# Patient Record
Sex: FEMALE | Race: WHITE | NOT HISPANIC OR LATINO | Employment: OTHER | URBAN - METROPOLITAN AREA
[De-identification: names, ages, dates, MRNs, and addresses within clinical notes are randomized per-mention and may not be internally consistent; named-entity substitution may affect disease eponyms.]

---

## 2017-01-03 ENCOUNTER — RADIANT APPOINTMENT (OUTPATIENT)
Dept: GENERAL RADIOLOGY | Facility: CLINIC | Age: 62
End: 2017-01-03
Attending: FAMILY MEDICINE
Payer: MEDICARE

## 2017-01-03 ENCOUNTER — OFFICE VISIT (OUTPATIENT)
Dept: FAMILY MEDICINE | Facility: CLINIC | Age: 62
End: 2017-01-03
Payer: MEDICARE

## 2017-01-03 VITALS
TEMPERATURE: 99.1 F | RESPIRATION RATE: 16 BRPM | BODY MASS INDEX: 47.8 KG/M2 | WEIGHT: 269.8 LBS | DIASTOLIC BLOOD PRESSURE: 77 MMHG | OXYGEN SATURATION: 96 % | HEART RATE: 72 BPM | SYSTOLIC BLOOD PRESSURE: 112 MMHG | HEIGHT: 63 IN

## 2017-01-03 DIAGNOSIS — M79.662 PAIN OF LEFT LOWER LEG: ICD-10-CM

## 2017-01-03 DIAGNOSIS — M79.661 PAIN OF RIGHT LOWER LEG: ICD-10-CM

## 2017-01-03 DIAGNOSIS — R09.89 OTHER SPECIFIED SYMPTOMS AND SIGNS INVOLVING THE CIRCULATORY AND RESPIRATORY SYSTEMS: ICD-10-CM

## 2017-01-03 DIAGNOSIS — M79.662 PAIN OF LEFT LOWER LEG: Primary | ICD-10-CM

## 2017-01-03 DIAGNOSIS — R20.9 BILATERAL COLD FEET: ICD-10-CM

## 2017-01-03 DIAGNOSIS — E66.01 OBESITY, CLASS III, BMI 40-49.9 (MORBID OBESITY) (H): ICD-10-CM

## 2017-01-03 LAB
BASOPHILS # BLD AUTO: 0 10E9/L (ref 0–0.2)
BASOPHILS NFR BLD AUTO: 0.5 %
CRP SERPL-MCNC: <2.9 MG/L (ref 0–8)
DIFFERENTIAL METHOD BLD: NORMAL
EOSINOPHIL # BLD AUTO: 0.1 10E9/L (ref 0–0.7)
EOSINOPHIL NFR BLD AUTO: 1.5 %
ERYTHROCYTE [DISTWIDTH] IN BLOOD BY AUTOMATED COUNT: 13.8 % (ref 10–15)
ERYTHROCYTE [SEDIMENTATION RATE] IN BLOOD BY WESTERGREN METHOD: 13 MM/H (ref 0–30)
HCT VFR BLD AUTO: 45.5 % (ref 35–47)
HGB BLD-MCNC: 14.5 G/DL (ref 11.7–15.7)
LYMPHOCYTES # BLD AUTO: 2.5 10E9/L (ref 0.8–5.3)
LYMPHOCYTES NFR BLD AUTO: 34.2 %
MCH RBC QN AUTO: 31 PG (ref 26.5–33)
MCHC RBC AUTO-ENTMCNC: 31.9 G/DL (ref 31.5–36.5)
MCV RBC AUTO: 97 FL (ref 78–100)
MONOCYTES # BLD AUTO: 0.5 10E9/L (ref 0–1.3)
MONOCYTES NFR BLD AUTO: 6.8 %
NEUTROPHILS # BLD AUTO: 4.2 10E9/L (ref 1.6–8.3)
NEUTROPHILS NFR BLD AUTO: 57 %
PLATELET # BLD AUTO: 201 10E9/L (ref 150–450)
RBC # BLD AUTO: 4.67 10E12/L (ref 3.8–5.2)
URATE SERPL-MCNC: 6.1 MG/DL (ref 2.6–6)
WBC # BLD AUTO: 7.4 10E9/L (ref 4–11)

## 2017-01-03 PROCEDURE — 86618 LYME DISEASE ANTIBODY: CPT | Performed by: FAMILY MEDICINE

## 2017-01-03 PROCEDURE — 73590 X-RAY EXAM OF LOWER LEG: CPT | Mod: RT

## 2017-01-03 PROCEDURE — 86038 ANTINUCLEAR ANTIBODIES: CPT | Performed by: FAMILY MEDICINE

## 2017-01-03 PROCEDURE — 84550 ASSAY OF BLOOD/URIC ACID: CPT | Performed by: FAMILY MEDICINE

## 2017-01-03 PROCEDURE — 36415 COLL VENOUS BLD VENIPUNCTURE: CPT | Performed by: FAMILY MEDICINE

## 2017-01-03 PROCEDURE — 86431 RHEUMATOID FACTOR QUANT: CPT | Performed by: FAMILY MEDICINE

## 2017-01-03 PROCEDURE — 85652 RBC SED RATE AUTOMATED: CPT | Performed by: FAMILY MEDICINE

## 2017-01-03 PROCEDURE — 73590 X-RAY EXAM OF LOWER LEG: CPT | Mod: LT

## 2017-01-03 PROCEDURE — 99214 OFFICE O/P EST MOD 30 MIN: CPT | Performed by: FAMILY MEDICINE

## 2017-01-03 PROCEDURE — 86140 C-REACTIVE PROTEIN: CPT | Performed by: FAMILY MEDICINE

## 2017-01-03 PROCEDURE — 85025 COMPLETE CBC W/AUTO DIFF WBC: CPT | Performed by: FAMILY MEDICINE

## 2017-01-03 NOTE — NURSING NOTE
"Chief Complaint   Patient presents with     Musculoskeletal Problem       Initial /77 mmHg  Pulse 72  Temp(Src) 99.1  F (37.3  C) (Tympanic)  Resp 16  Ht 5' 3\" (1.6 m)  Wt 269 lb 12.8 oz (122.38 kg)  BMI 47.80 kg/m2  SpO2 96%  LMP 05/29/2006  Breastfeeding? No Estimated body mass index is 47.8 kg/(m^2) as calculated from the following:    Height as of this encounter: 5' 3\" (1.6 m).    Weight as of this encounter: 269 lb 12.8 oz (122.38 kg).  BP completed using cuff size: cristobal Sol      "

## 2017-01-03 NOTE — PROGRESS NOTES
"Jacquie Harris is 61 year old female   Chief Complaint   Patient presents with     Musculoskeletal Problem     Musculoskeletal problem/pain      Duration: a long time...years / Never seen for this before        Description  Location: both legs    Intensity:  moderate    Accompanying signs and symptoms: numbness, tingling, warmth and swelling    History  Previous similar problem: no   Previous evaluation:  none    Precipitating or alleviating factors:  Trauma or overuse: no   Aggravating factors include: sitting, standing, walking, climbing stairs and lifting    Therapies tried and outcome: nothing       Problem list and histories reviewed & adjusted, as indicated.  Additional history: brought in written description:  \"Feet and toes are chilly, legs, knees and thighs warm almost feverish, left knee upper outer side rather numb since knee surgery, outer lower side tender to the tough with a bruised feeling.  front and back of knee swollen and hot to touch.  Knee cap and lower burning sensation.  Ankle is sore and tender to touch.  Right knee rather numb and hot tonight.  more numb to outside.  Front and back of knee swollen and very warm.  Back of calf hurts when I flex my toes.  Both thighs are warm and tender where I rest to jot in my notebook.  Outer left th back of thigh achy and sore.  Right ankle is sore and painful.  Woke at 7 am with pain in both legs just behind the knee.\"    Patient Active Problem List   Diagnosis     s/p gastric bypass x 2     Allergic rhinitis     Carpal tunnel syndrome     Obstructive sleep apnea     Rosacea     CARDIOVASCULAR SCREENING; LDL GOAL LESS THAN 160     Tick bite, infected, positive Lyme test 1996 not treated     Vitamin D deficiency     Osteoporosis     Obesity, Class III, BMI 40-49.9 (morbid obesity) (H)     S/P TKR (total knee replacement)     HTN, goal below 140/80     Back pain, left below scapula, strained muscles     Headache     Encounter for routine gynecological " examination      Colles' fracture     Pain of left lower leg     Pain of right lower leg     Bilateral cold feet     Other specified symptoms and signs involving the circulatory and respiratory systems      Past Surgical History   Procedure Laterality Date     Tubal ligation  1994     C/section, low transverse  1978, 1984, 1994     x 3     Gastric bypass  1980/2005     Gastric Bypass and revision     C oral surgery procedure  age 18     wisdom teeth     Colonoscopy  2001     normal colonoscopy     Hernia repair, incisional  6/16/2008     lap.repair with 10x8 mesh     Arthroplasty knee  4/2/2012     Procedure:ARTHROPLASTY KNEE; Left Total Knee Arthroplasty--Anesth.Choice; Surgeon:VICENTE MORALES; Location:[place] OR       Social History   Substance Use Topics     Smoking status: Never Smoker      Smokeless tobacco: Never Used     Alcohol Use: 0.0 oz/week     0 Standard drinks or equivalent per week      Comment: mixed very light 1 a month if that     Family History   Problem Relation Age of Onset     C.A.D. Father      MI at age 60     Hypertension Father      Lipids Father      high cholesterol     Alzheimer Disease Father      Eye Disorder Mother      glaucoma     OSTEOPOROSIS Mother      on Fosamax     Musculoskeletal Disorder Mother      knee and feet problems     Arthritis Mother      sagrario and feet problems     CANCER Other      maternal cousin with cervical cancer     CANCER Maternal Aunt      stomach cancer     DIABETES No family hx of      CEREBROVASCULAR DISEASE No family hx of      Breast Cancer No family hx of      Cancer - colorectal No family hx of      Prostate Cancer No family hx of      Other Cancer Brother          Current Outpatient Prescriptions   Medication Sig Dispense Refill     nystatin (MYCOSTATIN) cream Apply topically 2 times daily       hydrocortisone 1 % ointment  30 g 0     clobetasol (TEMOVATE) 0.05 % ointment Apply sparingly to affected area twice daily for 14 days.  Do not apply to  "face. 15 g 0     hydrochlorothiazide 12.5 MG TABS Take 1 tablet (12.5 mg) by mouth daily 90 tablet 3     atenolol (TENORMIN) 50 MG tablet Take 1 tablet (50 mg) by mouth daily 90 tablet 3     nystatin (MYCOSTATIN) 791853 UNIT/GM POWD Apply topically 3 times daily as needed 30 g 1     Emollient (CERAVE) CREA Externally apply topically 2 times daily as needed 1 Bottle 3     Allergies   Allergen Reactions     Vioxx Itching and Swelling     VIOXX (Swollen Feet,Hands itching), Okay with ibuprofen and aspirin     Recent Labs   Lab Test  08/15/16   0820  07/13/16   0648  02/26/16   0855   11/22/13   0802  09/24/13   0515  05/01/13   0722   07/14/11   1048  11/04/09   1440   A1C   --    --    --    --    --    --    --    --    --   5.5   LDL   --   94   --    --    --    --   127   --   104   --    HDL   --   45*   --    --    --    --   47*   --   46*   --    TRIG   --   158*   --    --    --    --   163*   --   136   --    ALT   --    --   24   --   36  21   --    --   <6  21   CR  0.80   --   0.75   < >  0.78  0.59   --    --    --   0.69   GFRESTIMATED  73   --   78   < >  76  >90   --    --    --   Not Calculated   GFRESTBLACK  88   --   >90   GFR Calc     < >  >90  >90   --    --    --   Not Calculated   POTASSIUM  3.8   --   3.7   < >  4.3  4.4   --    < >   --   4.6   TSH   --    --   1.82   --    --    --    --    --    --   1.68    < > = values in this interval not displayed.      BP Readings from Last 3 Encounters:   01/03/17 112/77   10/20/16 136/83   10/18/16 105/70    Wt Readings from Last 3 Encounters:   01/03/17 269 lb 12.8 oz (122.38 kg)   10/20/16 261 lb (118.389 kg)   10/18/16 260 lb (117.935 kg)         ROS:  Constitutional, HEENT, cardiovascular, pulmonary, gi and gu systems are negative, except as otherwise noted.    OBJECTIVE:                                                    /77 mmHg  Pulse 72  Temp(Src) 99.1  F (37.3  C) (Tympanic)  Resp 16  Ht 5' 3\" (1.6 m)  Wt 269 lb " 12.8 oz (122.38 kg)  BMI 47.80 kg/m2  SpO2 96%  LMP 05/29/2006  Breastfeeding? No  GENERAL APPEARANCE ADULT: Alert, no acute distress, morbidly obese  MS: extremities normal, no peripheral edema  leg obese, no lesions, full range of motion  SKIN: no suspicious lesions or rashes  NEURO: Alert, oriented, speech and mentation normal, Sensation is normal both legs.  PSYCH: mentation appears normal., affect and mood normal  Diagnostic Test Results:  Results for orders placed or performed in visit on 01/03/17   Erythrocyte sedimentation rate auto   Result Value Ref Range    Sed Rate 13 0 - 30 mm/h   CRP inflammation   Result Value Ref Range    CRP Inflammation <2.9 0.0 - 8.0 mg/L   CBC with platelets differential   Result Value Ref Range    WBC 7.4 4.0 - 11.0 10e9/L    RBC Count 4.67 3.8 - 5.2 10e12/L    Hemoglobin 14.5 11.7 - 15.7 g/dL    Hematocrit 45.5 35.0 - 47.0 %    MCV 97 78 - 100 fl    MCH 31.0 26.5 - 33.0 pg    MCHC 31.9 31.5 - 36.5 g/dL    RDW 13.8 10.0 - 15.0 %    Platelet Count 201 150 - 450 10e9/L    Diff Method Automated Method     % Neutrophils 57.0 %    % Lymphocytes 34.2 %    % Monocytes 6.8 %    % Eosinophils 1.5 %    % Basophils 0.5 %    Absolute Neutrophil 4.2 1.6 - 8.3 10e9/L    Absolute Lymphocytes 2.5 0.8 - 5.3 10e9/L    Absolute Monocytes 0.5 0.0 - 1.3 10e9/L    Absolute Eosinophils 0.1 0.0 - 0.7 10e9/L    Absolute Basophils 0.0 0.0 - 0.2 10e9/L   Uric acid   Result Value Ref Range    Uric Acid 6.1 (H) 2.6 - 6.0 mg/dL     Xray - bilateral Tib/fib normal, reviewed with Jacquie     ASSESSMENT/PLAN:                                                    1. Pain of left lower leg  If labs normal defer to ortho, encourage considerable weight loss, apathetic, has had gastric bypass surgery twice.  - XR Tibia & Fibula Left 2 Views; Future  - XR Tibia & Fibula Right 2 Views; Future  - Erythrocyte sedimentation rate auto  - CRP inflammation  - CBC with platelets differential  - Uric acid  - Antinuclear  antibody screen by EIA  - Rheumatoid factor  - Lyme Disease Crystal with reflex to WB Serum  - ORTHO  REFERRAL    2. Pain of right lower leg  as listed above  - XR Tibia & Fibula Left 2 Views; Future  - Erythrocyte sedimentation rate auto  - CRP inflammation  - CBC with platelets differential  - Uric acid  - Antinuclear antibody screen by EIA  - Rheumatoid factor  - Lyme Disease Crystal with reflex to WB Serum  - ORTHO  REFERRAL    3. Bilateral cold feet  Concern for circulatory disorder, expect to be normal, weight and winter related  - ANKLE-ARM INDEX SINGLE LEVEL BILATERAL; Future  - Erythrocyte sedimentation rate auto  - CRP inflammation  - CBC with platelets differential  - Uric acid  - Antinuclear antibody screen by EIA  - Rheumatoid factor  - Lyme Disease Crystal with reflex to WB Serum    4. Other specified symptoms and signs involving the circulatory and respiratory systems   - ANKLE-ARM INDEX SINGLE LEVEL BILATERAL; Future  - Erythrocyte sedimentation rate auto  - CRP inflammation  - CBC with platelets differential  - Uric acid  - Antinuclear antibody screen by EIA  - Rheumatoid factor  - Lyme Disease Crystal with reflex to WB Serum    5. Obesity, Class III, BMI 40-49.9 (morbid obesity) (H)      Mariel Sabillon MD  [facility]

## 2017-01-03 NOTE — Clinical Note
[facility]  [address] [place] [address]  Phone: [phone]    January 9, 2017    Jacquie Steven  [address] [place] [address]          Dear MsSophie Steven,    The results of your recent lab tests were Labs are essentially normal. Enclosed is a copy of these results.  If you have any further questions or problems, please contact our office.    Sincerely,      Mariel Sabillon MD / scar

## 2017-01-03 NOTE — MR AVS SNAPSHOT
After Visit Summary   1/3/2017    Jacquie Harris    MRN: [id number]           Patient Information     Date Of Birth          1955        Visit Information        Provider Department      1/3/2017 9:20 AM Mariel Sabillon MD [facility]        Today's Diagnoses     Pain of left lower leg    -  1     Pain of right lower leg         Bilateral cold feet         Other specified symptoms and signs involving the circulatory and respiratory systems          Obesity, Class III, BMI 40-49.9 (morbid obesity) (H)            Follow-ups after your visit        Additional Services     ORTHO  REFERRAL       [facility] is referring you to the Orthopedic  Services at [facility].       The  Representative will assist you in the coordination of your Orthopedic and Musculoskeletal Care as prescribed by your physician.    The  Representative will call you within 1 business day to help schedule your appointment, or you may contact the  Representative at:    All areas ~ [phone]     Type of Referral : Non Surgical       Timeframe requested: Routine    Coverage of these services is subject to the terms and limitations of your health insurance plan.  Please call member services at your health plan with any benefit or coverage questions.      If X-rays, CT or MRI's have been performed, please contact the facility where they were done to arrange for , prior to your scheduled appointment.  Please bring this referral request to your appointment and present it to your specialist.                  Future tests that were ordered for you today     Open Future Orders        Priority Expected Expires Ordered    ANKLE-ARM INDEX SINGLE LEVEL BILATERAL Routine  2/17/2017 1/3/2017            Who to contact     If you have questions or need follow up information about today's clinic visit or your schedule please contact [place]  [facility] directly at [phone].  Normal or non-critical lab and imaging results will be communicated to you by MyChart, letter or phone within 4 business days after the clinic has received the results. If you do not hear from us within 7 days, please contact the clinic through [employer] or phone. If you have a critical or abnormal lab result, we will notify you by phone as soon as possible.  Submit refill requests through [employer] or call your pharmacy and they will forward the refill request to us. Please allow 3 business days for your refill to be completed.          Additional Information About Your Visit        [employer] Information     [employer] lets you send messages to your doctor, view your test results, renew your prescriptions, schedule appointments and more. To sign up, go to [email or web address] . Click on \"Log in\" on the left side of the screen, which will take you to the Welcome page. Then click on \"Sign up Now\" on the right side of the page.     You will be asked to enter the access code listed below, as well as some personal information. Please follow the directions to create your username and password.     Your access code is: [email or web address]  Expires: 4/3/2017 10:56 AM     Your access code will  in 90 days. If you need help or a new code, please call your [facility] clinic or [phone].        Care EveryWhere ID     This is your Care EveryWhere ID. This could be used by other organizations to access your [facility] medical records  [id number]        Your Vitals Were     Pulse Temperature Respirations    72 99.1  F (37.3  C) (Tympanic) 16    Height BMI (Body Mass Index) Pulse Oximetry    5' 3\" (1.6 m) 47.80 kg/m2 96%    Last Period Breastfeeding?       2006 No        Blood Pressure from Last 3 Encounters:   17 112/77   10/20/16 136/83   10/18/16 105/70    Weight from Last 3 Encounters:   17 269 lb 12.8 oz (122.38 kg)   10/20/16 261 lb (118.389 kg)   10/18/16 260 lb " (117.935 kg)              We Performed the Following     Antinuclear antibody screen by EIA     CBC with platelets differential     CRP inflammation     Erythrocyte sedimentation rate auto     Lyme Disease Crystal with reflex to WB Serum     ORTHO  REFERRAL     Rheumatoid factor     Uric acid        Primary Care Provider Office Phone # Fax #    Mariel Va Sabillon [phone]       [facility] [address] [address]        Thank you!     Thank you for choosing [facility]  for your care. Our goal is always to provide you with excellent care. Hearing back from our patients is one way we can continue to improve our services. Please take a few minutes to complete the written survey that you may receive in the mail after your visit with us. Thank you!             Your Updated Medication List - Protect others around you: Learn how to safely use, store and throw away your medicines at www.disposemymeds.org.          This list is accurate as of: 1/3/17 10:56 AM.  Always use your most recent med list.                   Brand Name Dispense Instructions for use    atenolol 50 MG tablet    TENORMIN    90 tablet    Take 1 tablet (50 mg) by mouth daily       CERAVE Crea     1 Bottle    Externally apply topically 2 times daily as needed       clobetasol 0.05 % ointment    TEMOVATE    15 g    Apply sparingly to affected area twice daily for 14 days.  Do not apply to face.       hydrochlorothiazide 12.5 MG Tabs tablet     90 tablet    Take 1 tablet (12.5 mg) by mouth daily       hydrocortisone 1 % ointment     30 g        * nystatin [id number] UNIT/GM Powd    MYCOSTATIN    30 g    Apply topically 3 times daily as needed       * nystatin cream    MYCOSTATIN     Apply topically 2 times daily       * Notice:  This list has 2 medication(s) that are the same as other medications prescribed for you. Read the directions carefully, and ask your doctor or other care provider to review  them with you.

## 2017-01-04 LAB
ANA SER QL IA: NORMAL
B BURGDOR IGG+IGM SER QL: 0.03 (ref 0–0.89)
RHEUMATOID FACT SER NEPH-ACNC: <20 IU/ML (ref 0–20)

## 2017-01-10 ENCOUNTER — OFFICE VISIT (OUTPATIENT)
Dept: ORTHOPEDICS | Facility: CLINIC | Age: 62
End: 2017-01-10
Payer: MEDICARE

## 2017-01-10 VITALS
SYSTOLIC BLOOD PRESSURE: 115 MMHG | HEIGHT: 63 IN | WEIGHT: 269 LBS | BODY MASS INDEX: 47.66 KG/M2 | DIASTOLIC BLOOD PRESSURE: 75 MMHG

## 2017-01-10 DIAGNOSIS — M71.21 BAKER'S CYST, RIGHT: ICD-10-CM

## 2017-01-10 DIAGNOSIS — M17.11 PRIMARY OSTEOARTHRITIS OF RIGHT KNEE: Primary | ICD-10-CM

## 2017-01-10 DIAGNOSIS — E79.0 ELEVATED URIC ACID IN BLOOD: ICD-10-CM

## 2017-01-10 DIAGNOSIS — Z96.652 STATUS POST TOTAL LEFT KNEE REPLACEMENT: ICD-10-CM

## 2017-01-10 DIAGNOSIS — M25.562 ARTHRALGIA OF BOTH LOWER LEGS: ICD-10-CM

## 2017-01-10 DIAGNOSIS — M25.561 ARTHRALGIA OF BOTH LOWER LEGS: ICD-10-CM

## 2017-01-10 DIAGNOSIS — M70.50 PES ANSERINE BURSITIS: ICD-10-CM

## 2017-01-10 PROCEDURE — 99203 OFFICE O/P NEW LOW 30 MIN: CPT | Performed by: FAMILY MEDICINE

## 2017-01-10 RX ORDER — PREDNISONE 20 MG/1
40 TABLET ORAL DAILY
Qty: 10 TABLET | Refills: 0 | Status: SHIPPED | OUTPATIENT
Start: 2017-01-10 | End: 2017-01-15

## 2017-01-10 NOTE — PROGRESS NOTES
Jacquie Harris  :  1955  DOS: 1/10/2017  MRN: [id number]    Sports Medicine Clinic Visit    PCP: Mariel Sabillon    Jacquie Harris is a 61 year old female who is seen in consultation at the request of  Dr. Sabillon presenting with bilateral knee, lower leg pain.    Injury: H/o chronic bilateral knee pain over last 5+ years, now having radiating burning pain/swelling into bilateral lower legs over last 2 - 3 months.  Pain located over bilateral deep medial knee, posterior knee, posterior lower leg, distal thigh, radiating to lower leg.  Additional Features:  Positive: swelling, grinding, catching, numbness and weakness.  Symptoms are better with Rest.  Symptoms are worse with: prolonged standing, crossing legs, lying in bed, going from sit to stand position.  Other evaluation and/or treatments so far consists of: Tylenol, Ibuprofen, Rest and PCP consult.  Recent imaging completed: X-rays completed 1/3/17.  Prior History of related problems: H/o left TKA ~ 4 years by Dr Panchal @ [facility].  Right knee popliteal cyst aspiration provided moderate relief.  Patient believes that she had steroid injection into left by Dr Panchal in 2016 that provided minimal relief.    Social History: unemployed    Review of Systems  Musculoskeletal: as above  Remainder of review of systems is negative including constitutional, CV, pulmonary, GI, Skin and Neurologic except as noted in HPI or medical history.    Past Medical History   Diagnosis Date     IRON DEFICIENCY ANEMIA 2005      Iron infusion/ resolved since injections     DARWIN (obstructive sleep apnea)      HTN (hypertension)      Past Surgical History   Procedure Laterality Date     Tubal ligation       C/section, low transverse  , , 1994     x 3     Gastric bypass  /     Gastric Bypass and revision     C oral surgery procedure  age 18     wisdom teeth     Colonoscopy       normal colonoscopy     Hernia repair, incisional  2008     lap.repair  "with 10x8 mesh     Arthroplasty knee  4/2/2012     Procedure:ARTHROPLASTY KNEE; Left Total Knee Arthroplasty--Anesth.Choice; Surgeon:VICENTE MORALES; Location:[place] OR       Objective  /75 mmHg  Ht 5' 3\" (1.6 m)  Wt 269 lb (122.018 kg)  BMI 47.66 kg/m2  LMP 05/29/2006    General: healthy, alert and in no distress, obese  HEENT: no scleral icterus or conjunctival erythema   Skin: no suspicious lesions or rash. No jaundice.   CV: regular rhythm by palpation, 2+ distal pulses, no pedal edema    Resp: normal respiratory effort without conversational dyspnea   Psych: normal mood and affect    Gait: antalgic, appropriate coordination and balance   Neuro: normal light touch sensory exam of the extremities. Motor strength as noted below     Right Knee exam    ROM:        Flexion 110 degrees       Extension -2 degrees       Range of motion limited by pain, mechanical    Inspection:       no visible ecchymosis        effusion noted trace    Skin:       no visible deformities       well perfused       capillary refill brisk    Patellar Motion:        Crepitus noted in the patellofemoral joint    Tender:        medial patellar border       lateral patellar border       medial joint line       lateral joint line       Pes anserine bursa b/l       Posterior knee medially    Non Tender:         remainder of knee area        along MCL        distal IT Band        infrapatellar tendon        tibial tubercle    Special Tests:        neg (-) varus at 0 deg and 30 deg       neg (-) valgus at 0 deg and 30 deg    Evaluation of ipsilateral kinetic chain       decreased strength with resisted abduction of the b/l hip       decreased strength with resisted extension of the b/l hip       B/l decreased quad tone and core deconditioning      Radiology  XR images independently visualized and reviewed with patient today in clinic  Medial compartment narrowing on the R, no acute findings in lower leg bekah, no lucencies appreciated " about L knee hardware s/p TKA, will follow final radiology read    Assessment:  1. Primary osteoarthritis of right knee    2. Baker's cyst, right    3. Arthralgia of both lower legs    4. Status post total left knee replacement    5. Pes anserine bursitis    6. Elevated uric acid in blood        Plan:  Discussed the assessment with the patient.  Follow up: prn based on clinical progress  Suspect much of pain on R coming from OA  Trial of prednisone burst to help with sx b/l  Defer further knee tx/workup to surgeon who performed TKA, Dr Thompson  Offered synvisc into R knee which has worked well in the past, would perform using US guidance  Can consider if prednisone does not resolve the pain  Can use US to eval baker's cyst as well  PT offered and advised, order placed today  Uric acid slightly elevated, not a lot of risk factors, but prednoisone may help and can follow with PCP for further tx options  No clear clinical signs suggesting gout today  If aspiration is done in the future would be helpful to test fluid for crystals  We discussed modified progressive pain-free activity as tolerated  RICE reviewed  Reviewed wt loss, activity modification and progressive increase in activity as tolerated and guided by pain  Reviewed options for potential steroid vs viscosupplementation injections and the possibility for future orthopedic referral prn  Reviewed safe and appropriate OTC medication choices, try tylenol first  Up to 3000mg daily of tylenol is generally safe, NSAID dosing and duration limitations reviewed  Discussed nature of degenerative arthrosis of the knee.   Discussed symptom treatment with ice or heat, topical treatments, and rest if needed.   Home handouts provided and supportive care reviewed  All questions were answered today  Contact us with additional questions or concerns  Signs and sx of concern reviewed      Cedric Rashid DO, CANETTIE  Primary Care Sports Medicine  [facility]                Disclaimer: This note consists of symbols derived from keyboarding, dictation and/or voice recognition software. As a result, there may be errors in the script that have gone undetected. Please consider this when interpreting information found in this chart.

## 2017-01-10 NOTE — MR AVS SNAPSHOT
After Visit Summary   1/10/2017    Jacquie Harris    MRN: [id number]           Patient Information     Date Of Birth          1955        Visit Information        Provider Department      1/10/2017 10:20 AM Cedric Rashid, DO [facility]        Today's Diagnoses     Primary osteoarthritis of right knee    -  1     Baker's cyst, right         Arthralgia of both lower legs         Status post total left knee replacement         Pes anserine bursitis         Elevated uric acid in blood           Care Instructions    Assessment:  1. Primary osteoarthritis of right knee    2. Baker's cyst, right    3. Arthralgia of both lower legs    4. Status post total left knee replacement    5. Pes anserine bursitis    6. Elevated uric acid in blood        Plan:  Discussed the assessment with the patient.  Rehab: [facility] - [phone]  Follow up: as needed          Follow-ups after your visit        Additional Services     PHYSICAL THERAPY REFERRAL       *This therapy referral will be filtered to a centralized scheduling office at [facility] and the patient will receive a call to schedule an appointment at a [facility] location most convenient for them. *     [facility] provides Physical Therapy evaluation and treatment and many specialty services across the [facility] system.  If requesting a specialty program, please choose from the list below.    If you have not heard from the scheduling office within 2 business days, please call [phone] for all locations, with the exception of [place], please call [phone].  Treatment: Evaluation & Treatment  Special Instructions/Modalities: establish HEP  Special Programs: None    Please be aware that coverage of these services is subject to the terms and limitations of your health insurance plan.  Call member services at your health plan with any benefit or  "coverage questions.      **Note to Provider:  If you are referring outside of [facility] for the therapy appointment, please list the name of the location in the  special instructions  above, print the referral and give to the patient to schedule the appointment.                  Who to contact     If you have questions or need follow up information about today's clinic visit or your schedule please contact [facility] directly at [phone].  Normal or non-critical lab and imaging results will be communicated to you by [facility], letter or phone within 4 business days after the clinic has received the results. If you do not hear from us within 7 days, please contact the clinic through [facility] or phone. If you have a critical or abnormal lab result, we will notify you by phone as soon as possible.  Submit refill requests through [employer] or call your pharmacy and they will forward the refill request to us. Please allow 3 business days for your refill to be completed.          Additional Information About Your Visit        [employer] Information     [employer] lets you send messages to your doctor, view your test results, renew your prescriptions, schedule appointments and more. To sign up, go to [email or web address] . Click on \"Log in\" on the left side of the screen, which will take you to the Welcome page. Then click on \"Sign up Now\" on the right side of the page.     You will be asked to enter the access code listed below, as well as some personal information. Please follow the directions to create your username and password.     Your access code is: [email or web address]  Expires: 4/3/2017 10:56 AM     Your access code will  in 90 days. If you need help or a new code, please call your [facility] clinic or [phone].        Care EveryWhere ID     This is your Care EveryWhere ID. This could be used by other organizations to access your [facility] medical records  [id number]        Your " "Vitals Were     Height BMI (Body Mass Index) Last Period             5' 3\" (1.6 m) 47.66 kg/m2 05/29/2006          Blood Pressure from Last 3 Encounters:   01/10/17 115/75   01/03/17 112/77   10/20/16 136/83    Weight from Last 3 Encounters:   01/10/17 269 lb (122.018 kg)   01/03/17 269 lb 12.8 oz (122.38 kg)   10/20/16 261 lb (118.389 kg)              We Performed the Following     PHYSICAL THERAPY REFERRAL          Today's Medication Changes          These changes are accurate as of: 1/10/17 11:15 AM.  If you have any questions, ask your nurse or doctor.               Start taking these medicines.        Dose/Directions    predniSONE 20 MG tablet   Commonly known as:  DELTASONE   Used for:  Primary osteoarthritis of right knee, Arthralgia of both lower legs, Elevated uric acid in blood, Pes anserine bursitis   Started by:  Cedric Rashid DO        Dose:  40 mg   Take 2 tablets (40 mg) by mouth daily for 5 days   Quantity:  10 tablet   Refills:  0            Where to get your medicines      These medications were sent to [facility] [address]  [address] [address]     Phone:  [phone]    - predniSONE 20 MG tablet             Primary Care Provider Office Phone # Fax #    Mariel Va Sabillon [phone]       [facility] [address] [address]        Thank you!     Thank you for choosing [facility]  for your care. Our goal is always to provide you with excellent care. Hearing back from our patients is one way we can continue to improve our services. Please take a few minutes to complete the written survey that you may receive in the mail after your visit with us. Thank you!             Your Updated Medication List - Protect others around you: Learn how to safely use, store and throw away your medicines at www.disposemymeds.org.          This list is accurate as of: 1/10/17 " 11:15 AM.  Always use your most recent med list.                   Brand Name Dispense Instructions for use    atenolol 50 MG tablet    TENORMIN    90 tablet    Take 1 tablet (50 mg) by mouth daily       CERAVE Crea     1 Bottle    Externally apply topically 2 times daily as needed       clobetasol 0.05 % ointment    TEMOVATE    15 g    Apply sparingly to affected area twice daily for 14 days.  Do not apply to face.       hydrochlorothiazide 12.5 MG Tabs tablet     90 tablet    Take 1 tablet (12.5 mg) by mouth daily       hydrocortisone 1 % ointment     30 g        * nystatin 110026 UNIT/GM Powd    MYCOSTATIN    30 g    Apply topically 3 times daily as needed       * nystatin cream    MYCOSTATIN     Apply topically 2 times daily       predniSONE 20 MG tablet    DELTASONE    10 tablet    Take 2 tablets (40 mg) by mouth daily for 5 days       * Notice:  This list has 2 medication(s) that are the same as other medications prescribed for you. Read the directions carefully, and ask your doctor or other care provider to review them with you.

## 2017-01-10 NOTE — PATIENT INSTRUCTIONS
Assessment:  1. Primary osteoarthritis of right knee    2. Baker's cyst, right    3. Arthralgia of both lower legs    4. Status post total left knee replacement    5. Pes anserine bursitis    6. Elevated uric acid in blood        Plan:  Discussed the assessment with the patient.  [facility] [phone]  Follow up: as needed

## 2017-01-10 NOTE — NURSING NOTE
"Initial /75 mmHg  Ht 5' 3\" (1.6 m)  Wt 269 lb (122.018 kg)  BMI 47.66 kg/m2  LMP 05/29/2006 Estimated body mass index is 47.66 kg/(m^2) as calculated from the following:    Height as of this encounter: 5' 3\" (1.6 m).    Weight as of this encounter: 269 lb (122.018 kg). .    Sav Smith ATC  "

## 2017-01-27 ENCOUNTER — HOSPITAL ENCOUNTER (OUTPATIENT)
Dept: PHYSICAL THERAPY | Facility: CLINIC | Age: 62
Setting detail: THERAPIES SERIES
End: 2017-01-27
Attending: FAMILY MEDICINE
Payer: MEDICARE

## 2017-01-27 PROCEDURE — 40000185 ZZHC STATISTIC PT OUTPT VISIT

## 2017-01-27 PROCEDURE — 97110 THERAPEUTIC EXERCISES: CPT | Mod: GP

## 2017-01-27 PROCEDURE — G8979 MOBILITY GOAL STATUS: HCPCS | Mod: GP,CJ

## 2017-01-27 PROCEDURE — 97162 PT EVAL MOD COMPLEX 30 MIN: CPT | Mod: GP

## 2017-01-27 PROCEDURE — G8978 MOBILITY CURRENT STATUS: HCPCS | Mod: GP,CJ

## 2017-01-27 NOTE — PROGRESS NOTES
[facility]          OUTPATIENT PHYSICAL THERAPY ORTHOPEDIC EVALUATION  PLAN OF TREATMENT FOR OUTPATIENT REHABILITATION  (COMPLETE FOR INITIAL CLAIMS ONLY)  Patient's Last Name, First Name, M.I.  YOB: 1955  Jacquie Harris       Provider s Name:  [facility]   Medical Record No.  [id number]   Start of Care Date:  01/27/17   Onset Date:  06/01/16   Type:     _X__PT   ___OT   ___SLP Medical Diagnosis:  Primary osteoarthritis of right knee, Pes anserine bursitis, Arthralgia of both lower legs, Status post total left knee replacement     PT Diagnosis:  B knee and leg pain   Visits from SOC:  1      _________________________________________________________________________________  Plan of Treatment/Functional Goals:  strengthening, stretching, balance training, gait training           Goals  Goal Identifier: 1  Goal Description: Pt will be able to walk 2 blocks with < 5/10 pain.  Target Date: 02/17/17    Goal Identifier: 2  Goal Description: Pt will be able to navigate stairs one step at a time with < 5/10 pain.  Target Date: 02/24/17    Goal Identifier: 3  Goal Description: Pt will be able to stand for 1 hour with < 5/10 pain.  Target Date: 02/24/17    Goal Identifier: 4  Goal Description: Pt will be independent with HEP for improved functional recovery.  Target Date: 02/24/17                                                Therapy Frequency:  1 time/week  Predicted Duration of Therapy Intervention:  4 weeks    MARLYS QUINONEZ, PT                 I CERTIFY THE NEED FOR THESE SERVICES FURNISHED UNDER        THIS PLAN OF TREATMENT AND WHILE UNDER MY CARE     (Physician co-signature of this document indicates review and certification of the therapy plan).                       Certification Date From:  01/27/17   Certification Date To:  03/10/17    Referring Provider:  Dr Rashid    Initial Assessment        See Epic Evaluation Start of Care Date: 01/27/17

## 2017-01-27 NOTE — PROGRESS NOTES
PT Knee/LE Evaluation 01/27/17 1000   General Information   Type of Visit Initial OP Ortho PT Evaluation   Start of Care Date 01/27/17   Referring Physician Dr Rashid   Patient/Family Goals Statement wants to be painfree   Orders Evaluate and Treat   Orders Comment Develop HEP   Date of Order 01/10/17   Insurance Type Medicare   Medical Diagnosis Primary osteoarthritis of right knee, Pes anserine bursitis, Arthralgia of both lower legs, Status post total left knee replacement   Surgical/Medical history reviewed Yes  (L TKA, HTN, arthritis, gastric bypass)   Body Part(s)   Body Part(s) Knee   Presentation and Etiology   Pertinent history of current problem (include personal factors and/or comorbidities that impact the POC) Pt reports having a Bakers cyst in the back of her R knee that originally gave her pain starting last summer. She came to PT for 2 visits in Oct and then recieved a cortisone injection. The shot helped for a week, but she did not return to PT as she did not think it helped. Now her pain is in B knees and lower legs into ankles. Rates pain at 6-7/10 at rest, 8-9/10 with amb on the R, pain comes and goes on the L and she rates it at 3-4/10 currently.   Impairments A. Pain;B. Decreased WB tolerance;C. Swelling;D. Decreased ROM;E. Decreased flexibility;F. Decreased strength and endurance;G. Impaired balance;H. Impaired gait;J. Burning;K. Numbness;L. Tingling;M. Locking or catching   Functional Limitations perform activities of daily living;perform desired leisure / sports activities  (stairs, walking, squatting)   Symptom Location B ant and post knee, legs ant, med, lat, and post   Onset date of current episode/exacerbation 06/01/16   Chronicity Chronic   Pain rating (0-10 point scale) Best (/10);Worst (/10)   Best (/10) 3   Worst (/10) 8   Pain quality A. Sharp;B. Dull;C. Aching;D. Burning;E. Shooting;F. Stabbing;G. Cramping   Frequency of pain/symptoms A. Constant   Pain/symptoms are: The same all the  "time   Pain/symptoms exacerbated by G. Certain positions  (long sitting, stairs)   Pain/symptoms eased by E. Changing positions   Progression of symptoms since onset: Worsened   Prior Level of Function   Functional Level Prior Comment stairs one at a time or chair lift   Current Level of Function   Current Community Support Family/friend caregiver   Patient role/employment history F. Retired   [facility]   Home/community accessibility has a lift chair for stairs   Current equipment-Gait/Locomotion None   Fall Risk Screen   Fall screen completed by PT   Per patient - Fall 2 or more times in past year? No   Per patient - Fall with injury in past year? No   Is patient a fall risk? No   Functional Scales   Functional Scales (LEFS 23/80)   Knee Objective Findings   Side (if bilateral, select both right and left) Right;Left   Posture B valgus, L foot pronation   Right Knee Flexion AROM 108*   Right Knee Flexion Strength 4/5 ++   Right Knee Extension Strength 4/5 ++   Right Hip Abduction Strength 3/5    Knee/Hip Strength Comments B hip flex 4-/5, PF 3-/5 with pain on the R in post thigh and leg   Palpation very tender in B adductors, IT bands, hamstrings, calves, ant tib; nontender quads   Left Knee Extension AROM 0*   Left Knee Flexion AROM 125*   Left Knee Flexion Strength 4/5 ++   Left Knee Extension Strength 4/5 ++   Left Hip Abduction Strength 3-/5 ++   Left Gastrocnemius Flexibility normal   Left Hamstring Flexibility normal   Left ITB Flexibility normal   Gait/Locomotion Amb with B lateral lean, waddle gait   Balance/Proprioception (Single Leg Stance) R 9\". L 17\"   Right Knee Extension AROM 0*   Knee ROM Comment Pt reports R knee feels limited due to swelling   Right Hamstring Flexibility normal   Right ITB Flexibility normal   Right Gastrocnemius Flexibility normal   Lachmans Test -   Anterior Drawer Test -   Posterior Drawer Test -   Varus Stress Test -   Valgus Stress Test -   Fatmua's " Test -   Planned Therapy Interventions   Planned Therapy Interventions strengthening;stretching;balance training;gait training   Clinical Impression   Criteria for Skilled Therapeutic Interventions Met yes, treatment indicated   PT Diagnosis B knee and leg pain   Influenced by the following impairments pain, weakness   Functional limitations due to impairments walking, stairs, prolonged standing   Clinical Presentation Evolving/Changing   Clinical Presentation Rationale pain is worsening, arthritis, high BMI, impaired gait, stairs   Clinical Decision Making (Complexity) Moderate complexity   Therapy Frequency 1 time/week   Predicted Duration of Therapy Intervention (days/wks) 4 weeks   Risk & Benefits of therapy have been explained Yes   Patient, Family & other staff in agreement with plan of care Yes   Education Assessment   Preferred Learning Style Listening;Demonstration;Pictures/video   Barriers to Learning No barriers   ORTHO GOALS   PT Ortho Eval Goals 1;2;3;4   Ortho Goal 1   Goal Identifier 1   Goal Description Pt will be able to walk 2 blocks with < 5/10 pain.   Target Date 02/17/17   Ortho Goal 2   Goal Identifier 2   Goal Description Pt will be able to navigate stairs one step at a time with < 5/10 pain.   Target Date 02/24/17   Ortho Goal 3   Goal Identifier 3   Goal Description Pt will be able to stand for 1 hour with < 5/10 pain.   Target Date 02/24/17   Ortho Goal 4   Goal Identifier 4   Goal Description Pt will be independent with HEP for improved functional recovery.   Target Date 02/24/17   Total Evaluation Time   Total Evaluation Time 20   Therapy Certification   Certification date from 01/27/17   Certification date to 03/10/17   Medical Diagnosis Primary osteoarthritis of right knee, Pes anserine bursitis, Arthralgia of both lower legs, Status post total left knee replacement     Génesis Quick PT

## 2017-01-31 ENCOUNTER — HOSPITAL ENCOUNTER (OUTPATIENT)
Dept: PHYSICAL THERAPY | Facility: CLINIC | Age: 62
Setting detail: THERAPIES SERIES
End: 2017-01-31
Attending: FAMILY MEDICINE
Payer: MEDICARE

## 2017-01-31 PROCEDURE — 97110 THERAPEUTIC EXERCISES: CPT | Mod: GP | Performed by: PHYSICAL THERAPIST

## 2017-01-31 PROCEDURE — 40000718 ZZHC STATISTIC PT DEPARTMENT ORTHO VISIT: Performed by: PHYSICAL THERAPIST

## 2017-02-07 ENCOUNTER — HOSPITAL ENCOUNTER (OUTPATIENT)
Dept: PHYSICAL THERAPY | Facility: CLINIC | Age: 62
Setting detail: THERAPIES SERIES
End: 2017-02-07
Attending: FAMILY MEDICINE
Payer: MEDICARE

## 2017-02-07 PROCEDURE — 97110 THERAPEUTIC EXERCISES: CPT | Mod: GP | Performed by: PHYSICAL THERAPIST

## 2017-02-07 PROCEDURE — 40000718 ZZHC STATISTIC PT DEPARTMENT ORTHO VISIT: Performed by: PHYSICAL THERAPIST

## 2017-02-14 ENCOUNTER — HOSPITAL ENCOUNTER (OUTPATIENT)
Dept: PHYSICAL THERAPY | Facility: CLINIC | Age: 62
Setting detail: THERAPIES SERIES
End: 2017-02-14
Attending: FAMILY MEDICINE
Payer: MEDICARE

## 2017-02-14 PROCEDURE — G8980 MOBILITY D/C STATUS: HCPCS | Mod: GP,CJ | Performed by: PHYSICAL THERAPIST

## 2017-02-14 PROCEDURE — G8979 MOBILITY GOAL STATUS: HCPCS | Mod: GP,CI | Performed by: PHYSICAL THERAPIST

## 2017-02-14 PROCEDURE — 40000718 ZZHC STATISTIC PT DEPARTMENT ORTHO VISIT: Performed by: PHYSICAL THERAPIST

## 2017-02-14 NOTE — PROGRESS NOTES
Jacquie Harris   PHYSICAL THERAPY DISCHARGE  02/14/17 0800   Signing Clinician's Name / Credentials   Signing clinician's name / credentials Kris Hoenk, PT   Session Number   Session Number 4MC   PT Medicare Only G-code   G-code Mobility: Walking & Moving Around   Mobility: Walking & Moving Around   Mobility Goal,  (eval/re-eval, every progress note, & discharge) CI: 1-19% impairment   Mobility Discharge Status,  (discharge) CJ: 20-39% impairment   Discharge Mobility Modifier Rationale no signif change form eval, cont knee pain   Adult Goals   PT Ortho Eval Goals 1;2;3;4   Ortho Goal 1   Goal Description Pt will be able to walk 2 blocks with < 5/10 pain.  (pain level 8/10 today)   Target Date 02/17/17   Ortho Goal 2   Goal Description Pt will be able to navigate stairs one step at a time with < 5/10 pain.  (not met, high pain level)   Target Date 02/24/17   Ortho Goal 3   Goal Description Pt will be able to stand for 1 hour with < 5/10 pain.  (not met)   Target Date 02/24/17   Ortho Goal 4   Goal Description Pt will be independent with HEP for improved functional recovery.  (knows exercises, min change)   Target Date 02/24/17   Subjective Report   Subjective Report pain 8/10 today, had injection yesterday, [facility] going to [facility] to [facility] in [place], getting injection series of 5 shots, they have a 5 week plan of PT in series with the sinvis injections, really want to try this to see if her knee can get better   Therapeutic Procedure/exercise   Minutes 5   Skilled Intervention bike only   Treatment Detail recum bike 5min   Plan   Plan for next session DC PT here,will continue elswhere per pt choice   Total Session Time   Total Session Time 10 no charge   Kris Hoenk, PT #[id number]  [facility]

## 2017-02-24 ENCOUNTER — HOSPITAL ENCOUNTER (EMERGENCY)
Facility: CLINIC | Age: 62
Discharge: HOME | End: 2017-02-24
Attending: EMERGENCY MEDICINE | Admitting: EMERGENCY MEDICINE
Payer: MEDICARE

## 2017-02-24 ENCOUNTER — APPOINTMENT (OUTPATIENT)
Dept: GENERAL RADIOLOGY | Facility: CLINIC | Age: 62
End: 2017-02-24
Attending: EMERGENCY MEDICINE
Payer: MEDICARE

## 2017-02-24 VITALS
HEIGHT: 63 IN | OXYGEN SATURATION: 96 % | WEIGHT: 260 LBS | DIASTOLIC BLOOD PRESSURE: 69 MMHG | BODY MASS INDEX: 46.07 KG/M2 | RESPIRATION RATE: 16 BRPM | SYSTOLIC BLOOD PRESSURE: 122 MMHG | TEMPERATURE: 99.1 F

## 2017-02-24 DIAGNOSIS — R51.9 NONINTRACTABLE HEADACHE, UNSPECIFIED CHRONICITY PATTERN, UNSPECIFIED HEADACHE TYPE: ICD-10-CM

## 2017-02-24 DIAGNOSIS — B34.9 VIRAL SYNDROME: ICD-10-CM

## 2017-02-24 LAB
ALBUMIN SERPL-MCNC: 3.4 G/DL (ref 3.4–5)
ALBUMIN UR-MCNC: NEGATIVE MG/DL
ALP SERPL-CCNC: 74 U/L (ref 40–150)
ALT SERPL W P-5'-P-CCNC: 25 U/L (ref 0–50)
ANION GAP SERPL CALCULATED.3IONS-SCNC: 6 MMOL/L (ref 3–14)
APPEARANCE UR: CLEAR
AST SERPL W P-5'-P-CCNC: 21 U/L (ref 0–45)
BASOPHILS # BLD AUTO: 0 10E9/L (ref 0–0.2)
BASOPHILS NFR BLD AUTO: 0.5 %
BILIRUB SERPL-MCNC: 0.4 MG/DL (ref 0.2–1.3)
BILIRUB UR QL STRIP: NEGATIVE
BUN SERPL-MCNC: 13 MG/DL (ref 7–30)
CALCIUM SERPL-MCNC: 8.7 MG/DL (ref 8.5–10.1)
CHLORIDE SERPL-SCNC: 101 MMOL/L (ref 94–109)
CO2 SERPL-SCNC: 28 MMOL/L (ref 20–32)
COLOR UR AUTO: YELLOW
CREAT SERPL-MCNC: 0.82 MG/DL (ref 0.52–1.04)
DIFFERENTIAL METHOD BLD: ABNORMAL
EOSINOPHIL # BLD AUTO: 0 10E9/L (ref 0–0.7)
EOSINOPHIL NFR BLD AUTO: 0.2 %
ERYTHROCYTE [DISTWIDTH] IN BLOOD BY AUTOMATED COUNT: 12.8 % (ref 10–15)
FLUAV+FLUBV AG SPEC QL: NEGATIVE
FLUAV+FLUBV AG SPEC QL: NORMAL
GFR SERPL CREATININE-BSD FRML MDRD: 71 ML/MIN/1.7M2
GLUCOSE SERPL-MCNC: 124 MG/DL (ref 70–99)
GLUCOSE UR STRIP-MCNC: NEGATIVE MG/DL
HCT VFR BLD AUTO: 45.6 % (ref 35–47)
HGB BLD-MCNC: 14.6 G/DL (ref 11.7–15.7)
HGB UR QL STRIP: ABNORMAL
IMM GRANULOCYTES # BLD: 0 10E9/L (ref 0–0.4)
IMM GRANULOCYTES NFR BLD: 0.2 %
KETONES UR STRIP-MCNC: NEGATIVE MG/DL
LACTATE BLD-SCNC: 1.4 MMOL/L (ref 0.7–2.1)
LEUKOCYTE ESTERASE UR QL STRIP: NEGATIVE
LYMPHOCYTES # BLD AUTO: 0.7 10E9/L (ref 0.8–5.3)
LYMPHOCYTES NFR BLD AUTO: 15.8 %
MCH RBC QN AUTO: 30.7 PG (ref 26.5–33)
MCHC RBC AUTO-ENTMCNC: 32 G/DL (ref 31.5–36.5)
MCV RBC AUTO: 96 FL (ref 78–100)
MONOCYTES # BLD AUTO: 0.5 10E9/L (ref 0–1.3)
MONOCYTES NFR BLD AUTO: 12.2 %
MUCOUS THREADS #/AREA URNS LPF: PRESENT /LPF
NEUTROPHILS # BLD AUTO: 3.2 10E9/L (ref 1.6–8.3)
NEUTROPHILS NFR BLD AUTO: 71.1 %
NITRATE UR QL: NEGATIVE
PH UR STRIP: 6.5 PH (ref 5–7)
PLATELET # BLD AUTO: 148 10E9/L (ref 150–450)
POTASSIUM SERPL-SCNC: 3.7 MMOL/L (ref 3.4–5.3)
PROT SERPL-MCNC: 7.3 G/DL (ref 6.8–8.8)
RBC # BLD AUTO: 4.76 10E12/L (ref 3.8–5.2)
RBC #/AREA URNS AUTO: 7 /HPF (ref 0–2)
SODIUM SERPL-SCNC: 135 MMOL/L (ref 133–144)
SP GR UR STRIP: 1.02 (ref 1–1.03)
SPECIMEN SOURCE: NORMAL
SQUAMOUS #/AREA URNS AUTO: 1 /HPF (ref 0–1)
URN SPEC COLLECT METH UR: ABNORMAL
UROBILINOGEN UR STRIP-MCNC: NORMAL MG/DL (ref 0–2)
WBC # BLD AUTO: 4.4 10E9/L (ref 4–11)
WBC #/AREA URNS AUTO: 1 /HPF (ref 0–2)

## 2017-02-24 PROCEDURE — 25000128 H RX IP 250 OP 636: Performed by: EMERGENCY MEDICINE

## 2017-02-24 PROCEDURE — 81001 URINALYSIS AUTO W/SCOPE: CPT | Performed by: EMERGENCY MEDICINE

## 2017-02-24 PROCEDURE — 96375 TX/PRO/DX INJ NEW DRUG ADDON: CPT

## 2017-02-24 PROCEDURE — 80053 COMPREHEN METABOLIC PANEL: CPT | Performed by: EMERGENCY MEDICINE

## 2017-02-24 PROCEDURE — 85025 COMPLETE CBC W/AUTO DIFF WBC: CPT | Performed by: EMERGENCY MEDICINE

## 2017-02-24 PROCEDURE — 99284 EMERGENCY DEPT VISIT MOD MDM: CPT | Mod: 25

## 2017-02-24 PROCEDURE — 87804 INFLUENZA ASSAY W/OPTIC: CPT | Performed by: EMERGENCY MEDICINE

## 2017-02-24 PROCEDURE — 96374 THER/PROPH/DIAG INJ IV PUSH: CPT

## 2017-02-24 PROCEDURE — 71020 XR CHEST 2 VW: CPT

## 2017-02-24 PROCEDURE — 96361 HYDRATE IV INFUSION ADD-ON: CPT

## 2017-02-24 PROCEDURE — 83605 ASSAY OF LACTIC ACID: CPT | Performed by: EMERGENCY MEDICINE

## 2017-02-24 PROCEDURE — 99284 EMERGENCY DEPT VISIT MOD MDM: CPT | Performed by: EMERGENCY MEDICINE

## 2017-02-24 PROCEDURE — 25800025 ZZH RX 258: Performed by: EMERGENCY MEDICINE

## 2017-02-24 PROCEDURE — 25000125 ZZHC RX 250: Performed by: EMERGENCY MEDICINE

## 2017-02-24 RX ORDER — KETOROLAC TROMETHAMINE 30 MG/ML
30 INJECTION, SOLUTION INTRAMUSCULAR; INTRAVENOUS ONCE
Status: COMPLETED | OUTPATIENT
Start: 2017-02-24 | End: 2017-02-24

## 2017-02-24 RX ORDER — ONDANSETRON 4 MG/1
4 TABLET, ORALLY DISINTEGRATING ORAL ONCE
Status: COMPLETED | OUTPATIENT
Start: 2017-02-24 | End: 2017-02-24

## 2017-02-24 RX ORDER — SODIUM CHLORIDE, SODIUM LACTATE, POTASSIUM CHLORIDE, CALCIUM CHLORIDE 600; 310; 30; 20 MG/100ML; MG/100ML; MG/100ML; MG/100ML
1000 INJECTION, SOLUTION INTRAVENOUS CONTINUOUS
Status: DISCONTINUED | OUTPATIENT
Start: 2017-02-24 | End: 2017-02-24 | Stop reason: HOSPADM

## 2017-02-24 RX ORDER — DIPHENHYDRAMINE HYDROCHLORIDE 50 MG/ML
25 INJECTION INTRAMUSCULAR; INTRAVENOUS ONCE
Status: COMPLETED | OUTPATIENT
Start: 2017-02-24 | End: 2017-02-24

## 2017-02-24 RX ORDER — METOCLOPRAMIDE HYDROCHLORIDE 5 MG/ML
10 INJECTION INTRAMUSCULAR; INTRAVENOUS ONCE
Status: COMPLETED | OUTPATIENT
Start: 2017-02-24 | End: 2017-02-24

## 2017-02-24 RX ADMIN — SODIUM CHLORIDE, POTASSIUM CHLORIDE, SODIUM LACTATE AND CALCIUM CHLORIDE 1000 ML: 600; 310; 30; 20 INJECTION, SOLUTION INTRAVENOUS at 01:37

## 2017-02-24 RX ADMIN — ONDANSETRON 4 MG: 4 TABLET, ORALLY DISINTEGRATING ORAL at 00:40

## 2017-02-24 RX ADMIN — KETOROLAC TROMETHAMINE 30 MG: 30 INJECTION, SOLUTION INTRAMUSCULAR at 01:40

## 2017-02-24 RX ADMIN — METOCLOPRAMIDE 10 MG: 5 INJECTION, SOLUTION INTRAMUSCULAR; INTRAVENOUS at 01:43

## 2017-02-24 RX ADMIN — DIPHENHYDRAMINE HYDROCHLORIDE 25 MG: 50 INJECTION, SOLUTION INTRAMUSCULAR; INTRAVENOUS at 01:40

## 2017-02-24 ASSESSMENT — ENCOUNTER SYMPTOMS
WEAKNESS: 0
FATIGUE: 1
COUGH: 1
HEADACHES: 1
NECK PAIN: 0
NECK STIFFNESS: 0
NUMBNESS: 0
FREQUENCY: 0
APPETITE CHANGE: 1
ACTIVITY CHANGE: 1
SHORTNESS OF BREATH: 1
CHILLS: 1
PHOTOPHOBIA: 0
DYSURIA: 0
VOICE CHANGE: 0
CONFUSION: 0
SPEECH DIFFICULTY: 0
BACK PAIN: 0
VOMITING: 0
TROUBLE SWALLOWING: 0
LIGHT-HEADEDNESS: 0
HEMATURIA: 0
ABDOMINAL PAIN: 0
SORE THROAT: 0
DIARRHEA: 0
RHINORRHEA: 0
NAUSEA: 0
FEVER: 1
SINUS PRESSURE: 0

## 2017-02-24 NOTE — DISCHARGE INSTRUCTIONS
"  Self-Care for Headaches  Most headaches aren't serious and can be relieved with self-care. But some headaches may be a sign of another health problem like eye trouble or high blood pressure. To find the best treatment, learn what kind of headaches you get. For tension headaches, self-care will usually help. To treat migraines, ask your healthcare provider for advice. It is also possible to get both tension and migraine headaches. Self-care involves relieving the pain and avoiding headache  triggers  if you can.    Ways to reduce pain and tension  Try these steps:    Apply a cold compress or ice pack to the pain site.    Drink fluids. If nausea makes it hard to drink, try sucking on ice.    Rest. Protect yourself from bright light and loud noises.    Calm your emotions by imagining a peaceful scene.    Massage tight neck, shoulder, and head muscles.    To relax muscles, soak in a hot bath or use a hot shower.  Use medicines  Aspirin or aspirin substitutes, such as ibuprofen and acetaminophen, can relieve headache. Remember: Never give aspirin to anyone 18 years old or younger because of the risk of developing Reye syndrome. Use pain medicines only when necessary.  Track your headaches  Keeping a headache diary can help you and your healthcare provider identify what's causing your headaches:    Note when each headache happens.    Identify your activities and the foods you've eaten 6 to 8 hours before the headache began.    Look for any trends or \"triggers.\"  Signs of tension headache  Any of the following can be signs:    Dull pain or feeling of pressure in a tight band around your head    Pain in your neck or shoulders    Headache without a definite beginning or end    Headache after an activity such as driving or working on a computer  Signs of migraine  Any of the following can be signs:    Throbbing pain on one or both sides of your head    Nausea or vomiting    Extreme sensitivity to light, sound, and " "smells    Bright spots, flashes, or other visual changes    Pain or nausea so severe that you can't continue your daily activities  Call your healthcare provider   If you have any of the following symptoms, contact your healthcare provider:    A headache that lingers after a recent injury or bump to the head.    A fever with a stiff neck or pain when you bend your head toward your chest.    A headache along with slurred speech, changes in your vision, or numbness or weakness in your arms or legs.    A headache for longer than 3 days.    Frequent headaches, especially in the morning.    Headaches with seizures     Seek immediate medical attention if you have a headache that you would call \"the worst headache you have ever had.\"     7779-6677 [employer]. [address] [place] [address], [place] [address]. All rights reserved. This information is not intended as a substitute for professional medical care. Always follow your healthcare professional's instructions.        "

## 2017-02-24 NOTE — ED AVS SNAPSHOT
[facility] Emergency Department    [address] [address]    Phone:  [phone]    Fax:  [phone]                                       Jacquie Harris   MRN: [id number]    Department:  [facility] Emergency Department   Date of Visit:  2/24/2017           After Visit Summary Signature Page     I have received my discharge instructions, and my questions have been answered. I have discussed any challenges I see with this plan with the nurse or doctor.    ..........................................................................................................................................  Patient/Patient Representative Signature      ..........................................................................................................................................  Patient Representative Print Name and Relationship to Patient    ..................................................               ................................................  Date                                            Time    ..........................................................................................................................................  Reviewed by Signature/Title    ...................................................              ..............................................  Date                                                            Time

## 2017-02-24 NOTE — ED PROVIDER NOTES
History     Chief Complaint   Patient presents with     Fever     Headache     HPI  Jacquie Harris is a 61 year old female with a history of obesity, obstructive sleep apnea, hypertension, headaches, and gastric bypass in the past presents for evaluation of headache with moderate nonproductive cough, and chills with fever up to 102 at home today.  Patient reports intermittent symptoms all winter long but had been feeling well until this morning.  Patient has been feeling fatigued with her headache, cough, and chills.  Patient also reports new urinary urgency without dysuria or frequency.  Denies lightheadedness or dizziness.  Denies significant neck pain.  Head pain not worsened with movement of the neck.  Denies any visual symptoms or other numbness, tingling, or weakness.  Patient does not take any medicines after measuring a temporal temperature of 102.  No known sick contacts.    I have reviewed the Medications, Allergies, Past Medical and Surgical History, and Social History in the Epic system.    Review of Systems   Constitutional: Positive for activity change (decreased today), appetite change (Decreased this evening), chills, fatigue and fever.   HENT: Positive for congestion (Mild). Negative for rhinorrhea, sinus pressure, sneezing, sore throat, trouble swallowing and voice change.    Eyes: Negative for photophobia and visual disturbance.   Respiratory: Positive for cough and shortness of breath (Mild earlier, normal now.).    Cardiovascular: Negative for chest pain.   Gastrointestinal: Negative for abdominal pain, diarrhea (Patient does report some loose stools today), nausea and vomiting.   Genitourinary: Positive for decreased urine volume and urgency. Negative for dysuria, frequency and hematuria.   Musculoskeletal: Negative for back pain, neck pain and neck stiffness.   Skin: Negative for rash.   Neurological: Positive for headaches. Negative for speech difficulty, weakness, light-headedness and  "numbness.   Psychiatric/Behavioral: Negative for confusion.   All other systems reviewed and are negative.      Physical Exam   BP: 140/74  Heart Rate: 108  Temp: 99.1  F (37.3  C)  Resp: 18  Height: 160 cm (5' 3\")  Weight: 117.9 kg (260 lb)  SpO2: 94 %  Physical Exam   Constitutional: She is oriented to person, place, and time. She appears well-developed and well-nourished. No distress.   HENT:   Head: Normocephalic and atraumatic.   Mouth/Throat: Oropharynx is clear and moist.   Eyes: Conjunctivae and EOM are normal. Pupils are equal, round, and reactive to light.   Neck: Normal range of motion. Neck supple. No muscular tenderness present. No rigidity. Normal range of motion present. No Brudzinski's sign and no Kernig's sign noted.   Cardiovascular: Regular rhythm and normal heart sounds.    Minimally elevated heart rate at the time of my evaluation with pulse around 104   Pulmonary/Chest: Effort normal and breath sounds normal. No respiratory distress. She has no wheezes. She has no rales.   Patient did have a mild dry cough while I was in the room   Abdominal: Soft. There is no tenderness. There is no rebound and no guarding.   Obese   Musculoskeletal: Normal range of motion. She exhibits no edema.   Neurological: She is alert and oriented to person, place, and time.   Skin: Skin is warm and dry. She is not diaphoretic.   Psychiatric: She has a normal mood and affect.   Nursing note and vitals reviewed.      ED Course     ED Course     Procedures          Results for orders placed or performed during the hospital encounter of 02/24/17   XR Chest 2 Views    Narrative    CHEST 2 VIEWS  2/24/2017 2:18 AM     HISTORY: Cough and fever.    COMPARISON: 9/26/2013.    FINDINGS: Hypoinflated lungs. The lungs are clear. Normal-sized  cardiac silhouette.      Impression    IMPRESSION: No evidence of active cardiopulmonary disease.    SWEETIE DUGGAN MD   UA with Microscopic reflex to Culture   Result Value Ref Range    " Color Urine Yellow     Appearance Urine Clear     Glucose Urine Negative NEG mg/dL    Bilirubin Urine Negative NEG    Ketones Urine Negative NEG mg/dL    Specific Gravity Urine 1.018 1.003 - 1.035    Blood Urine Trace (A) NEG    pH Urine 6.5 5.0 - 7.0 pH    Protein Albumin Urine Negative NEG mg/dL    Urobilinogen mg/dL Normal 0.0 - 2.0 mg/dL    Nitrite Urine Negative NEG    Leukocyte Esterase Urine Negative NEG    Source Midstream Urine     WBC Urine 1 0 - 2 /HPF    RBC Urine 7 (H) 0 - 2 /HPF    Squamous Epithelial /HPF Urine 1 0 - 1 /HPF    Mucous Urine Present (A) NEG /LPF   CBC with platelets differential   Result Value Ref Range    WBC 4.4 4.0 - 11.0 10e9/L    RBC Count 4.76 3.8 - 5.2 10e12/L    Hemoglobin 14.6 11.7 - 15.7 g/dL    Hematocrit 45.6 35.0 - 47.0 %    MCV 96 78 - 100 fl    MCH 30.7 26.5 - 33.0 pg    MCHC 32.0 31.5 - 36.5 g/dL    RDW 12.8 10.0 - 15.0 %    Platelet Count 148 (L) 150 - 450 10e9/L    Diff Method Automated Method     % Neutrophils 71.1 %    % Lymphocytes 15.8 %    % Monocytes 12.2 %    % Eosinophils 0.2 %    % Basophils 0.5 %    % Immature Granulocytes 0.2 %    Absolute Neutrophil 3.2 1.6 - 8.3 10e9/L    Absolute Lymphocytes 0.7 (L) 0.8 - 5.3 10e9/L    Absolute Monocytes 0.5 0.0 - 1.3 10e9/L    Absolute Eosinophils 0.0 0.0 - 0.7 10e9/L    Absolute Basophils 0.0 0.0 - 0.2 10e9/L    Abs Immature Granulocytes 0.0 0 - 0.4 10e9/L   Comprehensive metabolic panel   Result Value Ref Range    Sodium 135 133 - 144 mmol/L    Potassium 3.7 3.4 - 5.3 mmol/L    Chloride 101 94 - 109 mmol/L    Carbon Dioxide 28 20 - 32 mmol/L    Anion Gap 6 3 - 14 mmol/L    Glucose 124 (H) 70 - 99 mg/dL    Urea Nitrogen 13 7 - 30 mg/dL    Creatinine 0.82 0.52 - 1.04 mg/dL    GFR Estimate 71 >60 mL/min/1.7m2    GFR Estimate If Black 85 >60 mL/min/1.7m2    Calcium 8.7 8.5 - 10.1 mg/dL    Bilirubin Total 0.4 0.2 - 1.3 mg/dL    Albumin 3.4 3.4 - 5.0 g/dL    Protein Total 7.3 6.8 - 8.8 g/dL    Alkaline Phosphatase 74 40 -  150 U/L    ALT 25 0 - 50 U/L    AST 21 0 - 45 U/L   Lactic acid whole blood   Result Value Ref Range    Lactic Acid 1.4 0.7 - 2.1 mmol/L   Influenza A/B antigen   Result Value Ref Range    Influenza A/B Agn Specimen Nasopharyngeal     Influenza A Negative NEG    Influenza B  NEG     Negative   Test results must be correlated with clinical data. If necessary, results   should be confirmed by a molecular assay or viral culture.           2:58 AM: PT re-assessed. Feeling much better. HA rated 2/10.      Assessments & Plan (with Medical Decision Making)  61-year-old female with a history of obesity, obstructive sleep apnea, hypertension, headaches, gastric bypass presenting for evaluation of headache with a nonproductive cough as well as fever and chills at home today.  Fever and chills of all resolved without pharmacologic intervention.  Patient maintained complaint is of headache.  Headache similar to previous headaches.  Patient alert and oriented with no neurologic symptoms.  Negative for meningeal signs and afebrile in the department making meningitis unlikely.  The patient does have a mild cough and given her reported fevers at home, patient was screened for influenza as well as for pneumonia, both negative.  Screening blood work showed a minimally low platelets but otherwise normal.  Symptoms treated with Toradol, Reglan, and Benadryl with notable improvement.  Symptoms may be secondary to viral infection.  His symptoms overall improved and patient clinically stable with no concerning findings on exam or workup.  Recommended continued symptomatic treatment at home.       Jacquie Harris is a 61 year old female with a history of obesity, obstructive sleep apnea, hypertension, headaches, and gastric bypass in the past presents for evaluation of headache with moderate nonproductive cough, and chills with fever up to 102 at home today.    I have reviewed the nursing notes.    I have reviewed the findings, diagnosis,  plan and need for follow up with the patient.    Discharge Medication List as of 2/24/2017  3:06 AM          Final diagnoses:   Nonintractable headache, unspecified chronicity pattern, unspecified headache type   Viral syndrome       2/24/2017   [facility] EMERGENCY DEPARTMENT     Mcmillan, Tk Isaac MD  02/24/17 0529

## 2017-02-24 NOTE — ED NOTES
Pt presents to ED with complaints of upper respiratory symptoms since linda, worse since yesterday. Pt complaining of nasal congestion, fevers, headaches (migraine), ear pain, sore throat, cough and decreased appetite. Decreased fluid intake. Some nausea. No change in bowel or bladder.

## 2017-02-24 NOTE — ED AVS SNAPSHOT
[facility] Emergency Department    [address] [address]    Phone:  [phone]    Fax:  [phone]                                       Jacquie Harris   MRN: [id number]    Department:  [facility] Emergency Department   Date of Visit:  2/24/2017           Patient Information     Date Of Birth          1955        Your diagnoses for this visit were:     Nonintractable headache, unspecified chronicity pattern, unspecified headache type     Viral syndrome        You were seen by Tk Mcmillan MD.      Follow-up Information     Follow up with Mariel Sabillon MD. Schedule an appointment as soon as possible for a visit in 3 days.    Specialty:  Family Practice    Why:  As needed for follow up    Contact information:    [facility] MED  [address] [address]  [phone]          Discharge Instructions         Self-Care for Headaches  Most headaches aren't serious and can be relieved with self-care. But some headaches may be a sign of another health problem like eye trouble or high blood pressure. To find the best treatment, learn what kind of headaches you get. For tension headaches, self-care will usually help. To treat migraines, ask your healthcare provider for advice. It is also possible to get both tension and migraine headaches. Self-care involves relieving the pain and avoiding headache  triggers  if you can.    Ways to reduce pain and tension  Try these steps:    Apply a cold compress or ice pack to the pain site.    Drink fluids. If nausea makes it hard to drink, try sucking on ice.    Rest. Protect yourself from bright light and loud noises.    Calm your emotions by imagining a peaceful scene.    Massage tight neck, shoulder, and head muscles.    To relax muscles, soak in a hot bath or use a hot shower.  Use medicines  Aspirin or aspirin substitutes, such as ibuprofen and acetaminophen, can relieve headache. Remember: Never give aspirin  "to anyone 18 years old or younger because of the risk of developing Reye syndrome. Use pain medicines only when necessary.  Track your headaches  Keeping a headache diary can help you and your healthcare provider identify what's causing your headaches:    Note when each headache happens.    Identify your activities and the foods you've eaten 6 to 8 hours before the headache began.    Look for any trends or \"triggers.\"  Signs of tension headache  Any of the following can be signs:    Dull pain or feeling of pressure in a tight band around your head    Pain in your neck or shoulders    Headache without a definite beginning or end    Headache after an activity such as driving or working on a computer  Signs of migraine  Any of the following can be signs:    Throbbing pain on one or both sides of your head    Nausea or vomiting    Extreme sensitivity to light, sound, and smells    Bright spots, flashes, or other visual changes    Pain or nausea so severe that you can't continue your daily activities  Call your healthcare provider   If you have any of the following symptoms, contact your healthcare provider:    A headache that lingers after a recent injury or bump to the head.    A fever with a stiff neck or pain when you bend your head toward your chest.    A headache along with slurred speech, changes in your vision, or numbness or weakness in your arms or legs.    A headache for longer than 3 days.    Frequent headaches, especially in the morning.    Headaches with seizures     Seek immediate medical attention if you have a headache that you would call \"the worst headache you have ever had.\"     3414-0808 [employer]. [address] [place] [address]. All rights reserved. This information is not intended as a substitute for professional medical care. Always follow your healthcare professional's instructions.          Discharge References/Attachments     VIRAL SYNDROME (ADULT) (ENGLISH)    "   Future Appointments        Provider Department Dept Phone Center    2/27/2017 12:45 PM Bairon Ordonez MD [facility] [phone] [facility]      24 Hour Appointment Hotline       To make an appointment at any [facility], call [phone] ([phone]). If you don't have a family doctor or clinic, we will help you find one. [facility] are conveniently located to serve the needs of you and your family.             Review of your medicines      Our records show that you are taking the medicines listed below. If these are incorrect, please call your family doctor or clinic.        Dose / Directions Last dose taken    atenolol 50 MG tablet   Commonly known as:  TENORMIN   Dose:  50 mg   Quantity:  90 tablet        Take 1 tablet (50 mg) by mouth daily   Refills:  3        CERAVE Crea   Quantity:  1 Bottle        Externally apply topically 2 times daily as needed   Refills:  3        clobetasol 0.05 % ointment   Commonly known as:  TEMOVATE   Quantity:  15 g        Apply sparingly to affected area twice daily for 14 days.  Do not apply to face.   Refills:  0        hydrochlorothiazide 12.5 MG Tabs tablet   Dose:  12.5 mg   Quantity:  90 tablet        Take 1 tablet (12.5 mg) by mouth daily   Refills:  3        hydrocortisone 1 % ointment   Quantity:  30 g        Refills:  0        * nystatin [id number] UNIT/GM Powd   Commonly known as:  MYCOSTATIN   Quantity:  30 g        Apply topically 3 times daily as needed   Refills:  1        * nystatin cream   Commonly known as:  MYCOSTATIN        Apply topically 2 times daily   Refills:  0        * Notice:  This list has 2 medication(s) that are the same as other medications prescribed for you. Read the directions carefully, and ask your doctor or other care provider to review them with you.            Procedures and tests performed during your visit     CBC with platelets differential    Comprehensive metabolic panel    Influenza A/B antigen     Lactic acid whole blood    UA with Microscopic reflex to Culture    XR Chest 2 Views      Orders Needing Specimen Collection     None      Pending Results     Date and Time Order Name Status Description    2/24/2017 0116 XR Chest 2 Views Preliminary             Pending Culture Results     No orders found from 2/22/2017 to 2/25/2017.             Test Results from your hospital stay     2/24/2017  1:06 AM - Interface, Flexilab Results      Component Results     Component Value Ref Range & Units Status    Influenza A/B Agn Specimen Nasopharyngeal  Final    Influenza A Negative NEG Final    Influenza B  NEG Final    Negative   Test results must be correlated with clinical data. If necessary, results   should be confirmed by a molecular assay or viral culture.           2/24/2017  2:03 AM - Interface, Flexilab Results      Component Results     Component Value Ref Range & Units Status    Color Urine Yellow  Final    Appearance Urine Clear  Final    Glucose Urine Negative NEG mg/dL Final    Bilirubin Urine Negative NEG Final    Ketones Urine Negative NEG mg/dL Final    Specific Gravity Urine 1.018 1.003 - 1.035 Final    Blood Urine Trace (A) NEG Final    pH Urine 6.5 5.0 - 7.0 pH Final    Protein Albumin Urine Negative NEG mg/dL Final    Urobilinogen mg/dL Normal 0.0 - 2.0 mg/dL Final    Nitrite Urine Negative NEG Final    Leukocyte Esterase Urine Negative NEG Final    Source Midstream Urine  Final    WBC Urine 1 0 - 2 /HPF Final    RBC Urine 7 (H) 0 - 2 /HPF Final    Squamous Epithelial /HPF Urine 1 0 - 1 /HPF Final    Mucous Urine Present (A) NEG /LPF Final         2/24/2017  2:41 AM - Interface, Radiant Ib      Narrative     CHEST 2 VIEWS  2/24/2017 2:18 AM     HISTORY: Cough and fever.    COMPARISON: 9/26/2013.    FINDINGS: Hypoinflated lungs. The lungs are clear. Normal-sized  cardiac silhouette.        Impression     IMPRESSION: No evidence of active cardiopulmonary disease.         2/24/2017  2:02 AM - Interface,  Flexilab Results      Component Results     Component Value Ref Range & Units Status    WBC 4.4 4.0 - 11.0 10e9/L Final    RBC Count 4.76 3.8 - 5.2 10e12/L Final    Hemoglobin 14.6 11.7 - 15.7 g/dL Final    Hematocrit 45.6 35.0 - 47.0 % Final    MCV 96 78 - 100 fl Final    MCH 30.7 26.5 - 33.0 pg Final    MCHC 32.0 31.5 - 36.5 g/dL Final    RDW 12.8 10.0 - 15.0 % Final    Platelet Count 148 (L) 150 - 450 10e9/L Final    Diff Method Automated Method  Final    % Neutrophils 71.1 % Final    % Lymphocytes 15.8 % Final    % Monocytes 12.2 % Final    % Eosinophils 0.2 % Final    % Basophils 0.5 % Final    % Immature Granulocytes 0.2 % Final    Absolute Neutrophil 3.2 1.6 - 8.3 10e9/L Final    Absolute Lymphocytes 0.7 (L) 0.8 - 5.3 10e9/L Final    Absolute Monocytes 0.5 0.0 - 1.3 10e9/L Final    Absolute Eosinophils 0.0 0.0 - 0.7 10e9/L Final    Absolute Basophils 0.0 0.0 - 0.2 10e9/L Final    Abs Immature Granulocytes 0.0 0 - 0.4 10e9/L Final         2/24/2017  2:14 AM - Interface, Flexilab Results      Component Results     Component Value Ref Range & Units Status    Sodium 135 133 - 144 mmol/L Final    Potassium 3.7 3.4 - 5.3 mmol/L Final    Chloride 101 94 - 109 mmol/L Final    Carbon Dioxide 28 20 - 32 mmol/L Final    Anion Gap 6 3 - 14 mmol/L Final    Glucose 124 (H) 70 - 99 mg/dL Final    Urea Nitrogen 13 7 - 30 mg/dL Final    Creatinine 0.82 0.52 - 1.04 mg/dL Final    GFR Estimate 71 >60 mL/min/1.7m2 Final    Non  GFR Calc    GFR Estimate If Black 85 >60 mL/min/1.7m2 Final    African American GFR Calc    Calcium 8.7 8.5 - 10.1 mg/dL Final    Bilirubin Total 0.4 0.2 - 1.3 mg/dL Final    Albumin 3.4 3.4 - 5.0 g/dL Final    Protein Total 7.3 6.8 - 8.8 g/dL Final    Alkaline Phosphatase 74 40 - 150 U/L Final    ALT 25 0 - 50 U/L Final    AST 21 0 - 45 U/L Final         2/24/2017  2:00 AM - Interface, Flexilab Results      Component Results     Component Value Ref Range & Units Status    Lactic Acid 1.4  "0.7 - 2.1 mmol/L Final                Thank you for choosing [facility]       Thank you for choosing [facility] for your care. Our goal is always to provide you with excellent care. Hearing back from our patients is one way we can continue to improve our services. Please take a few minutes to complete the written survey that you may receive in the mail after you visit with us. Thank you!        [employer] Information     [email or web address] lets you send messages to your doctor, view your test results, renew your prescriptions, schedule appointments and more. To sign up, go to [email or web address] . Click on \"Log in\" on the left side of the screen, which will take you to the Welcome page. Then click on \"Sign up Now\" on the right side of the page.     You will be asked to enter the access code listed below, as well as some personal information. Please follow the directions to create your username and password.     Your access code is: [email or web address]  Expires: 4/3/2017 10:56 AM     Your access code will  in 90 days. If you need help or a new code, please call your [facility] clinic or [phone].        Care EveryWhere ID     This is your Care EveryWhere ID. This could be used by other organizations to access your [facility] medical records  [id number]        After Visit Summary       This is your record. Keep this with you and show to your community pharmacist(s) and doctor(s) at your next visit.                  "

## 2017-03-06 ENCOUNTER — OFFICE VISIT (OUTPATIENT)
Dept: FAMILY MEDICINE | Facility: CLINIC | Age: 62
End: 2017-03-06
Payer: MEDICARE

## 2017-03-06 VITALS
BODY MASS INDEX: 47.65 KG/M2 | HEART RATE: 80 BPM | TEMPERATURE: 98.7 F | SYSTOLIC BLOOD PRESSURE: 106 MMHG | DIASTOLIC BLOOD PRESSURE: 80 MMHG | WEIGHT: 269 LBS | OXYGEN SATURATION: 97 %

## 2017-03-06 DIAGNOSIS — J06.9 VIRAL UPPER RESPIRATORY TRACT INFECTION: Primary | ICD-10-CM

## 2017-03-06 PROCEDURE — 99213 OFFICE O/P EST LOW 20 MIN: CPT | Performed by: NURSE PRACTITIONER

## 2017-03-06 NOTE — MR AVS SNAPSHOT
After Visit Summary   3/6/2017    Jacquie Harris    MRN: [id number]           Patient Information     Date Of Birth          1955        Visit Information        Provider Department      3/6/2017 11:40 AM Migdalia Romero APRN CNP [facility]        Care Instructions          Thank you for choosing [facility].  You may be receiving a survey in the mail from Nnamdi Hopkins regarding your visit today.  Please take a few minutes to complete and return the survey to let us know how we are doing.      If you have questions or concerns, please contact us via Ensequence or you can contact your care team at [phone].    Our Clinic hours are:  Monday 6:40 am  to 7:00 pm  Tuesday -Friday 6:40 am to 5:00 pm    The [place] outpatient lab hours are:  Monday - Friday 6:10 am to 4:45 pm  Saturdays 7:00 am to 11:00 am  Appointments are required, call [phone]    If you have clinical questions after hours or would like to schedule an appointment,  call the clinic at [phone].        Follow-ups after your visit        Who to contact     If you have questions or need follow up information about today's clinic visit or your schedule please contact [facility] directly at [phone].  Normal or non-critical lab and imaging results will be communicated to you by [facility], letter or phone within 4 business days after the clinic has received the results. If you do not hear from us within 7 days, please contact the clinic through [employer] [facility] [employer] [facility] [employer] or phone. If you have a critical or abnormal lab result, we will notify you by phone as soon as possible.  Submit refill requests through Ensequence or call your pharmacy and they will forward the refill request to us. Please allow 3 business days for your refill to be completed.          Additional Information About Your Visit        [facility] Information     Ensequence lets you send messages to your doctor, view your test results, renew your prescriptions,  "schedule appointments and more. To sign up, go to [email or web address] . Click on \"Log in\" on the left side of the screen, which will take you to the Welcome page. Then click on \"Sign up Now\" on the right side of the page.     You will be asked to enter the access code listed below, as well as some personal information. Please follow the directions to create your username and password.     Your access code is: [email or web address]  Expires: 4/3/2017 10:56 AM     Your access code will  in 90 days. If you need help or a new code, please call your [place] clinic or [phone].        Care EveryWhere ID     This is your Care EveryWhere ID. This could be used by other organizations to access your [place] medical records  [id number]        Your Vitals Were     Pulse Temperature Last Period Pulse Oximetry BMI (Body Mass Index)       80 98.7  F (37.1  C) (Tympanic) 2006 97% 47.65 kg/m2        Blood Pressure from Last 3 Encounters:   17 106/80   17 122/69   01/10/17 115/75    Weight from Last 3 Encounters:   17 269 lb (122 kg)   17 260 lb (117.9 kg)   01/10/17 269 lb (122 kg)              Today, you had the following     No orders found for display       Primary Care Provider Office Phone # Fax #    Mariel Va Sabillon [phone]       [facility] [address] [facility] [address]        Thank you!     Thank you for choosing [facility]  for your care. Our goal is always to provide you with excellent care. Hearing back from our patients is one way we can continue to improve our services. Please take a few minutes to complete the written survey that you may receive in the mail after your visit with us. Thank you!             Your Updated Medication List - Protect others around you: Learn how to safely use, store and throw away your medicines at www.disposemymeds.org.          This list is accurate as of: 3/6/17 12:00 PM.  Always use your most " recent med list.                   Brand Name Dispense Instructions for use    atenolol 50 MG tablet    TENORMIN    90 tablet    Take 1 tablet (50 mg) by mouth daily       CERAVE Crea     1 Bottle    Externally apply topically 2 times daily as needed       clobetasol 0.05 % ointment    TEMOVATE    15 g    Apply sparingly to affected area twice daily for 14 days.  Do not apply to face.       hydrochlorothiazide 12.5 MG Tabs tablet     90 tablet    Take 1 tablet (12.5 mg) by mouth daily       hydrocortisone 1 % ointment     30 g    Reported on 3/6/2017       * nystatin [id number] UNIT/GM Powd    MYCOSTATIN    30 g    Apply topically 3 times daily as needed       * nystatin cream    MYCOSTATIN     Apply topically 2 times daily Reported on 3/6/2017       * Notice:  This list has 2 medication(s) that are the same as other medications prescribed for you. Read the directions carefully, and ask your doctor or other care provider to review them with you.

## 2017-03-06 NOTE — NURSING NOTE
"Initial /80 (BP Location: Left arm, Patient Position: Chair, Cuff Size: Adult Large)  Pulse 80  Temp 98.7  F (37.1  C) (Tympanic)  Wt 269 lb (122 kg)  LMP 05/29/2006  SpO2 97%  BMI 47.65 kg/m2 Estimated body mass index is 47.65 kg/(m^2) as calculated from the following:    Height as of 2/24/17: 5' 3\" (1.6 m).    Weight as of this encounter: 269 lb (122 kg). .    Sindhu Friedman    "

## 2017-03-06 NOTE — PATIENT INSTRUCTIONS
Thank you for choosing [facility].  You may be receiving a survey in the mail from Nnamdi Hopkins regarding your visit today.  Please take a few minutes to complete and return the survey to let us know how we are doing.      If you have questions or concerns, please contact us via CloudWalk or you can contact your care team at [phone].    Our Clinic hours are:  Monday 6:40 am  to 7:00 pm  Tuesday -Friday 6:40 am to 5:00 pm    The [place] outpatient lab hours are:  Monday - Friday 6:10 am to 4:45 pm  Saturdays 7:00 am to 11:00 am  Appointments are required, call [phone]    If you have clinical questions after hours or would like to schedule an appointment,  call the clinic at [phone].

## 2017-03-06 NOTE — PROGRESS NOTES
SUBJECTIVE:                                                    Jacquie Harris is a 61 year old female who presents to clinic today for the following health issues:      ENT Symptoms             Symptoms: cc Present Absent Comment   Fever/Chills  x  Chills and sweats   Fatigue  x     Muscle Aches  x     Eye Irritation   x    Sneezing   x    Nasal Sudeep/Drg  x     Sinus Pressure/Pain   x    Loss of smell   x    Dental pain   x    Sore Throat   x    Swollen Glands  x     Ear Pain/Fullness  x     Cough  x     Wheeze   x    Chest Pain   x    Shortness of breath  x     Rash   x    Other   x      Symptom duration:  weeks   Symptom severity:  moderate to severe   Treatments tried:  natural remedies   Contacts:  none   Patient was in ER 10 days ago- pneumonia and influenza were negative.   She continues to complaint of URI symptoms, - non productive cough. Bilateral ears bother her a bit/ feel plugged. No sore throat. No sinus congestion. Feels more fatigued than normal.   Not sleeping well at night. Under a lot of stress- elderly father at home not expected to live much longer. No fevers.       -------------------------------------    Problem list and histories reviewed & adjusted, as indicated.  Additional history: as documented    Patient Active Problem List   Diagnosis     s/p gastric bypass x 2     Allergic rhinitis     Carpal tunnel syndrome     Obstructive sleep apnea     Rosacea     CARDIOVASCULAR SCREENING; LDL GOAL LESS THAN 160     Tick bite, infected, positive Lyme test 1996 not treated     Vitamin D deficiency     Osteoporosis     Obesity, Class III, BMI 40-49.9 (morbid obesity) (H)     S/P TKR (total knee replacement)     HTN, goal below 140/80     Back pain, left below scapula, strained muscles     Headache     Encounter for routine gynecological examination      Colles' fracture     Pain of left lower leg     Pain of right lower leg     Bilateral cold feet     Other specified symptoms and signs involving the  circulatory and respiratory systems      Past Surgical History   Procedure Laterality Date     Tubal ligation  1994     C/section, low transverse  1978, 1984, 1994     x 3     Gastric bypass  1980/2005     Gastric Bypass and revision     C oral surgery procedure  age 18     wisdom teeth     Colonoscopy  2001     normal colonoscopy     Hernia repair, incisional  6/16/2008     lap.repair with 10x8 mesh     Arthroplasty knee  4/2/2012     Procedure:ARTHROPLASTY KNEE; Left Total Knee Arthroplasty--Anesth.Choice; Surgeon:VICENTE MORALES; Location:[place] OR       Social History   Substance Use Topics     Smoking status: Never Smoker     Smokeless tobacco: Never Used     Alcohol use 0.0 oz/week     0 Standard drinks or equivalent per week      Comment: mixed very light 1 a month if that     Family History   Problem Relation Age of Onset     C.A.D. Father      MI at age 60     Hypertension Father      Lipids Father      high cholesterol     Alzheimer Disease Father      Eye Disorder Mother      glaucoma     OSTEOPOROSIS Mother      on Fosamax     Musculoskeletal Disorder Mother      knee and feet problems     Arthritis Mother      sagrario and feet problems     CANCER Other      maternal cousin with cervical cancer     CANCER Maternal Aunt      stomach cancer     DIABETES No family hx of      CEREBROVASCULAR DISEASE No family hx of      Breast Cancer No family hx of      Cancer - colorectal No family hx of      Prostate Cancer No family hx of      Other Cancer Brother            Reviewed and updated as needed this visit by clinical staff       Reviewed and updated as needed this visit by Provider         ROS:  Constitutional, HEENT, cardiovascular, pulmonary, GI, , musculoskeletal, neuro, skin, endocrine and psych systems are negative, except as otherwise noted.    OBJECTIVE:                                                    /80 (BP Location: Left arm, Patient Position: Chair, Cuff Size: Adult Large)  Pulse 80  Temp  98.7  F (37.1  C) (Tympanic)  Wt 269 lb (122 kg)  LMP 05/29/2006  SpO2 97%  BMI 47.65 kg/m2  Body mass index is 47.65 kg/(m^2).  GENERAL: alert, no distress and fatigued  EYES: Eyes grossly normal to inspection, PERRL and conjunctivae and sclerae normal  HENT: ear canals and TM's normal, nose and mouth without ulcers or lesions  NECK: no adenopathy, no asymmetry, masses, or scars and thyroid normal to palpation  RESP: lungs clear to auscultation - no rales, rhonchi or wheezes  CV: regular rate and rhythm, normal S1 S2, no S3 or S4, no murmur, click or rub,   MS: no gross musculoskeletal defects noted, no edema  PSYCH: mentation appears normal and affect flat    Diagnostic Test Results:  none      ASSESSMENT/PLAN:                                                      1. Viral upper respiratory tract infection  Patient with vague general URI complaints-  influenza and chest x-ray unremarkable in ER 10 days ago- and symptoms are not worsening. Patient has concerns about why she is not getting better and if she can be around her sick elderly father. Discussed with patient that physical exam unremarkable, recent chest x-ray and influenza negative- stress along with co morbidities  can influence healing process. CBC also normal except for minimally low platelet count.   Recommend emotional support, decrease stress, increase fluid intake- start OTC Vit. C/Zinc supplement.   Ok to see her father as long as she wears a mask and goes good handwashing.     MARGARITA [facility]

## 2017-03-17 ENCOUNTER — OFFICE VISIT (OUTPATIENT)
Dept: FAMILY MEDICINE | Facility: CLINIC | Age: 62
End: 2017-03-17
Payer: MEDICARE

## 2017-03-17 ENCOUNTER — RADIANT APPOINTMENT (OUTPATIENT)
Dept: GENERAL RADIOLOGY | Facility: CLINIC | Age: 62
End: 2017-03-17
Attending: PHYSICIAN ASSISTANT
Payer: MEDICARE

## 2017-03-17 VITALS
HEART RATE: 74 BPM | BODY MASS INDEX: 47.65 KG/M2 | TEMPERATURE: 99.1 F | SYSTOLIC BLOOD PRESSURE: 114 MMHG | WEIGHT: 269 LBS | DIASTOLIC BLOOD PRESSURE: 80 MMHG

## 2017-03-17 DIAGNOSIS — S93.422A SPRAIN OF DELTOID LIGAMENT OF LEFT ANKLE, INITIAL ENCOUNTER: ICD-10-CM

## 2017-03-17 DIAGNOSIS — S93.422A SPRAIN OF DELTOID LIGAMENT OF LEFT ANKLE, INITIAL ENCOUNTER: Primary | ICD-10-CM

## 2017-03-17 PROCEDURE — 99214 OFFICE O/P EST MOD 30 MIN: CPT | Performed by: PHYSICIAN ASSISTANT

## 2017-03-17 PROCEDURE — 73610 X-RAY EXAM OF ANKLE: CPT | Mod: RT

## 2017-03-17 ASSESSMENT — ENCOUNTER SYMPTOMS
VOMITING: 0
SEIZURES: 0
WEIGHT LOSS: 0
HALLUCINATIONS: 0
FOCAL WEAKNESS: 0
BLURRED VISION: 0
INSOMNIA: 0
PALPITATIONS: 0
FREQUENCY: 0
FEVER: 0
WHEEZING: 0
ORTHOPNEA: 0
HEADACHES: 0
DIZZINESS: 0
MYALGIAS: 0
HEMOPTYSIS: 0
NERVOUS/ANXIOUS: 0
DOUBLE VISION: 0
COUGH: 0
NAUSEA: 0
DEPRESSION: 0
PHOTOPHOBIA: 0
SORE THROAT: 0
DYSURIA: 0
EYE DISCHARGE: 0
SPUTUM PRODUCTION: 0
DIAPHORESIS: 0
ABDOMINAL PAIN: 0
EYE PAIN: 0
BACK PAIN: 0
HEARTBURN: 0
WEAKNESS: 0
SENSORY CHANGE: 0
CONSTIPATION: 0
LOSS OF CONSCIOUSNESS: 0
SHORTNESS OF BREATH: 0
EYE REDNESS: 0
DIARRHEA: 0
NECK PAIN: 0
NEUROLOGICAL NEGATIVE: 1
TINGLING: 0
BLOOD IN STOOL: 0

## 2017-03-17 ASSESSMENT — LIFESTYLE VARIABLES: SUBSTANCE_ABUSE: 0

## 2017-03-17 NOTE — NURSING NOTE
"Chief Complaint   Patient presents with     Musculoskeletal Problem     ankle pain       Initial /80 (BP Location: Right arm, Cuff Size: Adult Large)  Pulse 74  Temp 99.1  F (37.3  C) (Tympanic)  Wt 269 lb (122 kg)  LMP 05/29/2006  BMI 47.65 kg/m2 Estimated body mass index is 47.65 kg/(m^2) as calculated from the following:    Height as of 2/24/17: 5' 3\" (1.6 m).    Weight as of this encounter: 269 lb (122 kg).  Medication Reconciliation: complete   Blessing Saunders / Certified Medical Assistant......3/17/2017 10:43 AM    "

## 2017-03-17 NOTE — PROGRESS NOTES
HPI    SUBJECTIVE:                                                    Jacquie Harris is a 61 year old female who presents to clinic today for pain and bruising of right ankle that occurred 4 days ago after an injury. She points to the medial malleolus as the source of pain.      Joint Pain     Onset: 4 days    Description:   Location: right ankle and maybe closer to foot  Character: Sharp and Stabbing    Intensity: moderate    Progression of Symptoms: worse, intermittent    Accompanying Signs & Symptoms:  Other symptoms: swelling, redness and hard to tell cause she does PT on that leg   History:   Previous similar pain: no       Precipitating factors:   Trauma or overuse: YES- fell on stairs    Alleviating factors:  Improved by: ice       Therapies Tried and outcome: ice helps at night          Problem list and histories reviewed & adjusted, as indicated.  Additional history: as documented    Patient Active Problem List   Diagnosis     s/p gastric bypass x 2     Allergic rhinitis     Carpal tunnel syndrome     Obstructive sleep apnea     Rosacea     CARDIOVASCULAR SCREENING; LDL GOAL LESS THAN 160     Tick bite, infected, positive Lyme test 1996 not treated     Vitamin D deficiency     Osteoporosis     Obesity, Class III, BMI 40-49.9 (morbid obesity) (H)     S/P TKR (total knee replacement)     HTN, goal below 140/80     Back pain, left below scapula, strained muscles     Headache     Encounter for routine gynecological examination      Colles' fracture     Pain of left lower leg     Pain of right lower leg     Bilateral cold feet     Other specified symptoms and signs involving the circulatory and respiratory systems      Past Surgical History   Procedure Laterality Date     Tubal ligation  1994     C/section, low transverse  1978, 1984, 1994     x 3     Gastric bypass  1980/2005     Gastric Bypass and revision     C oral surgery procedure  age 18     wisdom teeth     Colonoscopy  2001     normal colonoscopy      Hernia repair, incisional  6/16/2008     lap.repair with 10x8 mesh     Arthroplasty knee  4/2/2012     Procedure:ARTHROPLASTY KNEE; Left Total Knee Arthroplasty--Anesth.Choice; Surgeon:VICENTE MORALES; Location:[place] OR       Social History   Substance Use Topics     Smoking status: Never Smoker     Smokeless tobacco: Never Used     Alcohol use 0.0 oz/week     0 Standard drinks or equivalent per week      Comment: mixed very light 1 a month if that     Family History   Problem Relation Age of Onset     C.A.D. Father      MI at age 60     Hypertension Father      Lipids Father      high cholesterol     Alzheimer Disease Father      Eye Disorder Mother      glaucoma     OSTEOPOROSIS Mother      on Fosamax     Musculoskeletal Disorder Mother      knee and feet problems     Arthritis Mother      sagrario and feet problems     Other Cancer Brother      CANCER Other      maternal cousin with cervical cancer     CANCER Maternal Aunt      stomach cancer     DIABETES No family hx of      CEREBROVASCULAR DISEASE No family hx of      Breast Cancer No family hx of      Cancer - colorectal No family hx of      Prostate Cancer No family hx of          Current Outpatient Prescriptions   Medication Sig Dispense Refill     nystatin (MYCOSTATIN) cream Apply topically 2 times daily Reported on 3/6/2017       hydrochlorothiazide 12.5 MG TABS Take 1 tablet (12.5 mg) by mouth daily 90 tablet 3     atenolol (TENORMIN) 50 MG tablet Take 1 tablet (50 mg) by mouth daily 90 tablet 3     Emollient (CERAVE) CREA Externally apply topically 2 times daily as needed 1 Bottle 3     hydrocortisone 1 % ointment Reported on 3/17/2017 30 g 0     clobetasol (TEMOVATE) 0.05 % ointment Apply sparingly to affected area twice daily for 14 days.  Do not apply to face. (Patient not taking: Reported on 3/6/2017) 15 g 0     nystatin (MYCOSTATIN) 423798 UNIT/GM POWD Apply topically 3 times daily as needed (Patient not taking: Reported on 3/6/2017) 30 g 1      Allergies   Allergen Reactions     Vioxx Itching and Swelling     VIOXX (Swollen Feet,Hands itching), Okay with ibuprofen and aspirin     Labs reviewed in EPIC    Reviewed and updated as needed this visit by clinical staff       Reviewed and updated as needed this visit by Provider             Review of Systems   Constitutional: Negative for diaphoresis, fever, malaise/fatigue and weight loss.   HENT: Negative for congestion, ear discharge, ear pain, hearing loss, nosebleeds and sore throat.    Eyes: Negative for blurred vision, double vision, photophobia, pain, discharge and redness.   Respiratory: Negative for cough, hemoptysis, sputum production, shortness of breath and wheezing.    Cardiovascular: Negative for chest pain, palpitations, orthopnea and leg swelling.   Gastrointestinal: Negative for abdominal pain, blood in stool, constipation, diarrhea, heartburn, melena, nausea and vomiting.   Genitourinary: Negative.  Negative for dysuria, frequency and urgency.   Musculoskeletal: Positive for joint pain. Negative for back pain, myalgias and neck pain.   Skin: Negative for itching and rash.   Neurological: Negative.  Negative for dizziness, tingling, sensory change, focal weakness, seizures, loss of consciousness, weakness and headaches.   Endo/Heme/Allergies: Negative.    Psychiatric/Behavioral: Negative for depression, hallucinations, substance abuse and suicidal ideas. The patient is not nervous/anxious and does not have insomnia.          Physical Exam   Constitutional: She is oriented to person, place, and time and well-developed, well-nourished, and in no distress. No distress.   HENT:   Head: Normocephalic and atraumatic.   Right Ear: External ear normal.   Left Ear: External ear normal.   Nose: Nose normal.   Eyes: Conjunctivae and EOM are normal. Pupils are equal, round, and reactive to light. Right eye exhibits no discharge. Left eye exhibits no discharge. No scleral icterus.   Neck: Normal range of  motion. Neck supple. No JVD present. No tracheal deviation present. No thyromegaly present.   Cardiovascular: Normal rate, regular rhythm, normal heart sounds and intact distal pulses.  Exam reveals no gallop and no friction rub.    No murmur heard.  Pulmonary/Chest: Effort normal and breath sounds normal. No stridor. No respiratory distress. She has no wheezes. She has no rales. She exhibits no tenderness.   Abdominal: Soft. Bowel sounds are normal. She exhibits no distension and no mass. There is no tenderness. There is no rebound and no guarding.   Musculoskeletal: Normal range of motion. She exhibits no edema.        Right ankle: She exhibits swelling and ecchymosis. She exhibits normal range of motion. Tenderness. Medial malleolus tenderness found.   Lymphadenopathy:     She has no cervical adenopathy.   Neurological: She is alert and oriented to person, place, and time. She has normal reflexes. No cranial nerve deficit. She exhibits normal muscle tone. Gait normal.   Skin: Skin is warm and dry. No rash noted. She is not diaphoretic. No erythema. No pallor.   Psychiatric: Mood, memory, affect and judgment normal.         (S9.422A) Sprain of deltoid ligament of left ankle, initial encounter  (primary encounter diagnosis)  Comment:   Plan: XR Ankle Right G/E 3 Views           X-rays show no evidence of fracture or significant ankle sprain. Symptomatic treatment was discussed and she will follow up is symptoms do not resolve.

## 2017-03-17 NOTE — MR AVS SNAPSHOT
"              After Visit Summary   3/17/2017    Jacquie Harris    MRN: [id number]           Patient Information     Date Of Birth          1955        Visit Information        Provider Department      3/17/2017 10:40 AM Denis Schmid PA-C [facility]        Today's Diagnoses     Sprain of deltoid ligament of left ankle, initial encounter    -  1       Follow-ups after your visit        Follow-up notes from your care team     Return if symptoms worsen or fail to improve.      Who to contact     If you have questions or need follow up information about today's clinic visit or your schedule please contact [facility] directly at [phone].  Normal or non-critical lab and imaging results will be communicated to you by [employer], letter or phone within 4 business days after the clinic has received the results. If you do not hear from us within 7 days, please contact the clinic through MyChart or phone. If you have a critical or abnormal lab result, we will notify you by phone as soon as possible.  Submit refill requests through [email or web address] or call your pharmacy and they will forward the refill request to us. Please allow 3 business days for your refill to be completed.          Additional Information About Your Visit        MyChart Information     [email or web address] lets you send messages to your doctor, view your test results, renew your prescriptions, schedule appointments and more. To sign up, go to [email or web address] . Click on \"Log in\" on the left side of the screen, which will take you to the Welcome page. Then click on \"Sign up Now\" on the right side of the page.     You will be asked to enter the access code listed below, as well as some personal information. Please follow the directions to create your username and password.     Your access code is: [email or web address]  Expires: 4/3/2017 11:56 AM     Your access code will  in 90 days. If you need help or a new code, please call your " [facility] or [phone].        Care EveryWhere ID     This is your Care EveryWhere ID. This could be used by other organizations to access your [place] medical records  [id number]        Your Vitals Were     Pulse Temperature Last Period BMI (Body Mass Index)          74 99.1  F (37.3  C) (Tympanic) 05/29/2006 47.65 kg/m2         Blood Pressure from Last 3 Encounters:   03/17/17 114/80   03/06/17 106/80   02/24/17 122/69    Weight from Last 3 Encounters:   03/17/17 269 lb (122 kg)   03/06/17 269 lb (122 kg)   02/24/17 260 lb (117.9 kg)               Primary Care Provider Office Phone # Fax #    Mariel Va Sabillon [phone]       [facility] [address] [facility] [address]        Thank you!     Thank you for choosing [facility]  for your care. Our goal is always to provide you with excellent care. Hearing back from our patients is one way we can continue to improve our services. Please take a few minutes to complete the written survey that you may receive in the mail after your visit with us. Thank you!             Your Updated Medication List - Protect others around you: Learn how to safely use, store and throw away your medicines at www.disposemymeds.org.          This list is accurate as of: 3/17/17 12:05 PM.  Always use your most recent med list.                   Brand Name Dispense Instructions for use    atenolol 50 MG tablet    TENORMIN    90 tablet    Take 1 tablet (50 mg) by mouth daily       CERAVE Crea     1 Bottle    Externally apply topically 2 times daily as needed       clobetasol 0.05 % ointment    TEMOVATE    15 g    Apply sparingly to affected area twice daily for 14 days.  Do not apply to face.       hydrochlorothiazide 12.5 MG Tabs tablet     90 tablet    Take 1 tablet (12.5 mg) by mouth daily       hydrocortisone 1 % ointment     30 g    Reported on 3/17/2017       * nystatin [id number] UNIT/GM Powd    MYCOSTATIN    30 g    Apply  topically 3 times daily as needed       * nystatin cream    MYCOSTATIN     Apply topically 2 times daily Reported on 3/6/2017       * Notice:  This list has 2 medication(s) that are the same as other medications prescribed for you. Read the directions carefully, and ask your doctor or other care provider to review them with you.

## 2017-04-14 ENCOUNTER — HOSPITAL ENCOUNTER (EMERGENCY)
Facility: CLINIC | Age: 62
Discharge: HOME | End: 2017-04-14
Attending: EMERGENCY MEDICINE | Admitting: EMERGENCY MEDICINE
Payer: MEDICARE

## 2017-04-14 VITALS
OXYGEN SATURATION: 98 % | TEMPERATURE: 98 F | HEART RATE: 83 BPM | SYSTOLIC BLOOD PRESSURE: 146 MMHG | RESPIRATION RATE: 18 BRPM | DIASTOLIC BLOOD PRESSURE: 98 MMHG

## 2017-04-14 DIAGNOSIS — S16.1XXA CERVICAL STRAIN, INITIAL ENCOUNTER: ICD-10-CM

## 2017-04-14 DIAGNOSIS — M62.830 SPASM OF BACK MUSCLES: ICD-10-CM

## 2017-04-14 DIAGNOSIS — S39.012A BACK STRAIN, INITIAL ENCOUNTER: ICD-10-CM

## 2017-04-14 PROCEDURE — 99284 EMERGENCY DEPT VISIT MOD MDM: CPT | Performed by: EMERGENCY MEDICINE

## 2017-04-14 PROCEDURE — 99284 EMERGENCY DEPT VISIT MOD MDM: CPT | Mod: 25

## 2017-04-14 PROCEDURE — 96372 THER/PROPH/DIAG INJ SC/IM: CPT

## 2017-04-14 PROCEDURE — 25000128 H RX IP 250 OP 636: Performed by: EMERGENCY MEDICINE

## 2017-04-14 PROCEDURE — A9270 NON-COVERED ITEM OR SERVICE: HCPCS | Mod: GY | Performed by: EMERGENCY MEDICINE

## 2017-04-14 PROCEDURE — 25000132 ZZH RX MED GY IP 250 OP 250 PS 637: Mod: GY | Performed by: EMERGENCY MEDICINE

## 2017-04-14 RX ORDER — HYDROCODONE BITARTRATE AND ACETAMINOPHEN 5; 325 MG/1; MG/1
TABLET ORAL
Qty: 20 TABLET | Refills: 0 | Status: SHIPPED | OUTPATIENT
Start: 2017-04-14 | End: 2017-04-20

## 2017-04-14 RX ORDER — KETOROLAC TROMETHAMINE 30 MG/ML
60 INJECTION, SOLUTION INTRAMUSCULAR; INTRAVENOUS ONCE
Status: COMPLETED | OUTPATIENT
Start: 2017-04-14 | End: 2017-04-14

## 2017-04-14 RX ORDER — METHOCARBAMOL 750 MG/1
TABLET, FILM COATED ORAL
Qty: 40 TABLET | Refills: 0 | Status: SHIPPED | OUTPATIENT
Start: 2017-04-14 | End: 2017-04-20

## 2017-04-14 RX ORDER — HYDROCODONE BITARTRATE AND ACETAMINOPHEN 5; 325 MG/1; MG/1
2 TABLET ORAL ONCE
Status: COMPLETED | OUTPATIENT
Start: 2017-04-14 | End: 2017-04-14

## 2017-04-14 RX ADMIN — KETOROLAC TROMETHAMINE 60 MG: 30 INJECTION, SOLUTION INTRAMUSCULAR at 10:48

## 2017-04-14 RX ADMIN — HYDROCODONE BITARTRATE AND ACETAMINOPHEN 2 TABLET: 5; 325 TABLET ORAL at 10:51

## 2017-04-14 ASSESSMENT — ENCOUNTER SYMPTOMS
NECK PAIN: 1
NUMBNESS: 0
CONSTITUTIONAL NEGATIVE: 1
NECK STIFFNESS: 1
BACK PAIN: 1
WEAKNESS: 0

## 2017-04-14 NOTE — DISCHARGE INSTRUCTIONS
Back Spasm (No Trauma)    Spasm of the back muscles can occur after a sudden forceful twisting or bending force (such as in a car accident), after a simple awkward movement, or after lifting something heavy with poor body positioning. In either case, muscle spasm adds to the pain. Sleeping in an awkward position or on a poor quality mattress can also cause this. Some persons respond to emotional stress by tensing the muscles of their back.  Pain that continues may require further evaluation or other types of treatment such as physical therapy.  Unless you had a physical injury (for example, a car accident or fall), X-rays are usually not ordered for the initial evaluation of back pain. If pain continues and does not respond to medical treatment, X-rays and other tests may be performed at a later time.   Home care  1. As soon as possible, begin sitting or walking to avoid problems from prolonged bedrest (muscle weakness, worsening back stiffness and pain, blood clots in the legs).  2. When in bed, try to find a position of comfort. A firm mattress is best. Try lying flat on your back with pillows under your knees. You can also try lying on your side with your knees bent up toward your chest and a pillow between your knees.  3. Avoid prolonged sitting, long car rides or travel. This puts more stress on the lower back than standing or walking.   4. During the first 24 to 72 hours after an injury of flare-up apply an ice pack to the painful area for 20 minutes, then remove it for 20 minutes over a period of 60 to 90 minutes or several times a day. This will reduce swelling and pain. Always wrap ice packs in a thin towel.  5. You can start with ice, then switch to heat. Heat (hot shower, hot bath, or heating pad) reduces swelling and pain, and works well for muscle spasms. Heat can be applied to the painful area for 20 minutes , then remove it for 20 minutes over a period of 60 to 90 minutes or several times a day. As  a safety precaution, do not sleep on a heating pad as it can burn or damage skin.  6. Ice and heat therapies can be alternated.  7. Gentle stretching will help your back heal faster. Perform this simple routine 2 to 3 times a day until your back is feeling better.     Low back stretch    Lie on your back with your knees bent and both feet on the ground.    Slowly raise your left knee to your chest as you flatten your lower back against the floor. Hold for 20 to 30 seconds.    Relax and repeat the exercise with your right knee.    Do 2 to 3 of these exercises for each leg.    Repeat, hugging both knees to your chest at the same time.    Do not bounce, but use a gentle pull.    8. Be aware of safe lifting methods and do not lift anything over 15 pounds until all the pain is gone.  Medications  Talk to your doctor before using medications, especially if you have other medical problems or are taking other medicines.  You may use acetaminophen (such as Tylenol) or ibuprofen (such as Advil or Motrin) to control pain, unless another pain medicine was prescribed. If you have a chronic condition such as diabetes, liver or kidney disease, stomach ulcer, or gastrointestinal bleeding, or are taking blood thinners, talk with your doctor before taking any medications.  Be careful if you are given prescription pain medications, narcotics, or medication for muscle spasm. They can cause drowsiness, affect your coordination, reflexes or judgment. Do not drive or operate heavy machinery.  Follow-up care  Follow up with your doctor or this facility if your symptoms do not start to improve after one week. Physical therapy or further tests may be needed.  If X-rays were taken, they will be reviewed by a radiologist. You will be notified of any new findings that may affect your care.  Call 911  Seek emergency medica care if any of the following occur:    Trouble breathing    Confusion    Drowsiness or trouble awakening    Fainting or loss  of consciousness    Rapid or very slow heart rate    Loss of bowel or bladder control  When to seek medical care  Get prompt medicat attention if any of the following occur:    Pain becomes worse or spreads to your legs    Weakness or numbness in one or both legs    Numbness in the groin or genital area    Unexplained fever over 100.4 F (38.0 C)    Burning or pain when passing urine    9132-8399 [employer] [address] [place] [address], [place] [address]. All rights reserved. This information is not intended as a substitute for professional medical care. Always follow your healthcare professional's instructions.          Relieving Back Pain  Back pain is a common problem. You can strain back muscles by lifting too much weight or just by moving the wrong way. Back strain can be uncomfortable, even painful. And it can take weeks or months to improve. To help yourself feel better and prevent future back strains, try these tips.  Important Note: Do not give aspirin to children or teens without first discussing it with your healthcare provider.     Ice    Ice reduces muscle pain and swelling. It helps most during the first 24 to 48 hours after an injury.    Wrap an ice pack or a bag of frozen peas in a thin towel. (Never place ice directly on your skin.)    Place the ice where your back hurts the most.    Don t ice for more than 20 minutes at a time.    You can use ice several times a day.     Medicines  Over-the-counter pain relievers can include acetaminophen and anti-inflammatory medicines, which includes aspirin or ibuprofen. They can help ease discomfort. Some also reduce swelling.    Tell your healthcare provider about any medicines you are already taking.    Take medicines only as directed.    Heat  After the first 48 hours, heat can relax sore muscles and improve blood flow.    Try a warm bath or shower. Or use a heating pad set on low. To prevent a burn, keep a cloth between you and the heating  pad.    Don t use a heating pad for more than 15 minutes at a time. Never sleep on a heating pad.    6780-9293 [employer] [address] [place] [address], [place] [address]. All rights reserved. This information is not intended as a substitute for professional medical care. Always follow your healthcare professional's instructions.

## 2017-04-14 NOTE — ED PROVIDER NOTES
"  History     Chief Complaint   Patient presents with     Back Pain     Rib Pain     HPI  Jacquie Harris is a 62 year old female who strained her neck and back while doing \"spring cleaning\" several days ago.  She has diffuse pain in the neck and back which was insidious in onset 3 days ago.  Pain is sharp, severe, radiating throughout the back and exacerbated by movement, bending and palpation.  She is using Ibuprofen and Aleve without pain relief.  No associated neurologic deficit or abnormality.         I have reviewed the Medications, Allergies, Past Medical and Surgical History, and Social History in the Epic system.  Patient Active Problem List   Diagnosis     s/p gastric bypass x 2     Allergic rhinitis     Carpal tunnel syndrome     Obstructive sleep apnea     Rosacea     CARDIOVASCULAR SCREENING; LDL GOAL LESS THAN 160     Tick bite, infected, positive Lyme test 1996 not treated     Vitamin D deficiency     Osteoporosis     Obesity, Class III, BMI 40-49.9 (morbid obesity) (H)     S/P TKR (total knee replacement)     HTN, goal below 140/80     Back pain, left below scapula, strained muscles     Headache     Encounter for routine gynecological examination      Colles' fracture     Pain of left lower leg     Pain of right lower leg     Bilateral cold feet     Other specified symptoms and signs involving the circulatory and respiratory systems      Past Medical History:   Diagnosis Date     HTN (hypertension)      IRON DEFICIENCY ANEMIA 7/5/2005     Iron infusion/ resolved since injections     DARWIN (obstructive sleep apnea)      Past Surgical History:   Procedure Laterality Date     ARTHROPLASTY KNEE  4/2/2012    Procedure:ARTHROPLASTY KNEE; Left Total Knee Arthroplasty--Anesth.Choice; Surgeon:VICENTE MORALES; Location:[facility] ORAL SURGERY PROCEDURE  age 18    wisdom teeth     C/SECTION, LOW TRANSVERSE  1978, 1984, 1994    x 3     COLONOSCOPY  2001    normal colonoscopy     GASTRIC BYPASS  1980/2005    " Gastric Bypass and revision     HERNIA REPAIR, INCISIONAL  6/16/2008    lap.repair with 10x8 mesh     TUBAL LIGATION  1994     No current facility-administered medications for this encounter.      Current Outpatient Prescriptions   Medication     HYDROcodone-acetaminophen (NORCO) 5-325 MG per tablet     methocarbamol (ROBAXIN) 750 MG tablet     nystatin (MYCOSTATIN) cream     hydrocortisone 1 % ointment     clobetasol (TEMOVATE) 0.05 % ointment     hydrochlorothiazide 12.5 MG TABS     atenolol (TENORMIN) 50 MG tablet     nystatin (MYCOSTATIN) 941722 UNIT/GM POWD     Emollient (CERAVE) CREA     Allergies   Allergen Reactions     Vioxx Itching and Swelling     VIOXX (Swollen Feet,Hands itching), Okay with ibuprofen and aspirin     Social History   Substance Use Topics     Smoking status: Never Smoker     Smokeless tobacco: Never Used     Alcohol use 0.0 oz/week     0 Standard drinks or equivalent per week      Comment: mixed very light 1 a month if that     Family History   Problem Relation Age of Onset     C.A.D. Father      MI at age 60     Hypertension Father      Lipids Father      high cholesterol     Alzheimer Disease Father      Eye Disorder Mother      glaucoma     OSTEOPOROSIS Mother      on Fosamax     Musculoskeletal Disorder Mother      knee and feet problems     Arthritis Mother      sagrario and feet problems     Other Cancer Brother      CANCER Other      maternal cousin with cervical cancer     CANCER Maternal Aunt      stomach cancer     DIABETES No family hx of      CEREBROVASCULAR DISEASE No family hx of      Breast Cancer No family hx of      Cancer - colorectal No family hx of      Prostate Cancer No family hx of        Review of Systems   Constitutional: Negative.    Musculoskeletal: Positive for back pain, neck pain and neck stiffness.   Skin: Negative.    Neurological: Negative for weakness and numbness.       Physical Exam   BP: (!) 146/98  Pulse: 83  Temp: 98  F (36.7  C)  Resp: 18  SpO2: 98  %  Physical Exam   Constitutional: She is oriented to person, place, and time. She appears well-developed and well-nourished. No distress.   HENT:   Head: Normocephalic and atraumatic.   Mouth/Throat: Oropharynx is clear and moist.   Eyes: Conjunctivae and EOM are normal. No scleral icterus.   Neck: Trachea normal. Neck supple. Normal carotid pulses present. Muscular tenderness present. No spinous process tenderness present. Carotid bruit is not present. No rigidity. Decreased range of motion present. No tracheal deviation, no edema and no erythema present. No thyromegaly present.   Cardiovascular: Normal rate, regular rhythm and normal heart sounds.  Exam reveals no gallop and no friction rub.    No murmur heard.  Pulmonary/Chest: Effort normal and breath sounds normal. No respiratory distress. She has no wheezes. She has no rales.   Abdominal: Soft. There is no tenderness.   Musculoskeletal: She exhibits tenderness (diffuse paraspinous musculature and back tenderness). She exhibits no edema.        Cervical back: She exhibits decreased range of motion and tenderness. She exhibits no bony tenderness, no swelling, no edema and no deformity.        Thoracic back: She exhibits decreased range of motion and tenderness. She exhibits no bony tenderness, no swelling, no edema and no deformity.        Lumbar back: She exhibits decreased range of motion and tenderness. She exhibits no bony tenderness, no swelling, no edema and no deformity.        Back:    Neurological: She is alert and oriented to person, place, and time. She has normal strength. No sensory deficit.   Skin: Skin is warm and dry. No rash noted. She is not diaphoretic. No erythema. No pallor.   Psychiatric: Her behavior is normal.     Anxious affect.   Nursing note and vitals reviewed.      ED Course     ED Course     Procedures               Labs Ordered and Resulted from Time of ED Arrival Up to the Time of Departure from the ED - No data to  "display    Medications   ketorolac (TORADOL) injection 60 mg (60 mg Intramuscular Given 4/14/17 1048)   HYDROcodone-acetaminophen (NORCO) 5-325 MG per tablet 2 tablet (2 tablets Oral Given 4/14/17 1051)       Assessments & Plan (with Medical Decision Making)   Insidious onset of neck and back paraspinous musc tenderness c/w strain and spasm, after \"spring cleaning\" activity several days ago. Neurologically intact. She was given Norco and Toradol in the ED and will be discharged with prescriptions for Norco and Robaxin.  She can continue NSAID and will follow up in primary care clinic next week for reevaluation symptoms aren't improving/resolving. Patient was provided instructions for supportive care and will return as needed for worsened condition or worsening symptoms, or new problems or concerns.      I have reviewed the nursing notes.    I have reviewed the findings, diagnosis, plan and need for follow up with the patient.    Discharge Medication List as of 4/14/2017 10:44 AM      START taking these medications    Details   HYDROcodone-acetaminophen (NORCO) 5-325 MG per tablet 1-2 tabs po q 4-6 hrs. prn pain, Disp-20 tablet, R-0, Local Print      methocarbamol (ROBAXIN) 750 MG tablet 1 tab by mouth every 4 hrs. or 2 tabs every 8 hrs. as needed for muscle spasm., Disp-40 tablet, R-0, Local Print             Final diagnoses:   Cervical strain, initial encounter   Back strain, initial encounter - Thoracic and lumbar   Spasm of back muscles       4/14/2017   [facility] EMERGENCY DEPARTMENT     Pablo De Jesus MD  04/14/17 1151    "

## 2017-04-14 NOTE — ED AVS SNAPSHOT
[facility] Emergency Department    [address] [facility] [address]    Phone:  [phone]    Fax:  [phone]                                       Jacquie Harris   MRN: [id number]    Department:  [facility] Emergency Department   Date of Visit:  4/14/2017           Patient Information     Date Of Birth          1955        Your diagnoses for this visit were:     Cervical strain, initial encounter     Back strain, initial encounter Thoracic and lumbar    Spasm of back muscles        You were seen by Pablo De Jesus MD.      Follow-up Information     Schedule an appointment as soon as possible for a visit with Mariel Sabillon MD.    Specialty:  Family Practice    Contact information:    [facility]  [address] [facility] [facility] [address]  [phone]          Discharge Instructions         Back Spasm (No Trauma)    Spasm of the back muscles can occur after a sudden forceful twisting or bending force (such as in a car accident), after a simple awkward movement, or after lifting something heavy with poor body positioning. In either case, muscle spasm adds to the pain. Sleeping in an awkward position or on a poor quality mattress can also cause this. Some persons respond to emotional stress by tensing the muscles of their back.  Pain that continues may require further evaluation or other types of treatment such as physical therapy.  Unless you had a physical injury (for example, a car accident or fall), X-rays are usually not ordered for the initial evaluation of back pain. If pain continues and does not respond to medical treatment, X-rays and other tests may be performed at a later time.   Home care  1. As soon as possible, begin sitting or walking to avoid problems from prolonged bedrest (muscle weakness, worsening back stiffness and pain, blood clots in the legs).  2. When in bed, try to find a position of comfort. A firm mattress is best. Try lying flat on your back with  pillows under your knees. You can also try lying on your side with your knees bent up toward your chest and a pillow between your knees.  3. Avoid prolonged sitting, long car rides or travel. This puts more stress on the lower back than standing or walking.   4. During the first 24 to 72 hours after an injury of flare-up apply an ice pack to the painful area for 20 minutes, then remove it for 20 minutes over a period of 60 to 90 minutes or several times a day. This will reduce swelling and pain. Always wrap ice packs in a thin towel.  5. You can start with ice, then switch to heat. Heat (hot shower, hot bath, or heating pad) reduces swelling and pain, and works well for muscle spasms. Heat can be applied to the painful area for 20 minutes , then remove it for 20 minutes over a period of 60 to 90 minutes or several times a day. As a safety precaution, do not sleep on a heating pad as it can burn or damage skin.  6. Ice and heat therapies can be alternated.  7. Gentle stretching will help your back heal faster. Perform this simple routine 2 to 3 times a day until your back is feeling better.     Low back stretch    Lie on your back with your knees bent and both feet on the ground.    Slowly raise your left knee to your chest as you flatten your lower back against the floor. Hold for 20 to 30 seconds.    Relax and repeat the exercise with your right knee.    Do 2 to 3 of these exercises for each leg.    Repeat, hugging both knees to your chest at the same time.    Do not bounce, but use a gentle pull.    8. Be aware of safe lifting methods and do not lift anything over 15 pounds until all the pain is gone.  Medications  Talk to your doctor before using medications, especially if you have other medical problems or are taking other medicines.  You may use acetaminophen (such as Tylenol) or ibuprofen (such as Advil or Motrin) to control pain, unless another pain medicine was prescribed. If you have a chronic condition  such as diabetes, liver or kidney disease, stomach ulcer, or gastrointestinal bleeding, or are taking blood thinners, talk with your doctor before taking any medications.  Be careful if you are given prescription pain medications, narcotics, or medication for muscle spasm. They can cause drowsiness, affect your coordination, reflexes or judgment. Do not drive or operate heavy machinery.  Follow-up care  Follow up with your doctor or this facility if your symptoms do not start to improve after one week. Physical therapy or further tests may be needed.  If X-rays were taken, they will be reviewed by a radiologist. You will be notified of any new findings that may affect your care.  Call [phone]  Seek emergency medica care if any of the following occur:    Trouble breathing    Confusion    Drowsiness or trouble awakening    Fainting or loss of consciousness    Rapid or very slow heart rate    Loss of bowel or bladder control  When to seek medical care  Get prompt medicat attention if any of the following occur:    Pain becomes worse or spreads to your legs    Weakness or numbness in one or both legs    Numbness in the groin or genital area    Unexplained fever over 100.4 F (38.0 C)    Burning or pain when passing urine    6620-5784 [employer]. [address] [place], [place] [address]. All rights reserved. This information is not intended as a substitute for professional medical care. Always follow your healthcare professional's instructions.          Relieving Back Pain  Back pain is a common problem. You can strain back muscles by lifting too much weight or just by moving the wrong way. Back strain can be uncomfortable, even painful. And it can take weeks or months to improve. To help yourself feel better and prevent future back strains, try these tips.  Important Note: Do not give aspirin to children or teens without first discussing it with your healthcare provider.     Ice    Ice reduces muscle pain and  swelling. It helps most during the first 24 to 48 hours after an injury.    Wrap an ice pack or a bag of frozen peas in a thin towel. (Never place ice directly on your skin.)    Place the ice where your back hurts the most.    Don t ice for more than 20 minutes at a time.    You can use ice several times a day.     Medicines  Over-the-counter pain relievers can include acetaminophen and anti-inflammatory medicines, which includes aspirin or ibuprofen. They can help ease discomfort. Some also reduce swelling.    Tell your healthcare provider about any medicines you are already taking.    Take medicines only as directed.    Heat  After the first 48 hours, heat can relax sore muscles and improve blood flow.    Try a warm bath or shower. Or use a heating pad set on low. To prevent a burn, keep a cloth between you and the heating pad.    Don t use a heating pad for more than 15 minutes at a time. Never sleep on a heating pad.    4696-8455 [employer]. [address] [place] [address]. All rights reserved. This information is not intended as a substitute for professional medical care. Always follow your healthcare professional's instructions.          24 Hour Appointment Hotline       To make an appointment at any [place] clinic, call [phone] ([phone]). If you don't have a family doctor or clinic, we will help you find one. [place] clinics are conveniently located to serve the needs of you and your family.             Review of your medicines      START taking        Dose / Directions Last dose taken    HYDROcodone-acetaminophen 5-325 MG per tablet   Commonly known as:  NORCO   Quantity:  20 tablet        1-2 tabs po q 4-6 hrs. prn pain   Refills:  0        methocarbamol 750 MG tablet   Commonly known as:  ROBAXIN   Quantity:  40 tablet        1 tab by mouth every 4 hrs. or 2 tabs every 8 hrs. as needed for muscle spasm.   Refills:  0          Our records show that you are taking the  medicines listed below. If these are incorrect, please call your family doctor or clinic.        Dose / Directions Last dose taken    atenolol 50 MG tablet   Commonly known as:  TENORMIN   Dose:  50 mg   Quantity:  90 tablet        Take 1 tablet (50 mg) by mouth daily   Refills:  3        CERAVE Crea   Quantity:  1 Bottle        Externally apply topically 2 times daily as needed   Refills:  3        clobetasol 0.05 % ointment   Commonly known as:  TEMOVATE   Quantity:  15 g        Apply sparingly to affected area twice daily for 14 days.  Do not apply to face.   Refills:  0        hydrochlorothiazide 12.5 MG Tabs tablet   Dose:  12.5 mg   Quantity:  90 tablet        Take 1 tablet (12.5 mg) by mouth daily   Refills:  3        hydrocortisone 1 % ointment   Quantity:  30 g        Reported on 3/17/2017   Refills:  0        * nystatin [id number] UNIT/GM Powd   Commonly known as:  MYCOSTATIN   Quantity:  30 g        Apply topically 3 times daily as needed   Refills:  1        * nystatin cream   Commonly known as:  MYCOSTATIN        Apply topically 2 times daily Reported on 3/6/2017   Refills:  0        * Notice:  This list has 2 medication(s) that are the same as other medications prescribed for you. Read the directions carefully, and ask your doctor or other care provider to review them with you.            Prescriptions were sent or printed at these locations (2 Prescriptions)                   Other Prescriptions                Printed at Department/Unit printer (2 of 2)         HYDROcodone-acetaminophen (NORCO) 5-325 MG per tablet               methocarbamol (ROBAXIN) 750 MG tablet                Orders Needing Specimen Collection     None      Pending Results     No orders found from 4/12/2017 to 4/15/2017.            Pending Culture Results     No orders found from 4/12/2017 to 4/15/2017.            Test Results From Your Hospital Stay               Thank you for choosing [facility]       Thank you for choosing [facility]  "for your care. Our goal is always to provide you with excellent care. Hearing back from our patients is one way we can continue to improve our services. Please take a few minutes to complete the written survey that you may receive in the mail after you visit with us. Thank you!        [employer] Information     [employer] lets you send messages to your doctor, view your test results, renew your prescriptions, schedule appointments and more. To sign up, go to [email or web address] . Click on \"Log in\" on the left side of the screen, which will take you to the Welcome page. Then click on \"Sign up Now\" on the right side of the page.     You will be asked to enter the access code listed below, as well as some personal information. Please follow the directions to create your username and password.     Your access code is: [email or web address]  Expires: 2017 10:44 AM     Your access code will  in 90 days. If you need help or a new code, please call your [place] clinic or [phone].        Care EveryWhere ID     This is your Care EveryWhere ID. This could be used by other organizations to access your [place] medical records  [id number]        After Visit Summary       This is your record. Keep this with you and show to your community pharmacist(s) and doctor(s) at your next visit.                  "

## 2017-04-14 NOTE — ED NOTES
Pt here with neck and back pain following repetative movements Monday and Tuesday. Was spring cleaning, lifting and moving furniture, and dusting. States pain starts in the neck and goes all the way down her back. Moving her arms, laughing, moving all cause pain.

## 2017-04-14 NOTE — ED AVS SNAPSHOT
[facility] Emergency Department    [address] [facility] [address] [address]    Phone:  [phone]    Fax:  [phone]                                       Jacquie Harris   MRN: [id number]    Department:  [facility] Emergency Department   Date of Visit:  4/14/2017           After Visit Summary Signature Page     I have received my discharge instructions, and my questions have been answered. I have discussed any challenges I see with this plan with the nurse or doctor.    ..........................................................................................................................................  Patient/Patient Representative Signature      ..........................................................................................................................................  Patient Representative Print Name and Relationship to Patient    ..................................................               ................................................  Date                                            Time    ..........................................................................................................................................  Reviewed by Signature/Title    ...................................................              ..............................................  Date                                                            Time

## 2017-04-14 NOTE — ED NOTES
Spring cleaning and felt since Tuesday night pain radiating from neck to lower back.  Difficult to walk.

## 2017-04-15 ENCOUNTER — HOSPITAL ENCOUNTER (INPATIENT)
Facility: CLINIC | Age: 62
LOS: 2 days | Discharge: HOME | DRG: 445 | End: 2017-04-18
Attending: EMERGENCY MEDICINE | Admitting: INTERNAL MEDICINE
Payer: MEDICARE

## 2017-04-15 ENCOUNTER — TELEPHONE (OUTPATIENT)
Dept: NURSING | Facility: CLINIC | Age: 62
End: 2017-04-15

## 2017-04-15 ENCOUNTER — APPOINTMENT (OUTPATIENT)
Dept: CT IMAGING | Facility: CLINIC | Age: 62
DRG: 445 | End: 2017-04-15
Attending: EMERGENCY MEDICINE
Payer: MEDICARE

## 2017-04-15 ENCOUNTER — APPOINTMENT (OUTPATIENT)
Dept: ULTRASOUND IMAGING | Facility: CLINIC | Age: 62
DRG: 445 | End: 2017-04-15
Attending: INTERNAL MEDICINE
Payer: MEDICARE

## 2017-04-15 DIAGNOSIS — R74.01 ELEVATED TRANSAMINASE LEVEL: ICD-10-CM

## 2017-04-15 DIAGNOSIS — T40.2X5A THERAPEUTIC OPIOID INDUCED CONSTIPATION: Primary | ICD-10-CM

## 2017-04-15 DIAGNOSIS — M54.6 ACUTE BILATERAL THORACIC BACK PAIN: ICD-10-CM

## 2017-04-15 DIAGNOSIS — K59.03 THERAPEUTIC OPIOID INDUCED CONSTIPATION: Primary | ICD-10-CM

## 2017-04-15 DIAGNOSIS — K83.1 BILIARY OBSTRUCTION (H): ICD-10-CM

## 2017-04-15 PROBLEM — R74.02 ELEVATED LEVELS OF TRANSAMINASE & LACTIC ACID DEHYDROGENASE: Status: ACTIVE | Noted: 2017-04-15

## 2017-04-15 LAB
ALBUMIN SERPL-MCNC: 2.8 G/DL (ref 3.4–5)
ALBUMIN SERPL-MCNC: 3 G/DL (ref 3.4–5)
ALP SERPL-CCNC: 126 U/L (ref 40–150)
ALP SERPL-CCNC: 163 U/L (ref 40–150)
ALT SERPL W P-5'-P-CCNC: 433 U/L (ref 0–50)
ALT SERPL W P-5'-P-CCNC: 558 U/L (ref 0–50)
ANION GAP SERPL CALCULATED.3IONS-SCNC: 5 MMOL/L (ref 3–14)
ANION GAP SERPL CALCULATED.3IONS-SCNC: 9 MMOL/L (ref 3–14)
APAP SERPL-MCNC: 3 MG/L (ref 10–20)
AST SERPL W P-5'-P-CCNC: 635 U/L (ref 0–45)
AST SERPL W P-5'-P-CCNC: 684 U/L (ref 0–45)
BASOPHILS # BLD AUTO: 0 10E9/L (ref 0–0.2)
BASOPHILS NFR BLD AUTO: 0.6 %
BILIRUB SERPL-MCNC: 0.6 MG/DL (ref 0.2–1.3)
BILIRUB SERPL-MCNC: 1.4 MG/DL (ref 0.2–1.3)
BUN SERPL-MCNC: 13 MG/DL (ref 7–30)
BUN SERPL-MCNC: 19 MG/DL (ref 7–30)
CALCIUM SERPL-MCNC: 8.5 MG/DL (ref 8.5–10.1)
CALCIUM SERPL-MCNC: 8.9 MG/DL (ref 8.5–10.1)
CHLORIDE SERPL-SCNC: 103 MMOL/L (ref 94–109)
CHLORIDE SERPL-SCNC: 106 MMOL/L (ref 94–109)
CK SERPL-CCNC: 50 U/L (ref 30–225)
CO2 SERPL-SCNC: 25 MMOL/L (ref 20–32)
CO2 SERPL-SCNC: 28 MMOL/L (ref 20–32)
CREAT SERPL-MCNC: 0.65 MG/DL (ref 0.52–1.04)
CREAT SERPL-MCNC: 0.67 MG/DL (ref 0.52–1.04)
CRP SERPL-MCNC: 27.2 MG/L (ref 0–8)
D DIMER PPP FEU-MCNC: 0.7 UG/ML FEU (ref 0–0.5)
DIFFERENTIAL METHOD BLD: NORMAL
EOSINOPHIL # BLD AUTO: 0.1 10E9/L (ref 0–0.7)
EOSINOPHIL NFR BLD AUTO: 2 %
ERYTHROCYTE [DISTWIDTH] IN BLOOD BY AUTOMATED COUNT: 13.1 % (ref 10–15)
GFR SERPL CREATININE-BSD FRML MDRD: 89 ML/MIN/1.7M2
GFR SERPL CREATININE-BSD FRML MDRD: ABNORMAL ML/MIN/1.7M2
GLUCOSE SERPL-MCNC: 111 MG/DL (ref 70–99)
GLUCOSE SERPL-MCNC: 113 MG/DL (ref 70–99)
HCT VFR BLD AUTO: 43.8 % (ref 35–47)
HGB BLD-MCNC: 14 G/DL (ref 11.7–15.7)
IMM GRANULOCYTES # BLD: 0 10E9/L (ref 0–0.4)
IMM GRANULOCYTES NFR BLD: 0 %
LIPASE SERPL-CCNC: 255 U/L (ref 73–393)
LYMPHOCYTES # BLD AUTO: 1.4 10E9/L (ref 0.8–5.3)
LYMPHOCYTES NFR BLD AUTO: 27.1 %
MCH RBC QN AUTO: 30.2 PG (ref 26.5–33)
MCHC RBC AUTO-ENTMCNC: 32 G/DL (ref 31.5–36.5)
MCV RBC AUTO: 95 FL (ref 78–100)
MONOCYTES # BLD AUTO: 0.4 10E9/L (ref 0–1.3)
MONOCYTES NFR BLD AUTO: 8.3 %
NEUTROPHILS # BLD AUTO: 3.1 10E9/L (ref 1.6–8.3)
NEUTROPHILS NFR BLD AUTO: 62 %
PLATELET # BLD AUTO: 192 10E9/L (ref 150–450)
POTASSIUM SERPL-SCNC: 3.6 MMOL/L (ref 3.4–5.3)
POTASSIUM SERPL-SCNC: 4.3 MMOL/L (ref 3.4–5.3)
PROT SERPL-MCNC: 6.6 G/DL (ref 6.8–8.8)
PROT SERPL-MCNC: 7 G/DL (ref 6.8–8.8)
RBC # BLD AUTO: 4.63 10E12/L (ref 3.8–5.2)
SODIUM SERPL-SCNC: 136 MMOL/L (ref 133–144)
SODIUM SERPL-SCNC: 140 MMOL/L (ref 133–144)
WBC # BLD AUTO: 5.1 10E9/L (ref 4–11)

## 2017-04-15 PROCEDURE — 96374 THER/PROPH/DIAG INJ IV PUSH: CPT

## 2017-04-15 PROCEDURE — 25000128 H RX IP 250 OP 636: Performed by: INTERNAL MEDICINE

## 2017-04-15 PROCEDURE — 82550 ASSAY OF CK (CPK): CPT | Performed by: EMERGENCY MEDICINE

## 2017-04-15 PROCEDURE — 96361 HYDRATE IV INFUSION ADD-ON: CPT

## 2017-04-15 PROCEDURE — 99220 ZZC INITIAL OBSERVATION CARE,LEVL III: CPT | Performed by: INTERNAL MEDICINE

## 2017-04-15 PROCEDURE — 25500064 ZZH RX 255 OP 636: Performed by: EMERGENCY MEDICINE

## 2017-04-15 PROCEDURE — 80053 COMPREHEN METABOLIC PANEL: CPT | Performed by: EMERGENCY MEDICINE

## 2017-04-15 PROCEDURE — 36415 COLL VENOUS BLD VENIPUNCTURE: CPT | Performed by: INTERNAL MEDICINE

## 2017-04-15 PROCEDURE — 25000125 ZZHC RX 250: Performed by: EMERGENCY MEDICINE

## 2017-04-15 PROCEDURE — 96376 TX/PRO/DX INJ SAME DRUG ADON: CPT

## 2017-04-15 PROCEDURE — 83690 ASSAY OF LIPASE: CPT | Performed by: EMERGENCY MEDICINE

## 2017-04-15 PROCEDURE — G0378 HOSPITAL OBSERVATION PER HR: HCPCS

## 2017-04-15 PROCEDURE — 74177 CT ABD & PELVIS W/CONTRAST: CPT

## 2017-04-15 PROCEDURE — 80053 COMPREHEN METABOLIC PANEL: CPT | Performed by: INTERNAL MEDICINE

## 2017-04-15 PROCEDURE — 85379 FIBRIN DEGRADATION QUANT: CPT | Performed by: EMERGENCY MEDICINE

## 2017-04-15 PROCEDURE — 96375 TX/PRO/DX INJ NEW DRUG ADDON: CPT

## 2017-04-15 PROCEDURE — 99285 EMERGENCY DEPT VISIT HI MDM: CPT | Mod: 25

## 2017-04-15 PROCEDURE — 86140 C-REACTIVE PROTEIN: CPT | Performed by: EMERGENCY MEDICINE

## 2017-04-15 PROCEDURE — 71260 CT THORAX DX C+: CPT

## 2017-04-15 PROCEDURE — 25000128 H RX IP 250 OP 636: Performed by: EMERGENCY MEDICINE

## 2017-04-15 PROCEDURE — 85025 COMPLETE CBC W/AUTO DIFF WBC: CPT | Performed by: EMERGENCY MEDICINE

## 2017-04-15 PROCEDURE — 99285 EMERGENCY DEPT VISIT HI MDM: CPT | Performed by: EMERGENCY MEDICINE

## 2017-04-15 PROCEDURE — 80329 ANALGESICS NON-OPIOID 1 OR 2: CPT | Performed by: INTERNAL MEDICINE

## 2017-04-15 PROCEDURE — 25000128 H RX IP 250 OP 636

## 2017-04-15 RX ORDER — HYDROMORPHONE HYDROCHLORIDE 1 MG/ML
.3-.5 INJECTION, SOLUTION INTRAMUSCULAR; INTRAVENOUS; SUBCUTANEOUS
Status: DISCONTINUED | OUTPATIENT
Start: 2017-04-15 | End: 2017-04-18 | Stop reason: HOSPADM

## 2017-04-15 RX ORDER — HYDROCHLOROTHIAZIDE 12.5 MG/1
12.5 CAPSULE ORAL DAILY
Status: DISCONTINUED | OUTPATIENT
Start: 2017-04-16 | End: 2017-04-18 | Stop reason: HOSPADM

## 2017-04-15 RX ORDER — ONDANSETRON 2 MG/ML
4 INJECTION INTRAMUSCULAR; INTRAVENOUS EVERY 6 HOURS PRN
Status: DISCONTINUED | OUTPATIENT
Start: 2017-04-15 | End: 2017-04-18 | Stop reason: HOSPADM

## 2017-04-15 RX ORDER — ONDANSETRON 2 MG/ML
4 INJECTION INTRAMUSCULAR; INTRAVENOUS ONCE
Status: COMPLETED | OUTPATIENT
Start: 2017-04-15 | End: 2017-04-15

## 2017-04-15 RX ORDER — ONDANSETRON 2 MG/ML
INJECTION INTRAMUSCULAR; INTRAVENOUS
Status: COMPLETED
Start: 2017-04-15 | End: 2017-04-15

## 2017-04-15 RX ORDER — ATENOLOL 50 MG/1
50 TABLET ORAL DAILY
Status: DISCONTINUED | OUTPATIENT
Start: 2017-04-16 | End: 2017-04-18 | Stop reason: HOSPADM

## 2017-04-15 RX ORDER — ONDANSETRON 4 MG/1
4 TABLET, ORALLY DISINTEGRATING ORAL EVERY 6 HOURS PRN
Status: DISCONTINUED | OUTPATIENT
Start: 2017-04-15 | End: 2017-04-18 | Stop reason: HOSPADM

## 2017-04-15 RX ORDER — LORAZEPAM 2 MG/ML
0.5 INJECTION INTRAMUSCULAR ONCE
Status: COMPLETED | OUTPATIENT
Start: 2017-04-15 | End: 2017-04-15

## 2017-04-15 RX ORDER — PROCHLORPERAZINE MALEATE 5 MG
5-10 TABLET ORAL EVERY 6 HOURS PRN
Status: DISCONTINUED | OUTPATIENT
Start: 2017-04-15 | End: 2017-04-18 | Stop reason: HOSPADM

## 2017-04-15 RX ORDER — OXYCODONE AND ACETAMINOPHEN 5; 325 MG/1; MG/1
1-2 TABLET ORAL EVERY 6 HOURS PRN
Qty: 20 TABLET | Refills: 0 | Status: SHIPPED | OUTPATIENT
Start: 2017-04-15 | End: 2017-04-20

## 2017-04-15 RX ORDER — NALOXONE HYDROCHLORIDE 0.4 MG/ML
.1-.4 INJECTION, SOLUTION INTRAMUSCULAR; INTRAVENOUS; SUBCUTANEOUS
Status: DISCONTINUED | OUTPATIENT
Start: 2017-04-15 | End: 2017-04-18 | Stop reason: HOSPADM

## 2017-04-15 RX ORDER — HYDROMORPHONE HYDROCHLORIDE 1 MG/ML
0.5 INJECTION, SOLUTION INTRAMUSCULAR; INTRAVENOUS; SUBCUTANEOUS
Status: COMPLETED | OUTPATIENT
Start: 2017-04-15 | End: 2017-04-15

## 2017-04-15 RX ORDER — PROCHLORPERAZINE 25 MG
25 SUPPOSITORY, RECTAL RECTAL EVERY 12 HOURS PRN
Status: DISCONTINUED | OUTPATIENT
Start: 2017-04-15 | End: 2017-04-18 | Stop reason: HOSPADM

## 2017-04-15 RX ORDER — ONDANSETRON 4 MG/1
4 TABLET, ORALLY DISINTEGRATING ORAL EVERY 6 HOURS PRN
Qty: 10 TABLET | Refills: 0 | Status: SHIPPED | OUTPATIENT
Start: 2017-04-15 | End: 2017-04-18

## 2017-04-15 RX ORDER — IOPAMIDOL 755 MG/ML
100 INJECTION, SOLUTION INTRAVASCULAR ONCE
Status: COMPLETED | OUTPATIENT
Start: 2017-04-15 | End: 2017-04-15

## 2017-04-15 RX ORDER — SODIUM CHLORIDE 9 MG/ML
1000 INJECTION, SOLUTION INTRAVENOUS CONTINUOUS
Status: DISCONTINUED | OUTPATIENT
Start: 2017-04-15 | End: 2017-04-16

## 2017-04-15 RX ADMIN — HYDROMORPHONE HYDROCHLORIDE 0.5 MG: 1 INJECTION, SOLUTION INTRAMUSCULAR; INTRAVENOUS; SUBCUTANEOUS at 21:05

## 2017-04-15 RX ADMIN — LORAZEPAM 0.5 MG: 2 INJECTION INTRAMUSCULAR; INTRAVENOUS at 08:22

## 2017-04-15 RX ADMIN — HYDROMORPHONE HYDROCHLORIDE 0.5 MG: 1 INJECTION, SOLUTION INTRAMUSCULAR; INTRAVENOUS; SUBCUTANEOUS at 15:34

## 2017-04-15 RX ADMIN — ONDANSETRON 4 MG: 2 INJECTION INTRAMUSCULAR; INTRAVENOUS at 10:49

## 2017-04-15 RX ADMIN — HYDROMORPHONE HYDROCHLORIDE 0.5 MG: 1 INJECTION, SOLUTION INTRAMUSCULAR; INTRAVENOUS; SUBCUTANEOUS at 08:22

## 2017-04-15 RX ADMIN — HYDROMORPHONE HYDROCHLORIDE 0.5 MG: 1 INJECTION, SOLUTION INTRAMUSCULAR; INTRAVENOUS; SUBCUTANEOUS at 17:49

## 2017-04-15 RX ADMIN — SODIUM CHLORIDE 1000 ML: 9 INJECTION, SOLUTION INTRAVENOUS at 08:20

## 2017-04-15 RX ADMIN — SODIUM CHLORIDE 1000 ML: 9 INJECTION, SOLUTION INTRAVENOUS at 14:56

## 2017-04-15 RX ADMIN — SODIUM CHLORIDE 1000 ML: 9 INJECTION, SOLUTION INTRAVENOUS at 22:49

## 2017-04-15 RX ADMIN — SODIUM CHLORIDE 1000 ML: 9 INJECTION, SOLUTION INTRAVENOUS at 10:47

## 2017-04-15 RX ADMIN — SODIUM CHLORIDE 75 ML: 9 INJECTION, SOLUTION INTRAVENOUS at 09:18

## 2017-04-15 RX ADMIN — PROCHLORPERAZINE EDISYLATE 5 MG: 5 INJECTION INTRAMUSCULAR; INTRAVENOUS at 16:20

## 2017-04-15 RX ADMIN — ONDANSETRON 4 MG: 2 INJECTION INTRAMUSCULAR; INTRAVENOUS at 08:21

## 2017-04-15 RX ADMIN — HYDROMORPHONE HYDROCHLORIDE 0.5 MG: 1 INJECTION, SOLUTION INTRAMUSCULAR; INTRAVENOUS; SUBCUTANEOUS at 14:28

## 2017-04-15 RX ADMIN — IOPAMIDOL 100 ML: 755 INJECTION, SOLUTION INTRAVENOUS at 09:18

## 2017-04-15 NOTE — ED AVS SNAPSHOT
[facility] Emergency Department    [address] [facility] [address]    Phone:  [phone]    Fax:  [phone]                                       Jacquie Harris   MRN: [id number]    Department:  [facility] Emergency Department   Date of Visit:  4/15/2017           Patient Information     Date Of Birth          1955        Your diagnoses for this visit were:     Acute bilateral thoracic back pain        You were seen by Cedric Quick MD.      Follow-up Information     Follow up with Mariel Sabillon MD.    Specialty:  Family Practice    Contact information:    [facility]  [address] [facility] [id number] [phone]          Discharge Instructions       Drink plenty of fluids, up and moving about as able, Percocet for pain, Zofran for nausea, return here for worsening pain, progressive weakness, fever greater than 100.4, trouble breathing or any other concern.    24 Hour Appointment Hotline       To make an appointment at any [place] clinic, call [phone] ([phone]). If you don't have a family doctor or clinic, we will help you find one. [facility] clinics are conveniently located to serve the needs of you and your family.             Review of your medicines      START taking        Dose / Directions Last dose taken    ondansetron 4 MG ODT tab   Commonly known as:  ZOFRAN ODT   Dose:  4 mg   Quantity:  10 tablet        Take 1 tablet (4 mg) by mouth every 6 hours as needed for nausea   Refills:  0        oxyCODONE-acetaminophen 5-325 MG per tablet   Commonly known as:  PERCOCET   Dose:  1-2 tablet   Quantity:  20 tablet        Take 1-2 tablets by mouth every 6 hours as needed for moderate to severe pain   Refills:  0          Our records show that you are taking the medicines listed below. If these are incorrect, please call your family doctor or clinic.        Dose / Directions Last dose taken    atenolol 50 MG tablet   Commonly known as:  TENORMIN    Dose:  50 mg   Quantity:  90 tablet        Take 1 tablet (50 mg) by mouth daily   Refills:  3        CERAVE Crea   Quantity:  1 Bottle        Externally apply topically 2 times daily as needed   Refills:  3        clobetasol 0.05 % ointment   Commonly known as:  TEMOVATE   Quantity:  15 g        Apply sparingly to affected area twice daily for 14 days.  Do not apply to face.   Refills:  0        hydrochlorothiazide 12.5 MG Tabs tablet   Dose:  12.5 mg   Quantity:  90 tablet        Take 1 tablet (12.5 mg) by mouth daily   Refills:  3        HYDROcodone-acetaminophen 5-325 MG per tablet   Commonly known as:  NORCO   Quantity:  20 tablet        1-2 tabs po q 4-6 hrs. prn pain   Refills:  0        hydrocortisone 1 % ointment   Quantity:  30 g        Reported on 3/17/2017   Refills:  0        methocarbamol 750 MG tablet   Commonly known as:  ROBAXIN   Quantity:  40 tablet        1 tab by mouth every 4 hrs. or 2 tabs every 8 hrs. as needed for muscle spasm.   Refills:  0        * nystatin [id number] UNIT/GM Powd   Commonly known as:  MYCOSTATIN   Quantity:  30 g        Apply topically 3 times daily as needed   Refills:  1        * nystatin cream   Commonly known as:  MYCOSTATIN        Apply topically 2 times daily Reported on 3/6/2017   Refills:  0        * Notice:  This list has 2 medication(s) that are the same as other medications prescribed for you. Read the directions carefully, and ask your doctor or other care provider to review them with you.            Prescriptions were sent or printed at these locations (2 Prescriptions)                   Other Prescriptions                Printed at Department/Unit printer (2 of 2)         oxyCODONE-acetaminophen (PERCOCET) 5-325 MG per tablet               ondansetron (ZOFRAN ODT) 4 MG ODT tab                Procedures and tests performed during your visit     CBC with platelets differential    CK total    CRP inflammation    CT Chest/Abdomen/Pelvis w Contrast    Comprehensive  metabolic panel    D dimer quantitative    Lipase    Peripheral IV catheter      Orders Needing Specimen Collection     None      Pending Results     Date and Time Order Name Status Description    4/15/2017 0852 CT Chest/Abdomen/Pelvis w Contrast Preliminary             Pending Culture Results     No orders found from 4/13/2017 to 4/16/2017.            Test Results From Your Hospital Stay        4/15/2017  8:32 AM      Component Results     Component Value Ref Range & Units Status    WBC 5.1 4.0 - 11.0 10e9/L Final    RBC Count 4.63 3.8 - 5.2 10e12/L Final    Hemoglobin 14.0 11.7 - 15.7 g/dL Final    Hematocrit 43.8 35.0 - 47.0 % Final    MCV 95 78 - 100 fl Final    MCH 30.2 26.5 - 33.0 pg Final    MCHC 32.0 31.5 - 36.5 g/dL Final    RDW 13.1 10.0 - 15.0 % Final    Platelet Count 192 150 - 450 10e9/L Final    Diff Method Automated Method  Final    % Neutrophils 62.0 % Final    % Lymphocytes 27.1 % Final    % Monocytes 8.3 % Final    % Eosinophils 2.0 % Final    % Basophils 0.6 % Final    % Immature Granulocytes 0.0 % Final    Absolute Neutrophil 3.1 1.6 - 8.3 10e9/L Final    Absolute Lymphocytes 1.4 0.8 - 5.3 10e9/L Final    Absolute Monocytes 0.4 0.0 - 1.3 10e9/L Final    Absolute Eosinophils 0.1 0.0 - 0.7 10e9/L Final    Absolute Basophils 0.0 0.0 - 0.2 10e9/L Final    Abs Immature Granulocytes 0.0 0 - 0.4 10e9/L Final         4/15/2017  8:49 AM      Component Results     Component Value Ref Range & Units Status    CRP Inflammation 27.2 (H) 0.0 - 8.0 mg/L Final         4/15/2017  8:59 AM      Component Results     Component Value Ref Range & Units Status    Sodium 140 133 - 144 mmol/L Final    Potassium 3.6 3.4 - 5.3 mmol/L Final    Chloride 106 94 - 109 mmol/L Final    Carbon Dioxide 25 20 - 32 mmol/L Final    Anion Gap 9 3 - 14 mmol/L Final    Glucose 111 (H) 70 - 99 mg/dL Final    Urea Nitrogen 19 7 - 30 mg/dL Final    Creatinine 0.65 0.52 - 1.04 mg/dL Final    GFR Estimate >90  Non  GFR Calc    >60 mL/min/1.7m2 Final    GFR Estimate If Black >90   GFR Calc   >60 mL/min/1.7m2 Final    Calcium 8.9 8.5 - 10.1 mg/dL Final    Bilirubin Total 0.6 0.2 - 1.3 mg/dL Final    Albumin 3.0 (L) 3.4 - 5.0 g/dL Final    Protein Total 7.0 6.8 - 8.8 g/dL Final    Alkaline Phosphatase 126 40 - 150 U/L Final     (H) 0 - 50 U/L Final     (HH) 0 - 45 U/L Final    Critical Value called to and read back by  NANNETTE SOLIMAN RN [id number] 04/15/2017 AMADOU           4/15/2017  8:43 AM      Component Results     Component Value Ref Range & Units Status    D Dimer 0.7 (H) 0.0 - 0.50 ug/ml FEU Final    This D-dimer assay is intended for use in conjuntion with a clinical pretest   probability assessment model to exclude pulmonary embolism (PE) and as an aid   in the diagnosis of deep venous thrombosis (DVT) in outpatients suspected of   PE   or DVT. The cut-off value is 0.5 g/mL FEU.           4/15/2017  8:47 AM      Component Results     Component Value Ref Range & Units Status    Lipase 255 73 - 393 U/L Final         4/15/2017  8:49 AM      Component Results     Component Value Ref Range & Units Status    CK Total 50 30 - 225 U/L Final         4/15/2017 10:01 AM      Narrative     CT CHEST/ABDOMEN/PELVIS WITH CONTRAST  4/15/2017 9:35 AM    HISTORY: Symptoms of dissection, right upper quadrant pain.    TECHNIQUE: CT scan obtained of the chest, abdomen, and pelvis with  oral and IV contrast. 100 Ml's Isovue 370 injected. Radiation dose for  this scan was reduced using automated exposure control, adjustment of  the mA and/or kV according to patient size, or iterative  reconstruction technique.    COMPARISON: CT abdomen and pelvis 9/24/2013.    FINDINGS:  Chest: No evidence for thoracic aortic dissection. No acute thoracic  aortic abnormality is seen. No evidence for pulmonary embolism. Mild  areas of coronary artery calcification. No effusions. No acute  airspace disease. Minor bibasilar atelectasis. Tiny nodule  lateral  right upper lobe is 0.3 cm series 5 image 34. No acute or aggressive  osseous abnormality. No adenopathy is identified by size.    Abdomen/pelvis: No evidence for abdominal aortic dissection or  aneurysm. No acute aortic abnormality is seen. Cholecystectomy. Mild  biliary ectasia may just relate to cholecystectomy. Gastric surgical  change. The liver, adrenals, spleen, pancreas, and kidneys do not show  any significant abnormalities. Left renal cyst image 127. Mild  vascular calcifications. No bowel obstruction or acute bowel finding.  No fluid collection or free air. No acute osseous abnormality is seen.        Impression     IMPRESSION:   1. No acute aortic abnormality is seen within the chest or abdomen. No  specific acute finding is identified.  2. Coronary artery and aortic calcifications.  3. Small right-sided pulmonary nodule.    Recommendations for one or multiple incidental lung nodules < 6mm :    Low risk patients: No routine follow-up.    High risk patients: Optional follow-up CT at 12 months; if  unchanged, no further follow-up.    *Low Risk: Minimal or absent history of smoking or other known risk  factors.  *Nonsolid (ground glass) or partly solid nodules may require longer  follow-up to exclude indolent adenocarcinoma.  *Recommendations based on Guidelines for the Management of Incidental  Pulmonary Nodules Detected at CT: From the Fleischner Society 2017,  Radiology 2017.                Thank you for choosing [facility]       Thank you for choosing [facility] for your care. Our goal is always to provide you with excellent care. Hearing back from our patients is one way we can continue to improve our services. Please take a few minutes to complete the written survey that you may receive in the mail after you visit with us. Thank you!        [employer] Information     [employer] lets you send messages to your doctor, view your test results, renew your prescriptions, schedule appointments and more. To  "sign up, go to [email or web address] . Click on \"Log in\" on the left side of the screen, which will take you to the Welcome page. Then click on \"Sign up Now\" on the right side of the page.     You will be asked to enter the access code listed below, as well as some personal information. Please follow the directions to create your username and password.     Your access code is: [email or web address]  Expires: 2017 10:44 AM     Your access code will  in 90 days. If you need help or a new code, please call your [place] clinic or [phone].        Care EveryWhere ID     This is your Care EveryWhere ID. This could be used by other organizations to access your [place] medical records  [id number]        After Visit Summary       This is your record. Keep this with you and show to your community pharmacist(s) and doctor(s) at your next visit.                  "

## 2017-04-15 NOTE — IP AVS SNAPSHOT
MRN:[id number]                      After Visit Summary   4/15/2017    Jacquie Harris    MRN: [id number]           Thank you!     Thank you for choosing [facility] for your care. Our goal is always to provide you with excellent care. Hearing back from our patients is one way we can continue to improve our services. Please take a few minutes to complete the written survey that you may receive in the mail after you visit with us. Thank you!        Patient Information     Date Of Birth          1955        Designated Caregiver       Most Recent Value    Caregiver    Will someone help with your care after discharge? no      About your hospital stay     You were admitted on:  April 15, 2017 You last received care in the:  [facility]    You were discharged on:  April 18, 2017       Who to Call     For medical emergencies, please call 911.  For non-urgent questions about your medical care, please call your primary care provider or clinic, [phone]          Attending Provider     Provider Specialty    Cedric Quick MD Emergency Medicine    [facility], Marv Osorio MD Internal Medicine    Devon La MD Family Practice       Primary Care Provider Office Phone # Fax #    Mariel Va Sabillon [phone]       [facility] [address] [address]        Additional Services     GASTROENTEROLOGY ADULT REF CONSULT ONLY       Preferred Location: [facility], [facility] [phone].  Pancreas division.        Please be aware that coverage of these services is subject to the terms and limitations of your health insurance plan.  Call member services at your health plan with any benefit or coverage questions.  Any procedures must be performed at a [facility] facility OR coordinated by your clinic's referral office.    Please bring the following with you to your appointment:    (1) Any X-Rays, CTs or MRIs which have been performed.  Contact the facility  "where they were done to arrange for  prior to your scheduled appointment.    (2) List of current medications   (3) This referral request   (4) Any documents/labs given to you for this referral                  Further instructions from your care team       Drink plenty of fluids, up and moving about as able, Percocet for pain, Zofran for nausea, return here for worsening pain, progressive weakness, fever greater than 100.4, trouble breathing or any other concern.    Would  follow up with GI (pancreas/biliary division at [facility] in the next 2 months ) they will be contacting you in the next day or so to schedule appointment   Senna for constipation .  May add miralax if needed  Use Tylenol 500mg or ibuprofen 400 mg up 2-3 times/day for the back pain.    Try to get on any aerobic exercise program you can do for the back pain.        Pending Results     No orders found from 4/13/2017 to 4/16/2017.            Statement of Approval     Ordered          04/18/17 0918  I have reviewed and agree with all the recommendations and orders detailed in this document.  EFFECTIVE NOW     Approved and electronically signed by:  Devon La MD             Admission Information     Date & Time Provider Department Dept. Phone    4/15/2017 Devon La MD [facility] [phone]      Your Vitals Were     Blood Pressure Pulse Temperature Respirations Height Weight    134/66 (BP Location: Left arm) 90 98.3  F (36.8  C) (Oral) 20 1.6 m (5' 3\") 125 kg (275 lb 9.2 oz)    Last Period Pulse Oximetry BMI (Body Mass Index)             05/29/2006 95% 48.82 kg/m2         [employer] Information     [employer] lets you send messages to your doctor, view your test results, renew your prescriptions, schedule appointments and more. To sign up, go to [email or web address] . Click on \"Log in\" on the left side of the screen, which will take you to the Welcome page. Then click on \"Sign up Now\" on the right side of " the page.     You will be asked to enter the access code listed below, as well as some personal information. Please follow the directions to create your username and password.     Your access code is: [email or web address]  Expires: 2017 10:44 AM     Your access code will  in 90 days. If you need help or a new code, please call your [facility] clinic or [phone].        Care EveryWhere ID     This is your Care EveryWhere ID. This could be used by other organizations to access your [facility] medical records  [id number]           Review of your medicines      START taking        Dose / Directions    acetaminophen 500 MG tablet   Commonly known as:  TYLENOL   Used for:  Acute bilateral thoracic back pain   Notes to Patient:  Not given today        Dose:  1000 mg   Take 2 tablets (1,000 mg) by mouth every 8 hours as needed for mild pain   Quantity:  100 tablet   Refills:  0       ibuprofen 200 MG capsule   Used for:  Acute bilateral thoracic back pain   Notes to Patient:  Not given today        Dose:  400 mg   Take 400 mg by mouth every 8 hours as needed for fever   Quantity:  60 capsule   Refills:  0       ondansetron 4 MG ODT tab   Commonly known as:  ZOFRAN ODT   Notes to Patient:  None given today, resume if needed        Dose:  4 mg   Take 1 tablet (4 mg) by mouth every 6 hours as needed for nausea   Quantity:  10 tablet   Refills:  0       oxyCODONE-acetaminophen 5-325 MG per tablet   Commonly known as:  PERCOCET   Notes to Patient:  DO NOT TAKE WITH HYDROCODONE(NORCO).  TAKE ONE OR THE OTHER IF TYLENOL OR IBUPROFEN NOT EFFECTIVE FOR PAIN        Dose:  1-2 tablet   Take 1-2 tablets by mouth every 6 hours as needed for moderate to severe pain   Quantity:  20 tablet   Refills:  0       senna-docusate 8.6-50 MG per tablet   Commonly known as:  SENOKOT-S;PERICOLACE   Used for:  Therapeutic opioid induced constipation        Dose:  1-2 tablet   Take 1-2 tablets by mouth 2 times daily as needed for constipation    Quantity:  100 tablet   Refills:  0         CONTINUE these medicines which may have CHANGED, or have new prescriptions. If we are uncertain of the size of tablets/capsules you have at home, strength may be listed as something that might have changed.        Dose / Directions    clobetasol 0.05 % ointment   Commonly known as:  TEMOVATE   This may have changed:    - when to take this  - reasons to take this  - additional instructions   Used for:  Vaginal itching        Apply sparingly to affected area twice daily for 14 days.  Do not apply to face.   Quantity:  15 g   Refills:  0       HYDROcodone-acetaminophen 5-325 MG per tablet   Commonly known as:  NORCO   This may have changed:    - how much to take  - how to take this  - additional instructions   Notes to Patient:  DO NOT TAKE WITH OXYCODONE (PERCOCET)  TAKE ONE OR THE OTHER IF TYLENOL OR IBUPROFEN NOT EFFECTIVE FOR PAIN.         1-2 tabs po q 4-6 hrs. prn pain   Quantity:  20 tablet   Refills:  0         CONTINUE these medicines which have NOT CHANGED        Dose / Directions    atenolol 50 MG tablet   Commonly known as:  TENORMIN   Used for:  HTN, goal below 140/80        Dose:  50 mg   Take 1 tablet (50 mg) by mouth daily   Quantity:  90 tablet   Refills:  3       CERAVE Crea   Used for:  Sunburn, Itching        Externally apply topically 2 times daily as needed   Quantity:  1 Bottle   Refills:  3       hydrochlorothiazide 12.5 MG Tabs tablet   Used for:  HTN, goal below 140/80        Dose:  12.5 mg   Take 1 tablet (12.5 mg) by mouth daily   Quantity:  90 tablet   Refills:  3       methocarbamol 750 MG tablet   Commonly known as:  ROBAXIN   Notes to Patient:  None given while hospitalized        1 tab by mouth every 4 hrs. or 2 tabs every 8 hrs. as needed for muscle spasm.   Quantity:  40 tablet   Refills:  0       * nystatin [id number] UNIT/GM Powd   Commonly known as:  MYCOSTATIN   Used for:  Candidiasis of skin        Apply topically 3 times daily as needed    Quantity:  30 g   Refills:  1       * nystatin cream   Commonly known as:  MYCOSTATIN        Apply topically 2 times daily Reported on 3/6/2017   Refills:  0       * Notice:  This list has 2 medication(s) that are the same as other medications prescribed for you. Read the directions carefully, and ask your doctor or other care provider to review them with you.         Where to get your medicines      Some of these will need a paper prescription and others can be bought over the counter. Ask your nurse if you have questions.     Bring a paper prescription for each of these medications     ondansetron 4 MG ODT tab    oxyCODONE-acetaminophen 5-325 MG per tablet       You don't need a prescription for these medications     acetaminophen 500 MG tablet    ibuprofen 200 MG capsule    senna-docusate 8.6-50 MG per tablet                Protect others around you: Learn how to safely use, store and throw away your medicines at www.disposemymeds.org.             Medication List: This is a list of all your medications and when to take them. Check marks below indicate your daily home schedule. Keep this list as a reference.      Medications           Morning Afternoon Evening Bedtime As Needed    acetaminophen 500 MG tablet   Commonly known as:  TYLENOL   Take 2 tablets (1,000 mg) by mouth every 8 hours as needed for mild pain   Notes to Patient:  Not given today                                   atenolol 50 MG tablet   Commonly known as:  TENORMIN   Take 1 tablet (50 mg) by mouth daily   Last time this was given:  50 mg on 4/18/2017  9:00 AM   Next Dose Due:  04/19/17                                   CERAVE Crea   Externally apply topically 2 times daily as needed                                   clobetasol 0.05 % ointment   Commonly known as:  TEMOVATE   Apply sparingly to affected area twice daily for 14 days.  Do not apply to face.                                   hydrochlorothiazide 12.5 MG Tabs tablet   Take 1 tablet  (12.5 mg) by mouth daily   Last time this was given:  04/18/17  9:00 AM   Next Dose Due:  04/19/17                                   HYDROcodone-acetaminophen 5-325 MG per tablet   Commonly known as:  NORCO   1-2 tabs po q 4-6 hrs. prn pain   Notes to Patient:  DO NOT TAKE WITH OXYCODONE (PERCOCET)  TAKE ONE OR THE OTHER IF TYLENOL OR IBUPROFEN NOT EFFECTIVE FOR PAIN.                                    ibuprofen 200 MG capsule   Take 400 mg by mouth every 8 hours as needed for fever   Notes to Patient:  Not given today                                   methocarbamol 750 MG tablet   Commonly known as:  ROBAXIN   1 tab by mouth every 4 hrs. or 2 tabs every 8 hrs. as needed for muscle spasm.   Notes to Patient:  None given while hospitalized                                   * nystatin [id number] UNIT/GM Powd   Commonly known as:  MYCOSTATIN   Apply topically 3 times daily as needed                                   * nystatin cream   Commonly known as:  MYCOSTATIN   Apply topically 2 times daily Reported on 3/6/2017                                   ondansetron 4 MG ODT tab   Commonly known as:  ZOFRAN ODT   Take 1 tablet (4 mg) by mouth every 6 hours as needed for nausea   Last time this was given:  4 mg on 4/16/2017  4:38 PM   Notes to Patient:  None given today, resume if needed                                   oxyCODONE-acetaminophen 5-325 MG per tablet   Commonly known as:  PERCOCET   Take 1-2 tablets by mouth every 6 hours as needed for moderate to severe pain   Notes to Patient:  DO NOT TAKE WITH HYDROCODONE(NORCO).  TAKE ONE OR THE OTHER IF TYLENOL OR IBUPROFEN NOT EFFECTIVE FOR PAIN                                   senna-docusate 8.6-50 MG per tablet   Commonly known as:  SENOKOT-S;PERICOLACE   Take 1-2 tablets by mouth 2 times daily as needed for constipation   Last time this was given:  2 tablets on 4/18/2017  9:00 AM                                   * Notice:  This list has 2 medication(s) that are the same  as other medications prescribed for you. Read the directions carefully, and ask your doctor or other care provider to review them with you.

## 2017-04-15 NOTE — ED PROVIDER NOTES
Chief Y and back pain    62-year-old female with a history of hypertension, morbid obesity, obstructive sleep apnea, gastric bypass surgery, presents for 2nd day in a row secondary to pain in the neck and paraspinous musculature.  She reports onset this past week on 4/11.  This was after doing spring cleaning and moving some exercise equipment in the home.  Patient had no immediate onset of pain during cleaning of the home.  Denies prior such pain.  Seen here yesterday diagnosed with muscle strain and spasm, prescription for Robaxin and hydrocodone.  Taking one half hydrocodone tablet every 2 hours with minimal pain relief and  associated nausea..  Denies associated fever, headache, focal neurologic change, cough, vomiting, chest pain, shortness of air, abdominal pain.  She has not had a bowel movement for 2 days.  Denies urinary symptoms.    Past medical history, medications, allergies, social history, family history all reviewed.  Patient Active Problem List   Diagnosis     s/p gastric bypass x 2     Allergic rhinitis     Carpal tunnel syndrome     Obstructive sleep apnea     Rosacea     CARDIOVASCULAR SCREENING; LDL GOAL LESS THAN 160     Tick bite, infected, positive Lyme test 1996 not treated     Vitamin D deficiency     Osteoporosis     Obesity, Class III, BMI 40-49.9 (morbid obesity) (H)     S/P TKR (total knee replacement)     HTN, goal below 140/80     Back pain, left below scapula, strained muscles     Headache     Encounter for routine gynecological examination      Colles' fracture     Pain of left lower leg     Pain of right lower leg     Bilateral cold feet     Other specified symptoms and signs involving the circulatory and respiratory systems      ROS: All other systems reviewed and are negative.  /88  Pulse 87  Temp 98.6  F (37  C) (Temporal)  Resp 16  Wt 117.9 kg (260 lb)  LMP 05/29/2006  SpO2 95%  BMI 46.06 kg/m2  Nontoxic appearing no respiratory distress alert and oriented ×3  Head  atraumatic normocephalic  Neck supple full active painful range of motion, tender cervical paraspinous musculature  Tender thoracic and lumbar paraspinous musculature worse on the right than the left  Lungs clear to auscultation  Heart regular no murmur  Abdomen soft mild epigastric tenderness without guarding or rebound bowel sounds positive no masses or HSM  Strength and sensation grossly intact throughout the extremities  Speech is fluent, good eye contact, thought processes are rational    Results for orders placed or performed during the hospital encounter of 04/15/17   CT Chest/Abdomen/Pelvis w Contrast    Narrative    CT CHEST/ABDOMEN/PELVIS WITH CONTRAST  4/15/2017 9:35 AM    HISTORY: Symptoms of dissection, right upper quadrant pain.    TECHNIQUE: CT scan obtained of the chest, abdomen, and pelvis with  oral and IV contrast. 100 Ml's Isovue 370 injected. Radiation dose for  this scan was reduced using automated exposure control, adjustment of  the mA and/or kV according to patient size, or iterative  reconstruction technique.    COMPARISON: CT abdomen and pelvis 9/24/2013.    FINDINGS:  Chest: No evidence for thoracic aortic dissection. No acute thoracic  aortic abnormality is seen. No evidence for pulmonary embolism. Mild  areas of coronary artery calcification. No effusions. No acute  airspace disease. Minor bibasilar atelectasis. Tiny nodule lateral  right upper lobe is 0.3 cm series 5 image 34. No acute or aggressive  osseous abnormality. No adenopathy is identified by size.    Abdomen/pelvis: No evidence for abdominal aortic dissection or  aneurysm. No acute aortic abnormality is seen. Cholecystectomy. Mild  biliary ectasia may just relate to cholecystectomy. Gastric surgical  change. The liver, adrenals, spleen, pancreas, and kidneys do not show  any significant abnormalities. Left renal cyst image 127. Mild  vascular calcifications. No bowel obstruction or acute bowel finding.  No fluid collection or  free air. No acute osseous abnormality is seen.      Impression    IMPRESSION:   1. No acute aortic abnormality is seen within the chest or abdomen. No  specific acute finding is identified.  2. Coronary artery and aortic calcifications.  3. Small right-sided pulmonary nodule.    Recommendations for one or multiple incidental lung nodules < 6mm :    Low risk patients: No routine follow-up.    High risk patients: Optional follow-up CT at 12 months; if  unchanged, no further follow-up.    *Low Risk: Minimal or absent history of smoking or other known risk  factors.  *Nonsolid (ground glass) or partly solid nodules may require longer  follow-up to exclude indolent adenocarcinoma.  *Recommendations based on Guidelines for the Management of Incidental  Pulmonary Nodules Detected at CT: From the Fleischner Society 2017,  Radiology 2017.    PATY SANDOVAL MD   CBC with platelets differential   Result Value Ref Range    WBC 5.1 4.0 - 11.0 10e9/L    RBC Count 4.63 3.8 - 5.2 10e12/L    Hemoglobin 14.0 11.7 - 15.7 g/dL    Hematocrit 43.8 35.0 - 47.0 %    MCV 95 78 - 100 fl    MCH 30.2 26.5 - 33.0 pg    MCHC 32.0 31.5 - 36.5 g/dL    RDW 13.1 10.0 - 15.0 %    Platelet Count 192 150 - 450 10e9/L    Diff Method Automated Method     % Neutrophils 62.0 %    % Lymphocytes 27.1 %    % Monocytes 8.3 %    % Eosinophils 2.0 %    % Basophils 0.6 %    % Immature Granulocytes 0.0 %    Absolute Neutrophil 3.1 1.6 - 8.3 10e9/L    Absolute Lymphocytes 1.4 0.8 - 5.3 10e9/L    Absolute Monocytes 0.4 0.0 - 1.3 10e9/L    Absolute Eosinophils 0.1 0.0 - 0.7 10e9/L    Absolute Basophils 0.0 0.0 - 0.2 10e9/L    Abs Immature Granulocytes 0.0 0 - 0.4 10e9/L   CRP inflammation   Result Value Ref Range    CRP Inflammation 27.2 (H) 0.0 - 8.0 mg/L   Comprehensive metabolic panel   Result Value Ref Range    Sodium 140 133 - 144 mmol/L    Potassium 3.6 3.4 - 5.3 mmol/L    Chloride 106 94 - 109 mmol/L    Carbon Dioxide 25 20 - 32 mmol/L    Anion Gap 9 3 - 14  mmol/L    Glucose 111 (H) 70 - 99 mg/dL    Urea Nitrogen 19 7 - 30 mg/dL    Creatinine 0.65 0.52 - 1.04 mg/dL    GFR Estimate >90  Non  GFR Calc   >60 mL/min/1.7m2    GFR Estimate If Black >90   GFR Calc   >60 mL/min/1.7m2    Calcium 8.9 8.5 - 10.1 mg/dL    Bilirubin Total 0.6 0.2 - 1.3 mg/dL    Albumin 3.0 (L) 3.4 - 5.0 g/dL    Protein Total 7.0 6.8 - 8.8 g/dL    Alkaline Phosphatase 126 40 - 150 U/L     (H) 0 - 50 U/L     (HH) 0 - 45 U/L   D dimer quantitative   Result Value Ref Range    D Dimer 0.7 (H) 0.0 - 0.50 ug/ml FEU   Lipase   Result Value Ref Range    Lipase 255 73 - 393 U/L   CK total   Result Value Ref Range    CK Total 50 30 - 225 U/L   Acetaminophen level   Result Value Ref Range    Acetaminophen Level 3 mg/L   Comprehensive metabolic panel   Result Value Ref Range    Sodium 136 133 - 144 mmol/L    Potassium 4.3 3.4 - 5.3 mmol/L    Chloride 103 94 - 109 mmol/L    Carbon Dioxide 28 20 - 32 mmol/L    Anion Gap 5 3 - 14 mmol/L    Glucose 113 (H) 70 - 99 mg/dL    Urea Nitrogen 13 7 - 30 mg/dL    Creatinine 0.67 0.52 - 1.04 mg/dL    GFR Estimate 89 >60 mL/min/1.7m2    GFR Estimate If Black >90   GFR Calc   >60 mL/min/1.7m2    Calcium 8.5 8.5 - 10.1 mg/dL    Bilirubin Total 1.4 (H) 0.2 - 1.3 mg/dL    Albumin 2.8 (L) 3.4 - 5.0 g/dL    Protein Total 6.6 (L) 6.8 - 8.8 g/dL    Alkaline Phosphatase 163 (H) 40 - 150 U/L     (HH) 0 - 50 U/L     (HH) 0 - 45 U/L     MDM: 62-year-old female presents with paraspinous muscle pain to the point of inability to function.  No specific trigger identified.  Incidental finding of transaminitis without elevation of alkaline phosphatase or bilirubin.  Prior cholecystectomy, gastric bypass.  Reviewed differential, no medications that could possibly explain transaminitis, nothing over-the-counter or herbal, patient is without fever or white count and level of transaminase elevation not consistent with  viral hepatitis, although could represent EBV, CMV.  No obvious liver lesions on CT, findings are not consistent with ischemia.  Patient will be admitted secondary to ongoing nausea vomiting, malaise, and significant elevation of transaminases.  Reviewed with  who accepts patient for admission.    Impression: Thoracic back pain, transaminitis       Cedric Quick MD  04/16/17 0626       Cedric Quick MD  04/16/17 0627

## 2017-04-15 NOTE — ED NOTES
States couldn't take muscle relaxer and narcotics due to nausea with taking them.  Numbness to both arms.  Difficult to ambulate and move arms without pain to neck and lower back.

## 2017-04-15 NOTE — PROGRESS NOTES
"1745 checked on pt, note  at bedside eating pt tray.  reports pt just had emesis of pork/rice from supper tray. This writer explained need to avoid food until nausea/emesis resolved for more than a few hours. Encourage GI rest, sips water only, IVF will keep pt hydrated. Pt states \"well I haven't eaten much in 2 days\". C/O back pain \"between my shoulder blades\", verified with pt, this is new site of pain as she had not reported previous upper back pain. Dilaudid given at 1749.   "

## 2017-04-15 NOTE — TELEPHONE ENCOUNTER
"Call Type: Triage Call    Presenting Problem: Spouse calling reporting patient was seen for  \"back pain\" yesterday. Reporting patient was given \"pain medication  and sent home.\" Patient stating \"I want to know what is causing it.\"  Reporting she is unable to tolerate pain medication and was only  able to take 1/2 tab of Hydrocodone this morning.  Reporting  increasing pain since yesterday. \"I can barely get up to go 25  feet.\" Patient plans to have spouse drive her back to ED now.  Triage Note:  Guideline Title: No Guideline - Advice Per Reference (Adult)  Recommended Disposition: See ED Immediately  Original Inclination: Did not know what to do  Override Disposition:  Intended Action: Follow advice given  Physician Contacted: No  SEE ED IMMEDIATELY ?  YES  ACTIVATE  ? NO  Physician Instructions:  Care Advice:  "

## 2017-04-15 NOTE — PROGRESS NOTES
"1425 entered room, pt appears angry, asking for pain medications. Dilaudid given at 1428, pt states call light didn't work. Call light remote does work, laying along side pt in bed, demonstrated to pt, bedside rail call light did not work. Fixed siderail nurses light, does work, pt able to use. Pt tearful, angry discussing \"nothing being done here in 2 days\", \"nobody tells me why I'm here, just leave me in this bed\". Pt C/O neck, back pain, upper abdominal pain, aqua k applied to neck for comfort. Notified charge nurse and Dr Grissom of pt complaints/concerns. Dr Grissom estimate round on pt in several hours, will notify pt of estimate. Will relay to pt xray/CT results per Dr Grissom request. Cont to monitor/treat pain.  "

## 2017-04-15 NOTE — IP AVS SNAPSHOT
[facility]    [address] [address]    Phone:  [phone]    Fax:  [phone]                                       After Visit Summary   4/15/2017    Jacquie Harris    MRN: [id number]           After Visit Summary Signature Page     I have received my discharge instructions, and my questions have been answered. I have discussed any challenges I see with this plan with the nurse or doctor.    ..........................................................................................................................................  Patient/Patient Representative Signature      ..........................................................................................................................................  Patient Representative Print Name and Relationship to Patient    ..................................................               ................................................  Date                                            Time    ..........................................................................................................................................  Reviewed by Signature/Title    ...................................................              ..............................................  Date                                                            Time

## 2017-04-15 NOTE — ED AVS SNAPSHOT
[facility] Emergency Department    [address] [address]    Phone:  [phone]    Fax:  [phone]                                       Jacquie Harris   MRN: [id number]    Department:  [facility] Emergency Department   Date of Visit:  4/15/2017           After Visit Summary Signature Page     I have received my discharge instructions, and my questions have been answered. I have discussed any challenges I see with this plan with the nurse or doctor.    ..........................................................................................................................................  Patient/Patient Representative Signature      ..........................................................................................................................................  Patient Representative Print Name and Relationship to Patient    ..................................................               ................................................  Date                                            Time    ..........................................................................................................................................  Reviewed by Signature/Title    ...................................................              ..............................................  Date                                                            Time

## 2017-04-15 NOTE — ED NOTES
Patient has  [place] to Observation  order. Patient has been given Patient Bill of Rights, Observation brochure and  What does Observation mean to me  forms.  Patient has been given the opportunity to ask questions about observation status and their plan of care.     Beverly Tavera

## 2017-04-15 NOTE — PROGRESS NOTES
"Pt denies nausea at this time, pain increases with ambulation, \"tolerable \"at this time while resting in bed. Paged Dr Grissom for prn pain medications and prn antiemetics.  "

## 2017-04-15 NOTE — PROGRESS NOTES
"[facility] [facility] [place]    Patient admitted to room 2312 at approximately 1305 via cart from emergency room. Patient was accompanied by transport tech.     Verbal SBAR report received from Cordelia prior to patient arrival.     Patient ambulated to bed with stand-by assist. Patient alert and oriented X 3. Pain is controlled with current analgesics.  Medication(s) being used: narcotic analgesics including Dilaudid. 0-10 Pain Scale: 4 (with norco and muscle relaxer). Admission vital signs: Blood pressure 132/69, pulse 80, temperature 99.3  F (37.4  C), temperature source Oral, resp. rate 18, height 1.6 m (5' 3\"), weight 125 kg (275 lb 9.2 oz), last menstrual period 05/29/2006, SpO2 96 %, not currently breastfeeding. Patient was oriented to plan of care, call light, bed controls, tv, telephone, bathroom and visiting hours.     The following safety risks were identified during admission: none. Yellow risk band applied: NO.     Emi Ambrosio    "

## 2017-04-15 NOTE — PROGRESS NOTES
"1600 pt reports pain \"better\", Dose Dilaudid given at 1534. Pt requesting apple juice and sandwich, given per request at 1600. Pt  reports pt had emesis at 1620, Compazine given. Assist to bathroom to void, C/O back pain with ambulation. Assist to bed, offered additional pain medications, pt declined. 1650 pt reports nausea resolved, feeling sleepy, pain \"ok\". Continue to monitor.  "

## 2017-04-16 ENCOUNTER — APPOINTMENT (OUTPATIENT)
Dept: ULTRASOUND IMAGING | Facility: CLINIC | Age: 62
DRG: 445 | End: 2017-04-16
Attending: INTERNAL MEDICINE
Payer: MEDICARE

## 2017-04-16 PROBLEM — K83.1 BILIARY OBSTRUCTION (H): Status: ACTIVE | Noted: 2017-04-16

## 2017-04-16 LAB
ALBUMIN SERPL-MCNC: 2.6 G/DL (ref 3.4–5)
ALP SERPL-CCNC: 170 U/L (ref 40–150)
ALT SERPL W P-5'-P-CCNC: 463 U/L (ref 0–50)
ANION GAP SERPL CALCULATED.3IONS-SCNC: 6 MMOL/L (ref 3–14)
AST SERPL W P-5'-P-CCNC: 422 U/L (ref 0–45)
BILIRUB SERPL-MCNC: 2.3 MG/DL (ref 0.2–1.3)
BUN SERPL-MCNC: 11 MG/DL (ref 7–30)
CALCIUM SERPL-MCNC: 8.5 MG/DL (ref 8.5–10.1)
CHLORIDE SERPL-SCNC: 105 MMOL/L (ref 94–109)
CO2 SERPL-SCNC: 30 MMOL/L (ref 20–32)
CREAT SERPL-MCNC: 0.72 MG/DL (ref 0.52–1.04)
ERYTHROCYTE [DISTWIDTH] IN BLOOD BY AUTOMATED COUNT: 13 % (ref 10–15)
ERYTHROCYTE [SEDIMENTATION RATE] IN BLOOD BY WESTERGREN METHOD: 94 MM/H (ref 0–30)
GFR SERPL CREATININE-BSD FRML MDRD: 82 ML/MIN/1.7M2
GLUCOSE SERPL-MCNC: 97 MG/DL (ref 70–99)
HCT VFR BLD AUTO: 41.3 % (ref 35–47)
HGB BLD-MCNC: 13 G/DL (ref 11.7–15.7)
LIPASE SERPL-CCNC: 226 U/L (ref 73–393)
MCH RBC QN AUTO: 30.3 PG (ref 26.5–33)
MCHC RBC AUTO-ENTMCNC: 31.5 G/DL (ref 31.5–36.5)
MCV RBC AUTO: 96 FL (ref 78–100)
PLATELET # BLD AUTO: 159 10E9/L (ref 150–450)
POTASSIUM SERPL-SCNC: 4.5 MMOL/L (ref 3.4–5.3)
PROT SERPL-MCNC: 6.3 G/DL (ref 6.8–8.8)
RBC # BLD AUTO: 4.29 10E12/L (ref 3.8–5.2)
SODIUM SERPL-SCNC: 141 MMOL/L (ref 133–144)
WBC # BLD AUTO: 5.3 10E9/L (ref 4–11)

## 2017-04-16 PROCEDURE — 85027 COMPLETE CBC AUTOMATED: CPT | Performed by: INTERNAL MEDICINE

## 2017-04-16 PROCEDURE — 99233 SBSQ HOSP IP/OBS HIGH 50: CPT | Performed by: INTERNAL MEDICINE

## 2017-04-16 PROCEDURE — 25000125 ZZHC RX 250: Performed by: INTERNAL MEDICINE

## 2017-04-16 PROCEDURE — A9270 NON-COVERED ITEM OR SERVICE: HCPCS | Mod: GY | Performed by: PHYSICIAN ASSISTANT

## 2017-04-16 PROCEDURE — 80053 COMPREHEN METABOLIC PANEL: CPT | Performed by: INTERNAL MEDICINE

## 2017-04-16 PROCEDURE — 87340 HEPATITIS B SURFACE AG IA: CPT | Performed by: INTERNAL MEDICINE

## 2017-04-16 PROCEDURE — 12000000 ZZH R&B MED SURG/OB

## 2017-04-16 PROCEDURE — 96361 HYDRATE IV INFUSION ADD-ON: CPT

## 2017-04-16 PROCEDURE — 25000132 ZZH RX MED GY IP 250 OP 250 PS 637: Mod: GY | Performed by: PHYSICIAN ASSISTANT

## 2017-04-16 PROCEDURE — 85652 RBC SED RATE AUTOMATED: CPT | Performed by: INTERNAL MEDICINE

## 2017-04-16 PROCEDURE — 86709 HEPATITIS A IGM ANTIBODY: CPT | Performed by: INTERNAL MEDICINE

## 2017-04-16 PROCEDURE — 93975 VASCULAR STUDY: CPT | Mod: TC

## 2017-04-16 PROCEDURE — 86803 HEPATITIS C AB TEST: CPT | Performed by: INTERNAL MEDICINE

## 2017-04-16 PROCEDURE — 83690 ASSAY OF LIPASE: CPT | Performed by: INTERNAL MEDICINE

## 2017-04-16 PROCEDURE — 25000132 ZZH RX MED GY IP 250 OP 250 PS 637: Mod: GY | Performed by: INTERNAL MEDICINE

## 2017-04-16 PROCEDURE — 25000128 H RX IP 250 OP 636: Performed by: INTERNAL MEDICINE

## 2017-04-16 PROCEDURE — 36415 COLL VENOUS BLD VENIPUNCTURE: CPT | Performed by: INTERNAL MEDICINE

## 2017-04-16 PROCEDURE — A9270 NON-COVERED ITEM OR SERVICE: HCPCS | Mod: GY | Performed by: INTERNAL MEDICINE

## 2017-04-16 PROCEDURE — 96376 TX/PRO/DX INJ SAME DRUG ADON: CPT

## 2017-04-16 PROCEDURE — G0378 HOSPITAL OBSERVATION PER HR: HCPCS

## 2017-04-16 RX ORDER — SODIUM CHLORIDE 9 MG/ML
1000 INJECTION, SOLUTION INTRAVENOUS CONTINUOUS
Status: DISCONTINUED | OUTPATIENT
Start: 2017-04-16 | End: 2017-04-16

## 2017-04-16 RX ORDER — IBUPROFEN 200 MG
400 TABLET ORAL ONCE
Status: COMPLETED | OUTPATIENT
Start: 2017-04-16 | End: 2017-04-16

## 2017-04-16 RX ADMIN — HYDROMORPHONE HYDROCHLORIDE 0.5 MG: 1 INJECTION, SOLUTION INTRAMUSCULAR; INTRAVENOUS; SUBCUTANEOUS at 08:00

## 2017-04-16 RX ADMIN — Medication 1 MG: at 16:30

## 2017-04-16 RX ADMIN — ONDANSETRON 4 MG: 2 INJECTION INTRAMUSCULAR; INTRAVENOUS at 08:08

## 2017-04-16 RX ADMIN — HYDROMORPHONE HYDROCHLORIDE 0.5 MG: 1 INJECTION, SOLUTION INTRAMUSCULAR; INTRAVENOUS; SUBCUTANEOUS at 12:15

## 2017-04-16 RX ADMIN — HYDROMORPHONE HYDROCHLORIDE 0.5 MG: 1 INJECTION, SOLUTION INTRAMUSCULAR; INTRAVENOUS; SUBCUTANEOUS at 00:45

## 2017-04-16 RX ADMIN — ONDANSETRON 4 MG: 4 TABLET, ORALLY DISINTEGRATING ORAL at 16:38

## 2017-04-16 RX ADMIN — Medication 1 MG: at 19:45

## 2017-04-16 RX ADMIN — HYDROMORPHONE HYDROCHLORIDE 0.5 MG: 1 INJECTION, SOLUTION INTRAMUSCULAR; INTRAVENOUS; SUBCUTANEOUS at 04:00

## 2017-04-16 RX ADMIN — HYDROCHLOROTHIAZIDE 12.5 MG: 12.5 CAPSULE ORAL at 08:00

## 2017-04-16 RX ADMIN — IBUPROFEN 400 MG: 200 TABLET, FILM COATED ORAL at 21:51

## 2017-04-16 NOTE — PROGRESS NOTES
[facility] Medicine Follow up Note  Date of Service: 04/16/2017      Patient still with recurrent pain in band from back around lower chest, not changed by eating. Otherwise no complaints.     RUQ US: CBD 1.4 cm cannot exclude obstruction. Patent hepatic vein and portal vein. Lipase normal.       Discussed with GI at [facility]. Agreed with plan for MRCP tomorrow. Would want to see evidence of filling defect/obstructin before considering transfer for ERCP. Gallstone still possible.    - advance diet today and NPO after MN   - MRCP tomorrow      Discussed with patient and  at length and explained what we've found and plan. Questions answered.    Patient staying at least another night if not more for symptom control and further work up. Admit to inpatient.  30 minutes coordinating care and counseling patient.   Mavr Grissom MD  [facility] Medicine

## 2017-04-16 NOTE — PROGRESS NOTES
[facility] Medicine Progress Note  Date of Service: 04/16/2017    Assessment & Plan   Jacquie Harris is a 62 year old female who presented on 4/15/2017 with back pain     Suspicious for bilary colic though s/p cholecystectomy  Marked elevated transaminases but normal alk phos and bilirubin on admission. Toxic hepatitis unlikely to have pain. DDx biliary obstruction, portal vein or hepatic vein thrombosis.    RUQ US ordered on admission but was not done but planned for this morning. Bilirubin and alk phos now trending up consistent with biliary obstruction. Does not look infected clinically.    - keep NPO   - dc RUQ US   - MRCP today   - symptom control      Back pain  Suspect referred GI pain and muscle spasms.   - pain control    S/p gastric bypass    Morbid obesity      DVT Prophylaxis: Low Risk/Ambulatory with no VTE prophylaxis indicated  Code Status: Prior    Lines: PIV   Ceballos catheter: None    Discussion: Await MRCP. Likely will need to discussed with GI once we have results.     Disposition: Uncertain. Will continue observation and consider admission or transfer after MRCP    Attestation:  Total time: 25 minutes    Marv Grissom MD       Interval History   Back pain controlled. Did not sleep well. No fever, chills. No chest pain, shortness of breath.     Physical Exam   Temp:  [98.7  F (37.1  C)-99.3  F (37.4  C)] 98.7  F (37.1  C)  Pulse:  [74-89] 89  Resp:  [18] 18  BP: (100-135)/(66-97) 132/69  SpO2:  [93 %-99 %] 96 %    Weights:   Vitals:    04/15/17 0745 04/15/17 1311   Weight: 117.9 kg (260 lb) 125 kg (275 lb 9.2 oz)    Body mass index is 48.82 kg/(m^2).    Constitutional: alert and oriented, nontoxic, NAD, tired appearing  CV: Regular  Respiratory: CTA bilaterally  GI: Soft, moderate RUQ tenderness without guarding  Skin: Warm and dry    Data     Recent Labs  Lab 04/16/17  0610 04/15/17  1954/17  0800   WBC 5.3  --  5.1   HGB 13.0  --  14.0   MCV 96  --  95      --  192    136 140   POTASSIUM 4.5 4.3 3.6   CHLORIDE 105 103 106   CO2 30 28 25   BUN 11 13 19   CR 0.72 0.67 0.65   ANIONGAP 6 5 9   SONIYA 8.5 8.5 8.9   GLC 97 113* 111*   ALBUMIN 2.6* 2.8* 3.0*   PROTTOTAL 6.3* 6.6* 7.0   BILITOTAL 2.3* 1.4* 0.6   ALKPHOS 170* 163* 126   * 558* 433*   * 635* 684*   LIPASE  --   --  255         Recent Labs  Lab 04/16/17  0610 04/15/17  1954/17  0800   GLC 97 113* 111*        Unresulted Labs Ordered in the Past 30 Days of this Admission     Date and Time Order Name Status Description    4/16/2017 0000 Hepatitis C antibody In process     4/16/2017 0000 Hepatitis B surface antigen In process     4/16/2017 0000 Hepatitis A antibody IgM In process            Imaging  Recent Results (from the past 24 hour(s))   CT Chest/Abdomen/Pelvis w Contrast    Narrative    CT CHEST/ABDOMEN/PELVIS WITH CONTRAST  4/15/2017 9:35 AM    HISTORY: Symptoms of dissection, right upper quadrant pain.    TECHNIQUE: CT scan obtained of the chest, abdomen, and pelvis with  oral and IV contrast. 100 Ml's Isovue 370 injected. Radiation dose for  this scan was reduced using automated exposure control, adjustment of  the mA and/or kV according to patient size, or iterative  reconstruction technique.    COMPARISON: CT abdomen and pelvis 9/24/2013.    FINDINGS:  Chest: No evidence for thoracic aortic dissection. No acute thoracic  aortic abnormality is seen. No evidence for pulmonary embolism. Mild  areas of coronary artery calcification. No effusions. No acute  airspace disease. Minor bibasilar atelectasis. Tiny nodule lateral  right upper lobe is 0.3 cm series 5 image 34. No acute or aggressive  osseous abnormality. No adenopathy is identified by size.    Abdomen/pelvis: No evidence for abdominal aortic dissection or  aneurysm. No acute aortic abnormality is seen. Cholecystectomy. Mild  biliary ectasia may just relate to cholecystectomy. Gastric surgical  change. The liver,  adrenals, spleen, pancreas, and kidneys do not show  any significant abnormalities. Left renal cyst image 127. Mild  vascular calcifications. No bowel obstruction or acute bowel finding.  No fluid collection or free air. No acute osseous abnormality is seen.      Impression    IMPRESSION:   1. No acute aortic abnormality is seen within the chest or abdomen. No  specific acute finding is identified.  2. Coronary artery and aortic calcifications.  3. Small right-sided pulmonary nodule.    Recommendations for one or multiple incidental lung nodules < 6mm :    Low risk patients: No routine follow-up.    High risk patients: Optional follow-up CT at 12 months; if  unchanged, no further follow-up.    *Low Risk: Minimal or absent history of smoking or other known risk  factors.  *Nonsolid (ground glass) or partly solid nodules may require longer  follow-up to exclude indolent adenocarcinoma.  *Recommendations based on Guidelines for the Management of Incidental  Pulmonary Nodules Detected at CT: From the Fleischner Society 2017,  Radiology 2017.    PATY SANDOVAL MD        I reviewed all new labs and imaging results over the last 24 hours. I will personally review MRCP once completed.    Medications     NaCl 1,000 mL (04/16/17 0600)       atenolol  50 mg Oral Daily     hydrochlorothiazide  12.5 mg Oral Daily   Reviewed meds    Marv Grissom MD

## 2017-04-16 NOTE — H&P
[facility]    History and Physical  Hospital Medicine       Date of Admission:  4/15/2017  Date of Service: 4/15/2017     Assessment & Plan   Jacquie Harris is a 62 year old female who presents with back pain    Suspicious for bilary colic though s/p cholecystectomy   Marked elevated transaminases but normal alk phos and bilirubin. Toxic hepatitis unlikely to have pain. DDx biliary obstruction, portal vein or hepatic vein thrombosis   - recheck LFTs tonight to trend   - RUQ US with doppler    Back pain   Suspect referred GI pain and muscle spasms.    - pain control    DVT Prophylaxis: Low Risk/Ambulatory with no VTE prophylaxis indicated  Code Status: Full Code    Disposition: Anticipate dischargein 1 days once improving. Appropriate for observation cares    Marv Grissom MD  Hospital Medicine        Primary Care Physician   Mariel Sabillon [phone]    History is obtained from the patient who is a good historian and  at bedside and review of old records via the EMR.    Past Medical History    Past Medical History:   Diagnosis Date     HTN (hypertension)      IRON DEFICIENCY ANEMIA 7/5/2005     Iron infusion/ resolved since injections     DARWIN (obstructive sleep apnea)        Priority: Medium     Pain of left lower leg 01/03/2017     Priority: Medium     Pain of right lower leg 01/03/2017     Priority: Medium     Bilateral cold feet 01/03/2017     Priority: Medium     Other specified symptoms and signs involving the circulatory and respiratory systems  01/03/2017     Priority: Medium     Colles' fracture 07/06/2015     Priority: Medium     Encounter for routine gynecological examination  06/04/2015     Priority: Medium     Headache 03/23/2015     Priority: Medium     Problem list name updated by automated process. Provider to review       Back pain, left below scapula, strained muscles 10/24/2013     Priority: Medium     HTN, goal below 140/80 05/05/2013     Priority: Medium      S/P TKR (total knee replacement) 04/03/2012     Priority: Medium     Osteoporosis 02/19/2012     Priority: Medium     Previous T scores -2.2  2/2012 Dexascan:  Lumbar spine L1-L4 T-score: -2.4, Left femoral neck T-score: -2.7, Right femoral neck T-score: -2.2   Percent change in lumbar spine: +6.6% since 4/16/2008   Percent change in femurs: +2.9% since 4/16/2008    IMPRESSION: Osteoporosis in the left femoral neck. Severe osteopenia  in the right femoral neck and lumbar spine.       Obesity, Class III, BMI 40-49.9 (morbid obesity) (H) 02/19/2012     Priority: Medium     ideal weight 120, goal weight 200, lose 58 lbs in 58 weeks.       Vitamin D deficiency 07/18/2011     Priority: Medium     Tick bite, infected, positive Lyme test 1996 not treated 07/08/2011     Priority: Medium     Rosacea 11/04/2009     Priority: Medium     Obstructive sleep apnea 12/01/2006     Priority: Medium     Sleep study 12/06 for EDS- AHI 36, desaturations to 65%. CPAP recommended but not tolerated.  01/16/07  ENT consult recommend CPAP and weight loss. Surgery discussed but success rate less than CPAP  1/15/07 CPAP trial, was not able to tolerate. Retried 2008, tolerated better.   Problem list name updated by automated process. Provider to review       Carpal tunnel syndrome 06/07/2006     Priority: Medium     04/10/09 Dr.Meletiou Mercado [facility]. Finding consistant with CTS, but nerve studies and MRI of C-Spine are normal. Corticosteroid injection given in office       CARDIOVASCULAR SCREENING; LDL GOAL LESS THAN 160 10/31/2010     Priority: Low     Allergic rhinitis 03/27/2006     Priority: Low     Problem list name updated by automated process. Provider to review       s/p gastric bypass x 2 07/05/2005     Priority: Low     Gastric bypass 1980          Past Surgical History   Past Surgical History:   Procedure Laterality Date     ARTHROPLASTY KNEE  4/2/2012    Procedure:ARTHROPLASTY KNEE; Left Total Knee Arthroplasty--Anesth.Choice;  Surgeon:VICENTE MORALES; Location:[place] OR     C ORAL SURGERY PROCEDURE  age 18    wisdom teeth     C/SECTION, LOW TRANSVERSE  1978, 1984, 1994    x 3     COLONOSCOPY  2001    normal colonoscopy     GASTRIC BYPASS  1980/2005    Gastric Bypass and revision     HERNIA REPAIR, INCISIONAL  6/16/2008    lap.repair with 10x8 mesh     TUBAL LIGATION  1994        History of Present Illness   Jacquie Harris is a 62 year old female who presents with 4 days of back pain.  Pain began late in the day on Tuesday 1st noticed the base of the neck between the shoulder blades and over the next couple of days it progressed to being the whole spine from the neck to the belt line.  There is bilateral paraspinous muscle pain and tenderness, and her  and was rubbing her back with blue ice 3-5 times a day.  Patient also notes a bandlike tightness from mid thoracic around front lower chest.  Her pain is primarily constant and sharp.  Has been eating without exacerbation of pain.  No known trauma.  On the day this started they have been doing spring cleaning but nothing strenuous.  Friday went to the emergency department and was given Robaxin and Norco.  Robaxin causes severe nausea and the Norco was also difficult to tolerate.  She returned today for further workup.  In the ED she was found to have elevated transaminases and otherwise unremarkable labs, exam and imaging.    Patient has been taking some ibuprofen but only about 6 total over the course.  She took Aleve 2 tablets once.  No Tylenol other than what was in a couple of Norco that she took.  Blood weeks ago she had some heartburn that was better with proton pump inhibitor and this is not returned.  No change in bowel movements although she's been constipated L for 2 days.  No fevers or chills.  She has history of cholecystectomy.  She is a history of gastric bypass.    Prior to Admission Medications   Prior to Admission Medications   Prescriptions Last Dose Informant  Patient Reported? Taking?   Emollient (CERAVE) CREA More than a month at Unknown time Self No No   Sig: Externally apply topically 2 times daily as needed   HYDROcodone-acetaminophen (NORCO) 5-325 MG per tablet 4/15/2017 at am 0600 Self No Yes   Si-2 tabs po q 4-6 hrs. prn pain   Patient taking differently: Take 0.5-1 tablets by mouth 1-2 tabs po q 4-6 hrs. prn pain   atenolol (TENORMIN) 50 MG tablet 4/15/2017 at am Self No Yes   Sig: Take 1 tablet (50 mg) by mouth daily   clobetasol (TEMOVATE) 0.05 % ointment Past Week at Unknown time Self No Yes   Sig: Apply sparingly to affected area twice daily for 14 days.  Do not apply to face.   Patient taking differently: daily as needed Apply sparingly to affected area twice daily for 14 days.  Do not apply to face.   hydrochlorothiazide 12.5 MG TABS 4/15/2017 at am Self No Yes   Sig: Take 1 tablet (12.5 mg) by mouth daily   methocarbamol (ROBAXIN) 750 MG tablet 4/15/2017 at 0600 0.5 tab Self No Yes   Si tab by mouth every 4 hrs. or 2 tabs every 8 hrs. as needed for muscle spasm.   nystatin (MYCOSTATIN) 570996 UNIT/GM POWD Past Month at Unknown time Self No Yes   Sig: Apply topically 3 times daily as needed   nystatin (MYCOSTATIN) cream Past Month at Unknown time Self Yes Yes   Sig: Apply topically 2 times daily Reported on 3/6/2017      Facility-Administered Medications: None     Allergies   Allergies   Allergen Reactions     Vioxx Itching and Swelling     VIOXX (Swollen Feet,Hands itching), Okay with ibuprofen and aspirin       Family History    Family History   Problem Relation Age of Onset     C.A.D. Father      MI at age 60     Hypertension Father      Lipids Father      high cholesterol     Alzheimer Disease Father      Eye Disorder Mother      glaucoma     OSTEOPOROSIS Mother      on Fosamax     Musculoskeletal Disorder Mother      knee and feet problems     Arthritis Mother      sagrario and feet problems     Other Cancer Brother      CANCER Other      maternal  "cousin with cervical cancer     CANCER Maternal Aunt      stomach cancer     DIABETES No family hx of      CEREBROVASCULAR DISEASE No family hx of      Breast Cancer No family hx of      Cancer - colorectal No family hx of      Prostate Cancer No family hx of      mother is alive age 79, father  age 89 just last week with complications of dementia.  One brother  of substance abuse issues.  3 sisters are living.    Social History   Social History     Social History     Marital status:      Spouse name: N/A     Number of children: N/A     Years of education: N/A     Occupational History     Not on file.     Social History Main Topics     Smoking status: Never Smoker     Smokeless tobacco: Never Used     Alcohol use 0.0 oz/week     0 Standard drinks or equivalent per week      Comment: mixed very light 1 a month if that     Drug use: No     Sexual activity: Yes     Partners: Male     Birth control/ protection: Surgical     Other Topics Concern     Parent/Sibling W/ Cabg, Mi Or Angioplasty Before 65f 55m? No      Service No     Blood Transfusions Yes     , , ,      Caffeine Concern No     Occupational Exposure No     Hobby Hazards No     Sleep Concern No     Stress Concern No     Weight Concern Yes     Special Diet No     Back Care No     Exercise No     Bike Helmet No     Seat Belt Yes     Social History Narrative     lives with  and retired from administrative support    Review of Systems   The 10 point Review of Systems is negative other than noted in the HPI or here.  No other pertinent positives    Physical Exam   /69  Pulse 80  Temp 99.3  F (37.4  C) (Oral)  Resp 18  Ht 1.6 m (5' 3\")  Wt 125 kg (275 lb 9.2 oz)  LMP 2006  SpO2 96%  BMI 48.82 kg/m2     Weight: 275 lbs 9.2 oz Body mass index is 48.82 kg/(m^2).     Constitutional: Alert, oriented, cooperative, no apparent distress, appears nontoxic, appears stated age.  Eyes: Eyes are clear, " pupils are reactive.  HEENT: Oropharynx is clear and a little dry. No evidence of cranial trauma.  Lymph/Hematologic: No epitrochlear, axillary, anterior or posterior cervical, or supraclavicular lymphadenopathy is appreciated.  Cardiovascular: Regular rate and rhythm, normal S1 and S2, and no murmur noted. JVP is normal. Good peripheral pulses in wrists bilaterally. No lower extremity edema.  Respiratory: Clear to auscultation bilaterally.   GI: Soft, moderate tenderness to moderate palpation in the right upper quadrant without guarding or rebound, normal bowel sounds, no hepatosplenomegaly.  Genitourinary: Deferred  Musculoskeletal: Normal muscle bulk and tone for age.  There is paraspinous muscle tenderness and tightness though not exquisite.  No spinous process tenderness.  Skin: Warm and dry, no rashes.   Neurologic: Neck supple. Cranial nerves are grossly intact.  is symmetric.  Dorsiflexion is symmetric and strong.    Data   Data reviewed today:     Recent Labs  Lab 04/15/17  0800   WBC 5.1   HGB 14.0   MCV 95         POTASSIUM 3.6   CHLORIDE 106   CO2 25   BUN 19   CR 0.65   ANIONGAP 9   SONIYA 8.9   *   ALBUMIN 3.0*   PROTTOTAL 7.0   BILITOTAL 0.6   ALKPHOS 126   *   *   LIPASE 255       Recent Results (from the past 24 hour(s))   CT Chest/Abdomen/Pelvis w Contrast    Narrative    CT CHEST/ABDOMEN/PELVIS WITH CONTRAST  4/15/2017 9:35 AM    HISTORY: Symptoms of dissection, right upper quadrant pain.    TECHNIQUE: CT scan obtained of the chest, abdomen, and pelvis with  oral and IV contrast. 100 Ml's Isovue 370 injected. Radiation dose for  this scan was reduced using automated exposure control, adjustment of  the mA and/or kV according to patient size, or iterative  reconstruction technique.    COMPARISON: CT abdomen and pelvis 9/24/2013.    FINDINGS:  Chest: No evidence for thoracic aortic dissection. No acute thoracic  aortic abnormality is seen. No evidence for pulmonary  embolism. Mild  areas of coronary artery calcification. No effusions. No acute  airspace disease. Minor bibasilar atelectasis. Tiny nodule lateral  right upper lobe is 0.3 cm series 5 image 34. No acute or aggressive  osseous abnormality. No adenopathy is identified by size.    Abdomen/pelvis: No evidence for abdominal aortic dissection or  aneurysm. No acute aortic abnormality is seen. Cholecystectomy. Mild  biliary ectasia may just relate to cholecystectomy. Gastric surgical  change. The liver, adrenals, spleen, pancreas, and kidneys do not show  any significant abnormalities. Left renal cyst image 127. Mild  vascular calcifications. No bowel obstruction or acute bowel finding.  No fluid collection or free air. No acute osseous abnormality is seen.      Impression    IMPRESSION:   1. No acute aortic abnormality is seen within the chest or abdomen. No  specific acute finding is identified.  2. Coronary artery and aortic calcifications.  3. Small right-sided pulmonary nodule.    Recommendations for one or multiple incidental lung nodules < 6mm :    Low risk patients: No routine follow-up.    High risk patients: Optional follow-up CT at 12 months; if  unchanged, no further follow-up.    *Low Risk: Minimal or absent history of smoking or other known risk  factors.  *Nonsolid (ground glass) or partly solid nodules may require longer  follow-up to exclude indolent adenocarcinoma.  *Recommendations based on Guidelines for the Management of Incidental  Pulmonary Nodules Detected at CT: From the Fleischner Society 2017,  Radiology 2017.    PATY SANDOVAL MD       I personally reviewed the abdominal CT image(s) showing nothing acute, no visible biliary ductal dilatation, aorta appears normal.    Chart documentation with keystrokes and/or Dragon voice recognition software. Although reviewed after completion, some word and grammatical error may remain.  Marv Grissom MD  [facility]

## 2017-04-16 NOTE — PLAN OF CARE
Problem: Pain, Acute (Adult)  Goal: Identify Related Risk Factors and Signs and Symptoms  Related risk factors and signs and symptoms are identified upon initiation of [employer] Clinical Practice Guideline (CPG)   Outcome: No Change  Patient up in chair this am. NPO for abdominal u/s. Complained of a headache. States back and neck pain improved. Dilaudid given. She became nauseated and zofran given. States that she takes motrin for headache at home. Patient tearful after MD rounds. States that she doesn't understand what he is talking about. He had discontinued the US and ordered MRCP, but due to a holiday. MRI not available. US reordered. Explained this to patient. Currently down for US. VSS. Will monitor.

## 2017-04-16 NOTE — PLAN OF CARE
Critical stat lab results called to writer  AST = 635 (down from 684 earlier today)  ALT = 558 (up from 433 earlier today)  U/S tech called to say this U/S test is best done NPO X 12 hrs    Above information related to STEPHEN Rendon  U/S tech verified with writer will be here in am 9-10 am Easter Sunday

## 2017-04-17 ENCOUNTER — APPOINTMENT (OUTPATIENT)
Dept: MRI IMAGING | Facility: CLINIC | Age: 62
DRG: 445 | End: 2017-04-17
Attending: INTERNAL MEDICINE
Payer: MEDICARE

## 2017-04-17 LAB
ALBUMIN SERPL-MCNC: 2.4 G/DL (ref 3.4–5)
ALP SERPL-CCNC: 148 U/L (ref 40–150)
ALT SERPL W P-5'-P-CCNC: 289 U/L (ref 0–50)
ANION GAP SERPL CALCULATED.3IONS-SCNC: 5 MMOL/L (ref 3–14)
AST SERPL W P-5'-P-CCNC: 147 U/L (ref 0–45)
BILIRUB SERPL-MCNC: 0.5 MG/DL (ref 0.2–1.3)
BUN SERPL-MCNC: 11 MG/DL (ref 7–30)
CALCIUM SERPL-MCNC: 8.7 MG/DL (ref 8.5–10.1)
CHLORIDE SERPL-SCNC: 106 MMOL/L (ref 94–109)
CO2 SERPL-SCNC: 32 MMOL/L (ref 20–32)
CREAT SERPL-MCNC: 0.74 MG/DL (ref 0.52–1.04)
ERYTHROCYTE [DISTWIDTH] IN BLOOD BY AUTOMATED COUNT: 13 % (ref 10–15)
GFR SERPL CREATININE-BSD FRML MDRD: 80 ML/MIN/1.7M2
GLUCOSE SERPL-MCNC: 119 MG/DL (ref 70–99)
HAV IGM SERPL QL IA: NORMAL
HBV SURFACE AG SERPL QL IA: NONREACTIVE
HCT VFR BLD AUTO: 39.9 % (ref 35–47)
HCV AB SERPL QL IA: NORMAL
HGB BLD-MCNC: 12.6 G/DL (ref 11.7–15.7)
LIPASE SERPL-CCNC: 176 U/L (ref 73–393)
MCH RBC QN AUTO: 30 PG (ref 26.5–33)
MCHC RBC AUTO-ENTMCNC: 31.6 G/DL (ref 31.5–36.5)
MCV RBC AUTO: 95 FL (ref 78–100)
PLATELET # BLD AUTO: 156 10E9/L (ref 150–450)
POTASSIUM SERPL-SCNC: 3.6 MMOL/L (ref 3.4–5.3)
PROT SERPL-MCNC: 6 G/DL (ref 6.8–8.8)
RBC # BLD AUTO: 4.2 10E12/L (ref 3.8–5.2)
SODIUM SERPL-SCNC: 143 MMOL/L (ref 133–144)
WBC # BLD AUTO: 4.6 10E9/L (ref 4–11)

## 2017-04-17 PROCEDURE — 74183 MRI ABD W/O CNTR FLWD CNTR: CPT

## 2017-04-17 PROCEDURE — A9270 NON-COVERED ITEM OR SERVICE: HCPCS | Mod: GY | Performed by: FAMILY MEDICINE

## 2017-04-17 PROCEDURE — A9585 GADOBUTROL INJECTION: HCPCS | Performed by: FAMILY MEDICINE

## 2017-04-17 PROCEDURE — 25000132 ZZH RX MED GY IP 250 OP 250 PS 637: Mod: GY | Performed by: FAMILY MEDICINE

## 2017-04-17 PROCEDURE — S5010 5% DEXTROSE AND 0.45% SALINE: HCPCS | Performed by: INTERNAL MEDICINE

## 2017-04-17 PROCEDURE — 27210995 ZZH RX 272: Performed by: FAMILY MEDICINE

## 2017-04-17 PROCEDURE — 25500064 ZZH RX 255 OP 636: Performed by: FAMILY MEDICINE

## 2017-04-17 PROCEDURE — 99233 SBSQ HOSP IP/OBS HIGH 50: CPT | Performed by: FAMILY MEDICINE

## 2017-04-17 PROCEDURE — 25000132 ZZH RX MED GY IP 250 OP 250 PS 637: Mod: GY | Performed by: INTERNAL MEDICINE

## 2017-04-17 PROCEDURE — 83690 ASSAY OF LIPASE: CPT | Performed by: INTERNAL MEDICINE

## 2017-04-17 PROCEDURE — 25800025 ZZH RX 258: Performed by: INTERNAL MEDICINE

## 2017-04-17 PROCEDURE — A9270 NON-COVERED ITEM OR SERVICE: HCPCS | Mod: GY | Performed by: INTERNAL MEDICINE

## 2017-04-17 PROCEDURE — 36415 COLL VENOUS BLD VENIPUNCTURE: CPT | Performed by: INTERNAL MEDICINE

## 2017-04-17 PROCEDURE — 85027 COMPLETE CBC AUTOMATED: CPT | Performed by: INTERNAL MEDICINE

## 2017-04-17 PROCEDURE — 80053 COMPREHEN METABOLIC PANEL: CPT | Performed by: INTERNAL MEDICINE

## 2017-04-17 PROCEDURE — 12000000 ZZH R&B MED SURG/OB

## 2017-04-17 RX ORDER — GADOBUTROL 604.72 MG/ML
15 INJECTION INTRAVENOUS ONCE
Status: COMPLETED | OUTPATIENT
Start: 2017-04-17 | End: 2017-04-17

## 2017-04-17 RX ORDER — ACYCLOVIR 200 MG/1
60 CAPSULE ORAL ONCE
Status: DISCONTINUED | OUTPATIENT
Start: 2017-04-17 | End: 2017-04-18 | Stop reason: HOSPADM

## 2017-04-17 RX ORDER — ACYCLOVIR 200 MG/1
60 CAPSULE ORAL ONCE
Status: COMPLETED | OUTPATIENT
Start: 2017-04-17 | End: 2017-04-17

## 2017-04-17 RX ORDER — AMOXICILLIN 250 MG
1-2 CAPSULE ORAL 2 TIMES DAILY PRN
Status: DISCONTINUED | OUTPATIENT
Start: 2017-04-17 | End: 2017-04-18 | Stop reason: HOSPADM

## 2017-04-17 RX ADMIN — HYDROCHLOROTHIAZIDE 12.5 MG: 12.5 CAPSULE ORAL at 16:17

## 2017-04-17 RX ADMIN — Medication 1 MG: at 15:39

## 2017-04-17 RX ADMIN — SENNOSIDES AND DOCUSATE SODIUM 2 TABLET: 8.6; 5 TABLET ORAL at 23:00

## 2017-04-17 RX ADMIN — ATENOLOL 50 MG: 50 TABLET ORAL at 16:17

## 2017-04-17 RX ADMIN — DEXTROSE AND SODIUM CHLORIDE: 5; 450 INJECTION, SOLUTION INTRAVENOUS at 01:16

## 2017-04-17 RX ADMIN — GADOBUTROL 15 ML: 604.72 INJECTION INTRAVENOUS at 09:39

## 2017-04-17 RX ADMIN — SENNOSIDES AND DOCUSATE SODIUM 2 TABLET: 8.6; 5 TABLET ORAL at 16:21

## 2017-04-17 RX ADMIN — SODIUM CHLORIDE 60 ML: 9 INJECTION, SOLUTION INTRAMUSCULAR; INTRAVENOUS; SUBCUTANEOUS at 09:39

## 2017-04-17 NOTE — PROGRESS NOTES
"SUBJECTIVE:   Some back pain continues.   Some of her abd pain but she is better  Hungry  Had some BRBPR on th paper this AM with attempt at BM.  No BM since 6 days.       ROS:4 point ROS including Respiratory, CV, GI and , other than that noted in the HPI,  is negative     OBJECTIVE:   /76 (BP Location: Left arm)  Pulse 79  Temp 97.8  F (36.6  C) (Oral)  Resp 16  Ht 1.6 m (5' 3\")  Wt 125 kg (275 lb 9.2 oz)  LMP 05/29/2006  SpO2 95%  BMI 48.82 kg/m2    GENERAL APPEARANCE:  Alert, NAD      RESP:clear      CV: regular rate and rhythm,  No  murmur , edema: none       Abdomen: soft, some right upper quadrant tenderness with positive Garza's.  Suspect some liver tenderness.   Skin: no cyanosis, pallor, or jaundice    CMP  Recent Labs  Lab 04/17/17  0610 04/16/17  0610 04/15/17  1954/17  0800    141 136 140   POTASSIUM 3.6 4.5 4.3 3.6   CHLORIDE 106 105 103 106   CO2 32 30 28 25   ANIONGAP 5 6 5 9   * 97 113* 111*   BUN 11 11 13 19   CR 0.74 0.72 0.67 0.65   GFRESTIMATED 80 82 89 >90Non  GFR Calc   GFRESTBLACK >90African American GFR Calc >90African American GFR Calc >90African American GFR Calc >90African American GFR Calc   SONIYA 8.7 8.5 8.5 8.9   PROTTOTAL 6.0* 6.3* 6.6* 7.0   ALBUMIN 2.4* 2.6* 2.8* 3.0*   BILITOTAL 0.5 2.3* 1.4* 0.6   ALKPHOS 148 170* 163* 126   * 422* 635* 684*   * 463* 558* 433*     CBC  Recent Labs  Lab 04/17/17  0610 04/16/17  0610 04/15/17  0800   WBC 4.6 5.3 5.1   RBC 4.20 4.29 4.63   HGB 12.6 13.0 14.0   HCT 39.9 41.3 43.8   MCV 95 96 95   MCH 30.0 30.3 30.2   MCHC 31.6 31.5 32.0   RDW 13.0 13.0 13.1    159 192     INRNo lab results found in last 7 days.  Arterial BloodGas  No lab results found in last 7 days.   Venous Blood Gas  No lab results found in last 7 days.    Medications     sodium chloride bacteriostatic  60 mL Intravenous Once     atenolol  50 mg Oral Daily     hydrochlorothiazide  12.5 mg Oral Daily "       Intake/Output Summary (Last 24 hours) at 04/17/17 1120  Last data filed at 04/17/17 0600   Gross per 24 hour   Intake           2154.3 ml   Output              950 ml   Net           1204.3 ml         ASSESSMENT: PLAN:   Suspicious for bilary colic though s/p cholecystectomy  Marked elevated transaminases but normal alk phos and bilirubin on admission. Toxic hepatitis unlikely to have pain. DDx biliary obstruction, portal vein or hepatic vein thrombosis.   RUQ US ordered on admission but was not done but planned for this morning. Bilirubin and alk phos now trending up consistent with biliary obstruction. Does not look infected clinically.   - MRCP negative.  LFTs coming down.   -will try feeding again and discuss with GI:    -suspect transcient biliary obstruction.          Back pain  Suspect referred GI pain and muscle spasms.   - there is some muscle spasm on exam.      Constipation:   -likely from narcotics plus the acute biliary event.  Follow with supp as needed.       S/p gastric bypass     Morbid obesity        DVT Prophylaxis: Low Risk/Ambulatory with no VTE prophylaxis indicated  Code Status: Prior     Lines: PIV   Ceballos catheter: None     Discussion: MRCP negative.  Discuss GI:       Disposition:

## 2017-04-17 NOTE — PROGRESS NOTES
Called due to fever (100.2). Patient has elevated transaminases. Allergy to Vioxx but takes ibuprofen at home. No signs of bleeding or renal impairment. No on anti-coagulation.    Ibuprofen 400mg po x1.    I have discussed patient with Dr. Robertson. Assessment and plan as above.    Julieta Mojica PA-C

## 2017-04-17 NOTE — PLAN OF CARE
Problem: Goal Outcome Summary  Goal: Goal Outcome Summary  Outcome: No Change  Up independently in room.  NPO this morning prior to MRCP.  Tolerated eating soup, bread, strawberries and applesauce for lunch with no increase in abdominal pain or nausea.  Sipping on 7-up soda brought from home. IV removed secondary to pain.  Will address with Dr La. No problems voiding, pt reports having difficulty having bowel movement.  She states her  will bring chocolate from home which usually relieves her constipation.  Will obtain PRN order for bowel med as well.  Pt reports small amount of blood on toilet tissue after straining to have BM twice today, no BM since 04/11/17.    Medicated with (po) dilaudid and warm pack to left upper back/shoulder discomfort.

## 2017-04-17 NOTE — PLAN OF CARE
Problem: Goal Outcome Summary  Goal: Goal Outcome Summary  Outcome: Improving  Patient up independently in her room. Reporting upper abdominal pain and back/neck pain. PRN oral dilaudid for pain management, and ice to neck. Ibuprofen for slight headache. MD advanced her diet to regular for dinner, which she tolerated well, however patient is to be NPO at midnight for MRCP tomorrow.

## 2017-04-18 VITALS
DIASTOLIC BLOOD PRESSURE: 66 MMHG | TEMPERATURE: 98.3 F | RESPIRATION RATE: 20 BRPM | SYSTOLIC BLOOD PRESSURE: 134 MMHG | WEIGHT: 275.57 LBS | BODY MASS INDEX: 48.83 KG/M2 | HEART RATE: 90 BPM | HEIGHT: 63 IN | OXYGEN SATURATION: 95 %

## 2017-04-18 LAB
ALBUMIN SERPL-MCNC: 2.6 G/DL (ref 3.4–5)
ALP SERPL-CCNC: 132 U/L (ref 40–150)
ALT SERPL W P-5'-P-CCNC: 200 U/L (ref 0–50)
ANION GAP SERPL CALCULATED.3IONS-SCNC: 6 MMOL/L (ref 3–14)
AST SERPL W P-5'-P-CCNC: 66 U/L (ref 0–45)
BILIRUB SERPL-MCNC: 0.3 MG/DL (ref 0.2–1.3)
BUN SERPL-MCNC: 9 MG/DL (ref 7–30)
CALCIUM SERPL-MCNC: 8.7 MG/DL (ref 8.5–10.1)
CHLORIDE SERPL-SCNC: 105 MMOL/L (ref 94–109)
CO2 SERPL-SCNC: 31 MMOL/L (ref 20–32)
CREAT SERPL-MCNC: 0.65 MG/DL (ref 0.52–1.04)
ERYTHROCYTE [DISTWIDTH] IN BLOOD BY AUTOMATED COUNT: 13 % (ref 10–15)
GFR SERPL CREATININE-BSD FRML MDRD: ABNORMAL ML/MIN/1.7M2
GGT SERPL-CCNC: 161 U/L (ref 0–40)
GLUCOSE SERPL-MCNC: 101 MG/DL (ref 70–99)
HCT VFR BLD AUTO: 40.1 % (ref 35–47)
HGB BLD-MCNC: 12.7 G/DL (ref 11.7–15.7)
MCH RBC QN AUTO: 29.8 PG (ref 26.5–33)
MCHC RBC AUTO-ENTMCNC: 31.7 G/DL (ref 31.5–36.5)
MCV RBC AUTO: 94 FL (ref 78–100)
PLATELET # BLD AUTO: 191 10E9/L (ref 150–450)
POTASSIUM SERPL-SCNC: 3.7 MMOL/L (ref 3.4–5.3)
PROT SERPL-MCNC: 6.2 G/DL (ref 6.8–8.8)
RBC # BLD AUTO: 4.26 10E12/L (ref 3.8–5.2)
SODIUM SERPL-SCNC: 142 MMOL/L (ref 133–144)
WBC # BLD AUTO: 5.6 10E9/L (ref 4–11)

## 2017-04-18 PROCEDURE — 25000132 ZZH RX MED GY IP 250 OP 250 PS 637: Mod: GY | Performed by: INTERNAL MEDICINE

## 2017-04-18 PROCEDURE — A9270 NON-COVERED ITEM OR SERVICE: HCPCS | Mod: GY | Performed by: FAMILY MEDICINE

## 2017-04-18 PROCEDURE — A9270 NON-COVERED ITEM OR SERVICE: HCPCS | Mod: GY | Performed by: INTERNAL MEDICINE

## 2017-04-18 PROCEDURE — 36415 COLL VENOUS BLD VENIPUNCTURE: CPT | Performed by: FAMILY MEDICINE

## 2017-04-18 PROCEDURE — 82977 ASSAY OF GGT: CPT | Performed by: FAMILY MEDICINE

## 2017-04-18 PROCEDURE — 25000132 ZZH RX MED GY IP 250 OP 250 PS 637: Mod: GY | Performed by: FAMILY MEDICINE

## 2017-04-18 PROCEDURE — 99238 HOSP IP/OBS DSCHRG MGMT 30/<: CPT | Performed by: FAMILY MEDICINE

## 2017-04-18 PROCEDURE — 80053 COMPREHEN METABOLIC PANEL: CPT | Performed by: FAMILY MEDICINE

## 2017-04-18 PROCEDURE — 85027 COMPLETE CBC AUTOMATED: CPT | Performed by: FAMILY MEDICINE

## 2017-04-18 RX ORDER — AMOXICILLIN 250 MG
1-2 CAPSULE ORAL 2 TIMES DAILY PRN
Qty: 100 TABLET | COMMUNITY
Start: 2017-04-18 | End: 2017-08-18

## 2017-04-18 RX ORDER — OMEGA-3 FATTY ACIDS/FISH OIL 300-1000MG
400 CAPSULE ORAL EVERY 8 HOURS PRN
Qty: 60 CAPSULE | Refills: 0 | COMMUNITY
Start: 2017-04-18 | End: 2017-08-18

## 2017-04-18 RX ORDER — ACETAMINOPHEN 500 MG
1000 TABLET ORAL EVERY 8 HOURS PRN
Qty: 100 TABLET | Refills: 0 | COMMUNITY
Start: 2017-04-18 | End: 2017-08-18

## 2017-04-18 RX ADMIN — HYDROCHLOROTHIAZIDE 12.5 MG: 12.5 CAPSULE ORAL at 09:00

## 2017-04-18 RX ADMIN — SENNOSIDES AND DOCUSATE SODIUM 2 TABLET: 8.6; 5 TABLET ORAL at 09:00

## 2017-04-18 RX ADMIN — ATENOLOL 50 MG: 50 TABLET ORAL at 09:00

## 2017-04-18 NOTE — PLAN OF CARE
Problem: Pain, Acute (Adult)  Goal: Identify Related Risk Factors and Signs and Symptoms  Related risk factors and signs and symptoms are identified upon initiation of Human Response Clinical Practice Guideline (CPG)   Outcome: Improving  Abdominal and back pain has resolved.  Left neck/shoulder pain continues and has not requested and PRN meds for this.  Ambulated in hallway last evening independently and offered no complaints.  VSS.  Senna started yesterday, and very small hard BM per patient-will monitor.

## 2017-04-18 NOTE — PLAN OF CARE
Problem: Discharge Planning  Goal: Discharge Planning (Adult, OB, Behavioral, Peds)  Outcome: Adequate for Discharge Date Met:  04/18/17  [facility] [facility] DISCHARGE NOTE     Patient discharged to home at 10:55 AM via wheel chair. Accompanied by spouse and staff. Discharge instructions reviewed with patient, opportunity offered to ask questions. Prescriptions sent to patients preferred pharmacy. All belongings sent with patient.     Pt updated on changes from AVS:  GI will be contacting pt in 1-2 weeks NOT 1-2 days.  Pt did not want percocet filled as she has NORCO at home if needed.  Gely Santana    Problem: Goal Outcome Summary  Goal: Goal Outcome Summary  Outcome: Adequate for Discharge Date Met:  04/18/17  Up in room independently.  Medicated with senna again this morning, had large soft formed brown stool prior to discharge.Ate fair.  Reported low achy abdominal discomfort prior to BM, declined the need for pain medication today.

## 2017-04-18 NOTE — DOWNTIME EVENT NOTE
The EMR was down for 7 hours on 4/18/2017.    Yesica Thao was responsible for completing the paper charting during this time period.     The following information was re-entered into the system by Yesica Thao: Flowsheet data, Intake and output, Orders and MAR    The following information will remain in the paper chart:     Yesica Thao  4/18/2017

## 2017-04-18 NOTE — PHARMACY - DISCHARGE MEDICATION RECONCILIATION
Discharge medication review for this patient is complete. Pharmacist assisted with medication reconciliation of discharge medications with prior to admission medications.     The following changes were made to the discharge medication list based on pharmacist review:  Added:  none  Discontinued: patient refused Percocet as he has Norco at home if he needs for pain.  Changed: none      Patient's Discharge Medication List  - medications as listed on After Visit Summary (AVS)     Review of your medicines      START taking       Dose / Directions    acetaminophen 500 MG tablet   Commonly known as:  TYLENOL   Used for:  Acute bilateral thoracic back pain   Notes to Patient:  Not given today        Dose:  1000 mg   Take 2 tablets (1,000 mg) by mouth every 8 hours as needed for mild pain   Quantity:  100 tablet   Refills:  0       ibuprofen 200 MG capsule   Used for:  Acute bilateral thoracic back pain   Notes to Patient:  Not given today        Dose:  400 mg   Take 400 mg by mouth every 8 hours as needed for fever   Quantity:  60 capsule   Refills:  0       ondansetron 4 MG ODT tab   Commonly known as:  ZOFRAN ODT   Notes to Patient:  None given today, resume if needed        Dose:  4 mg   Take 1 tablet (4 mg) by mouth every 6 hours as needed for nausea   Quantity:  10 tablet   Refills:  0       oxyCODONE-acetaminophen 5-325 MG per tablet   Commonly known as:  PERCOCET   Notes to Patient:  DO NOT TAKE WITH HYDROCODONE(NORCO).  TAKE ONE OR THE OTHER IF TYLENOL OR IBUPROFEN NOT EFFECTIVE FOR PAIN        Dose:  1-2 tablet   Take 1-2 tablets by mouth every 6 hours as needed for moderate to severe pain   Quantity:  20 tablet   Refills:  0       senna-docusate 8.6-50 MG per tablet   Commonly known as:  SENOKOT-S;PERICOLACE   Used for:  Therapeutic opioid induced constipation        Dose:  1-2 tablet   Take 1-2 tablets by mouth 2 times daily as needed for constipation   Quantity:  100 tablet   Refills:  0         CONTINUE these  medicines which may have CHANGED, or have new prescriptions. If we are uncertain of the size of tablets/capsules you have at home, strength may be listed as something that might have changed.       Dose / Directions    clobetasol 0.05 % ointment   Commonly known as:  TEMOVATE   This may have changed:    - when to take this  - reasons to take this  - additional instructions   Used for:  Vaginal itching        Apply sparingly to affected area twice daily for 14 days.  Do not apply to face.   Quantity:  15 g   Refills:  0       HYDROcodone-acetaminophen 5-325 MG per tablet   Commonly known as:  NORCO   This may have changed:    - how much to take  - how to take this  - additional instructions   Notes to Patient:  DO NOT TAKE WITH OXYCODONE (PERCOCET)  TAKE ONE OR THE OTHER IF TYLENOL OR IBUPROFEN NOT EFFECTIVE FOR PAIN.         1-2 tabs po q 4-6 hrs. prn pain   Quantity:  20 tablet   Refills:  0         CONTINUE these medicines which have NOT CHANGED       Dose / Directions    atenolol 50 MG tablet   Commonly known as:  TENORMIN   Used for:  HTN, goal below 140/80        Dose:  50 mg   Take 1 tablet (50 mg) by mouth daily   Quantity:  90 tablet   Refills:  3       CERAVE Crea   Used for:  Sunburn, Itching        Externally apply topically 2 times daily as needed   Quantity:  1 Bottle   Refills:  3       hydrochlorothiazide 12.5 MG Tabs tablet   Used for:  HTN, goal below 140/80        Dose:  12.5 mg   Take 1 tablet (12.5 mg) by mouth daily   Quantity:  90 tablet   Refills:  3       methocarbamol 750 MG tablet   Commonly known as:  ROBAXIN   Notes to Patient:  None given while hospitalized        1 tab by mouth every 4 hrs. or 2 tabs every 8 hrs. as needed for muscle spasm.   Quantity:  40 tablet   Refills:  0       * nystatin 590012 UNIT/GM Powd   Commonly known as:  MYCOSTATIN   Used for:  Candidiasis of skin        Apply topically 3 times daily as needed   Quantity:  30 g   Refills:  1       * nystatin cream    Commonly known as:  MYCOSTATIN        Apply topically 2 times daily Reported on 3/6/2017   Refills:  0       * Notice:  This list has 2 medication(s) that are the same as other medications prescribed for you. Read the directions carefully, and ask your doctor or other care provider to review them with you.         Where to get your medicines      Some of these will need a paper prescription and others can be bought over the counter. Ask your nurse if you have questions.     Bring a paper prescription for each of these medications      ondansetron 4 MG ODT tab     oxyCODONE-acetaminophen 5-325 MG per tablet       You don't need a prescription for these medications      acetaminophen 500 MG tablet     ibuprofen 200 MG capsule     senna-docusate 8.6-50 MG per tablet

## 2017-04-18 NOTE — DISCHARGE SUMMARY
[place] Hospitalist Discharge Summary    Jacquie Harris MRN# [id number]   Age: 62 year old YOB: 1955     Date of Admission:  4/15/2017  Date of Discharge::  4/18/2017 10:50 AM  Admitting Physician:  Marv Grissom MD  Discharge Physician:  Devon La MD  Primary Physician: Mariel Sabillon       Home clinic: [facility]               Discharge Diagnosis:   Principle diagnosis: acute biliary obstruction with spontaneous resolution     Secondary diagnoses:  Fatty liver  Morbid obesity  Constipation due to narcotics  S/P gastric bypass.   Mechanical thoracic back pain       Discharge Instructions:   Drink plenty of fluids, up and moving about as able, Percocet for pain, Zofran for nausea, return here for worsening pain, progressive weakness, fever greater than 100.4, trouble breathing or any other concern.    Would  follow up with GI (pancreas/biliary division at [facility] in the next 2 months )  Senna for constipation .  May add miralax if needed  Use Tylenol 500mg or ibuprofen 400 mg up 2-3 times/day for the back pain.    Try to get on any aerobic exercise program you can do for the back pain.        Follow up with primary care provider in 7 days        Procedures:       Results for orders placed or performed during the hospital encounter of 04/15/17   CT Chest/Abdomen/Pelvis w Contrast    Narrative    CT CHEST/ABDOMEN/PELVIS WITH CONTRAST  4/15/2017 9:35 AM    HISTORY: Symptoms of dissection, right upper quadrant pain.    TECHNIQUE: CT scan obtained of the chest, abdomen, and pelvis with  oral and IV contrast. 100 Ml's Isovue 370 injected. Radiation dose for  this scan was reduced using automated exposure control, adjustment of  the mA and/or kV according to patient size, or iterative  reconstruction technique.    COMPARISON: CT abdomen and pelvis 9/24/2013.    FINDINGS:  Chest: No evidence for thoracic aortic dissection. No acute thoracic  aortic abnormality is seen. No  evidence for pulmonary embolism. Mild  areas of coronary artery calcification. No effusions. No acute  airspace disease. Minor bibasilar atelectasis. Tiny nodule lateral  right upper lobe is 0.3 cm series 5 image 34. No acute or aggressive  osseous abnormality. No adenopathy is identified by size.    Abdomen/pelvis: No evidence for abdominal aortic dissection or  aneurysm. No acute aortic abnormality is seen. Cholecystectomy. Mild  biliary ectasia may just relate to cholecystectomy. Gastric surgical  change. The liver, adrenals, spleen, pancreas, and kidneys do not show  any significant abnormalities. Left renal cyst image 127. Mild  vascular calcifications. No bowel obstruction or acute bowel finding.  No fluid collection or free air. No acute osseous abnormality is seen.      Impression    IMPRESSION:   1. No acute aortic abnormality is seen within the chest or abdomen. No  specific acute finding is identified.  2. Coronary artery and aortic calcifications.  3. Small right-sided pulmonary nodule.    Recommendations for one or multiple incidental lung nodules < 6mm :    Low risk patients: No routine follow-up.    High risk patients: Optional follow-up CT at 12 months; if  unchanged, no further follow-up.    *Low Risk: Minimal or absent history of smoking or other known risk  factors.  *Nonsolid (ground glass) or partly solid nodules may require longer  follow-up to exclude indolent adenocarcinoma.  *Recommendations based on Guidelines for the Management of Incidental  Pulmonary Nodules Detected at CT: From the Fleischner Society 2017,  Radiology 2017.    PATY SANDOVAL MD   US Abdomen Complete Abd/Pelvis Duplex Complete    Narrative    COMPLETE ABDOMEN ULTRASOUND WITH DOPPLER COMPLETE  4/16/2017  10:49 AM      HISTORY: Right upper quadrant pain, elevated LFTs, rising bilirubin,  evaluate for biliary obstruction. Patient is status post  cholecystectomy.    COMPARISON: CT abdomen and pelvis 4/15/2017.    FINDINGS:    Gallbladder: Absent.    Bile ducts: Common duct measures up to 1.4 cm.    Liver: Echogenic liver indicating fatty infiltration. Enlarged liver  measuring 20 cm in length.    Pancreas: There is some mild ectasia of the main pancreatic duct  measuring 0.4 cm.    Spleen: Normal.     Right kidney: Normal.     Left kidney: There is a 4 cm simple cyst at the lower left kidney.    Aorta and IVC: Not specifically assessed.     Color and Doppler spectral analysis of the portal veins, hepatic  veins, and hepatic artery show patency.      Impression    IMPRESSION:   1. Cholecystectomy. Mild dilatation of the common bile duct measuring  up to 1.4 cm. Mild ectasia of the main pancreatic duct. It is  difficult to exclude an obstructing process, though dilatation of the  biliary tree may normally be seen after cholecystectomy.  2. Fatty infiltration of the liver. Enlarged liver.  3. Left lower renal cyst.    PATY SANDOVAL MD   MR Abdomen MRCP w/o & w Contrast    Narrative    MR ABDOMEN MRCP WITH AND WITHOUT CONTRAST April 17, 2017 10:17 AM     HISTORY: Right upper quadrant pain, rising liver function tests and  bilirubin. Status post cholecystectomy and gastric bypass, evaluate  for biliary obstruction.     TECHNIQUE:  Multisequence, multiplanar imaging is performed through  the biliary system. A total of 15 mL Gadavist is injected  intravenously followed by dynamic axial T1 fat sat sequences.    FINDINGS:     Abdomen: There is diffuse fatty infiltration of the liver on  out-of-phase T1-weighted sequence. No focal liver mass is appreciated.  Spleen, pancreas, adrenal glands and right kidney are unremarkable.  Probable tiny exophytic upper pole right renal cyst measuring less  than 1 cm on series 3, image 11. A complex appearing cyst lower pole  left kidney on series 3, image 12 shows dependent debris and measures  3.2 cm possibly a proteinaceous or hemorrhagic cyst. No  hydronephrosis. No enlarged abdominal lymph nodes. Apparent  prior  anterior abdominal wall hernia repair with suture artifact evident on  series 6, image 56. Aorta is normal in caliber.    MRCP images demonstrate no evidence of intrahepatic or common bile  duct dilatation. No intraductal stones are appreciated. Pancreatic  duct is normal in caliber and course. Prior cholecystectomy changes.    Following contrast administration, no abnormal arterial enhancement is  identified within the liver. No focal liver lesions are appreciated.  The spleen, pancreas and adrenal glands are unremarkable. No  enhancement within the tiny upper pole right renal lesion or complex  lower pole left renal lesion.      Impression    IMPRESSION: Diffuse fatty infiltration of the liver without focal  liver mass. No biliary obstruction or intraductal stones. Pancreas and  pancreatic duct are unremarkable. Bilateral renal cysts of which the  left is likely complex with dependent proteinaceous debris or  hemorrhage. Renal cysts are likely benign and no further follow-up.    SARAHY MESSER MD                    Allergies:      Allergies   Allergen Reactions     Vioxx Itching and Swelling     VIOXX (Swollen Feet,Hands itching), Okay with ibuprofen and aspirin                  Discharge Medications:     Current Discharge Medication List      START taking these medications    Details   senna-docusate (SENOKOT-S;PERICOLACE) 8.6-50 MG per tablet Take 1-2 tablets by mouth 2 times daily as needed for constipation  Qty: 100 tablet    Associated Diagnoses: Therapeutic opioid induced constipation      acetaminophen (TYLENOL) 500 MG tablet Take 2 tablets (1,000 mg) by mouth every 8 hours as needed for mild pain  Qty: 100 tablet, Refills: 0    Associated Diagnoses: Acute bilateral thoracic back pain      ibuprofen 200 MG capsule Take 400 mg by mouth every 8 hours as needed for fever  Qty: 60 capsule, Refills: 0    Associated Diagnoses: Acute bilateral thoracic back pain      oxyCODONE-acetaminophen (PERCOCET) 5-325 MG  per tablet Take 1-2 tablets by mouth every 6 hours as needed for moderate to severe pain  Qty: 20 tablet, Refills: 0      ondansetron (ZOFRAN ODT) 4 MG ODT tab Take 1 tablet (4 mg) by mouth every 6 hours as needed for nausea  Qty: 10 tablet, Refills: 0         CONTINUE these medications which have NOT CHANGED    Details   HYDROcodone-acetaminophen (NORCO) 5-325 MG per tablet 1-2 tabs po q 4-6 hrs. prn pain  Qty: 20 tablet, Refills: 0      methocarbamol (ROBAXIN) 750 MG tablet 1 tab by mouth every 4 hrs. or 2 tabs every 8 hrs. as needed for muscle spasm.  Qty: 40 tablet, Refills: 0      nystatin (MYCOSTATIN) cream Apply topically 2 times daily Reported on 3/6/2017      clobetasol (TEMOVATE) 0.05 % ointment Apply sparingly to affected area twice daily for 14 days.  Do not apply to face.  Qty: 15 g, Refills: 0    Associated Diagnoses: Vaginal itching      hydrochlorothiazide 12.5 MG TABS Take 1 tablet (12.5 mg) by mouth daily  Qty: 90 tablet, Refills: 3    Associated Diagnoses: HTN, goal below 140/80      atenolol (TENORMIN) 50 MG tablet Take 1 tablet (50 mg) by mouth daily  Qty: 90 tablet, Refills: 3    Associated Diagnoses: HTN, goal below 140/80      nystatin (MYCOSTATIN) 188898 UNIT/GM POWD Apply topically 3 times daily as needed  Qty: 30 g, Refills: 1    Associated Diagnoses: Candidiasis of skin      Emollient (CERAVE) CREA Externally apply topically 2 times daily as needed  Qty: 1 Bottle, Refills: 3    Associated Diagnoses: Sunburn; Itching                   Consultations:   None           Brief History of Presenting Illness:   Jacquie Harris is a 62 year old female who presents with 4 days of back pain.  Pain began late in the day on Tuesday 1st noticed the base of the neck between the shoulder blades and over the next couple of days it progressed to being the whole spine from the neck to the belt line. There is bilateral paraspinous muscle pain and tenderness, and her  and was rubbing her back with blue  ice 3-5 times a day.  Patient also notes a bandlike tightness from mid thoracic around front lower chest. Her pain is primarily constant and sharp. Has been eating without exacerbation of pain. No known trauma. On the day this started they have been doing spring cleaning but nothing strenuous. Friday went to the emergency department and was given Robaxin and Norco. Robaxin causes severe nausea and the Norco was also difficult to tolerate. She returned today for further workup. In the ED she was found to have elevated transaminases and otherwise unremarkable labs, exam and imaging.     Patient has been taking some ibuprofen but only about 6 total over the course. She took Aleve 2 tablets once. No Tylenol other than what was in a couple of Norco that she took. Blood weeks ago she had some heartburn that was better with proton pump inhibitor and this is not returned. No change in bowel movements although she's been constipated L for 2 days. No fevers or chills. She has history of cholecystectomy. She is a history of gastric bypass.                Hospital Course:   Suspicious for bilary colic though s/p cholecystectomy  Marked elevated transaminases but normal alk phos and bilirubin on admission. Toxic hepatitis unlikely to have pain. DDx biliary obstruction, portal vein or hepatic vein thrombosis.   RUQ US ordered on admission but was not done but planned for this morning. Bilirubin and alk phos now trending up consistent with biliary obstruction. Does not look infected clinically.   - MRCP negative. LFTs coming down.   -will try feeding again and discuss with GI: they concurred this seemed likely transient biliary obstruction.              Back pain  Suspect referred GI pain and muscle spasms.   - there is some muscle spasm on exam.     Fatty liver.   Discussed with patient.       Constipation:   -likely from narcotics plus the acute biliary event. Follow with supp as needed.       S/p gastric bypass      Morbid  "obesity contributing to her fatty liver, persistent elevated transaminase,           DVT Prophylaxis: Low Risk/Ambulatory with no VTE prophylaxis indicated  Code Status: Prior      Lines: PIV   Ceballos catheter: None            Discharge Exam:   OBJECTIVE:   /66 (BP Location: Left arm)  Pulse 90  Temp 98.3  F (36.8  C) (Oral)  Resp 20  Ht 1.6 m (5' 3\")  Wt 125 kg (275 lb 9.2 oz)  LMP 05/29/2006  SpO2 95%  BMI 48.82 kg/m2    GENERAL APPEARANCE:  Alert, NAD, Ox3     RESP:clear      CV: regular rates and rhythm,no murmur, no click or rub - no edema     Abdomen: soft, nontender, no liver or spleen enlargement, no masses, BSs normal   Skin: no cyanosis, pallor, or jaundice            Pending Tests at Discharge:     Unresulted Labs Ordered in the Past 30 Days of this Admission     No orders found from 2/14/2017 to 4/16/2017.                   Discharge Disposition:   Discharged to home      Attestation:  Amount of time performed on this discharge : 30 minutes.    Devon La MD       "

## 2017-04-18 NOTE — DISCHARGE INSTRUCTIONS
Drink plenty of fluids, up and moving about as able, Percocet for pain, Zofran for nausea, return here for worsening pain, progressive weakness, fever greater than 100.4, trouble breathing or any other concern.    Would  follow up with GI (pancreas/biliary division at [facility] in the next 2 months ) they will be contacting you in the next day 1-2 weeks to schedule appointment   Senna for constipation .  May add miralax if needed  Use Tylenol 500mg or ibuprofen 400 mg up 2-3 times/day for the back pain.    Try to get on any aerobic exercise program you can do for the back pain.

## 2017-04-19 ENCOUNTER — CARE COORDINATION (OUTPATIENT)
Dept: GASTROENTEROLOGY | Facility: CLINIC | Age: 62
End: 2017-04-19

## 2017-04-19 ENCOUNTER — TELEPHONE (OUTPATIENT)
Dept: FAMILY MEDICINE | Facility: CLINIC | Age: 62
End: 2017-04-19

## 2017-04-19 NOTE — TELEPHONE ENCOUNTER
"ED/Discharge Protocol    \"Hi, my name is Mandi Baker, a registered nurse, and I am calling on behalf of Dr. Sabillon's office at [facility].  I am calling to follow up and see how things are going for you after your recent visit.\"    \"I see that you were in the (ER/UC/IP) on 4/15/2017.    How are you doing now that you are home?\" \"Doing ok, the worst is the pain in my neck and shoulders. I am using Ibuprofen and ice/heat\"    Is patient experiencing symptoms that may require a hospital visit?  no    Discharge Instructions    \"Let's review your discharge instructions.  What is/are the follow-up recommendations?  Pt. Response: GI and PCP    \"Were you instructed to make a follow-up appointment?\"  Pt. Response: Yes.  Has appointment been made?   No.  \"Can I help you schedule that appointment?\" Scheduled her with Dr. Sabillon for tomorrow morning.      \"When you see the provider, I would recommend that you bring your discharge instructions with you.      Call Summary    \"Do you have any questions or concerns about your condition or care plan at the moment?\"    No  Triage nurse advice given: call us with concerns or questions    Patient was in ER three times in the past year (assess appropriateness of ER visits.)      \"If you have questions or things don't continue to improve, we encourage you contact us through the main clinic number,  [phone].  Even if the clinic is not open, triage nurses are available 24/7 to help you.     We would like you to know that our clinic has extended hours (provide information).  We also have urgent care (provide details on closest location and hours/contact info)\"      \"Thank you for your time and take care!\"        "

## 2017-04-19 NOTE — TELEPHONE ENCOUNTER
ED/UC/IP follow up phone call:   04/18/17  Acute Bilateral Thoracic Back Pain ; Therapeutic Opioid Induced Constipation    RN please call to follow up    Number of ED visits in past 12 mths:   2      Melvina Selden  Clinic Station

## 2017-04-19 NOTE — PROGRESS NOTES
Hi, this pt has been referred by Dr. Devon La from [facility] for Pancreas Division and Biliary Obstruction.  All of her records are in [facility], and they are hoping the pt can be seen in the next few months.  Pt's number is above.  Thanks!     Danni                Referring provider: Dr. Devon La from [facility]    Referred to: [facility] providers     Referral Received: 4/17/2017    Records received: In EPIC    MD review date: Reviewed by Dr. Rome: 4/21/2017                               Requested procedure: Clinic appointment    Evaluation for: pancreas division and biliary obstruction.    Clinical History (per RN review of records provided):   MRCP: MRCP images demonstrate no evidence of intrahepatic or common bile  duct dilatation. No intraductal stones are appreciated. Pancreatic  duct is normal in caliber and course. Prior cholecystectomy changes.        IMPRESSION: Diffuse fatty infiltration of the liver without focal  liver mass. No biliary obstruction or intraductal stones. Pancreas and  pancreatic duct are unremarkable. Bilateral renal cysts of which the  left is likely complex with dependent proteinaceous debris or  hemorrhage. Renal cysts are likely benign and no further follow-up.    Hospitalization notes:   Suspicious for bilary colic though s/p cholecystectomy  Marked elevated transaminases but normal alk phos and bilirubin on admission. Toxic hepatitis unlikely to have pain. DDx biliary obstruction, portal vein or hepatic vein thrombosis.   RUQ US ordered on admission but was not done but planned for this morning. Bilirubin and alk phos now trending up consistent with biliary obstruction. Does not look infected clinically.   - MRCP negative. LFTs coming down.   -will try feeding again and discuss with GI: they concurred this seemed likely transient biliary obstruction.     Labs:                     Lipase         4/16     226  4/17     176    ALT   4/16      463  4/18       200    AST  4/16      422  4/18      161    4/19/2017: Called clinic to verify no urgency for clinic visit.     Recommendations/Orders:  Per Dr. Rome  Next available clinic appointment: If symptoms worsen please let Dr Rome know.

## 2017-04-20 ENCOUNTER — OFFICE VISIT (OUTPATIENT)
Dept: FAMILY MEDICINE | Facility: CLINIC | Age: 62
End: 2017-04-20
Payer: MEDICARE

## 2017-04-20 VITALS
WEIGHT: 269 LBS | HEART RATE: 71 BPM | DIASTOLIC BLOOD PRESSURE: 81 MMHG | SYSTOLIC BLOOD PRESSURE: 133 MMHG | HEIGHT: 63 IN | BODY MASS INDEX: 47.66 KG/M2

## 2017-04-20 DIAGNOSIS — R74.01 ELEVATED LEVELS OF TRANSAMINASE & LACTIC ACID DEHYDROGENASE: ICD-10-CM

## 2017-04-20 DIAGNOSIS — R74.02 ELEVATED LEVELS OF TRANSAMINASE & LACTIC ACID DEHYDROGENASE: ICD-10-CM

## 2017-04-20 DIAGNOSIS — K76.0 FATTY LIVER: ICD-10-CM

## 2017-04-20 DIAGNOSIS — K83.1 BILIARY OBSTRUCTION (H): Primary | ICD-10-CM

## 2017-04-20 LAB
ALBUMIN SERPL-MCNC: 3.2 G/DL (ref 3.4–5)
ALP SERPL-CCNC: 129 U/L (ref 40–150)
ALT SERPL W P-5'-P-CCNC: 123 U/L (ref 0–50)
ANION GAP SERPL CALCULATED.3IONS-SCNC: 6 MMOL/L (ref 3–14)
AST SERPL W P-5'-P-CCNC: 28 U/L (ref 0–45)
BILIRUB DIRECT SERPL-MCNC: 0.1 MG/DL (ref 0–0.2)
BILIRUB SERPL-MCNC: 0.3 MG/DL (ref 0.2–1.3)
BUN SERPL-MCNC: 11 MG/DL (ref 7–30)
CALCIUM SERPL-MCNC: 9.2 MG/DL (ref 8.5–10.1)
CHLORIDE SERPL-SCNC: 101 MMOL/L (ref 94–109)
CO2 SERPL-SCNC: 31 MMOL/L (ref 20–32)
CREAT SERPL-MCNC: 0.72 MG/DL (ref 0.52–1.04)
GFR SERPL CREATININE-BSD FRML MDRD: 81 ML/MIN/1.7M2
GGT SERPL-CCNC: 148 U/L (ref 0–40)
GLUCOSE SERPL-MCNC: 89 MG/DL (ref 70–99)
POTASSIUM SERPL-SCNC: 3.6 MMOL/L (ref 3.4–5.3)
PROT SERPL-MCNC: 7.3 G/DL (ref 6.8–8.8)
SODIUM SERPL-SCNC: 138 MMOL/L (ref 133–144)

## 2017-04-20 PROCEDURE — 36415 COLL VENOUS BLD VENIPUNCTURE: CPT | Performed by: FAMILY MEDICINE

## 2017-04-20 PROCEDURE — 82977 ASSAY OF GGT: CPT | Performed by: FAMILY MEDICINE

## 2017-04-20 PROCEDURE — 80076 HEPATIC FUNCTION PANEL: CPT | Performed by: FAMILY MEDICINE

## 2017-04-20 PROCEDURE — 80048 BASIC METABOLIC PNL TOTAL CA: CPT | Performed by: FAMILY MEDICINE

## 2017-04-20 PROCEDURE — 99214 OFFICE O/P EST MOD 30 MIN: CPT | Performed by: FAMILY MEDICINE

## 2017-04-20 NOTE — NURSING NOTE
"Chief Complaint   Patient presents with     Hospital F/U     hospital f/u - discharged 4/18/17       Initial /81  Pulse 71  Ht 5' 3\" (1.6 m)  Wt 269 lb (122 kg)  LMP 05/29/2006  BMI 47.65 kg/m2 Estimated body mass index is 47.65 kg/(m^2) as calculated from the following:    Height as of this encounter: 5' 3\" (1.6 m).    Weight as of this encounter: 269 lb (122 kg).  Medication Reconciliation: complete  "

## 2017-04-20 NOTE — MR AVS SNAPSHOT
"              After Visit Summary   2017    Jacquie Harris    MRN: [id number]           Patient Information     Date Of Birth          1955        Visit Information        Provider Department      2017 8:40 AM Mariel Sabillon MD [facility]        Today's Diagnoses     Biliary obstruction    -  1    Elevated levels of transaminase & lactic acid dehydrogenase          Care Instructions    [facility]          Follow-ups after your visit        Who to contact     If you have questions or need follow up information about today's clinic visit or your schedule please contact [facility] directly at [phone].  Normal or non-critical lab and imaging results will be communicated to you by MyChart, letter or phone within 4 business days after the clinic has received the results. If you do not hear from us within 7 days, please contact the clinic through [email or web address] or phone. If you have a critical or abnormal lab result, we will notify you by phone as soon as possible.  Submit refill requests through [employer] or call your pharmacy and they will forward the refill request to us. Please allow 3 business days for your refill to be completed.          Additional Information About Your Visit        MyChart Information     [employer] lets you send messages to your doctor, view your test results, renew your prescriptions, schedule appointments and more. To sign up, go to [email or web address] . Click on \"Log in\" on the left side of the screen, which will take you to the Welcome page. Then click on \"Sign up Now\" on the right side of the page.     You will be asked to enter the access code listed below, as well as some personal information. Please follow the directions to create your username and password.     Your access code is: [email or web address]  Expires: 2017 10:44 AM     Your access code will  in 90 days. If you need help or a new code, please call your [facility] or " "[phone].        Care EveryWhere ID     This is your Care EveryWhere ID. This could be used by other organizations to access your [place] medical records  [id number]        Your Vitals Were     Pulse Height Last Period BMI (Body Mass Index)          71 5' 3\" (1.6 m) 05/29/2006 47.65 kg/m2         Blood Pressure from Last 3 Encounters:   04/20/17 141/87   04/18/17 134/66   04/14/17 (!) 146/98    Weight from Last 3 Encounters:   04/20/17 269 lb (122 kg)   04/15/17 275 lb 9.2 oz (125 kg)   03/17/17 269 lb (122 kg)              We Performed the Following     Basic metabolic panel     GGT     Hepatic panel          Today's Medication Changes          These changes are accurate as of: 4/20/17  9:12 AM.  If you have any questions, ask your nurse or doctor.               These medicines have changed or have updated prescriptions.        Dose/Directions    clobetasol 0.05 % ointment   Commonly known as:  TEMOVATE   This may have changed:    - when to take this  - reasons to take this  - additional instructions   Used for:  Vaginal itching        Apply sparingly to affected area twice daily for 14 days.  Do not apply to face.   Quantity:  15 g   Refills:  0                Primary Care Provider Office Phone # Fax #    Mariel Va Sabillon [phone]       [facility] [address] [address]        Thank you!     Thank you for choosing [facility]  for your care. Our goal is always to provide you with excellent care. Hearing back from our patients is one way we can continue to improve our services. Please take a few minutes to complete the written survey that you may receive in the mail after your visit with us. Thank you!             Your Updated Medication List - Protect others around you: Learn how to safely use, store and throw away your medicines at www.disposemymeds.org.          This list is accurate as of: 4/20/17  9:12 AM.  Always use your most recent med " list.                   Brand Name Dispense Instructions for use    acetaminophen 500 MG tablet    TYLENOL    100 tablet    Take 2 tablets (1,000 mg) by mouth every 8 hours as needed for mild pain       atenolol 50 MG tablet    TENORMIN    90 tablet    Take 1 tablet (50 mg) by mouth daily       CERAVE Crea     1 Bottle    Externally apply topically 2 times daily as needed       clobetasol 0.05 % ointment    TEMOVATE    15 g    Apply sparingly to affected area twice daily for 14 days.  Do not apply to face.       hydrochlorothiazide 12.5 MG Tabs tablet     90 tablet    Take 1 tablet (12.5 mg) by mouth daily       ibuprofen 200 MG capsule     60 capsule    Take 400 mg by mouth every 8 hours as needed for fever       * nystatin 686116 UNIT/GM Powd    MYCOSTATIN    30 g    Apply topically 3 times daily as needed       * nystatin cream    MYCOSTATIN     Apply topically 2 times daily Reported on 3/6/2017       senna-docusate 8.6-50 MG per tablet    SENOKOT-S;PERICOLACE    100 tablet    Take 1-2 tablets by mouth 2 times daily as needed for constipation       * Notice:  This list has 2 medication(s) that are the same as other medications prescribed for you. Read the directions carefully, and ask your doctor or other care provider to review them with you.

## 2017-04-20 NOTE — PROGRESS NOTES
Two tests are still elevated but coming down, not normal. [facility]  Please notify.        Thank you. WICHO WHITE MD

## 2017-04-20 NOTE — PROGRESS NOTES
"a  SUBJECTIVE:                                                    Jacquie Harris is 62 year old female   Chief Complaint   Patient presents with     Hospital F/U     hospital f/u - discharged 4/18/17         Hospital Follow-up Visit:    Hospital/Nursing Home/IP Rehab Facility: [facility]  Date of Admission: 4/15/17  Date of Discharge: 4/18/17  Reason(s) for Admission: back and neck pain along with a GI blockage            Problems taking medications regularly:  None       Medication changes since discharge: None       Problems adhering to non-medication therapy:  None    Summary of hospitalization:  [facility] discharge summary reviewed  Copy of discharge summary:  \"Principle diagnosis: acute biliary obstruction with spontaneous resolution   Secondary diagnoses:  Fatty liver  Morbid obesity  Constipation due to narcotics  S/P gastric bypass.   Mechanical thoracic back pain  Discharge Instructions:   Drink plenty of fluids, up and moving about as able, Percocet for pain, Zofran for nausea, return here for worsening pain, progressive weakness, fever greater than 100.4, trouble breathing or any other concern.     Would follow up with GI (pancreas/biliary division at [facility] in the next 2 months )  Senna for constipation . May add miralax if needed  Use Tylenol 500mg or ibuprofen 400 mg up 2-3 times/day for the back pain.   Try to get on any aerobic exercise program you can do for the back pain.\"     Diagnostic Tests/Treatments reviewed.  Follow up needed: GI at [place] next 2 months  Other Healthcare Providers Involved in Patient s Care:         None  Update since discharge: improved.     Post Discharge Medication Reconciliation: discharge medications reconciled, continue medications without change.  Plan of care communicated with patient     Coding guidelines for this visit:  Type of Medical   Decision Making Face-to-Face Visit       within 7 Days of discharge Face-to-Face Visit        within " 14 days of discharge   Moderate Complexity [id number]   High Complexity [id number]            Problem list and histories reviewed & adjusted, as indicated.  Additional history: as documented    Patient Active Problem List   Diagnosis     s/p gastric bypass x 2     Allergic rhinitis     Carpal tunnel syndrome     Obstructive sleep apnea     Rosacea     CARDIOVASCULAR SCREENING; LDL GOAL LESS THAN 160     Tick bite, infected, positive Lyme test 1996 not treated     Vitamin D deficiency     Osteoporosis     Obesity, Class III, BMI 40-49.9 (morbid obesity) (H)     S/P TKR (total knee replacement)     HTN, goal below 140/80     Back pain, left below scapula, strained muscles     Headache     Encounter for routine gynecological examination      Colles' fracture     Pain of left lower leg     Pain of right lower leg     Bilateral cold feet     Other specified symptoms and signs involving the circulatory and respiratory systems      Elevated levels of transaminase & lactic acid dehydrogenase     Biliary obstruction     Fatty liver     Past Surgical History:   Procedure Laterality Date     ARTHROPLASTY KNEE  4/2/2012    Procedure:ARTHROPLASTY KNEE; Left Total Knee Arthroplasty--Anesth.Choice; Surgeon:VICENTE MORAELS; Location:[place] OR     C ORAL SURGERY PROCEDURE  age 18    wisdom teeth     C/SECTION, LOW TRANSVERSE  1978, 1984, 1994    x 3     COLONOSCOPY  2001    normal colonoscopy     GASTRIC BYPASS  1980/2005    Gastric Bypass and revision     HERNIA REPAIR, INCISIONAL  6/16/2008    lap.repair with 10x8 mesh     TUBAL LIGATION  1994       Social History   Substance Use Topics     Smoking status: Never Smoker     Smokeless tobacco: Never Used     Alcohol use 0.0 oz/week     0 Standard drinks or equivalent per week      Comment: mixed very light 1 a month if that     Family History   Problem Relation Age of Onset     C.A.D. Father      MI at age 60     Hypertension Father      Lipids Father      high cholesterol      Alzheimer Disease Father      Eye Disorder Mother      glaucoma     OSTEOPOROSIS Mother      on Fosamax     Musculoskeletal Disorder Mother      knee and feet problems     Arthritis Mother      sagrario and feet problems     Other Cancer Brother      CANCER Other      maternal cousin with cervical cancer     CANCER Maternal Aunt      stomach cancer     DIABETES No family hx of      CEREBROVASCULAR DISEASE No family hx of      Breast Cancer No family hx of      Cancer - colorectal No family hx of      Prostate Cancer No family hx of          Current Outpatient Prescriptions   Medication Sig Dispense Refill     senna-docusate (SENOKOT-S;PERICOLACE) 8.6-50 MG per tablet Take 1-2 tablets by mouth 2 times daily as needed for constipation 100 tablet      acetaminophen (TYLENOL) 500 MG tablet Take 2 tablets (1,000 mg) by mouth every 8 hours as needed for mild pain 100 tablet 0     ibuprofen 200 MG capsule Take 400 mg by mouth every 8 hours as needed for fever 60 capsule 0     nystatin (MYCOSTATIN) cream Apply topically 2 times daily Reported on 3/6/2017       clobetasol (TEMOVATE) 0.05 % ointment Apply sparingly to affected area twice daily for 14 days.  Do not apply to face. (Patient taking differently: daily as needed Apply sparingly to affected area twice daily for 14 days.  Do not apply to face.) 15 g 0     hydrochlorothiazide 12.5 MG TABS Take 1 tablet (12.5 mg) by mouth daily 90 tablet 3     atenolol (TENORMIN) 50 MG tablet Take 1 tablet (50 mg) by mouth daily 90 tablet 3     nystatin (MYCOSTATIN) 658011 UNIT/GM POWD Apply topically 3 times daily as needed 30 g 1     Emollient (CERAVE) CREA Externally apply topically 2 times daily as needed 1 Bottle 3     Allergies   Allergen Reactions     Vioxx Itching and Swelling     VIOXX (Swollen Feet,Hands itching), Okay with ibuprofen and aspirin     Recent Labs   Lab Test  04/18/17   0620  04/17/17   0610  04/16/17   0610   07/13/16   0648  02/26/16   0855   05/01/13   0722    "07/14/11   1048  11/04/09   1440   A1C   --    --    --    --    --    --    --    --    --    --   5.5   LDL   --    --    --    --   94   --    --   127   --   104   --    HDL   --    --    --    --   45*   --    --   47*   --   46*   --    TRIG   --    --    --    --   158*   --    --   163*   --   136   --    ALT  200*  289*  463*   < >   --   24   < >   --    --   <6  21   CR  0.65  0.74  0.72   < >   --   0.75   < >   --    --    --   0.69   GFRESTIMATED  >90  Non  GFR Calc    80  82   < >   --   78   < >   --    --    --   Not Calculated   GFRESTBLACK  >90   GFR Calc    >90   GFR Calc    >90   GFR Calc     < >   --   >90   GFR Calc     < >   --    --    --   Not Calculated   POTASSIUM  3.7  3.6  4.5   < >   --   3.7   < >   --    < >   --   4.6   TSH   --    --    --    --    --   1.82   --    --    --    --   1.68    < > = values in this interval not displayed.      BP Readings from Last 3 Encounters:   04/20/17 133/81   04/18/17 134/66   04/14/17 (!) 146/98    Wt Readings from Last 3 Encounters:   04/20/17 269 lb (122 kg)   04/15/17 275 lb 9.2 oz (125 kg)   03/17/17 269 lb (122 kg)         ROS:  Constitutional, HEENT, cardiovascular, pulmonary, gi and gu systems are negative, except as otherwise noted.    OBJECTIVE:                                                    /81  Pulse 71  Ht 5' 3\" (1.6 m)  Wt 269 lb (122 kg)  LMP 05/29/2006  BMI 47.65 kg/m2  GENERAL APPEARANCE ADULT: Alert, no acute distress  CV: normal rate, regular rhythm, no murmur or gallop  ABDOMEN: soft, no organomegaly, masses or tenderness, morbidly obese  PSYCH: mentation appears normal., affect and mood normal  Diagnostic Test Results:  pending     ASSESSMENT/PLAN:                                                    1. Biliary obstruction  Resolved, recheck LFT to verify improvement  - Hepatic panel  - Basic metabolic panel  - GGT    2. Elevated " levels of transaminase & lactic acid dehydrogenase  as listed above  - Hepatic panel  - Basic metabolic panel  - GGT    3. Fatty liver  Considerable time discussing cause and need to lose weight, 10% was set, she has better understanding, will bet off pop and candy by May 1, use accounting tool like myfitnesspal.com.  Work on nutritions food and not consume empty calories.      Mariel Sabillon MD  [facility]

## 2017-04-21 ENCOUNTER — TELEPHONE (OUTPATIENT)
Dept: GASTROENTEROLOGY | Facility: CLINIC | Age: 62
End: 2017-04-21

## 2017-04-21 NOTE — TELEPHONE ENCOUNTER
Jazmín Watts, Maxim Loya                   Reviewed by Wild:   Next available clinic visit.       Thank you,   Jazmín Jones informed that she is scheduled on 6/19/2017 at 12 P for a clinic consult with Dr. Rome.   Instructions/location will be mailed to Karen as she does not know where we are located. Address verified during this call.    SR 04/21/2017  924x

## 2017-04-24 ENCOUNTER — TELEPHONE (OUTPATIENT)
Dept: FAMILY MEDICINE | Facility: CLINIC | Age: 62
End: 2017-04-24

## 2017-04-24 NOTE — TELEPHONE ENCOUNTER
Reason for Call: Request for an order or referral:    Order or referral being requested: Shoulder and neck xrays     Date needed: as soon as possible    Has the patient been seen by the PCP for this problem? NO    Additional comments: Pt is calling to have xrays ordered for her chiropractor, states she was seen last week   And does not want to pay for them out of pocket if they are done in his office.      Phone number Patient can be reached at:  Home number on file [phone] (home)    Best Time:  Any     Can we leave a detailed message on this number?  YES    Call taken on 4/24/2017 at 7:49 AM by Roxie Aragon

## 2017-04-24 NOTE — TELEPHONE ENCOUNTER
Notified her,   She agreed to an appt, and is scheduled to see Dr Suazo tomorrow am.   Kristen Griffin RNC

## 2017-04-24 NOTE — TELEPHONE ENCOUNTER
"Dr Sabillon, please see her request for \"xray orders\" below.   You did see her recently, is this reasonable? Or does she need to be seen again?   Kristen Griffin RNC    "

## 2017-04-25 ENCOUNTER — OFFICE VISIT (OUTPATIENT)
Dept: FAMILY MEDICINE | Facility: CLINIC | Age: 62
End: 2017-04-25
Payer: MEDICARE

## 2017-04-25 VITALS
TEMPERATURE: 98.9 F | BODY MASS INDEX: 46.42 KG/M2 | SYSTOLIC BLOOD PRESSURE: 107 MMHG | HEART RATE: 67 BPM | DIASTOLIC BLOOD PRESSURE: 62 MMHG | HEIGHT: 63 IN | WEIGHT: 262 LBS

## 2017-04-25 DIAGNOSIS — M54.2 CERVICALGIA: Primary | ICD-10-CM

## 2017-04-25 DIAGNOSIS — M25.512 ACUTE PAIN OF LEFT SHOULDER: ICD-10-CM

## 2017-04-25 PROCEDURE — 20610 DRAIN/INJ JOINT/BURSA W/O US: CPT | Performed by: FAMILY MEDICINE

## 2017-04-25 PROCEDURE — 99213 OFFICE O/P EST LOW 20 MIN: CPT | Mod: 25 | Performed by: FAMILY MEDICINE

## 2017-04-25 RX ORDER — TRIAMCINOLONE ACETONIDE 40 MG/ML
40 INJECTION, SUSPENSION INTRA-ARTICULAR; INTRAMUSCULAR ONCE
Qty: 1 ML | Refills: 0 | OUTPATIENT
Start: 2017-04-25 | End: 2017-04-25

## 2017-04-25 NOTE — PROGRESS NOTES
SUBJECTIVE:                                                    Jacquie Harris is a 62 year old female who presents to clinic today for the following health issues:      Joint Pain     Onset: 4-10-17    Description:   Location: left shoulder both sides of neck   Character: Sharp    Intensity: moderate, severe with movement     Progression of Symptoms: same    Accompanying Signs & Symptoms:  Other symptoms: radiation of pain to down spine and she feels like she has swollen glands    History:   Previous similar pain: no       Precipitating factors:   Trauma or overuse: YES- trauma she was pulling on and exercise machine     Alleviating factors:  Improved by: heat       Therapies Tried and outcome: Patient was seen in ER on 14th and 15th  Had a CT and MRI  Was done Patient has tried ice heat ibuprofen       Patient comes in with pain in her left shoulder and also in her neck. She reports that she was doing some spring cleaning and started some exercises that requires some weight lifting. She says she hurt her shoulders doing this and the pain also radiates into her neck. The pain is a dull pain and is continuous. She reports that her range of motion both in her neck and shoulders have been decreased. She has been taking Ibuprofen for the pain with some minimal improvement. No swelling or redness.       Problem list and histories reviewed & adjusted, as indicated.  Additional history: as documented    Patient Active Problem List   Diagnosis     s/p gastric bypass x 2     Allergic rhinitis     Carpal tunnel syndrome     Obstructive sleep apnea     Rosacea     CARDIOVASCULAR SCREENING; LDL GOAL LESS THAN 160     Tick bite, infected, positive Lyme test 1996 not treated     Vitamin D deficiency     Osteoporosis     Obesity, Class III, BMI 40-49.9 (morbid obesity) (H)     S/P TKR (total knee replacement)     HTN, goal below 140/80     Back pain, left below scapula, strained muscles     Headache     Encounter for routine  gynecological examination      Colles' fracture     Pain of left lower leg     Pain of right lower leg     Bilateral cold feet     Other specified symptoms and signs involving the circulatory and respiratory systems      Elevated levels of transaminase & lactic acid dehydrogenase     Biliary obstruction     Fatty liver     Past Surgical History:   Procedure Laterality Date     ARTHROPLASTY KNEE  4/2/2012    Procedure:ARTHROPLASTY KNEE; Left Total Knee Arthroplasty--Anesth.Choice; Surgeon:VICENTE MORALES; Location:[place] OR     C ORAL SURGERY PROCEDURE  age 18    wisdom teeth     C/SECTION, LOW TRANSVERSE  1978, 1984, 1994    x 3     COLONOSCOPY  2001    normal colonoscopy     GASTRIC BYPASS  1980/2005    Gastric Bypass and revision     HERNIA REPAIR, INCISIONAL  6/16/2008    lap.repair with 10x8 mesh     TUBAL LIGATION  1994       Social History   Substance Use Topics     Smoking status: Never Smoker     Smokeless tobacco: Never Used     Alcohol use 0.0 oz/week     0 Standard drinks or equivalent per week      Comment: mixed very light 1 a month if that     Family History   Problem Relation Age of Onset     C.A.D. Father      MI at age 60     Hypertension Father      Lipids Father      high cholesterol     Alzheimer Disease Father      Eye Disorder Mother      glaucoma     OSTEOPOROSIS Mother      on Fosamax     Musculoskeletal Disorder Mother      knee and feet problems     Arthritis Mother      sagrario and feet problems     Other Cancer Brother      CANCER Other      maternal cousin with cervical cancer     CANCER Maternal Aunt      stomach cancer     DIABETES No family hx of      CEREBROVASCULAR DISEASE No family hx of      Breast Cancer No family hx of      Cancer - colorectal No family hx of      Prostate Cancer No family hx of          Current Outpatient Prescriptions   Medication Sig Dispense Refill     clobetasol (TEMOVATE) 0.05 % ointment Apply sparingly to affected area twice daily for 14 days.  Do not apply  "to face. (Patient taking differently: daily as needed Apply sparingly to affected area twice daily for 14 days.  Do not apply to face.) 15 g 0     hydrochlorothiazide 12.5 MG TABS Take 1 tablet (12.5 mg) by mouth daily 90 tablet 3     atenolol (TENORMIN) 50 MG tablet Take 1 tablet (50 mg) by mouth daily 90 tablet 3     nystatin (MYCOSTATIN) 561000 UNIT/GM POWD Apply topically 3 times daily as needed 30 g 1     Emollient (CERAVE) CREA Externally apply topically 2 times daily as needed 1 Bottle 3     senna-docusate (SENOKOT-S;PERICOLACE) 8.6-50 MG per tablet Take 1-2 tablets by mouth 2 times daily as needed for constipation (Patient not taking: Reported on 4/25/2017) 100 tablet      acetaminophen (TYLENOL) 500 MG tablet Take 2 tablets (1,000 mg) by mouth every 8 hours as needed for mild pain 100 tablet 0     ibuprofen 200 MG capsule Take 400 mg by mouth every 8 hours as needed for fever 60 capsule 0     nystatin (MYCOSTATIN) cream Apply topically 2 times daily Reported on 3/6/2017       Allergies   Allergen Reactions     Vioxx Itching and Swelling     VIOXX (Swollen Feet,Hands itching), Okay with ibuprofen and aspirin       Reviewed and updated as needed this visit by clinical staff  Tobacco  Allergies  Med Hx  Surg Hx  Fam Hx  Soc Hx      Reviewed and updated as needed this visit by Provider         ROS:  Constitutional, HEENT, cardiovascular, pulmonary, gi and gu systems are negative, except as otherwise noted.    OBJECTIVE:                                                    /62  Pulse 67  Temp 98.9  F (37.2  C) (Tympanic)  Ht 5' 3\" (1.6 m)  Wt 262 lb (118.8 kg)  LMP 05/29/2006  BMI 46.41 kg/m2  Body mass index is 46.41 kg/(m^2).  GENERAL: healthy, alert and no distress  NECK: no adenopathy, no asymmetry, masses, or scars, thyroid normal to palpation and rang of motion impaired.  RESP: lungs clear to auscultation - no rales, rhonchi or wheezes  CV: regular rate and rhythm, normal S1 S2, no S3 or S4, " no murmur, click or rub, no peripheral edema and peripheral pulses strong  ABDOMEN: soft, nontender, no hepatosplenomegaly, no masses and bowel sounds normal  MS: decreased range of motion in left shoulder     Diagnostic Test Results:  none      ASSESSMENT/PLAN:                                                      (M54.2) Cervicalgia  (primary encounter diagnosis)  Comment: Patient referred to physical therapy   Plan: PHYSICAL THERAPY REFERRAL      (M25.512) Acute pain of left shoulder  Comment: Patient is given a cortisone shot in her left shoulder.   Plan:  Cortisone shot, physical therapy.     The following medication was given:     MEDICATION: Kenalog 40 mg  ROUTE: Intrarticular  SITE: Left shoulder  DOSE: 40 mg   LOT #: [id number]  :  [employer]  EXPIRATION DATE:  August 2018   NDC#: 8393-7990-42       FUTURE APPOINTMENTS:       - Follow-up visit as needed    Jennie Suazo MD  [facility]

## 2017-04-25 NOTE — PROCEDURES
Injection Procedure note  After obtaining consent from the patient the area of the left shoulder joint was prepped in the usual fashion. Lidocaine 1 % with Kenalog (40 Mg ) was injected into the joint space .Area was cleansed .Band aid applied.Patient tolerated the procedure well.

## 2017-04-25 NOTE — PATIENT INSTRUCTIONS
Thank you for choosing [facility].  You may be receiving a survey in the mail from Nnamdi Hopkins regarding your visit today.  Please take a few minutes to complete and return the survey to let us know how we are doing.      If you have questions or concerns, please contact us via [email or web address] or you can contact your care team at [phone].    Our Clinic hours are:  Monday 6:40 am  to 7:00 pm  Tuesday -Friday 6:40 am to 5:00 pm    The [place] outpatient lab hours are:  Monday - Friday 6:10 am to 4:45 pm  Saturdays 7:00 am to 11:00 am  Appointments are required, call [phone]    If you have clinical questions after hours or would like to schedule an appointment,  call the clinic at [phone].  Neck Sprain or Strain  A sudden force that causes turning or bending of the neck can cause sprain or strain. An example would be the force from a car accident. This can stretch or tear muscles called a strain. It can also stretch or tear ligaments called a sprain. Either of these can cause neck pain. Sometimes neck pain occurs after a simple awkward movement. In either case, muscle spasm is commonly present and contributes to the pain.    Unless you had a forceful physical injury (for example, a car accident or fall), X-rays are usually not ordered for the initial evaluation of neck pain. If pain continues and dose not respond to medical treatment, X-rays and other tests may be performed at a later time.  Home care    You may feel more soreness and spasm the first few days after the injury. Rest until symptoms begin to improve.    When lying down, use a comfortable pillow or a rolled towel that supports the head and keeps the spine in a neutral position. The position of the head should not be tilted forward or backward.    Apply an ice pack over the injured area for 15 to 20 minutes every 3 to 6 hours. You should do this for the first 24 to 48 hours. You can make an ice pack by filling a plastic bag that seals at  the top with ice cubes and then wrapping it with a thin towel. After 48 hours, apply heat (warm shower or warm bath) for 15 to 20 minutes several times a day, or alternate ice and heat.    You may use over-the-counter pain medicine to control pain, unless another pain medicine was prescribed. If you have chronic liver or kidney disease or ever had a stomach ulcer or GI bleeding, talk with your healthcare provider before using these medicines.    If a soft cervical collar was prescribed, it should be worn only for periods of increased pain. It should not be worn for more than 3 hours a day, or for a period longer than 1 to 2 weeks.  Follow-up care  Follow up with your healthcare provider as directed. Physical therapy may be needed.  Sometimes fractures don t show up on the first X-ray. Bruises and sprains can sometimes hurt as much as a fracture. These injuries can take time to heal completely. If your symptoms don t improve or they get worse, talk with your healthcare provider. You may need a repeat X-ray or other tests. If X-rays were taken, you will be told of any new findings that may affect your care.  Call 911  Call 911 if you have:     Neck swelling, difficulty or painful swallowing    Difficulty breathing    Chest pain  When to seek medical advice  Call your healthcare provider right away if any of these occur:    Pain becomes worse or spreads into your arms    Weakness or numbness in one or both arms    [phone] [employer]. [address] [place] [address]. All rights reserved. This information is not intended as a substitute for professional medical care. Always follow your healthcare professional's instructions.

## 2017-04-25 NOTE — MR AVS SNAPSHOT
After Visit Summary   4/25/2017    Jacquie Harris    MRN: [id number]           Patient Information     Date Of Birth          1955        Visit Information        Provider Department      4/25/2017 9:40 AM Jennie Suazo MD [facility]        Today's Diagnoses     Cervicalgia    -  1    Acute pain of left shoulder          Care Instructions          Thank you for choosing [facility].  You may be receiving a survey in the mail from [facility] regarding your visit today.  Please take a few minutes to complete and return the survey to let us know how we are doing.      If you have questions or concerns, please contact us via [employer] or you can contact your care team at [phone].    Our Clinic hours are:  Monday 6:40 am  to 7:00 pm  Tuesday -Friday 6:40 am to 5:00 pm    The [place] outpatient lab hours are:  Monday - Friday 6:10 am to 4:45 pm  Saturdays 7:00 am to 11:00 am  Appointments are required, call [phone]    If you have clinical questions after hours or would like to schedule an appointment,  call the clinic at [phone].  Neck Sprain or Strain  A sudden force that causes turning or bending of the neck can cause sprain or strain. An example would be the force from a car accident. This can stretch or tear muscles called a strain. It can also stretch or tear ligaments called a sprain. Either of these can cause neck pain. Sometimes neck pain occurs after a simple awkward movement. In either case, muscle spasm is commonly present and contributes to the pain.    Unless you had a forceful physical injury (for example, a car accident or fall), X-rays are usually not ordered for the initial evaluation of neck pain. If pain continues and dose not respond to medical treatment, X-rays and other tests may be performed at a later time.  Home care    You may feel more soreness and spasm the first few days after the injury. Rest until symptoms begin to  improve.    When lying down, use a comfortable pillow or a rolled towel that supports the head and keeps the spine in a neutral position. The position of the head should not be tilted forward or backward.    Apply an ice pack over the injured area for 15 to 20 minutes every 3 to 6 hours. You should do this for the first 24 to 48 hours. You can make an ice pack by filling a plastic bag that seals at the top with ice cubes and then wrapping it with a thin towel. After 48 hours, apply heat (warm shower or warm bath) for 15 to 20 minutes several times a day, or alternate ice and heat.    You may use over-the-counter pain medicine to control pain, unless another pain medicine was prescribed. If you have chronic liver or kidney disease or ever had a stomach ulcer or GI bleeding, talk with your healthcare provider before using these medicines.    If a soft cervical collar was prescribed, it should be worn only for periods of increased pain. It should not be worn for more than 3 hours a day, or for a period longer than 1 to 2 weeks.  Follow-up care  Follow up with your healthcare provider as directed. Physical therapy may be needed.  Sometimes fractures don t show up on the first X-ray. Bruises and sprains can sometimes hurt as much as a fracture. These injuries can take time to heal completely. If your symptoms don t improve or they get worse, talk with your healthcare provider. You may need a repeat X-ray or other tests. If X-rays were taken, you will be told of any new findings that may affect your care.  Call 911  Call 911 if you have:     Neck swelling, difficulty or painful swallowing    Difficulty breathing    Chest pain  When to seek medical advice  Call your healthcare provider right away if any of these occur:    Pain becomes worse or spreads into your arms    Weakness or numbness in one or both arms    6050-2556 [employer]. [address] [place] [address], [place] [address]. All rights reserved. This  information is not intended as a substitute for professional medical care. Always follow your healthcare professional's instructions.              Follow-ups after your visit        Additional Services     PHYSICAL THERAPY REFERRAL       *This therapy referral will be filtered to a centralized scheduling office at [facility] and the patient will receive a call to schedule an appointment at a [place] location most convenient for them. *     [facility] provides Physical Therapy evaluation and treatment and many specialty services across the [place] system.  If requesting a specialty program, please choose from the list below.    If you have not heard from the scheduling office within 2 business days, please call [phone] for all locations, with the exception of [place], please call [phone].  Treatment: Evaluation & Treatment  Special Instructions/Modalities:   Special Programs: None    Please be aware that coverage of these services is subject to the terms and limitations of your health insurance plan.  Call member services at your health plan with any benefit or coverage questions.      **Note to Provider:  If you are referring outside of [place] for the therapy appointment, please list the name of the location in the  special instructions  above, print the referral and give to the patient to schedule the appointment.                  Your next 10 appointments already scheduled     Jun 19, 2017 12:00 PM CDT   (Arrive by 11:45 AM)   New Patient Visit with Malachi Rome MD   [facility]iliary ([facility])    [address] [place] [address] [phone]              Who to contact     If you have questions or need follow up information about today's clinic visit or your schedule please contact [facility] directly at [phone].  Normal or non-critical lab and imaging results  "will be communicated to you by MyChart, letter or phone within 4 business days after the clinic has received the results. If you do not hear from us within 7 days, please contact the clinic through [employer] or phone. If you have a critical or abnormal lab result, we will notify you by phone as soon as possible.  Submit refill requests through [employer] or call your pharmacy and they will forward the refill request to us. Please allow 3 business days for your refill to be completed.          Additional Information About Your Visit        j-Grab Information     [employer] lets you send messages to your doctor, view your test results, renew your prescriptions, schedule appointments and more. To sign up, go to [email or web address] . Click on \"Log in\" on the left side of the screen, which will take you to the Welcome page. Then click on \"Sign up Now\" on the right side of the page.     You will be asked to enter the access code listed below, as well as some personal information. Please follow the directions to create your username and password.     Your access code is: [email or web address]  Expires: 2017 10:44 AM     Your access code will  in 90 days. If you need help or a new code, please call your [place] clinic or [phone].        Care EveryWhere ID     This is your Care EveryWhere ID. This could be used by other organizations to access your [place] medical records  [id number]        Your Vitals Were     Pulse Temperature Height Last Period BMI (Body Mass Index)       67 98.9  F (37.2  C) (Tympanic) 5' 3\" (1.6 m) 2006 46.41 kg/m2        Blood Pressure from Last 3 Encounters:   17 107/62   17 133/81   17 134/66    Weight from Last 3 Encounters:   17 262 lb (118.8 kg)   17 269 lb (122 kg)   04/15/17 275 lb 9.2 oz (125 kg)              We Performed the Following     PHYSICAL THERAPY REFERRAL          Today's Medication Changes          These changes are accurate as of: 17 " 10:20 AM.  If you have any questions, ask your nurse or doctor.               These medicines have changed or have updated prescriptions.        Dose/Directions    clobetasol 0.05 % ointment   Commonly known as:  TEMOVATE   This may have changed:    - when to take this  - reasons to take this  - additional instructions   Used for:  Vaginal itching        Apply sparingly to affected area twice daily for 14 days.  Do not apply to face.   Quantity:  15 g   Refills:  0                Primary Care Provider Office Phone # Fax #    Mariel Va Sabillon [phone]       [facility] [address] [facility] [address]        Thank you!     Thank you for choosing [facility]  for your care. Our goal is always to provide you with excellent care. Hearing back from our patients is one way we can continue to improve our services. Please take a few minutes to complete the written survey that you may receive in the mail after your visit with us. Thank you!             Your Updated Medication List - Protect others around you: Learn how to safely use, store and throw away your medicines at www.disposemymeds.org.          This list is accurate as of: 4/25/17 10:20 AM.  Always use your most recent med list.                   Brand Name Dispense Instructions for use    acetaminophen 500 MG tablet    TYLENOL    100 tablet    Take 2 tablets (1,000 mg) by mouth every 8 hours as needed for mild pain       atenolol 50 MG tablet    TENORMIN    90 tablet    Take 1 tablet (50 mg) by mouth daily       CERAVE Crea     1 Bottle    Externally apply topically 2 times daily as needed       clobetasol 0.05 % ointment    TEMOVATE    15 g    Apply sparingly to affected area twice daily for 14 days.  Do not apply to face.       hydrochlorothiazide 12.5 MG Tabs tablet     90 tablet    Take 1 tablet (12.5 mg) by mouth daily       ibuprofen 200 MG capsule     60 capsule    Take 400 mg by mouth every 8 hours as  needed for fever       * nystatin 423937 UNIT/GM Powd    MYCOSTATIN    30 g    Apply topically 3 times daily as needed       * nystatin cream    MYCOSTATIN     Apply topically 2 times daily Reported on 3/6/2017       senna-docusate 8.6-50 MG per tablet    SENOKOT-S;PERICOLACE    100 tablet    Take 1-2 tablets by mouth 2 times daily as needed for constipation       * Notice:  This list has 2 medication(s) that are the same as other medications prescribed for you. Read the directions carefully, and ask your doctor or other care provider to review them with you.

## 2017-04-25 NOTE — NURSING NOTE
"Chief Complaint   Patient presents with     Neck Pain     and shoulder        Initial /62 (BP Location: Left arm, Cuff Size: Adult Large)  Pulse 67  Temp 98.9  F (37.2  C) (Tympanic)  Ht 5' 3\" (1.6 m)  Wt 262 lb (118.8 kg)  LMP 05/29/2006  BMI 46.41 kg/m2 Estimated body mass index is 46.41 kg/(m^2) as calculated from the following:    Height as of this encounter: 5' 3\" (1.6 m).    Weight as of this encounter: 262 lb (118.8 kg).  Medication Reconciliation: complete  "

## 2017-05-17 ENCOUNTER — OFFICE VISIT (OUTPATIENT)
Dept: FAMILY MEDICINE | Facility: CLINIC | Age: 62
End: 2017-05-17
Payer: MEDICARE

## 2017-05-17 VITALS
HEIGHT: 63 IN | WEIGHT: 254 LBS | BODY MASS INDEX: 45 KG/M2 | HEART RATE: 66 BPM | SYSTOLIC BLOOD PRESSURE: 118 MMHG | TEMPERATURE: 98.7 F | DIASTOLIC BLOOD PRESSURE: 67 MMHG

## 2017-05-17 DIAGNOSIS — K83.1 BILIARY OBSTRUCTION (H): Primary | ICD-10-CM

## 2017-05-17 LAB
ALBUMIN SERPL-MCNC: 3.7 G/DL (ref 3.4–5)
ALP SERPL-CCNC: 75 U/L (ref 40–150)
ALT SERPL W P-5'-P-CCNC: 23 U/L (ref 0–50)
ANION GAP SERPL CALCULATED.3IONS-SCNC: 6 MMOL/L (ref 3–14)
AST SERPL W P-5'-P-CCNC: 20 U/L (ref 0–45)
BILIRUB SERPL-MCNC: 0.4 MG/DL (ref 0.2–1.3)
BUN SERPL-MCNC: 18 MG/DL (ref 7–30)
CALCIUM SERPL-MCNC: 9.5 MG/DL (ref 8.5–10.1)
CHLORIDE SERPL-SCNC: 103 MMOL/L (ref 94–109)
CO2 SERPL-SCNC: 31 MMOL/L (ref 20–32)
CREAT SERPL-MCNC: 0.76 MG/DL (ref 0.52–1.04)
GFR SERPL CREATININE-BSD FRML MDRD: 77 ML/MIN/1.7M2
GGT SERPL-CCNC: 26 U/L (ref 0–40)
GLUCOSE SERPL-MCNC: 97 MG/DL (ref 70–99)
POTASSIUM SERPL-SCNC: 3.8 MMOL/L (ref 3.4–5.3)
PROT SERPL-MCNC: 7.7 G/DL (ref 6.8–8.8)
SODIUM SERPL-SCNC: 140 MMOL/L (ref 133–144)

## 2017-05-17 PROCEDURE — 80053 COMPREHEN METABOLIC PANEL: CPT | Performed by: FAMILY MEDICINE

## 2017-05-17 PROCEDURE — 36415 COLL VENOUS BLD VENIPUNCTURE: CPT | Performed by: FAMILY MEDICINE

## 2017-05-17 PROCEDURE — 99213 OFFICE O/P EST LOW 20 MIN: CPT | Performed by: FAMILY MEDICINE

## 2017-05-17 PROCEDURE — 82977 ASSAY OF GGT: CPT | Performed by: FAMILY MEDICINE

## 2017-05-17 RX ORDER — HYDROMORPHONE HYDROCHLORIDE 2 MG/1
2 TABLET ORAL EVERY 4 HOURS PRN
Qty: 18 TABLET | Refills: 0 | Status: SHIPPED | OUTPATIENT
Start: 2017-05-17 | End: 2017-08-18

## 2017-05-17 NOTE — NURSING NOTE
"Chief Complaint   Patient presents with     Right Upper Quadrant Pain       Initial /67  Pulse 66  Temp 98.7  F (37.1  C) (Tympanic)  Ht 5' 3\" (1.6 m)  Wt 254 lb (115.2 kg)  LMP 05/29/2006  BMI 44.99 kg/m2 Estimated body mass index is 44.99 kg/(m^2) as calculated from the following:    Height as of this encounter: 5' 3\" (1.6 m).    Weight as of this encounter: 254 lb (115.2 kg).  Medication Reconciliation: complete  "

## 2017-05-17 NOTE — MR AVS SNAPSHOT
After Visit Summary   5/17/2017    Jacquie Harris    MRN: [id number]           Patient Information     Date Of Birth          1955        Visit Information        Provider Department      5/17/2017 7:40 AM Jennie Suazo MD [facility]        Today's Diagnoses     Biliary obstruction    -  1      Care Instructions          Thank you for choosing [facility].  You may be receiving a survey in the mail from [facility] regarding your visit today.  Please take a few minutes to complete and return the survey to let us know how we are doing.      If you have questions or concerns, please contact us via [employer] or you can contact your care team at [phone].    Our Clinic hours are:  Monday 6:40 am  to 7:00 pm  Tuesday -Friday 6:40 am to 5:00 pm    The [place] outpatient lab hours are:  Monday - Friday 6:10 am to 4:45 pm  Saturdays 7:00 am to 11:00 am  Appointments are required, call [phone]    If you have clinical questions after hours or would like to schedule an appointment,  call the clinic at [phone].  Possible Gallstone with Biliary Colic (Presumed)    Your abdominal pain is may be due to spasm of the gallbladder. This is called gallbladder or biliary colic. The gallbladder is a small sac under the liver, which stores and releases bile. Bile is a fluid that aids in the digestion of fat. Eating fatty food stimulates the gallbladder to contract, and release the bile. A gallstone may form in this sac. Although most people do not have symptoms, when the stone moves and blocks the passage of bile out of the bladder, it can cause pain and even an infection.  To be more certain of the diagnosis, you may need to have an ultrasound, CT-scan or other special test.  A number of things increase the risk for developing gallstones:    Women are more likely    Obesity    Increasing age    Losing or gaining weight quickly    High calorie diet    Pregnancy    Hormone  therapy    Diabetes  The most common symptoms are:    Abdominal pain, cramping, aching    Nausea, vomiting    Fever  Many illnesses can cause these symptoms. This pain usually starts in the upper right side of your abdomen. Sometimes it can radiate to your right shoulder, back and arm. It usually starts suddenly, becomes more intense quickly, and then gradually decreases and disappears over a couple of hours. Elderly people and diabetics may have difficulty showing where the pain is exactly.  Home care    Rest in bed and follow a clear liquid diet until feeling better. If pain or nausea medicine was given to help with your symptoms, take these as directed.    You can take acetaminophen or ibuprofen for pain, unless you were given a different pain medicine to use.Note: If you have chronic liver or kidney disease or ever had a stomach ulcer or GI bleeding or are taking blood thinner medications, talk with your doctor before using these medicines.    Fat in your diet makes the gallbladder contract and may cause increased pain. Therefore, avoid fat in your diet over the next two days and follow a low-fat diet after that. If you are overweight, a low fat diet will help you lose weight.  Follow-up care  If a test was already scheduled for you, keep this appointment. Be sure you know how to prepare yourself for the test. Usually, you will be asked not to eat or drink anything for at least 8 hours before the test. Schedule an appointment with your own doctor after your test is complete to discuss the findings.  When to seek medical advice  Call your healthcare provider if any of the following occur:    Pain gets worse or moves to the right lower abdomen    Repeated vomiting    Swelling of the abdomen    Pain lasts over 6 hours    Fever of 100.4  F (38  C) or higher, or as directed by your healthcare provider    Weakness, dizziness    Dark urine or light colored stools    Yellow color of the skin or eyes    Chest, arm, back,  "neck or jaw pain  Call 911  Get emergency medical care right away if any of these occur:    Trouble breathing    Very confused    Very drowsy or trouble awakening    Fainting or loss of consciousness    Rapid heart rate    6927-0790 [employer]. [address] [place] [address], [place] [address]. All rights reserved. This information is not intended as a substitute for professional medical care. Always follow your healthcare professional's instructions.              Follow-ups after your visit        Your next 10 appointments already scheduled     Jun 19, 2017 12:00 PM CDT   (Arrive by 11:45 AM)   New Patient Visit with Malachi Rome MD   [facility] ([facility])    [address]  4th Floor  [facility] [address] [phone]              Who to contact     If you have questions or need follow up information about today's clinic visit or your schedule please contact [facility] directly at [phone].  Normal or non-critical lab and imaging results will be communicated to you by MyChart, letter or phone within 4 business days after the clinic has received the results. If you do not hear from us within 7 days, please contact the clinic through [employer] or phone. If you have a critical or abnormal lab result, we will notify you by phone as soon as possible.  Submit refill requests through [employer] or call your pharmacy and they will forward the refill request to us. Please allow 3 business days for your refill to be completed.          Additional Information About Your Visit        [employer] Information     [employer] lets you send messages to your doctor, view your test results, renew your prescriptions, schedule appointments and more. To sign up, go to [email or web address] . Click on \"Log in\" on the left side of the screen, which will take you to the Welcome page. Then click on \"Sign up Now\" on the right side of the page.     You will " "be asked to enter the access code listed below, as well as some personal information. Please follow the directions to create your username and password.     Your access code is: [email or web address]  Expires: 2017 10:44 AM     Your access code will  in 90 days. If you need help or a new code, please call your [place] clinic or [phone].        Care EveryWhere ID     This is your Care EveryWhere ID. This could be used by other organizations to access your [place] medical records  [id number]        Your Vitals Were     Pulse Temperature Height Last Period BMI (Body Mass Index)       66 98.7  F (37.1  C) (Tympanic) 5' 3\" (1.6 m) 2006 44.99 kg/m2        Blood Pressure from Last 3 Encounters:   17 118/67   17 107/62   17 133/81    Weight from Last 3 Encounters:   17 254 lb (115.2 kg)   17 262 lb (118.8 kg)   17 269 lb (122 kg)              We Performed the Following     Comprehensive metabolic panel     GGT          Today's Medication Changes          These changes are accurate as of: 17  8:03 AM.  If you have any questions, ask your nurse or doctor.               Start taking these medicines.        Dose/Directions    HYDROmorphone 2 MG tablet   Commonly known as:  DILAUDID   Used for:  Biliary obstruction   Started by:  Jennie Suazo MD        Dose:  2 mg   Take 1 tablet (2 mg) by mouth every 4 hours as needed for pain   Quantity:  18 tablet   Refills:  0         These medicines have changed or have updated prescriptions.        Dose/Directions    clobetasol 0.05 % ointment   Commonly known as:  TEMOVATE   This may have changed:    - when to take this  - reasons to take this  - additional instructions   Used for:  Vaginal itching        Apply sparingly to affected area twice daily for 14 days.  Do not apply to face.   Quantity:  15 g   Refills:  0            Where to get your medicines      Some of these will need a paper prescription and others can " be bought over the counter.  Ask your nurse if you have questions.     Bring a paper prescription for each of these medications     HYDROmorphone 2 MG tablet                Primary Care Provider Office Phone # Fax #    Mariel Va Sabillon [phone]       [facility] [address] [facility] [address] [facility] [address]        Thank you!     Thank you for choosing [facility]  for your care. Our goal is always to provide you with excellent care. Hearing back from our patients is one way we can continue to improve our services. Please take a few minutes to complete the written survey that you may receive in the mail after your visit with us. Thank you!             Your Updated Medication List - Protect others around you: Learn how to safely use, store and throw away your medicines at www.disposemymeds.org.          This list is accurate as of: 5/17/17  8:03 AM.  Always use your most recent med list.                   Brand Name Dispense Instructions for use    acetaminophen 500 MG tablet    TYLENOL    100 tablet    Take 2 tablets (1,000 mg) by mouth every 8 hours as needed for mild pain       atenolol 50 MG tablet    TENORMIN    90 tablet    Take 1 tablet (50 mg) by mouth daily       CERAVE Crea     1 Bottle    Externally apply topically 2 times daily as needed       clobetasol 0.05 % ointment    TEMOVATE    15 g    Apply sparingly to affected area twice daily for 14 days.  Do not apply to face.       hydrochlorothiazide 12.5 MG Tabs tablet     90 tablet    Take 1 tablet (12.5 mg) by mouth daily       HYDROmorphone 2 MG tablet    DILAUDID    18 tablet    Take 1 tablet (2 mg) by mouth every 4 hours as needed for pain       ibuprofen 200 MG capsule     60 capsule    Take 400 mg by mouth every 8 hours as needed for fever       * nystatin 219317 UNIT/GM Powd    MYCOSTATIN    30 g    Apply topically 3 times daily as needed       * nystatin cream    MYCOSTATIN     Apply topically 2 times daily  Reported on 3/6/2017       senna-docusate 8.6-50 MG per tablet    SENOKOT-S;PERICOLACE    100 tablet    Take 1-2 tablets by mouth 2 times daily as needed for constipation       * Notice:  This list has 2 medication(s) that are the same as other medications prescribed for you. Read the directions carefully, and ask your doctor or other care provider to review them with you.

## 2017-05-17 NOTE — PROGRESS NOTES
Please inform patient that test result was within normal parameters.   Thank you.     Jennie Suazo M.D.

## 2017-05-17 NOTE — LETTER
[facility]  [address] [address]  Phone: [phone]    May 17, 2017    Jacquie Steven  [address] [place] [id number]          Dear Ms. Harris,    The results of your recent lab tests were within normal limits. Enclosed is a copy of these results.  If you have any further questions or problems, please contact our office.    Sincerely,      Jennie Suazo MD / scar

## 2017-05-17 NOTE — PATIENT INSTRUCTIONS
Thank you for choosing [facility].  You may be receiving a survey in the mail from Nnamdi Hopkins regarding your visit today.  Please take a few minutes to complete and return the survey to let us know how we are doing.      If you have questions or concerns, please contact us via [employer] or you can contact your care team at [phone].    Our Clinic hours are:  Monday 6:40 am  to 7:00 pm  Tuesday -Friday 6:40 am to 5:00 pm    The [place] outpatient lab hours are:  Monday - Friday 6:10 am to 4:45 pm  Saturdays 7:00 am to 11:00 am  Appointments are required, call [phone]    If you have clinical questions after hours or would like to schedule an appointment,  call the clinic at [phone].  Possible Gallstone with Biliary Colic (Presumed)    Your abdominal pain is may be due to spasm of the gallbladder. This is called gallbladder or biliary colic. The gallbladder is a small sac under the liver, which stores and releases bile. Bile is a fluid that aids in the digestion of fat. Eating fatty food stimulates the gallbladder to contract, and release the bile. A gallstone may form in this sac. Although most people do not have symptoms, when the stone moves and blocks the passage of bile out of the bladder, it can cause pain and even an infection.  To be more certain of the diagnosis, you may need to have an ultrasound, CT-scan or other special test.  A number of things increase the risk for developing gallstones:    Women are more likely    Obesity    Increasing age    Losing or gaining weight quickly    High calorie diet    Pregnancy    Hormone therapy    Diabetes  The most common symptoms are:    Abdominal pain, cramping, aching    Nausea, vomiting    Fever  Many illnesses can cause these symptoms. This pain usually starts in the upper right side of your abdomen. Sometimes it can radiate to your right shoulder, back and arm. It usually starts suddenly, becomes more intense quickly, and then gradually  decreases and disappears over a couple of hours. Elderly people and diabetics may have difficulty showing where the pain is exactly.  Home care    Rest in bed and follow a clear liquid diet until feeling better. If pain or nausea medicine was given to help with your symptoms, take these as directed.    You can take acetaminophen or ibuprofen for pain, unless you were given a different pain medicine to use.Note: If you have chronic liver or kidney disease or ever had a stomach ulcer or GI bleeding or are taking blood thinner medications, talk with your doctor before using these medicines.    Fat in your diet makes the gallbladder contract and may cause increased pain. Therefore, avoid fat in your diet over the next two days and follow a low-fat diet after that. If you are overweight, a low fat diet will help you lose weight.  Follow-up care  If a test was already scheduled for you, keep this appointment. Be sure you know how to prepare yourself for the test. Usually, you will be asked not to eat or drink anything for at least 8 hours before the test. Schedule an appointment with your own doctor after your test is complete to discuss the findings.  When to seek medical advice  Call your healthcare provider if any of the following occur:    Pain gets worse or moves to the right lower abdomen    Repeated vomiting    Swelling of the abdomen    Pain lasts over 6 hours    Fever of 100.4  F (38  C) or higher, or as directed by your healthcare provider    Weakness, dizziness    Dark urine or light colored stools    Yellow color of the skin or eyes    Chest, arm, back, neck or jaw pain  Call 911  Get emergency medical care right away if any of these occur:    Trouble breathing    Very confused    Very drowsy or trouble awakening    Fainting or loss of consciousness    Rapid heart rate    9705-5985 [employer]. [address] [place] [address], [place] [address]. All rights reserved. This information is not intended as a  substitute for professional medical care. Always follow your healthcare professional's instructions.

## 2017-05-17 NOTE — PROGRESS NOTES
SUBJECTIVE:                                                    Jacquie Harris is a 62 year old female who presents to clinic today for the following health issues:      Concern - RUQ pain      Onset: yesterday    Description:   Patient was driving home and felt a dull back ache, and today is unable to use her R /arm     Intensity: moderate    Progression of Symptoms:  worsening    Accompanying Signs & Symptoms:  Dull back ache        Previous history of similar problem:   4-17 seen in the ER     Precipitating factors:   Worsened by: with reaching, or any movement of arm     Alleviating factors:  Improved by: none        Therapies Tried and outcome: increase of water     Patient is a 62 yr old female here for right upper quadrant. Pain started yesterday and has persisted. Patient reports similar pain as compared to when she had the pain in April. She was diagnosed with biliary obstruction at the time. She was referred to the GI specialist at the  . She reports no nausea or vomiting at this time. She reports that she has been on a strict diet and has cut out sugars from her diet.    Her pain today is mild to moderate and it varies depending on what she is doing .       Problem list and histories reviewed & adjusted, as indicated.  Additional history: as documented    Patient Active Problem List   Diagnosis     s/p gastric bypass x 2     Allergic rhinitis     Carpal tunnel syndrome     Obstructive sleep apnea     Rosacea     CARDIOVASCULAR SCREENING; LDL GOAL LESS THAN 160     Tick bite, infected, positive Lyme test 1996 not treated     Vitamin D deficiency     Osteoporosis     Obesity, Class III, BMI 40-49.9 (morbid obesity) (H)     S/P TKR (total knee replacement)     HTN, goal below 140/80     Back pain, left below scapula, strained muscles     Headache     Encounter for routine gynecological examination      Colles' fracture     Pain of left lower leg     Pain of right lower leg     Bilateral cold feet     Other  specified symptoms and signs involving the circulatory and respiratory systems      Elevated levels of transaminase & lactic acid dehydrogenase     Biliary obstruction     Fatty liver     Past Surgical History:   Procedure Laterality Date     ARTHROPLASTY KNEE  4/2/2012    Procedure:ARTHROPLASTY KNEE; Left Total Knee Arthroplasty--Anesth.Choice; Surgeon:VICENTE MORALES; Location:[place] OR     C ORAL SURGERY PROCEDURE  age 18    wisdom teeth     C/SECTION, LOW TRANSVERSE  1978, 1984, 1994    x 3     COLONOSCOPY  2001    normal colonoscopy     GASTRIC BYPASS  1980/2005    Gastric Bypass and revision     HERNIA REPAIR, INCISIONAL  6/16/2008    lap.repair with 10x8 mesh     TUBAL LIGATION  1994       Social History   Substance Use Topics     Smoking status: Never Smoker     Smokeless tobacco: Never Used     Alcohol use 0.0 oz/week     0 Standard drinks or equivalent per week      Comment: mixed very light 1 a month if that     Family History   Problem Relation Age of Onset     C.A.D. Father      MI at age 60     Hypertension Father      Lipids Father      high cholesterol     Alzheimer Disease Father      Eye Disorder Mother      glaucoma     OSTEOPOROSIS Mother      on Fosamax     Musculoskeletal Disorder Mother      knee and feet problems     Arthritis Mother      sagrario and feet problems     Other Cancer Brother      CANCER Other      maternal cousin with cervical cancer     CANCER Maternal Aunt      stomach cancer     DIABETES No family hx of      CEREBROVASCULAR DISEASE No family hx of      Breast Cancer No family hx of      Cancer - colorectal No family hx of      Prostate Cancer No family hx of          Current Outpatient Prescriptions   Medication Sig Dispense Refill     HYDROmorphone (DILAUDID) 2 MG tablet Take 1 tablet (2 mg) by mouth every 4 hours as needed for pain 18 tablet 0     nystatin (MYCOSTATIN) cream Apply topically 2 times daily Reported on 3/6/2017       hydrochlorothiazide 12.5 MG TABS Take 1  "tablet (12.5 mg) by mouth daily 90 tablet 3     atenolol (TENORMIN) 50 MG tablet Take 1 tablet (50 mg) by mouth daily 90 tablet 3     nystatin (MYCOSTATIN) 172668 UNIT/GM POWD Apply topically 3 times daily as needed 30 g 1     senna-docusate (SENOKOT-S;PERICOLACE) 8.6-50 MG per tablet Take 1-2 tablets by mouth 2 times daily as needed for constipation (Patient not taking: Reported on 4/25/2017) 100 tablet      acetaminophen (TYLENOL) 500 MG tablet Take 2 tablets (1,000 mg) by mouth every 8 hours as needed for mild pain 100 tablet 0     ibuprofen 200 MG capsule Take 400 mg by mouth every 8 hours as needed for fever (Patient not taking: Reported on 5/17/2017) 60 capsule 0     clobetasol (TEMOVATE) 0.05 % ointment Apply sparingly to affected area twice daily for 14 days.  Do not apply to face. (Patient taking differently: daily as needed Apply sparingly to affected area twice daily for 14 days.  Do not apply to face.) 15 g 0     Emollient (CERAVE) CREA Externally apply topically 2 times daily as needed 1 Bottle 3     Allergies   Allergen Reactions     Vioxx Itching and Swelling     VIOXX (Swollen Feet,Hands itching), Okay with ibuprofen and aspirin       Reviewed and updated as needed this visit by clinical staff  Tobacco  Allergies  Med Hx  Surg Hx  Fam Hx  Soc Hx      Reviewed and updated as needed this visit by Provider         ROS:  Constitutional, HEENT, cardiovascular, pulmonary, gi and gu systems are negative, except as otherwise noted.    OBJECTIVE:                                                    /67  Pulse 66  Temp 98.7  F (37.1  C) (Tympanic)  Ht 5' 3\" (1.6 m)  Wt 254 lb (115.2 kg)  LMP 05/29/2006  BMI 44.99 kg/m2  Body mass index is 44.99 kg/(m^2).  GENERAL: healthy, alert and no distress  NECK: no adenopathy, no asymmetry, masses, or scars and thyroid normal to palpation  RESP: lungs clear to auscultation - no rales, rhonchi or wheezes  CV: regular rate and rhythm, normal S1 S2, no S3 or " S4, no murmur, click or rub, no peripheral edema and peripheral pulses strong  ABDOMEN: tenderness RUQ and bowel sounds normal  MS: no gross musculoskeletal defects noted, no edema    Diagnostic Test Results:  Pending      ASSESSMENT/PLAN:                                                    1. Biliary obstruction  Patient given pain medication. If her liver enzymes are more elevated than the last time will call GI .  - GGT  - Comprehensive metabolic panel  - HYDROmorphone (DILAUDID) 2 MG tablet; Take 1 tablet (2 mg) by mouth every 4 hours as needed for pain  Dispense: 18 tablet; Refill: 0    FUTURE APPOINTMENTS:       - Follow-up visit as needed.    Jennie Suazo MD  [facility]

## 2017-06-09 ENCOUNTER — CARE COORDINATION (OUTPATIENT)
Dept: GASTROENTEROLOGY | Facility: CLINIC | Age: 62
End: 2017-06-09

## 2017-06-09 NOTE — PROGRESS NOTES
Confirmed appointment with Dr. Rome on 6/19/2017 at 12 pm.    Contact information given for financial counselor as Karen has a few questions.    Emerald Larry LPN  Advanced Endoscopy~ Panc/Bili  Dr. Rome, Dr. Rg & Dr. Gonzalez  [phone]

## 2017-06-19 ENCOUNTER — OFFICE VISIT (OUTPATIENT)
Dept: GASTROENTEROLOGY | Facility: CLINIC | Age: 62
End: 2017-06-19

## 2017-06-19 VITALS
SYSTOLIC BLOOD PRESSURE: 116 MMHG | HEART RATE: 54 BPM | WEIGHT: 251.3 LBS | BODY MASS INDEX: 44.53 KG/M2 | OXYGEN SATURATION: 96 % | DIASTOLIC BLOOD PRESSURE: 72 MMHG | HEIGHT: 63 IN

## 2017-06-19 DIAGNOSIS — K76.0 FATTY LIVER: ICD-10-CM

## 2017-06-19 DIAGNOSIS — R10.11 ABDOMINAL PAIN, RIGHT UPPER QUADRANT: Primary | ICD-10-CM

## 2017-06-19 ASSESSMENT — PAIN SCALES - GENERAL: PAINLEVEL: NO PAIN (0)

## 2017-06-19 NOTE — NURSING NOTE
"Chief Complaint   Patient presents with     Consult     Conuslt for fatty liver       Vitals:    06/19/17 1133   BP: 116/72   Pulse: 54   SpO2: 96%   Weight: 251 lb 4.8 oz   Height: 5' 3\"       Body mass index is 44.52 kg/(m^2).    [place] Cedeño CMA                          "

## 2017-06-19 NOTE — PROGRESS NOTES
"REASON FOR CONSULT:  \"stone in liver\"   REFERRING PHYSICIAN: Dr. Marv Grissom ([facility])      History obtained from patient and EMR.     HISTORY OF PRESENT ILLNESS:     Malena Harris is a 62 year old female with history of cholecystectomy, gastric bypass, fatty liver, marked elevation in liver function test, recently hospitalized in April for concern for biliary obstruction who was referred at the request of Dr. Grissom (Hospitalist)  for further evaluation.   Patient reports in April she presented to the emergency room with severe back pain that radiated to both sides of upper abdomen. At that time, she thought she pulled a muscle from moving furniture. She states the pain was severe and sharp. She states she never experienced this type of pain before. During her hospitalization, she was found to have marked elevation in transaminase levels (ALT//147, ) and rising bilirubin level, concern for biliary obstruction.   RUQ abdominal US showed dilated CBD measuring 1.4cm. MRCP from 4/17/2017 showed NO evidence of intrahepatic or CBD dilation, no intraductal stone, with a normal appearing pancreas. She was noted to have diffuse fatty infiltration of the liver.    Since her discharge from the hospital in April, patient reports doing relatively well.   She states she has one episode of mild abdominal pain in the RUQ that felt like a \"twisting, pinching pain\" under her bra strap that \"didn't last long\". Last pain episode was 2 weeks ago. She states the pain was similar to her first episode that sent her to the hospital \"not as intense\". She followed up with her primary care physicians, LFT's were rechecked and noted to be trending down. She reports occasional nausea that is chronic since having her gastric bypass surgery. She denies fever, night sweats, chills, jaundice, dark urine.     PAST MEDICAL HISTORY:  Fatty liver   Hypertension      PAST SURGICAL HISTORY:   Gastric bypass surgery ([facility] years ago)     Cholecystectomy        MEDICATIONS:   See Epic medication list, medications reviewed       FAMILY HISTORY:  -Father  of heart disease         SOCIAL HISTORY:    Home situation: lives with , 2 adult sons   Occupation/Schooling:   Tobacco use: Never   Alcohol use: rare     REVIEW OF SYSTEMS:   A complete 10-point review of systems was performed and was noted to be negative except as above.      PHYSICAL EXAM:  GENERAL: obese, alert,  no acute distress  EYES: Eyes grossly normal to inspection, anicteric sclera   MOUTH: no ulcers, no lesions  NECK: no tenderness, no adenopathy, no asymmetry, no masses, no stiffness; thyroid- normal to palpation  RESP: lungs clear to auscultation - no rales, no rhonchi, no wheezes  CV: regular rates and rhythm, normal S1 S2, no murmur   ABDOMEN: soft, no tenderness, no hepatosplenomegaly, no masses, distention, guarding, or rebound, normal bowel sounds  MS: extremities- no gross deformities noted, no edema  SKIN: no rashes  NEURO: strength and tone- normal, mentation- intact, speech- normal, reflexes- symmetric  Non focal no aphasia. No facial asymmetry.   PSYCH: Alert and oriented times 3; speech- coherent , normal rate and volume; able to articulate logical thoughts, able to abstract reason, no tangential thoughts, no hallucinations or delusions, affect- normal   LYMPHATICS: ant. cervical- normal      ASSESSMENT AND RECOMMENDATIONS:   Malena Harris  is a  62 year old female with history of cholecystectomy, gastric bypass, fatty liver, RUQ abdominal pain and marked elevation in liver function test that has since normalized, who was recently hospitalized due to concern for biliary obstruction with spontaneous resolution. Discussed at length with patient considering she had transit episode of abdominal pain that has since resolved, liver function test has normalized, and there is NO evidence of bile duct stone on imaging, there is no  indication for endoscopic intervention at this time. MRCP from 4/17/2017 reviewed with Dr. Rome in clinic today. Possible etiology for transit elevation in LFT's and abdominal pain include passage of bile duct stone or mild papillary stenosis. Considering patient has history of gastric bypass ([place] anatomy) access to the pancreaticobiliary limb in order to perform ERCP would require laparoscopic assistance. Risk of procedure explained. Considering most recent MRCP showed no evidence of bile duct stone, we do not recommend endoscopic intervention at this time. Patient was informed, if she develops recurrence of symptoms, we will reconsider the need for endoscopic intervention. She agreed with plan. She was given a prescription for SL Levsin for smooth muscle spasm to take as needed. Referral placed for her to follow-up with Hepatology for concern for fatty liver, and referral placed to see our dietitian Michelle for further dietary recommendations.             Plan discussed with Dr. Rome, agreed with plan     Lakisha Daly, [facility]   Advanced Endoscopy   [phone]        I spent 25 minutes with this patient face to face and explained the conditions and plans (more than 50% of time was counseling/coordination of care, as discussed above).

## 2017-06-19 NOTE — PATIENT INSTRUCTIONS
It was a pleasure taking care of you today.  I've included a brief summary of our discussion and care plan from today's visit below.  Please review this information with your primary care provider.  ______________________________________________________________________    My recommendations are summarized as follows:    Hepatology referral, please call [phone] to schedule an appointment.   Take Levsin as needed for abdominal spasm       ______________________________________________________________________    Who do I call with any questions after my visit?  Please be in touch if there are any further questions that arise following today's visit.  There are multiple ways to contact your gastroenterology care team.        During business hours, you may reach a Gastroenterology nurse at [phone].       To schedule or reschedule an appointment, please call [phone].       You can always send a secure message through [employer].  VFA messages are answered by your nurse or doctor typically within 24 hours.  Please allow extra time on weekends and holidays.        For urgent/emergent questions after business hours, you may reach the on-call GI Fellow by contacting the [facility]  at [phone].         How will I get the results of any tests ordered?    You will receive all of your results.  If you have signed up for VFA, any tests ordered at your visit will be available to you after your physician reviews them.  Typically this takes 1-2 weeks.  If there are urgent results that require a change in your care plan, your physician or nurse will call you to discuss the next steps.      What is VFA?  VFA is a secure way for you to access all of your healthcare records from the [facility].  It is a web based computer program, so you can sign on to it from any location.  It also allows you to send secure messages to your care team.  I recommend signing up for [employer]  access if you have not already done so and are comfortable with using a computer.      How to I schedule a follow-up visit?  If you did not schedule a follow-up visit today, please call [phone] to schedule a follow-up office visit.                Who do I call for additional appointments?      To schedule an appointment with the Pain Clinic, please call [phone].      To schedule an appointment with the Radiology Department, please call [phone].      To schedule an appointment with the Pre-procedure Assessment Clinic (PAC), please call [phone].      To schedule your Endoscopy appointment, please call [phone].      To schedule an appointment with the Dietitian, please call [phone].      To schedule an appointment with the Financial Counselor, please call [phone].      To schedule an appointment with our Health Psychologist, please call         [phone].           Thank you,    Lakisha Daly, CNP  [facility]  Division of Gastroenterology

## 2017-06-19 NOTE — MR AVS SNAPSHOT
After Visit Summary   6/19/2017    Jacquie Harris    MRN: [id number]           Patient Information     Date Of Birth          1955        Visit Information        Provider Department      6/19/2017 12:00 PM Lakisha Daly, MARGARITA CNP [facility]        Today's Diagnoses     Fatty liver    -  1    Abdominal pain, right upper quadrant          Care Instructions    It was a pleasure taking care of you today.  I've included a brief summary of our discussion and care plan from today's visit below.  Please review this information with your primary care provider.  ______________________________________________________________________    My recommendations are summarized as follows:    Hepatology referral, please call [phone] to schedule an appointment.   Take Levsin as needed for abdominal spasm       ______________________________________________________________________    Who do I call with any questions after my visit?  Please be in touch if there are any further questions that arise following today's visit.  There are multiple ways to contact your gastroenterology care team.        During business hours, you may reach a Gastroenterology nurse at [phone].       To schedule or reschedule an appointment, please call [phone].       You can always send a secure message through [employer].  [employer] messages are answered by your nurse or doctor typically within 24 hours.  Please allow extra time on weekends and holidays.        For urgent/emergent questions after business hours, you may reach the on-call GI Fellow by contacting the [facility] at [phone].         How will I get the results of any tests ordered?    You will receive all of your results.  If you have signed up for [employer], any tests ordered at your visit will be available to you after your physician reviews them.  Typically this takes 1-2 weeks.  If there are urgent results that require a  change in your care plan, your physician or nurse will call you to discuss the next steps.      What is [employer]?  [employer] is a secure way for you to access all of your healthcare records from the [facility].  It is a web based computer program, so you can sign on to it from any location.  It also allows you to send secure messages to your care team.  I recommend signing up for [employer] access if you have not already done so and are comfortable with using a computer.      How to I schedule a follow-up visit?  If you did not schedule a follow-up visit today, please call [phone] to schedule a follow-up office visit.                Who do I call for additional appointments?      To schedule an appointment with the Pain Clinic, please call [phone].      To schedule an appointment with the Radiology Department, please call [phone].      To schedule an appointment with the Pre-procedure Assessment Clinic (PAC), please call [phone].      To schedule your Endoscopy appointment, please call [phone].      To schedule an appointment with the Dietitian, please call [phone].      To schedule an appointment with the Financial Counselor, please call [phone].      To schedule an appointment with our Health Psychologist, please call         [phone].           Thank you,    Lakisha Daly, CNP  [facility]  Division of Gastroenterology                  Follow-ups after your visit        Additional Services     GI Hepatology Adult IP Consult           NUTRITION REFERRAL                 Your next 10 appointments already scheduled     Sep 12, 2017  7:40 AM CDT   [facility]azo [facility]   [facility]    [address] [address]   [phone]            Sep 12, 2017  8:30 AM CDT   MA SCREENING DIGITAL BILATERAL with [address] ([facility])    [address]  "[place]  [facility] [address]   [phone]           Do not use any powder, lotion or deodorant under your arms or on your breast. If you do, we will ask you to remove it before your exam.  Wear comfortable, two-piece clothing.  If you have any allergies, tell your care team.  Bring any previous mammograms from other facilities or have them mailed to the breast center.              Who to contact     Please call your clinic at [phone] to:    Ask questions about your health    Make or cancel appointments    Discuss your medicines    Learn about your test results    Speak to your doctor   If you have compliments or concerns about an experience at your clinic, or if you wish to file a complaint, please contact [facility] Patient Relations at [phone] or email us at [email or web address]         Additional Information About Your Visit        [employer] Information     [employer] is an electronic gateway that provides easy, online access to your medical records. With [employer], you can request a clinic appointment, read your test results, renew a prescription or communicate with your care team.     To sign up for [employer] visit the website at [email or web address]   You will be asked to enter the access code listed below, as well as some personal information. Please follow the directions to create your username and password.     Your access code is: [email or web address]  Expires: 2017 10:44 AM     Your access code will  in 90 days. If you need help or a new code, please contact your [facility] or call [phone] for assistance.        Care EveryWhere ID     This is your Care EveryWhere ID. This could be used by other organizations to access your [place] medical records  [id number]        Your Vitals Were     Pulse Height Last Period Pulse Oximetry BMI (Body Mass Index)       54 1.6 m (5' 3\") 2006 96% 44.52 kg/m2        Blood " Pressure from Last 3 Encounters:   06/19/17 116/72   05/17/17 118/67   04/25/17 107/62    Weight from Last 3 Encounters:   06/19/17 114 kg (251 lb 4.8 oz)   05/17/17 115.2 kg (254 lb)   04/25/17 118.8 kg (262 lb)              We Performed the Following     GI Hepatology Adult IP Consult     NUTRITION REFERRAL          Today's Medication Changes          These changes are accurate as of: 6/19/17  1:33 PM.  If you have any questions, ask your nurse or doctor.               Start taking these medicines.        Dose/Directions    hyoscyamine 0.125 MG sublingual tablet   Commonly known as:  LEVSIN/SL   Used for:  Abdominal pain, right upper quadrant   Started by:  Lakisha Daly APRN CNP        Dose:  0.125 mg   Place 1 tablet (0.125 mg) under the tongue 4 times daily as needed for cramping   Quantity:  120 tablet   Refills:  3         These medicines have changed or have updated prescriptions.        Dose/Directions    clobetasol 0.05 % ointment   Commonly known as:  TEMOVATE   This may have changed:    - when to take this  - reasons to take this  - additional instructions   Used for:  Vaginal itching        Apply sparingly to affected area twice daily for 14 days.  Do not apply to face.   Quantity:  15 g   Refills:  0            Where to get your medicines      These medications were sent to [facility] [address]  [address] [facility] [address]     Phone:  [phone]     hyoscyamine 0.125 MG sublingual tablet                Primary Care Provider Office Phone # Fax #    Marielarias Sabillon [phone]       [address] [address]        Thank you!     Thank you for choosing [facility]  for your care. Our goal is always to provide you with excellent care. Hearing back from our patients is one way we can continue to improve our services. Please take a few minutes to complete the written survey that  you may receive in the mail after your visit with us. Thank you!             Your Updated Medication List - Protect others around you: Learn how to safely use, store and throw away your medicines at www.disposemymeds.org.          This list is accurate as of: 6/19/17  1:33 PM.  Always use your most recent med list.                   Brand Name Dispense Instructions for use    acetaminophen 500 MG tablet    TYLENOL    100 tablet    Take 2 tablets (1,000 mg) by mouth every 8 hours as needed for mild pain       atenolol 50 MG tablet    TENORMIN    90 tablet    Take 1 tablet (50 mg) by mouth daily       CERAVE Crea     1 Bottle    Externally apply topically 2 times daily as needed       clobetasol 0.05 % ointment    TEMOVATE    15 g    Apply sparingly to affected area twice daily for 14 days.  Do not apply to face.       hydrochlorothiazide 12.5 MG Tabs tablet     90 tablet    Take 1 tablet (12.5 mg) by mouth daily       HYDROmorphone 2 MG tablet    DILAUDID    18 tablet    Take 1 tablet (2 mg) by mouth every 4 hours as needed for pain       hyoscyamine 0.125 MG sublingual tablet    LEVSIN/SL    120 tablet    Place 1 tablet (0.125 mg) under the tongue 4 times daily as needed for cramping       ibuprofen 200 MG capsule     60 capsule    Take 400 mg by mouth every 8 hours as needed for fever       * nystatin 306047 UNIT/GM Powd    MYCOSTATIN    30 g    Apply topically 3 times daily as needed       * nystatin cream    MYCOSTATIN     Apply topically 2 times daily Reported on 3/6/2017       senna-docusate 8.6-50 MG per tablet    SENOKOT-S;PERICOLACE    100 tablet    Take 1-2 tablets by mouth 2 times daily as needed for constipation       * Notice:  This list has 2 medication(s) that are the same as other medications prescribed for you. Read the directions carefully, and ask your doctor or other care provider to review them with you.

## 2017-06-19 NOTE — LETTER
"6/19/2017       RE: Jacquie Harris  [address] [place] [address]     Dear Colleague,    Thank you for referring your patient, Jacquie Harris, to the [facility] at [facility]. Please see a copy of my visit note below.    REASON FOR CONSULT:  \"stone in liver\"   REFERRING PHYSICIAN: Dr. Marv Grissom ([facility])      History obtained from patient and EMR.     HISTORY OF PRESENT ILLNESS:     Malena Harris is a 62 year old female with history of cholecystectomy, gastric bypass, fatty liver, marked elevation in liver function test, recently hospitalized in April for concern for biliary obstruction who was referred at the request of Dr. Grissom (Hospitalist)  for further evaluation.   Patient reports in April she presented to the emergency room with severe back pain that radiated to both sides of upper abdomen. At that time, she thought she pulled a muscle from moving furniture. She states the pain was severe and sharp. She states she never experienced this type of pain before. During her hospitalization, she was found to have marked elevation in transaminase levels (ALT//147, ) and rising bilirubin level, concern for biliary obstruction.   RUQ abdominal US showed dilated CBD measuring 1.4cm. MRCP from 4/17/2017 showed NO evidence of intrahepatic or CBD dilation, no intraductal stone, with a normal appearing pancreas. She was noted to have diffuse fatty infiltration of the liver.    Since her discharge from the hospital in April, patient reports doing relatively well.   She states she has one episode of mild abdominal pain in the RUQ that felt like a \"twisting, pinching pain\" under her bra strap that \"didn't last long\". Last pain episode was 2 weeks ago. She states the pain was similar to her first episode that sent her to the hospital \"not as intense\". She followed up with her primary care physicians, LFT's were rechecked and noted to be " trending down. She reports occasional nausea that is chronic since having her gastric bypass surgery. She denies fever, night sweats, chills, jaundice, dark urine.     PAST MEDICAL HISTORY:  Fatty liver   Hypertension      PAST SURGICAL HISTORY:   Gastric bypass surgery ([facility] 15 years ago)     Cholecystectomy        MEDICATIONS:   See Epic medication list, medications reviewed       FAMILY HISTORY:  -Father  of heart disease         SOCIAL HISTORY:    Home situation: lives with , 2 adult sons   Occupation/Schooling:   Tobacco use: Never   Alcohol use: rare     REVIEW OF SYSTEMS:   A complete 10-point review of systems was performed and was noted to be negative except as above.      PHYSICAL EXAM:  GENERAL: obese, alert,  no acute distress  EYES: Eyes grossly normal to inspection, anicteric sclera   MOUTH: no ulcers, no lesions  NECK: no tenderness, no adenopathy, no asymmetry, no masses, no stiffness; thyroid- normal to palpation  RESP: lungs clear to auscultation - no rales, no rhonchi, no wheezes  CV: regular rates and rhythm, normal S1 S2, no murmur   ABDOMEN: soft, no tenderness, no hepatosplenomegaly, no masses, distention, guarding, or rebound, normal bowel sounds  MS: extremities- no gross deformities noted, no edema  SKIN: no rashes  NEURO: strength and tone- normal, mentation- intact, speech- normal, reflexes- symmetric  Non focal no aphasia. No facial asymmetry.   PSYCH: Alert and oriented times 3; speech- coherent , normal rate and volume; able to articulate logical thoughts, able to abstract reason, no tangential thoughts, no hallucinations or delusions, affect- normal   LYMPHATICS: ant. cervical- normal      ASSESSMENT AND RECOMMENDATIONS:   Malena Harris  is a  62 year old female with history of cholecystectomy, gastric bypass, fatty liver, RUQ abdominal pain and marked elevation in liver function test that has since normalized, who was recently hospitalized due to  concern for biliary obstruction with spontaneous resolution. Discussed at length with patient considering she had transit episode of abdominal pain that has since resolved, liver function test has normalized, and there is NO evidence of bile duct stone on imaging, there is no indication for endoscopic intervention at this time. MRCP from 4/17/2017 reviewed with Dr. Rome in clinic today. Possible etiology for transit elevation in LFT's and abdominal pain include passage of bile duct stone or mild papillary stenosis. Considering patient has history of gastric bypass ([place] anatomy) access to the pancreaticobiliary limb in order to perform ERCP would require laparoscopic assistance. Risk of procedure explained. Considering most recent MRCP showed no evidence of bile duct stone, we do not recommend endoscopic intervention at this time. Patient was informed, if she develops recurrence of symptoms, we will reconsider the need for endoscopic intervention. She agreed with plan. She was given a prescription for SL Levsin for smooth muscle spasm to take as needed. Referral placed for her to follow-up with Hepatology for concern for fatty liver, and referral placed to see our dietitian Michelle for further dietary recommendations.     Plan discussed with Dr. Rome, agreed with plan     I spent 25 minutes with this patient face to face and explained the conditions and plans (more than 50% of time was counseling/coordination of care, as discussed above).        Again, thank you for allowing me to participate in the care of your patient.      Sincerely,    MARGARITA Romero CNP

## 2017-07-01 ENCOUNTER — HEALTH MAINTENANCE LETTER (OUTPATIENT)
Age: 62
End: 2017-07-01

## 2017-08-01 DIAGNOSIS — K76.0 FATTY LIVER: Primary | ICD-10-CM

## 2017-08-08 ENCOUNTER — PRE VISIT (OUTPATIENT)
Dept: GASTROENTEROLOGY | Facility: CLINIC | Age: 62
End: 2017-08-08

## 2017-08-08 NOTE — TELEPHONE ENCOUNTER
Was the patient contacted by phone and reminded of the upcoming visit? Yes    Was the patient instructed to bring a current list of all medications to the appointment or instructed to bring in all medication bottles? Yes, patient verbalized understanding    Was the patient instructed to arrive prior to the appointment time to have ordered labs drawn? Yes, patient verbalized understanding    Were the needed lab orders placed? Yes    Elisa Catherine CMA  8/8/2017 3:46 PM

## 2017-08-12 ENCOUNTER — HOSPITAL ENCOUNTER (OUTPATIENT)
Dept: MRI IMAGING | Facility: CLINIC | Age: 62
Discharge: HOME | End: 2017-08-12
Attending: PHYSICIAN ASSISTANT | Admitting: PHYSICIAN ASSISTANT

## 2017-08-12 DIAGNOSIS — M25.519 SHOULDER PAIN: ICD-10-CM

## 2017-08-12 PROCEDURE — 73221 MRI JOINT UPR EXTREM W/O DYE: CPT | Mod: RT

## 2017-08-16 ENCOUNTER — OFFICE VISIT (OUTPATIENT)
Dept: GASTROENTEROLOGY | Facility: CLINIC | Age: 62
End: 2017-08-16
Attending: INTERNAL MEDICINE
Payer: MEDICARE

## 2017-08-16 VITALS
TEMPERATURE: 98.4 F | SYSTOLIC BLOOD PRESSURE: 114 MMHG | OXYGEN SATURATION: 96 % | RESPIRATION RATE: 18 BRPM | DIASTOLIC BLOOD PRESSURE: 67 MMHG | HEART RATE: 50 BPM | BODY MASS INDEX: 42.51 KG/M2 | HEIGHT: 63 IN | WEIGHT: 239.9 LBS

## 2017-08-16 DIAGNOSIS — R79.89 ABNORMAL LIVER FUNCTION TESTS: ICD-10-CM

## 2017-08-16 DIAGNOSIS — E88.01 ALPHA-1-ANTITRYPSIN DEFICIENCY (H): ICD-10-CM

## 2017-08-16 DIAGNOSIS — R19.5 POSITIVE FIT (FECAL IMMUNOCHEMICAL TEST): Primary | ICD-10-CM

## 2017-08-16 DIAGNOSIS — K76.0 FATTY LIVER: ICD-10-CM

## 2017-08-16 PROBLEM — R09.89 OTHER SPECIFIED SYMPTOMS AND SIGNS INVOLVING THE CIRCULATORY AND RESPIRATORY SYSTEMS: Status: RESOLVED | Noted: 2017-01-03 | Resolved: 2017-08-16

## 2017-08-16 PROBLEM — M79.662 PAIN OF LEFT LOWER LEG: Status: RESOLVED | Noted: 2017-01-03 | Resolved: 2017-08-16

## 2017-08-16 PROBLEM — K83.1 BILIARY OBSTRUCTION (H): Status: RESOLVED | Noted: 2017-04-16 | Resolved: 2017-08-16

## 2017-08-16 PROBLEM — M79.661 PAIN OF RIGHT LOWER LEG: Status: RESOLVED | Noted: 2017-01-03 | Resolved: 2017-08-16

## 2017-08-16 LAB
ALBUMIN SERPL-MCNC: 3.4 G/DL (ref 3.4–5)
ALP SERPL-CCNC: 80 U/L (ref 40–150)
ALT SERPL W P-5'-P-CCNC: 21 U/L (ref 0–50)
ANION GAP SERPL CALCULATED.3IONS-SCNC: 6 MMOL/L (ref 3–14)
AST SERPL W P-5'-P-CCNC: 12 U/L (ref 0–45)
BILIRUB DIRECT SERPL-MCNC: 0.1 MG/DL (ref 0–0.2)
BILIRUB SERPL-MCNC: 0.6 MG/DL (ref 0.2–1.3)
BUN SERPL-MCNC: 19 MG/DL (ref 7–30)
CALCIUM SERPL-MCNC: 9.2 MG/DL (ref 8.5–10.1)
CHLORIDE SERPL-SCNC: 102 MMOL/L (ref 94–109)
CO2 SERPL-SCNC: 31 MMOL/L (ref 20–32)
CREAT SERPL-MCNC: 0.74 MG/DL (ref 0.52–1.04)
ERYTHROCYTE [DISTWIDTH] IN BLOOD BY AUTOMATED COUNT: 13.1 % (ref 10–15)
GFR SERPL CREATININE-BSD FRML MDRD: 79 ML/MIN/1.7M2
GLUCOSE SERPL-MCNC: 93 MG/DL (ref 70–99)
HCT VFR BLD AUTO: 46.9 % (ref 35–47)
HGB BLD-MCNC: 14.8 G/DL (ref 11.7–15.7)
INR PPP: 1.05 (ref 0.86–1.14)
MCH RBC QN AUTO: 29.6 PG (ref 26.5–33)
MCHC RBC AUTO-ENTMCNC: 31.6 G/DL (ref 31.5–36.5)
MCV RBC AUTO: 94 FL (ref 78–100)
PLATELET # BLD AUTO: 213 10E9/L (ref 150–450)
POTASSIUM SERPL-SCNC: 4 MMOL/L (ref 3.4–5.3)
PROT SERPL-MCNC: 7.6 G/DL (ref 6.8–8.8)
RBC # BLD AUTO: 5 10E12/L (ref 3.8–5.2)
SODIUM SERPL-SCNC: 139 MMOL/L (ref 133–144)
WBC # BLD AUTO: 8.4 10E9/L (ref 4–11)

## 2017-08-16 PROCEDURE — 82103 ALPHA-1-ANTITRYPSIN TOTAL: CPT | Performed by: INTERNAL MEDICINE

## 2017-08-16 PROCEDURE — 85610 PROTHROMBIN TIME: CPT | Performed by: INTERNAL MEDICINE

## 2017-08-16 PROCEDURE — 80048 BASIC METABOLIC PNL TOTAL CA: CPT | Performed by: INTERNAL MEDICINE

## 2017-08-16 PROCEDURE — 36415 COLL VENOUS BLD VENIPUNCTURE: CPT | Performed by: INTERNAL MEDICINE

## 2017-08-16 PROCEDURE — 85027 COMPLETE CBC AUTOMATED: CPT | Performed by: INTERNAL MEDICINE

## 2017-08-16 PROCEDURE — 80076 HEPATIC FUNCTION PANEL: CPT | Performed by: INTERNAL MEDICINE

## 2017-08-16 PROCEDURE — 81332 SERPINA1 GENE: CPT | Performed by: INTERNAL MEDICINE

## 2017-08-16 PROCEDURE — 83516 IMMUNOASSAY NONANTIBODY: CPT | Performed by: INTERNAL MEDICINE

## 2017-08-16 PROCEDURE — 99212 OFFICE O/P EST SF 10 MIN: CPT | Mod: ZF

## 2017-08-16 ASSESSMENT — PAIN SCALES - GENERAL: PAINLEVEL: MODERATE PAIN (5)

## 2017-08-16 NOTE — NURSING NOTE
"Chief Complaint   Patient presents with     Allied Health Visit     fatty liver       Initial /67  Pulse 50  Temp 98.4  F (36.9  C) (Oral)  Resp 18  Ht 1.6 m (5' 3\")  Wt 108.8 kg (239 lb 14.4 oz)  LMP 05/29/2006  SpO2 96%  BMI 42.5 kg/m2 Estimated body mass index is 42.5 kg/(m^2) as calculated from the following:    Height as of this encounter: 1.6 m (5' 3\").    Weight as of this encounter: 108.8 kg (239 lb 14.4 oz).  Medication Reconciliation: complete'  "

## 2017-08-16 NOTE — LETTER
"8/16/2017       RE: Jacquie Harris  [address] [place] [address]     Dear Colleague,    Thank you for referring your patient, Jacquie Harris, to the [facility]PATOLOG[facility] at [facility]. Please see a copy of my visit note below.    [facility]    Hepatology New Patient Visit    Referring provider:  Referred Self    Chief complaint:   establish care for fatty liver    HPI:   Mrs. Harris is a 62 year old female with past history of gastric bypass, longstanding obesity and recent RUQ pain who presents for evaluation of fatty liver disease. In April, she developed RUQ pain in conjunction with abnormal hepatic panel that was evaluated by our pancreas/biliary group and was felt to be 2/2 passed biliary stone or perhaps papillary stenosis. They did not feel there was clear benefit to ERCP given her altered anatomy. Her imaging did show some increased echogenicity consistent with fatty infiltration.     She has no previous knowledge of having liver disease. She does have a diagnosis of DARWIN although she has not been wearing here CPAP.     Of importance, she was recently seen by her PCP after diagnosis of fatty liver disease and has stopped drinking pop and eating candy and has accordingly lost approximately 30#. She is also exercising more frequently as well.     She was entirely pain free following her episode in April until around two days ago when she \"strained something\" on her right side after daly some peaches and she is concerned that this is her liver acting up again.     Patient denies jaundice, abdominal distension, lower extremity edema, lethargy or confusion.    No history of melena, hematemesis or hematochezia.    Patient denies fevers, sweats, chills or weight loss.    Medical hx Surgical hx   Past Medical History:   Diagnosis Date     HTN (hypertension)      IRON DEFICIENCY ANEMIA 7/5/2005     DARWIN (obstructive sleep apnea)       Past Surgical " History:   Procedure Laterality Date     ARTHROPLASTY KNEE  4/2/2012    Procedure:ARTHROPLASTY KNEE; Left Total Knee Arthroplasty--Anesth.Choice; Surgeon:VICENTE MORALES; Location:[facility] OR     C ORAL SURGERY PROCEDURE  age 18    wisdom teeth     C/SECTION, LOW TRANSVERSE  1978, 1984, 1994    x 3     COLONOSCOPY  2001    normal colonoscopy     GASTRIC BYPASS  1980/2005    Gastric Bypass and revision     HERNIA REPAIR, INCISIONAL  6/16/2008    lap.repair with 10x8 mesh     TUBAL LIGATION  1994          Medications  Prior to Admission medications    Medication Sig Start Date End Date Taking? Authorizing Provider   hyoscyamine (LEVSIN/SL) 0.125 MG sublingual tablet Place 1 tablet (0.125 mg) under the tongue 4 times daily as needed for cramping 6/19/17  Yes Lakisha Daly APRN CNP   HYDROmorphone (DILAUDID) 2 MG tablet Take 1 tablet (2 mg) by mouth every 4 hours as needed for pain 5/17/17  Yes Jennie Suazo MD   senna-docusate (SENOKOT-S;PERICOLACE) 8.6-50 MG per tablet Take 1-2 tablets by mouth 2 times daily as needed for constipation 4/18/17  Yes Devon La MD   acetaminophen (TYLENOL) 500 MG tablet Take 2 tablets (1,000 mg) by mouth every 8 hours as needed for mild pain 4/18/17  Yes Devon La MD   ibuprofen 200 MG capsule Take 400 mg by mouth every 8 hours as needed for fever 4/18/17  Yes Devon La MD   nystatin (MYCOSTATIN) cream Apply topically 2 times daily Reported on 3/6/2017 10/20/16  Yes Jennie Suazo MD   clobetasol (TEMOVATE) 0.05 % ointment Apply sparingly to affected area twice daily for 14 days.  Do not apply to face.  Patient taking differently: daily as needed Apply sparingly to affected area twice daily for 14 days.  Do not apply to face. 10/20/16  Yes Jennie Suazo MD   hydrochlorothiazide 12.5 MG TABS Take 1 tablet (12.5 mg) by mouth daily 8/15/16  Yes Migdalia Romero APRN CNP   atenolol (TENORMIN) 50 MG tablet Take 1 tablet  "(50 mg) by mouth daily 8/15/16  Yes Migdalia Romero APRN CNP   nystatin (MYCOSTATIN) 560457 UNIT/GM POWD Apply topically 3 times daily as needed 8/15/16  Yes Migdalia Romero APRN CNP   Emollient (CERAVE) CREA Externally apply topically 2 times daily as needed 7/12/16  Yes Mariel Sabillon MD       Allergies  Allergies   Allergen Reactions     Vioxx Itching and Swelling     VIOXX (Swollen Feet,Hands itching), Okay with ibuprofen and aspirin       Family hx Social hx   Family History   Problem Relation Age of Onset     C.A.D. Father      MI at age 60     Hypertension Father      Alzheimer Disease Father      Hyperlipidemia Father      OSTEOPOROSIS Mother      on Fosamax     Glaucoma Mother      Other Cancer Brother      Stomach Cancer Maternal Aunt      Cervical Cancer Cousin      DIABETES No family hx of      CEREBROVASCULAR DISEASE No family hx of      Breast Cancer No family hx of      Cancer - colorectal No family hx of      Prostate Cancer No family hx of      Liver Disease No family hx of       Social History   Substance Use Topics     Smoking status: Never Smoker     Smokeless tobacco: Never Used     Alcohol use 0.0 oz/week     0 Standard drinks or equivalent per week      Comment: mixed very light 1 a month if that          Review of systems  A 10-point review of systems was negative.    Examination  /67  Pulse 50  Temp 98.4  F (36.9  C) (Oral)  Resp 18  Ht 1.6 m (5' 3\")  Wt 108.8 kg (239 lb 14.4 oz)  LMP 05/29/2006  SpO2 96%  BMI 42.5 kg/m2  Body mass index is 42.5 kg/(m^2).    Gen- well, NAD, A+Ox3, normal color  Eye- EOMI  ENT- MMM, normal oropharynx  Lym- no palpable lymphadenopathy  CVS- S1, S2 normal, no added sounds, RRR  RS- CTA  Abd- soft, obese, NT, ND, BS+, no palpable organomegaly  Extr- pulses good, no LORI  MS- hands normal- no clubbing  Neuro- A+Ox3, no asterixis  Skin- no rash or jaundice  Psych- normal mood    Laboratory  Lab Results   Component Value Date     " 08/16/2017    POTASSIUM 4.0 08/16/2017    CHLORIDE 102 08/16/2017    CO2 31 08/16/2017    BUN 19 08/16/2017    CR 0.74 08/16/2017       Lab Results   Component Value Date    BILITOTAL 0.6 08/16/2017    ALT 21 08/16/2017    AST 12 08/16/2017    ALKPHOS 80 08/16/2017       Lab Results   Component Value Date    ALBUMIN 3.4 08/16/2017    PROTTOTAL 7.6 08/16/2017        Lab Results   Component Value Date    WBC 8.4 08/16/2017    HGB 14.8 08/16/2017    MCV 94 08/16/2017     08/16/2017       Lab Results   Component Value Date    INR 1.05 08/16/2017         Radiology   MRCP 4/17/17 personally reviewed     Assessment  62 year old female s/p gastric bypass and features of the metabolic syndrome (central adiposity, DARWIN, HTN) with imaging findings consistent with steatosis. She does not consume alcohol to any significant extent. Her LFTs have normalized (and on review, have never been abnormal in past) which are more suggestive towards transient obstruction as the cause of her hepatic panel abnormalities. She nonetheless will certainly benefit from addressing components of the metabolic syndrome.    Plan  -check TTG Ab, A1-AT genotype  -Re-address DARWIN treatment  -continue slow regular weight loss  -RTC PRN if LFTs become abnormal    Patient seen and discussed with Dr. Andrew Cifuentes  Hepatology Fellow      Physician Attestation  I, Benny Morales, saw this patient with the fellow and agree with the fellow s findings and plan of care as documented in the fellow s note.  I personally reviewed vital signs, medications, labs and imaging.    Abnormal liver function tests in April may be related to passed gallstone.  Liver function tests normal now.  Patient is currently losing weight through dietary modification which will treat/improve any underlying NAFLD.  Liver function tests can be monitored every 6-12 months.    Benny Morales  Date of Service (when I saw the patient): Aug 16, 2017

## 2017-08-16 NOTE — MR AVS SNAPSHOT
After Visit Summary   8/16/2017    Jacquie Harris    MRN: [id number]           Patient Information     Date Of Birth          1955        Visit Information        Provider Department      8/16/2017 10:20 AM Benny Collazo MD [facility]        Today's Diagnoses     Positive FIT (fecal immunochemical test)    -  1    Abnormal liver function tests        Alpha-1-antitrypsin deficiency (H)            Follow-ups after your visit        Additional Services     GASTROENTEROLOGY ADULT REF PROCEDURE ONLY       Next available, any available provider at [facility]  Last Lab Result: Creatinine (mg/dL)       Date                     Value                 08/16/2017               0.74             ----------  Body mass index is 42.5 kg/(m^2).     Needed:  No  Language:  English    Patient will be contacted to schedule procedure.     Please be aware that coverage of these services is subject to the terms and limitations of your health insurance plan.  Call member services at your health plan with any benefit or coverage questions.  Any procedures must be performed at a [place] facility OR coordinated by your clinic's referral office.    Please bring the following with you to your appointment:    (1) Any X-Rays, CTs or MRIs which have been performed.  Contact the facility where they were done to arrange for  prior to your scheduled appointment.    (2) List of current medications   (3) This referral request   (4) Any documents/labs given to you for this referral                  Follow-up notes from your care team     Return if symptoms worsen or fail to improve.      Your next 10 appointments already scheduled     Aug 18, 2017  1:20 PM CDT   SHORT with Mariel Sabillon MD   [facility] ([facility]    [address] [facility] [address]   [phone]            Sep 12, 2017  7:40 AM CDT   SHORT with Jennie Suazo MD   [place]  [facility] [place] ([facility])    [address]  [facility] [address]   [phone]            Sep 12, 2017  8:30 AM CDT   MA SCREENING DIGITAL BILATERAL with [email or web address]   [facility] ([facility])    [address]  [facility] [address]   [phone]           Do not use any powder, lotion or deodorant under your arms or on your breast. If you do, we will ask you to remove it before your exam.  Wear comfortable, two-piece clothing.  If you have any allergies, tell your care team.  Bring any previous mammograms from other facilities or have them mailed to the breast center. Three-dimensional (3D) mammograms are available at [place] locations in [facility], and [place]. [facility] locations include [place] [facility] in [place]. Benefits of 3D mammograms include: - Improved rate of cancer detection - Decreases your chance of having to go back for more tests, which means fewer: - \"False-positive\" results (This means that there is an abnormal area but it isn't cancer.) - Invasive testing procedures, such as a biopsy or surgery - Can provide clearer images of the breast if you have dense breast tissue. 3D mammography is an optional exam that anyone can have with a 2D mammogram. It doesn't replace or take the place of a 2D mammogram. 2D mammograms remain an effective screening test for all women.  Not all insurance companies cover the cost of a 3D mammogram. Check with your insurance.              Who to contact     If you have questions or need follow up information about today's clinic visit or your schedule please contact [facility] directly at [phone].  Normal or non-critical lab and imaging results will be communicated to you by MyChart, letter or phone within 4 business days after the clinic has received the results. If you do not hear from us within 7 days, please contact the " "clinic through [employer] or phone. If you have a critical or abnormal lab result, we will notify you by phone as soon as possible.  Submit refill requests through Veronica or call your pharmacy and they will forward the refill request to us. Please allow 3 business days for your refill to be completed.          Additional Information About Your Visit        [employer] Information     Veronica lets you send messages to your doctor, view your test results, renew your prescriptions, schedule appointments and more. To sign up, go to [email or web address] . Click on \"Log in\" on the left side of the screen, which will take you to the Welcome page. Then click on \"Sign up Now\" on the right side of the page.     You will be asked to enter the access code listed below, as well as some personal information. Please follow the directions to create your username and password.     Your access code is: [email or web address]  Expires: 10/31/2017  6:30 AM     Your access code will  in 90 days. If you need help or a new code, please call your [facility] clinic or [phone].        Care EveryWhere ID     This is your Care EveryWhere ID. This could be used by other organizations to access your [facility] medical records  [id number]        Your Vitals Were     Pulse Temperature Respirations Height Last Period Pulse Oximetry    50 98.4  F (36.9  C) (Oral) 18 1.6 m (5' 3\") 2006 96%    BMI (Body Mass Index)                   42.5 kg/m2            Blood Pressure from Last 3 Encounters:   17 114/67   17 116/72   17 118/67    Weight from Last 3 Encounters:   17 108.8 kg (239 lb 14.4 oz)   17 114 kg (251 lb 4.8 oz)   17 115.2 kg (254 lb)              We Performed the Following     GASTROENTEROLOGY ADULT REF PROCEDURE ONLY          Today's Medication Changes          These changes are accurate as of: 17 11:59 PM.  If you have any questions, ask your nurse or doctor.               These medicines have " changed or have updated prescriptions.        Dose/Directions    clobetasol 0.05 % ointment   Commonly known as:  TEMOVATE   This may have changed:    - when to take this  - reasons to take this  - additional instructions   Used for:  Vaginal itching        Apply sparingly to affected area twice daily for 14 days.  Do not apply to face.   Quantity:  15 g   Refills:  0                Primary Care Provider Office Phone # Fax #    Mariel Va Sabillon [phone] [address] [facility] [address]        Equal Access to Services     [facility]: Hadii jose ku hadasho Soomaali, waaxda luqadaha, qaybta kaalmada adeegyazachary, mildred sanchez . So [facility] [phone].    ATENCIÓN: Si habla español, tiene a booth disposición servicios gratuitos de asistencia lingüística. Ll[facility] [phone].    We comply with applicable federal civil rights laws and [place] laws. We do not discriminate on the basis of race, color, national origin, age, disability sex, sexual orientation or gender identity.            Thank you!     Thank you for choosing [facility]  for your care. Our goal is always to provide you with excellent care. Hearing back from our patients is one way we can continue to improve our services. Please take a few minutes to complete the written survey that you may receive in the mail after your visit with us. Thank you!             Your Updated Medication List - Protect others around you: Learn how to safely use, store and throw away your medicines at www.disposemymeds.org.          This list is accurate as of: 8/16/17 11:59 PM.  Always use your most recent med list.                   Brand Name Dispense Instructions for use Diagnosis    acetaminophen 500 MG tablet    TYLENOL    100 tablet    Take 2 tablets (1,000 mg) by mouth every 8 hours as needed for mild pain    Acute bilateral thoracic back pain       atenolol 50 MG tablet    TENORMIN    90 tablet    Take 1  tablet (50 mg) by mouth daily    HTN, goal below 140/80       CERAVE Crea     1 Bottle    Externally apply topically 2 times daily as needed    Sunburn, Itching       clobetasol 0.05 % ointment    TEMOVATE    15 g    Apply sparingly to affected area twice daily for 14 days.  Do not apply to face.    Vaginal itching       hydrochlorothiazide 12.5 MG Tabs tablet     90 tablet    Take 1 tablet (12.5 mg) by mouth daily    HTN, goal below 140/80       HYDROmorphone 2 MG tablet    DILAUDID    18 tablet    Take 1 tablet (2 mg) by mouth every 4 hours as needed for pain    Biliary obstruction       hyoscyamine 0.125 MG sublingual tablet    LEVSIN/SL    120 tablet    Place 1 tablet (0.125 mg) under the tongue 4 times daily as needed for cramping    Abdominal pain, right upper quadrant       ibuprofen 200 MG capsule     60 capsule    Take 400 mg by mouth every 8 hours as needed for fever    Acute bilateral thoracic back pain       * nystatin 108703 UNIT/GM Powd    MYCOSTATIN    30 g    Apply topically 3 times daily as needed    Candidiasis of skin       * nystatin cream    MYCOSTATIN     Apply topically 2 times daily Reported on 3/6/2017        senna-docusate 8.6-50 MG per tablet    SENOKOT-S;PERICOLACE    100 tablet    Take 1-2 tablets by mouth 2 times daily as needed for constipation    Therapeutic opioid induced constipation       * Notice:  This list has 2 medication(s) that are the same as other medications prescribed for you. Read the directions carefully, and ask your doctor or other care provider to review them with you.

## 2017-08-16 NOTE — PROGRESS NOTES
"[facility]    Hepatology New Patient Visit    Referring provider:  Referred Self    Chief complaint:   establish care for fatty liver    HPI:   Mrs. Harris is a 62 year old female with past history of gastric bypass, longstanding obesity and recent RUQ pain who presents for evaluation of fatty liver disease. In April, she developed RUQ pain in conjunction with abnormal hepatic panel that was evaluated by our pancreas/biliary group and was felt to be 2/2 passed biliary stone or perhaps papillary stenosis. They did not feel there was clear benefit to ERCP given her altered anatomy. Her imaging did show some increased echogenicity consistent with fatty infiltration.     She has no previous knowledge of having liver disease. She does have a diagnosis of DARWIN although she has not been wearing here CPAP.     Of importance, she was recently seen by her PCP after diagnosis of fatty liver disease and has stopped drinking pop and eating candy and has accordingly lost approximately 30#. She is also exercising more frequently as well.     She was entirely pain free following her episode in April until around two days ago when she \"strained something\" on her right side after daly some peaches and she is concerned that this is her liver acting up again.     Patient denies jaundice, abdominal distension, lower extremity edema, lethargy or confusion.    No history of melena, hematemesis or hematochezia.    Patient denies fevers, sweats, chills or weight loss.    Medical hx Surgical hx   Past Medical History:   Diagnosis Date     HTN (hypertension)      IRON DEFICIENCY ANEMIA 7/5/2005     DARWIN (obstructive sleep apnea)       Past Surgical History:   Procedure Laterality Date     ARTHROPLASTY KNEE  4/2/2012    Procedure:ARTHROPLASTY KNEE; Left Total Knee Arthroplasty--Anesth.Choice; Surgeon:VICENTE MORALES; Location:[place] OR     C ORAL SURGERY PROCEDURE  age 18    wisdom teeth     C/SECTION, LOW " TRANSVERSE  1978, 1984, 1994    x 3     COLONOSCOPY  2001    normal colonoscopy     GASTRIC BYPASS  1980/2005    Gastric Bypass and revision     HERNIA REPAIR, INCISIONAL  6/16/2008    lap.repair with 10x8 mesh     TUBAL LIGATION  1994          Medications  Prior to Admission medications    Medication Sig Start Date End Date Taking? Authorizing Provider   hyoscyamine (LEVSIN/SL) 0.125 MG sublingual tablet Place 1 tablet (0.125 mg) under the tongue 4 times daily as needed for cramping 6/19/17  Yes Lakisha Daly APRN CNP   HYDROmorphone (DILAUDID) 2 MG tablet Take 1 tablet (2 mg) by mouth every 4 hours as needed for pain 5/17/17  Yes Jennie Suazo MD   senna-docusate (SENOKOT-S;PERICOLACE) 8.6-50 MG per tablet Take 1-2 tablets by mouth 2 times daily as needed for constipation 4/18/17  Yes Devon La MD   acetaminophen (TYLENOL) 500 MG tablet Take 2 tablets (1,000 mg) by mouth every 8 hours as needed for mild pain 4/18/17  Yes Devon La MD   ibuprofen 200 MG capsule Take 400 mg by mouth every 8 hours as needed for fever 4/18/17  Yes Devon La MD   nystatin (MYCOSTATIN) cream Apply topically 2 times daily Reported on 3/6/2017 10/20/16  Yes Jennie Suazo MD   clobetasol (TEMOVATE) 0.05 % ointment Apply sparingly to affected area twice daily for 14 days.  Do not apply to face.  Patient taking differently: daily as needed Apply sparingly to affected area twice daily for 14 days.  Do not apply to face. 10/20/16  Yes Jennie Suazo MD   hydrochlorothiazide 12.5 MG TABS Take 1 tablet (12.5 mg) by mouth daily 8/15/16  Yes Migdalia Romero APRN CNP   atenolol (TENORMIN) 50 MG tablet Take 1 tablet (50 mg) by mouth daily 8/15/16  Yes Migdalia Romero APRN CNP   nystatin (MYCOSTATIN) 257564 UNIT/GM POWD Apply topically 3 times daily as needed 8/15/16  Yes Migdalia Romero APRN CNP   Emollient (CERAVE) CREA Externally apply topically 2 times daily as  "needed 7/12/16  Yes Mariel Sabillon MD       Allergies  Allergies   Allergen Reactions     Vioxx Itching and Swelling     VIOXX (Swollen Feet,Hands itching), Okay with ibuprofen and aspirin       Family hx Social hx   Family History   Problem Relation Age of Onset     C.A.D. Father      MI at age 60     Hypertension Father      Alzheimer Disease Father      Hyperlipidemia Father      OSTEOPOROSIS Mother      on Fosamax     Glaucoma Mother      Other Cancer Brother      Stomach Cancer Maternal Aunt      Cervical Cancer Cousin      DIABETES No family hx of      CEREBROVASCULAR DISEASE No family hx of      Breast Cancer No family hx of      Cancer - colorectal No family hx of      Prostate Cancer No family hx of      Liver Disease No family hx of       Social History   Substance Use Topics     Smoking status: Never Smoker     Smokeless tobacco: Never Used     Alcohol use 0.0 oz/week     0 Standard drinks or equivalent per week      Comment: mixed very light 1 a month if that          Review of systems  A 10-point review of systems was negative.    Examination  /67  Pulse 50  Temp 98.4  F (36.9  C) (Oral)  Resp 18  Ht 1.6 m (5' 3\")  Wt 108.8 kg (239 lb 14.4 oz)  LMP 05/29/2006  SpO2 96%  BMI 42.5 kg/m2  Body mass index is 42.5 kg/(m^2).    Gen- well, NAD, A+Ox3, normal color  Eye- EOMI  ENT- MMM, normal oropharynx  Lym- no palpable lymphadenopathy  CVS- S1, S2 normal, no added sounds, RRR  RS- CTA  Abd- soft, obese, NT, ND, BS+, no palpable organomegaly  Extr- pulses good, no LORI  MS- hands normal- no clubbing  Neuro- A+Ox3, no asterixis  Skin- no rash or jaundice  Psych- normal mood    Laboratory  Lab Results   Component Value Date     08/16/2017    POTASSIUM 4.0 08/16/2017    CHLORIDE 102 08/16/2017    CO2 31 08/16/2017    BUN 19 08/16/2017    CR 0.74 08/16/2017       Lab Results   Component Value Date    BILITOTAL 0.6 08/16/2017    ALT 21 08/16/2017    AST 12 08/16/2017    ALKPHOS 80 " 08/16/2017       Lab Results   Component Value Date    ALBUMIN 3.4 08/16/2017    PROTTOTAL 7.6 08/16/2017        Lab Results   Component Value Date    WBC 8.4 08/16/2017    HGB 14.8 08/16/2017    MCV 94 08/16/2017     08/16/2017       Lab Results   Component Value Date    INR 1.05 08/16/2017         Radiology   MRCP 4/17/17 personally reviewed     Assessment  62 year old female s/p gastric bypass and features of the metabolic syndrome (central adiposity, DARWIN, HTN) with imaging findings consistent with steatosis. She does not consume alcohol to any significant extent. Her LFTs have normalized (and on review, have never been abnormal in past) which are more suggestive towards transient obstruction as the cause of her hepatic panel abnormalities. She nonetheless will certainly benefit from addressing components of the metabolic syndrome.    Plan  -check TTG Ab, A1-AT genotype  -Re-address DARWIN treatment  -continue slow regular weight loss  -RTC PRN if LFTs become abnormal    Patient seen and discussed with Dr. Andrew Cifuentes  Hepatology Fellow      Physician Attestation  I, Benny Morales, saw this patient with the fellow and agree with the fellow s findings and plan of care as documented in the fellow s note.  I personally reviewed vital signs, medications, labs and imaging.    Abnormal liver function tests in April may be related to passed gallstone.  Liver function tests normal now.  Patient is currently losing weight through dietary modification which will treat/improve any underlying NAFLD.  Liver function tests can be monitored every 6-12 months.    Benny Morales  Date of Service (when I saw the patient): Aug 16, 2017

## 2017-08-17 LAB
TTG IGA SER-ACNC: 1 U/ML
TTG IGG SER-ACNC: 1 U/ML

## 2017-08-18 ENCOUNTER — OFFICE VISIT (OUTPATIENT)
Dept: FAMILY MEDICINE | Facility: CLINIC | Age: 62
End: 2017-08-18
Payer: MEDICARE

## 2017-08-18 VITALS
DIASTOLIC BLOOD PRESSURE: 69 MMHG | SYSTOLIC BLOOD PRESSURE: 113 MMHG | BODY MASS INDEX: 41.82 KG/M2 | HEART RATE: 70 BPM | WEIGHT: 236 LBS | HEIGHT: 63 IN | TEMPERATURE: 98.7 F

## 2017-08-18 DIAGNOSIS — M79.621 AXILLARY PAIN, RIGHT: Primary | ICD-10-CM

## 2017-08-18 DIAGNOSIS — B37.2 CANDIDIASIS OF SKIN: ICD-10-CM

## 2017-08-18 DIAGNOSIS — R19.5 POSITIVE FIT (FECAL IMMUNOCHEMICAL TEST): ICD-10-CM

## 2017-08-18 DIAGNOSIS — M54.9 UPPER BACK PAIN ON RIGHT SIDE: ICD-10-CM

## 2017-08-18 PROCEDURE — 99214 OFFICE O/P EST MOD 30 MIN: CPT | Performed by: FAMILY MEDICINE

## 2017-08-18 RX ORDER — NYSTATIN 100000 [USP'U]/G
POWDER TOPICAL 3 TIMES DAILY PRN
Qty: 30 G | Refills: 1 | Status: SHIPPED | OUTPATIENT
Start: 2017-08-18 | End: 2018-01-08

## 2017-08-18 RX ORDER — NYSTATIN 100000 U/G
CREAM TOPICAL 2 TIMES DAILY
Qty: 30 G | Refills: 11 | Status: SHIPPED | OUTPATIENT
Start: 2017-08-18 | End: 2021-11-22

## 2017-08-18 NOTE — PATIENT INSTRUCTIONS
Thank you for choosing [facility].  You may be receiving a survey in the mail from Nnamdi Hopkins regarding your visit today.  Please take a few minutes to complete and return the survey to let us know how we are doing.      If you have questions or concerns, please contact us via Baeta or you can contact your care team at [phone].    Our Clinic hours are:  Monday 6:40 am  to 7:00 pm  Tuesday -Friday 6:40 am to 5:00 pm    The [place] outpatient lab hours are:  Monday - Friday 6:10 am to 4:45 pm  Saturdays 7:00 am to 11:00 am  Appointments are required, call [phone]    If you have clinical questions after hours or would like to schedule an appointment,  call the clinic at [phone].

## 2017-08-18 NOTE — PROGRESS NOTES
SUBJECTIVE:                                                    Jacquie Harris is 62 year old female   Chief Complaint   Patient presents with     Pain     Here to discuss about right side pain that radiates around to part of the back.     Refill Request     Needs a refill of the Nystatin powder and cream.     Health Maintenance     Colonoscopy order placed on 8-16-17 by her Gastro doctor.     PAIN      Duration: Started in April-Was in the hospital in April.  More recent symptoms since last Wednesday-Thursday.    Description (location/character/radiation): Right side, where her bra strap is.      Intensity:  moderate, severe    Accompanying signs and symptoms: The pain radiates to the right back.  Not to the center.    History (similar episodes/previous evaluation): Thought her symptoms were related to her liver and had an appointment with her Gastro doctor on 8-16-17.  He told her to see her PCP to discuss if the symptoms could be a pinched nerve with the ribs.    Precipitating or alleviating factors: If she tries to keep her arm still and not move she can control the pain.  Rolling over in bed can cause more pain.    Therapies tried and outcome: She is not able to take pain medication due to getting a very upset stomach.       REFILL:  Needs a refill of the Nystatin powder and cream.    Problem list and histories reviewed & adjusted, as indicated.  Additional history: review of her chart shows work up by several providers and no definite cause known.  Fatty liver and elevated LFT's. Positive FIT.  No colonoscopy scheduled  Patient Active Problem List   Diagnosis     s/p gastric bypass x 2     Allergic rhinitis     Carpal tunnel syndrome     Obstructive sleep apnea     Rosacea     CARDIOVASCULAR SCREENING; LDL GOAL LESS THAN 160     Tick bite, infected, positive Lyme test 1996 not treated     Vitamin D deficiency     Osteoporosis     Obesity, Class III, BMI 40-49.9 (morbid obesity) (H)     S/P TKR (total knee  replacement)     HTN, goal below 140/80     Back pain, left below scapula, strained muscles     Encounter for routine gynecological examination      Colles' fracture     Bilateral cold feet     Elevated levels of transaminase & lactic acid dehydrogenase     Fatty liver     Past Surgical History:   Procedure Laterality Date     ARTHROPLASTY KNEE  4/2/2012    Procedure:ARTHROPLASTY KNEE; Left Total Knee Arthroplasty--Anesth.Choice; Surgeon:VICENTE MORALES; Location:[facility] OR     C ORAL SURGERY PROCEDURE  age 18    wisdom teeth     C/SECTION, LOW TRANSVERSE  1978, 1984, 1994    x 3     COLONOSCOPY  2001    normal colonoscopy     GASTRIC BYPASS  1980/2005    Gastric Bypass and revision     HERNIA REPAIR, INCISIONAL  6/16/2008    lap.repair with 10x8 mesh     TUBAL LIGATION  1994       Social History   Substance Use Topics     Smoking status: Never Smoker     Smokeless tobacco: Never Used     Alcohol use 0.0 oz/week     0 Standard drinks or equivalent per week      Comment: mixed very light 1 a month if that     Family History   Problem Relation Age of Onset     C.A.D. Father      MI at age 60     Hypertension Father      Alzheimer Disease Father      Hyperlipidemia Father      OSTEOPOROSIS Mother      on Fosamax     Glaucoma Mother      Other Cancer Brother      Stomach Cancer Maternal Aunt      Cervical Cancer Cousin      DIABETES No family hx of      CEREBROVASCULAR DISEASE No family hx of      Breast Cancer No family hx of      Cancer - colorectal No family hx of      Prostate Cancer No family hx of      Liver Disease No family hx of          Current Outpatient Prescriptions   Medication Sig Dispense Refill     nystatin (MYCOSTATIN) cream Apply topically 2 times daily Reported on 3/6/2017       clobetasol (TEMOVATE) 0.05 % ointment Apply sparingly to affected area twice daily for 14 days.  Do not apply to face. (Patient taking differently: daily as needed Apply sparingly to affected area twice daily for 14 days.   Do not apply to face.) 15 g 0     hydrochlorothiazide 12.5 MG TABS Take 1 tablet (12.5 mg) by mouth daily 90 tablet 3     atenolol (TENORMIN) 50 MG tablet Take 1 tablet (50 mg) by mouth daily 90 tablet 3     nystatin (MYCOSTATIN) 020509 UNIT/GM POWD Apply topically 3 times daily as needed 30 g 1     Emollient (CERAVE) CREA Externally apply topically 2 times daily as needed 1 Bottle 3     hyoscyamine (LEVSIN/SL) 0.125 MG sublingual tablet Place 1 tablet (0.125 mg) under the tongue 4 times daily as needed for cramping 120 tablet 3     Allergies   Allergen Reactions     Vioxx Itching and Swelling     VIOXX (Swollen Feet,Hands itching), Okay with ibuprofen and aspirin     Recent Labs   Lab Test  08/16/17   0930  05/17/17   0804  04/20/17   0921   07/13/16   0648  02/26/16   0855   05/01/13   0722   07/14/11   1048  11/04/09   1440   A1C   --    --    --    --    --    --    --    --    --    --   5.5   LDL   --    --    --    --   94   --    --   127   --   104   --    HDL   --    --    --    --   45*   --    --   47*   --   46*   --    TRIG   --    --    --    --   158*   --    --   163*   --   136   --    ALT  21  23  123*   < >   --   24   < >   --    --   <6  21   CR  0.74  0.76  0.72   < >   --   0.75   < >   --    --    --   0.69   GFRESTIMATED  79  77  81   < >   --   78   < >   --    --    --   Not Calculated   GFRESTBLACK  >90  >90  African American GFR Calc    >90   GFR Calc     < >   --   >90   GFR Calc     < >   --    --    --   Not Calculated   POTASSIUM  4.0  3.8  3.6   < >   --   3.7   < >   --    < >   --   4.6   TSH   --    --    --    --    --   1.82   --    --    --    --   1.68    < > = values in this interval not displayed.      BP Readings from Last 3 Encounters:   08/18/17 113/69   08/16/17 114/67   06/19/17 116/72    Wt Readings from Last 3 Encounters:   08/18/17 236 lb (107 kg)   08/16/17 239 lb 14.4 oz (108.8 kg)   06/19/17 251 lb 4.8 oz (114 kg)     "     ROS:  Constitutional, HEENT, cardiovascular, pulmonary, gi and gu systems are negative, except as otherwise noted.    OBJECTIVE:                                                    /69  Pulse 70  Temp 98.7  F (37.1  C) (Tympanic)  Ht 5' 3\" (1.6 m)  Wt 236 lb (107 kg)  LMP 05/29/2006  BMI 41.81 kg/m2  GENERAL APPEARANCE ADULT: Alert, no acute distress  CV: normal rate, regular rhythm, no murmur or gallop  ABDOMEN: soft, no organomegaly, masses or tenderness  PSYCH: mentation appears normal., affect and mood normal  Diagnostic Test Results:  none      ASSESSMENT/PLAN:                                                    1. Axillary pain, right  Possible muscle strain, rib malalignment, will have PT evaluate and treat  - PHYSICAL THERAPY REFERRAL    2. Upper back pain on right side  - PHYSICAL THERAPY REFERRAL    3. Positive FIT (fecal immunochemical test)  - GASTROENTEROLOGY ADULT REF PROCEDURE ONLY    Mariel Sabillon MD  [facility]        "

## 2017-08-18 NOTE — NURSING NOTE
"Chief Complaint   Patient presents with     Pain     Here to discuss about right side pain that radiates around to part of the back.     Refill Request     Needs a refill of the Nystatin powder and cream.       Initial /69  Pulse 70  Temp 98.7  F (37.1  C) (Tympanic)  Ht 5' 3\" (1.6 m)  Wt 236 lb (107 kg)  LMP 05/29/2006  BMI 41.81 kg/m2 Estimated body mass index is 41.81 kg/(m^2) as calculated from the following:    Height as of this encounter: 5' 3\" (1.6 m).    Weight as of this encounter: 236 lb (107 kg).  Medication Reconciliation: complete  "

## 2017-08-18 NOTE — MR AVS SNAPSHOT
After Visit Summary   8/18/2017    Jacquie Harris    MRN: [id number]           Patient Information     Date Of Birth          1955        Visit Information        Provider Department      8/18/2017 1:20 PM Mariel Sabillon MD [facility]        Today's Diagnoses     Axillary pain, right    -  1    Upper back pain on right side        Positive FIT (fecal immunochemical test)          Care Instructions          Thank you for choosing [facility].  You may be receiving a survey in the mail from [facility] regarding your visit today.  Please take a few minutes to complete and return the survey to let us know how we are doing.      If you have questions or concerns, please contact us via [employer] or you can contact your care team at [phone].    Our Clinic hours are:  Monday 6:40 am  to 7:00 pm  Tuesday -Friday 6:40 am to 5:00 pm    The [place] outpatient lab hours are:  Monday - Friday 6:10 am to 4:45 pm  Saturdays 7:00 am to 11:00 am  Appointments are required, call [phone]    If you have clinical questions after hours or would like to schedule an appointment,  call the clinic at [phone].            Follow-ups after your visit        Additional Services     GASTROENTEROLOGY ADULT REF PROCEDURE ONLY       Last Lab Result: Creatinine (mg/dL)       Date                     Value                 08/16/2017               0.74             ----------  Body mass index is 41.81 kg/(m^2).      Patient will be contacted to schedule procedure.     Please be aware that coverage of these services is subject to the terms and limitations of your health insurance plan.  Call member services at your health plan with any benefit or coverage questions.  Any procedures must be performed at a [facility] facility OR coordinated by your clinic's referral office.    Please bring the following with you to your appointment:    (1) Any X-Rays, CTs or MRIs which have been performed.  Contact the  "facility where they were done to arrange for  prior to your scheduled appointment.    (2) List of current medications   (3) This referral request   (4) Any documents/labs given to you for this referral            PHYSICAL THERAPY REFERRAL       *This therapy referral will be filtered to a centralized scheduling office at [facility] and the patient will receive a call to schedule an appointment at a [facility] location most convenient for them. *     [facility] provides Physical Therapy evaluation and treatment and many specialty services across the [facility] system.  If requesting a specialty program, please choose from the list below.    If you have not heard from the scheduling office within 2 business days, please call [phone] for all locations, with the exception of [place], please call [phone].  Treatment: Evaluation & Treatment  Special Instructions/Modalitie  Special Programs:     Please be aware that coverage of these services is subject to the terms and limitations of your health insurance plan.  Call member services at your health plan with any benefit or coverage questions.      **Note to Provider:  If you are referring outside of [facility] for the therapy appointment, please list the name of the location in the \"special instructions\" above, print the referral and give to the patient to schedule the appointment.                  Your next 10 appointments already scheduled     Aug 21, 2017  9:20 AM CDT   Pre-Op physical with Mariel Sabillon MD   [facility]    [address] [place] [id number]   [phone]            Aug 25, 2017   Procedure with Hernando Thompson MD   [facility] (--)    [address] [place] [id number]   [phone]           The medical center is located at [address]. (between [address] and [address] in [place], four miles north of " "[place]).            Sep 12, 2017  7:40 AM CDT   SHORT [facility] Jennie Suazo MD   [facility]    [address] [facility] [id number] [phone]            Sep 12, 2017  8:30 AM CDT   MA SCREENING DIGITAL BILATERAL with [email or web address]   [facility] ([facility])    [address] [id number] [phone]           Do not use any powder, lotion or deodorant under your arms or on your breast. If you do, we will ask you to remove it before your exam.  Wear comfortable, two-piece clothing.  If you have any allergies, tell your care team.  Bring any previous mammograms from other facilities or have them mailed to the breast center. Three-dimensional (3D) mammograms are available at [facility] locations in [facility], and [place]. [facility] locations include [place] and [facility] in [place]. Benefits of 3D mammograms include: - Improved rate of cancer detection - Decreases your chance of having to go back for more tests, which means fewer: - \"False-positive\" results (This means that there is an abnormal area but it isn't cancer.) - Invasive testing procedures, such as a biopsy or surgery - Can provide clearer images of the breast if you have dense breast tissue. 3D mammography is an optional exam that anyone can have with a 2D mammogram. It doesn't replace or take the place of a 2D mammogram. 2D mammograms remain an effective screening test for all women.  Not all insurance companies cover the cost of a 3D mammogram. Check with your insurance.              Who to contact     If you have questions or need follow up information about today's clinic visit or your schedule please contact [facility] directly at [phone].  Normal or non-critical lab and imaging results will be communicated to you by MyChart, letter or phone within 4 business days " "after the clinic has received the results. If you do not hear from us within 7 days, please contact the clinic through [employer] or phone. If you have a critical or abnormal lab result, we will notify you by phone as soon as possible.  Submit refill requests through [employer] or call your pharmacy and they will forward the refill request to us. Please allow 3 business days for your refill to be completed.          Additional Information About Your Visit        [employer] Information     [employer] lets you send messages to your doctor, view your test results, renew your prescriptions, schedule appointments and more. To sign up, go to [email or web address] . Click on \"Log in\" on the left side of the screen, which will take you to the Welcome page. Then click on \"Sign up Now\" on the right side of the page.     You will be asked to enter the access code listed below, as well as some personal information. Please follow the directions to create your username and password.     Your access code is: [email or web address]  Expires: 10/31/2017  6:30 AM     Your access code will  in 90 days. If you need help or a new code, please call your [facility] [place] clinic or [phone].        Care EveryWhere ID     This is your Care EveryWhere ID. This could be used by other organizations to access your [place] medical records  [id number]        Your Vitals Were     Pulse Temperature Height Last Period BMI (Body Mass Index)       70 98.7  F (37.1  C) (Tympanic) 5' 3\" (1.6 m) 2006 41.81 kg/m2        Blood Pressure from Last 3 Encounters:   17 113/69   17 114/67   17 116/72    Weight from Last 3 Encounters:   17 236 lb (107 kg)   17 239 lb 14.4 oz (108.8 kg)   17 251 lb 4.8 oz (114 kg)              We Performed the Following     GASTROENTEROLOGY ADULT REF PROCEDURE ONLY     PHYSICAL THERAPY REFERRAL          Today's Medication Changes          These changes are accurate as of: 17  1:43 PM.  If you have " any questions, ask your nurse or doctor.               These medicines have changed or have updated prescriptions.        Dose/Directions    clobetasol 0.05 % ointment   Commonly known as:  TEMOVATE   This may have changed:    - when to take this  - reasons to take this  - additional instructions   Used for:  Vaginal itching        Apply sparingly to affected area twice daily for 14 days.  Do not apply to face.   Quantity:  15 g   Refills:  0                Primary Care Provider Office Phone # Fax #    Mariel Va Sabillon [phone] [address] [facility] [facility] [address]        Equal Access to Services     [facility]: Hadii jose ku hadasho Soomaali, waaxda luqadaha, qaybta kaalmada shaun, mildred sanchez . So [facility] [phone].    ATENCIÓN: Si habla español, tiene a booth disposición servicios gratuitos de asistencia lingüística. Llame al [phone].    We comply with applicable federal civil rights laws and [place] laws. We do not discriminate on the basis of race, color, national origin, age, disability sex, sexual orientation or gender identity.            Thank you!     Thank you for choosing [facility]  for your care. Our goal is always to provide you with excellent care. Hearing back from our patients is one way we can continue to improve our services. Please take a few minutes to complete the written survey that you may receive in the mail after your visit with us. Thank you!             Your Updated Medication List - Protect others around you: Learn how to safely use, store and throw away your medicines at www.disposemymeds.org.          This list is accurate as of: 8/18/17  1:43 PM.  Always use your most recent med list.                   Brand Name Dispense Instructions for use Diagnosis    atenolol 50 MG tablet    TENORMIN    90 tablet    Take 1 tablet (50 mg) by mouth daily    HTN, goal below 140/80       CERAVE Crea     1 Bottle     Externally apply topically 2 times daily as needed    Sunburn, Itching       clobetasol 0.05 % ointment    TEMOVATE    15 g    Apply sparingly to affected area twice daily for 14 days.  Do not apply to face.    Vaginal itching       hydrochlorothiazide 12.5 MG Tabs tablet     90 tablet    Take 1 tablet (12.5 mg) by mouth daily    HTN, goal below 140/80       hyoscyamine 0.125 MG sublingual tablet    LEVSIN/SL    120 tablet    Place 1 tablet (0.125 mg) under the tongue 4 times daily as needed for cramping    Abdominal pain, right upper quadrant       * nystatin 644849 UNIT/GM Powd    MYCOSTATIN    30 g    Apply topically 3 times daily as needed    Candidiasis of skin       * nystatin cream    MYCOSTATIN     Apply topically 2 times daily Reported on 3/6/2017        * Notice:  This list has 2 medication(s) that are the same as other medications prescribed for you. Read the directions carefully, and ask your doctor or other care provider to review them with you.

## 2017-08-21 ENCOUNTER — HOSPITAL ENCOUNTER (OUTPATIENT)
Dept: PHYSICAL THERAPY | Facility: CLINIC | Age: 62
Setting detail: THERAPIES SERIES
End: 2017-08-21
Attending: FAMILY MEDICINE
Payer: MEDICARE

## 2017-08-21 ENCOUNTER — OFFICE VISIT (OUTPATIENT)
Dept: FAMILY MEDICINE | Facility: CLINIC | Age: 62
End: 2017-08-21
Payer: MEDICARE

## 2017-08-21 ENCOUNTER — ANESTHESIA EVENT (OUTPATIENT)
Dept: GASTROENTEROLOGY | Facility: CLINIC | Age: 62
End: 2017-08-21
Payer: MEDICARE

## 2017-08-21 VITALS
HEART RATE: 56 BPM | DIASTOLIC BLOOD PRESSURE: 77 MMHG | HEIGHT: 63 IN | SYSTOLIC BLOOD PRESSURE: 114 MMHG | WEIGHT: 234 LBS | TEMPERATURE: 98.1 F | BODY MASS INDEX: 41.46 KG/M2

## 2017-08-21 DIAGNOSIS — S49.91XD SHOULDER INJURY, RIGHT, SUBSEQUENT ENCOUNTER: ICD-10-CM

## 2017-08-21 DIAGNOSIS — Z01.818 PREOP GENERAL PHYSICAL EXAM: Primary | ICD-10-CM

## 2017-08-21 PROCEDURE — 97140 MANUAL THERAPY 1/> REGIONS: CPT | Mod: GP | Performed by: PHYSICAL THERAPIST

## 2017-08-21 PROCEDURE — 40000718 ZZHC STATISTIC PT DEPARTMENT ORTHO VISIT: Performed by: PHYSICAL THERAPIST

## 2017-08-21 PROCEDURE — G8982 BODY POS GOAL STATUS: HCPCS | Mod: GP,CI | Performed by: PHYSICAL THERAPIST

## 2017-08-21 PROCEDURE — G8981 BODY POS CURRENT STATUS: HCPCS | Mod: GP,CK | Performed by: PHYSICAL THERAPIST

## 2017-08-21 PROCEDURE — 99214 OFFICE O/P EST MOD 30 MIN: CPT | Performed by: FAMILY MEDICINE

## 2017-08-21 PROCEDURE — 93000 ELECTROCARDIOGRAM COMPLETE: CPT | Performed by: FAMILY MEDICINE

## 2017-08-21 PROCEDURE — 97110 THERAPEUTIC EXERCISES: CPT | Mod: GP | Performed by: PHYSICAL THERAPIST

## 2017-08-21 PROCEDURE — 97162 PT EVAL MOD COMPLEX 30 MIN: CPT | Mod: GP | Performed by: PHYSICAL THERAPIST

## 2017-08-21 NOTE — PROGRESS NOTES
08/21/17 1100   General Information   Type of Visit Initial OP Ortho PT Evaluation   Start of Care Date 08/21/17   Referring Physician Mariel Sabillon MD   Patient/Family Goals Statement To get rid of the pain   Orders Evaluate and Treat   Date of Order 08/18/17   Insurance Type Medicare   Medical Diagnosis R UBP/ axillary pain   Body Part(s)   Body Part(s) Cervical Spine   Presentation and Etiology   Pertinent history of current problem (include personal factors and/or comorbidities that impact the POC) Pt states mid April she felt a band tightening around lower bra level.   Pt states she had attempted an ab strengthening machine did 1 rep and the symptoms came on.   Pt states she was hospitalized at the time due to neck and back pain.   Pt had MRI in hospital was told she has a fatty liver but has learned that would not be causing her pain.   Currently:  R  lateral rib cage to UB.  Intermittent pain 10/10.   Pt also notes some neck pain but feels this may be due to posture w/ reading in bed.  No numbness/ tingling.  Pt is scheduled for RCR 8/25/17.  No recent tests.  No meds for this problem.  PMHX:  Anemia,  R wrist fx 2 years ago,  HBP,  bladder control / urgency, arthritis.  L TKA.  Mod complexity: having R RC surgery this week   Impairments A. Pain;D. Decreased ROM   Onset date of current episode/exacerbation 04/11/17   Pain quality A. Sharp;E. Shooting;F. Stabbing   Pain exacerbation comment toileting/ wiping.  Reaching to turn on light/ faucet.  Wakes 2X/night due to symptoms.  Bending fwd.  Getting in /out of car.  Reaching back to push off from chair.  Pt states overall has been less active due to rib and shoulder pain.  Pulling weeds   Pain/symptoms eased by A. Sitting;C. Rest;E. Changing positions   Progression of symptoms since onset: Improved  (no longer radiates around whole body)   Prior Level of Function   Functional Level Prior Comment could sleep thru the night.  Gardening.  Likes to ride  4 kim/ snowmobile.     Current Level of Function   Patient role/employment history F. Retired   Fall Risk Screen   Fall screen completed by PT   Per patient - Fall 2 or more times in past year? No   Per patient - Fall with injury in past year? No   Is patient a fall risk? No   Cervical Spine   Posture Mild FH;  more prominant anterior upper ribs   Cervical Flexion ROM WFL   Cervical Extension ROM 50-60% w/ some irritation   Cervical Right Side Bending ROM 50%   Cervical Left Side Bending ROM 50% w/ opposite side pull   Cervical Right Rotation ROM 50% w/ opposite side pull   Cervical Left Rotation ROM WFL   Shoulder AROM Screen flex R 75*, L 140*;  abd R 45*, L 120*,  ER R 45*, L 77*   Shoulder Shrug (C2-C4) Strength strength not tested due to current RCT ---surgery 8/25/17   Segmental Mobility-Cervical ERSL T2,    Segmental Mobility-Thoracic ERSR  T4, T6,   Post rib R T 6 and T7   Palpation Moderate tenderness R levator   Planned Therapy Interventions   Planned Therapy Interventions manual therapy;joint mobilization;ROM;strengthening;stretching  (posture)   Clinical Impression   Criteria for Skilled Therapeutic Interventions Met yes, treatment indicated   PT Diagnosis R rib/ UBP   Influenced by the following impairments pain, decreased mobility   Functional limitations due to impairments reaching, sleeping,  getting in/out of car   Clinical Presentation Evolving/Changing   Clinical Presentation Rationale will be having RC surgery this week   Clinical Decision Making (Complexity) Moderate complexity   Therapy Frequency other (see comments)   Predicted Duration of Therapy Intervention (days/wks) 6 visits over the next 90 days due to RCR on 8/25/17   Risk & Benefits of therapy have been explained Yes   Patient, Family & other staff in agreement with plan of care Yes   Education Assessment   Barriers to Learning No barriers   Ortho Goal 1   Goal Description 1.  Pt will be able to get in/out of car w/ pain no > 3/10    Target Date 11/19/17   Ortho Goal 2   Goal Description 2.  Reaching w/ toileting no pain > 3/10   Target Date 11/19/17   Ortho Goal 3   Goal Description 3.  Pt will wake no > 4X/wk due to rib / upper back pain   Target Date 11/19/17   Ortho Goal 4   Goal Description 4.  Independent and consistent w/HEP and increased awareness of posture   Target Date 11/19/17   Total Evaluation Time   Total Evaluation Time 30   Therapy Certification   Certification date from 08/21/17   Certification date to 11/19/17   Medical Diagnosis UBP/ axillary pain       Thank you for this referral,    Dasia Wilson, PT,  CEAS   [id number]  [facility].  [phone]

## 2017-08-21 NOTE — NURSING NOTE
"Chief Complaint   Patient presents with     Pre-Op Exam       Initial /77 (BP Location: Left arm, Patient Position: Sitting, Cuff Size: Adult Regular)  Pulse 56  Temp 98.1  F (36.7  C) (Tympanic)  Ht 5' 3\" (1.6 m)  Wt 234 lb (106.1 kg)  LMP 05/29/2006  BMI 41.45 kg/m2 Estimated body mass index is 41.45 kg/(m^2) as calculated from the following:    Height as of this encounter: 5' 3\" (1.6 m).    Weight as of this encounter: 234 lb (106.1 kg).  Medication Reconciliation: complete    "

## 2017-08-21 NOTE — MR AVS SNAPSHOT
After Visit Summary   8/21/2017    Jacquie Harris    MRN: [id number]           Patient Information     Date Of Birth          1955        Visit Information        Provider Department      8/21/2017 9:20 AM Mariel Sabillon MD [facility]        Today's Diagnoses     Preop general physical exam    -  1    Shoulder injury, right, subsequent encounter          Care Instructions      Before Your Surgery      Call your surgeon if there is any change in your health. This includes signs of a cold or flu (such as a sore throat, runny nose, cough, rash or fever).    Do not smoke, drink alcohol or take over the counter medicine (unless your surgeon or primary care doctor tells you to) for the 24 hours before and after surgery.    If you take prescribed drugs: Follow your doctor s orders about which medicines to take and which to stop until after surgery.    Eating and drinking prior to surgery: follow the instructions from your surgeon  Take a shower or bath the night before surgery. Use the soap your surgeon gave you to gently clean your skin. If you do not have soap from your surgeon, use your regular soap. Do not shave or scrub the surgery site.  Wear clean pajamas and have clean sheets on your bed.       Thank you for choosing [facility].  You may be receiving a survey in the mail from [employer] regarding your visit today.  Please take a few minutes to complete and return the survey to let us know how we are doing.      If you have questions or concerns, please contact us via [employer] or you can contact your care team at [phone].    Our Clinic hours are:  Monday 6:40 am  to 7:00 pm  Tuesday -Friday 6:40 am to 5:00 pm    The [place] outpatient lab hours are:  Monday - Friday 6:10 am to 4:45 pm  Saturdays 7:00 am to 11:00 am  Appointments are required, call [phone]      If you have clinical questions after hours or would like to schedule an appointment,  call the clinic at  [phone].              Follow-ups after your visit        Your next 10 appointments already scheduled     Aug 21, 2017 11:00 AM CDT   Ortho Eval with Dasia Wilson PT   [facility] Physical Therapy ([facility])    [address]  [address]  [place] [id number] [phone]            Aug 22, 2017   Procedure with Delfino Yoo MD   [facility] Endoscopy ([facility])    [address] [id number] [phone]           The medical center is located at [address] (between [address] in [place], [place] miles north of [place]).            Aug 25, 2017   Procedure with Hernando Thompson MD   [facility] PeriOP Services (--)    [address] [address] [phone]           The medical center is located at [address] (between [address] in [place], four miles north of [place]).            Sep 12, 2017  7:40 AM CDT   SHORT with Jennie Suazo MD   [facility]    [address] [address] [phone]            Sep 12, 2017  8:30 AM CDT   MA SCREENING DIGITAL BILATERAL with [facility] [facility] ([facility])    [address] [address] [phone]           Do not use any powder, lotion or deodorant under your arms or on your breast. If you do, we will ask you to remove it before your exam.  Wear comfortable, two-piece clothing.  If you have any allergies, tell your care team.  Bring any previous mammograms from other facilities or have them mailed to the breast center. Three-dimensional (3D) mammograms are available at [place] locations in [facility], and [place]. -Health locations include [place] and [facility] in [place]. Benefits of 3D mammograms include: - Improved rate of cancer detection -  "Decreases your chance of having to go back for more tests, which means fewer: - \"False-positive\" results (This means that there is an abnormal area but it isn't cancer.) - Invasive testing procedures, such as a biopsy or surgery - Can provide clearer images of the breast if you have dense breast tissue. 3D mammography is an optional exam that anyone can have with a 2D mammogram. It doesn't replace or take the place of a 2D mammogram. 2D mammograms remain an effective screening test for all women.  Not all insurance companies cover the cost of a 3D mammogram. Check with your insurance.              Who to contact     If you have questions or need follow up information about today's clinic visit or your schedule please contact [facility] directly at [phone].  Normal or non-critical lab and imaging results will be communicated to you by [employer], letter or phone within 4 business days after the clinic has received the results. If you do not hear from us within 7 days, please contact the clinic through [employer] or phone. If you have a critical or abnormal lab result, we will notify you by phone as soon as possible.  Submit refill requests through [employer] or call your pharmacy and they will forward the refill request to us. Please allow 3 business days for your refill to be completed.          Additional Information About Your Visit        [employer] Information     [employer] lets you send messages to your doctor, view your test results, renew your prescriptions, schedule appointments and more. To sign up, go to [email or web address] . Click on \"Log in\" on the left side of the screen, which will take you to the Welcome page. Then click on \"Sign up Now\" on the right side of the page.     You will be asked to enter the access code listed below, as well as some personal information. Please follow the directions to create your username and password.     Your access code is: [email or web address]  Expires: 10/31/2017  " "6:30 AM     Your access code will  in 90 days. If you need help or a new code, please call your [place] clinic or [phone].        Care EveryWhere ID     This is your Care EveryWhere ID. This could be used by other organizations to access your [place] medical records  [id number]        Your Vitals Were     Pulse Temperature Height Last Period BMI (Body Mass Index)       56 98.1  F (36.7  C) (Tympanic) 5' 3\" (1.6 m) 2006 41.45 kg/m2        Blood Pressure from Last 3 Encounters:   17 114/77   17 113/69   17 114/67    Weight from Last 3 Encounters:   17 234 lb (106.1 kg)   17 236 lb (107 kg)   17 239 lb 14.4 oz (108.8 kg)              We Performed the Following     EKG 12-lead complete w/read - Clinics          Today's Medication Changes          These changes are accurate as of: 17 10:05 AM.  If you have any questions, ask your nurse or doctor.               These medicines have changed or have updated prescriptions.        Dose/Directions    clobetasol 0.05 % ointment   Commonly known as:  TEMOVATE   This may have changed:    - when to take this  - reasons to take this  - additional instructions   Used for:  Vaginal itching        Apply sparingly to affected area twice daily for 14 days.  Do not apply to face.   Quantity:  15 g   Refills:  0                Primary Care Provider Office Phone # Fax #    Mariel Va Sabillon [phone] [address] [address]        Equal Access to Services     [facility] Center[facility]ILLIAM AH: Hadii jose quiroz Sobg, waedwinda luqadaha, qaybjessica kaalmildred vila. So [facility] [phone].    ATENCIÓN: Si habla español, tiene a booth disposición servicios gratuitos de asistencia lingüística. Llame al [phone].    We comply with applicable federal civil rights laws and [place] laws. We do not discriminate on the basis of race, color, national origin, age, disability " sex, sexual orientation or gender identity.            Thank you!     Thank you for choosing [facility]  for your care. Our goal is always to provide you with excellent care. Hearing back from our patients is one way we can continue to improve our services. Please take a few minutes to complete the written survey that you may receive in the mail after your visit with us. Thank you!             Your Updated Medication List - Protect others around you: Learn how to safely use, store and throw away your medicines at www.disposemymeds.org.          This list is accurate as of: 8/21/17 10:05 AM.  Always use your most recent med list.                   Brand Name Dispense Instructions for use Diagnosis    atenolol 50 MG tablet    TENORMIN    90 tablet    Take 1 tablet (50 mg) by mouth daily    HTN, goal below 140/80       CERAVE Crea     1 Bottle    Externally apply topically 2 times daily as needed    Sunburn, Itching       clobetasol 0.05 % ointment    TEMOVATE    15 g    Apply sparingly to affected area twice daily for 14 days.  Do not apply to face.    Vaginal itching       hydrochlorothiazide 12.5 MG Tabs tablet     90 tablet    Take 1 tablet (12.5 mg) by mouth daily    HTN, goal below 140/80       * nystatin 212135 UNIT/GM Powd    MYCOSTATIN    30 g    Apply topically 3 times daily as needed    Candidiasis of skin       * nystatin cream    MYCOSTATIN    30 g    Apply topically 2 times daily Reported on 3/6/2017    Candidiasis of skin       * Notice:  This list has 2 medication(s) that are the same as other medications prescribed for you. Read the directions carefully, and ask your doctor or other care provider to review them with you.

## 2017-08-21 NOTE — PROGRESS NOTES
[facility]  [address] [address]  [phone]  Dept: [phone]    PRE-OP EVALUATION:  Today's date: 2017    Jacquie Harris (: 1955) presents for pre-operative evaluation assessment as requested by Dr. Thompson.  She requires evaluation and anesthesia risk assessment prior to undergoing surgery/procedure for treatment of torn rotator cuff .  Proposed procedure: Right shoulder distal clavicle excision, subacromial decompression, rotator cuff repair with possible ganglion cyst excision.    Date of Surgery/ Procedure: 17  Time of Surgery/ Procedure: 1:35 pm  Hospital/Surgical Facility: [facility]    Primary Physician: Mariel Sabillon  Type of Anesthesia Anticipated: General    Patient has a Health Care Directive or Living Will:  NO    1. NO - Do you have a history of heart attack, stroke, stent, bypass or surgery on an artery in the head, neck, heart or legs?  2. NO - Do you ever have any pain or discomfort in your chest?  3. NO - Do you have a history of  Heart Failure?  4. NO - Are you troubled by shortness of breath when: walking on the level, up a slight hill or at night?  5. NO - Do you currently have a cold, bronchitis or other respiratory infection?  6. NO - Do you have a cough, shortness of breath or wheezing?  7. NO - Do you sometimes get pains in the calves of your legs when you walk?  8. NO - Do you or anyone in your family have previous history of blood clots?  9. YES, Personal hx. - Do you or does anyone in your family have a serious bleeding problem such as prolonged bleeding following surgeries or cuts?  10. YES - Have you ever had problems with anemia or been told to take iron pills?  11. NO - Have you had any abnormal blood loss such as black, tarry or bloody stools, or abnormal vaginal bleeding?  12. YES - Have you ever had a blood transfusion?  13. NO - Have you or any of your relatives ever had problems with anesthesia?  14.  YES - Do you have sleep apnea, excessive snoring or daytime drowsiness?  15. NO - Do you have any prosthetic heart valves?  16. YES, Knee - Do you have prosthetic joints?  17. NO - Is there any chance that you may be pregnant?        HPI:                                                      Brief HPI related to upcoming procedure: Jacquie Harris is 62 year old white female with obesity, fatty liver, HTN, osteoporosis and right shoulder injury who is here to get clearance to have general anesthesia.  MR shoulder:  1. 1.9 cm supraspinatus tendon tear with full-thickness involvement.  Moderate tendinosis.  2. Some anatomic findings which can predispose to impingement.  3. 2.3 cm ganglion cyst extending between the supraspinatus and  infraspinatus tendons.    See problem list for active medical problems.  Problems all longstanding and stable, except as noted/documented.  See ROS for pertinent symptoms related to these conditions.                                                                                                  .    MEDICAL HISTORY:                                                    Patient Active Problem List    Diagnosis Date Noted     Shoulder injury, right, subsequent encounter 08/21/2017     Priority: Medium     Fatty liver 04/20/2017     Priority: Medium     Elevated levels of transaminase & lactic acid dehydrogenase 04/15/2017     Priority: Medium     Bilateral cold feet 01/03/2017     Priority: Medium     Colles' fracture 07/06/2015     Priority: Medium     Encounter for routine gynecological examination  06/04/2015     Priority: Medium     Back pain, left below scapula, strained muscles 10/24/2013     Priority: Medium     HTN, goal below 140/80 05/05/2013     Priority: Medium     S/P TKR (total knee replacement) 04/03/2012     Priority: Medium     Osteoporosis 02/19/2012     Priority: Medium     Previous T scores -2.2  2/2012 Dexascan:  Lumbar spine L1-L4 T-score: -2.4, Left femoral neck T-score:  -2.7, Right femoral neck T-score: -2.2   Percent change in lumbar spine: +6.6% since 4/16/2008   Percent change in femurs: +2.9% since 4/16/2008    IMPRESSION: Osteoporosis in the left femoral neck. Severe osteopenia  in the right femoral neck and lumbar spine.       Obesity, Class III, BMI 40-49.9 (morbid obesity) (H) 02/19/2012     Priority: Medium     ideal weight 120, goal weight 200, lose 58 lbs in 58 weeks.       Vitamin D deficiency 07/18/2011     Priority: Medium     Tick bite, infected, positive Lyme test 1996 not treated 07/08/2011     Priority: Medium     Rosacea 11/04/2009     Priority: Medium     Obstructive sleep apnea 12/01/2006     Priority: Medium     Sleep study 12/06 for EDS- AHI 36, desaturations to 65%. CPAP recommended but not tolerated.  01/16/07  ENT consult recommend CPAP and weight loss. Surgery discussed but success rate less than CPAP  1/15/07 CPAP trial, was not able to tolerate. Retried 2008, tolerated better.   Problem list name updated by automated process. Provider to review       Carpal tunnel syndrome 06/07/2006     Priority: Medium     04/10/09  [facility]. Finding consistant with CTS, but nerve studies and MRI of C-Spine are normal. Corticosteroid injection given in office       CARDIOVASCULAR SCREENING; LDL GOAL LESS THAN 160 10/31/2010     Priority: Low     Allergic rhinitis 03/27/2006     Priority: Low     Problem list name updated by automated process. Provider to review       s/p gastric bypass x 2 07/05/2005     Priority: Low     Gastric bypass 1980        Past Medical History:   Diagnosis Date     HTN (hypertension)      IRON DEFICIENCY ANEMIA 7/5/2005     Iron infusion/ resolved since injections     DARWIN (obstructive sleep apnea)      Past Surgical History:   Procedure Laterality Date     ARTHROPLASTY KNEE  4/2/2012    Procedure:ARTHROPLASTY KNEE; Left Total Knee Arthroplasty--Anesth.Choice; Surgeon:VICENTE MORALES; Location:[facility] ORAL SURGERY  "PROCEDURE  age 18    wisdom teeth     C/SECTION, LOW TRANSVERSE  1978, 1984, 1994    x 3     COLONOSCOPY  2001    normal colonoscopy     GASTRIC BYPASS  1980/2005    Gastric Bypass and revision     HERNIA REPAIR, INCISIONAL  6/16/2008    lap.repair with 10x8 mesh     TUBAL LIGATION  1994     Current Outpatient Prescriptions   Medication Sig Dispense Refill     nystatin (MYCOSTATIN) 705890 UNIT/GM POWD Apply topically 3 times daily as needed 30 g 1     nystatin (MYCOSTATIN) cream Apply topically 2 times daily Reported on 3/6/2017 30 g 11     clobetasol (TEMOVATE) 0.05 % ointment Apply sparingly to affected area twice daily for 14 days.  Do not apply to face. (Patient taking differently: daily as needed Apply sparingly to affected area twice daily for 14 days.  Do not apply to face.) 15 g 0     hydrochlorothiazide 12.5 MG TABS Take 1 tablet (12.5 mg) by mouth daily 90 tablet 3     atenolol (TENORMIN) 50 MG tablet Take 1 tablet (50 mg) by mouth daily 90 tablet 3     Emollient (CERAVE) CREA Externally apply topically 2 times daily as needed 1 Bottle 3     OTC products: None, except as noted above    Allergies   Allergen Reactions     Vioxx Itching and Swelling     VIOXX (Swollen Feet,Hands itching), Okay with ibuprofen and aspirin      Latex Allergy: NO    Social History   Substance Use Topics     Smoking status: Never Smoker     Smokeless tobacco: Never Used     Alcohol use 0.0 oz/week     0 Standard drinks or equivalent per week      Comment: mixed very light 1 a month if that     History   Drug Use No       REVIEW OF SYSTEMS:                                                    Constitutional, HEENT, cardiovascular, pulmonary, gi and gu systems are negative, except as otherwise noted.      EXAM:                                                    /77 (BP Location: Left arm, Patient Position: Sitting, Cuff Size: Adult Regular)  Pulse 56  Temp 98.1  F (36.7  C) (Tympanic)  Ht 5' 3\" (1.6 m)  Wt 234 lb (106.1 kg) "  LMP 05/29/2006  BMI 41.45 kg/m2    GENERAL APPEARANCE: healthy, alert and no distress     EYES: EOMI, PERRL     HENT: ear canals and TM's normal and nose and mouth without ulcers or lesions     NECK: no adenopathy, no asymmetry, masses, or scars and thyroid normal to palpation     RESP: lungs clear to auscultation - no rales, rhonchi or wheezes     CV: regular rates and rhythm, normal S1 S2, no S3 or S4 and no murmur, click or rub     ABDOMEN:  soft, nontender, no HSM or masses and bowel sounds normal     MS: extremities normal- no gross deformities noted, no evidence of inflammation in joints, FROM in all extremities.     SKIN: no suspicious lesions or rashes     NEURO: Normal strength and tone, sensory exam grossly normal, mentation intact and speech normal     PSYCH: mentation appears normal. and affect normal/bright     LYMPHATICS: No axillary, cervical, or supraclavicular nodes    DIAGNOSTICS:                                                    EKG: sinus bradycardia, rate 48, normal axis, normal intervals, no acute ST/T changes c/w ischemia, no LVH by voltage criteria, unchanged from previous tracings    Recent Labs   Lab Test  08/16/17   0930  05/17/17   0804   04/18/17   0620   04/05/12   0610   11/04/09   1440   HGB  14.8   --    --   12.7   < >   --    < >   --    PLT  213   --    --   191   < >   --    < >   --    INR  1.05   --    --    --    --   1.47*   < >   --    NA  139  140   < >  142   < >   --    --   140   POTASSIUM  4.0  3.8   < >  3.7   < >   --    < >  4.6   CR  0.74  0.76   < >  0.65   < >   --    --   0.69   A1C   --    --    --    --    --    --    --   5.5    < > = values in this interval not displayed.        IMPRESSION:                                                    Reason for surgery/procedure:   1. Preop general physical exam  - EKG 12-lead complete w/read - Clinics    2. Shoulder injury, right, subsequent encounter   cleared for general anesthesia        The proposed surgical  procedure is considered INTERMEDIATE risk.    REVISED CARDIAC RISK INDEX  The patient has the following serious cardiovascular risks for perioperative complications such as (MI, PE, VFib and 3  AV Block):  No serious cardiac risks  INTERPRETATION: The ASCVD Risk score (Jolieannmarie BRITO Jr, et al., 2013) failed to calculate for the following reasons:    The patient has a prior MCI or stroke diagnosis      The patient has the following additional risks for perioperative complications:  No identified additional risks      ICD-10-CM    1. Preop general physical exam Z01.818    2. Shoulder injury, right, subsequent encounter S49.91XD        RECOMMENDATIONS:                                                      --Consult hospital rounder / IM to assist post-op medical management    --Patient is to take all scheduled medications on the day of surgery EXCEPT for modifications listed below.    APPROVAL GIVEN to proceed with proposed procedure, without further diagnostic evaluation       Signed Electronically by: Mariel Sabillon MD    Copy of this evaluation report is provided to requesting physician.    [place] Preop Guidelines

## 2017-08-21 NOTE — ANESTHESIA PREPROCEDURE EVALUATION
Anesthesia Evaluation     .             ROS/MED HX    ENT/Pulmonary:     (+)sleep apnea, DARWIN risk factors allergic rhinitis, , . .    Neurologic:       Cardiovascular:     (+) hypertension----. : . . . :. . Previous cardiac testing date:results:date: results:ECG reviewed date:8/21/17 results:Marked sinus Bradycardia   BORDERLINE RHYTHM--rate 48   date: results:          METS/Exercise Tolerance:     Hematologic:     (+) Anemia, -      Musculoskeletal:   (+) , , other musculoskeletal- osteoporosis , s/p TKA, LBP, right shoulder injury      GI/Hepatic:     (+) liver disease, Other GI/Hepatic positive FIT , s/p gastric bypass x 2      Renal/Genitourinary:         Endo:     (+) Obesity, .      Psychiatric:     (+) psychiatric history depression      Infectious Disease:         Malignancy:         Other: Comment: Vit D deficiency                    Physical Exam  Normal systems: cardiovascular, pulmonary and dental    Airway   Mallampati: II  TM distance: >3 FB  Neck ROM: full    Dental     Cardiovascular       Pulmonary                     Anesthesia Plan      History & Physical Review  History and physical reviewed and following examination; no interval change.    ASA Status:  3 .    NPO Status:  > 6 hours    Plan for MAC Reason for MAC:  Deep or markedly invasive procedure (G8)         Postoperative Care      Consents  Anesthetic plan, risks, benefits and alternatives discussed with:  Patient..                          .

## 2017-08-21 NOTE — PROGRESS NOTES
[facility]          OUTPATIENT PHYSICAL THERAPY ORTHOPEDIC EVALUATION  PLAN OF TREATMENT FOR OUTPATIENT REHABILITATION  (COMPLETE FOR INITIAL CLAIMS ONLY)  Patient's Last Name, First Name, M.I.  YOB: 1955  Jacquie Harris       Provider s Name:  [facility]   Medical Record No.  [id number]   Start of Care Date:  08/21/17   Onset Date:  04/11/17   Type:     _X__PT   ___OT   ___SLP Medical Diagnosis:  UBP/ axillary pain     PT Diagnosis:  R rib/ UBP   Visits from SOC:  1      _________________________________________________________________________________  Plan of Treatment/Functional Goals:  manual therapy, joint mobilization, ROM, strengthening, stretching (posture)     Goals  Goal Description: 1.  Pt will be able to get in/out of car w/ pain no > 3/10  Target Date: 11/19/17    Goal Description: 2.  Reaching w/ toileting no pain > 3/10  Target Date: 11/19/17     Goal Description: 3.  Pt will wake no > 4X/wk due to rib / upper back pain  Target Date: 11/19/17    Goal Description: 4.  Independent and consistent w/HEP and increased awareness of posture  Target Date: 11/19/17      Therapy Frequency:  other (see comments)  Predicted Duration of Therapy Intervention:  6 visits over the next 90 days due to RCR on 8/25/17    Dasia Wilson, PT                 I CERTIFY THE NEED FOR THESE SERVICES FURNISHED UNDER        THIS PLAN OF TREATMENT AND WHILE UNDER MY CARE     (Physician co-signature of this document indicates review and certification of the therapy plan).                         Certification Date From:  08/21/17   Certification Date To:  11/19/17    Referring Provider:  Mariel Sabillon MD    Initial Assessment        See Epic Evaluation Start of Care Date: 08/21/17

## 2017-08-22 ENCOUNTER — ANESTHESIA (OUTPATIENT)
Dept: GASTROENTEROLOGY | Facility: CLINIC | Age: 62
End: 2017-08-22
Payer: MEDICARE

## 2017-08-22 ENCOUNTER — HOSPITAL ENCOUNTER (OUTPATIENT)
Facility: CLINIC | Age: 62
Discharge: HOME | End: 2017-08-22
Attending: SURGERY | Admitting: SURGERY
Payer: MEDICARE

## 2017-08-22 ENCOUNTER — SURGERY (OUTPATIENT)
Age: 62
End: 2017-08-22

## 2017-08-22 VITALS
OXYGEN SATURATION: 99 % | SYSTOLIC BLOOD PRESSURE: 112 MMHG | HEART RATE: 66 BPM | BODY MASS INDEX: 41.46 KG/M2 | TEMPERATURE: 98.6 F | DIASTOLIC BLOOD PRESSURE: 64 MMHG | RESPIRATION RATE: 16 BRPM | HEIGHT: 63 IN | WEIGHT: 234 LBS

## 2017-08-22 LAB
A1AT PHENOTYP SERPL-IMP: NORMAL
A1AT SERPL-MCNC: 131 MG/DL (ref 90–200)
A1AT SS SERPL-MCNC: NEGATIVE
A1AT SZ SERPL-MCNC: NORMAL
A1AT ZZ SERPL-MCNC: NEGATIVE
COLONOSCOPY: NORMAL
SPECIMEN SOURCE: NORMAL

## 2017-08-22 PROCEDURE — 25000128 H RX IP 250 OP 636: Performed by: SURGERY

## 2017-08-22 PROCEDURE — 45378 DIAGNOSTIC COLONOSCOPY: CPT | Performed by: SURGERY

## 2017-08-22 PROCEDURE — 25000125 ZZHC RX 250: Performed by: SURGERY

## 2017-08-22 PROCEDURE — 25000128 H RX IP 250 OP 636: Performed by: NURSE ANESTHETIST, CERTIFIED REGISTERED

## 2017-08-22 PROCEDURE — 37000008 ZZH ANESTHESIA TECHNICAL FEE, 1ST 30 MIN: Performed by: SURGERY

## 2017-08-22 RX ORDER — ONDANSETRON 2 MG/ML
4 INJECTION INTRAMUSCULAR; INTRAVENOUS
Status: DISCONTINUED | OUTPATIENT
Start: 2017-08-22 | End: 2017-08-22 | Stop reason: HOSPADM

## 2017-08-22 RX ORDER — PROPOFOL 10 MG/ML
INJECTION, EMULSION INTRAVENOUS PRN
Status: DISCONTINUED | OUTPATIENT
Start: 2017-08-22 | End: 2017-08-22

## 2017-08-22 RX ORDER — LIDOCAINE 40 MG/G
CREAM TOPICAL
Status: DISCONTINUED | OUTPATIENT
Start: 2017-08-22 | End: 2017-08-22 | Stop reason: HOSPADM

## 2017-08-22 RX ORDER — SODIUM CHLORIDE, SODIUM LACTATE, POTASSIUM CHLORIDE, CALCIUM CHLORIDE 600; 310; 30; 20 MG/100ML; MG/100ML; MG/100ML; MG/100ML
INJECTION, SOLUTION INTRAVENOUS CONTINUOUS
Status: DISCONTINUED | OUTPATIENT
Start: 2017-08-22 | End: 2017-08-22 | Stop reason: HOSPADM

## 2017-08-22 RX ORDER — PROPOFOL 10 MG/ML
INJECTION, EMULSION INTRAVENOUS CONTINUOUS PRN
Status: DISCONTINUED | OUTPATIENT
Start: 2017-08-22 | End: 2017-08-22

## 2017-08-22 RX ADMIN — SODIUM CHLORIDE, POTASSIUM CHLORIDE, SODIUM LACTATE AND CALCIUM CHLORIDE: 600; 310; 30; 20 INJECTION, SOLUTION INTRAVENOUS at 10:02

## 2017-08-22 RX ADMIN — LIDOCAINE HYDROCHLORIDE 1 ML: 10 INJECTION, SOLUTION EPIDURAL; INFILTRATION; INTRACAUDAL; PERINEURAL at 10:02

## 2017-08-22 RX ADMIN — PROPOFOL 50 MG: 10 INJECTION, EMULSION INTRAVENOUS at 10:16

## 2017-08-22 RX ADMIN — PROPOFOL 200 MCG/KG/MIN: 10 INJECTION, EMULSION INTRAVENOUS at 10:16

## 2017-08-22 RX ADMIN — PROPOFOL 50 MG: 10 INJECTION, EMULSION INTRAVENOUS at 10:15

## 2017-08-22 NOTE — H&P
"62 year old year old female here for colonoscopy for positive FIT test.    Patient Active Problem List   Diagnosis     s/p gastric bypass x 2     Allergic rhinitis     Carpal tunnel syndrome     Obstructive sleep apnea     Rosacea     CARDIOVASCULAR SCREENING; LDL GOAL LESS THAN 160     Tick bite, infected, positive Lyme test 1996 not treated     Vitamin D deficiency     Osteoporosis     Obesity, Class III, BMI 40-49.9 (morbid obesity) (H)     S/P TKR (total knee replacement)     HTN, goal below 140/80     Back pain, left below scapula, strained muscles     Encounter for routine gynecological examination      Colles' fracture     Bilateral cold feet     Elevated levels of transaminase & lactic acid dehydrogenase     Fatty liver     Shoulder injury, right, subsequent encounter       Past Medical History:   Diagnosis Date     HTN (hypertension)      IRON DEFICIENCY ANEMIA 7/5/2005     Iron infusion/ resolved since injections     DARWIN (obstructive sleep apnea)        Past Surgical History:   Procedure Laterality Date     ARTHROPLASTY KNEE  4/2/2012    Procedure:ARTHROPLASTY KNEE; Left Total Knee Arthroplasty--Anesth.Choice; Surgeon:VICENTE MORALES; Location:[place] OR      ORAL SURGERY PROCEDURE  age 18    wisdom teeth     C/SECTION, LOW TRANSVERSE  1978, 1984, 1994    x 3     COLONOSCOPY  2001    normal colonoscopy     GASTRIC BYPASS  1980/2005    Gastric Bypass and revision     HERNIA REPAIR, INCISIONAL  6/16/2008    lap.repair with 10x8 mesh     TUBAL LIGATION  1994       @[facility]@    No current outpatient prescriptions on file.       Allergies   Allergen Reactions     Vioxx Itching and Swelling     VIOXX (Swollen Feet,Hands itching), Okay with ibuprofen and aspirin       Pt reports that she has never smoked. She has never used smokeless tobacco. She reports that she drinks alcohol. She reports that she does not use illicit drugs.    Exam:  /89  Temp 98.6  F (37  C) (Oral)  Resp 20  Ht 1.6 m (5' 3\")  Wt " 106.1 kg (234 lb)  LMP 05/29/2006  SpO2 100%  BMI 41.45 kg/m2    Awake, Alert OX3  Lungs - CTA bilaterally  CV - RRR, no murmurs, distal pulses intact  Abd - soft, non-distended, non-tender, +BS  Extr - No cyanosis or edema    A/P 62 year old year old female in need of colonoscopy for positive FIT test.  Risks, benefits, alternatives, and complications were discussed including the possibility of perforation and the patient agreed to proceed.    Delfino Yoo MD

## 2017-08-22 NOTE — BRIEF OP NOTE
[facility]   Brief Operative Note    Pre-operative diagnosis: positive FIT   Post-operative diagnosis normal colon   Procedure: Procedure(s):  Colonoscopy   - Wound Class: II-Clean Contaminated   Surgeon(s): Surgeon(s) and Role:     * Delfino Yoo MD - Primary   Estimated blood loss: * No values recorded between 8/22/2017 12:00 AM and 8/22/2017 10:34 AM *    Specimens: * No specimens in log *   Findings: Normal colon

## 2017-08-22 NOTE — ANESTHESIA CARE TRANSFER NOTE
Patient: Jacquie Harris    Procedure(s):  Colonoscopy   - Wound Class: II-Clean Contaminated    Diagnosis: positive FIT  Diagnosis Additional Information: No value filed.    Anesthesia Type:   MAC     Note:    Patient transferred to:Phase II        Vitals: (Last set prior to Anesthesia Care Transfer)    CRNA VITALS  8/22/2017 1001 - 8/22/2017 1034      8/22/2017             NIBP: (!)  83/40    Pulse: 61    NIBP Mean: 55    Ht Rate: 61    SpO2: 98 %                Electronically Signed By: Jazzmine Sebastian CRNA, APRN CRNA  August 22, 2017  10:34 AM

## 2017-08-22 NOTE — ANESTHESIA POSTPROCEDURE EVALUATION
Patient: Jacquie Harris    Procedure(s):  Colonoscopy   - Wound Class: II-Clean Contaminated    Diagnosis:positive FIT  Diagnosis Additional Information: No value filed.    Anesthesia Type:  MAC    Note:  Anesthesia Post Evaluation    Patient location during evaluation: Bedside  Patient participation: Able to fully participate in evaluation  Level of consciousness: awake and alert  Pain management: adequate  Airway patency: patent  Cardiovascular status: acceptable  Respiratory status: acceptable  Hydration status: acceptable  PONV: none     Anesthetic complications: None          Last vitals:  Vitals:    08/22/17 0926   BP: 121/89   Resp: 20   Temp: 37  C (98.6  F)   SpO2: 100%         Electronically Signed By: Jazzmine Sebastian CRNA, APRN CRNA  August 22, 2017  10:34 AM

## 2017-08-24 ENCOUNTER — ANESTHESIA EVENT (OUTPATIENT)
Dept: SURGERY | Facility: CLINIC | Age: 62
End: 2017-08-24
Payer: MEDICARE

## 2017-08-25 ENCOUNTER — HOSPITAL ENCOUNTER (OUTPATIENT)
Facility: CLINIC | Age: 62
Discharge: HOME | End: 2017-08-25
Attending: ORTHOPAEDIC SURGERY | Admitting: ORTHOPAEDIC SURGERY
Payer: MEDICARE

## 2017-08-25 ENCOUNTER — ANESTHESIA (OUTPATIENT)
Dept: SURGERY | Facility: CLINIC | Age: 62
End: 2017-08-25
Payer: MEDICARE

## 2017-08-25 VITALS
SYSTOLIC BLOOD PRESSURE: 131 MMHG | WEIGHT: 234 LBS | BODY MASS INDEX: 41.46 KG/M2 | OXYGEN SATURATION: 93 % | RESPIRATION RATE: 16 BRPM | TEMPERATURE: 98.2 F | HEIGHT: 63 IN | DIASTOLIC BLOOD PRESSURE: 74 MMHG

## 2017-08-25 PROCEDURE — 25000566 ZZH SEVOFLURANE, EA 15 MIN: Performed by: ORTHOPAEDIC SURGERY

## 2017-08-25 PROCEDURE — 36000063 ZZH SURGERY LEVEL 4 EA 15 ADDTL MIN: Performed by: ORTHOPAEDIC SURGERY

## 2017-08-25 PROCEDURE — 25000125 ZZHC RX 250: Performed by: NURSE ANESTHETIST, CERTIFIED REGISTERED

## 2017-08-25 PROCEDURE — 71000027 ZZH RECOVERY PHASE 2 EACH 15 MINS: Performed by: ORTHOPAEDIC SURGERY

## 2017-08-25 PROCEDURE — C1713 ANCHOR/SCREW BN/BN,TIS/BN: HCPCS | Performed by: ORTHOPAEDIC SURGERY

## 2017-08-25 PROCEDURE — 25000128 H RX IP 250 OP 636: Performed by: NURSE ANESTHETIST, CERTIFIED REGISTERED

## 2017-08-25 PROCEDURE — 37000009 ZZH ANESTHESIA TECHNICAL FEE, EACH ADDTL 15 MIN: Performed by: ORTHOPAEDIC SURGERY

## 2017-08-25 PROCEDURE — 71000012 ZZH RECOVERY PHASE 1 LEVEL 1 FIRST HR: Performed by: ORTHOPAEDIC SURGERY

## 2017-08-25 PROCEDURE — 37000008 ZZH ANESTHESIA TECHNICAL FEE, 1ST 30 MIN: Performed by: ORTHOPAEDIC SURGERY

## 2017-08-25 PROCEDURE — A9270 NON-COVERED ITEM OR SERVICE: HCPCS | Mod: GY | Performed by: PHYSICIAN ASSISTANT

## 2017-08-25 PROCEDURE — 40000306 ZZH STATISTIC PRE PROC ASSESS II: Performed by: ORTHOPAEDIC SURGERY

## 2017-08-25 PROCEDURE — 27210794 ZZH OR GENERAL SUPPLY STERILE: Performed by: ORTHOPAEDIC SURGERY

## 2017-08-25 PROCEDURE — 25000128 H RX IP 250 OP 636: Performed by: PHYSICIAN ASSISTANT

## 2017-08-25 PROCEDURE — 25000132 ZZH RX MED GY IP 250 OP 250 PS 637: Mod: GY | Performed by: PHYSICIAN ASSISTANT

## 2017-08-25 PROCEDURE — 36000093 ZZH SURGERY LEVEL 4 1ST 30 MIN: Performed by: ORTHOPAEDIC SURGERY

## 2017-08-25 DEVICE — IMP ANCHOR ARTHREX BIO-SWIVLK W/F TAPE 4.75MM AR-2600SBS-4: Type: IMPLANTABLE DEVICE | Site: SHOULDER | Status: FUNCTIONAL

## 2017-08-25 DEVICE — IMP ANCHOR ARTHREX 5.5MM BIOCOMPOSITE CORKSCREW AR-1927BCF: Type: IMPLANTABLE DEVICE | Site: SHOULDER | Status: FUNCTIONAL

## 2017-08-25 RX ORDER — EPHEDRINE SULFATE 50 MG/ML
INJECTION, SOLUTION INTRAVENOUS PRN
Status: DISCONTINUED | OUTPATIENT
Start: 2017-08-25 | End: 2017-08-25

## 2017-08-25 RX ORDER — KETOROLAC TROMETHAMINE 30 MG/ML
30 INJECTION, SOLUTION INTRAMUSCULAR; INTRAVENOUS EVERY 6 HOURS PRN
Status: DISCONTINUED | OUTPATIENT
Start: 2017-08-25 | End: 2017-08-25 | Stop reason: HOSPADM

## 2017-08-25 RX ORDER — CEFAZOLIN SODIUM 1 G/3ML
1 INJECTION, POWDER, FOR SOLUTION INTRAMUSCULAR; INTRAVENOUS SEE ADMIN INSTRUCTIONS
Status: DISCONTINUED | OUTPATIENT
Start: 2017-08-25 | End: 2017-08-25 | Stop reason: HOSPADM

## 2017-08-25 RX ORDER — NALOXONE HYDROCHLORIDE 0.4 MG/ML
.1-.4 INJECTION, SOLUTION INTRAMUSCULAR; INTRAVENOUS; SUBCUTANEOUS
Status: DISCONTINUED | OUTPATIENT
Start: 2017-08-25 | End: 2017-08-25 | Stop reason: HOSPADM

## 2017-08-25 RX ORDER — METOCLOPRAMIDE 10 MG/1
10 TABLET ORAL EVERY 6 HOURS PRN
Status: DISCONTINUED | OUTPATIENT
Start: 2017-08-25 | End: 2017-08-25 | Stop reason: HOSPADM

## 2017-08-25 RX ORDER — LIDOCAINE HCL/EPINEPHRINE/PF 2%-1:200K
VIAL (ML) INJECTION PRN
Status: DISCONTINUED | OUTPATIENT
Start: 2017-08-25 | End: 2017-08-25

## 2017-08-25 RX ORDER — ACETAMINOPHEN 325 MG/1
975 TABLET ORAL ONCE
Status: COMPLETED | OUTPATIENT
Start: 2017-08-25 | End: 2017-08-25

## 2017-08-25 RX ORDER — FENTANYL CITRATE 50 UG/ML
25-50 INJECTION, SOLUTION INTRAMUSCULAR; INTRAVENOUS
Status: DISCONTINUED | OUTPATIENT
Start: 2017-08-25 | End: 2017-08-25 | Stop reason: HOSPADM

## 2017-08-25 RX ORDER — LIDOCAINE HYDROCHLORIDE 10 MG/ML
INJECTION, SOLUTION EPIDURAL; INFILTRATION; INTRACAUDAL; PERINEURAL PRN
Status: DISCONTINUED | OUTPATIENT
Start: 2017-08-25 | End: 2017-08-25

## 2017-08-25 RX ORDER — LIDOCAINE 40 MG/G
CREAM TOPICAL
Status: DISCONTINUED | OUTPATIENT
Start: 2017-08-25 | End: 2017-08-25 | Stop reason: HOSPADM

## 2017-08-25 RX ORDER — LIDOCAINE HYDROCHLORIDE 10 MG/ML
INJECTION, SOLUTION INFILTRATION; PERINEURAL PRN
Status: DISCONTINUED | OUTPATIENT
Start: 2017-08-25 | End: 2017-08-25

## 2017-08-25 RX ORDER — ONDANSETRON 2 MG/ML
4 INJECTION INTRAMUSCULAR; INTRAVENOUS EVERY 30 MIN PRN
Status: DISCONTINUED | OUTPATIENT
Start: 2017-08-25 | End: 2017-08-25 | Stop reason: HOSPADM

## 2017-08-25 RX ORDER — PROPOFOL 10 MG/ML
INJECTION, EMULSION INTRAVENOUS PRN
Status: DISCONTINUED | OUTPATIENT
Start: 2017-08-25 | End: 2017-08-25

## 2017-08-25 RX ORDER — HYDROMORPHONE HYDROCHLORIDE 1 MG/ML
.3-.5 INJECTION, SOLUTION INTRAMUSCULAR; INTRAVENOUS; SUBCUTANEOUS EVERY 10 MIN PRN
Status: DISCONTINUED | OUTPATIENT
Start: 2017-08-25 | End: 2017-08-25 | Stop reason: HOSPADM

## 2017-08-25 RX ORDER — ROPIVACAINE HYDROCHLORIDE 5 MG/ML
INJECTION, SOLUTION EPIDURAL; INFILTRATION; PERINEURAL PRN
Status: DISCONTINUED | OUTPATIENT
Start: 2017-08-25 | End: 2017-08-25

## 2017-08-25 RX ORDER — MEPERIDINE HYDROCHLORIDE 25 MG/ML
12.5 INJECTION INTRAMUSCULAR; INTRAVENOUS; SUBCUTANEOUS
Status: DISCONTINUED | OUTPATIENT
Start: 2017-08-25 | End: 2017-08-25 | Stop reason: HOSPADM

## 2017-08-25 RX ORDER — ALBUTEROL SULFATE 0.83 MG/ML
2.5 SOLUTION RESPIRATORY (INHALATION) EVERY 4 HOURS PRN
Status: DISCONTINUED | OUTPATIENT
Start: 2017-08-25 | End: 2017-08-25 | Stop reason: HOSPADM

## 2017-08-25 RX ORDER — METOCLOPRAMIDE HYDROCHLORIDE 5 MG/ML
10 INJECTION INTRAMUSCULAR; INTRAVENOUS EVERY 6 HOURS PRN
Status: DISCONTINUED | OUTPATIENT
Start: 2017-08-25 | End: 2017-08-25 | Stop reason: HOSPADM

## 2017-08-25 RX ORDER — ONDANSETRON 4 MG/1
4 TABLET, ORALLY DISINTEGRATING ORAL EVERY 30 MIN PRN
Status: DISCONTINUED | OUTPATIENT
Start: 2017-08-25 | End: 2017-08-25 | Stop reason: HOSPADM

## 2017-08-25 RX ORDER — SODIUM CHLORIDE, SODIUM LACTATE, POTASSIUM CHLORIDE, CALCIUM CHLORIDE 600; 310; 30; 20 MG/100ML; MG/100ML; MG/100ML; MG/100ML
INJECTION, SOLUTION INTRAVENOUS CONTINUOUS
Status: DISCONTINUED | OUTPATIENT
Start: 2017-08-25 | End: 2017-08-25 | Stop reason: HOSPADM

## 2017-08-25 RX ORDER — FENTANYL CITRATE 50 UG/ML
INJECTION, SOLUTION INTRAMUSCULAR; INTRAVENOUS PRN
Status: DISCONTINUED | OUTPATIENT
Start: 2017-08-25 | End: 2017-08-25

## 2017-08-25 RX ORDER — CEFAZOLIN SODIUM 2 G/100ML
2 INJECTION, SOLUTION INTRAVENOUS
Status: DISCONTINUED | OUTPATIENT
Start: 2017-08-25 | End: 2017-08-25 | Stop reason: HOSPADM

## 2017-08-25 RX ADMIN — MIDAZOLAM HYDROCHLORIDE 2 MG: 1 INJECTION, SOLUTION INTRAMUSCULAR; INTRAVENOUS at 12:52

## 2017-08-25 RX ADMIN — ROCURONIUM BROMIDE 20 MG: 10 INJECTION INTRAVENOUS at 13:31

## 2017-08-25 RX ADMIN — FENTANYL CITRATE 100 MCG: 50 INJECTION, SOLUTION INTRAMUSCULAR; INTRAVENOUS at 13:07

## 2017-08-25 RX ADMIN — MIDAZOLAM HYDROCHLORIDE 1 MG: 1 INJECTION, SOLUTION INTRAMUSCULAR; INTRAVENOUS at 13:31

## 2017-08-25 RX ADMIN — MIDAZOLAM HYDROCHLORIDE 1 MG: 1 INJECTION, SOLUTION INTRAMUSCULAR; INTRAVENOUS at 13:07

## 2017-08-25 RX ADMIN — EPHEDRINE SULFATE 10 MG: 50 INJECTION, SOLUTION INTRAVENOUS at 13:47

## 2017-08-25 RX ADMIN — LIDOCAINE HYDROCHLORIDE 1 ML: 10 INJECTION, SOLUTION EPIDURAL; INFILTRATION; INTRACAUDAL; PERINEURAL at 12:11

## 2017-08-25 RX ADMIN — SODIUM CHLORIDE, POTASSIUM CHLORIDE, SODIUM LACTATE AND CALCIUM CHLORIDE: 600; 310; 30; 20 INJECTION, SOLUTION INTRAVENOUS at 12:11

## 2017-08-25 RX ADMIN — SODIUM CHLORIDE, POTASSIUM CHLORIDE, SODIUM LACTATE AND CALCIUM CHLORIDE: 600; 310; 30; 20 INJECTION, SOLUTION INTRAVENOUS at 15:54

## 2017-08-25 RX ADMIN — ROPIVACAINE HYDROCHLORIDE 15 ML: 5 INJECTION, SOLUTION EPIDURAL; INFILTRATION; PERINEURAL at 13:12

## 2017-08-25 RX ADMIN — CEFAZOLIN 2 G: 1 INJECTION, POWDER, FOR SOLUTION INTRAMUSCULAR; INTRAVENOUS at 13:40

## 2017-08-25 RX ADMIN — LIDOCAINE HYDROCHLORIDE 2 ML: 10 INJECTION, SOLUTION EPIDURAL; INFILTRATION; INTRACAUDAL; PERINEURAL at 13:06

## 2017-08-25 RX ADMIN — ROPIVACAINE HYDROCHLORIDE 15 ML: 5 INJECTION, SOLUTION EPIDURAL; INFILTRATION; PERINEURAL at 13:13

## 2017-08-25 RX ADMIN — EPHEDRINE SULFATE 10 MG: 50 INJECTION, SOLUTION INTRAVENOUS at 14:30

## 2017-08-25 RX ADMIN — LIDOCAINE HYDROCHLORIDE,EPINEPHRINE BITARTRATE 3 ML: 20; .005 INJECTION, SOLUTION EPIDURAL; INFILTRATION; INTRACAUDAL; PERINEURAL at 13:11

## 2017-08-25 RX ADMIN — FENTANYL CITRATE 100 MCG: 50 INJECTION, SOLUTION INTRAMUSCULAR; INTRAVENOUS at 12:52

## 2017-08-25 RX ADMIN — LIDOCAINE HYDROCHLORIDE 3 ML: 10 INJECTION, SOLUTION EPIDURAL; INFILTRATION; INTRACAUDAL; PERINEURAL at 12:53

## 2017-08-25 RX ADMIN — ACETAMINOPHEN 975 MG: 325 TABLET, FILM COATED ORAL at 12:16

## 2017-08-25 RX ADMIN — PROPOFOL 200 MG: 10 INJECTION, EMULSION INTRAVENOUS at 13:31

## 2017-08-25 RX ADMIN — LIDOCAINE HYDROCHLORIDE 50 MG: 10 INJECTION, SOLUTION INFILTRATION; PERINEURAL at 13:31

## 2017-08-25 RX ADMIN — FENTANYL CITRATE 50 MCG: 50 INJECTION, SOLUTION INTRAMUSCULAR; INTRAVENOUS at 13:45

## 2017-08-25 RX ADMIN — ONDANSETRON 4 MG: 2 INJECTION INTRAMUSCULAR; INTRAVENOUS at 15:50

## 2017-08-25 NOTE — ANESTHESIA CARE TRANSFER NOTE
Patient: Jacquie Steven    Procedure(s):  Right shoulder distal clavicle excision, subacromial decompression, rotator cuff repair  - Wound Class: I-Clean    Diagnosis: Right rotator cuff tear  Diagnosis Additional Information: No value filed.    Anesthesia Type:   General, ETT, Periph. Nerve Block for postop pain     Note:  Airway :Nasal Cannula  Patient transferred to:PACU        Vitals: (Last set prior to Anesthesia Care Transfer)    CRNA VITALS  8/25/2017 1429 - 8/25/2017 1504      8/25/2017             Resp Rate (observed): (!)  5                Electronically Signed By: Jazzmine Sebastian CRNA, APRN CRNA  August 25, 2017  3:04 PM

## 2017-08-25 NOTE — ANESTHESIA PREPROCEDURE EVALUATION
Anesthesia Evaluation     . Pt has had prior anesthetic. Type: MAC and General           ROS/MED HX    ENT/Pulmonary:     (+)sleep apnea, DARWIN risk factors hypertension, allergic rhinitis, doesn't use CPAP , . .    Neurologic:  - neg neurologic ROS     Cardiovascular:     (+) hypertension----. : . . . :. .       METS/Exercise Tolerance:     Hematologic:     (+) Anemia, -      Musculoskeletal:  - neg musculoskeletal ROS       GI/Hepatic:     (+) Other GI/Hepatic GB x 2      Renal/Genitourinary:  - ROS Renal section negative       Endo: Comment: MORBID OBESITY    (+) Obesity, .      Psychiatric:  - neg psychiatric ROS       Infectious Disease:  - neg infectious disease ROS       Malignancy:      - no malignancy   Other:    (+) H/O Chronic Pain,                   Physical Exam  Normal systems: cardiovascular, pulmonary and dental    Airway   Mallampati: II  TM distance: >3 FB  Neck ROM: full    Dental     Cardiovascular       Pulmonary                     Anesthesia Plan      History & Physical Review  History and physical reviewed and following examination; no interval change.    ASA Status:  3 .    NPO Status:  > 6 hours    Plan for General, ETT and Periph. Nerve Block for postop pain with Intravenous and Propofol induction. Maintenance will be Balanced.    PONV prophylaxis:  Ondansetron (or other 5HT-3) and Dexamethasone or Solumedrol  Additional equipment: Videolaryngoscope      Postoperative Care  Postoperative pain management:  IV analgesics, Oral pain medications and Peripheral nerve block (Single Shot).      Consents  Anesthetic plan, risks, benefits and alternatives discussed with:  Patient..                          .

## 2017-08-25 NOTE — H&P (VIEW-ONLY)
[facility]  [address] [address]  [phone]  Dept: [phone]    PRE-OP EVALUATION:  Today's date: 2017    Jacquie Harris (: 1955) presents for pre-operative evaluation assessment as requested by Dr. Thompson.  She requires evaluation and anesthesia risk assessment prior to undergoing surgery/procedure for treatment of torn rotator cuff .  Proposed procedure: Right shoulder distal clavicle excision, subacromial decompression, rotator cuff repair with possible ganglion cyst excision.    Date of Surgery/ Procedure: 17  Time of Surgery/ Procedure: 1:35 pm  Hospital/Surgical Facility: [facility]    Primary Physician: Mariel Sabillon  Type of Anesthesia Anticipated: General    Patient has a Health Care Directive or Living Will:  NO    1. NO - Do you have a history of heart attack, stroke, stent, bypass or surgery on an artery in the head, neck, heart or legs?  2. NO - Do you ever have any pain or discomfort in your chest?  3. NO - Do you have a history of  Heart Failure?  4. NO - Are you troubled by shortness of breath when: walking on the level, up a slight hill or at night?  5. NO - Do you currently have a cold, bronchitis or other respiratory infection?  6. NO - Do you have a cough, shortness of breath or wheezing?  7. NO - Do you sometimes get pains in the calves of your legs when you walk?  8. NO - Do you or anyone in your family have previous history of blood clots?  9. YES, Personal hx. - Do you or does anyone in your family have a serious bleeding problem such as prolonged bleeding following surgeries or cuts?  10. YES - Have you ever had problems with anemia or been told to take iron pills?  11. NO - Have you had any abnormal blood loss such as black, tarry or bloody stools, or abnormal vaginal bleeding?  12. YES - Have you ever had a blood transfusion?  13. NO - Have you or any of your relatives ever had problems with anesthesia?  14.  YES - Do you have sleep apnea, excessive snoring or daytime drowsiness?  15. NO - Do you have any prosthetic heart valves?  16. YES, Knee - Do you have prosthetic joints?  17. NO - Is there any chance that you may be pregnant?        HPI:                                                      Brief HPI related to upcoming procedure: Jacquie Harris is 62 year old white female with obesity, fatty liver, HTN, osteoporosis and right shoulder injury who is here to get clearance to have general anesthesia.  MR shoulder:  1. 1.9 cm supraspinatus tendon tear with full-thickness involvement.  Moderate tendinosis.  2. Some anatomic findings which can predispose to impingement.  3. 2.3 cm ganglion cyst extending between the supraspinatus and  infraspinatus tendons.    See problem list for active medical problems.  Problems all longstanding and stable, except as noted/documented.  See ROS for pertinent symptoms related to these conditions.                                                                                                  .    MEDICAL HISTORY:                                                    Patient Active Problem List    Diagnosis Date Noted     Shoulder injury, right, subsequent encounter 08/21/2017     Priority: Medium     Fatty liver 04/20/2017     Priority: Medium     Elevated levels of transaminase & lactic acid dehydrogenase 04/15/2017     Priority: Medium     Bilateral cold feet 01/03/2017     Priority: Medium     Colles' fracture 07/06/2015     Priority: Medium     Encounter for routine gynecological examination  06/04/2015     Priority: Medium     Back pain, left below scapula, strained muscles 10/24/2013     Priority: Medium     HTN, goal below 140/80 05/05/2013     Priority: Medium     S/P TKR (total knee replacement) 04/03/2012     Priority: Medium     Osteoporosis 02/19/2012     Priority: Medium     Previous T scores -2.2  2/2012 Dexascan:  Lumbar spine L1-L4 T-score: -2.4, Left femoral neck T-score:  -2.7, Right femoral neck T-score: -2.2   Percent change in lumbar spine: +6.6% since 4/16/2008   Percent change in femurs: +2.9% since 4/16/2008    IMPRESSION: Osteoporosis in the left femoral neck. Severe osteopenia  in the right femoral neck and lumbar spine.       Obesity, Class III, BMI 40-49.9 (morbid obesity) (H) 02/19/2012     Priority: Medium     ideal weight 120, goal weight 200, lose 58 lbs in 58 weeks.       Vitamin D deficiency 07/18/2011     Priority: Medium     Tick bite, infected, positive Lyme test 1996 not treated 07/08/2011     Priority: Medium     Rosacea 11/04/2009     Priority: Medium     Obstructive sleep apnea 12/01/2006     Priority: Medium     Sleep study 12/06 for EDS- AHI 36, desaturations to 65%. CPAP recommended but not tolerated.  01/16/07  ENT consult recommend CPAP and weight loss. Surgery discussed but success rate less than CPAP  1/15/07 CPAP trial, was not able to tolerate. Retried 2008, tolerated better.   Problem list name updated by automated process. Provider to review       Carpal tunnel syndrome 06/07/2006     Priority: Medium     04/10/09  [facility]. Finding consistant with CTS, but nerve studies and MRI of C-Spine are normal. Corticosteroid injection given in office       CARDIOVASCULAR SCREENING; LDL GOAL LESS THAN 160 10/31/2010     Priority: Low     Allergic rhinitis 03/27/2006     Priority: Low     Problem list name updated by automated process. Provider to review       s/p gastric bypass x 2 07/05/2005     Priority: Low     Gastric bypass 1980        Past Medical History:   Diagnosis Date     HTN (hypertension)      IRON DEFICIENCY ANEMIA 7/5/2005     Iron infusion/ resolved since injections     DARWIN (obstructive sleep apnea)      Past Surgical History:   Procedure Laterality Date     ARTHROPLASTY KNEE  4/2/2012    Procedure:ARTHROPLASTY KNEE; Left Total Knee Arthroplasty--Anesth.Choice; Surgeon:VICENTE MORALES; Location:[facility] ORAL SURGERY  "PROCEDURE  age 18    wisdom teeth     C/SECTION, LOW TRANSVERSE  1978, 1984, 1994    x 3     COLONOSCOPY  2001    normal colonoscopy     GASTRIC BYPASS  1980/2005    Gastric Bypass and revision     HERNIA REPAIR, INCISIONAL  6/16/2008    lap.repair with 10x8 mesh     TUBAL LIGATION  1994     Current Outpatient Prescriptions   Medication Sig Dispense Refill     nystatin (MYCOSTATIN) 475616 UNIT/GM POWD Apply topically 3 times daily as needed 30 g 1     nystatin (MYCOSTATIN) cream Apply topically 2 times daily Reported on 3/6/2017 30 g 11     clobetasol (TEMOVATE) 0.05 % ointment Apply sparingly to affected area twice daily for 14 days.  Do not apply to face. (Patient taking differently: daily as needed Apply sparingly to affected area twice daily for 14 days.  Do not apply to face.) 15 g 0     hydrochlorothiazide 12.5 MG TABS Take 1 tablet (12.5 mg) by mouth daily 90 tablet 3     atenolol (TENORMIN) 50 MG tablet Take 1 tablet (50 mg) by mouth daily 90 tablet 3     Emollient (CERAVE) CREA Externally apply topically 2 times daily as needed 1 Bottle 3     OTC products: None, except as noted above    Allergies   Allergen Reactions     Vioxx Itching and Swelling     VIOXX (Swollen Feet,Hands itching), Okay with ibuprofen and aspirin      Latex Allergy: NO    Social History   Substance Use Topics     Smoking status: Never Smoker     Smokeless tobacco: Never Used     Alcohol use 0.0 oz/week     0 Standard drinks or equivalent per week      Comment: mixed very light 1 a month if that     History   Drug Use No       REVIEW OF SYSTEMS:                                                    Constitutional, HEENT, cardiovascular, pulmonary, gi and gu systems are negative, except as otherwise noted.      EXAM:                                                    /77 (BP Location: Left arm, Patient Position: Sitting, Cuff Size: Adult Regular)  Pulse 56  Temp 98.1  F (36.7  C) (Tympanic)  Ht 5' 3\" (1.6 m)  Wt 234 lb (106.1 kg) "  LMP 05/29/2006  BMI 41.45 kg/m2    GENERAL APPEARANCE: healthy, alert and no distress     EYES: EOMI, PERRL     HENT: ear canals and TM's normal and nose and mouth without ulcers or lesions     NECK: no adenopathy, no asymmetry, masses, or scars and thyroid normal to palpation     RESP: lungs clear to auscultation - no rales, rhonchi or wheezes     CV: regular rates and rhythm, normal S1 S2, no S3 or S4 and no murmur, click or rub     ABDOMEN:  soft, nontender, no HSM or masses and bowel sounds normal     MS: extremities normal- no gross deformities noted, no evidence of inflammation in joints, FROM in all extremities.     SKIN: no suspicious lesions or rashes     NEURO: Normal strength and tone, sensory exam grossly normal, mentation intact and speech normal     PSYCH: mentation appears normal. and affect normal/bright     LYMPHATICS: No axillary, cervical, or supraclavicular nodes    DIAGNOSTICS:                                                    EKG: sinus bradycardia, rate 48, normal axis, normal intervals, no acute ST/T changes c/w ischemia, no LVH by voltage criteria, unchanged from previous tracings    Recent Labs   Lab Test  08/16/17   0930  05/17/17   0804   04/18/17   0620   04/05/12   0610   11/04/09   1440   HGB  14.8   --    --   12.7   < >   --    < >   --    PLT  213   --    --   191   < >   --    < >   --    INR  1.05   --    --    --    --   1.47*   < >   --    NA  139  140   < >  142   < >   --    --   140   POTASSIUM  4.0  3.8   < >  3.7   < >   --    < >  4.6   CR  0.74  0.76   < >  0.65   < >   --    --   0.69   A1C   --    --    --    --    --    --    --   5.5    < > = values in this interval not displayed.        IMPRESSION:                                                    Reason for surgery/procedure:   1. Preop general physical exam  - EKG 12-lead complete w/read - Clinics    2. Shoulder injury, right, subsequent encounter   cleared for general anesthesia        The proposed surgical  procedure is considered INTERMEDIATE risk.    REVISED CARDIAC RISK INDEX  The patient has the following serious cardiovascular risks for perioperative complications such as (MI, PE, VFib and 3  AV Block):  No serious cardiac risks  INTERPRETATION: The ASCVD Risk score (Jolieannmarie BRITO Jr, et al., 2013) failed to calculate for the following reasons:    The patient has a prior MCI or stroke diagnosis      The patient has the following additional risks for perioperative complications:  No identified additional risks      ICD-10-CM    1. Preop general physical exam Z01.818    2. Shoulder injury, right, subsequent encounter S49.91XD        RECOMMENDATIONS:                                                      --Consult hospital rounder / IM to assist post-op medical management    --Patient is to take all scheduled medications on the day of surgery EXCEPT for modifications listed below.    APPROVAL GIVEN to proceed with proposed procedure, without further diagnostic evaluation       Signed Electronically by: Mariel Sabillon MD    Copy of this evaluation report is provided to requesting physician.    [facility] Preop Guidelines

## 2017-08-25 NOTE — DISCHARGE INSTRUCTIONS
Same Day Surgery Discharge Instructions  Special Precautions After Surgery - Adult    1. It is not unusual to feel lightheaded or faint, up to 24 hours after surgery or while taking pain medication.  If you have these symptoms; sit for a few minutes before standing and have someone assist you when getting up.  2. You should rest and relax for the next 24 hours and must have someone stay with you for at least 24 hours after your discharge.  3. DO NOT DRIVE any vehicle or operate mechanical equipment for 24 hours following the end of your surgery.  DO NOT DRIVE while taking narcotic pain medications that have been prescribed by your physician.  If you had a limb operated on, you must be able to use it fully to drive.  4. DO NOT drink alcoholic beverages for 24 hours following surgery or while taking prescription pain medication.  5. Drink clear liquids (apple juice, ginger ale, broth, 7-Up, etc.).  Progress to your regular diet as you feel able.  6. Any questions call your physician and do not make important decisions for 24 hours.    Start your pain pills at bedtime per anesthesia instructions.    [facility]:  [phone]       Same Day Surgery [phone], Monday thru Friday 6am-9pm.      Nausea and Vomiting  What are nausea and vomiting?   Nausea is the queasy feeling you usually have before you vomit. Vomiting is the forceful emptying (throwing up) of the stomach's contents through the mouth.   What causes nausea and vomiting?   Nausea and vomiting are symptoms that may occur with many conditions, such as:   Anesthesia medications   side effect of narcotic medicines  exposure to unpleasant odors or sights   stress and anxiety     How is it treated?   At first you should rest your stomach for a few hours by eating nothing solid and sipping only clear liquids. A little later you can eat soft bland foods that are easy to digest.   It is important to drink small amounts (1 to 4 ounces)  often so that you do not become dehydrated. Gradually drink larger amounts of the clear fluids. If you vomit, wait an hour, then start over with a smaller amount of fluid.   Eat slowly and avoid foods that are acidic, spicy, fatty, or fibrous (such as meats, coarse grains, and raw vegetables). Also avoid extremely hot or cold food. In addition, avoid dairy products if you have diarrhea. You may start eating your normal diet again in 3 days or so, when all signs of illness have passed.   Rest as much as possible. Sit or lie down with your head propped up. Do not lie flat for at least 2 hours after eating. Nausea and vomiting usually last only a short period of time. If you have cramping or pain in your belly you can try putting a heating pad set at low or a covered hot water bottle on your belly. Never set a heating pad on high because you could get burned.   If you have been vomiting for more than a day or have had diarrhea for over 3 days, you may need to have an exam by your provider, including a check for dehydration. If you are very dehydrated, you may need to be given fluids intravenously (IV). In children and older adults dehydration can quickly become life threatening.   When should I call my healthcare provider?   Talk with your provider if you are unable to keep fluids down for more than 12 hours or if you have any of the following symptoms with nausea and vomiting:   severe headache or neck ache, or stiff neck   severe abdominal pain   diarrhea and vomiting that last more than 24 hours   blood in the vomited material that may look red, brown, or black, or like coffee grounds   bloody diarrhea   very forceful vomiting   signs of dehydration such as dry mouth, excessive thirst, little or no urination, severe weakness, dizziness, or lightheadedness.   If you have nausea and pain in the jaw, arm, shoulder, chest, or back; sweating; shortness of breath; or lightheadedness; call 911 for emergency care.      Breakthrough Bleeding    How/Why does it occur?   There are many causes of breakthrough bleeding onto your dressing. The two most common causes are increased activity and increased NSAID use.     How is it treated?   The treatment for breakthrough dressing bleeding depends on the cause. For simple problems such as a saturated dressing, you may need to reinforce the dressing with more gauze and tape and put slight pressure on the site.  Call your healthcare provider if something else is causing bleeding.        Post-operative Infection  What is a wound infection?    watch for signs of infection. Signs that the wound/incision is infected include:   Pus or cloudy fluid draining from the incision.   A pimple or yellow crust forming on the incision   The scab is increasing in size.   Increasing redness occurs around the incisions  A red streak is spreading from the wound toward the heart.   The incision has become extremely tender and/or hot   The lymph node draining that area of skin may become large and tender.   You may develop a fever over 100?F (37.8?C).     What is the cause?   Most skin infections follow breaks in the skin (for example, surgery,  from cuts, puncture wounds, animal bites, splinters, thorns, or burns). Bacteria (especially staphylococcus or streptococcus) then invade the wound and cause the infection.    Deeper wounds (like surgical incisions) are much more likely to become infected than superficial wounds (for example, scrapes).     What is the treatment?   Call your doctor's clinic if you feel you have the beginnings of an infection   Antibiotics You will probably need antibiotics prescribed by your healthcare provider. This medicine will kill the germs that are causing the wound infection. Try not to forget any of the doses.  Even if you feel better in a few days, take the antibiotic until it is completely gone to keep the infection from flaring up again.    Fever and pain relief Take  acetaminophen or ibuprofen if you develop a fever over 102?F (39?C)    How can I help prevent infections?   Wash around all new incisions vigorously with soap and water for 5 to 10 minutes to remove dirt and bacteria.      When should I call my healthcare provider?   Call IMMEDIATELY if:   The redness keeps spreading.   The wound becomes extremely painful.   Call during office hours if:   The fever is not gone 48 hours after you start taking an antibiotic.   The wound infection does not look better 3 days after your start taking an antibiotic.   The wound isn't completely healed within 10 days.   You have other questions or concerns.

## 2017-08-25 NOTE — BRIEF OP NOTE
[facility] Operative Note    Pre-operative diagnosis: Right rotator cuff tear   Post-operative diagnosis: Same  Rotator cuff disease   Procedure: Rotator cuff repair (Left)   Surgeon: [facility] Thompson MD   Assistant(s): Estefania Bacon PA-C   Anesthesia: General endotracheal anesthesia   Estimated blood loss: Less than 10 ml   Drains: None   Complications: None   Condition: Stable

## 2017-08-25 NOTE — ANESTHESIA POSTPROCEDURE EVALUATION
Patient: Jacquie Steven    Procedure(s):  Right shoulder distal clavicle excision, subacromial decompression, rotator cuff repair  - Wound Class: I-Clean    Diagnosis:Right rotator cuff tear  Diagnosis Additional Information: No value filed.    Anesthesia Type:  General, ETT, Periph. Nerve Block for postop pain    Note:  Anesthesia Post Evaluation    Patient location during evaluation: Bedside  Patient participation: Able to fully participate in evaluation  Level of consciousness: awake and alert  Pain management: adequate  Airway patency: patent  Cardiovascular status: acceptable  Respiratory status: acceptable  Hydration status: acceptable  PONV: none     Anesthetic complications: None          Last vitals:  Vitals:    08/25/17 1145   BP: 113/72   Resp: 18   Temp: 36.6  C (97.8  F)   SpO2: 97%         Electronically Signed By: Jazzmine Sebastian CRNA, APRN CRNA  August 25, 2017  3:10 PM

## 2017-08-25 NOTE — ANESTHESIA PROCEDURE NOTES
Peripheral nerve/Neuraxial procedure note : Interscalene  Pre-Procedure    Location: pre-op      Pre-Anesthestic Checklist: patient identified, IV checked, site marked, risks and benefits discussed, informed consent, monitors and equipment checked, pre-op evaluation, at physician/surgeon's request and post-op pain management    Timeout  Correct Patient: Yes   Correct Procedure: Yes   Correct Site: Yes   Correct Laterality: Yes   Correct Position: Yes   Site Marked: Yes   .   Procedure Documentation    .    Procedure:    Interscalene.  Local skin infiltrated with 5 mL of 1% lidocaine.     Ultrasound used to identify targeted nerve, plexus, or vascular marker and placed a needle adjacent to it., Ultrasound was used to visualize the spread of the anesthetic in close proximity to the above stated nerve. A permanent image is entered into the patient's record.  Patient Prep;mask, sterile gloves, chlorhexidine gluconate and isopropyl alcohol, patient draped.  Nerve Stim: Initial Level 1 mA.  Lowest motor response 0.44 mA..  Needle: insulated Needle Gauge: 20.    Needle Length (Inches) 4  Insertion Method: Single Shot.       Assessment/Narrative  .  The placement was negative for: blood aspirated, painful injection and site bleeding.  Bolus given via needle..   Secured via.   Complications: none. Test dose of 3 mL lidocaine 2% w/ 1:200,000 epinephrine at 13:11. .

## 2017-08-25 NOTE — OP NOTE
DATE OF SURGERY:  08/25/2017.      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      PROCEDURE:  Mini open right shoulder large rotator cuff repair with distal clavicle excision and subacromial decompression.      SURGEON:  Hernando Thompson MD      ASSISTANT:  Estefania Bacon PA-C      ANESTHESIA:  General endotracheal.      ESTIMATED BLOOD LOSS:  10 mL.      COMPLICATIONS:  None apparent.      INDICATIONS:  Jacquie Harris is a 62-year-old white female who presented with longstanding issues with supraspinatus tendinopathy, AC arthrosis, subacromial impingement syndrome, but then recently going on to a tear.  Alternatives, risks, possible complications carefully discussed prior to obtaining operative consent.      OPERATION:  The patient was brought to the main operating room and after general anesthesia was obtained placed in a beach chair position.  Two grams of Ancef were given intravenously.  Right upper extremity was prepped and draped in the usual sterile fashion.      Oblique incision was made centered off the distal acromion.  Subcutaneous tissue was divided with cautery.  I then made a split within the deltoid fibers and elevated the small portion of the deltoid off the anterior acromion.  Weitlaner retractor was placed deeper.      We then placed a Remington retractor underneath the acromion and resected anterior and inferior osteophytes with an oscillating saw, removing bony fragments.  A rasp was used to smooth off the undersurface.  Wound was irrigated out.      Once again noted the large cuff tear was peeled up off the greater tuberosity.  Unfortunately, a good section of it was markedly shredded.  I broke down all adhesions with a Remington retractor, resected out the diseased  tissue and then made a trough within the greater tuberosity.  Then brought the supraspinatus, infraspinatus complex down as far as absolutely possible to the junction of the humeral articular cartilage in the tuberosity.  I then utilized the Arthrex SpeedBridge with 2 medial anchors and 2 lateral anchors to lock it down.  Then, I did an additional SpeedFix with the medial and lateral anchors with additional 4 FiberWires.  I was able to gain complete closure; however, it was obviously under moderate tension.      I therefore placed baby Hohmann retractors on the distal clavicle, excised approximately 4 mm of bone, removing the bony fragment.  Surfaces were smoothed off with a rongeur.  We irrigated out the wound, removed retractors and reapproximated the deltoid with #1 Stratafix, closed subcutaneous tissue with 2-0 Stratafix, completed closure with 3-0.  Dermabond was placed over the skin followed by a sterile compression bandage and a shoulder immobilizer.  The patient was awoken, extubated and returned to PAR in stable condition, without apparent complication.         VICENTE MORALES MD             D: 2017 14:30   T: 2017 17:14   MT: MARINA      Name:     DESTINY KILPATRICK   MRN:      [id number]        Account:        [id number]   :      1955           Procedure Date: 2017      Document: [id number]       cc: [facility]

## 2017-08-25 NOTE — IP AVS SNAPSHOT
[facility] PreOP/Phase II    [address] [address]    Phone:  [phone]    Fax:  [phone]                                       After Visit Summary   8/25/2017    Jacquie Harris    MRN: [id number]           After Visit Summary Signature Page     I have received my discharge instructions, and my questions have been answered. I have discussed any challenges I see with this plan with the nurse or doctor.    ..........................................................................................................................................  Patient/Patient Representative Signature      ..........................................................................................................................................  Patient Representative Print Name and Relationship to Patient    ..................................................               ................................................  Date                                            Time    ..........................................................................................................................................  Reviewed by Signature/Title    ...................................................              ..............................................  Date                                                            Time

## 2017-08-25 NOTE — IP AVS SNAPSHOT
MRN:[id number]                      After Visit Summary   8/25/2017    Jacquie Harris    MRN: [id number]           Thank you!     Thank you for choosing [facility] for your care. Our goal is always to provide you with excellent care. Hearing back from our patients is one way we can continue to improve our services. Please take a few minutes to complete the written survey that you may receive in the mail after you visit with us. Thank you!        Patient Information     Date Of Birth          1955        About your hospital stay     You were admitted on:  August 25, 2017 You last received care in the:  [facility] PreOP/Phase II    You were discharged on:  August 25, 2017       Who to Call     For medical emergencies, please call 911.  For non-urgent questions about your medical care, please call your primary care provider or clinic, [phone]  For questions related to your surgery, please call your surgery clinic        Attending Provider     Provider Specialty    Hernando Thompson MD Orthopaedic Surgery       Primary Care Provider Office Phone # Fax #    Mariel Va Sabillon [phone]      After Care Instructions      Diet as Tolerated       Return to diet before surgery, unless instructed otherwise.            Discharge Instructions       Review outpatient procedure discharge instructions with patient as directed by Provider            Dressing Change        Daily and as needed            Ice to affected area       Ice pack to surgical site every 15 minutes per hour for 24 hours            Return to clinic       Return to clinic in 2 weeks            Shower        Cover dressing if dressing is not going to be changed today            Weight bearing - As tolerated           Wound care       Do not immerse wound in water until sutures removed                  Your next 10 appointments already scheduled     Sep 12, 2017  7:40 AM CDT   SHORT with Jennie Suazo  "MD   [facility]    [address]  [facility] [address]   [phone]            Sep 12, 2017  8:30 AM CDT   MA SCREENING DIGITAL BILATERAL with [id number]   [facility] ([facility])    [address]  [place] [address]   [phone]           Do not use any powder, lotion or deodorant under your arms or on your breast. If you do, we will ask you to remove it before your exam.  Wear comfortable, two-piece clothing.  If you have any allergies, tell your care team.  Bring any previous mammograms from other facilities or have them mailed to the breast center. Three-dimensional (3D) mammograms are available at [place] locations in [facility], and [place]. [facility] locations include [place] [facility] in [place]. Benefits of 3D mammograms include: - Improved rate of cancer detection - Decreases your chance of having to go back for more tests, which means fewer: - \"False-positive\" results (This means that there is an abnormal area but it isn't cancer.) - Invasive testing procedures, such as a biopsy or surgery - Can provide clearer images of the breast if you have dense breast tissue. 3D mammography is an optional exam that anyone can have with a 2D mammogram. It doesn't replace or take the place of a 2D mammogram. 2D mammograms remain an effective screening test for all women.  Not all insurance companies cover the cost of a 3D mammogram. Check with your insurance.              Further instructions from your care team                         Same Day Surgery Discharge Instructions  Special Precautions After Surgery - Adult    1. It is not unusual to feel lightheaded or faint, up to 24 hours after surgery or while taking pain medication.  If you have these symptoms; sit for a few minutes before standing and have someone assist you when getting up.  2. You should rest " and relax for the next 24 hours and must have someone stay with you for at least 24 hours after your discharge.  3. DO NOT DRIVE any vehicle or operate mechanical equipment for 24 hours following the end of your surgery.  DO NOT DRIVE while taking narcotic pain medications that have been prescribed by your physician.  If you had a limb operated on, you must be able to use it fully to drive.  4. DO NOT drink alcoholic beverages for 24 hours following surgery or while taking prescription pain medication.  5. Drink clear liquids (apple juice, ginger ale, broth, 7-Up, etc.).  Progress to your regular diet as you feel able.  6. Any questions call your physician and do not make important decisions for 24 hours.    Start your pain pills at bedtime per anesthesia instructions.    [facility]:  [phone]       Same Day Surgery [phone], Monday thru Friday 6am-9pm.      Nausea and Vomiting  What are nausea and vomiting?   Nausea is the queasy feeling you usually have before you vomit. Vomiting is the forceful emptying (throwing up) of the stomach's contents through the mouth.   What causes nausea and vomiting?   Nausea and vomiting are symptoms that may occur with many conditions, such as:   Anesthesia medications   side effect of narcotic medicines  exposure to unpleasant odors or sights   stress and anxiety     How is it treated?   At first you should rest your stomach for a few hours by eating nothing solid and sipping only clear liquids. A little later you can eat soft bland foods that are easy to digest.   It is important to drink small amounts (1 to 4 ounces) often so that you do not become dehydrated. Gradually drink larger amounts of the clear fluids. If you vomit, wait an hour, then start over with a smaller amount of fluid.   Eat slowly and avoid foods that are acidic, spicy, fatty, or fibrous (such as meats, coarse grains, and raw vegetables). Also avoid extremely hot or cold food. In addition,  avoid dairy products if you have diarrhea. You may start eating your normal diet again in 3 days or so, when all signs of illness have passed.   Rest as much as possible. Sit or lie down with your head propped up. Do not lie flat for at least 2 hours after eating. Nausea and vomiting usually last only a short period of time. If you have cramping or pain in your belly you can try putting a heating pad set at low or a covered hot water bottle on your belly. Never set a heating pad on high because you could get burned.   If you have been vomiting for more than a day or have had diarrhea for over 3 days, you may need to have an exam by your provider, including a check for dehydration. If you are very dehydrated, you may need to be given fluids intravenously (IV). In children and older adults dehydration can quickly become life threatening.   When should I call my healthcare provider?   Talk with your provider if you are unable to keep fluids down for more than 12 hours or if you have any of the following symptoms with nausea and vomiting:   severe headache or neck ache, or stiff neck   severe abdominal pain   diarrhea and vomiting that last more than 24 hours   blood in the vomited material that may look red, brown, or black, or like coffee grounds   bloody diarrhea   very forceful vomiting   signs of dehydration such as dry mouth, excessive thirst, little or no urination, severe weakness, dizziness, or lightheadedness.   If you have nausea and pain in the jaw, arm, shoulder, chest, or back; sweating; shortness of breath; or lightheadedness; call 911 for emergency care.     Breakthrough Bleeding    How/Why does it occur?   There are many causes of breakthrough bleeding onto your dressing. The two most common causes are increased activity and increased NSAID use.     How is it treated?   The treatment for breakthrough dressing bleeding depends on the cause. For simple problems such as a saturated dressing, you may need  to reinforce the dressing with more gauze and tape and put slight pressure on the site.  Call your healthcare provider if something else is causing bleeding.        Post-operative Infection  What is a wound infection?    watch for signs of infection. Signs that the wound/incision is infected include:   Pus or cloudy fluid draining from the incision.   A pimple or yellow crust forming on the incision   The scab is increasing in size.   Increasing redness occurs around the incisions  A red streak is spreading from the wound toward the heart.   The incision has become extremely tender and/or hot   The lymph node draining that area of skin may become large and tender.   You may develop a fever over 100?F (37.8?C).     What is the cause?   Most skin infections follow breaks in the skin (for example, surgery,  from cuts, puncture wounds, animal bites, splinters, thorns, or burns). Bacteria (especially staphylococcus or streptococcus) then invade the wound and cause the infection.    Deeper wounds (like surgical incisions) are much more likely to become infected than superficial wounds (for example, scrapes).     What is the treatment?   Call your doctor's clinic if you feel you have the beginnings of an infection   Antibiotics You will probably need antibiotics prescribed by your healthcare provider. This medicine will kill the germs that are causing the wound infection. Try not to forget any of the doses.  Even if you feel better in a few days, take the antibiotic until it is completely gone to keep the infection from flaring up again.    Fever and pain relief Take acetaminophen or ibuprofen if you develop a fever over 102?F (39?C)    How can I help prevent infections?   Wash around all new incisions vigorously with soap and water for 5 to 10 minutes to remove dirt and bacteria.      When should I call my healthcare provider?   Call IMMEDIATELY if:   The redness keeps spreading.   The wound becomes extremely painful.  "  Call during office hours if:   The fever is not gone 48 hours after you start taking an antibiotic.   The wound infection does not look better 3 days after your start taking an antibiotic.   The wound isn't completely healed within 10 days.   You have other questions or concerns.               Pending Results     No orders found from 2017 to 2017.            Admission Information     Date & Time Provider Department Dept. Phone    2017 Hernando Thompson MD [facility] PreOP/Phase [phone]      Your Vitals Were     Blood Pressure Temperature Respirations Height Weight Last Period    135/82 98.2  F (36.8  C) 16 1.6 m (5' 3\") 106.1 kg (234 lb) 2006    Pulse Oximetry BMI (Body Mass Index)                95% 41.45 kg/m2          MyChart Information     [facility] lets you send messages to your doctor, view your test results, renew your prescriptions, schedule appointments and more. To sign up, go to [email or web address] . Click on \"Log in\" on the left side of the screen, which will take you to the Welcome page. Then click on \"Sign up Now\" on the right side of the page.     You will be asked to enter the access code listed below, as well as some personal information. Please follow the directions to create your username and password.     Your access code is: [email or web address]  Expires: 10/31/2017  6:30 AM     Your access code will  in 90 days. If you need help or a new code, please call your [place] clinic or [phone].        Care EveryWhere ID     This is your Care EveryWhere ID. This could be used by other organizations to access your [place] medical records  [id number]        Equal Access to Services     [facility] AH: Hadrica Lynch, waedwinda guerita, qaena kaalmada shaun, mildred gonzalez. So [facility] [phone].    ATENCIÓN: Si habla español, tiene a booth disposición servicios gratuitos de asistencia lingüística. Llame al " [phone].    We comply with applicable federal civil rights laws and [place] laws. We do not discriminate on the basis of race, color, national origin, age, disability sex, sexual orientation or gender identity.               Review of your medicines      CONTINUE these medicines which may have CHANGED, or have new prescriptions. If we are uncertain of the size of tablets/capsules you have at home, strength may be listed as something that might have changed.        Dose / Directions    clobetasol 0.05 % ointment   Commonly known as:  TEMOVATE   This may have changed:    - when to take this  - reasons to take this  - additional instructions   Used for:  Vaginal itching        Apply sparingly to affected area twice daily for 14 days.  Do not apply to face.   Quantity:  15 g   Refills:  0         CONTINUE these medicines which have NOT CHANGED        Dose / Directions    atenolol 50 MG tablet   Commonly known as:  TENORMIN   Used for:  HTN, goal below 140/80        Dose:  50 mg   Take 1 tablet (50 mg) by mouth daily   Quantity:  90 tablet   Refills:  3       CERAVE Crea   Used for:  Sunburn, Itching        Externally apply topically 2 times daily as needed   Quantity:  1 Bottle   Refills:  3       hydrochlorothiazide 12.5 MG Tabs tablet   Used for:  HTN, goal below 140/80        Dose:  12.5 mg   Take 1 tablet (12.5 mg) by mouth daily   Quantity:  90 tablet   Refills:  3       * nystatin [id number] UNIT/GM Powd   Commonly known as:  MYCOSTATIN   Used for:  Candidiasis of skin        Apply topically 3 times daily as needed   Quantity:  30 g   Refills:  1       * nystatin cream   Commonly known as:  MYCOSTATIN   Used for:  Candidiasis of skin        Apply topically 2 times daily Reported on 3/6/2017   Quantity:  30 g   Refills:  11       * Notice:  This list has 2 medication(s) that are the same as other medications prescribed for you. Read the directions carefully, and ask your doctor or other care provider to review  them with you.             Protect others around you: Learn how to safely use, store and throw away your medicines at www.disposemymeds.org.             Medication List: This is a list of all your medications and when to take them. Check marks below indicate your daily home schedule. Keep this list as a reference.      Medications           Morning Afternoon Evening Bedtime As Needed    atenolol 50 MG tablet   Commonly known as:  TENORMIN   Take 1 tablet (50 mg) by mouth daily                                CERAVE Crea   Externally apply topically 2 times daily as needed                                clobetasol 0.05 % ointment   Commonly known as:  TEMOVATE   Apply sparingly to affected area twice daily for 14 days.  Do not apply to face.                                hydrochlorothiazide 12.5 MG Tabs tablet   Take 1 tablet (12.5 mg) by mouth daily                                * nystatin [id number] UNIT/GM Powd   Commonly known as:  MYCOSTATIN   Apply topically 3 times daily as needed                                * nystatin cream   Commonly known as:  MYCOSTATIN   Apply topically 2 times daily Reported on 3/6/2017                                * Notice:  This list has 2 medication(s) that are the same as other medications prescribed for you. Read the directions carefully, and ask your doctor or other care provider to review them with you.

## 2017-09-12 ENCOUNTER — HOSPITAL ENCOUNTER (OUTPATIENT)
Dept: PHYSICAL THERAPY | Facility: CLINIC | Age: 62
Setting detail: THERAPIES SERIES
End: 2017-09-12
Attending: PHYSICIAN ASSISTANT
Payer: MEDICARE

## 2017-09-12 ENCOUNTER — OFFICE VISIT (OUTPATIENT)
Dept: FAMILY MEDICINE | Facility: CLINIC | Age: 62
End: 2017-09-12
Payer: MEDICARE

## 2017-09-12 VITALS
WEIGHT: 252 LBS | BODY MASS INDEX: 44.65 KG/M2 | DIASTOLIC BLOOD PRESSURE: 57 MMHG | SYSTOLIC BLOOD PRESSURE: 118 MMHG | TEMPERATURE: 98.5 F | HEIGHT: 63 IN | HEART RATE: 74 BPM

## 2017-09-12 DIAGNOSIS — Z12.31 ENCOUNTER FOR SCREENING MAMMOGRAM FOR BREAST CANCER: Primary | ICD-10-CM

## 2017-09-12 PROCEDURE — 40000718 ZZHC STATISTIC PT DEPARTMENT ORTHO VISIT: Performed by: PHYSICAL THERAPIST

## 2017-09-12 PROCEDURE — G8987 SELF CARE CURRENT STATUS: HCPCS | Mod: GP,CL | Performed by: PHYSICAL THERAPIST

## 2017-09-12 PROCEDURE — G8989 SELF CARE D/C STATUS: HCPCS | Mod: GP,CL | Performed by: PHYSICAL THERAPIST

## 2017-09-12 PROCEDURE — 97110 THERAPEUTIC EXERCISES: CPT | Mod: GP | Performed by: PHYSICAL THERAPIST

## 2017-09-12 PROCEDURE — 99213 OFFICE O/P EST LOW 20 MIN: CPT | Performed by: FAMILY MEDICINE

## 2017-09-12 PROCEDURE — G8988 SELF CARE GOAL STATUS: HCPCS | Mod: GP,CI | Performed by: PHYSICAL THERAPIST

## 2017-09-12 PROCEDURE — 97161 PT EVAL LOW COMPLEX 20 MIN: CPT | Mod: GP | Performed by: PHYSICAL THERAPIST

## 2017-09-12 RX ORDER — HYDROCODONE BITARTRATE AND ACETAMINOPHEN 5; 325 MG/1; MG/1
1 TABLET ORAL EVERY 4 HOURS PRN
Qty: 18 TABLET | Refills: 0 | COMMUNITY
Start: 2017-09-12 | End: 2017-12-14

## 2017-09-12 NOTE — PROGRESS NOTES
09/12/17 1400   General Information   Type of Visit Initial OP Ortho PT Evaluation   Start of Care Date 09/12/17   Referring Physician MARISELA Garland   Patient/Family Goals Statement To be able to use R arm fully   Orders Evaluate and Treat   Date of Order 09/08/17   Insurance Type Medicare   Medical Diagnosis s/p R DCE, SAD, RCR   Body Part(s)   Body Part(s) Shoulder   Presentation and Etiology   Pertinent history of current problem (include personal factors and/or comorbidities that impact the POC) Pt is s/p R RCR (large), DCE, SAD on 8/25/17.  Pt presented to PT w/o a sling.   Pt was told she could go w/o the sling per her report and is not to use it.  Pt notes pain in upper arm / forearm and wrist also R thumb.  Pt states the thumb bothered when she woke up w/ arm in sling.  Pain @ best 3/10, @ worst 8/10.   Meds:  hydrocodone.   Pt is R dominant.  PMHX:  B wrist fx, HBP, anemia, bladder control issues,  arthritis.   Low complexity   Impairments A. Pain;C. Swelling;D. Decreased ROM;E. Decreased flexibility;F. Decreased strength and endurance   Onset date of current episode/exacerbation 08/25/17   Pain quality C. Aching;B. Dull;F. Stabbing;G. Cramping   Pain exacerbation comment no use of R UE currently.  Sleeping in bed propped w/ pillows wakes 4-5X/night to change position or get ice.  Pt did drive today but no use of R UE.     Pain/symptoms eased by E. Changing positions;H. Cold  (Rx meds)   Progression of symptoms since onset: Improved   Current Level of Function   Patient role/employment history F. Retired   Fall Risk Screen   Fall screen completed by PT   Per patient - Fall 2 or more times in past year? No   Per patient - Fall with injury in past year? No   Is patient a fall risk? No   Shoulder Objective Findings   Side (if bilateral, select both right and left) Right   Observation incision healing as expected.   Purple brusing in R upper arm.     Shoulder ROM Comment Elbow AROM ext lacks 10*,   flex WNL   Right Shoulder Flexion PROM 80*   Right Shoulder Abduction PROM scaption   Right Shoulder ER PROM 30* w/ arm at side.   Right Shoulder IR PROM 50* in 20* abd.   Planned Therapy Interventions   Planned Therapy Interventions manual therapy;ROM;strengthening;stretching  (progress as able following large RCR)   Clinical Impression   Criteria for Skilled Therapeutic Interventions Met yes, treatment indicated   PT Diagnosis s/p R shoulder RCR, SAD, DCE   Influenced by the following impairments pain, decreased ROM, decreased strength   Functional limitations due to impairments all daily activities, no reaching or lifting currently   Clinical Presentation Stable/Uncomplicated   Clinical Presentation Rationale progressing as expected following RCR/ DCE/ SAD   Clinical Decision Making (Complexity) Low complexity   Therapy Frequency 2 times/Week   Predicted Duration of Therapy Intervention (days/wks) 6 weeks then 1x/wk x 4 weeks = 16 visits   Risk & Benefits of therapy have been explained Yes   Patient, Family & other staff in agreement with plan of care Yes   Education Assessment   Barriers to Learning No barriers   Ortho Goal 1   Goal Description 1.  Pt will be able to reach to shoulder ht in order to wash her hair w/ minimal difficulty   Target Date 10/27/17   Ortho Goal 2   Goal Description 2. Pt will be able to reach behind back w/ putting on bra   Target Date 11/11/17   Ortho Goal 3   Goal Description 3.   Pt will be able to reach overhead w/ ADL's w/ minimal difficulty   Target Date 12/11/17   Ortho Goal 4   Goal Description 4.  Pt will be able to lift 10-15# w/ ADL's w/ pain no > 2/10 and minimal difficulty   Target Date 12/11/17   Ortho Goal 5   Goal Description 5.  Independent and consistent w/HEP   Target Date 12/11/17   Total Evaluation Time   Total Evaluation Time 20   Therapy Certification   Certification date from 09/12/17   Certification date to 12/11/17   Medical Diagnosis s/p R shoulder DCE/ SAD/  RCR     Thank you for this referral,    Dasia Wilson, PT,  CEAS   #[id number]  [facility]  [phone]

## 2017-09-12 NOTE — PROGRESS NOTES
[facility]          OUTPATIENT PHYSICAL THERAPY ORTHOPEDIC EVALUATION  PLAN OF TREATMENT FOR OUTPATIENT REHABILITATION  (COMPLETE FOR INITIAL CLAIMS ONLY)  Patient's Last Name, First Name, M.I.  YOB: 1955  Jacquie Harris       Provider s Name:  [facility]   Medical Record No.  [id number]   Start of Care Date:  09/12/17   Onset Date:  08/25/17   Type:     _X__PT   ___OT   ___SLP Medical Diagnosis:  s/p R shoulder DCE/ SAD/ RCR     PT Diagnosis:  s/p R shoulder RCR, SAD, DCE   Visits from SOC:  1      _________________________________________________________________________________  Plan of Treatment/Functional Goals:  manual therapy, ROM, strengthening, stretching (progress as able following large RCR)     Goals  Goal Description: 1.  Pt will be able to reach to shoulder ht in order to wash her hair w/ minimal difficulty  Target Date: 10/27/17    Goal Description: 2. Pt will be able to reach behind back w/ putting on bra  Target Date: 11/11/17    Goal Description: 3.   Pt will be able to reach overhead w/ ADL's w/ minimal difficulty  Target Date: 12/11/17    Goal Description: 4.  Pt will be able to lift 10-15# w/ ADL's w/ pain no > 2/10 and minimal difficulty  Target Date: 12/11/17     Goal Description: 5.  Independent and consistent w/HEP  Target Date: 12/11/17    Therapy Frequency:  2 times/Week  Predicted Duration of Therapy Intervention:  6 weeks then 1x/wk x 4 weeks = 16 visits    Dasia Wilson, PT                 I CERTIFY THE NEED FOR THESE SERVICES FURNISHED UNDER        THIS PLAN OF TREATMENT AND WHILE UNDER MY CARE .             Physician Signature               Date    X_____________________________________________________                               Certification Date From:  09/12/17   Certification Date To:  12/11/17    Referring Provider:  MARISELA Garland    Initial Assessment        See Epic Evaluation Start of Care Date: 09/12/17

## 2017-09-12 NOTE — PROGRESS NOTES
SUBJECTIVE:   Jacquie Harris is a 62 year old female who presents to clinic today for the following health issues:      Patient is in for breast exam today on [facility] program     Patient is here for the breast examination and mammogram; She has no new complaints on her breasts.She is not due for a pap.     Problem list and histories reviewed & adjusted, as indicated.  Additional history: as documented    Patient Active Problem List   Diagnosis     s/p gastric bypass x 2     Allergic rhinitis     Carpal tunnel syndrome     Obstructive sleep apnea     Rosacea     CARDIOVASCULAR SCREENING; LDL GOAL LESS THAN 160     Tick bite, infected, positive Lyme test 1996 not treated     Vitamin D deficiency     Osteoporosis     Obesity, Class III, BMI 40-49.9 (morbid obesity) (H)     S/P TKR (total knee replacement)     HTN, goal below 140/80     Back pain, left below scapula, strained muscles     Encounter for routine gynecological examination      Colles' fracture     Bilateral cold feet     Elevated levels of transaminase & lactic acid dehydrogenase     Fatty liver     Shoulder injury, right, subsequent encounter     Past Surgical History:   Procedure Laterality Date     ARTHROPLASTY KNEE  4/2/2012    Procedure:ARTHROPLASTY KNEE; Left Total Knee Arthroplasty--Anesth.Choice; Surgeon:VICENTE THOMPSON; Location:[facility]     ARTHROTOMY SHOULDER, ROTATOR CUFF REPAIR, COMBINED Right 8/25/2017    Procedure: COMBINED ARTHROTOMY SHOULDER, ROTATOR CUFF REPAIR;  Right shoulder distal clavicle excision, subacromial decompression, rotator cuff repair ;  Surgeon: Vicente Thompson MD;  Location: [facility] OR     C ORAL SURGERY PROCEDURE  age 18    wisdom teeth     C/SECTION, LOW TRANSVERSE  1978, 1984, 1994    x 3     COLONOSCOPY  2001    normal colonoscopy     COLONOSCOPY N/A 8/22/2017    Procedure: COLONOSCOPY;  Colonoscopy  ;  Surgeon: Delfino Yoo MD;  Location: [facility]     GASTRIC BYPASS  1980/2005    Gastric Bypass and revision      HERNIA REPAIR, INCISIONAL  6/16/2008    lap.repair with 10x8 mesh     TUBAL LIGATION  1994       Social History   Substance Use Topics     Smoking status: Never Smoker     Smokeless tobacco: Never Used     Alcohol use 0.0 oz/week     0 Standard drinks or equivalent per week      Comment: mixed very light 1 a month if that     Family History   Problem Relation Age of Onset     C.A.D. Father      MI at age 60     Hypertension Father      Alzheimer Disease Father      Hyperlipidemia Father      OSTEOPOROSIS Mother      on Fosamax     Glaucoma Mother      Other Cancer Brother      Stomach Cancer Maternal Aunt      Cervical Cancer Cousin      DIABETES No family hx of      CEREBROVASCULAR DISEASE No family hx of      Breast Cancer No family hx of      Cancer - colorectal No family hx of      Prostate Cancer No family hx of      Liver Disease No family hx of          Current Outpatient Prescriptions   Medication Sig Dispense Refill     HYDROcodone-acetaminophen (NORCO) 5-325 MG per tablet Take 1 tablet by mouth every 4 hours as needed for pain maximum  tablet(s) per day 18 tablet 0     nystatin (MYCOSTATIN) 733261 UNIT/GM POWD Apply topically 3 times daily as needed 30 g 1     nystatin (MYCOSTATIN) cream Apply topically 2 times daily Reported on 3/6/2017 30 g 11     clobetasol (TEMOVATE) 0.05 % ointment Apply sparingly to affected area twice daily for 14 days.  Do not apply to face. (Patient taking differently: daily as needed Apply sparingly to affected area twice daily for 14 days.  Do not apply to face.) 15 g 0     hydrochlorothiazide 12.5 MG TABS Take 1 tablet (12.5 mg) by mouth daily 90 tablet 3     atenolol (TENORMIN) 50 MG tablet Take 1 tablet (50 mg) by mouth daily 90 tablet 3     Emollient (CERAVE) CREA Externally apply topically 2 times daily as needed 1 Bottle 3     Allergies   Allergen Reactions     Vioxx Itching and Swelling     VIOXX (Swollen Feet,Hands itching), Okay with ibuprofen and aspirin     BP  "Readings from Last 3 Encounters:   09/12/17 118/57   08/25/17 131/74   08/22/17 112/64    Wt Readings from Last 3 Encounters:   09/12/17 252 lb (114.3 kg)   08/25/17 234 lb (106.1 kg)   08/22/17 234 lb (106.1 kg)                  Labs reviewed in EPIC        Reviewed and updated as needed this visit by clinical staffTobacco  Allergies  Med Hx  Surg Hx  Fam Hx  Soc Hx      Reviewed and updated as needed this visit by Provider         ROS:  Constitutional, HEENT, cardiovascular, pulmonary, gi and gu systems are negative, except as otherwise noted.      OBJECTIVE:   /57  Pulse 74  Temp 98.5  F (36.9  C) (Tympanic)  Ht 5' 3\" (1.6 m)  Wt 252 lb (114.3 kg)  LMP 05/29/2006  BMI 44.64 kg/m2  Body mass index is 44.64 kg/(m^2).  GENERAL: healthy, alert and no distress  EYES: Eyes grossly normal to inspection, PERRL and conjunctivae and sclerae normal  HENT: ear canals and TM's normal, nose and mouth without ulcers or lesions  NECK: no adenopathy, no asymmetry, masses, or scars and thyroid normal to palpation  RESP: lungs clear to auscultation - no rales, rhonchi or wheezes  BREAST: normal without masses, tenderness or nipple discharge and no palpable axillary masses or adenopathy  CV: regular rate and rhythm, normal S1 S2, no S3 or S4, no murmur, click or rub, no peripheral edema and peripheral pulses strong      Diagnostic Test Results:  none     ASSESSMENT/PLAN:         1. Encounter for screening mammogram for breast cancer  Patient has no abnormalities seen on her breast examination. Mammogram to be scheduled by patient .      FUTURE APPOINTMENTS:       - Follow-up visit as needed.    Jennie Suazo MD  [facility]  "

## 2017-09-12 NOTE — MR AVS SNAPSHOT
After Visit Summary   9/12/2017    Jacquie Harris    MRN: [id number]           Patient Information     Date Of Birth          1955        Visit Information        Provider Department      9/12/2017 7:40 AM Jennie Suazo MD [facility]        Today's Diagnoses     Encounter for screening mammogram for breast cancer    -  1      Care Instructions          Thank you for choosing [facility].  You may be receiving a survey in the mail from [facility] regarding your visit today.  Please take a few minutes to complete and return the survey to let us know how we are doing.      If you have questions or concerns, please contact us via Soonr or you can contact your care team at [phone].    Our Clinic hours are:  Monday 6:40 am  to 7:00 pm  Tuesday -Friday 6:40 am to 5:00 pm    The [place] outpatient lab hours are:  Monday - Friday 6:10 am to 4:45 pm  Saturdays 7:00 am to 11:00 am  Appointments are required, call [phone]    If you have clinical questions after hours or would like to schedule an appointment,  call the clinic at [phone].          Follow-ups after your visit        Your next 10 appointments already scheduled     Sep 12, 2017  2:00 PM CDT   Ortho Eval with Dasia Wilson PT   [facility] ([facility])    [address] [address] [phone]              Who to contact     If you have questions or need follow up information about today's clinic visit or your schedule please contact [facility] directly at [phone].  Normal or non-critical lab and imaging results will be communicated to you by MyChart, letter or phone within 4 business days after the clinic has received the results. If you do not hear from us within 7 days, please contact the clinic through MyChart or phone. If you have a critical or abnormal lab result, we will notify you by phone as soon as  "possible.  Submit refill requests through Optaros or call your pharmacy and they will forward the refill request to us. Please allow 3 business days for your refill to be completed.          Additional Information About Your Visit        Optaros Information     Optaros lets you send messages to your doctor, view your test results, renew your prescriptions, schedule appointments and more. To sign up, go to [email or web address] . Click on \"Log in\" on the left side of the screen, which will take you to the Welcome page. Then click on \"Sign up Now\" on the right side of the page.     You will be asked to enter the access code listed below, as well as some personal information. Please follow the directions to create your username and password.     Your access code is: [email or web address]  Expires: 10/31/2017  6:30 AM     Your access code will  in 90 days. If you need help or a new code, please call your [facility] clinic or [phone].        Care EveryWhere ID     This is your Care EveryWhere ID. This could be used by other organizations to access your [facility] medical records  [id number]        Your Vitals Were     Pulse Temperature Height Last Period BMI (Body Mass Index)       74 98.5  F (36.9  C) (Tympanic) 5' 3\" (1.6 m) 2006 44.64 kg/m2        Blood Pressure from Last 3 Encounters:   17 118/57   17 131/74   17 112/64    Weight from Last 3 Encounters:   17 252 lb (114.3 kg)   17 234 lb (106.1 kg)   17 234 lb (106.1 kg)              Today, you had the following     No orders found for display         Today's Medication Changes          These changes are accurate as of: 17 11:23 AM.  If you have any questions, ask your nurse or doctor.               These medicines have changed or have updated prescriptions.        Dose/Directions    clobetasol 0.05 % ointment   Commonly known as:  TEMOVATE   This may have changed:    - when to take this  - reasons to take this  - " additional instructions   Used for:  Vaginal itching        Apply sparingly to affected area twice daily for 14 days.  Do not apply to face.   Quantity:  15 g   Refills:  0                Primary Care Provider Office Phone # Fax #    Mariel Va Sabillon [phone] [address] [facility] [address]        Equal Access to Services     KUNAL NGO : Hadii aad ku hadasho Soomaali, waaxda luqadaha, qaybta kaalmada adeegyada, waxay idiin hayaan adeeg khdarysridevi lawintern . So [facility] [phone].    ATENCIÓN: Si habla español, tiene a booth disposición servicios gratuitos de asistencia lingüística. Llame al [phone].    We comply with applicable federal civil rights laws and [place] laws. We do not discriminate on the basis of race, color, national origin, age, disability sex, sexual orientation or gender identity.            Thank you!     Thank you for choosing [facility]  for your care. Our goal is always to provide you with excellent care. Hearing back from our patients is one way we can continue to improve our services. Please take a few minutes to complete the written survey that you may receive in the mail after your visit with us. Thank you!             Your Updated Medication List - Protect others around you: Learn how to safely use, store and throw away your medicines at www.disposemymeds.org.          This list is accurate as of: 9/12/17 11:23 AM.  Always use your most recent med list.                   Brand Name Dispense Instructions for use Diagnosis    atenolol 50 MG tablet    TENORMIN    90 tablet    Take 1 tablet (50 mg) by mouth daily    HTN, goal below 140/80       CERAVE Crea     1 Bottle    Externally apply topically 2 times daily as needed    Sunburn, Itching       clobetasol 0.05 % ointment    TEMOVATE    15 g    Apply sparingly to affected area twice daily for 14 days.  Do not apply to face.    Vaginal itching       hydrochlorothiazide 12.5 MG Tabs tablet      90 tablet    Take 1 tablet (12.5 mg) by mouth daily    HTN, goal below 140/80       HYDROcodone-acetaminophen 5-325 MG per tablet    NORCO    18 tablet    Take 1 tablet by mouth every 4 hours as needed for pain maximum *** tablet(s) per day        * nystatin 548932 UNIT/GM Powd    MYCOSTATIN    30 g    Apply topically 3 times daily as needed    Candidiasis of skin       * nystatin cream    MYCOSTATIN    30 g    Apply topically 2 times daily Reported on 3/6/2017    Candidiasis of skin       * Notice:  This list has 2 medication(s) that are the same as other medications prescribed for you. Read the directions carefully, and ask your doctor or other care provider to review them with you.

## 2017-09-15 ENCOUNTER — HOSPITAL ENCOUNTER (OUTPATIENT)
Dept: PHYSICAL THERAPY | Facility: CLINIC | Age: 62
Setting detail: THERAPIES SERIES
End: 2017-09-15
Attending: PHYSICIAN ASSISTANT
Payer: MEDICARE

## 2017-09-15 PROCEDURE — 97110 THERAPEUTIC EXERCISES: CPT | Mod: GP | Performed by: PHYSICAL THERAPIST

## 2017-09-15 PROCEDURE — 40000718 ZZHC STATISTIC PT DEPARTMENT ORTHO VISIT: Performed by: PHYSICAL THERAPIST

## 2017-09-19 ENCOUNTER — HOSPITAL ENCOUNTER (OUTPATIENT)
Dept: PHYSICAL THERAPY | Facility: CLINIC | Age: 62
Setting detail: THERAPIES SERIES
End: 2017-09-19
Attending: PHYSICIAN ASSISTANT
Payer: MEDICARE

## 2017-09-19 PROCEDURE — 40000185 ZZHC STATISTIC PT OUTPT VISIT

## 2017-09-19 PROCEDURE — 97110 THERAPEUTIC EXERCISES: CPT | Mod: GP

## 2017-09-22 ENCOUNTER — HOSPITAL ENCOUNTER (OUTPATIENT)
Dept: PHYSICAL THERAPY | Facility: CLINIC | Age: 62
Setting detail: THERAPIES SERIES
End: 2017-09-22
Attending: PHYSICIAN ASSISTANT
Payer: MEDICARE

## 2017-09-22 PROCEDURE — 97110 THERAPEUTIC EXERCISES: CPT | Mod: GP | Performed by: PHYSICAL THERAPIST

## 2017-09-22 PROCEDURE — 40000718 ZZHC STATISTIC PT DEPARTMENT ORTHO VISIT: Performed by: PHYSICAL THERAPIST

## 2017-09-26 ENCOUNTER — HOSPITAL ENCOUNTER (OUTPATIENT)
Dept: PHYSICAL THERAPY | Facility: CLINIC | Age: 62
Setting detail: THERAPIES SERIES
End: 2017-09-26
Attending: PHYSICIAN ASSISTANT
Payer: MEDICARE

## 2017-09-26 PROCEDURE — 97110 THERAPEUTIC EXERCISES: CPT | Mod: GP | Performed by: PHYSICAL THERAPIST

## 2017-09-26 PROCEDURE — 40000718 ZZHC STATISTIC PT DEPARTMENT ORTHO VISIT: Performed by: PHYSICAL THERAPIST

## 2017-09-27 DIAGNOSIS — I10 HTN, GOAL BELOW 140/80: ICD-10-CM

## 2017-09-28 RX ORDER — HYDROCHLOROTHIAZIDE 12.5 MG/1
TABLET ORAL
Qty: 90 TABLET | Refills: 1 | Status: SHIPPED | OUTPATIENT
Start: 2017-09-28 | End: 2018-01-08

## 2017-09-28 RX ORDER — ATENOLOL 50 MG/1
TABLET ORAL
Qty: 90 TABLET | Refills: 1 | Status: SHIPPED | OUTPATIENT
Start: 2017-09-28 | End: 2018-01-08

## 2017-09-28 NOTE — TELEPHONE ENCOUNTER
Prescription approved per [facility] Refill Protocol.  Patient LOV for HTN 4/20/17  Refilled Rx for HCTZ  6 mos    Lacey MEYERS Rn

## 2017-09-29 ENCOUNTER — HOSPITAL ENCOUNTER (OUTPATIENT)
Dept: PHYSICAL THERAPY | Facility: CLINIC | Age: 62
Setting detail: THERAPIES SERIES
End: 2017-09-29
Attending: PHYSICIAN ASSISTANT
Payer: MEDICARE

## 2017-09-29 PROCEDURE — 40000718 ZZHC STATISTIC PT DEPARTMENT ORTHO VISIT: Performed by: PHYSICAL THERAPIST

## 2017-09-29 PROCEDURE — 97110 THERAPEUTIC EXERCISES: CPT | Mod: GP | Performed by: PHYSICAL THERAPIST

## 2017-10-04 ENCOUNTER — HOSPITAL ENCOUNTER (OUTPATIENT)
Dept: PHYSICAL THERAPY | Facility: CLINIC | Age: 62
Setting detail: THERAPIES SERIES
End: 2017-10-04
Attending: PHYSICIAN ASSISTANT
Payer: MEDICARE

## 2017-10-04 PROCEDURE — 40000718 ZZHC STATISTIC PT DEPARTMENT ORTHO VISIT: Performed by: PHYSICAL THERAPIST

## 2017-10-04 PROCEDURE — 97110 THERAPEUTIC EXERCISES: CPT | Mod: GP | Performed by: PHYSICAL THERAPIST

## 2017-10-04 NOTE — PROGRESS NOTES
10/04/17 1600   Signing Clinician's Name / Credentials   Signing clinician's name / credentials Dasia Wilson, PT [id number]   Session Number   Session Number 7    Ortho Goal 1   Goal Description 1.  Pt will be able to reach to shoulder ht in order to wash her hair w/ minimal difficulty.  10/4/17 ongoing goal   Target Date 11/03/17   Ortho Goal 2   Goal Description 2. Pt will be able to reach behind back w/ putting on bra   Target Date 11/11/17   Ortho Goal 3   Goal Description 3.   Pt will be able to reach overhead w/ ADL's w/ minimal difficulty   Target Date 12/11/17   Ortho Goal 4   Goal Description 4.  Pt will be able to lift 10-15# w/ ADL's w/ pain no > 2/10 and minimal difficulty   Target Date 12/11/17   Ortho Goal 5   Goal Description 5.  Independent and consistent w/HEP   Target Date 12/11/17   Subjective Report   Subjective Report Pt states her arm is really sore.Pt ntoes tension in R upper arm and forearm is really sore. Pt also reports neck soreness.   Pt is not taking any meds.   Pain level 5-6/10.  Pt drove 5 hours yesterday.  Pt states today she did paperwork/ pay bills    Objective Measures   Objective Measure R shld PROM   Details flex 125*, scaption 110*, ER 77* in 35* abd, IR 65* in 50* abd   Therapeutic Procedure/exercise   Patient Response Pt did better w/ PROM today.     Treatment Detail Pendulums X 4 directions.  Table flex X 10.  Pulley flex and scaption X 10 each.   Wand IR X 10.   Wand ER w/ arm at side supine X 10.   PROM R shoulder X 4 directions.  LT sets.     Manual Therapy   Skilled Intervention to promote relaxation   Treatment Detail Grade 1 oscillations and gentle rib rocking w/ PROM .      Plan   Home program ex as above   Plan cont 1X/wk add wall walks/ arm biking.    Comments   Comments Pt has not been wearing her sling.  Pt cont to work towards goals

## 2017-10-06 ENCOUNTER — TRANSFERRED RECORDS (OUTPATIENT)
Dept: HEALTH INFORMATION MANAGEMENT | Facility: CLINIC | Age: 62
End: 2017-10-06

## 2017-10-09 ENCOUNTER — HOSPITAL ENCOUNTER (OUTPATIENT)
Dept: PHYSICAL THERAPY | Facility: CLINIC | Age: 62
Setting detail: THERAPIES SERIES
End: 2017-10-09
Attending: PHYSICIAN ASSISTANT
Payer: MEDICARE

## 2017-10-09 PROCEDURE — 40000718 ZZHC STATISTIC PT DEPARTMENT ORTHO VISIT: Performed by: PHYSICAL THERAPIST

## 2017-10-09 PROCEDURE — 97110 THERAPEUTIC EXERCISES: CPT | Mod: GP | Performed by: PHYSICAL THERAPIST

## 2017-10-18 ENCOUNTER — HOSPITAL ENCOUNTER (OUTPATIENT)
Dept: PHYSICAL THERAPY | Facility: CLINIC | Age: 62
Setting detail: THERAPIES SERIES
End: 2017-10-18
Attending: PHYSICIAN ASSISTANT
Payer: MEDICARE

## 2017-10-18 PROCEDURE — 40000718 ZZHC STATISTIC PT DEPARTMENT ORTHO VISIT: Performed by: PHYSICAL THERAPIST

## 2017-10-18 PROCEDURE — 97110 THERAPEUTIC EXERCISES: CPT | Mod: GP | Performed by: PHYSICAL THERAPIST

## 2017-10-25 ENCOUNTER — HOSPITAL ENCOUNTER (OUTPATIENT)
Dept: PHYSICAL THERAPY | Facility: CLINIC | Age: 62
Setting detail: THERAPIES SERIES
End: 2017-10-25
Attending: FAMILY MEDICINE
Payer: MEDICARE

## 2017-10-25 ENCOUNTER — HOSPITAL ENCOUNTER (OUTPATIENT)
Dept: PHYSICAL THERAPY | Facility: CLINIC | Age: 62
Setting detail: THERAPIES SERIES
End: 2017-10-25
Attending: PHYSICIAN ASSISTANT
Payer: MEDICARE

## 2017-10-25 PROCEDURE — 97110 THERAPEUTIC EXERCISES: CPT | Mod: GP | Performed by: PHYSICAL THERAPIST

## 2017-10-25 PROCEDURE — 40000718 ZZHC STATISTIC PT DEPARTMENT ORTHO VISIT: Performed by: PHYSICAL THERAPIST

## 2017-10-25 PROCEDURE — 97140 MANUAL THERAPY 1/> REGIONS: CPT | Mod: GP | Performed by: PHYSICAL THERAPIST

## 2017-10-25 NOTE — PROGRESS NOTES
OUTPATIENT PHYSICAL THERAPY DISCHARGE SUMMARY   Estefania Bacon, PAC 9/12/17 to 10/25/17 0900   Signing Clinician's Name / Credentials   Signing clinician's name / credentials Dasia Corralchristiane, PT [id number]   Session Number   Session Number 10    Ortho Goal 1   Goal Description 1.  Pt will be able to reach to shoulder ht in order to wash her hair w/ minimal difficulty.  10/4/17 ongoing goal  10/25/17 ongoing goal   Target Date 11/03/17   Ortho Goal 2   Goal Description 2. Pt will be able to reach behind back w/ putting on bra.  10/25/17 has done it occasionally but notes increased pain 8/10   Target Date 11/11/17   Ortho Goal 3   Goal Description 3.   Pt will be able to reach overhead w/ ADL's w/ minimal difficulty   Target Date 12/11/17   Ortho Goal 4   Goal Description 4.  Pt will be able to lift 10-15# w/ ADL's w/ pain no > 2/10 and minimal difficulty   Target Date 12/11/17   Ortho Goal 5   Goal Description 5.  Independent and consistent w/HEP.  10/25/17 consistent w/ current program   Target Date 12/11/17   Subjective Report   Subjective Report Pt notes shoulder is sore at incision which will go up the shoulder.  Pt states she has been digging w/ plastic tool old wood chips/ bark w/ R UE to clean up her yard X last 3 days.  Pain level today 3-4/10   Objective Measures   Objective Measure R shld AAROM   Details flex 145*, scaption 147*, ER 83* in 45* abd, IR 67* in 50* abd    Objective Measure R shoulder AROM   Details flex 40*,  scaption 38*,  ER 45*,  IR to T11   Therapeutic Procedure/exercise   Treatment Detail Scifit seat 6 elevated L1 X 2 min.  Pulley flex and scaption X 10 each.   wall flex and  B ER X 5 each.   Wand IR X 10.   Self assisted / wand flex supine X 10 total.    Wand ER w/ arm at side supine X 10.   AAROM R shoulder X 4 directions.    Plan   Home program ex as above.     Plan  cont 1X/wk X 6 weeks --AA/ AROM and progresss into strengthening   Comments   Comments  Pt cont to work towards goals as  noted above

## 2017-11-01 ENCOUNTER — HOSPITAL ENCOUNTER (OUTPATIENT)
Dept: PHYSICAL THERAPY | Facility: CLINIC | Age: 62
Setting detail: THERAPIES SERIES
End: 2017-11-01
Attending: PHYSICIAN ASSISTANT
Payer: MEDICARE

## 2017-11-01 ENCOUNTER — HOSPITAL ENCOUNTER (OUTPATIENT)
Dept: PHYSICAL THERAPY | Facility: CLINIC | Age: 62
Setting detail: THERAPIES SERIES
End: 2017-11-01
Attending: FAMILY MEDICINE
Payer: MEDICARE

## 2017-11-01 PROCEDURE — 40000718 ZZHC STATISTIC PT DEPARTMENT ORTHO VISIT: Performed by: PHYSICAL THERAPIST

## 2017-11-01 PROCEDURE — 97110 THERAPEUTIC EXERCISES: CPT | Mod: GP | Performed by: PHYSICAL THERAPIST

## 2017-11-01 PROCEDURE — 97140 MANUAL THERAPY 1/> REGIONS: CPT | Mod: GP | Performed by: PHYSICAL THERAPIST

## 2017-11-01 NOTE — PROGRESS NOTES
Dr. Thompson 11/01/17 0800   Signing Clinician's Name / Credentials   Signing clinician's name / credentials Dasia Wilson, PT [id number]   Session Number   Session Number 11--shoulder   Ortho Goal 1   Goal Description 1.  Pt will be able to reach to shoulder ht in order to wash her hair w/ minimal difficulty.  10/4/17 ongoing goal  10/25/17 ongoing goal   Target Date 11/03/17   Ortho Goal 2   Goal Description 2. Pt will be able to reach behind back w/ putting on bra.  10/25/17 has done it occasionally but notes increased pain 8/10   Target Date 11/11/17   Ortho Goal 3   Goal Description 3.   Pt will be able to reach overhead w/ ADL's w/ minimal difficulty   Target Date 12/11/17   Ortho Goal 4   Goal Description 4.  Pt will be able to lift 10-15# w/ ADL's w/ pain no > 2/10 and minimal difficulty   Target Date 12/11/17   Ortho Goal 5   Goal Description 5.  Independent and consistent w/HEP.  10/25/17 consistent w/ current program   Target Date 12/11/17   Subjective Report   Subjective Report Pt states shoulder pain 5/10.  Pt states the upper arm is hard and it hurts.  Pt notes the bruising has resolved.  Pt states she is using the arm more but not carrying any wt w/ it.  Pt states she still has difficulty w/ wall flex.    Objective Measures   Objective Measure R shld AAROM   Details flex 150*, scaption 165*, ER 85* in 45* abd, IR 65* in 50* abd    Objective Measure R shoulder AROM   Details flex 43*w/ mild hike,  scaption 38* w/ increased hike,  ER 63*,  IR to L1   Therapeutic Procedure/exercise   Patient Response ongoing difficulty w/ wall walk--limited motion.   Treatment Detail Scifit seat 6 elevated L1 X 2 min.  Pulley flex and scaption X 10 each.   wall flex and  B ER X 5 each.   Wand IR X 10.   Shoulder ext leaning on plinth X 15.  ER in SLw/ LT set X 15.  Supine AROM flex * between PT hands X 10.  Flex supine from folded pillow (starting and  endrange lowering  w/ elbow bent) and reaching overhead and back  to pillow X 10.   AAROM R shoulder X 4 directions.    Plan   Home program ex as above.     Plan cont 1X/wk X 5 weeks --AA/ AROM and progresss  strengthening   Comments   Comments s/p R RCR (large), DCE, SAD on 8/25/17.  Pt cont to work towards goals as noted above

## 2017-11-03 ENCOUNTER — TRANSFERRED RECORDS (OUTPATIENT)
Dept: HEALTH INFORMATION MANAGEMENT | Facility: CLINIC | Age: 62
End: 2017-11-03

## 2017-11-10 ENCOUNTER — HOSPITAL ENCOUNTER (OUTPATIENT)
Dept: PHYSICAL THERAPY | Facility: CLINIC | Age: 62
Setting detail: THERAPIES SERIES
End: 2017-11-10
Attending: PHYSICIAN ASSISTANT
Payer: MEDICARE

## 2017-11-10 ENCOUNTER — HOSPITAL ENCOUNTER (OUTPATIENT)
Dept: PHYSICAL THERAPY | Facility: CLINIC | Age: 62
Setting detail: THERAPIES SERIES
End: 2017-11-10
Attending: FAMILY MEDICINE
Payer: MEDICARE

## 2017-11-10 PROCEDURE — 97110 THERAPEUTIC EXERCISES: CPT | Mod: GP | Performed by: PHYSICAL THERAPIST

## 2017-11-10 PROCEDURE — 97140 MANUAL THERAPY 1/> REGIONS: CPT | Mod: GP | Performed by: PHYSICAL THERAPIST

## 2017-11-10 PROCEDURE — 40000718 ZZHC STATISTIC PT DEPARTMENT ORTHO VISIT: Performed by: PHYSICAL THERAPIST

## 2017-11-11 ENCOUNTER — HOSPITAL ENCOUNTER (OUTPATIENT)
Dept: MAMMOGRAPHY | Facility: CLINIC | Age: 62
Discharge: HOME | End: 2017-11-11
Attending: FAMILY MEDICINE | Admitting: FAMILY MEDICINE
Payer: MEDICARE

## 2017-11-11 DIAGNOSIS — Z12.31 VISIT FOR SCREENING MAMMOGRAM: ICD-10-CM

## 2017-11-11 PROCEDURE — G0202 SCR MAMMO BI INCL CAD: HCPCS

## 2017-11-11 PROCEDURE — 77063 BREAST TOMOSYNTHESIS BI: CPT

## 2017-11-22 DIAGNOSIS — S46.001D INJURY OF RIGHT ROTATOR CUFF, SUBSEQUENT ENCOUNTER: Primary | ICD-10-CM

## 2017-11-22 NOTE — PROGRESS NOTES
PT need an x-ray arthrogram to look for rotator cuff tearing.  Hernando Thompson MD, PA-D  11/22/2017   9:55 AM

## 2017-11-24 ENCOUNTER — HOSPITAL ENCOUNTER (OUTPATIENT)
Dept: GENERAL RADIOLOGY | Facility: CLINIC | Age: 62
Discharge: HOME | End: 2017-11-24
Attending: PHYSICIAN ASSISTANT | Admitting: PHYSICIAN ASSISTANT
Payer: MEDICARE

## 2017-11-24 DIAGNOSIS — S46.001D INJURY OF RIGHT ROTATOR CUFF, SUBSEQUENT ENCOUNTER: ICD-10-CM

## 2017-11-24 PROCEDURE — 27211110 XR SHOULDER ARTHROGRAM RIGHT

## 2017-11-24 PROCEDURE — 25500064 ZZH RX 255 OP 636: Performed by: RADIOLOGY

## 2017-11-24 PROCEDURE — 73040 CONTRAST X-RAY OF SHOULDER: CPT | Mod: RT

## 2017-11-24 PROCEDURE — 25000125 ZZHC RX 250: Performed by: RADIOLOGY

## 2017-11-24 RX ORDER — LIDOCAINE HYDROCHLORIDE 10 MG/ML
5 INJECTION, SOLUTION EPIDURAL; INFILTRATION; INTRACAUDAL; PERINEURAL ONCE
Status: COMPLETED | OUTPATIENT
Start: 2017-11-24 | End: 2017-11-24

## 2017-11-24 RX ORDER — IOPAMIDOL 408 MG/ML
10 INJECTION, SOLUTION INTRATHECAL ONCE
Status: COMPLETED | OUTPATIENT
Start: 2017-11-24 | End: 2017-11-24

## 2017-11-24 RX ADMIN — IOPAMIDOL 12 ML: 408 INJECTION, SOLUTION INTRATHECAL at 13:46

## 2017-11-24 RX ADMIN — LIDOCAINE HYDROCHLORIDE 5 ML: 10 INJECTION, SOLUTION EPIDURAL; INFILTRATION; INTRACAUDAL; PERINEURAL at 13:45

## 2017-11-24 NOTE — PROGRESS NOTES
RADIOLOGY PROCEDURE NOTE  Patient name: Jacquie Harris  MRN: [id number]  : 1955    Pre-procedure diagnosis: Pain.  Post-procedure diagnosis: Same    Procedure Date/Time: 2017  1:40 PM  Procedure: Right shoulder arthrogram.  Estimated blood loss: None  Specimen(s) collected:  None.  The patient tolerated the procedure well with no immediate complications.    See imaging dictation for procedural details.    Provider name: Stanley Srivastava  Assistant(s):None

## 2017-12-01 ENCOUNTER — TRANSFERRED RECORDS (OUTPATIENT)
Dept: HEALTH INFORMATION MANAGEMENT | Facility: CLINIC | Age: 62
End: 2017-12-01

## 2017-12-04 ENCOUNTER — OFFICE VISIT (OUTPATIENT)
Dept: FAMILY MEDICINE | Facility: CLINIC | Age: 62
End: 2017-12-04
Payer: MEDICARE

## 2017-12-04 VITALS
DIASTOLIC BLOOD PRESSURE: 71 MMHG | TEMPERATURE: 99.1 F | HEART RATE: 68 BPM | BODY MASS INDEX: 39.98 KG/M2 | WEIGHT: 234.2 LBS | SYSTOLIC BLOOD PRESSURE: 108 MMHG | HEIGHT: 64 IN

## 2017-12-04 DIAGNOSIS — Z01.818 PREOP GENERAL PHYSICAL EXAM: Primary | ICD-10-CM

## 2017-12-04 DIAGNOSIS — Z23 NEED FOR PROPHYLACTIC VACCINATION AND INOCULATION AGAINST INFLUENZA: ICD-10-CM

## 2017-12-04 DIAGNOSIS — S49.91XD SHOULDER INJURY, RIGHT, SUBSEQUENT ENCOUNTER: ICD-10-CM

## 2017-12-04 DIAGNOSIS — E78.2 MIXED HYPERLIPIDEMIA: ICD-10-CM

## 2017-12-04 LAB
ANION GAP SERPL CALCULATED.3IONS-SCNC: 5 MMOL/L (ref 3–14)
BASOPHILS # BLD AUTO: 0 10E9/L (ref 0–0.2)
BASOPHILS NFR BLD AUTO: 0.3 %
BUN SERPL-MCNC: 14 MG/DL (ref 7–30)
CALCIUM SERPL-MCNC: 9.2 MG/DL (ref 8.5–10.1)
CHLORIDE SERPL-SCNC: 101 MMOL/L (ref 94–109)
CHOLEST SERPL-MCNC: 164 MG/DL
CO2 SERPL-SCNC: 31 MMOL/L (ref 20–32)
CREAT SERPL-MCNC: 0.82 MG/DL (ref 0.52–1.04)
DIFFERENTIAL METHOD BLD: NORMAL
EOSINOPHIL # BLD AUTO: 0.1 10E9/L (ref 0–0.7)
EOSINOPHIL NFR BLD AUTO: 1 %
ERYTHROCYTE [DISTWIDTH] IN BLOOD BY AUTOMATED COUNT: 13.2 % (ref 10–15)
GFR SERPL CREATININE-BSD FRML MDRD: 71 ML/MIN/1.7M2
GLUCOSE SERPL-MCNC: 77 MG/DL (ref 70–99)
HCT VFR BLD AUTO: 45.4 % (ref 35–47)
HDLC SERPL-MCNC: 56 MG/DL
HGB BLD-MCNC: 14.8 G/DL (ref 11.7–15.7)
LDLC SERPL CALC-MCNC: 79 MG/DL
LYMPHOCYTES # BLD AUTO: 2.5 10E9/L (ref 0.8–5.3)
LYMPHOCYTES NFR BLD AUTO: 31.8 %
MCH RBC QN AUTO: 31.3 PG (ref 26.5–33)
MCHC RBC AUTO-ENTMCNC: 32.6 G/DL (ref 31.5–36.5)
MCV RBC AUTO: 96 FL (ref 78–100)
MONOCYTES # BLD AUTO: 0.6 10E9/L (ref 0–1.3)
MONOCYTES NFR BLD AUTO: 8 %
NEUTROPHILS # BLD AUTO: 4.6 10E9/L (ref 1.6–8.3)
NEUTROPHILS NFR BLD AUTO: 58.9 %
NONHDLC SERPL-MCNC: 108 MG/DL
PLATELET # BLD AUTO: 237 10E9/L (ref 150–450)
POTASSIUM SERPL-SCNC: 3.4 MMOL/L (ref 3.4–5.3)
RBC # BLD AUTO: 4.73 10E12/L (ref 3.8–5.2)
SODIUM SERPL-SCNC: 137 MMOL/L (ref 133–144)
TRIGL SERPL-MCNC: 144 MG/DL
WBC # BLD AUTO: 7.7 10E9/L (ref 4–11)

## 2017-12-04 PROCEDURE — 99214 OFFICE O/P EST MOD 30 MIN: CPT | Mod: 25 | Performed by: FAMILY MEDICINE

## 2017-12-04 PROCEDURE — 85025 COMPLETE CBC W/AUTO DIFF WBC: CPT | Performed by: FAMILY MEDICINE

## 2017-12-04 PROCEDURE — 90686 IIV4 VACC NO PRSV 0.5 ML IM: CPT | Performed by: FAMILY MEDICINE

## 2017-12-04 PROCEDURE — 80061 LIPID PANEL: CPT | Performed by: FAMILY MEDICINE

## 2017-12-04 PROCEDURE — 80048 BASIC METABOLIC PNL TOTAL CA: CPT | Performed by: FAMILY MEDICINE

## 2017-12-04 PROCEDURE — G0008 ADMIN INFLUENZA VIRUS VAC: HCPCS | Performed by: FAMILY MEDICINE

## 2017-12-04 PROCEDURE — 36415 COLL VENOUS BLD VENIPUNCTURE: CPT | Performed by: FAMILY MEDICINE

## 2017-12-04 NOTE — PROGRESS NOTES
[facility]  [address] [address]  [phone]  Dept: [phone]    PRE-OP EVALUATION:  Today's date: 2017    Jacquie Harris (: 1955) presents for pre-operative evaluation assessment as requested by Dr. Thompson.  She requires evaluation and anesthesia risk assessment prior to undergoing surgery/procedure for treatment of Right shoulder RTC issue .  Proposed procedure: Right Shoulder Rotator Cuff Revision    Date of Surgery/ Procedure: 17  Time of Surgery/ Procedure: [facility]  Hospital/Surgical Facility: [place] OR    Primary Physician: Mariel Sabillon  Type of Anesthesia Anticipated: General    Patient has a Health Care Directive or Living Will:  NO    1. NO - Do you have a history of heart attack, stroke, stent, bypass or surgery on an artery in the head, neck, heart or legs?  2. NO - Do you ever have any pain or discomfort in your chest?  3. NO - Do you have a history of  Heart Failure?  4. NO - Are you troubled by shortness of breath when: walking on the level, up a slight hill or at night?  5. NO - Do you currently have a cold, bronchitis or other respiratory infection?  6. NO - Do you have a cough, shortness of breath or wheezing?  7. NO - Do you sometimes get pains in the calves of your legs when you walk?  8. NO - Do you or anyone in your family have previous history of blood clots?  9. YES - DO YOU OR DOES ANYONE IN YOUR FAMILY HAVE A SERIOUS BLEEDING PROBLEM SUCH AS PROLONGED BLEEDING FOLLOWING SURGERIES OR CUTS?   10. YES - HAVE YOU EVER HAD PROBLEMS WITH ANEMIA OR BEEN TOLD TO TAKE IRON PILLS?   11. NO - Have you had any abnormal blood loss such as black, tarry or bloody stools, or abnormal vaginal bleeding?  12. YES - HAVE YOU EVER HAD A BLOOD TRANSFUSION?   13. NO - Have you or any of your relatives ever had problems with anesthesia?  14. YES - DO YOU HAVE SLEEP APNEA, EXCESSIVE SNORING OR DAYTIME DROWSINESS?   15. NO - Do you have any  prosthetic heart valves?  16. YES - DO YOU HAVE PROSTHETIC JOINTS? l knee  17. NO - Is there any chance that you may be pregnant?        HPI:                                                      Brief HPI related to upcoming procedure: Jacquie Harris is 62 year old white female with rotator cuff injury, fatty liver, elevated LFT, HTN, osteoporosis, obesity   who is here to get clearance to have general anesthesia.        See problem list for active medical problems.  Problems all longstanding and stable, except as noted/documented.  See ROS for pertinent symptoms related to these conditions.                                                                                                  .    MEDICAL HISTORY:                                                    Patient Active Problem List    Diagnosis Date Noted     Shoulder injury, right, subsequent encounter 08/21/2017     Priority: Medium     Fatty liver 04/20/2017     Priority: Medium     Elevated levels of transaminase & lactic acid dehydrogenase 04/15/2017     Priority: Medium     Bilateral cold feet 01/03/2017     Priority: Medium     Colles' fracture 07/06/2015     Priority: Medium     Encounter for routine gynecological examination  06/04/2015     Priority: Medium     Back pain, left below scapula, strained muscles 10/24/2013     Priority: Medium     HTN, goal below 140/80 05/05/2013     Priority: Medium     S/P TKR (total knee replacement) 04/03/2012     Priority: Medium     Osteoporosis 02/19/2012     Priority: Medium     Previous T scores -2.2  2/2012 Dexascan:  Lumbar spine L1-L4 T-score: -2.4, Left femoral neck T-score: -2.7, Right femoral neck T-score: -2.2   Percent change in lumbar spine: +6.6% since 4/16/2008   Percent change in femurs: +2.9% since 4/16/2008    IMPRESSION: Osteoporosis in the left femoral neck. Severe osteopenia  in the right femoral neck and lumbar spine.       Obesity, Class III, BMI 40-49.9 (morbid obesity) (H) 02/19/2012      Priority: Medium     ideal weight 120, goal weight 200, lose 58 lbs in 58 weeks.       Vitamin D deficiency 07/18/2011     Priority: Medium     Tick bite, infected, positive Lyme test 1996 not treated 07/08/2011     Priority: Medium     Rosacea 11/04/2009     Priority: Medium     Obstructive sleep apnea 12/01/2006     Priority: Medium     Sleep study 12/06 for EDS- AHI 36, desaturations to 65%. CPAP recommended but not tolerated.  01/16/07  ENT consult recommend CPAP and weight loss. Surgery discussed but success rate less than CPAP  1/15/07 CPAP trial, was not able to tolerate. Retried 2008, tolerated better.   Problem list name updated by automated process. Provider to review       Carpal tunnel syndrome 06/07/2006     Priority: Medium     04/10/09 Dr.Meletiou Cronin. Finding consistant with CTS, but nerve studies and MRI of C-Spine are normal. Corticosteroid injection given in office       CARDIOVASCULAR SCREENING; LDL GOAL LESS THAN 160 10/31/2010     Priority: Low     Allergic rhinitis 03/27/2006     Priority: Low     Problem list name updated by automated process. Provider to review       s/p gastric bypass x 2 07/05/2005     Priority: Low     Gastric bypass 1980        Past Medical History:   Diagnosis Date     HTN (hypertension)      IRON DEFICIENCY ANEMIA 7/5/2005     Iron infusion/ resolved since injections     DARWIN (obstructive sleep apnea)      Past Surgical History:   Procedure Laterality Date     ARTHROPLASTY KNEE  4/2/2012    Procedure:ARTHROPLASTY KNEE; Left Total Knee Arthroplasty--Anesth.Choice; Surgeon:HERNANDO THOMPSON; Location:[place] OR     ARTHROTOMY SHOULDER, ROTATOR CUFF REPAIR, COMBINED Right 8/25/2017    Procedure: COMBINED ARTHROTOMY SHOULDER, ROTATOR CUFF REPAIR;  Right shoulder distal clavicle excision, subacromial decompression, rotator cuff repair ;  Surgeon: Hernando Thompson MD;  Location: [facility] OR      ORAL SURGERY PROCEDURE  age 18    wisdom teeth     C/SECTION, LOW  "TRANSVERSE  1978, 1984, 1994    x 3     COLONOSCOPY  2001    normal colonoscopy     COLONOSCOPY N/A 8/22/2017    Procedure: COLONOSCOPY;  Colonoscopy  ;  Surgeon: Delfino Yoo MD;  Location: [facility]     GASTRIC BYPASS  1980/2005    Gastric Bypass and revision     HERNIA REPAIR, INCISIONAL  6/16/2008    lap.repair with 10x8 mesh     TUBAL LIGATION  1994       OTC products: None, except as noted above    Allergies   Allergen Reactions     Vioxx Itching and Swelling     VIOXX (Swollen Feet,Hands itching), Okay with ibuprofen and aspirin      Latex Allergy: NO    Social History   Substance Use Topics     Smoking status: Never Smoker     Smokeless tobacco: Never Used     Alcohol use 0.0 oz/week     0 Standard drinks or equivalent per week      Comment: mixed very light 1 a month if that     History   Drug Use No       REVIEW OF SYSTEMS:                                                    C: NEGATIVE for fever, chills, change in weight  E/M: NEGATIVE for ear, mouth and throat problems  R: NEGATIVE for significant cough or SOB  CV: NEGATIVE for chest pain, palpitations or peripheral edema    EXAM:                                                    /71  Pulse 68  Temp 99.1  F (37.3  C) (Tympanic)  Ht 5' 3.5\" (1.613 m)  Wt 234 lb 3.2 oz (106.2 kg)  LMP 05/29/2006  BMI 40.84 kg/m2    GENERAL APPEARANCE: healthy, alert and no distress     EYES: EOMI, PERRL     HENT: ear canals and TM's normal and nose and mouth without ulcers or lesions     NECK: no adenopathy, no asymmetry, masses, or scars and thyroid normal to palpation     RESP: lungs clear to auscultation - no rales, rhonchi or wheezes     CV: regular rates and rhythm, normal S1 S2, no S3 or S4 and no murmur, click or rub     ABDOMEN:  soft, nontender, no HSM or masses and bowel sounds normal     MS: extremities normal- no gross deformities noted, no evidence of inflammation in joints, FROM in all extremities.     SKIN: no suspicious lesions or rashes     " NEURO: Normal strength and tone, sensory exam grossly normal, mentation intact and speech normal     PSYCH: mentation appears normal. and affect normal/bright     LYMPHATICS: No axillary, cervical, or supraclavicular nodes    DIAGNOSTICS:                                                      Labs Resulted Today:   Results for orders placed or performed in visit on 12/04/17   CBC with platelets differential   Result Value Ref Range    WBC 7.7 4.0 - 11.0 10e9/L    RBC Count 4.73 3.8 - 5.2 10e12/L    Hemoglobin 14.8 11.7 - 15.7 g/dL    Hematocrit 45.4 35.0 - 47.0 %    MCV 96 78 - 100 fl    MCH 31.3 26.5 - 33.0 pg    MCHC 32.6 31.5 - 36.5 g/dL    RDW 13.2 10.0 - 15.0 %    Platelet Count 237 150 - 450 10e9/L    Diff Method Automated Method     % Neutrophils 58.9 %    % Lymphocytes 31.8 %    % Monocytes 8.0 %    % Eosinophils 1.0 %    % Basophils 0.3 %    Absolute Neutrophil 4.6 1.6 - 8.3 10e9/L    Absolute Lymphocytes 2.5 0.8 - 5.3 10e9/L    Absolute Monocytes 0.6 0.0 - 1.3 10e9/L    Absolute Eosinophils 0.1 0.0 - 0.7 10e9/L    Absolute Basophils 0.0 0.0 - 0.2 10e9/L       Recent Labs   Lab Test  08/16/17   0930  05/17/17   0804   04/18/17   0620   04/05/12   0610   11/04/09   1440   HGB  14.8   --    --   12.7   < >   --    < >   --    PLT  213   --    --   191   < >   --    < >   --    INR  1.05   --    --    --    --   1.47*   < >   --    NA  139  140   < >  142   < >   --    --   140   POTASSIUM  4.0  3.8   < >  3.7   < >   --    < >  4.6   CR  0.74  0.76   < >  0.65   < >   --    --   0.69   A1C   --    --    --    --    --    --    --   5.5    < > = values in this interval not displayed.        IMPRESSION:                                                    Reason for surgery/procedure:   1. Preop general physical exam  2. Shoulder injury, right, subsequent encounter  - CBC with platelets differential  - Basic metabolic panel   cleared for general anesthesia    3. Mixed hyperlipidemia  - Lipid panel reflex to direct  LDL Fasting    4. Need for prophylactic vaccination and inoculation against influenza  - FLU VAC, SPLIT VIRUS IM > 3 YO (QUADRIVALENT) [48757]        The proposed surgical procedure is considered INTERMEDIATE risk.    REVISED CARDIAC RISK INDEX  The patient has the following serious cardiovascular risks for perioperative complications such as (MI, PE, VFib and 3  AV Block):  No serious cardiac risks  INTERPRETATION: The 10-year ASCVD risk score (Jolieannmarie BRITO Jr, et al., 2013) is: 4.4%    Values used to calculate the score:      Age: 62 years      Sex: Female      Is Non- : No      Diabetic: No      Tobacco smoker: No      Systolic Blood Pressure: 108 mmHg      Is BP treated: Yes      HDL Cholesterol: 45 mg/dL      Total Cholesterol: 171 mg/dL      The patient has the following additional risks for perioperative complications:  No identified additional risks      ICD-10-CM    1. Preop general physical exam Z01.818 CBC with platelets differential     Basic metabolic panel   2. Shoulder injury, right, subsequent encounter S49.91XD CBC with platelets differential     Basic metabolic panel   3. Mixed hyperlipidemia E78.2 Lipid panel reflex to direct LDL Fasting   4. Need for prophylactic vaccination and inoculation against influenza Z23 FLU VAC, SPLIT VIRUS IM > 3 YO (QUADRIVALENT) [68057]       RECOMMENDATIONS:                                                      --Consult hospital rounder / IM to assist post-op medical management    --Patient is to take all scheduled medications on the day of surgery EXCEPT for modifications listed below.    APPROVAL GIVEN to proceed with proposed procedure, without further diagnostic evaluation       Signed Electronically by: Mariel Sabillon MD    Copy of this evaluation report is provided to requesting physician.    [place] Preop Guidelines

## 2017-12-04 NOTE — PROGRESS NOTES
Injectable Influenza Immunization Documentation    1.  Is the person to be vaccinated sick today?   No    2. Does the person to be vaccinated have an allergy to a component   of the vaccine?   No  Egg Allergy Algorithm Link    3. Has the person to be vaccinated ever had a serious reaction   to influenza vaccine in the past?   No    4. Has the person to be vaccinated ever had Guillain-Barré syndrome?   No    Form completed by Blessing Saunders / Certified Medical Assistant......12/4/2017 10:11 AM

## 2017-12-04 NOTE — LETTER
December 4, 2017      Jacquie Harris  [address] [place] [id number]        Dear ,    We are writing to inform you of your test results.    Your test results fall within the expected range(s) .  Please continue with current treatment plan.  If you have any questions or concerns, please call the clinic at the number listed above.       Sincerely,        Mariel Sabillon MD

## 2017-12-04 NOTE — NURSING NOTE
"Initial /71  Pulse 68  Temp 99.1  F (37.3  C) (Tympanic)  Ht 5' 3.5\" (1.613 m)  Wt 234 lb 3.2 oz (106.2 kg)  LMP 05/29/2006  BMI 40.84 kg/m2 Estimated body mass index is 40.84 kg/(m^2) as calculated from the following:    Height as of this encounter: 5' 3.5\" (1.613 m).    Weight as of this encounter: 234 lb 3.2 oz (106.2 kg). .      "

## 2017-12-04 NOTE — MR AVS SNAPSHOT
After Visit Summary   12/4/2017    Jacquie Harris    MRN: [id number]           Patient Information     Date Of Birth          1955        Visit Information        Provider Department      12/4/2017 9:40 AM Mariel Sabillon MD [facility]        Today's Diagnoses     Preop general physical exam    -  1    Shoulder injury, right, subsequent encounter        Mixed hyperlipidemia        Need for prophylactic vaccination and inoculation against influenza          Care Instructions      Before Your Surgery      Call your surgeon if there is any change in your health. This includes signs of a cold or flu (such as a sore throat, runny nose, cough, rash or fever).    Do not smoke, drink alcohol or take over the counter medicine (unless your surgeon or primary care doctor tells you to) for the 24 hours before and after surgery.    If you take prescribed drugs: Follow your doctor s orders about which medicines to take and which to stop until after surgery.    Eating and drinking prior to surgery: follow the instructions from your surgeon    Take a shower or bath the night before surgery. Use the soap your surgeon gave you to gently clean your skin. If you do not have soap from your surgeon, use your regular soap. Do not shave or scrub the surgery site.  Wear clean pajamas and have clean sheets on your bed.           Follow-ups after your visit        Your next 10 appointments already scheduled     Dec 06, 2017  9:00 AM CST   Ortho Treatment with Dasia Wilson PT   [facility] ([facility])    [address] [address]   [phone]            Dec 06, 2017  9:30 AM CST   Ortho Treatment with Dasia Wilson PT   [facility] ([facility])    [address] [address]   [phone]            Dec 18, 2017   Procedure with Hernando Thompson MD   [facility]  "Services (--)    [address] [facility] [address]   [phone]           The medical center is located at [address]. (between [address] in [place], [place] north of [place]).              Who to contact     If you have questions or need follow up information about today's clinic visit or your schedule please contact [facility] directly at [phone].  Normal or non-critical lab and imaging results will be communicated to you by [employer], letter or phone within 4 business days after the clinic has received the results. If you do not hear from us within 7 days, please contact the clinic through [employer] or phone. If you have a critical or abnormal lab result, we will notify you by phone as soon as possible.  Submit refill requests through "[employer] or call your pharmacy and they will forward the refill request to us. Please allow 3 business days for your refill to be completed.          Additional Information About Your Visit        "[employer] Information     "[employer] lets you send messages to your doctor, view your test results, renew your prescriptions, schedule appointments and more. To sign up, go to [email or web address] . Click on \"Log in\" on the left side of the screen, which will take you to the Welcome page. Then click on \"Sign up Now\" on the right side of the page.     You will be asked to enter the access code listed below, as well as some personal information. Please follow the directions to create your username and password.     Your access code is: [email or web address]  Expires: 3/4/2018 11:03 AM     Your access code will  in 90 days. If you need help or a new code, please call your [place] clinic or [phone].        Care EveryWhere ID     This is your Care EveryWhere ID. This could be used by other organizations to access your [facility] medical records  [id number]        Your Vitals Were     Pulse Temperature Height Last Period BMI (Body Mass Index)    " "   68 99.1  F (37.3  C) (Tympanic) 5' 3.5\" (1.613 m) 05/29/2006 40.84 kg/m2        Blood Pressure from Last 3 Encounters:   12/04/17 108/71   09/12/17 118/57   08/25/17 131/74    Weight from Last 3 Encounters:   12/04/17 234 lb 3.2 oz (106.2 kg)   09/12/17 252 lb (114.3 kg)   08/25/17 234 lb (106.1 kg)              We Performed the Following     Basic metabolic panel     CBC with platelets differential     FLU VAC, SPLIT VIRUS IM > 3 YO (QUADRIVALENT) [09303]     Lipid panel reflex to direct LDL Fasting          Today's Medication Changes          These changes are accurate as of: 12/4/17 11:03 AM.  If you have any questions, ask your nurse or doctor.               These medicines have changed or have updated prescriptions.        Dose/Directions    clobetasol 0.05 % ointment   Commonly known as:  TEMOVATE   This may have changed:    - when to take this  - reasons to take this  - additional instructions   Used for:  Vaginal itching        Apply sparingly to affected area twice daily for 14 days.  Do not apply to face.   Quantity:  15 g   Refills:  0                Primary Care Provider Office Phone # Fax #    Mariel Va Sabillon [phone] [address] [facility] [facility] [address]        Equal Access to Services     KUNAL [employer] AH: Hadii aad ku hadasho Sobg, waaxda luqadaha, qaybta kaalmada shaun, mildred gonzalez. So [facility] [phone].    ATENCIÓN: Si habla español, tiene a booth disposición servicios gratuitos de asistencia lingüística. Sandhya al [phone].    We comply with applicable federal civil rights laws and [place] laws. We do not discriminate on the basis of race, color, national origin, age, disability, sex, sexual orientation, or gender identity.            Thank you!     Thank you for choosing [facility]  for your care. Our goal is always to provide you with excellent care. Hearing back from our patients is one way we can continue to " improve our services. Please take a few minutes to complete the written survey that you may receive in the mail after your visit with us. Thank you!             Your Updated Medication List - Protect others around you: Learn how to safely use, store and throw away your medicines at www.disposemymeds.org.          This list is accurate as of: 12/4/17 11:03 AM.  Always use your most recent med list.                   Brand Name Dispense Instructions for use Diagnosis    atenolol 50 MG tablet    TENORMIN    90 tablet    TAKE ONE TABLET BY MOUTH EVERY DAY    HTN, goal below 140/80       CERAVE Crea     1 Bottle    Externally apply topically 2 times daily as needed    Sunburn, Itching       clobetasol 0.05 % ointment    TEMOVATE    15 g    Apply sparingly to affected area twice daily for 14 days.  Do not apply to face.    Vaginal itching       hydrochlorothiazide 12.5 MG Tabs tablet     90 tablet    TAKE ONE TABLET BY MOUTH EVERY DAY    HTN, goal below 140/80       HYDROcodone-acetaminophen 5-325 MG per tablet    NORCO    18 tablet    Take 1 tablet by mouth every 4 hours as needed for pain maximum *** tablet(s) per day        * nystatin 464609 UNIT/GM Powd    MYCOSTATIN    30 g    Apply topically 3 times daily as needed    Candidiasis of skin       * nystatin cream    MYCOSTATIN    30 g    Apply topically 2 times daily Reported on 3/6/2017    Candidiasis of skin       * Notice:  This list has 2 medication(s) that are the same as other medications prescribed for you. Read the directions carefully, and ask your doctor or other care provider to review them with you.

## 2017-12-06 ENCOUNTER — HOSPITAL ENCOUNTER (OUTPATIENT)
Dept: PHYSICAL THERAPY | Facility: CLINIC | Age: 62
Setting detail: THERAPIES SERIES
End: 2017-12-06
Attending: FAMILY MEDICINE
Payer: MEDICARE

## 2017-12-06 PROCEDURE — 97140 MANUAL THERAPY 1/> REGIONS: CPT | Mod: GP | Performed by: PHYSICAL THERAPIST

## 2017-12-06 PROCEDURE — G8982 BODY POS GOAL STATUS: HCPCS | Mod: GP,CI | Performed by: PHYSICAL THERAPIST

## 2017-12-06 PROCEDURE — 97110 THERAPEUTIC EXERCISES: CPT | Mod: GP | Performed by: PHYSICAL THERAPIST

## 2017-12-06 PROCEDURE — 40000718 ZZHC STATISTIC PT DEPARTMENT ORTHO VISIT: Performed by: PHYSICAL THERAPIST

## 2017-12-06 PROCEDURE — G8981 BODY POS CURRENT STATUS: HCPCS | Mod: GP,CJ | Performed by: PHYSICAL THERAPIST

## 2017-12-06 NOTE — PROGRESS NOTES
OUTPATIENT PHYSICAL THERAPY PROGRESS NOTE    8/21/17 to 12/06/17 0800   Signing Clinician's Name / Credentials   Signing clinician's name / credentials Dasia Wilson, PT [id number]   Session Number   Session Number 5 [facility]    PT Medicare Only G-code   G-code Body Position: Changing & Maintaining Body Position   Body Position: Changing & Maintaining Body Position   Body Position Current Status,  (eval/re-eval & every progress note) CJ: 20-39% impairment   Current Body Position Modifier Rationale ongoing difficulty w/ sleep and changing position this is also affected by shoulder   Body Position Goal,  (eval/re-eval, every progress note, & discharge) CI: 1-19% impairment   Ortho Goal 1   Goal Description 1.  Pt will be able to get in/out of car w/ pain no > 3/10.  10/25/17 3-4/10.   12/6/17 R arm bothering more than neck/ UB.    Target Date 01/19/18   Ortho Goal 2   Goal Description 2.  Reaching w/ toileting no pain > 3/10.  10/25/17 limited currently more due to shoulder but notes this is improving.  12/6/17 due to shoulder increased difficulty.     Target Date 11/19/17   Ortho Goal 3   Goal Description 3.  Pt will wake no > 4X/wk due to rib / upper back pain.  10/25/17 waking at least 2X/night.  12/6/17 waking at least 1X/night due to neck/ UB.     Target Date 01/19/18   Ortho Goal 4   Goal Description 4.  Independent and consistent w/HEP and increased awareness of posture.  12/6/17 has not been doing ex since last visit on 11/10/17.     Target Date 01/19/18   Subjective Report   Subjective Report Pt states she will be having RC revision on 12/18/17.   Pt states she has not been able to do any ex due pain down the whole R arm.   Pt notes the neck / UB is flared up.  Pain level 7-8/10.   Pt states L shoulder also bothering as she is doing more w/ that.    Objective Measures   Objective Measure Mod + tenderness R occiput.    Details CROM flex/ ext WFL.,  B rot 90%   Objective Measure increased swelling / bogginess   C7 to T5 posterior region   Therapeutic Procedure/exercise   Treatment Detail Pendulums.  table flex.  pulley flex / scaption,  Wand ER seated.   LT sets.  Cervical ext isometrics.   Manual Therapy   Treatment Detail MET  ERSL T2, ERSR T4. Post rib rocking R T4 and T5.    Occipital releases and manual traction.    cervical sideglides  ST work R > L  neck/ UT/ lev/ UB seated   Plan   Home program ex as previous   Plan  Pt will recheck before surgery as needed then cont up to 5 visits   Comments   Comments Progress towards goals as noted above     RECERTIFICATION    Jacquie [facility]  1955    Session Number: 5/ 10 planned MC --neck since start of care.    Reasons for Continuing Treatment:   Ongoing limitations w/ daily function / sleep    Frequency/Duration  1 time / 1-2 weeks this will be dependent on upcoming shoulder surgery for a total of 5 visits.    Recertification Period  11/19/17- 1/19/18    Physician Signature:    Date:    X_______________________________________________________    Physician Name:Mariel Sabillon MD    I certify the need for these services furnished under this plan of treatment and while under my care. Physician co-signature of this document indicates review and certification of the therapy plan.  This signature may be written on paper, or electronically signed within EPIC.

## 2017-12-07 ENCOUNTER — OFFICE VISIT (OUTPATIENT)
Dept: FAMILY MEDICINE | Facility: CLINIC | Age: 62
End: 2017-12-07
Payer: MEDICARE

## 2017-12-07 VITALS
WEIGHT: 234 LBS | TEMPERATURE: 98.9 F | HEIGHT: 64 IN | SYSTOLIC BLOOD PRESSURE: 105 MMHG | DIASTOLIC BLOOD PRESSURE: 64 MMHG | HEART RATE: 73 BPM | BODY MASS INDEX: 39.95 KG/M2

## 2017-12-07 DIAGNOSIS — R22.9 LOCALIZED SUPERFICIAL SWELLING, MASS, OR LUMP: Primary | ICD-10-CM

## 2017-12-07 PROCEDURE — 99213 OFFICE O/P EST LOW 20 MIN: CPT | Performed by: FAMILY MEDICINE

## 2017-12-07 NOTE — NURSING NOTE
"Chief Complaint   Patient presents with     Swelling     in neck shoulders and middle of back        Initial /64 (BP Location: Left arm, Cuff Size: Adult Large)  Pulse 73  Temp 98.9  F (37.2  C) (Tympanic)  Ht 5' 3.5\" (1.613 m)  Wt 234 lb (106.1 kg)  LMP 05/29/2006  BMI 40.8 kg/m2 Estimated body mass index is 40.8 kg/(m^2) as calculated from the following:    Height as of this encounter: 5' 3.5\" (1.613 m).    Weight as of this encounter: 234 lb (106.1 kg).  Medication Reconciliation: complete  "

## 2017-12-07 NOTE — PROGRESS NOTES
SUBJECTIVE:   Jacquie Harris is a 62 year old female who presents to clinic today for the following health issues:      Joint Pain    Onset: 11/2017    Description: Patient has fullness and swelling in neck both shoulders and middle upper back, she has tried a few things for(seebelow) it but are not helping  had surgery of  the 25th of aug , had a fall 1st week of nov, Patient does not think it has to do with the surgery or fall    Location: left shoulder, right shoulder, neck  and middle upper back    Character: Fullness, tugging  and tightness     Intensity: severe    Progression of Symptoms: worse    Accompanying Signs & Symptoms:  Other symptoms: swelling    History:   Previous similar pain: no       Precipitating factors:   Trauma or overuse: no     Alleviating factors:  Improved by: rest/inactivity and heat    Therapies Tried and outcome: ice heat exercise, and deep heat rubs messaging          62 yr old female with a lump on the back of her neck. Patient states that she first noticed this a couple of weeks ago, it is starting to cause some discomfort for her. She reports that she has been having some neck pain with the swelling . She did report a fall in November. Did not fall on her neck but fell on her buttocks. No head and neck injury at the time.     Problem list and histories reviewed & adjusted, as indicated.  Additional history: as documented    Patient Active Problem List   Diagnosis     s/p gastric bypass x 2     Allergic rhinitis     Carpal tunnel syndrome     Obstructive sleep apnea     Rosacea     CARDIOVASCULAR SCREENING; LDL GOAL LESS THAN 160     Tick bite, infected, positive Lyme test 1996 not treated     Vitamin D deficiency     Osteoporosis     Obesity, Class III, BMI 40-49.9 (morbid obesity) (H)     S/P TKR (total knee replacement)     HTN, goal below 140/80     Back pain, left below scapula, strained muscles     Encounter for routine gynecological examination      Colles' fracture      Bilateral cold feet     Elevated levels of transaminase & lactic acid dehydrogenase     Fatty liver     Shoulder injury, right, subsequent encounter     Past Surgical History:   Procedure Laterality Date     ARTHROPLASTY KNEE  4/2/2012    Procedure:ARTHROPLASTY KNEE; Left Total Knee Arthroplasty--Anesth.Choice; Surgeon:HERNANDO THOMPSON; Location:[facility]     ARTHROTOMY SHOULDER, ROTATOR CUFF REPAIR, COMBINED Right 8/25/2017    Procedure: COMBINED ARTHROTOMY SHOULDER, ROTATOR CUFF REPAIR;  Right shoulder distal clavicle excision, subacromial decompression, rotator cuff repair ;  Surgeon: Hernando Thompson MD;  Location: [facility]     C ORAL SURGERY PROCEDURE  age 18    wisdom teeth     C/SECTION, LOW TRANSVERSE  1978, 1984, 1994    x 3     COLONOSCOPY  2001    normal colonoscopy     COLONOSCOPY N/A 8/22/2017    Procedure: COLONOSCOPY;  Colonoscopy  ;  Surgeon: Delfino Yoo MD;  Location: [facility]     GASTRIC BYPASS  1980/2005    Gastric Bypass and revision     HERNIA REPAIR, INCISIONAL  6/16/2008    lap.repair with 10x8 mesh     TUBAL LIGATION  1994       Social History   Substance Use Topics     Smoking status: Never Smoker     Smokeless tobacco: Never Used     Alcohol use 0.0 oz/week     0 Standard drinks or equivalent per week      Comment: mixed very light 1 a month if that     Family History   Problem Relation Age of Onset     C.A.D. Father      MI at age 60     Hypertension Father      Alzheimer Disease Father      Hyperlipidemia Father      OSTEOPOROSIS Mother      on Fosamax     Glaucoma Mother      Other Cancer Brother      Stomach Cancer Maternal Aunt      Cervical Cancer Cousin      DIABETES No family hx of      CEREBROVASCULAR DISEASE No family hx of      Breast Cancer No family hx of      Cancer - colorectal No family hx of      Prostate Cancer No family hx of      Liver Disease No family hx of          Current Outpatient Prescriptions   Medication Sig Dispense Refill     atenolol (TENORMIN) 50 MG  "tablet TAKE ONE TABLET BY MOUTH EVERY DAY 90 tablet 1     hydrochlorothiazide 12.5 MG TABS tablet TAKE ONE TABLET BY MOUTH EVERY DAY 90 tablet 1     HYDROcodone-acetaminophen (NORCO) 5-325 MG per tablet Take 1 tablet by mouth every 4 hours as needed for pain maximum  tablet(s) per day 18 tablet 0     nystatin (MYCOSTATIN) 112838 UNIT/GM POWD Apply topically 3 times daily as needed (Patient not taking: Reported on 12/4/2017) 30 g 1     nystatin (MYCOSTATIN) cream Apply topically 2 times daily Reported on 3/6/2017 (Patient not taking: Reported on 12/4/2017) 30 g 11     clobetasol (TEMOVATE) 0.05 % ointment Apply sparingly to affected area twice daily for 14 days.  Do not apply to face. (Patient taking differently: daily as needed Apply sparingly to affected area twice daily for 14 days.  Do not apply to face.) 15 g 0     Emollient (CERAVE) CREA Externally apply topically 2 times daily as needed 1 Bottle 3     Allergies   Allergen Reactions     Vioxx Itching and Swelling     VIOXX (Swollen Feet,Hands itching), Okay with ibuprofen and aspirin     BP Readings from Last 3 Encounters:   12/07/17 105/64   12/04/17 108/71   09/12/17 118/57    Wt Readings from Last 3 Encounters:   12/07/17 234 lb (106.1 kg)   12/04/17 234 lb 3.2 oz (106.2 kg)   09/12/17 252 lb (114.3 kg)                  Labs reviewed in EPIC        Reviewed and updated as needed this visit by clinical staffTobacco  Allergies  Med Hx  Surg Hx  Fam Hx  Soc Hx      Reviewed and updated as needed this visit by Provider         ROS:  Constitutional, HEENT, cardiovascular, pulmonary, gi and gu systems are negative, except as otherwise noted.      OBJECTIVE:   /64 (BP Location: Left arm, Cuff Size: Adult Large)  Pulse 73  Temp 98.9  F (37.2  C) (Tympanic)  Ht 5' 3.5\" (1.613 m)  Wt 234 lb (106.1 kg)  LMP 05/29/2006  BMI 40.8 kg/m2  Body mass index is 40.8 kg/(m^2).  GENERAL: healthy, alert and no distress  SKIN: Lump on the posterior neck. Feels " like a lipoma .    Diagnostic Test Results:  none     ASSESSMENT/PLAN:         1. Localized superficial swelling, mass, or lump  Patient referred for an ultrasound,   - US Head Neck Soft Tissue; Future    FUTURE APPOINTMENTS:       - Follow-up visit as needed.    Jennie Suazo MD  [facility]

## 2017-12-07 NOTE — MR AVS SNAPSHOT
After Visit Summary   12/7/2017    Jacquie Harris    MRN: [id number]           Patient Information     Date Of Birth          1955        Visit Information        Provider Department      12/7/2017 12:40 PM Jennie Suazo MD [facility]        Today's Diagnoses     Localized superficial swelling, mass, or lump    -  1      Care Instructions          Thank you for choosing [facility].  You may be receiving a survey in the mail from [facility] regarding your visit today.  Please take a few minutes to complete and return the survey to let us know how we are doing.      If you have questions or concerns, please contact us via Bevy[employer] or you can contact your care team at [phone].    Our Clinic hours are:  Monday 6:40 am  to 7:00 pm  Tuesday -Friday 6:40 am to 5:00 pm    The [place] outpatient lab hours are:  Monday - Friday 6:10 am to 4:45 pm  Saturdays 7:00 am to 11:00 am  Appointments are required, call [phone]    If you have clinical questions after hours or would like to schedule an appointment,  call the clinic at [phone].  Lipoma, No Treatment  A lipoma is a local overgrowth of fatty tissue. It appears as a soft raised area, usually less than 2 inches across. It is a benign condition (not cancer).   Home care  General information regarding lipoma includes:  1. No special care is needed for a lipoma.  2. You can consider removal for cosmetic reasons.  3. Sometimes lipomas are uncomfortable because they put pressure on surrounding tissues. This is also a reason to have a lipoma removed.  Follow-up care  Follow up with your healthcare provider, or as advised if you want to have the lipoma removed at a later time.  When to seek medical advice  Call your healthcare provider right away if any of the following occur:    Redness, pain, tenderness, or drainage from the lipoma    Lipoma begins to enlarge or change shape    Changes in the color of the skin over  "the lipoma  Date Last Reviewed: 6/1/2016 2000-2017 [employer]. [address] [place] [address], [place] [address]. All rights reserved. This information is not intended as a substitute for professional medical care. Always follow your healthcare professional's instructions.                Follow-ups after your visit        Your next 10 appointments already scheduled     Dec 18, 2017   Procedure with Hernando Thompson MD   [facility] (--)    [address] [address] [phone]           The medical center is located at [address]. (between [address] and [address] in [place], [place] miles north of [place]).              Future tests that were ordered for you today     Open Future Orders        Priority Expected Expires Ordered    US Head Neck Soft Tissue Routine  12/7/2018 12/7/2017            Who to contact     If you have questions or need follow up information about today's clinic visit or your schedule please contact [facility] directly at [phone].  Normal or non-critical lab and imaging results will be communicated to you by MyChart, letter or phone within 4 business days after the clinic has received the results. If you do not hear from us within 7 days, please contact the clinic through MyChart or phone. If you have a critical or abnormal lab result, we will notify you by phone as soon as possible.  Submit refill requests through Caregivers or call your pharmacy and they will forward the refill request to us. Please allow 3 business days for your refill to be completed.          Additional Information About Your Visit        [employer] Information     [employer]ivers lets you send messages to your doctor, view your test results, renew your prescriptions, schedule appointments and more. To sign up, go to [email or web address] . Click on \"Log in\" on the left side of the screen, which will take you to the Welcome page. Then click on \"Sign up Now\" " "on the right side of the page.     You will be asked to enter the access code listed below, as well as some personal information. Please follow the directions to create your username and password.     Your access code is: [email or web address]  Expires: 3/4/2018 11:03 AM     Your access code will  in 90 days. If you need help or a new code, please call your [facility] clinic or [phone].        Care EveryWhere ID     This is your Care EveryWhere ID. This could be used by other organizations to access your [facility] medical records  [id number]        Your Vitals Were     Pulse Temperature Height Last Period BMI (Body Mass Index)       73 98.9  F (37.2  C) (Tympanic) 5' 3.5\" (1.613 m) 2006 40.8 kg/m2        Blood Pressure from Last 3 Encounters:   17 105/64   17 108/71   17 118/57    Weight from Last 3 Encounters:   17 234 lb (106.1 kg)   17 234 lb 3.2 oz (106.2 kg)   17 252 lb (114.3 kg)                 Today's Medication Changes          These changes are accurate as of: 17  1:01 PM.  If you have any questions, ask your nurse or doctor.               These medicines have changed or have updated prescriptions.        Dose/Directions    clobetasol 0.05 % ointment   Commonly known as:  TEMOVATE   This may have changed:    - when to take this  - reasons to take this  - additional instructions   Used for:  Vaginal itching        Apply sparingly to affected area twice daily for 14 days.  Do not apply to face.   Quantity:  15 g   Refills:  0                Primary Care Provider Office Phone # Fax #    Mariel Va Sabillon [phone] [address] [address]        Equal Access to Services     PREM NGO AH: Kris Lynch, walavell dexter, qaloista kaalshirley dunn, mildred gonzalez. So [facility] [phone].    ATENCIÓN: Si habla español, tiene a booth disposición servicios gratuitos de asistencia lingüística. " Sandhya urias [phone].    We comply with applicable federal civil rights laws and [place] laws. We do not discriminate on the basis of race, color, national origin, age, disability, sex, sexual orientation, or gender identity.            Thank you!     Thank you for choosing [facility]  for your care. Our goal is always to provide you with excellent care. Hearing back from our patients is one way we can continue to improve our services. Please take a few minutes to complete the written survey that you may receive in the mail after your visit with us. Thank you!             Your Updated Medication List - Protect others around you: Learn how to safely use, store and throw away your medicines at www.disposemymeds.org.          This list is accurate as of: 12/7/17  1:01 PM.  Always use your most recent med list.                   Brand Name Dispense Instructions for use Diagnosis    atenolol 50 MG tablet    TENORMIN    90 tablet    TAKE ONE TABLET BY MOUTH EVERY DAY    HTN, goal below 140/80       CERAVE Crea     1 Bottle    Externally apply topically 2 times daily as needed    Sunburn, Itching       clobetasol 0.05 % ointment    TEMOVATE    15 g    Apply sparingly to affected area twice daily for 14 days.  Do not apply to face.    Vaginal itching       hydrochlorothiazide 12.5 MG Tabs tablet     90 tablet    TAKE ONE TABLET BY MOUTH EVERY DAY    HTN, goal below 140/80       HYDROcodone-acetaminophen 5-325 MG per tablet    NORCO    18 tablet    Take 1 tablet by mouth every 4 hours as needed for pain maximum *** tablet(s) per day        * nystatin 463910 UNIT/GM Powd    MYCOSTATIN    30 g    Apply topically 3 times daily as needed    Candidiasis of skin       * nystatin cream    MYCOSTATIN    30 g    Apply topically 2 times daily Reported on 3/6/2017    Candidiasis of skin       * Notice:  This list has 2 medication(s) that are the same as other medications prescribed for you. Read the directions  carefully, and ask your doctor or other care provider to review them with you.

## 2017-12-07 NOTE — PATIENT INSTRUCTIONS
Thank you for choosing [facility].  You may be receiving a survey in the mail from [facility] regarding your visit today.  Please take a few minutes to complete and return the survey to let us know how we are doing.      If you have questions or concerns, please contact us via [employer]Mundus or you can contact your care team at [phone].    Our Clinic hours are:  Monday 6:40 am  to 7:00 pm  Tuesday -Friday 6:40 am to 5:00 pm    The [place] outpatient lab hours are:  Monday - Friday 6:10 am to 4:45 pm  Saturdays 7:00 am to 11:00 am  Appointments are required, call [phone]    If you have clinical questions after hours or would like to schedule an appointment,  call the clinic at [phone].  Lipoma, No Treatment  A lipoma is a local overgrowth of fatty tissue. It appears as a soft raised area, usually less than 2 inches across. It is a benign condition (not cancer).   Home care  General information regarding lipoma includes:  1. No special care is needed for a lipoma.  2. You can consider removal for cosmetic reasons.  3. Sometimes lipomas are uncomfortable because they put pressure on surrounding tissues. This is also a reason to have a lipoma removed.  Follow-up care  Follow up with your healthcare provider, or as advised if you want to have the lipoma removed at a later time.  When to seek medical advice  Call your healthcare provider right away if any of the following occur:    Redness, pain, tenderness, or drainage from the lipoma    Lipoma begins to enlarge or change shape    Changes in the color of the skin over the lipoma  Date Last Reviewed: 6/1/2016 2000-2017 [employer]. [address] [place] [address], [place] [address]. All rights reserved. This information is not intended as a substitute for professional medical care. Always follow your healthcare professional's instructions.

## 2017-12-08 ENCOUNTER — TELEPHONE (OUTPATIENT)
Dept: FAMILY MEDICINE | Facility: CLINIC | Age: 62
End: 2017-12-08

## 2017-12-08 ENCOUNTER — HOSPITAL ENCOUNTER (OUTPATIENT)
Dept: ULTRASOUND IMAGING | Facility: CLINIC | Age: 62
Discharge: HOME | End: 2017-12-08
Attending: FAMILY MEDICINE | Admitting: FAMILY MEDICINE
Payer: MEDICARE

## 2017-12-08 DIAGNOSIS — R22.9 LOCALIZED SUPERFICIAL SWELLING, MASS, OR LUMP: ICD-10-CM

## 2017-12-08 PROCEDURE — 76536 US EXAM OF HEAD AND NECK: CPT

## 2017-12-08 NOTE — TELEPHONE ENCOUNTER
Reason for Call:  Ultrasound results    Detailed comments: patient is calling and stating she would like her Ultrasound Results today. Could someone else look at it, as Gabriele is not here today.    Phone Number Patient can be reached at: Home number on file [phone] (home)    Best Time: any    Can we leave a detailed message on this number? YES   Génesis Campa  [facility] [place] [facility]      Call taken on 12/8/2017 at 1:31 PM by Génesis Campa

## 2017-12-14 ENCOUNTER — HOSPITAL ENCOUNTER (OUTPATIENT)
Dept: ULTRASOUND IMAGING | Facility: CLINIC | Age: 62
Discharge: HOME | End: 2017-12-14
Attending: FAMILY MEDICINE | Admitting: FAMILY MEDICINE
Payer: MEDICARE

## 2017-12-14 ENCOUNTER — HOSPITAL ENCOUNTER (OUTPATIENT)
Dept: CT IMAGING | Facility: CLINIC | Age: 62
End: 2017-12-14
Attending: FAMILY MEDICINE
Payer: MEDICARE

## 2017-12-14 ENCOUNTER — OFFICE VISIT (OUTPATIENT)
Dept: FAMILY MEDICINE | Facility: CLINIC | Age: 62
End: 2017-12-14
Payer: MEDICARE

## 2017-12-14 VITALS
HEIGHT: 64 IN | SYSTOLIC BLOOD PRESSURE: 136 MMHG | HEART RATE: 63 BPM | BODY MASS INDEX: 39.95 KG/M2 | WEIGHT: 234 LBS | TEMPERATURE: 98.3 F | DIASTOLIC BLOOD PRESSURE: 81 MMHG

## 2017-12-14 DIAGNOSIS — R22.9 LOCALIZED SUPERFICIAL SWELLING, MASS, OR LUMP: Primary | ICD-10-CM

## 2017-12-14 DIAGNOSIS — M79.621 PAIN OF RIGHT UPPER ARM: ICD-10-CM

## 2017-12-14 DIAGNOSIS — R22.9 LOCALIZED SUPERFICIAL SWELLING, MASS, OR LUMP: ICD-10-CM

## 2017-12-14 PROCEDURE — 25000128 H RX IP 250 OP 636: Performed by: RADIOLOGY

## 2017-12-14 PROCEDURE — 93971 EXTREMITY STUDY: CPT | Mod: RT

## 2017-12-14 PROCEDURE — 99214 OFFICE O/P EST MOD 30 MIN: CPT | Performed by: FAMILY MEDICINE

## 2017-12-14 PROCEDURE — 70491 CT SOFT TISSUE NECK W/DYE: CPT

## 2017-12-14 PROCEDURE — 25000125 ZZHC RX 250: Performed by: RADIOLOGY

## 2017-12-14 RX ORDER — IOPAMIDOL 755 MG/ML
80 INJECTION, SOLUTION INTRAVASCULAR ONCE
Status: COMPLETED | OUTPATIENT
Start: 2017-12-14 | End: 2017-12-14

## 2017-12-14 RX ADMIN — IOPAMIDOL 80 ML: 755 INJECTION, SOLUTION INTRAVENOUS at 16:19

## 2017-12-14 RX ADMIN — SODIUM CHLORIDE 70 ML: 9 INJECTION, SOLUTION INTRAVENOUS at 16:19

## 2017-12-14 NOTE — PROGRESS NOTES
SUBJECTIVE:   Jacquie Harris is a 62 year old female who presents to clinic today for the following health issues:      LUMPS      Duration: Right after the first of November.    Description (location/character/radiation): Lumps under the skin.  Her Physical Therapist noticed this first at a visit.  A lump on her back.  She was seen in clinic on 12-7-17.    Intensity:  moderate    Accompanying signs and symptoms: States there are lumps now on the back of the neck, front of the neck, chest, right arm.  Now the left arm is aching.  No fever or chills.    History (similar episodes/previous evaluation): Clinic visit on 12-7-17.  Ultrasound was done on 12-8-17.    Precipitating or alleviating factors: Two nights ago it was painful to sleep due to the pain.  Movement.     Therapies tried and outcome: She is trying to work through the pain.  Doesn't do well with swallowing pills.         Patient is here for a follow up on the lump on the nape of neck. Patient states that she is discovering more lumps on her body . She found one on her right shoulder. She also noticed a lump in her neck area. Her ultrasound was non conclusive. Spoke with radiology a CT of neck was recommended. Lump appears to be getting bigger.    Also mentioned that she had shoulder surgery in August and since then she has had swelling and pain in her right upper arm. She was told that she may have a clot in the arm but no imaging was ever done. She reports pain in the arm and swelling.     Problem list and histories reviewed & adjusted, as indicated.  Additional history: as documented    Patient Active Problem List   Diagnosis     s/p gastric bypass x 2     Allergic rhinitis     Carpal tunnel syndrome     Obstructive sleep apnea     Rosacea     CARDIOVASCULAR SCREENING; LDL GOAL LESS THAN 160     Tick bite, infected, positive Lyme test 1996 not treated     Vitamin D deficiency     Osteoporosis     Obesity, Class III, BMI 40-49.9 (morbid obesity) (H)      S/P TKR (total knee replacement)     HTN, goal below 140/80     Back pain, left below scapula, strained muscles     Encounter for routine gynecological examination      Colles' fracture     Bilateral cold feet     Elevated levels of transaminase & lactic acid dehydrogenase     Fatty liver     Shoulder injury, right, subsequent encounter     Past Surgical History:   Procedure Laterality Date     ARTHROPLASTY KNEE  4/2/2012    Procedure:ARTHROPLASTY KNEE; Left Total Knee Arthroplasty--Anesth.Choice; Surgeon:HERNANDO THOMPSON; Location:[facility]     ARTHROTOMY SHOULDER, ROTATOR CUFF REPAIR, COMBINED Right 8/25/2017    Procedure: COMBINED ARTHROTOMY SHOULDER, ROTATOR CUFF REPAIR;  Right shoulder distal clavicle excision, subacromial decompression, rotator cuff repair ;  Surgeon: Hernando Thompson MD;  Location: [facility]     C ORAL SURGERY PROCEDURE  age 18    wisdom teeth     C/SECTION, LOW TRANSVERSE  1978, 1984, 1994    x 3     COLONOSCOPY  2001    normal colonoscopy     COLONOSCOPY N/A 8/22/2017    Procedure: COLONOSCOPY;  Colonoscopy  ;  Surgeon: Delfino Yoo MD;  Location: [facility]     GASTRIC BYPASS  1980/2005    Gastric Bypass and revision     HERNIA REPAIR, INCISIONAL  6/16/2008    lap.repair with 10x8 mesh     TUBAL LIGATION  1994       Social History   Substance Use Topics     Smoking status: Never Smoker     Smokeless tobacco: Never Used     Alcohol use 0.0 oz/week     0 Standard drinks or equivalent per week      Comment: mixed very light 1 a month if that     Family History   Problem Relation Age of Onset     C.A.D. Father      MI at age 60     Hypertension Father      Alzheimer Disease Father      Hyperlipidemia Father      OSTEOPOROSIS Mother      on Fosamax     Glaucoma Mother      Other Cancer Brother      Stomach Cancer Maternal Aunt      Cervical Cancer Cousin      DIABETES No family hx of      CEREBROVASCULAR DISEASE No family hx of      Breast Cancer No family hx of      Cancer - colorectal No  "family hx of      Prostate Cancer No family hx of      Liver Disease No family hx of          Current Outpatient Prescriptions   Medication Sig Dispense Refill     atenolol (TENORMIN) 50 MG tablet TAKE ONE TABLET BY MOUTH EVERY DAY 90 tablet 1     hydrochlorothiazide 12.5 MG TABS tablet TAKE ONE TABLET BY MOUTH EVERY DAY 90 tablet 1     nystatin (MYCOSTATIN) 166618 UNIT/GM POWD Apply topically 3 times daily as needed 30 g 1     nystatin (MYCOSTATIN) cream Apply topically 2 times daily Reported on 3/6/2017 30 g 11     clobetasol (TEMOVATE) 0.05 % ointment Apply sparingly to affected area twice daily for 14 days.  Do not apply to face. (Patient taking differently: daily as needed Apply sparingly to affected area twice daily for 14 days.  Do not apply to face.) 15 g 0     Emollient (CERAVE) CREA Externally apply topically 2 times daily as needed 1 Bottle 3     Allergies   Allergen Reactions     Vioxx Itching and Swelling     VIOXX (Swollen Feet,Hands itching), Okay with ibuprofen and aspirin     BP Readings from Last 3 Encounters:   12/14/17 136/81   12/07/17 105/64   12/04/17 108/71    Wt Readings from Last 3 Encounters:   12/14/17 234 lb (106.1 kg)   12/07/17 234 lb (106.1 kg)   12/04/17 234 lb 3.2 oz (106.2 kg)                  Labs reviewed in EPIC        Reviewed and updated as needed this visit by clinical staff     Reviewed and updated as needed this visit by Provider     ROS:  Constitutional, HEENT, cardiovascular, pulmonary, gi and gu systems are negative, except as otherwise noted.      OBJECTIVE:   /81  Pulse 63  Temp 98.3  F (36.8  C) (Tympanic)  Ht 5' 3.5\" (1.613 m)  Wt 234 lb (106.1 kg)  LMP 05/29/2006  BMI 40.8 kg/m2  Body mass index is 40.8 kg/(m^2).  GENERAL: healthy, alert and no distress  EYES: Eyes grossly normal to inspection, PERRL and conjunctivae and sclerae normal  HENT: ear canals and TM's normal, nose and mouth without ulcers or lesions  NECK: no adenopathy, no asymmetry, masses, " or scars and thyroid normal to palpation  RESP: lungs clear to auscultation - no rales, rhonchi or wheezes  CV: regular rate and rhythm, normal S1 S2, no S3 or S4, no murmur, click or rub, no peripheral edema and peripheral pulses strong  MS: spine exam shows lump on the inferior neck. Soft movable.    Diagnostic Test Results:  none     ASSESSMENT/PLAN:         1. Localized superficial swelling, mass, or lump  CT ordered, patient will be notified of results.   - CT Soft Tissue Neck w Contrast; Future    2. Pain of right upper arm  Patient has had pain and swelling since the surgery.   - US Upper Extremity Venous Duplex Right; Future    FUTURE APPOINTMENTS:       - Follow-up visit as needed    Jennie Suazo MD  [facility]

## 2017-12-14 NOTE — MR AVS SNAPSHOT
After Visit Summary   12/14/2017    Jacquie Harris    MRN: [id number]           Patient Information     Date Of Birth          1955        Visit Information        Provider Department      12/14/2017 9:20 AM Jennie Suazo MD [facility]        Today's Diagnoses     Localized superficial swelling, mass, or lump    -  1    Pain of right upper arm          Care Instructions          Thank you for choosing [facility].  You may be receiving a survey in the mail from [facility] regarding your visit today.  Please take a few minutes to complete and return the survey to let us know how we are doing.      If you have questions or concerns, please contact us via [employer] or you can contact your care team at [phone].    Our Clinic hours are:  Monday 6:40 am  to 7:00 pm  Tuesday -Friday 6:40 am to 5:00 pm    The [place] outpatient lab hours are:  Monday - Friday 6:10 am to 4:45 pm  Saturdays 7:00 am to 11:00 am  Appointments are required, call [phone]    If you have clinical questions after hours or would like to schedule an appointment,  call the clinic at [phone].            Follow-ups after your visit        Your next 10 appointments already scheduled     Dec 18, 2017   Procedure with Hernando Thompson MD   [facility] (--)    [address] [place] [id number] [phone]           The medical center is located at [address]. (between [address] and [address] in [place], [place] north of [place]).              Future tests that were ordered for you today     Open Future Orders        Priority Expected Expires Ordered    US Upper Extremity Venous Duplex Right Routine  12/14/2018 12/14/2017    CT Soft Tissue Neck w/o Contrast Routine  12/14/2018 12/14/2017            Who to contact     If you have questions or need follow up information about today's clinic visit or your schedule please contact [facility]  "directly at [phone].  Normal or non-critical lab and imaging results will be communicated to you by [employer], letter or phone within 4 business days after the clinic has received the results. If you do not hear from us within 7 days, please contact the clinic through [employer] or phone. If you have a critical or abnormal lab result, we will notify you by phone as soon as possible.  Submit refill requests through [employer] or call your pharmacy and they will forward the refill request to us. Please allow 3 business days for your refill to be completed.          Additional Information About Your Visit        [employer] Information     [employer] lets you send messages to your doctor, view your test results, renew your prescriptions, schedule appointments and more. To sign up, go to [email or web address] . Click on \"Log in\" on the left side of the screen, which will take you to the Welcome page. Then click on \"Sign up Now\" on the right side of the page.     You will be asked to enter the access code listed below, as well as some personal information. Please follow the directions to create your username and password.     Your access code is: [email or web address]  Expires: 3/4/2018 11:03 AM     Your access code will  in 90 days. If you need help or a new code, please call your [place] clinic or [phone].        Care EveryWhere ID     This is your Care EveryWhere ID. This could be used by other organizations to access your [place] medical records  [id number]        Your Vitals Were     Pulse Temperature Height Last Period BMI (Body Mass Index)       63 98.3  F (36.8  C) (Tympanic) 5' 3.5\" (1.613 m) 2006 40.8 kg/m2        Blood Pressure from Last 3 Encounters:   17 136/81   17 105/64   17 108/71    Weight from Last 3 Encounters:   17 234 lb (106.1 kg)   17 234 lb (106.1 kg)   17 234 lb 3.2 oz (106.2 kg)                 Today's Medication Changes          These changes are " accurate as of: 12/14/17  9:53 AM.  If you have any questions, ask your nurse or doctor.               These medicines have changed or have updated prescriptions.        Dose/Directions    clobetasol 0.05 % ointment   Commonly known as:  TEMOVATE   This may have changed:    - when to take this  - reasons to take this  - additional instructions   Used for:  Vaginal itching        Apply sparingly to affected area twice daily for 14 days.  Do not apply to face.   Quantity:  15 g   Refills:  0                Primary Care Provider Office Phone # Fax #    Mariel Va Sabillon [phone] [address] [facility] [address]        Equal Access to Services     KUNAL NOG : Hadii jose Lynch, waaxda guerita, qaena bhatmada shaun, mildred sanchez . So [place] -[phone].    ATENCIÓN: Si habla español, tiene a booth disposición servicios gratuitos de asistencia lingüística. Llame al [phone].    We comply with applicable federal civil rights laws and [place] laws. We do not discriminate on the basis of race, color, national origin, age, disability, sex, sexual orientation, or gender identity.            Thank you!     Thank you for choosing [facility]  for your care. Our goal is always to provide you with excellent care. Hearing back from our patients is one way we can continue to improve our services. Please take a few minutes to complete the written survey that you may receive in the mail after your visit with us. Thank you!             Your Updated Medication List - Protect others around you: Learn how to safely use, store and throw away your medicines at www.disposemymeds.org.          This list is accurate as of: 12/14/17  9:53 AM.  Always use your most recent med list.                   Brand Name Dispense Instructions for use Diagnosis    atenolol 50 MG tablet    TENORMIN    90 tablet    TAKE ONE TABLET BY MOUTH EVERY DAY    HTN, goal  below 140/80       CERAVE Crea     1 Bottle    Externally apply topically 2 times daily as needed    Sunburn, Itching       clobetasol 0.05 % ointment    TEMOVATE    15 g    Apply sparingly to affected area twice daily for 14 days.  Do not apply to face.    Vaginal itching       hydrochlorothiazide 12.5 MG Tabs tablet     90 tablet    TAKE ONE TABLET BY MOUTH EVERY DAY    HTN, goal below 140/80       * nystatin 852265 UNIT/GM Powd    MYCOSTATIN    30 g    Apply topically 3 times daily as needed    Candidiasis of skin       * nystatin cream    MYCOSTATIN    30 g    Apply topically 2 times daily Reported on 3/6/2017    Candidiasis of skin       * Notice:  This list has 2 medication(s) that are the same as other medications prescribed for you. Read the directions carefully, and ask your doctor or other care provider to review them with you.

## 2017-12-14 NOTE — PATIENT INSTRUCTIONS
Thank you for choosing [facility].  You may be receiving a survey in the mail from Nnamdi Hopkins regarding your visit today.  Please take a few minutes to complete and return the survey to let us know how we are doing.      If you have questions or concerns, please contact us via [employer] or you can contact your care team at [phone].    Our Clinic hours are:  Monday 6:40 am  to 7:00 pm  Tuesday -Friday 6:40 am to 5:00 pm    The [place] outpatient lab hours are:  Monday - Friday 6:10 am to 4:45 pm  Saturdays 7:00 am to 11:00 am  Appointments are required, call [phone]    If you have clinical questions after hours or would like to schedule an appointment,  call the clinic at [phone].

## 2017-12-14 NOTE — NURSING NOTE
"Chief Complaint   Patient presents with     Mass     Here to have have areas checked.       Initial /81  Pulse 63  Temp 98.3  F (36.8  C) (Tympanic)  Ht 5' 3.5\" (1.613 m)  Wt 234 lb (106.1 kg)  LMP 05/29/2006  BMI 40.8 kg/m2 Estimated body mass index is 40.8 kg/(m^2) as calculated from the following:    Height as of this encounter: 5' 3.5\" (1.613 m).    Weight as of this encounter: 234 lb (106.1 kg).  Medication Reconciliation: complete  "

## 2017-12-15 ENCOUNTER — TELEPHONE (OUTPATIENT)
Dept: FAMILY MEDICINE | Facility: CLINIC | Age: 62
End: 2017-12-15

## 2017-12-15 ENCOUNTER — ANESTHESIA EVENT (OUTPATIENT)
Dept: SURGERY | Facility: CLINIC | Age: 62
End: 2017-12-15
Payer: MEDICARE

## 2017-12-15 NOTE — TELEPHONE ENCOUNTER
Patient is calling wanting to know her MRI and CT results from last night. Pt is wanting recommendations before her surgery Monday morning   Please advise   Call back [phone]    Roxie Lopez  [facility] [place]

## 2017-12-18 ENCOUNTER — ANESTHESIA (OUTPATIENT)
Dept: SURGERY | Facility: CLINIC | Age: 62
End: 2017-12-18
Payer: MEDICARE

## 2017-12-18 ENCOUNTER — HOSPITAL ENCOUNTER (OUTPATIENT)
Facility: CLINIC | Age: 62
Discharge: HOME | End: 2017-12-18
Attending: ORTHOPAEDIC SURGERY | Admitting: ORTHOPAEDIC SURGERY
Payer: MEDICARE

## 2017-12-18 VITALS
OXYGEN SATURATION: 94 % | BODY MASS INDEX: 39.98 KG/M2 | RESPIRATION RATE: 16 BRPM | SYSTOLIC BLOOD PRESSURE: 113 MMHG | TEMPERATURE: 97.5 F | DIASTOLIC BLOOD PRESSURE: 63 MMHG | HEART RATE: 68 BPM | HEIGHT: 64 IN | WEIGHT: 234.2 LBS

## 2017-12-18 PROCEDURE — 36000063 ZZH SURGERY LEVEL 4 EA 15 ADDTL MIN: Performed by: ORTHOPAEDIC SURGERY

## 2017-12-18 PROCEDURE — 25000125 ZZHC RX 250: Performed by: NURSE ANESTHETIST, CERTIFIED REGISTERED

## 2017-12-18 PROCEDURE — 37000008 ZZH ANESTHESIA TECHNICAL FEE, 1ST 30 MIN: Performed by: ORTHOPAEDIC SURGERY

## 2017-12-18 PROCEDURE — 27210794 ZZH OR GENERAL SUPPLY STERILE: Performed by: ORTHOPAEDIC SURGERY

## 2017-12-18 PROCEDURE — 40000306 ZZH STATISTIC PRE PROC ASSESS II: Performed by: ORTHOPAEDIC SURGERY

## 2017-12-18 PROCEDURE — C9399 UNCLASSIFIED DRUGS OR BIOLOG: HCPCS | Performed by: NURSE ANESTHETIST, CERTIFIED REGISTERED

## 2017-12-18 PROCEDURE — 25000564 ZZH DESFLURANE, EA 15 MIN: Performed by: ORTHOPAEDIC SURGERY

## 2017-12-18 PROCEDURE — 27210995 ZZH RX 272: Performed by: ORTHOPAEDIC SURGERY

## 2017-12-18 PROCEDURE — 36000093 ZZH SURGERY LEVEL 4 1ST 30 MIN: Performed by: ORTHOPAEDIC SURGERY

## 2017-12-18 PROCEDURE — 71000012 ZZH RECOVERY PHASE 1 LEVEL 1 FIRST HR: Performed by: ORTHOPAEDIC SURGERY

## 2017-12-18 PROCEDURE — 25000128 H RX IP 250 OP 636: Performed by: NURSE ANESTHETIST, CERTIFIED REGISTERED

## 2017-12-18 PROCEDURE — 37000009 ZZH ANESTHESIA TECHNICAL FEE, EACH ADDTL 15 MIN: Performed by: ORTHOPAEDIC SURGERY

## 2017-12-18 PROCEDURE — 71000027 ZZH RECOVERY PHASE 2 EACH 15 MINS: Performed by: ORTHOPAEDIC SURGERY

## 2017-12-18 RX ORDER — ONDANSETRON 2 MG/ML
4 INJECTION INTRAMUSCULAR; INTRAVENOUS EVERY 30 MIN PRN
Status: DISCONTINUED | OUTPATIENT
Start: 2017-12-18 | End: 2017-12-18 | Stop reason: HOSPADM

## 2017-12-18 RX ORDER — SODIUM CHLORIDE, SODIUM LACTATE, POTASSIUM CHLORIDE, CALCIUM CHLORIDE 600; 310; 30; 20 MG/100ML; MG/100ML; MG/100ML; MG/100ML
INJECTION, SOLUTION INTRAVENOUS CONTINUOUS
Status: DISCONTINUED | OUTPATIENT
Start: 2017-12-18 | End: 2017-12-18 | Stop reason: HOSPADM

## 2017-12-18 RX ORDER — LIDOCAINE HCL/EPINEPHRINE/PF 2%-1:200K
VIAL (ML) INJECTION PRN
Status: DISCONTINUED | OUTPATIENT
Start: 2017-12-18 | End: 2017-12-18

## 2017-12-18 RX ORDER — CEFAZOLIN SODIUM 2 G/100ML
2 INJECTION, SOLUTION INTRAVENOUS
Status: DISCONTINUED | OUTPATIENT
Start: 2017-12-18 | End: 2017-12-18 | Stop reason: HOSPADM

## 2017-12-18 RX ORDER — ROPIVACAINE HYDROCHLORIDE 7.5 MG/ML
INJECTION, SOLUTION EPIDURAL; PERINEURAL PRN
Status: DISCONTINUED | OUTPATIENT
Start: 2017-12-18 | End: 2017-12-18

## 2017-12-18 RX ORDER — EPHEDRINE SULFATE 50 MG/ML
INJECTION, SOLUTION INTRAVENOUS PRN
Status: DISCONTINUED | OUTPATIENT
Start: 2017-12-18 | End: 2017-12-18

## 2017-12-18 RX ORDER — LIDOCAINE 40 MG/G
CREAM TOPICAL
Status: DISCONTINUED | OUTPATIENT
Start: 2017-12-18 | End: 2017-12-18 | Stop reason: HOSPADM

## 2017-12-18 RX ORDER — ONDANSETRON 4 MG/1
4 TABLET, ORALLY DISINTEGRATING ORAL EVERY 30 MIN PRN
Status: DISCONTINUED | OUTPATIENT
Start: 2017-12-18 | End: 2017-12-18 | Stop reason: HOSPADM

## 2017-12-18 RX ORDER — PHENYLEPHRINE HCL IN 0.9% NACL 1 MG/10 ML
100 SYRINGE (ML) INTRAVENOUS EVERY 5 MIN PRN
Status: DISCONTINUED | OUTPATIENT
Start: 2017-12-18 | End: 2017-12-18 | Stop reason: HOSPADM

## 2017-12-18 RX ORDER — HYDROMORPHONE HYDROCHLORIDE 1 MG/ML
.3-.5 INJECTION, SOLUTION INTRAMUSCULAR; INTRAVENOUS; SUBCUTANEOUS EVERY 10 MIN PRN
Status: DISCONTINUED | OUTPATIENT
Start: 2017-12-18 | End: 2017-12-18 | Stop reason: HOSPADM

## 2017-12-18 RX ORDER — FENTANYL CITRATE 50 UG/ML
INJECTION, SOLUTION INTRAMUSCULAR; INTRAVENOUS PRN
Status: DISCONTINUED | OUTPATIENT
Start: 2017-12-18 | End: 2017-12-18

## 2017-12-18 RX ORDER — DEXAMETHASONE SODIUM PHOSPHATE 4 MG/ML
INJECTION, SOLUTION INTRA-ARTICULAR; INTRALESIONAL; INTRAMUSCULAR; INTRAVENOUS; SOFT TISSUE PRN
Status: DISCONTINUED | OUTPATIENT
Start: 2017-12-18 | End: 2017-12-18

## 2017-12-18 RX ORDER — NALOXONE HYDROCHLORIDE 0.4 MG/ML
.1-.4 INJECTION, SOLUTION INTRAMUSCULAR; INTRAVENOUS; SUBCUTANEOUS
Status: DISCONTINUED | OUTPATIENT
Start: 2017-12-18 | End: 2017-12-18 | Stop reason: HOSPADM

## 2017-12-18 RX ORDER — PROPOFOL 10 MG/ML
INJECTION, EMULSION INTRAVENOUS PRN
Status: DISCONTINUED | OUTPATIENT
Start: 2017-12-18 | End: 2017-12-18

## 2017-12-18 RX ORDER — FENTANYL CITRATE 50 UG/ML
25-50 INJECTION, SOLUTION INTRAMUSCULAR; INTRAVENOUS
Status: DISCONTINUED | OUTPATIENT
Start: 2017-12-18 | End: 2017-12-18 | Stop reason: HOSPADM

## 2017-12-18 RX ORDER — MAGNESIUM HYDROXIDE 1200 MG/15ML
LIQUID ORAL PRN
Status: DISCONTINUED | OUTPATIENT
Start: 2017-12-18 | End: 2017-12-18 | Stop reason: HOSPADM

## 2017-12-18 RX ORDER — LIDOCAINE HYDROCHLORIDE 10 MG/ML
INJECTION, SOLUTION EPIDURAL; INFILTRATION; INTRACAUDAL; PERINEURAL PRN
Status: DISCONTINUED | OUTPATIENT
Start: 2017-12-18 | End: 2017-12-18

## 2017-12-18 RX ORDER — ONDANSETRON 2 MG/ML
INJECTION INTRAMUSCULAR; INTRAVENOUS PRN
Status: DISCONTINUED | OUTPATIENT
Start: 2017-12-18 | End: 2017-12-18

## 2017-12-18 RX ORDER — MEPERIDINE HYDROCHLORIDE 25 MG/ML
12.5 INJECTION INTRAMUSCULAR; INTRAVENOUS; SUBCUTANEOUS
Status: DISCONTINUED | OUTPATIENT
Start: 2017-12-18 | End: 2017-12-18 | Stop reason: HOSPADM

## 2017-12-18 RX ADMIN — EPHEDRINE SULFATE 10 MG: 50 INJECTION, SOLUTION INTRAVENOUS at 12:41

## 2017-12-18 RX ADMIN — PHENYLEPHRINE HYDROCHLORIDE 100 MCG: 10 INJECTION, SOLUTION INTRAMUSCULAR; INTRAVENOUS; SUBCUTANEOUS at 12:21

## 2017-12-18 RX ADMIN — LIDOCAINE HYDROCHLORIDE 40 MG: 10 INJECTION, SOLUTION EPIDURAL; INFILTRATION; INTRACAUDAL; PERINEURAL at 11:56

## 2017-12-18 RX ADMIN — ONDANSETRON 4 MG: 2 INJECTION INTRAMUSCULAR; INTRAVENOUS at 11:56

## 2017-12-18 RX ADMIN — PROPOFOL 150 MG: 10 INJECTION, EMULSION INTRAVENOUS at 11:56

## 2017-12-18 RX ADMIN — FENTANYL CITRATE 100 MCG: 50 INJECTION, SOLUTION INTRAMUSCULAR; INTRAVENOUS at 11:14

## 2017-12-18 RX ADMIN — ROCURONIUM BROMIDE 50 MG: 10 INJECTION INTRAVENOUS at 11:56

## 2017-12-18 RX ADMIN — LIDOCAINE HYDROCHLORIDE 1 ML: 10 INJECTION, SOLUTION EPIDURAL; INFILTRATION; INTRACAUDAL; PERINEURAL at 10:55

## 2017-12-18 RX ADMIN — SODIUM CHLORIDE, POTASSIUM CHLORIDE, SODIUM LACTATE AND CALCIUM CHLORIDE: 600; 310; 30; 20 INJECTION, SOLUTION INTRAVENOUS at 10:55

## 2017-12-18 RX ADMIN — MIDAZOLAM 2 MG: 1 INJECTION INTRAMUSCULAR; INTRAVENOUS at 11:14

## 2017-12-18 RX ADMIN — PHENYLEPHRINE HYDROCHLORIDE 100 MCG: 10 INJECTION, SOLUTION INTRAMUSCULAR; INTRAVENOUS; SUBCUTANEOUS at 12:17

## 2017-12-18 RX ADMIN — PHENYLEPHRINE HYDROCHLORIDE 100 MCG: 10 INJECTION, SOLUTION INTRAMUSCULAR; INTRAVENOUS; SUBCUTANEOUS at 12:08

## 2017-12-18 RX ADMIN — PHENYLEPHRINE HYDROCHLORIDE 100 MCG: 10 INJECTION, SOLUTION INTRAMUSCULAR; INTRAVENOUS; SUBCUTANEOUS at 12:03

## 2017-12-18 RX ADMIN — LIDOCAINE HYDROCHLORIDE,EPINEPHRINE BITARTRATE 3 ML: 20; .005 INJECTION, SOLUTION EPIDURAL; INFILTRATION; INTRACAUDAL; PERINEURAL at 11:21

## 2017-12-18 RX ADMIN — MIDAZOLAM 1 MG: 1 INJECTION INTRAMUSCULAR; INTRAVENOUS at 11:17

## 2017-12-18 RX ADMIN — EPHEDRINE SULFATE 10 MG: 50 INJECTION, SOLUTION INTRAVENOUS at 12:37

## 2017-12-18 RX ADMIN — DEXAMETHASONE SODIUM PHOSPHATE 4 MG: 4 INJECTION, SOLUTION INTRA-ARTICULAR; INTRALESIONAL; INTRAMUSCULAR; INTRAVENOUS; SOFT TISSUE at 11:56

## 2017-12-18 RX ADMIN — PHENYLEPHRINE HYDROCHLORIDE 100 MCG: 10 INJECTION, SOLUTION INTRAMUSCULAR; INTRAVENOUS; SUBCUTANEOUS at 12:13

## 2017-12-18 RX ADMIN — SUGAMMADEX 100 MG: 100 INJECTION, SOLUTION INTRAVENOUS at 12:34

## 2017-12-18 RX ADMIN — FENTANYL CITRATE 150 MCG: 50 INJECTION, SOLUTION INTRAMUSCULAR; INTRAVENOUS at 11:56

## 2017-12-18 RX ADMIN — LIDOCAINE HYDROCHLORIDE 1 ML: 10 INJECTION, SOLUTION EPIDURAL; INFILTRATION; INTRACAUDAL; PERINEURAL at 11:19

## 2017-12-18 RX ADMIN — MIDAZOLAM 1 MG: 1 INJECTION INTRAMUSCULAR; INTRAVENOUS at 11:16

## 2017-12-18 RX ADMIN — ROPIVACAINE HYDROCHLORIDE 30 ML: 7.5 INJECTION, SOLUTION EPIDURAL; PERINEURAL at 11:22

## 2017-12-18 NOTE — ANESTHESIA PREPROCEDURE EVALUATION
Anesthesia Evaluation     . Pt has had prior anesthetic. Type: MAC and General    No history of anesthetic complications          ROS/MED HX    ENT/Pulmonary:     (+)sleep apnea, allergic rhinitis, doesn't use CPAP , . .    Neurologic:  - neg neurologic ROS     Cardiovascular:     (+) Dyslipidemia, hypertension----. : . . . :. . Previous cardiac testing date:results:date: results:ECG reviewed date:8-2017 results:Marked sinus Bradycardia   BORDERLINE RHYTHM date: results:          METS/Exercise Tolerance:     Hematologic:     (+) History of Transfusion -      Musculoskeletal:   (+) arthritis, , , other musculoskeletal- back pain      GI/Hepatic:     (+) liver disease, Other GI/Hepatic h/o gastric bypass x2      Renal/Genitourinary:         Endo:     (+) Obesity, Other Endocrine Disorder morbid obesity.      Psychiatric:  - neg psychiatric ROS       Infectious Disease:  - neg infectious disease ROS       Malignancy:      - no malignancy   Other:    - neg other ROS                 Physical Exam  Normal systems: cardiovascular, pulmonary and dental    Airway   Mallampati: II  TM distance: >3 FB  Neck ROM: full    Dental     Cardiovascular       Pulmonary                     Anesthesia Plan      History & Physical Review  History and physical reviewed and following examination; no interval change.    ASA Status:  3 .    NPO Status:  > 8 hours    Plan for General, ETT and Periph. Nerve Block for postop pain with Intravenous and Propofol induction. Maintenance will be Inhalation and Balanced.    PONV prophylaxis:  Ondansetron (or other 5HT-3) and Dexamethasone or Solumedrol  Additional equipment: Videolaryngoscope      Postoperative Care  Postoperative pain management:  IV analgesics, Oral pain medications and Peripheral nerve block (Single Shot).      Consents  Anesthetic plan, risks, benefits and alternatives discussed with:  Patient..                          .

## 2017-12-18 NOTE — IP AVS SNAPSHOT
MRN:[id number]                      After Visit Summary   12/18/2017    Jacquie Harris    MRN: [id number]           Thank you!     Thank you for choosing [facility] for your care. Our goal is always to provide you with excellent care. Hearing back from our patients is one way we can continue to improve our services. Please take a few minutes to complete the written survey that you may receive in the mail after you visit with us. Thank you!        Patient Information     Date Of Birth          1955        About your hospital stay     You were admitted on:  December 18, 2017 You last received care in the:  [facility] PreOP/Phase II    You were discharged on:  December 18, 2017       Who to Call     For medical emergencies, please call 911.  For non-urgent questions about your medical care, please call your primary care provider or clinic, [phone]  For questions related to your surgery, please call your surgery clinic        Attending Provider     Provider Specialty    Hernando Thompson MD Orthopaedic Surgery       Primary Care Provider Office Phone # Fax #    Mariel Va Sabillon [phone]      After Care Instructions      Diet as Tolerated       Return to diet before surgery, unless instructed otherwise.            Discharge Instructions       Review outpatient procedure discharge instructions with patient as directed by Provider            Dressing Change        Daily and as needed            Ice to affected area       Ice pack to surgical site every 15 minutes per hour for 24 hours            Return to clinic       Return to clinic in 2 weeks            Shower        Cover dressing if dressing is not going to be changed today            Wound care       Do not immerse wound in water until sutures removed                  Further instructions from your care team                         Same Day Surgery Discharge Instructions  Special Precautions After Surgery -  Adult    1. It is not unusual to feel lightheaded or faint, up to 24 hours after surgery or while taking pain medication.  If you have these symptoms; sit for a few minutes before standing and have someone assist you when getting up.  2. You should rest and relax for the next 24 hours and must have someone stay with you for at least 24 hours after your discharge.  3. DO NOT DRIVE any vehicle or operate mechanical equipment for 24 hours following the end of your surgery.  DO NOT DRIVE while taking narcotic pain medications that have been prescribed by your physician.  If you had a limb operated on, you must be able to use it fully to drive.  4. DO NOT drink alcoholic beverages for 24 hours following surgery or while taking prescription pain medication.  5. Drink clear liquids (apple juice, ginger ale, broth, 7-Up, etc.).  Progress to your regular diet as you feel able.  6. Any questions call your physician and do not make important decisions for 24 hours.      Start your pain pills at bedtime per anesthesia instructions.    [facility]:  [phone]       Same Day Surgery [phone], Monday thru Friday 6am-9pm.    Break through Bleeding  As instructed per Surgeon or Nurse.    Post Op Infection  Be alert for signs of infection: redness, swelling, heat, drainage of pus, and/or elevated temperature.  Contact your surgeon if these occur.    Nausea   If post op nausea occurs, at first rest your stomach for a few hours by eating nothing solid and sipping only clear liquids.  Call your Surgeon if nausea does not resolve in 24 hours.        Additional Information     If you use hormonal birth control (such as the pill, patch, ring or implants): You'll need a second form of birth control for 7 days (condoms, a diaphragm or contraceptive foam). While in the hospital, you received a medicine called Bridion. Your normal birth control will not work as well for a week after taking this medicine.          Pending  "Results     No orders found from 2017 to 2017.            Admission Information     Date & Time Provider Department Dept. Phone    2017 Hernando Thompson MD [facility] PreOP/Phase -[phone]      Your Vitals Were     Blood Pressure Pulse Temperature Respirations Height Weight    124/63 68 97.5  F (36.4  C) (Oral) 16 1.613 m (5' 3.5\") 106.2 kg (234 lb 3.2 oz)    Last Period Pulse Oximetry BMI (Body Mass Index)             2006 93% 40.83 kg/m2         MyChart Information     [employer] lets you send messages to your doctor, view your test results, renew your prescriptions, schedule appointments and more. To sign up, go to [email or web address] . Click on \"Log in\" on the left side of the screen, which will take you to the Welcome page. Then click on \"Sign up Now\" on the right side of the page.     You will be asked to enter the access code listed below, as well as some personal information. Please follow the directions to create your username and password.     Your access code is: [email or web address]  Expires: 3/4/2018 11:03 AM     Your access code will  in 90 days. If you need help or a new code, please call your [place] clinic or [phone].        Care EveryWhere ID     This is your Care EveryWhere ID. This could be used by other organizations to access your [place] medical records  [id number]        Equal Access to Services     [facility] AH: Hadii jose ered hadasho Soomaali, waaxda luqadaha, qaybta kaalmada adeegyada, mildred gonzalez. So [facility] [phone].    ATENCIÓN: Si habla español, tiene a booth disposición servicios gratuitos de asistencia lingüística. Llame al [phone].    We comply with applicable federal civil rights laws and [place] laws. We do not discriminate on the basis of race, color, national origin, age, disability, sex, sexual orientation, or gender identity.               Review of your medicines      CONTINUE these medicines " which may have CHANGED, or have new prescriptions. If we are uncertain of the size of tablets/capsules you have at home, strength may be listed as something that might have changed.        Dose / Directions    clobetasol 0.05 % ointment   Commonly known as:  TEMOVATE   This may have changed:    - when to take this  - reasons to take this  - additional instructions   Used for:  Vaginal itching        Apply sparingly to affected area twice daily for 14 days.  Do not apply to face.   Quantity:  15 g   Refills:  0         CONTINUE these medicines which have NOT CHANGED        Dose / Directions    atenolol 50 MG tablet   Commonly known as:  TENORMIN   Used for:  HTN, goal below 140/80        TAKE ONE TABLET BY MOUTH EVERY DAY   Quantity:  90 tablet   Refills:  1       CERAVE Crea   Used for:  Sunburn, Itching        Externally apply topically 2 times daily as needed   Quantity:  1 Bottle   Refills:  3       hydrochlorothiazide 12.5 MG Tabs tablet   Used for:  HTN, goal below 140/80        TAKE ONE TABLET BY MOUTH EVERY DAY   Quantity:  90 tablet   Refills:  1       * nystatin 382012 UNIT/GM Powd   Commonly known as:  MYCOSTATIN   Used for:  Candidiasis of skin        Apply topically 3 times daily as needed   Quantity:  30 g   Refills:  1       * nystatin cream   Commonly known as:  MYCOSTATIN   Used for:  Candidiasis of skin        Apply topically 2 times daily Reported on 3/6/2017   Quantity:  30 g   Refills:  11       * Notice:  This list has 2 medication(s) that are the same as other medications prescribed for you. Read the directions carefully, and ask your doctor or other care provider to review them with you.             Protect others around you: Learn how to safely use, store and throw away your medicines at www.disposemymeds.org.             Medication List: This is a list of all your medications and when to take them. Check marks below indicate your daily home schedule. Keep this list as a reference.       Medications           Morning Afternoon Evening Bedtime As Needed    atenolol 50 MG tablet   Commonly known as:  TENORMIN   TAKE ONE TABLET BY MOUTH EVERY DAY                                CERAVE Crea   Externally apply topically 2 times daily as needed                                clobetasol 0.05 % ointment   Commonly known as:  TEMOVATE   Apply sparingly to affected area twice daily for 14 days.  Do not apply to face.                                hydrochlorothiazide 12.5 MG Tabs tablet   TAKE ONE TABLET BY MOUTH EVERY DAY                                * nystatin [id number] UNIT/GM Powd   Commonly known as:  MYCOSTATIN   Apply topically 3 times daily as needed                                * nystatin cream   Commonly known as:  MYCOSTATIN   Apply topically 2 times daily Reported on 3/6/2017                                * Notice:  This list has 2 medication(s) that are the same as other medications prescribed for you. Read the directions carefully, and ask your doctor or other care provider to review them with you.

## 2017-12-18 NOTE — ANESTHESIA PROCEDURE NOTES
Peripheral nerve/Neuraxial procedure note : interscalene  Pre-Procedure  Performed by  ISAAC NEWSOME   Location: pre-op    Procedure Times:12/18/2017 11:14 AM and 12/18/2017 11:23 AM  Pre-Anesthestic Checklist: patient identified, IV checked, site marked, risks and benefits discussed, informed consent, monitors and equipment checked, pre-op evaluation, at physician/surgeon's request and post-op pain management    Timeout  Correct Patient: Yes   Correct Procedure: Yes   Correct Site: Yes   Correct Laterality: Yes   Correct Position: Yes   Site Marked: Yes   .   Procedure Documentation    Diagnosis:PAIN.    Procedure:    Interscalene.  Local skin infiltrated with 1 mL of 1% lidocaine.     Ultrasound used to identify targeted nerve, plexus, or vascular marker and placed a needle adjacent to it., Ultrasound was used to visualize the spread of the anesthetic in close proximity to the above stated nerve. A permanent image is entered into the patient's record.  Patient Prep;mask, sterile gloves, chlorhexidine gluconate and isopropyl alcohol, patient draped.  Nerve Stim: Initial Level 1 mA.  Lowest motor response 0.44 mA..  Needle: insulated, short bevel Needle Gauge: 22.    Needle Length (Inches) 2  .       Assessment/Narrative  Paresthesias: No.  Injection made incrementally with aspirations every 5 mL..  The placement was negative for: blood aspirated, painful injection and site bleeding.  Bolus given via needle. No blood aspirated via catheter.   Secured via.   Complications: none. Test dose of 3 mL at 11:21. Test dose negative for signs of intravascular, subdural or intrathecal injection.

## 2017-12-18 NOTE — ANESTHESIA CARE TRANSFER NOTE
Patient: Jacquie Steven    Procedure(s):  Right Shoulder Rotator Cuff Revision - Wound Class: I-Clean    Diagnosis: failed rotator cuff repair  Diagnosis Additional Information: No value filed.    Anesthesia Type:   General, ETT, Periph. Nerve Block for postop pain     Note:  Airway :Face Mask  Patient transferred to:PACU  Handoff Report: Identifed the Patient, Identified the Reponsible Provider, Reviewed the pertinent medical history, Discussed the surgical course, Reviewed Intra-OP anesthesia mangement and issues during anesthesia, Set expectations for post-procedure period and Allowed opportunity for questions and acknowledgement of understanding      Vitals: (Last set prior to Anesthesia Care Transfer)    CRNA VITALS  12/18/2017 1222 - 12/18/2017 1258      12/18/2017             Pulse: 85    SpO2: 99 %    Resp Rate (observed): (!)  4                Electronically Signed By: Rambo Montgomery CRNA, APRN CRNA  December 18, 2017  12:58 PM

## 2017-12-18 NOTE — DISCHARGE INSTRUCTIONS
Same Day Surgery Discharge Instructions  Special Precautions After Surgery - Adult    1. It is not unusual to feel lightheaded or faint, up to 24 hours after surgery or while taking pain medication.  If you have these symptoms; sit for a few minutes before standing and have someone assist you when getting up.  2. You should rest and relax for the next 24 hours and must have someone stay with you for at least 24 hours after your discharge.  3. DO NOT DRIVE any vehicle or operate mechanical equipment for 24 hours following the end of your surgery.  DO NOT DRIVE while taking narcotic pain medications that have been prescribed by your physician.  If you had a limb operated on, you must be able to use it fully to drive.  4. DO NOT drink alcoholic beverages for 24 hours following surgery or while taking prescription pain medication.  5. Drink clear liquids (apple juice, ginger ale, broth, 7-Up, etc.).  Progress to your regular diet as you feel able.  6. Any questions call your physician and do not make important decisions for 24 hours.      Start your pain pills at bedtime per anesthesia instructions.    [facility]:  [phone]       Same Day Surgery [phone], Monday thru Friday 6am-9pm.    Break through Bleeding  As instructed per Surgeon or Nurse.    Post Op Infection  Be alert for signs of infection: redness, swelling, heat, drainage of pus, and/or elevated temperature.  Contact your surgeon if these occur.    Nausea   If post op nausea occurs, at first rest your stomach for a few hours by eating nothing solid and sipping only clear liquids.  Call your Surgeon if nausea does not resolve in 24 hours.

## 2017-12-18 NOTE — IP AVS SNAPSHOT
[facility] PreOP/Phase II    [address] [address]    Phone:  [phone]    Fax:  [phone]                                       After Visit Summary   12/18/2017    Jacquie Harris    MRN: [id number]           After Visit Summary Signature Page     I have received my discharge instructions, and my questions have been answered. I have discussed any challenges I see with this plan with the nurse or doctor.    ..........................................................................................................................................  Patient/Patient Representative Signature      ..........................................................................................................................................  Patient Representative Print Name and Relationship to Patient    ..................................................               ................................................  Date                                            Time    ..........................................................................................................................................  Reviewed by Signature/Title    ...................................................              ..............................................  Date                                                            Time

## 2017-12-18 NOTE — ANESTHESIA POSTPROCEDURE EVALUATION
Patient: Jacquie Steven    Procedure(s):  Right Shoulder Rotator Cuff Revision - Wound Class: I-Clean    Diagnosis:failed rotator cuff repair  Diagnosis Additional Information: No value filed.    Anesthesia Type:  General, ETT, Periph. Nerve Block for postop pain    Note:  Anesthesia Post Evaluation    Patient location during evaluation: Phase 2  Patient participation: Able to fully participate in evaluation  Level of consciousness: awake and alert  Pain management: adequate  Airway patency: patent  Cardiovascular status: acceptable and hemodynamically stable  Respiratory status: acceptable, room air and spontaneous ventilation  Hydration status: acceptable  PONV: none     Anesthetic complications: None          Last vitals:  Vitals:    12/18/17 1400 12/18/17 1430 12/18/17 1459   BP: 129/71 124/63 113/63   Pulse:      Resp: 16 16 16   Temp:      SpO2: 93% 93% 94%         Electronically Signed By: MARGARITA Wilson CRNA  December 18, 2017  4:05 PM

## 2017-12-18 NOTE — PROGRESS NOTES
Please inform patient that test result showed a lipoma in the area where she is having the swelling. It is a benign mass. If she wants this removed she will have to see a spine doctor.  Thank you.     Jennie Suazo M.D.

## 2017-12-18 NOTE — H&P (VIEW-ONLY)
[facility]  [address] [address]  [phone]  Dept: [phone]    PRE-OP EVALUATION:  Today's date: 2017    Jacquie Harris (: 1955) presents for pre-operative evaluation assessment as requested by Dr. Thompson.  She requires evaluation and anesthesia risk assessment prior to undergoing surgery/procedure for treatment of Right shoulder RTC issue .  Proposed procedure: Right Shoulder Rotator Cuff Revision    Date of Surgery/ Procedure: 17  Time of Surgery/ Procedure: [facility]  Hospital/Surgical Facility: [place] OR    Primary Physician: Mariel Sabillon  Type of Anesthesia Anticipated: General    Patient has a Health Care Directive or Living Will:  NO    1. NO - Do you have a history of heart attack, stroke, stent, bypass or surgery on an artery in the head, neck, heart or legs?  2. NO - Do you ever have any pain or discomfort in your chest?  3. NO - Do you have a history of  Heart Failure?  4. NO - Are you troubled by shortness of breath when: walking on the level, up a slight hill or at night?  5. NO - Do you currently have a cold, bronchitis or other respiratory infection?  6. NO - Do you have a cough, shortness of breath or wheezing?  7. NO - Do you sometimes get pains in the calves of your legs when you walk?  8. NO - Do you or anyone in your family have previous history of blood clots?  9. YES - DO YOU OR DOES ANYONE IN YOUR FAMILY HAVE A SERIOUS BLEEDING PROBLEM SUCH AS PROLONGED BLEEDING FOLLOWING SURGERIES OR CUTS?   10. YES - HAVE YOU EVER HAD PROBLEMS WITH ANEMIA OR BEEN TOLD TO TAKE IRON PILLS?   11. NO - Have you had any abnormal blood loss such as black, tarry or bloody stools, or abnormal vaginal bleeding?  12. YES - HAVE YOU EVER HAD A BLOOD TRANSFUSION?   13. NO - Have you or any of your relatives ever had problems with anesthesia?  14. YES - DO YOU HAVE SLEEP APNEA, EXCESSIVE SNORING OR DAYTIME DROWSINESS?   15. NO - Do you have any  prosthetic heart valves?  16. YES - DO YOU HAVE PROSTHETIC JOINTS? l knee  17. NO - Is there any chance that you may be pregnant?        HPI:                                                      Brief HPI related to upcoming procedure: Jacquie Harris is 62 year old white female with rotator cuff injury, fatty liver, elevated LFT, HTN, osteoporosis, obesity   who is here to get clearance to have general anesthesia.        See problem list for active medical problems.  Problems all longstanding and stable, except as noted/documented.  See ROS for pertinent symptoms related to these conditions.                                                                                                  .    MEDICAL HISTORY:                                                    Patient Active Problem List    Diagnosis Date Noted     Shoulder injury, right, subsequent encounter 08/21/2017     Priority: Medium     Fatty liver 04/20/2017     Priority: Medium     Elevated levels of transaminase & lactic acid dehydrogenase 04/15/2017     Priority: Medium     Bilateral cold feet 01/03/2017     Priority: Medium     Colles' fracture 07/06/2015     Priority: Medium     Encounter for routine gynecological examination  06/04/2015     Priority: Medium     Back pain, left below scapula, strained muscles 10/24/2013     Priority: Medium     HTN, goal below 140/80 05/05/2013     Priority: Medium     S/P TKR (total knee replacement) 04/03/2012     Priority: Medium     Osteoporosis 02/19/2012     Priority: Medium     Previous T scores -2.2  2/2012 Dexascan:  Lumbar spine L1-L4 T-score: -2.4, Left femoral neck T-score: -2.7, Right femoral neck T-score: -2.2   Percent change in lumbar spine: +6.6% since 4/16/2008   Percent change in femurs: +2.9% since 4/16/2008    IMPRESSION: Osteoporosis in the left femoral neck. Severe osteopenia  in the right femoral neck and lumbar spine.       Obesity, Class III, BMI 40-49.9 (morbid obesity) (H) 02/19/2012      Priority: Medium     ideal weight 120, goal weight 200, lose 58 lbs in 58 weeks.       Vitamin D deficiency 07/18/2011     Priority: Medium     Tick bite, infected, positive Lyme test 1996 not treated 07/08/2011     Priority: Medium     Rosacea 11/04/2009     Priority: Medium     Obstructive sleep apnea 12/01/2006     Priority: Medium     Sleep study 12/06 for EDS- AHI 36, desaturations to 65%. CPAP recommended but not tolerated.  01/16/07  ENT consult recommend CPAP and weight loss. Surgery discussed but success rate less than CPAP  1/15/07 CPAP trial, was not able to tolerate. Retried 2008, tolerated better.   Problem list name updated by automated process. Provider to review       Carpal tunnel syndrome 06/07/2006     Priority: Medium     04/10/09 Dr.Meletiou Cronin. Finding consistant with CTS, but nerve studies and MRI of C-Spine are normal. Corticosteroid injection given in office       CARDIOVASCULAR SCREENING; LDL GOAL LESS THAN 160 10/31/2010     Priority: Low     Allergic rhinitis 03/27/2006     Priority: Low     Problem list name updated by automated process. Provider to review       s/p gastric bypass x 2 07/05/2005     Priority: Low     Gastric bypass 1980        Past Medical History:   Diagnosis Date     HTN (hypertension)      IRON DEFICIENCY ANEMIA 7/5/2005     Iron infusion/ resolved since injections     DARWIN (obstructive sleep apnea)      Past Surgical History:   Procedure Laterality Date     ARTHROPLASTY KNEE  4/2/2012    Procedure:ARTHROPLASTY KNEE; Left Total Knee Arthroplasty--Anesth.Choice; Surgeon:HERNANDO THOMPSON; Location:[facility] OR     ARTHROTOMY SHOULDER, ROTATOR CUFF REPAIR, COMBINED Right 8/25/2017    Procedure: COMBINED ARTHROTOMY SHOULDER, ROTATOR CUFF REPAIR;  Right shoulder distal clavicle excision, subacromial decompression, rotator cuff repair ;  Surgeon: Hernando Thompson MD;  Location: [facility] OR      ORAL SURGERY PROCEDURE  age 18    wisdom teeth     C/SECTION, LOW  "TRANSVERSE  1978, 1984, 1994    x 3     COLONOSCOPY  2001    normal colonoscopy     COLONOSCOPY N/A 8/22/2017    Procedure: COLONOSCOPY;  Colonoscopy  ;  Surgeon: Delfino Yoo MD;  Location: [facility] GI     GASTRIC BYPASS  1980/2005    Gastric Bypass and revision     HERNIA REPAIR, INCISIONAL  6/16/2008    lap.repair with 10x8 mesh     TUBAL LIGATION  1994       OTC products: None, except as noted above    Allergies   Allergen Reactions     Vioxx Itching and Swelling     VIOXX (Swollen Feet,Hands itching), Okay with ibuprofen and aspirin      Latex Allergy: NO    Social History   Substance Use Topics     Smoking status: Never Smoker     Smokeless tobacco: Never Used     Alcohol use 0.0 oz/week     0 Standard drinks or equivalent per week      Comment: mixed very light 1 a month if that     History   Drug Use No       REVIEW OF SYSTEMS:                                                    C: NEGATIVE for fever, chills, change in weight  E/M: NEGATIVE for ear, mouth and throat problems  R: NEGATIVE for significant cough or SOB  CV: NEGATIVE for chest pain, palpitations or peripheral edema    EXAM:                                                    /71  Pulse 68  Temp 99.1  F (37.3  C) (Tympanic)  Ht 5' 3.5\" (1.613 m)  Wt 234 lb 3.2 oz (106.2 kg)  LMP 05/29/2006  BMI 40.84 kg/m2    GENERAL APPEARANCE: healthy, alert and no distress     EYES: EOMI, PERRL     HENT: ear canals and TM's normal and nose and mouth without ulcers or lesions     NECK: no adenopathy, no asymmetry, masses, or scars and thyroid normal to palpation     RESP: lungs clear to auscultation - no rales, rhonchi or wheezes     CV: regular rates and rhythm, normal S1 S2, no S3 or S4 and no murmur, click or rub     ABDOMEN:  soft, nontender, no HSM or masses and bowel sounds normal     MS: extremities normal- no gross deformities noted, no evidence of inflammation in joints, FROM in all extremities.     SKIN: no suspicious lesions or rashes     " NEURO: Normal strength and tone, sensory exam grossly normal, mentation intact and speech normal     PSYCH: mentation appears normal. and affect normal/bright     LYMPHATICS: No axillary, cervical, or supraclavicular nodes    DIAGNOSTICS:                                                      Labs Resulted Today:   Results for orders placed or performed in visit on 12/04/17   CBC with platelets differential   Result Value Ref Range    WBC 7.7 4.0 - 11.0 10e9/L    RBC Count 4.73 3.8 - 5.2 10e12/L    Hemoglobin 14.8 11.7 - 15.7 g/dL    Hematocrit 45.4 35.0 - 47.0 %    MCV 96 78 - 100 fl    MCH 31.3 26.5 - 33.0 pg    MCHC 32.6 31.5 - 36.5 g/dL    RDW 13.2 10.0 - 15.0 %    Platelet Count 237 150 - 450 10e9/L    Diff Method Automated Method     % Neutrophils 58.9 %    % Lymphocytes 31.8 %    % Monocytes 8.0 %    % Eosinophils 1.0 %    % Basophils 0.3 %    Absolute Neutrophil 4.6 1.6 - 8.3 10e9/L    Absolute Lymphocytes 2.5 0.8 - 5.3 10e9/L    Absolute Monocytes 0.6 0.0 - 1.3 10e9/L    Absolute Eosinophils 0.1 0.0 - 0.7 10e9/L    Absolute Basophils 0.0 0.0 - 0.2 10e9/L       Recent Labs   Lab Test  08/16/17   0930  05/17/17   0804   04/18/17   0620   04/05/12   0610   11/04/09   1440   HGB  14.8   --    --   12.7   < >   --    < >   --    PLT  213   --    --   191   < >   --    < >   --    INR  1.05   --    --    --    --   1.47*   < >   --    NA  139  140   < >  142   < >   --    --   140   POTASSIUM  4.0  3.8   < >  3.7   < >   --    < >  4.6   CR  0.74  0.76   < >  0.65   < >   --    --   0.69   A1C   --    --    --    --    --    --    --   5.5    < > = values in this interval not displayed.        IMPRESSION:                                                    Reason for surgery/procedure:   1. Preop general physical exam  2. Shoulder injury, right, subsequent encounter  - CBC with platelets differential  - Basic metabolic panel   cleared for general anesthesia    3. Mixed hyperlipidemia  - Lipid panel reflex to direct  LDL Fasting    4. Need for prophylactic vaccination and inoculation against influenza  - FLU VAC, SPLIT VIRUS IM > 3 YO (QUADRIVALENT) [06905]        The proposed surgical procedure is considered INTERMEDIATE risk.    REVISED CARDIAC RISK INDEX  The patient has the following serious cardiovascular risks for perioperative complications such as (MI, PE, VFib and 3  AV Block):  No serious cardiac risks  INTERPRETATION: The 10-year ASCVD risk score (Jolieannmarie BRITO Jr, et al., 2013) is: 4.4%    Values used to calculate the score:      Age: 62 years      Sex: Female      Is Non- : No      Diabetic: No      Tobacco smoker: No      Systolic Blood Pressure: 108 mmHg      Is BP treated: Yes      HDL Cholesterol: 45 mg/dL      Total Cholesterol: 171 mg/dL      The patient has the following additional risks for perioperative complications:  No identified additional risks      ICD-10-CM    1. Preop general physical exam Z01.818 CBC with platelets differential     Basic metabolic panel   2. Shoulder injury, right, subsequent encounter S49.91XD CBC with platelets differential     Basic metabolic panel   3. Mixed hyperlipidemia E78.2 Lipid panel reflex to direct LDL Fasting   4. Need for prophylactic vaccination and inoculation against influenza Z23 FLU VAC, SPLIT VIRUS IM > 3 YO (QUADRIVALENT) [21848]       RECOMMENDATIONS:                                                      --Consult hospital rounder / IM to assist post-op medical management    --Patient is to take all scheduled medications on the day of surgery EXCEPT for modifications listed below.    APPROVAL GIVEN to proceed with proposed procedure, without further diagnostic evaluation       Signed Electronically by: Mariel Sabillon MD    Copy of this evaluation report is provided to requesting physician.    [facility] Preop Guidelines

## 2017-12-18 NOTE — BRIEF OP NOTE
[facility] Brief Operative Note    Pre-operative diagnosis: failed rotator cuff repair   Post-operative diagnosis: Same   Procedure: Rotator cuff repair (Right)   Surgeon: Hernando Thompson MD   Assistant(s): Estefania Bacon PA-C   Anesthesia: General endotracheal anesthesia   Estimated blood loss: Minimal   Drains: None   Complications: None   Condition: Stable

## 2017-12-19 NOTE — OP NOTE
DATE OF PROCEDURE:  12/18/2017      PREOPERATIVE DIAGNOSIS:  Small recurrent rotator cuff tear, right shoulder.      POSTOPERATIVE DIAGNOSIS:  Small recurrent rotator cuff tear, right shoulder.      PROCEDURE:  Right shoulder rotator cuff repair.      SURGEON:  Hernando Thompson MD      ASSISTANT:  Estefania Bacon PA-C      ANESTHESIA:  General endotracheal.      ESTIMATED BLOOD LOSS:  Minimal.      COMPLICATIONS:  None apparent.      INDICATIONS:  Jacquie Harris is a 62-year-old white female who I did a rotator cuff repair previously.  The patient had persistent pain arthrogram was obtained revealing presumed very small tear in the anterolateral corner.  Therefore, after alternatives, risks, and possible complications were very carefully discussed, the patient desired surgical intervention.  Consent was obtained.      DESCRIPTION OF PROCEDURE:  The patient was brought to main operating room and after general anesthesia was obtained, was placed in a beach chair position.  Two grams of Ancef were given intravenously.  The right upper extremity was prepped and draped in the usual sterile fashion.      The old scar was reopened with a 10-blade.  Subcutaneous tissue was divided.  Deltoid obviously had healed, I reopened a split within the fibers.  No fluid was evident whatsoever.  Therefore, I did break down adhesions with a Remington retractor and noted there was just a small rough edge on the corner of the acromion and this was smoothed off, wound was irrigated out.      With Weitlaner tractor spreading the deltoid fibers and [employer] on the inferior apex, ran the shoulder through a range of motion and identifying no evidence for cuff tear.  I made a small linear incision right on the supraspinatus tendon at the anterolateral corner.  However, expected to find a tear.  However, the undersurface was also solidly healed.  Biceps tendon was again visible and was pristine and within the groove.  Therefore, I resutured this  with a running #1 Vicryl suture and then instilled 20 mL of sterile saline into the glenohumeral joint to produce an intraarticular arthrogram.  The only fluid seen to emanate from the cuff was right at my recent suture line.  Therefore, this was reinforced and again shoulder was filled with fluid with no evidence of otherwise a tear present.  Therefore, irrigated out the wound, I did one final layer of closure over the superficial fibers of the rotator cuff, removed Weitlaner retractor, repaired deltoid with #1 Stratafix, closed subcutaneous tissue with 2-0 Stratafix, completed closure with 3-0 Stratafix.  Dermabond was placed over the skin followed by a sterile compression bandage and a sling.  The patient was awoken, extubated and returned to PAR in stable condition, without apparent complication.         VICENTE MORALES MD             D: 2017 12:37   T: 2017 18:11   MT: MARIO#[id number]      Name:     DESTINY KILPATRICK   MRN:      [id number]        Account:        [id number]   :      1955           Procedure Date: 2017      Document: [id number]       cc: [facility]

## 2017-12-29 ENCOUNTER — TELEPHONE (OUTPATIENT)
Dept: FAMILY MEDICINE | Facility: CLINIC | Age: 62
End: 2017-12-29

## 2017-12-29 NOTE — TELEPHONE ENCOUNTER
Reason for Call:  Pain near shoulder, coughing, cold symptoms, when taking a deep breath she is in pain    Detailed comments: lots of pain, Surgery on Dec 15th for shoulder, pain is in her back near her bra area and shoulder, turning her head hurts    Phone Number Patient can be reached at: Home number on file [phone] (home)    Best Time: any    Can we leave a detailed message on this number? YES   Génesis Campa  [facility] [place] [facility]      Call taken on 12/29/2017 at 12:10 PM by Génesis Campa

## 2017-12-29 NOTE — TELEPHONE ENCOUNTER
Patient is contacted and she has runny nose and eyes, cough and body aches.  Sounds like the flu.  Her  has pneumonia.  To late for Tamiflu but should be seen to have lungs listened to as she is post op. Winter BRAVO RN

## 2018-01-02 NOTE — ADDENDUM NOTE
Encounter addended by: Dasia Wilson, PT on: 1/2/2018  1:55 PM<BR>     Actions taken: Sign clinical note, Flowsheet accepted, Episode resolved

## 2018-01-02 NOTE — PROGRESS NOTES
OUTPATIENT PHYSICAL THERAPY DISCHARGE SUMMARY   Estefania Bacon, PAC 9/12/17 to 11/10/17 1000   Signing Clinician's Name / Credentials   Signing clinician's name / credentials Dasia Corralstefnaineptali, PT [id number]   Session Number   Session Number 12 [facility]--shoulder   Ortho Goal 1   Goal Description 1.  Pt will be able to reach to shoulder ht in order to wash her hair w/ minimal difficulty.  10/4/17 ongoing goal  10/25/17 ongoing goal   Target Date 11/03/17   Ortho Goal 2   Goal Description 2. Pt will be able to reach behind back w/ putting on bra.  10/25/17 has done it occasionally but notes increased pain 8/10   Target Date 11/11/17   Ortho Goal 3   Goal Description 3.   Pt will be able to reach overhead w/ ADL's w/ minimal difficulty   Target Date 12/11/17   Ortho Goal 4   Goal Description 4.  Pt will be able to lift 10-15# w/ ADL's w/ pain no > 2/10 and minimal difficulty   Target Date 12/11/17   Ortho Goal 5   Goal Description 5.  Independent and consistent w/HEP.  10/25/17 consistent w/ current program   Target Date 12/11/17   Subjective Report   Subjective Report Pt notes ongoing shoulder pain 5-6/10.  Pt saw MD and was told it could take up to a year to improve.  Pt states MD also stated he could do an arthrogram.   Therapeutic Procedure/exercise   Treatment Detail Scifit seat 6 elevated L2 X 2 min.  Pulley flex and scaption X 10 each.   wall flex (limited range) and ER X 5 each.   Wand IR X 10.   Shoulder ext leaning on plinth X 20 thumb out.   Horizontal abd leaning on plinth X 10.   ER in SLw/ LT set X 30 ( will add small tuna can at home).  Supine AROM flex * between PT hands X 10.  Flex supine from towel roll (w/ mild bend to elbow) and reaching overhead and back to towel roll X 10.   AAROM R shoulder X 4 directions.   Plan   Home program ex as above.     Plan  Discharge from physical therapy -- spoke w/ patient on 12/6/17 and she was scheduled for RC revision on 12/18/17   Comments   Comments Pt cont to work  towards goals as noted above

## 2018-01-05 ENCOUNTER — TRANSFERRED RECORDS (OUTPATIENT)
Dept: HEALTH INFORMATION MANAGEMENT | Facility: CLINIC | Age: 63
End: 2018-01-05

## 2018-01-08 ENCOUNTER — OFFICE VISIT (OUTPATIENT)
Dept: FAMILY MEDICINE | Facility: CLINIC | Age: 63
End: 2018-01-08
Payer: MEDICARE

## 2018-01-08 VITALS
SYSTOLIC BLOOD PRESSURE: 129 MMHG | DIASTOLIC BLOOD PRESSURE: 76 MMHG | WEIGHT: 237 LBS | HEIGHT: 63 IN | BODY MASS INDEX: 41.99 KG/M2 | TEMPERATURE: 98.6 F | HEART RATE: 61 BPM

## 2018-01-08 DIAGNOSIS — B37.2 CANDIDIASIS OF SKIN: ICD-10-CM

## 2018-01-08 DIAGNOSIS — D17.1 BENIGN LIPOMATOUS NEOPLASM OF SKIN AND SUBCUTANEOUS TISSUE OF TRUNK: Primary | ICD-10-CM

## 2018-01-08 DIAGNOSIS — I10 BENIGN ESSENTIAL HYPERTENSION: ICD-10-CM

## 2018-01-08 PROCEDURE — 99214 OFFICE O/P EST MOD 30 MIN: CPT | Performed by: FAMILY MEDICINE

## 2018-01-08 RX ORDER — HYDROCHLOROTHIAZIDE 12.5 MG/1
12.5 TABLET ORAL DAILY
Qty: 90 TABLET | Refills: 3 | Status: SHIPPED | OUTPATIENT
Start: 2018-01-08 | End: 2019-01-21

## 2018-01-08 RX ORDER — ATENOLOL 50 MG/1
50 TABLET ORAL DAILY
Qty: 90 TABLET | Refills: 3 | Status: SHIPPED | OUTPATIENT
Start: 2018-01-08 | End: 2019-01-21

## 2018-01-08 RX ORDER — NYSTATIN 100000 [USP'U]/G
POWDER TOPICAL 3 TIMES DAILY PRN
Qty: 30 G | Refills: 1 | Status: SHIPPED | OUTPATIENT
Start: 2018-01-08 | End: 2022-04-11

## 2018-01-08 NOTE — PATIENT INSTRUCTIONS
Thank you for choosing [facility].  You may be receiving a survey in the mail from Nnamdi Hopkins regarding your visit today.  Please take a few minutes to complete and return the survey to let us know how we are doing.      If you have questions or concerns, please contact us via [employer] or you can contact your care team at [phone].    Our Clinic hours are:  Monday 6:40 am  to 7:00 pm  Tuesday -Friday 6:40 am to 5:00 pm    The [place] outpatient lab hours are:  Monday - Friday 6:10 am to 4:45 pm  Saturdays 7:00 am to 11:00 am  Appointments are required, call [phone]    If you have clinical questions after hours or would like to schedule an appointment,  call the clinic at [phone].  Lipoma, No Treatment  A lipoma is a local overgrowth of fatty tissue. It appears as a soft raised area, usually less than 2 inches across. It is a benign condition (not cancer).   Home care  General information regarding lipoma includes:  1. No special care is needed for a lipoma.  2. You can consider removal for cosmetic reasons.  3. Sometimes lipomas are uncomfortable because they put pressure on surrounding tissues. This is also a reason to have a lipoma removed.  Follow-up care  Follow up with your healthcare provider, or as advised if you want to have the lipoma removed at a later time.  When to seek medical advice  Call your healthcare provider right away if any of the following occur:    Redness, pain, tenderness, or drainage from the lipoma    Lipoma begins to enlarge or change shape    Changes in the color of the skin over the lipoma  Date Last Reviewed: 6/1/2016 2000-2017 [employer]. [address] [place] [address], [place] [address]. All rights reserved. This information is not intended as a substitute for professional medical care. Always follow your healthcare professional's instructions.

## 2018-01-08 NOTE — PROGRESS NOTES
SUBJECTIVE:   Jacquie Harris is a 62 year old female who presents to clinic today for the following health issues:  Chief Complaint   Patient presents with     lipoma     patient would like a ref for removal of lipoma       Concern - Lipoma would like ref for removal     Onset: 9-2017    Description:   Patient has lump on neck and under R arm and R shoulder     Intensity: moderate    Progression of Symptoms:  Worsening getting larger     Accompanying Signs & Symptoms:  None     Previous history of similar problem:   no    Precipitating factors:   Worsened by: with rubbing of clothes    Alleviating factors:  Improved by: noting     Therapies Tried and outcome: patient has been seen and CT and us was done results Lipomas    Patient here for follow up on Lipomas , had a CT scan which confirmed this. Will priscilla this removed. Patient reports they are causing some discomfort .     Patient also wants refills on her medication for blood pressure, and also refill on her creams.   Has no other acute symptom.     Problem list and histories reviewed & adjusted, as indicated.  Additional history: as documented    Patient Active Problem List   Diagnosis     s/p gastric bypass x 2     Allergic rhinitis     Carpal tunnel syndrome     Obstructive sleep apnea     Rosacea     CARDIOVASCULAR SCREENING; LDL GOAL LESS THAN 160     Tick bite, infected, positive Lyme test 1996 not treated     Vitamin D deficiency     Osteoporosis     Obesity, Class III, BMI 40-49.9 (morbid obesity) (H)     S/P TKR (total knee replacement)     HTN, goal below 140/80     Back pain, left below scapula, strained muscles     Encounter for routine gynecological examination      Colles' fracture     Bilateral cold feet     Elevated levels of transaminase & lactic acid dehydrogenase     Fatty liver     Shoulder injury, right, subsequent encounter     Past Surgical History:   Procedure Laterality Date     ARTHROPLASTY KNEE  4/2/2012    Procedure:ARTHROPLASTY KNEE;  Left Total Knee Arthroplasty--Anesth.Choice; Surgeon:HERNANDO THOMPSON; Location:[facility]     ARTHROTOMY SHOULDER, ROTATOR CUFF REPAIR, COMBINED Right 8/25/2017    Procedure: COMBINED ARTHROTOMY SHOULDER, ROTATOR CUFF REPAIR;  Right shoulder distal clavicle excision, subacromial decompression, rotator cuff repair ;  Surgeon: Hernando Thompson MD;  Location: [facility]     ARTHROTOMY SHOULDER, ROTATOR CUFF REPAIR, COMBINED Right 12/18/2017    Procedure: COMBINED ARTHROTOMY SHOULDER, ROTATOR CUFF REPAIR;  Right Shoulder Rotator Cuff Revision;  Surgeon: Hernando Thompson MD;  Location: [facility]     C ORAL SURGERY PROCEDURE  age 18    wisdom teeth     C/SECTION, LOW TRANSVERSE  1978, 1984, 1994    x 3     COLONOSCOPY  2001    normal colonoscopy     COLONOSCOPY N/A 8/22/2017    Procedure: COLONOSCOPY;  Colonoscopy  ;  Surgeon: Delfino Yoo MD;  Location: [facility]     GASTRIC BYPASS  1980/2005    Gastric Bypass and revision     HERNIA REPAIR, INCISIONAL  6/16/2008    lap.repair with 10x8 mesh     TUBAL LIGATION  1994       Social History   Substance Use Topics     Smoking status: Never Smoker     Smokeless tobacco: Never Used     Alcohol use 0.0 oz/week     0 Standard drinks or equivalent per week      Comment: mixed very light 1 a month if that     Family History   Problem Relation Age of Onset     C.A.D. Father      MI at age 60     Hypertension Father      Alzheimer Disease Father      Hyperlipidemia Father      OSTEOPOROSIS Mother      on Fosamax     Glaucoma Mother      Other Cancer Brother      Stomach Cancer Maternal Aunt      Cervical Cancer Cousin      DIABETES No family hx of      CEREBROVASCULAR DISEASE No family hx of      Breast Cancer No family hx of      Cancer - colorectal No family hx of      Prostate Cancer No family hx of      Liver Disease No family hx of          Current Outpatient Prescriptions   Medication Sig Dispense Refill     atenolol (TENORMIN) 50 MG tablet Take 1 tablet (50 mg) by mouth  "daily 90 tablet 3     hydrochlorothiazide 12.5 MG TABS tablet Take 1 tablet (12.5 mg) by mouth daily 90 tablet 3     nystatin (MYCOSTATIN) 631597 UNIT/GM POWD Apply topically 3 times daily as needed Refill at patient's request 30 g 1     nystatin (MYCOSTATIN) cream Apply topically 2 times daily Reported on 3/6/2017 30 g 11     Emollient (CERAVE) CREA Externally apply topically 2 times daily as needed 1 Bottle 3     [DISCONTINUED] atenolol (TENORMIN) 50 MG tablet TAKE ONE TABLET BY MOUTH EVERY DAY 90 tablet 1     [DISCONTINUED] hydrochlorothiazide 12.5 MG TABS tablet TAKE ONE TABLET BY MOUTH EVERY DAY 90 tablet 1     clobetasol (TEMOVATE) 0.05 % ointment Apply sparingly to affected area twice daily for 14 days.  Do not apply to face. (Patient taking differently: daily as needed Apply sparingly to affected area twice daily for 14 days.  Do not apply to face.) 15 g 0     Allergies   Allergen Reactions     Vioxx Itching and Swelling     VIOXX (Swollen Feet,Hands itching), Okay with ibuprofen and aspirin     BP Readings from Last 3 Encounters:   01/08/18 129/76   12/18/17 113/63   12/14/17 136/81    Wt Readings from Last 3 Encounters:   01/08/18 237 lb (107.5 kg)   12/18/17 234 lb 3.2 oz (106.2 kg)   12/14/17 234 lb (106.1 kg)                  Labs reviewed in EPIC        Reviewed and updated as needed this visit by clinical staffTobacco  Allergies  Med Hx  Surg Hx  Fam Hx  Soc Hx      Reviewed and updated as needed this visit by Provider         ROS:  Constitutional, HEENT, cardiovascular, pulmonary, gi and gu systems are negative, except as otherwise noted.      OBJECTIVE:   /76 (BP Location: Right arm, Cuff Size: Adult Regular)  Pulse 61  Temp 98.6  F (37  C) (Tympanic)  Ht 5' 3\" (1.6 m)  Wt 237 lb (107.5 kg)  LMP 05/29/2006  BMI 41.98 kg/m2  Body mass index is 41.98 kg/(m^2).  GENERAL: healthy, alert and no distress  EYES: Eyes grossly normal to inspection, PERRL and conjunctivae and sclerae " normal  HENT: ear canals and TM's normal, nose and mouth without ulcers or lesions  NECK: no adenopathy, no asymmetry, masses, or scars and thyroid normal to palpation  RESP: lungs clear to auscultation - no rales, rhonchi or wheezes  CV: regular rate and rhythm, normal S1 S2, no S3 or S4, no murmur, click or rub, no peripheral edema and peripheral pulses strong  MS: no gross musculoskeletal defects noted, no edema    Diagnostic Test Results:  none     ASSESSMENT/PLAN:       (D17.1) Benign lipomatous neoplasm of skin and subcutaneous tissue of trunk  (primary encounter diagnosis)  Comment: referred to ortho  Plan: GENERAL SURG ADULT REFERRAL, CANCELED: ORTHO          REFERRAL      (B37.2) Candidiasis of skin  Comment: Medication refilled  Plan: nystatin (MYCOSTATIN) [id number] UNIT/GM POWD      (I10) Benign essential hypertension  Comment: Medication refilled  Plan: atenolol (TENORMIN) 50 MG tablet,         hydrochlorothiazide 12.5 MG TABS tablet              FUTURE APPOINTMENTS:       - Follow-up visit as needed    Jennie Suazo MD  [facility]

## 2018-01-08 NOTE — MR AVS SNAPSHOT
After Visit Summary   1/8/2018    Jacquie Harris    MRN: [id number]           Patient Information     Date Of Birth          1955        Visit Information        Provider Department      1/8/2018 8:40 AM Jennie Suazo MD [facility]        Today's Diagnoses     Benign essential hypertension    -  1    Candidiasis of skin        Benign lipomatous neoplasm of skin and subcutaneous tissue of trunk          Care Instructions          Thank you for choosing [facility].  You may be receiving a survey in the mail from [facility] regarding your visit today.  Please take a few minutes to complete and return the survey to let us know how we are doing.      If you have questions or concerns, please contact us via [employer] or you can contact your care team at [phone].    Our Clinic hours are:  Monday 6:40 am  to 7:00 pm  Tuesday -Friday 6:40 am to 5:00 pm    The [place] outpatient lab hours are:  Monday - Friday 6:10 am to 4:45 pm  Saturdays 7:00 am to 11:00 am  Appointments are required, call [phone]    If you have clinical questions after hours or would like to schedule an appointment,  call the clinic at [phone].  Lipoma, No Treatment  A lipoma is a local overgrowth of fatty tissue. It appears as a soft raised area, usually less than 2 inches across. It is a benign condition (not cancer).   Home care  General information regarding lipoma includes:  1. No special care is needed for a lipoma.  2. You can consider removal for cosmetic reasons.  3. Sometimes lipomas are uncomfortable because they put pressure on surrounding tissues. This is also a reason to have a lipoma removed.  Follow-up care  Follow up with your healthcare provider, or as advised if you want to have the lipoma removed at a later time.  When to seek medical advice  Call your healthcare provider right away if any of the following occur:    Redness, pain, tenderness, or drainage from the  lipoma    Lipoma begins to enlarge or change shape    Changes in the color of the skin over the lipoma  Date Last Reviewed: 6/1/2016 2000-2017 [employer]. [address] [place] [address], [place] [address]. All rights reserved. This information is not intended as a substitute for professional medical care. Always follow your healthcare professional's instructions.                Follow-ups after your visit        Additional Services     ORTHO  REFERRAL       [facility] is referring you to the Orthopedic  Services at [facility].       The  Representative will assist you in the coordination of your Orthopedic and Musculoskeletal Care as prescribed by your physician.    The  Representative will call you within 1 business day to help schedule your appointment, or you may contact the  Representative at:    All areas ~ [phone]     Type of Referral : Surgical / Specialist       Timeframe requested: 3 - 5 days  Has lipoma on base of neck and on cervical spine  Coverage of these services is subject to the terms and limitations of your health insurance plan.  Please call member services at your health plan with any benefit or coverage questions.      If X-rays, CT or MRI's have been performed, please contact the facility where they were done to arrange for , prior to your scheduled appointment.  Please bring this referral request to your appointment and present it to your specialist.                  Who to contact     If you have questions or need follow up information about today's clinic visit or your schedule please contact [facility] directly at [phone].  Normal or non-critical lab and imaging results will be communicated to you by MyChart, letter or phone within 4 business days after the clinic has received the results. If you do not hear from us within 7 days, please contact the clinic through  "[employer] or phone. If you have a critical or abnormal lab result, we will notify you by phone as soon as possible.  Submit refill requests through [employer] or call your pharmacy and they will forward the refill request to us. Please allow 3 business days for your refill to be completed.          Additional Information About Your Visit        [employer] Information     [employer] lets you send messages to your doctor, view your test results, renew your prescriptions, schedule appointments and more. To sign up, go to [email or web address] . Click on \"Log in\" on the left side of the screen, which will take you to the Welcome page. Then click on \"Sign up Now\" on the right side of the page.     You will be asked to enter the access code listed below, as well as some personal information. Please follow the directions to create your username and password.     Your access code is: [email or web address]  Expires: 3/4/2018 11:03 AM     Your access code will  in 90 days. If you need help or a new code, please call your [facility] clinic or [phone].        Care EveryWhere ID     This is your Care EveryWhere ID. This could be used by other organizations to access your [facility] medical records  [id number]        Your Vitals Were     Pulse Temperature Height Last Period BMI (Body Mass Index)       61 98.6  F (37  C) (Tympanic) 5' 3\" (1.6 m) 2006 41.98 kg/m2        Blood Pressure from Last 3 Encounters:   18 129/76   17 113/63   17 136/81    Weight from Last 3 Encounters:   18 237 lb (107.5 kg)   17 234 lb 3.2 oz (106.2 kg)   17 234 lb (106.1 kg)              We Performed the Following     ORTHO  REFERRAL          Today's Medication Changes          These changes are accurate as of: 18  8:52 AM.  If you have any questions, ask your nurse or doctor.               These medicines have changed or have updated prescriptions.        Dose/Directions    atenolol 50 MG tablet   Commonly " known as:  TENORMIN   This may have changed:  See the new instructions.   Used for:  Benign essential hypertension   Changed by:  Jennie Suazo MD        Dose:  50 mg   Take 1 tablet (50 mg) by mouth daily   Quantity:  90 tablet   Refills:  3       clobetasol 0.05 % ointment   Commonly known as:  TEMOVATE   This may have changed:    - when to take this  - reasons to take this  - additional instructions   Used for:  Vaginal itching        Apply sparingly to affected area twice daily for 14 days.  Do not apply to face.   Quantity:  15 g   Refills:  0       hydrochlorothiazide 12.5 MG Tabs tablet   This may have changed:  See the new instructions.   Used for:  Benign essential hypertension   Changed by:  Jennie Suazo MD        Dose:  12.5 mg   Take 1 tablet (12.5 mg) by mouth daily   Quantity:  90 tablet   Refills:  3       * nystatin cream   Commonly known as:  MYCOSTATIN   This may have changed:  Another medication with the same name was changed. Make sure you understand how and when to take each.   Used for:  Candidiasis of skin   Changed by:  Mariel Sabillon MD        Apply topically 2 times daily Reported on 3/6/2017   Quantity:  30 g   Refills:  11       * nystatin [id number] UNIT/GM Powd   Commonly known as:  MYCOSTATIN   This may have changed:  additional instructions   Used for:  Candidiasis of skin   Changed by:  Jennie Suazo MD        Apply topically 3 times daily as needed Refill at patient's request   Quantity:  30 g   Refills:  1       * Notice:  This list has 2 medication(s) that are the same as other medications prescribed for you. Read the directions carefully, and ask your doctor or other care provider to review them with you.         Where to get your medicines      These medications were sent to [id number] - [place], [place] - [address]  [address] [address]     Phone:  [phone]     atenolol 50 MG tablet     hydrochlorothiazide 12.5 MG Tabs tablet    nystatin [id number] UNIT/GM Powd                Primary Care Provider Office Phone # Fax #    Mariel Va Sabillon [phone] [address] [address]        Equal Access to Services     [employer] : Hadii jose ku hadasho Soomaali, waaxda luqadaha, qaybta kaalmada adeegyada, waxthasi alvarez haychaparron howard bergerondarysridevi gonzalez. So [facility] [phone].    ATENCIÓN: Si habla español, tiene a booth disposición servicios gratuitos de asistencia lingüística. Llame al [phone].    We comply with applicable federal civil rights laws and [place] laws. We do not discriminate on the basis of race, color, national origin, age, disability, sex, sexual orientation, or gender identity.            Thank you!     Thank you for choosing [facility]  for your care. Our goal is always to provide you with excellent care. Hearing back from our patients is one way we can continue to improve our services. Please take a few minutes to complete the written survey that you may receive in the mail after your visit with us. Thank you!             Your Updated Medication List - Protect others around you: Learn how to safely use, store and throw away your medicines at www.disposemymeds.org.          This list is accurate as of: 1/8/18  8:52 AM.  Always use your most recent med list.                   Brand Name Dispense Instructions for use Diagnosis    atenolol 50 MG tablet    TENORMIN    90 tablet    Take 1 tablet (50 mg) by mouth daily    Benign essential hypertension       CERAVE Crea     1 Bottle    Externally apply topically 2 times daily as needed    Sunburn, Itching       clobetasol 0.05 % ointment    TEMOVATE    15 g    Apply sparingly to affected area twice daily for 14 days.  Do not apply to face.    Vaginal itching       hydrochlorothiazide 12.5 MG Tabs tablet     90 tablet    Take 1 tablet (12.5 mg) by mouth daily    Benign essential hypertension        * nystatin cream    MYCOSTATIN    30 g    Apply topically 2 times daily Reported on 3/6/2017    Candidiasis of skin       * nystatin [id number] UNIT/GM Powd    MYCOSTATIN    30 g    Apply topically 3 times daily as needed Refill at patient's request    Candidiasis of skin       * Notice:  This list has 2 medication(s) that are the same as other medications prescribed for you. Read the directions carefully, and ask your doctor or other care provider to review them with you.

## 2018-01-11 ENCOUNTER — OFFICE VISIT (OUTPATIENT)
Dept: SURGERY | Facility: CLINIC | Age: 63
End: 2018-01-11
Payer: COMMERCIAL

## 2018-01-11 VITALS
DIASTOLIC BLOOD PRESSURE: 64 MMHG | HEIGHT: 63 IN | WEIGHT: 237 LBS | BODY MASS INDEX: 41.99 KG/M2 | SYSTOLIC BLOOD PRESSURE: 114 MMHG | HEART RATE: 77 BPM | TEMPERATURE: 98.4 F

## 2018-01-11 DIAGNOSIS — D17.1 LIPOMA OF BACK: Primary | ICD-10-CM

## 2018-01-11 PROCEDURE — 99213 OFFICE O/P EST LOW 20 MIN: CPT | Performed by: SURGERY

## 2018-01-11 NOTE — NURSING NOTE
"Initial /64 (BP Location: Left arm, Patient Position: Sitting, Cuff Size: Adult Large)  Pulse 77  Temp 98.4  F (36.9  C) (Oral)  Ht 1.6 m (5' 3\")  Wt 107.5 kg (237 lb)  LMP 05/29/2006  BMI 41.98 kg/m2 Estimated body mass index is 41.98 kg/(m^2) as calculated from the following:    Height as of this encounter: 1.6 m (5' 3\").    Weight as of this encounter: 107.5 kg (237 lb). .    Roopa Harrison MA    "

## 2018-01-11 NOTE — MR AVS SNAPSHOT
"              After Visit Summary   2018    Jacquie Harris    MRN: [id number]           Patient Information     Date Of Birth          1955        Visit Information        Provider Department      2018 3:00 PM Sumit Mejias MD [facility]        Today's Diagnoses     Lipoma of back    -  1      Care Instructions    Per Dr. Mejias's instructions          Follow-ups after your visit        Who to contact     If you have questions or need follow up information about today's clinic visit or your schedule please contact [facility] directly at [phone].  Normal or non-critical lab and imaging results will be communicated to you by MyChart, letter or phone within 4 business days after the clinic has received the results. If you do not hear from us within 7 days, please contact the clinic through [employer] or phone. If you have a critical or abnormal lab result, we will notify you by phone as soon as possible.  Submit refill requests through [employer] or call your pharmacy and they will forward the refill request to us. Please allow 3 business days for your refill to be completed.          Additional Information About Your Visit        MyChart Information     [employer] lets you send messages to your doctor, view your test results, renew your prescriptions, schedule appointments and more. To sign up, go to [email or web address] . Click on \"Log in\" on the left side of the screen, which will take you to the Welcome page. Then click on \"Sign up Now\" on the right side of the page.     You will be asked to enter the access code listed below, as well as some personal information. Please follow the directions to create your username and password.     Your access code is: [email or web address]  Expires: 3/4/2018 11:03 AM     Your access code will  in 90 days. If you need help or a new code, please call your [facility] or [phone].        Care EveryWhere ID     This is your Care " "EveryWhere ID. This could be used by other organizations to access your [place] medical records  [id number]        Your Vitals Were     Pulse Temperature Height Last Period BMI (Body Mass Index)       77 98.4  F (36.9  C) (Oral) 1.6 m (5' 3\") 05/29/2006 41.98 kg/m2        Blood Pressure from Last 3 Encounters:   01/11/18 114/64   01/08/18 129/76   12/18/17 113/63    Weight from Last 3 Encounters:   01/11/18 107.5 kg (237 lb)   01/08/18 107.5 kg (237 lb)   12/18/17 106.2 kg (234 lb 3.2 oz)              Today, you had the following     No orders found for display         Today's Medication Changes          These changes are accurate as of: 1/11/18 11:59 PM.  If you have any questions, ask your nurse or doctor.               These medicines have changed or have updated prescriptions.        Dose/Directions    clobetasol 0.05 % ointment   Commonly known as:  TEMOVATE   This may have changed:    - when to take this  - reasons to take this  - additional instructions   Used for:  Vaginal itching        Apply sparingly to affected area twice daily for 14 days.  Do not apply to face.   Quantity:  15 g   Refills:  0                Primary Care Provider Office Phone # Fax #    Mariel Va Sabillon [phone] [address] [facility] [address]        Equal Access to Services     [employer] AH: Hadii jose Lynch, waaxda lugolden, qaybta brendonalshirley dunn, mildred gonzalez. So [facility] [phone].    ATENCIÓN: Si habla español, tiene a booth disposición servicios gratuitos de asistencia lingüística. Llame al [phone].    We comply with applicable federal civil rights laws and [place] laws. We do not discriminate on the basis of race, color, national origin, age, disability, sex, sexual orientation, or gender identity.            Thank you!     Thank you for choosing [facility]  for your care. Our goal is always to provide you with excellent care. " Hearing back from our patients is one way we can continue to improve our services. Please take a few minutes to complete the written survey that you may receive in the mail after your visit with us. Thank you!             Your Updated Medication List - Protect others around you: Learn how to safely use, store and throw away your medicines at www.disposemymeds.org.          This list is accurate as of: 1/11/18 11:59 PM.  Always use your most recent med list.                   Brand Name Dispense Instructions for use Diagnosis    atenolol 50 MG tablet    TENORMIN    90 tablet    Take 1 tablet (50 mg) by mouth daily    Benign essential hypertension       CERAVE Crea     1 Bottle    Externally apply topically 2 times daily as needed    Sunburn, Itching       clobetasol 0.05 % ointment    TEMOVATE    15 g    Apply sparingly to affected area twice daily for 14 days.  Do not apply to face.    Vaginal itching       hydrochlorothiazide 12.5 MG Tabs tablet     90 tablet    Take 1 tablet (12.5 mg) by mouth daily    Benign essential hypertension       * nystatin cream    MYCOSTATIN    30 g    Apply topically 2 times daily Reported on 3/6/2017    Candidiasis of skin       * nystatin [id number] UNIT/GM Powd    MYCOSTATIN    30 g    Apply topically 3 times daily as needed Refill at patient's request    Candidiasis of skin       * Notice:  This list has 2 medication(s) that are the same as other medications prescribed for you. Read the directions carefully, and ask your doctor or other care provider to review them with you.

## 2018-01-11 NOTE — LETTER
1/11/2018         RE: Jacquie Harris  [address] [place] [id number]        Dear Colleague,    Thank you for referring your patient, Jacquie Harris, to the [facility]. Please see a copy of my visit note below.    Pt comes to see me for discussion of multiple areas of lipomas.  She stated that she has recently loss weight and feels that she now has areas of fatty tumors that is causing her some discomfort.      She has an area of the back of her neck, upper back, her right axilla and her abdominal area.    On examination:  She does have a fat pad area of the back of her neck/top of her back, but it is large and ill define and feels more like residual fatty tissue, rather than a lipoma.    Her right axilla and abdominal area are also fatty tissue, and not a discrete lipoma that can be excised.    A/P:  I do not feel that any of these areas can be removed well for therapeutic reasons.  I explained this to her.  I also offered her a second surgical opinion if she would like to pursue that.  She declined that presently.    Juan Mejias MD, FACS      Again, thank you for allowing me to participate in the care of your patient.        Sincerely,        Sumit Mejias MD

## 2018-01-17 NOTE — PROGRESS NOTES
Pt comes to see me for discussion of multiple areas of lipomas.  She stated that she has recently loss weight and feels that she now has areas of fatty tumors that is causing her some discomfort.      She has an area of the back of her neck, upper back, her right axilla and her abdominal area.    On examination:  She does have a fat pad area of the back of her neck/top of her back, but it is large and ill define and feels more like residual fatty tissue, rather than a lipoma.    Her right axilla and abdominal area are also fatty tissue, and not a discrete lipoma that can be excised.    A/P:  I do not feel that any of these areas can be removed well for therapeutic reasons.  I explained this to her.  I also offered her a second surgical opinion if she would like to pursue that.  She declined that presently.    Juan Mejias MD, FACS

## 2018-01-19 ENCOUNTER — TRANSFERRED RECORDS (OUTPATIENT)
Dept: HEALTH INFORMATION MANAGEMENT | Facility: CLINIC | Age: 63
End: 2018-01-19

## 2018-01-23 ENCOUNTER — HOSPITAL ENCOUNTER (OUTPATIENT)
Dept: PHYSICAL THERAPY | Facility: CLINIC | Age: 63
Setting detail: THERAPIES SERIES
End: 2018-01-23
Attending: ORTHOPAEDIC SURGERY
Payer: COMMERCIAL

## 2018-01-23 PROCEDURE — 40000718 ZZHC STATISTIC PT DEPARTMENT ORTHO VISIT: Performed by: PHYSICAL THERAPIST

## 2018-01-23 PROCEDURE — G8982 BODY POS GOAL STATUS: HCPCS | Mod: GP,CI | Performed by: PHYSICAL THERAPIST

## 2018-01-23 PROCEDURE — G8981 BODY POS CURRENT STATUS: HCPCS | Mod: GP,CK | Performed by: PHYSICAL THERAPIST

## 2018-01-23 PROCEDURE — 97110 THERAPEUTIC EXERCISES: CPT | Mod: GP | Performed by: PHYSICAL THERAPIST

## 2018-01-23 PROCEDURE — 97162 PT EVAL MOD COMPLEX 30 MIN: CPT | Mod: GP | Performed by: PHYSICAL THERAPIST

## 2018-01-23 NOTE — PROGRESS NOTES
OUTPATIENT PHYSICAL THERAPY DISCHARGE SUMMARY    Dr. Sabillon 8/21/17 to 12/06/17 0800   Signing Clinician's Name / Credentials   Signing clinician's name / credentials Dasia Katie, PT [id number]   Session Number   Session Number 5     Ortho Goal 1   Goal Description 1.  Pt will be able to get in/out of car w/ pain no > 3/10.  10/25/17 3-4/10.   12/6/17 R arm bothering more than neck/ UB.    Target Date 01/19/18   Ortho Goal 2   Goal Description 2.  Reaching w/ toileting no pain > 3/10.  10/25/17 limited currently more due to shoulder but notes this is improving.  12/6/17 due to shoulder increased difficulty.     Target Date 11/19/17   Ortho Goal 3   Goal Description 3.  Pt will wake no > 4X/wk due to rib / upper back pain.  10/25/17 waking at least 2X/night.  12/6/17 waking at least 1X/night due to neck/ UB.     Target Date 01/19/18   Ortho Goal 4   Goal Description 4.  Independent and consistent w/HEP and increased awareness of posture.  12/6/17 has not been doing ex since last visit on 11/10/17.     Target Date 01/19/18   Subjective Report   Subjective Report Pt states she will be having RC revision on 12/18/17.   Pt states she has not been able to do any ex due pain down the whole R arm.   Pt notes the neck / UB is flared up.  Pain level 7-8/10.   Pt states L shoulder also bothering as she is doing more w/ that.    Objective Measures   Objective Measure Mod + tenderness R occiput.    Details CROM flex/ ext WFL.,  B rot 90%   Objective Measure increased swelling / bogginess  C7 to T5 posterior region   Therapeutic Procedure/exercise   Treatment Detail Pendulums.  table flex.  pulley flex / scaption,  Wand ER seated.   LT sets.  Cervical ext isometrics.   Manual Therapy   Treatment Detail MET  ERSL T2, ERSR T4. Post rib rocking R T4 and T5.    Occipital releases and manual traction.    cervical sideglides  ST work R > L  neck/ UT/ lev/ UB seated   Plan   Home program ex as previous   Plan  Pt did not return for neck  symptoms.  Discharge from physical therapy.     Comments   Comments Progress towards goals as noted above.  Unable to report final G code as patient did not return.

## 2018-01-23 NOTE — PROGRESS NOTES
01/23/18 0800   General Information   Type of Visit Initial OP Ortho PT Evaluation   Start of Care Date 01/23/18   Referring Physician Dr. Thompson   Patient/Family Goals Statement To be able to move and get dressed easier.  To decrease pain   Orders Evaluate and Treat  (L shoulder, R shoulder full ROM)   Date of Order 01/19/18   Insurance Type [employer]   Medical Diagnosis R shoulder surgery, L shoulder impingement   Body Part(s)   Body Part(s) Shoulder   Presentation and Etiology   Pertinent history of current problem (include personal factors and/or comorbidities that impact the POC) R shoulder:  Pt had RCR and then 2nd surgery on 12/18/17 to reinforce the repair.  Pt wore sling X 3.5-4 weeks.  R shoulder intermittent pain 7/10 in upper arm.   L shoulder:  2 week history of L shoulder pain feels it is due to overuse.  Xray negative.  Pain in upper arm  @ best 3/10, @ worst 9/10.  No numbness/ tingling.  Notes some pain across UT's.   Meds:  none.  PMHX;  anemia, B wrist fx, HBP,  bladder urgency,  arthritis.  Moderate: reoccuring shoulder issues   Impairments A. Pain;B. Decreased WB tolerance;D. Decreased ROM;C. Swelling;E. Decreased flexibility;F. Decreased strength and endurance;M. Locking or catching   Onset date of current episode/exacerbation 12/18/17   Pain quality A. Sharp;B. Dull;C. Aching;E. Shooting;F. Stabbing   Pain exacerbation comment Unable to lie on either side. Pt trying to sleep on her back which is not normal for her therefore getting limited sleep.  Pt states she is sleeping 3-4 hours/ night--pt notes she does better in the recliner.  R arm remains limited w/ reaching.  L limited w/ reaching.  Driving bothers it.    Pain/symptoms eased by C. Rest  (heat temporarily helps)   Progression of symptoms since onset: Unchanged  (but now has L sided symptoms)   Prior Level of Function   Functional Level Prior Comment Normally sleeps 6-8 hours/ night.    Current Level of Function   Patient  role/employment history C. Homemaker;G. Disabled   Fall Risk Screen   Fall screen completed by PT   Have you fallen 2 or more times in the past year? No   Have you fallen and had an injury in the past year? No   Is patient a fall risk? No   Shoulder Objective Findings   Side (if bilateral, select both right and left) Right   Posture mild FH   Cervical Screen (ROM, quadrant) CROM flex/ext/ B rot WFL and no complaints.    Phillips-Av Test L +   Coracoid Test L +   Palpation Mild + tenderness L supraspinatus.  No other touch tenderness but just moving the arm w/ her increases symptoms.     Right Shoulder Flexion AROM R 42*, L 96*   Right Shoulder Flexion PROM R 130*  L 155*   Right Shoulder Abduction AROM R 30*, L 58*  Scaption R 30*, L 60*   Right Shoulder Abduction PROM R 100*,  ,  L 145*   Right Shoulder ER AROM R 35*, L 60*   Right Shoulder ER PROM R 65* w/ arm at side,  L WNL   Right Shoulder IR AROM R to top of sacrum, L to T10   Right Shoulder IR PROM R 35* in 40* abd, L WNL   Right Shoulder Flexion Strength strength not tested at this time   Planned Therapy Interventions   Planned Therapy Interventions manual therapy;ROM;strengthening;stretching   Clinical Impression   Criteria for Skilled Therapeutic Interventions Met yes, treatment indicated   PT Diagnosis B shoulder pain, s/p R shoulder RCR w/ 2nd surgery to reinforce the repair.   Influenced by the following impairments pain, decreased motion, decreased strength   Functional limitations due to impairments reaching, lifting, sleeping   Clinical Presentation Evolving/Changing   Clinical Presentation Rationale ongoing R shoulder issues w/ recent onset of L shoulder pain   Clinical Decision Making (Complexity) Moderate complexity   Predicted Duration of Therapy Intervention (days/wks) 1-2 X /wk up to 12 visits   Risk & Benefits of therapy have been explained Yes   Patient, Family & other staff in agreement with plan of care Yes   Education Assessment    Barriers to Learning No barriers   Ortho Goal 1   Goal Description 1.  Pt will be able to reach to shoulder ht for ADL's w/ minimal difficulty   Target Date 02/22/18   Ortho Goal 2   Goal Description 2.  Pt will be able to reach behind back for ADL's w/ minimal difficulty   Target Date 02/22/18   Ortho Goal 3   Goal Description 3.  Pt will be able to sleep 5-6 hours/ night   Target Date 03/24/18   Ortho Goal 4   Goal Description 4.  Pt will be able to lift and carry up to 10 # for groceries/ laundry   Target Date 03/24/18   Ortho Goal 5   Goal Description 5.  Pt will be able to reach overhead for ADL's w/ minimal difficulty   Target Date 03/24/18   Ortho Goal 6   Goal Description 6.  Pt will be independent and consistent w/ HEP   Target Date 03/24/18   Total Evaluation Time   Total Evaluation Time 30     Thank you for this referral,    Dasia Wilson, PT,  CEAS   #[id number]  [facility]  [phone]

## 2018-01-23 NOTE — ADDENDUM NOTE
Encounter addended by: Dasia Wilson, PT on: 1/23/2018  9:50 AM<BR>     Actions taken: Sign clinical note, Flowsheet accepted, Episode resolved

## 2018-01-29 ENCOUNTER — OFFICE VISIT (OUTPATIENT)
Dept: FAMILY MEDICINE | Facility: CLINIC | Age: 63
End: 2018-01-29
Payer: COMMERCIAL

## 2018-01-29 VITALS
HEIGHT: 63 IN | BODY MASS INDEX: 42.35 KG/M2 | HEART RATE: 86 BPM | DIASTOLIC BLOOD PRESSURE: 75 MMHG | WEIGHT: 239 LBS | TEMPERATURE: 98.6 F | SYSTOLIC BLOOD PRESSURE: 125 MMHG

## 2018-01-29 DIAGNOSIS — M25.512 PAIN IN JOINT OF LEFT SHOULDER: Primary | ICD-10-CM

## 2018-01-29 PROCEDURE — 20610 DRAIN/INJ JOINT/BURSA W/O US: CPT | Performed by: FAMILY MEDICINE

## 2018-01-29 PROCEDURE — 99213 OFFICE O/P EST LOW 20 MIN: CPT | Mod: 25 | Performed by: FAMILY MEDICINE

## 2018-01-29 RX ORDER — LIDOCAINE HYDROCHLORIDE 10 MG/ML
INJECTION, SOLUTION INFILTRATION; PERINEURAL
Qty: 2 ML | Refills: 0 | OUTPATIENT
Start: 2018-01-29 | End: 2018-03-17

## 2018-01-29 RX ORDER — TRIAMCINOLONE ACETONIDE 40 MG/ML
INJECTION, SUSPENSION INTRA-ARTICULAR; INTRAMUSCULAR
Qty: 2 ML | Refills: 0 | OUTPATIENT
Start: 2018-01-29 | End: 2018-03-17

## 2018-01-29 NOTE — NURSING NOTE
"Chief Complaint   Patient presents with     Shoulder Pain     L /side     Neck Pain     R/side        Initial /75 (BP Location: Left arm, Cuff Size: Adult Regular)  Pulse 86  Temp 98.6  F (37  C) (Tympanic)  Ht 5' 3\" (1.6 m)  Wt 239 lb (108.4 kg)  LMP 05/29/2006  BMI 42.34 kg/m2 Estimated body mass index is 42.34 kg/(m^2) as calculated from the following:    Height as of this encounter: 5' 3\" (1.6 m).    Weight as of this encounter: 239 lb (108.4 kg).  Medication Reconciliation: complete  "

## 2018-01-29 NOTE — MR AVS SNAPSHOT
After Visit Summary   1/29/2018    Jacquie Harris    MRN: [id number]           Patient Information     Date Of Birth          1955        Visit Information        Provider Department      1/29/2018 9:00 AM Jennie Suazo MD [facility]        Today's Diagnoses     Pain in joint of left shoulder    -  1      Care Instructions          Thank you for choosing [facility].  You may be receiving a survey in the mail from [employer] regarding your visit today.  Please take a few minutes to complete and return the survey to let us know how we are doing.      If you have questions or concerns, please contact us via [employer] or you can contact your care team at [phone].    Our Clinic hours are:  Monday 6:40 am  to 7:00 pm  Tuesday -Friday 6:40 am to 5:00 pm    The [place] outpatient lab hours are:  Monday - Friday 6:10 am to 4:45 pm  Saturdays 7:00 am to 11:00 am  Appointments are required, call [phone]    If you have clinical questions after hours or would like to schedule an appointment,  call the clinic at [phone].          Follow-ups after your visit        Your next 10 appointments already scheduled     Jan 30, 2018  9:30 AM CST   Ortho Treatment with Dasia Wilson PT   [facility] ([facility])    [address] [address] [phone]              Who to contact     If you have questions or need follow up information about today's clinic visit or your schedule please contact [facility] directly at [phone].  Normal or non-critical lab and imaging results will be communicated to you by MyChart, letter or phone within 4 business days after the clinic has received the results. If you do not hear from us within 7 days, please contact the clinic through MyChart or phone. If you have a critical or abnormal lab result, we will notify you by phone as soon as possible.  Submit refill  "requests through [employer] or call your pharmacy and they will forward the refill request to us. Please allow 3 business days for your refill to be completed.          Additional Information About Your Visit        [employer] Information     [employer] lets you send messages to your doctor, view your test results, renew your prescriptions, schedule appointments and more. To sign up, go to [email or web address] . Click on \"Log in\" on the left side of the screen, which will take you to the Welcome page. Then click on \"Sign up Now\" on the right side of the page.     You will be asked to enter the access code listed below, as well as some personal information. Please follow the directions to create your username and password.     Your access code is: [email or web address]  Expires: 3/4/2018 11:03 AM     Your access code will  in 90 days. If you need help or a new code, please call your [facility] clinic or [phone].        Care EveryWhere ID     This is your Care EveryWhere ID. This could be used by other organizations to access your [facility] medical records  [id number]        Your Vitals Were     Pulse Temperature Height Last Period BMI (Body Mass Index)       86 98.6  F (37  C) (Tympanic) 5' 3\" (1.6 m) 2006 42.34 kg/m2        Blood Pressure from Last 3 Encounters:   18 125/75   18 114/64   18 129/76    Weight from Last 3 Encounters:   18 239 lb (108.4 kg)   18 237 lb (107.5 kg)   18 237 lb (107.5 kg)              We Performed the Following     Kenalog 40 MG  []          Today's Medication Changes          These changes are accurate as of 18  9:57 AM.  If you have any questions, ask your nurse or doctor.               Start taking these medicines.        Dose/Directions    lidocaine 1 % injection   Used for:  Pain in joint of left shoulder   Started by:  Jennie Suazo MD        Patient was given in clinic   Quantity:  2 mL   Refills:  0       triamcinolone " acetonide 40 MG/ML injection   Commonly known as:  KENALOG   Used for:  Pain in joint of left shoulder   Started by:  Jennie Suazo MD        Given in clinic for shoulder pain   Quantity:  2 mL   Refills:  0         These medicines have changed or have updated prescriptions.        Dose/Directions    clobetasol 0.05 % ointment   Commonly known as:  TEMOVATE   This may have changed:    - when to take this  - reasons to take this  - additional instructions   Used for:  Vaginal itching        Apply sparingly to affected area twice daily for 14 days.  Do not apply to face.   Quantity:  15 g   Refills:  0            Where to get your medicines      Some of these will need a paper prescription and others can be bought over the counter.  Ask your nurse if you have questions.     You don't need a prescription for these medications     lidocaine 1 % injection    triamcinolone acetonide 40 MG/ML injection                Primary Care Provider Office Phone # Fax #    Mariel Va Sabillon [phone] [address] [facility] [address]        Equal Access to Services     PREM [facility] AH: Hadii jose reed hadasho Soomaali, waaxda luqadaha, qaybta kaalmada adeegyada, mildred sanchez . So [facility] [phone].    ATENCIÓN: Si habla español, tiene a booth disposición servicios gratuitos de asistencia lingüística. Llame al [phone].    We comply with applicable federal civil rights laws and [place] laws. We do not discriminate on the basis of race, color, national origin, age, disability, sex, sexual orientation, or gender identity.            Thank you!     Thank you for choosing [facility]  for your care. Our goal is always to provide you with excellent care. Hearing back from our patients is one way we can continue to improve our services. Please take a few minutes to complete the written survey that you may receive in the mail after your visit with us. Thank  you!             Your Updated Medication List - Protect others around you: Learn how to safely use, store and throw away your medicines at www.disposemymeds.org.          This list is accurate as of 1/29/18  9:57 AM.  Always use your most recent med list.                   Brand Name Dispense Instructions for use Diagnosis    atenolol 50 MG tablet    TENORMIN    90 tablet    Take 1 tablet (50 mg) by mouth daily    Benign essential hypertension       CERAVE Crea     1 Bottle    Externally apply topically 2 times daily as needed    Sunburn, Itching       clobetasol 0.05 % ointment    TEMOVATE    15 g    Apply sparingly to affected area twice daily for 14 days.  Do not apply to face.    Vaginal itching       hydrochlorothiazide 12.5 MG Tabs tablet     90 tablet    Take 1 tablet (12.5 mg) by mouth daily    Benign essential hypertension       lidocaine 1 % injection     2 mL    Patient was given in clinic    Pain in joint of left shoulder       * nystatin cream    MYCOSTATIN    30 g    Apply topically 2 times daily Reported on 3/6/2017    Candidiasis of skin       * nystatin [id number] UNIT/GM Powd    MYCOSTATIN    30 g    Apply topically 3 times daily as needed Refill at patient's request    Candidiasis of skin       triamcinolone acetonide 40 MG/ML injection    KENALOG    2 mL    Given in clinic for shoulder pain    Pain in joint of left shoulder       * Notice:  This list has 2 medication(s) that are the same as other medications prescribed for you. Read the directions carefully, and ask your doctor or other care provider to review them with you.

## 2018-01-29 NOTE — PROGRESS NOTES
SUBJECTIVE:   Jacquie Harris is a 62 year old female who presents to clinic today for the following health issues:      Joint Pain/L shoulder pain     Onset: 3 weeks ago     Description:Pankaj mohr had surgery on her R arm and was overusing her l/arm which she think caused the pain and swelling    Location: left shoulder  Character: Sharp with movement  and Dull ache    Intensity: moderate, severe with movement     Progression of Symptoms: worse    Accompanying Signs & Symptoms:  Other symptoms: radiation of pain to in L arm and swelling     History:   Previous similar pain: no       Precipitating factors:   Trauma or overuse: YES- overuse     Alleviating factors:  Improved by: nothing    Therapies Tried and outcome: Patient has tried deep heating rubs PT ibuprofen rest Ice and heat       Neck Pain/R side of neck   Onset: 2.5 weeks ago and comes and goes     Description: Patient is having some neck pain over the last 2.5weeks ago pain increases with any movement   Location: R side neck pain   Radiation: into the right shoulder    Intensity: moderate    Progression of Symptoms:  same    Accompanying Signs & Symptoms:  Burning, prickly sensation (paresthesias) in arm(s): no   Numbness in arm(s): no   Weakness in arm(s):  no   Fever: no   Headache: no   Nausea and/or vomiting: no     History:   Trauma: no   Previous neck pain: no   Previous surgery or injections: no   Previous Imaging (MRI,X ray): no     Precipitating factors:   Does movement increase the pain:  YES    Alleviating factors:  Heat     Therapies Tried and outcome:  Heat does help    Patient is a 62 yr old female here for left shoulder pain. She reports that in the last three weeks pain has become unbearable. She had right shoulder arthroscopy and feels like she may have been compensating with the left shoulder. Patient says pain is bad with outward movements of her shoulder. Has seen ortho for this and an x-ray was done. Patient reports that the x-rays were  negative.No numbness or tingling down the arms. Range of motion is still fair.    Problem list and histories reviewed & adjusted, as indicated.  Additional history: as documented    Patient Active Problem List   Diagnosis     s/p gastric bypass x 2     Allergic rhinitis     Carpal tunnel syndrome     Obstructive sleep apnea     Rosacea     CARDIOVASCULAR SCREENING; LDL GOAL LESS THAN 160     Tick bite, infected, positive Lyme test 1996 not treated     Vitamin D deficiency     Osteoporosis     Obesity, Class III, BMI 40-49.9 (morbid obesity) (H)     S/P TKR (total knee replacement)     HTN, goal below 140/80     Back pain, left below scapula, strained muscles     Encounter for routine gynecological examination      Colles' fracture     Bilateral cold feet     Elevated levels of transaminase & lactic acid dehydrogenase     Fatty liver     Shoulder injury, right, subsequent encounter     Past Surgical History:   Procedure Laterality Date     ARTHROPLASTY KNEE  4/2/2012    Procedure:ARTHROPLASTY KNEE; Left Total Knee Arthroplasty--Anesth.Choice; Surgeon:HERNANDO THOMPSON; Location:[facility]     ARTHROTOMY SHOULDER, ROTATOR CUFF REPAIR, COMBINED Right 8/25/2017    Procedure: COMBINED ARTHROTOMY SHOULDER, ROTATOR CUFF REPAIR;  Right shoulder distal clavicle excision, subacromial decompression, rotator cuff repair ;  Surgeon: Hernando Thompson MD;  Location: [facility]     ARTHROTOMY SHOULDER, ROTATOR CUFF REPAIR, COMBINED Right 12/18/2017    Procedure: COMBINED ARTHROTOMY SHOULDER, ROTATOR CUFF REPAIR;  Right Shoulder Rotator Cuff Revision;  Surgeon: Hernando Thompson MD;  Location: [facility]     C ORAL SURGERY PROCEDURE  age 18    wisdom teeth     C/SECTION, LOW TRANSVERSE  1978, 1984, 1994    x 3     COLONOSCOPY  2001    normal colonoscopy     COLONOSCOPY N/A 8/22/2017    Procedure: COLONOSCOPY;  Colonoscopy  ;  Surgeon: Delfino Yoo MD;  Location: [facility]     GASTRIC BYPASS  1980/2005    Gastric Bypass and revision      HERNIA REPAIR, INCISIONAL  6/16/2008    lap.repair with 10x8 mesh     TUBAL LIGATION  1994       Social History   Substance Use Topics     Smoking status: Never Smoker     Smokeless tobacco: Never Used     Alcohol use 0.0 oz/week     0 Standard drinks or equivalent per week      Comment: mixed very light 1 a month if that     Family History   Problem Relation Age of Onset     C.A.D. Father      MI at age 60     Hypertension Father      Alzheimer Disease Father      Hyperlipidemia Father      OSTEOPOROSIS Mother      on Fosamax     Glaucoma Mother      Other Cancer Brother      Stomach Cancer Maternal Aunt      Cervical Cancer Cousin      DIABETES No family hx of      CEREBROVASCULAR DISEASE No family hx of      Breast Cancer No family hx of      Cancer - colorectal No family hx of      Prostate Cancer No family hx of      Liver Disease No family hx of          Current Outpatient Prescriptions   Medication Sig Dispense Refill     atenolol (TENORMIN) 50 MG tablet Take 1 tablet (50 mg) by mouth daily 90 tablet 3     nystatin (MYCOSTATIN) 532988 UNIT/GM POWD Apply topically 3 times daily as needed Refill at patient's request 30 g 1     nystatin (MYCOSTATIN) cream Apply topically 2 times daily Reported on 3/6/2017 30 g 11     clobetasol (TEMOVATE) 0.05 % ointment Apply sparingly to affected area twice daily for 14 days.  Do not apply to face. (Patient taking differently: daily as needed Apply sparingly to affected area twice daily for 14 days.  Do not apply to face.) 15 g 0     hydrochlorothiazide 12.5 MG TABS tablet Take 1 tablet (12.5 mg) by mouth daily 90 tablet 3     Emollient (CERAVE) CREA Externally apply topically 2 times daily as needed 1 Bottle 3     Allergies   Allergen Reactions     Vioxx Itching and Swelling     VIOXX (Swollen Feet,Hands itching), Okay with ibuprofen and aspirin     BP Readings from Last 3 Encounters:   01/29/18 125/75   01/11/18 114/64   01/08/18 129/76    Wt Readings from Last 3  "Encounters:   01/29/18 239 lb (108.4 kg)   01/11/18 237 lb (107.5 kg)   01/08/18 237 lb (107.5 kg)                  Labs reviewed in EPIC    Reviewed and updated as needed this visit by clinical staff  Tobacco  Allergies  Med Hx  Surg Hx  Fam Hx  Soc Hx      Reviewed and updated as needed this visit by Provider         ROS:  Constitutional, HEENT, cardiovascular, pulmonary, gi and gu systems are negative, except as otherwise noted.    OBJECTIVE:     /75 (BP Location: Left arm, Cuff Size: Adult Regular)  Pulse 86  Temp 98.6  F (37  C) (Tympanic)  Ht 5' 3\" (1.6 m)  Wt 239 lb (108.4 kg)  LMP 05/29/2006  BMI 42.34 kg/m2  Body mass index is 42.34 kg/(m^2).  GENERAL: healthy, alert and no distress  EYES: Eyes grossly normal to inspection, PERRL and conjunctivae and sclerae normal  HENT: ear canals and TM's normal, nose and mouth without ulcers or lesions  NECK: no adenopathy, no asymmetry, masses, or scars and thyroid normal to palpation  RESP: lungs clear to auscultation - no rales, rhonchi or wheezes  CV: regular rate and rhythm, normal S1 S2, no S3 or S4, no murmur, click or rub, no peripheral edema and peripheral pulses strong  MS: normal range of motion in left shoulder with clicks heard and tenderness to palpation anterior shoulder.     Diagnostic Test Results:  none     ASSESSMENT/PLAN:       1. Pain in joint of left shoulder  Injection given in clinic, she is in physical therapy already, asked that she continue with this, if no improvement may need an MRI.  - Kenalog 40 MG  []  - triamcinolone acetonide (KENALOG) 40 MG/ML injection; Given in clinic for shoulder pain  Dispense: 2 mL; Refill: 0  - lidocaine 1 % injection; Patient was given in clinic  Dispense: 2 mL; Refill: 0    FUTURE APPOINTMENTS:       - Follow-up visit as needed    Jennie Suazo MD  [facility]  "

## 2018-01-29 NOTE — PATIENT INSTRUCTIONS
Thank you for choosing [facility].  You may be receiving a survey in the mail from Nnamdi Hopkins regarding your visit today.  Please take a few minutes to complete and return the survey to let us know how we are doing.      If you have questions or concerns, please contact us via [employer] or you can contact your care team at [phone].    Our Clinic hours are:  Monday 6:40 am  to 7:00 pm  Tuesday -Friday 6:40 am to 5:00 pm    The [place] outpatient lab hours are:  Monday - Friday 6:10 am to 4:45 pm  Saturdays 7:00 am to 11:00 am  Appointments are required, call [phone]    If you have clinical questions after hours or would like to schedule an appointment,  call the clinic at [phone].

## 2018-01-29 NOTE — PROCEDURES
Injection Procedure note  After obtaining consent from the patient the area of the left  joint was prepped in the usual fashion. Lidocaine 1 %  with Kenalog (40 Mg ) was injected into the joint space Area was cleansed .Band aid applied.Patient tolerated the procedure well.

## 2018-01-30 ENCOUNTER — HOSPITAL ENCOUNTER (OUTPATIENT)
Dept: PHYSICAL THERAPY | Facility: CLINIC | Age: 63
Setting detail: THERAPIES SERIES
End: 2018-01-30
Attending: ORTHOPAEDIC SURGERY
Payer: COMMERCIAL

## 2018-01-30 PROCEDURE — 40000718 ZZHC STATISTIC PT DEPARTMENT ORTHO VISIT: Performed by: PHYSICAL THERAPIST

## 2018-01-30 PROCEDURE — 97110 THERAPEUTIC EXERCISES: CPT | Mod: GP | Performed by: PHYSICAL THERAPIST

## 2018-02-06 ENCOUNTER — HOSPITAL ENCOUNTER (OUTPATIENT)
Dept: PHYSICAL THERAPY | Facility: CLINIC | Age: 63
Setting detail: THERAPIES SERIES
End: 2018-02-06
Attending: ORTHOPAEDIC SURGERY
Payer: COMMERCIAL

## 2018-02-06 PROCEDURE — 97110 THERAPEUTIC EXERCISES: CPT | Mod: GP | Performed by: PHYSICAL THERAPIST

## 2018-02-06 PROCEDURE — 40000718 ZZHC STATISTIC PT DEPARTMENT ORTHO VISIT: Performed by: PHYSICAL THERAPIST

## 2018-02-13 ENCOUNTER — HOSPITAL ENCOUNTER (OUTPATIENT)
Dept: PHYSICAL THERAPY | Facility: CLINIC | Age: 63
Setting detail: THERAPIES SERIES
End: 2018-02-13
Attending: ORTHOPAEDIC SURGERY
Payer: COMMERCIAL

## 2018-02-13 PROCEDURE — 97110 THERAPEUTIC EXERCISES: CPT | Mod: GP | Performed by: PHYSICAL THERAPIST

## 2018-02-13 PROCEDURE — 40000718 ZZHC STATISTIC PT DEPARTMENT ORTHO VISIT: Performed by: PHYSICAL THERAPIST

## 2018-02-14 ENCOUNTER — TELEPHONE (OUTPATIENT)
Dept: FAMILY MEDICINE | Facility: CLINIC | Age: 63
End: 2018-02-14

## 2018-02-14 DIAGNOSIS — M25.512 CHRONIC LEFT SHOULDER PAIN: Primary | ICD-10-CM

## 2018-02-14 DIAGNOSIS — G89.29 CHRONIC LEFT SHOULDER PAIN: Primary | ICD-10-CM

## 2018-02-16 ENCOUNTER — HOSPITAL ENCOUNTER (OUTPATIENT)
Dept: MRI IMAGING | Facility: CLINIC | Age: 63
Discharge: HOME | End: 2018-02-16
Attending: FAMILY MEDICINE | Admitting: FAMILY MEDICINE
Payer: COMMERCIAL

## 2018-02-16 DIAGNOSIS — M25.512 CHRONIC LEFT SHOULDER PAIN: ICD-10-CM

## 2018-02-16 DIAGNOSIS — G89.29 CHRONIC LEFT SHOULDER PAIN: ICD-10-CM

## 2018-02-16 PROCEDURE — 73221 MRI JOINT UPR EXTREM W/O DYE: CPT | Mod: LT

## 2018-02-20 NOTE — PROGRESS NOTES
Please inform patient that test result showed a partial tear of one of the rotator cuff tendons. She also has some bursitis in her shoulder. I will recommend seeing an orthopedist for this.   Thank you.     Jennie Suazo M.D.

## 2018-02-21 ENCOUNTER — HOSPITAL ENCOUNTER (OUTPATIENT)
Dept: PHYSICAL THERAPY | Facility: CLINIC | Age: 63
Setting detail: THERAPIES SERIES
End: 2018-02-21
Attending: ORTHOPAEDIC SURGERY
Payer: COMMERCIAL

## 2018-02-21 PROCEDURE — 97110 THERAPEUTIC EXERCISES: CPT | Mod: GP | Performed by: PHYSICAL THERAPIST

## 2018-02-21 PROCEDURE — 40000718 ZZHC STATISTIC PT DEPARTMENT ORTHO VISIT: Performed by: PHYSICAL THERAPIST

## 2018-02-21 NOTE — PROGRESS NOTES
OUTPATIENT PHYSICAL THERAPY PROGRESS NOTE    02/21/18 1200   Signing Clinician's Name / Credentials   Signing clinician's name / credentials Dasia Wilson, PT [id number]   Session Number   Session Number 5  [facility]    Ortho Goal 1   Goal Description 1.  Pt will be able to reach to shoulder ht for ADL's w/ minimal difficulty.  2/2/18 slight difficulty MET   Target Date 02/22/18   Ortho Goal 2   Goal Description 2.  Pt will be able to reach behind back for ADL's w/ minimal difficulty.  2/21/18 able to pull up pants w/o difficulty but unable to hook bra.     Target Date 02/22/18   Ortho Goal 3   Goal Description 3.  Pt will be able to sleep 5-6 hours/ night.  2/21/18 able to lie on side but increases pain.  Sleeping 2 hours at a time.     Target Date 03/24/18   Ortho Goal 4   Goal Description 4.  Pt will be able to lift and carry up to 10 # for groceries/ laundry.  2/21/18 has not been doing.   Target Date 03/24/18   Ortho Goal 5   Goal Description 5.  Pt will be able to reach overhead for ADL's w/ minimal difficulty   Target Date 03/24/18   Ortho Goal 6   Goal Description 6.  Pt will be independent and consistent w/ HEP.  2/21/8 consistent w/ HEP MET   Target Date 03/24/18   Subjective Report   Subjective Report Pt states she had MRI and will discuss it with the surgeon.  MRI in chart states thinning of supraspinatus ? partial tear.  Pt notes the motion has improved in both shoulders.  Pain level R intermittent 2-3/10,  L shoulder @ best 4/10,  w/ movement increases to 7/10.      Objective Measure 1   Objective Measure AAROM flex R 140*, L 155* ;  scaption R 153*, L 156*;   ER R 82* in 45* abd,  L WNL in 45* abd ;  IR  R 58*,  L WNL   Details AROM shoulder flex R 112*, L  160*,  scaption R 72*, L 125*, ER R 67*,  L 60*;  IR  R to L2,  L to T9   Therapeutic Procedure/exercise   Patient Response Pendulums on own.   Notes fatigue w/ AROM.     Treatment Detail Scifit seat 6 elevated L 1 X 2 min   Wand ER seated B x 5 each  (notes pain on L side).  Pulleys flex and scaption X 10 each B.  Wall flex B shoulder X 5 .  RTB shoulder ext  w/ LT set X 30 B (will progress to GTB when red becomes easy).      AROM shoulder flex and scaption X 20 each B.    ER in SL 4 oz X 20 B .   Brief AAROM w/ PT R > L shoulder.    Plan   Home program ex as above--strengthening 3-4X/wk, ice   Plan  Cont 1X/ 7-10 days for ex progression   Comments   Comments Progress towards goal as noted above

## 2018-02-23 ENCOUNTER — TRANSFERRED RECORDS (OUTPATIENT)
Dept: HEALTH INFORMATION MANAGEMENT | Facility: CLINIC | Age: 63
End: 2018-02-23

## 2018-03-13 ENCOUNTER — HOSPITAL ENCOUNTER (OUTPATIENT)
Dept: PHYSICAL THERAPY | Facility: CLINIC | Age: 63
Setting detail: THERAPIES SERIES
End: 2018-03-13
Attending: ORTHOPAEDIC SURGERY
Payer: COMMERCIAL

## 2018-03-13 PROCEDURE — 40000718 ZZHC STATISTIC PT DEPARTMENT ORTHO VISIT: Performed by: PHYSICAL THERAPIST

## 2018-03-13 PROCEDURE — 97110 THERAPEUTIC EXERCISES: CPT | Mod: GP | Performed by: PHYSICAL THERAPIST

## 2018-03-17 ENCOUNTER — APPOINTMENT (OUTPATIENT)
Dept: GENERAL RADIOLOGY | Facility: CLINIC | Age: 63
End: 2018-03-17
Attending: EMERGENCY MEDICINE
Payer: COMMERCIAL

## 2018-03-17 ENCOUNTER — HOSPITAL ENCOUNTER (OUTPATIENT)
Facility: CLINIC | Age: 63
Setting detail: OBSERVATION
Discharge: HOME | End: 2018-03-18
Attending: EMERGENCY MEDICINE | Admitting: SURGERY
Payer: COMMERCIAL

## 2018-03-17 DIAGNOSIS — Z98.84 BARIATRIC SURGERY STATUS: ICD-10-CM

## 2018-03-17 DIAGNOSIS — I10 ESSENTIAL HYPERTENSION, MALIGNANT: ICD-10-CM

## 2018-03-17 DIAGNOSIS — S49.91XD SHOULDER INJURY, RIGHT, SUBSEQUENT ENCOUNTER: Primary | ICD-10-CM

## 2018-03-17 DIAGNOSIS — K92.2 UPPER GI BLEED: ICD-10-CM

## 2018-03-17 DIAGNOSIS — K92.1 BLOOD IN STOOL: ICD-10-CM

## 2018-03-17 DIAGNOSIS — D64.9 ANEMIA, UNSPECIFIED TYPE: ICD-10-CM

## 2018-03-17 LAB
ALBUMIN SERPL-MCNC: 3.2 G/DL (ref 3.4–5)
ALBUMIN UR-MCNC: NEGATIVE MG/DL
ALP SERPL-CCNC: 80 U/L (ref 40–150)
ALT SERPL W P-5'-P-CCNC: 17 U/L (ref 0–50)
ANION GAP SERPL CALCULATED.3IONS-SCNC: 6 MMOL/L (ref 3–14)
APPEARANCE UR: ABNORMAL
AST SERPL W P-5'-P-CCNC: 16 U/L (ref 0–45)
BASOPHILS # BLD AUTO: 0 10E9/L (ref 0–0.2)
BASOPHILS # BLD AUTO: 0 10E9/L (ref 0–0.2)
BASOPHILS NFR BLD AUTO: 0.3 %
BASOPHILS NFR BLD AUTO: 0.4 %
BILIRUB SERPL-MCNC: 0.2 MG/DL (ref 0.2–1.3)
BILIRUB UR QL STRIP: NEGATIVE
BUN SERPL-MCNC: 32 MG/DL (ref 7–30)
CALCIUM SERPL-MCNC: 8.7 MG/DL (ref 8.5–10.1)
CHLORIDE SERPL-SCNC: 106 MMOL/L (ref 94–109)
CO2 SERPL-SCNC: 25 MMOL/L (ref 20–32)
COLOR UR AUTO: YELLOW
CREAT SERPL-MCNC: 0.71 MG/DL (ref 0.52–1.04)
DIFFERENTIAL METHOD BLD: ABNORMAL
DIFFERENTIAL METHOD BLD: ABNORMAL
EOSINOPHIL # BLD AUTO: 0.1 10E9/L (ref 0–0.7)
EOSINOPHIL # BLD AUTO: 0.1 10E9/L (ref 0–0.7)
EOSINOPHIL NFR BLD AUTO: 0.8 %
EOSINOPHIL NFR BLD AUTO: 0.9 %
ERYTHROCYTE [DISTWIDTH] IN BLOOD BY AUTOMATED COUNT: 13.9 % (ref 10–15)
ERYTHROCYTE [DISTWIDTH] IN BLOOD BY AUTOMATED COUNT: 14.1 % (ref 10–15)
GFR SERPL CREATININE-BSD FRML MDRD: 83 ML/MIN/1.7M2
GLUCOSE SERPL-MCNC: 102 MG/DL (ref 70–99)
GLUCOSE UR STRIP-MCNC: NEGATIVE MG/DL
HCT VFR BLD AUTO: 33.3 % (ref 35–47)
HCT VFR BLD AUTO: 36 % (ref 35–47)
HGB BLD-MCNC: 10.5 G/DL (ref 11.7–15.7)
HGB BLD-MCNC: 11.5 G/DL (ref 11.7–15.7)
HGB UR QL STRIP: NEGATIVE
HYALINE CASTS #/AREA URNS LPF: 9 /LPF (ref 0–2)
IMM GRANULOCYTES # BLD: 0 10E9/L (ref 0–0.4)
IMM GRANULOCYTES # BLD: 0 10E9/L (ref 0–0.4)
IMM GRANULOCYTES NFR BLD: 0.3 %
IMM GRANULOCYTES NFR BLD: 0.4 %
INR PPP: 1 (ref 0.86–1.14)
KETONES UR STRIP-MCNC: NEGATIVE MG/DL
LEUKOCYTE ESTERASE UR QL STRIP: NEGATIVE
LYMPHOCYTES # BLD AUTO: 2.5 10E9/L (ref 0.8–5.3)
LYMPHOCYTES # BLD AUTO: 3 10E9/L (ref 0.8–5.3)
LYMPHOCYTES NFR BLD AUTO: 34 %
LYMPHOCYTES NFR BLD AUTO: 38.9 %
MCH RBC QN AUTO: 29.7 PG (ref 26.5–33)
MCH RBC QN AUTO: 30 PG (ref 26.5–33)
MCHC RBC AUTO-ENTMCNC: 31.5 G/DL (ref 31.5–36.5)
MCHC RBC AUTO-ENTMCNC: 31.9 G/DL (ref 31.5–36.5)
MCV RBC AUTO: 94 FL (ref 78–100)
MCV RBC AUTO: 94 FL (ref 78–100)
MONOCYTES # BLD AUTO: 0.4 10E9/L (ref 0–1.3)
MONOCYTES # BLD AUTO: 0.4 10E9/L (ref 0–1.3)
MONOCYTES NFR BLD AUTO: 4.7 %
MONOCYTES NFR BLD AUTO: 5.1 %
MUCOUS THREADS #/AREA URNS LPF: PRESENT /LPF
NEUTROPHILS # BLD AUTO: 4.3 10E9/L (ref 1.6–8.3)
NEUTROPHILS # BLD AUTO: 4.4 10E9/L (ref 1.6–8.3)
NEUTROPHILS NFR BLD AUTO: 54.9 %
NEUTROPHILS NFR BLD AUTO: 59.3 %
NITRATE UR QL: NEGATIVE
PH UR STRIP: 5 PH (ref 5–7)
PLATELET # BLD AUTO: 211 10E9/L (ref 150–450)
PLATELET # BLD AUTO: 245 10E9/L (ref 150–450)
POTASSIUM SERPL-SCNC: 3.8 MMOL/L (ref 3.4–5.3)
PROT SERPL-MCNC: 6.9 G/DL (ref 6.8–8.8)
RBC # BLD AUTO: 3.54 10E12/L (ref 3.8–5.2)
RBC # BLD AUTO: 3.83 10E12/L (ref 3.8–5.2)
RBC #/AREA URNS AUTO: 2 /HPF (ref 0–2)
SODIUM SERPL-SCNC: 137 MMOL/L (ref 133–144)
SOURCE: ABNORMAL
SP GR UR STRIP: 1.02 (ref 1–1.03)
SQUAMOUS #/AREA URNS AUTO: 2 /HPF (ref 0–1)
UROBILINOGEN UR STRIP-MCNC: 0 MG/DL (ref 0–2)
WBC # BLD AUTO: 7.5 10E9/L (ref 4–11)
WBC # BLD AUTO: 7.7 10E9/L (ref 4–11)
WBC #/AREA URNS AUTO: 3 /HPF (ref 0–5)

## 2018-03-17 PROCEDURE — 99219 ZZC INITIAL OBSERVATION CARE,LEVL II: CPT | Performed by: FAMILY MEDICINE

## 2018-03-17 PROCEDURE — 36415 COLL VENOUS BLD VENIPUNCTURE: CPT | Performed by: EMERGENCY MEDICINE

## 2018-03-17 PROCEDURE — 81001 URINALYSIS AUTO W/SCOPE: CPT | Performed by: EMERGENCY MEDICINE

## 2018-03-17 PROCEDURE — 99285 EMERGENCY DEPT VISIT HI MDM: CPT | Mod: 25 | Performed by: EMERGENCY MEDICINE

## 2018-03-17 PROCEDURE — 25800025 ZZH RX 258: Performed by: EMERGENCY MEDICINE

## 2018-03-17 PROCEDURE — 72072 X-RAY EXAM THORAC SPINE 3VWS: CPT

## 2018-03-17 PROCEDURE — G0378 HOSPITAL OBSERVATION PER HR: HCPCS

## 2018-03-17 PROCEDURE — 25000125 ZZHC RX 250: Performed by: FAMILY MEDICINE

## 2018-03-17 PROCEDURE — 25000132 ZZH RX MED GY IP 250 OP 250 PS 637: Performed by: PHYSICIAN ASSISTANT

## 2018-03-17 PROCEDURE — 25000125 ZZHC RX 250: Performed by: EMERGENCY MEDICINE

## 2018-03-17 PROCEDURE — 96365 THER/PROPH/DIAG IV INF INIT: CPT | Performed by: EMERGENCY MEDICINE

## 2018-03-17 PROCEDURE — 85025 COMPLETE CBC W/AUTO DIFF WBC: CPT | Mod: 91 | Performed by: EMERGENCY MEDICINE

## 2018-03-17 PROCEDURE — 96375 TX/PRO/DX INJ NEW DRUG ADDON: CPT

## 2018-03-17 PROCEDURE — 80053 COMPREHEN METABOLIC PANEL: CPT | Performed by: EMERGENCY MEDICINE

## 2018-03-17 RX ORDER — CLOBETASOL PROPIONATE 0.5 MG/G
OINTMENT TOPICAL DAILY PRN
Status: DISCONTINUED | OUTPATIENT
Start: 2018-03-17 | End: 2018-03-18 | Stop reason: HOSPADM

## 2018-03-17 RX ORDER — HYDROCHLOROTHIAZIDE 12.5 MG/1
12.5 TABLET ORAL DAILY
Status: DISCONTINUED | OUTPATIENT
Start: 2018-03-18 | End: 2018-03-18 | Stop reason: HOSPADM

## 2018-03-17 RX ORDER — NYSTATIN 100000 U/G
CREAM TOPICAL 2 TIMES DAILY
Status: DISCONTINUED | OUTPATIENT
Start: 2018-03-17 | End: 2018-03-17

## 2018-03-17 RX ORDER — ATENOLOL 50 MG/1
50 TABLET ORAL DAILY
Status: DISCONTINUED | OUTPATIENT
Start: 2018-03-18 | End: 2018-03-18 | Stop reason: HOSPADM

## 2018-03-17 RX ORDER — NYSTATIN 100000 U/G
CREAM TOPICAL
Status: DISCONTINUED | OUTPATIENT
Start: 2018-03-17 | End: 2018-03-18 | Stop reason: HOSPADM

## 2018-03-17 RX ORDER — ONDANSETRON 4 MG/1
4 TABLET, ORALLY DISINTEGRATING ORAL EVERY 6 HOURS PRN
Status: DISCONTINUED | OUTPATIENT
Start: 2018-03-17 | End: 2018-03-18 | Stop reason: HOSPADM

## 2018-03-17 RX ORDER — DEXTROSE MONOHYDRATE, SODIUM CHLORIDE, AND POTASSIUM CHLORIDE 50; 1.49; 4.5 G/1000ML; G/1000ML; G/1000ML
INJECTION, SOLUTION INTRAVENOUS CONTINUOUS
Status: DISCONTINUED | OUTPATIENT
Start: 2018-03-17 | End: 2018-03-18 | Stop reason: HOSPADM

## 2018-03-17 RX ORDER — NALOXONE HYDROCHLORIDE 0.4 MG/ML
.1-.4 INJECTION, SOLUTION INTRAMUSCULAR; INTRAVENOUS; SUBCUTANEOUS
Status: DISCONTINUED | OUTPATIENT
Start: 2018-03-17 | End: 2018-03-18 | Stop reason: HOSPADM

## 2018-03-17 RX ORDER — ACETAMINOPHEN 325 MG/1
650 TABLET ORAL EVERY 4 HOURS PRN
Status: DISCONTINUED | OUTPATIENT
Start: 2018-03-17 | End: 2018-03-18 | Stop reason: HOSPADM

## 2018-03-17 RX ORDER — ONDANSETRON 2 MG/ML
4 INJECTION INTRAMUSCULAR; INTRAVENOUS EVERY 6 HOURS PRN
Status: DISCONTINUED | OUTPATIENT
Start: 2018-03-17 | End: 2018-03-18 | Stop reason: HOSPADM

## 2018-03-17 RX ADMIN — POTASSIUM CHLORIDE, DEXTROSE MONOHYDRATE AND SODIUM CHLORIDE: 150; 5; 450 INJECTION, SOLUTION INTRAVENOUS at 17:27

## 2018-03-17 RX ADMIN — PANTOPRAZOLE SODIUM 40 MG: 40 INJECTION, POWDER, FOR SOLUTION INTRAVENOUS at 20:05

## 2018-03-17 RX ADMIN — FAMOTIDINE 20 MG: 20 INJECTION, SOLUTION INTRAVENOUS at 13:55

## 2018-03-17 RX ADMIN — ACETAMINOPHEN 650 MG: 325 TABLET, FILM COATED ORAL at 21:51

## 2018-03-17 ASSESSMENT — ENCOUNTER SYMPTOMS
BACK PAIN: 1
SHORTNESS OF BREATH: 0
COUGH: 0
LIGHT-HEADEDNESS: 1

## 2018-03-17 NOTE — IP AVS SNAPSHOT
[facility]    [address], [place] [address]    Phone:  [phone]    Fax:  [phone]                                       After Visit Summary   3/17/2018    Jacquie Harris    MRN: [id number]           After Visit Summary Signature Page     I have received my discharge instructions, and my questions have been answered. I have discussed any challenges I see with this plan with the nurse or doctor.    ..........................................................................................................................................  Patient/Patient Representative Signature      ..........................................................................................................................................  Patient Representative Print Name and Relationship to Patient    ..................................................               ................................................  Date                                            Time    ..........................................................................................................................................  Reviewed by Signature/Title    ...................................................              ..............................................  Date                                                            Time

## 2018-03-17 NOTE — ED PROVIDER NOTES
"  History     Chief Complaint   Patient presents with     Melena     black tarry stools, overheating and dizziness, upper back pain, left lower abdominal pain.  \" I think I have a liver blockage\"     HPI  Jacquie Harris is a 62 year old female who presents to the ED for the evaluation of dark, tarry stools x3-4 days. She complains of constant upper, right back pain. Patient admits to lightheadedness when standing up. Admits to urinary frequency. Past abdominal surgeries include gastric bypass and . Patient reports her gallbladder may have been taken out during gastric bypass surgery. She had a normal colonoscopy in 2017. Patient was informed of a liver blockage in 2017. Denies history of ulcers, pancreatitis, reflux, kidney stones, bleeding in intestinal track, appendectomy, fall or injury, ill contacts. Denies cough, SOB.     Problem List:    Patient Active Problem List    Diagnosis Date Noted     GI bleed 2018     Priority: Medium     Upper GI bleed 2018     Priority: Medium     Shoulder injury, right, subsequent encounter 2017     Priority: Medium     Fatty liver 2017     Priority: Medium     Elevated levels of transaminase & lactic acid dehydrogenase 04/15/2017     Priority: Medium     Bilateral cold feet 2017     Priority: Medium     Colles' fracture 2015     Priority: Medium     Encounter for routine gynecological examination  2015     Priority: Medium     Back pain, left below scapula, strained muscles 10/24/2013     Priority: Medium     HTN, goal below 140/80 2013     Priority: Medium     S/P TKR (total knee replacement) 2012     Priority: Medium     Osteoporosis 2012     Priority: Medium     Previous T scores -2.2  2012 Dexascan:  Lumbar spine L1-L4 T-score: -2.4, Left femoral neck T-score: -2.7, Right femoral neck T-score: -2.2   Percent change in lumbar spine: +6.6% since 2008   Percent change in femurs: +2.9% since " 4/16/2008    IMPRESSION: Osteoporosis in the left femoral neck. Severe osteopenia  in the right femoral neck and lumbar spine.       Obesity, Class III, BMI 40-49.9 (morbid obesity) (H) 02/19/2012     Priority: Medium     ideal weight 120, goal weight 200, lose 58 lbs in 58 weeks.       Vitamin D deficiency 07/18/2011     Priority: Medium     Tick bite, infected, positive Lyme test 1996 not treated 07/08/2011     Priority: Medium     Rosacea 11/04/2009     Priority: Medium     Obstructive sleep apnea 12/01/2006     Priority: Medium     Sleep study 12/06 for EDS- AHI 36, desaturations to 65%. CPAP recommended but not tolerated.  01/16/07  ENT consult recommend CPAP and weight loss. Surgery discussed but success rate less than CPAP  1/15/07 CPAP trial, was not able to tolerate. Retried 2008, tolerated better.   Problem list name updated by automated process. Provider to review       Carpal tunnel syndrome 06/07/2006     Priority: Medium     04/10/09 Dr.Meletiou Cronin. Finding consistant with CTS, but nerve studies and MRI of C-Spine are normal. Corticosteroid injection given in office       CARDIOVASCULAR SCREENING; LDL GOAL LESS THAN 160 10/31/2010     Priority: Low     Allergic rhinitis 03/27/2006     Priority: Low     Problem list name updated by automated process. Provider to review       s/p gastric bypass x 2 07/05/2005     Priority: Low     Gastric bypass 1980          Past Medical History:    Past Medical History:   Diagnosis Date     HTN (hypertension)      IRON DEFICIENCY ANEMIA 7/5/2005     DARWIN (obstructive sleep apnea)        Past Surgical History:    Past Surgical History:   Procedure Laterality Date     ARTHROPLASTY KNEE  4/2/2012    Procedure:ARTHROPLASTY KNEE; Left Total Knee Arthroplasty--Anesth.Choice; Surgeon:VICENTE MORALES; Location:[facility]     ARTHROTOMY SHOULDER, ROTATOR CUFF REPAIR, COMBINED Right 8/25/2017    Procedure: COMBINED ARTHROTOMY SHOULDER, ROTATOR CUFF REPAIR;  Right  shoulder distal clavicle excision, subacromial decompression, rotator cuff repair ;  Surgeon: Hernando Thompson MD;  Location: [facility]     ARTHROTOMY SHOULDER, ROTATOR CUFF REPAIR, COMBINED Right 12/18/2017    Procedure: COMBINED ARTHROTOMY SHOULDER, ROTATOR CUFF REPAIR;  Right Shoulder Rotator Cuff Revision;  Surgeon: Hernando Thompson MD;  Location: [facility]      ORAL SURGERY PROCEDURE  age 18    wisdom teeth     C/SECTION, LOW TRANSVERSE  1978, 1984, 1994    x 3     COLONOSCOPY  2001    normal colonoscopy     COLONOSCOPY N/A 8/22/2017    Procedure: COLONOSCOPY;  Colonoscopy  ;  Surgeon: Delfino Yoo MD;  Location: [facility]     GASTRIC BYPASS  1980/2005    Gastric Bypass and revision     HERNIA REPAIR, INCISIONAL  6/16/2008    lap.repair with 10x8 mesh     TUBAL LIGATION  1994       Family History:    Family History   Problem Relation Age of Onset     C.A.D. Father      MI at age 60     Hypertension Father      Alzheimer Disease Father      Hyperlipidemia Father      OSTEOPOROSIS Mother      on Fosamax     Glaucoma Mother      Other Cancer Brother      Stomach Cancer Maternal Aunt      Cervical Cancer Cousin      DIABETES No family hx of      CEREBROVASCULAR DISEASE No family hx of      Breast Cancer No family hx of      Cancer - colorectal No family hx of      Prostate Cancer No family hx of      Liver Disease No family hx of        Social History:  Marital Status:   [2]  Social History   Substance Use Topics     Smoking status: Never Smoker     Smokeless tobacco: Never Used     Alcohol use 0.0 oz/week     0 Standard drinks or equivalent per week      Comment: mixed very light 1 a month if that        Medications:      No current outpatient prescriptions on file.     Allergies   Allergen Reactions     Vioxx Itching and Swelling     VIOXX (Swollen Feet,Hands itching), Okay with ibuprofen and aspirin         Review of Systems   Respiratory: Negative for cough and shortness of breath.   "  Gastrointestinal:        Positive for black tarry stools   Musculoskeletal: Positive for back pain (upper right).   Neurological: Positive for light-headedness (with positional change).   All other systems reviewed and are negative.      Physical Exam   BP: 132/88  Heart Rate: 77  Temp: 96.8  F (36  C)  Resp: 18  Height: 160 cm (5' 3\")  Weight: 94.3 kg (208 lb)  SpO2: 98 %      Physical Exam general alert cooperative female in mild to moderate distress.  HEENT shows no scleral icterus.  No facial asymmetry.  Speech is clear and concise.  Neck is supple.  Lungs are clear without adventitious sounds.  Cardiac regular rate without murmur.  Back reveals no CVA tenderness.  She does have some mid thoracic spinous tenderness without crepitus or step-off.  Muscular spasm to the right of that area.  No skin rash or lesion over the area.  Abdomen is morbidly obese.  She is active bowel sounds.  On palpation she has tenderness in the midepigastrium moderately and mildly in the left lower quadrant.  No skin rashes over the abdomen.  She has intact surgical scars.  Remedies reveal no pitting edema, calf or thigh tenderness.  Skin exam shows no significant bruising issues.  Exam reveals no tenderness or masses to the examining digit.  Dark stool that is grossly guaiac positive.    ED Course     ED Course     Procedures               Critical Care time:  none               Results for orders placed or performed during the hospital encounter of 03/17/18 (from the past 24 hour(s))   UA reflex to Microscopic   Result Value Ref Range    Color Urine Yellow     Appearance Urine Slightly Cloudy     Glucose Urine Negative NEG^Negative mg/dL    Bilirubin Urine Negative NEG^Negative    Ketones Urine Negative NEG^Negative mg/dL    Specific Gravity Urine 1.023 1.003 - 1.035    Blood Urine Negative NEG^Negative    pH Urine 5.0 5.0 - 7.0 pH    Protein Albumin Urine Negative NEG^Negative mg/dL    Urobilinogen mg/dL 0.0 0.0 - 2.0 mg/dL    " Nitrite Urine Negative NEG^Negative    Leukocyte Esterase Urine Negative NEG^Negative    Source Midstream Urine     RBC Urine 2 0 - 2 /HPF    WBC Urine 3 0 - 5 /HPF    Squamous Epithelial /HPF Urine 2 (H) 0 - 1 /HPF    Mucous Urine Present (A) NEG^Negative /LPF    Hyaline Casts 9 (H) 0 - 2 /LPF   CBC with platelets differential   Result Value Ref Range    WBC 7.5 4.0 - 11.0 10e9/L    RBC Count 3.83 3.8 - 5.2 10e12/L    Hemoglobin 11.5 (L) 11.7 - 15.7 g/dL    Hematocrit 36.0 35.0 - 47.0 %    MCV 94 78 - 100 fl    MCH 30.0 26.5 - 33.0 pg    MCHC 31.9 31.5 - 36.5 g/dL    RDW 13.9 10.0 - 15.0 %    Platelet Count 245 150 - 450 10e9/L    Diff Method Automated Method     % Neutrophils 59.3 %    % Lymphocytes 34.0 %    % Monocytes 5.1 %    % Eosinophils 0.9 %    % Basophils 0.3 %    % Immature Granulocytes 0.4 %    Absolute Neutrophil 4.4 1.6 - 8.3 10e9/L    Absolute Lymphocytes 2.5 0.8 - 5.3 10e9/L    Absolute Monocytes 0.4 0.0 - 1.3 10e9/L    Absolute Eosinophils 0.1 0.0 - 0.7 10e9/L    Absolute Basophils 0.0 0.0 - 0.2 10e9/L    Abs Immature Granulocytes 0.0 0 - 0.4 10e9/L   INR   Result Value Ref Range    INR 1.00 0.86 - 1.14   Comprehensive metabolic panel   Result Value Ref Range    Sodium 137 133 - 144 mmol/L    Potassium 3.8 3.4 - 5.3 mmol/L    Chloride 106 94 - 109 mmol/L    Carbon Dioxide 25 20 - 32 mmol/L    Anion Gap 6 3 - 14 mmol/L    Glucose 102 (H) 70 - 99 mg/dL    Urea Nitrogen 32 (H) 7 - 30 mg/dL    Creatinine 0.71 0.52 - 1.04 mg/dL    GFR Estimate 83 >60 mL/min/1.7m2    GFR Estimate If Black >90 >60 mL/min/1.7m2    Calcium 8.7 8.5 - 10.1 mg/dL    Bilirubin Total 0.2 0.2 - 1.3 mg/dL    Albumin 3.2 (L) 3.4 - 5.0 g/dL    Protein Total 6.9 6.8 - 8.8 g/dL    Alkaline Phosphatase 80 40 - 150 U/L    ALT 17 0 - 50 U/L    AST 16 0 - 45 U/L   XR Thoracic Spine 3 Views    Narrative    THORACIC SPINE THREE VIEWS  3/17/2018 1:58 PM     COMPARISON: Thoracic spine x-ray series 10/10/2013    HISTORY: Mid thoracic  tenderness.      Impression    IMPRESSION: There is continued normal alignment of the thoracic  vertebrae. Vertebral body heights of the thoracic spine remain normal.  There is no evidence for fracture of the thoracic spine. Degenerative  endplate spurring at the T2-T3, T3-T4, T4-T5, T5-T6, T6-T7 and T7-T8  levels again noted.    ALLISON LEDBETTER MD       Medications   famotidine (PEPCID) infusion 20 mg (not administered)   naloxone (NARCAN) injection 0.1-0.4 mg (not administered)   dextrose 5% and 0.45% NaCl + KCl 20 mEq/L infusion ( Intravenous New Bag 3/17/18 9966)   ondansetron (ZOFRAN-ODT) ODT tab 4 mg (not administered)     Or   ondansetron (ZOFRAN) injection 4 mg (not administered)   pantoprazole (PROTONIX) 40 mg IV push injection (not administered)   atenolol (TENORMIN) tablet 50 mg (not administered)   clobetasol (TEMOVATE) 0.05 % ointment (not administered)   CERAVE CREA (not administered)   hydrochlorothiazide tablet 12.5 mg (not administered)   nystatin (MYCOSTATIN) cream (not administered)     1:02 PM Patient assessed.  IV was established and blood work was obtained.  Patient was given IV Pepcid.  X-ray of the thoracic spine is ordered and is described above  Assessments & Plan (with Medical Decision Making)   It is a 62-year-old female presents with complaints of black tarry stools, generalized weakness and dizziness, epigastric and left lower quadrant abdominal pains.  Patient has had previous gastric bypass ×2, cholecystectomy, and C-sections ×3 and previous hernia incisional repair.  Previously noted to have elevated LFTs but had an MRCP in April 2017 that did not show evidence of acute abnormality.  She denies history of ulcer or reflux.  She has had normal colonoscopy most recently 8/22/17.  She is not on blood thinners or antiplatelet therapy.  Denies recent exposure to infectious illness.  She has had no vomiting.  Denies unusual bleeding or bruising otherwise.  Has not been on antibiotics.  On  presentation she was afebrile and vitally stable.  She is not tachycardic or hypotensive.  She was not hypoxic.  She had no scleral icterus.  Oral mucosa is moist.  Neck was supple.  Cardiac and respiratory auscultations were normal.  No CVA tenderness.  Abdomen is obese with active bowel sounds.  Intact surgical incisions.  Moderately tender in the midepigastrium without guarding or rebound.  Mildly tender in the left lower quadrant.  Rectal exam revealed no masses or tenderness to the examining digit.  She had dark stool that was grossly guaiac positive.  Blood work was obtained and did show a drop in her hemoglobin from 14.8 in December 2017 to 11.5 today.  No unusual bruising on skin exam.  Patient's INR is normal.  Her platelets are 245.  Urinalysis was unremarkable.  She did have some midthoracic tenderness on palpation without crepitus or step-off.  Muscular spasm associated with this.  No skin rash that would suggest shingles.  X-ray did not show any acute compression fracture with just some degenerative changes.  Patient was given IV fluids and IV Pepcid.  I spoke with Dr. Addison from the hospitalist service and he will assume care on admission.  Dr. Galindo from surgical services will be contacted to discuss endoscopy.  I have reviewed the nursing notes.    I have reviewed the findings, diagnosis, plan and need for follow up with the patient.       Current Discharge Medication List          Final diagnoses:   Upper GI bleed   Anemia, unspecified type     This document serves as a record of the services and decisions personally performed and made by Deepak Lemus MD. It was created on HIS/HER behalf by   Petra Pozo, a trained medical scribe. The creation of this document is based the provider's statements to the medical scribe.  Petra Pozo 1:02 PM 3/17/2018    Provider:   The information in this document, created by the medical scribe for me, accurately reflects the services I personally performed and the  decisions made by me. I have reviewed and approved this document for accuracy prior to leaving the patient care area.  Deepak Lemus MD 1:02 PM 3/17/2018    3/17/2018   [facility] EMERGENCY DEPARTMENT     Deepak Lemus MD  03/17/18 1748

## 2018-03-17 NOTE — IP AVS SNAPSHOT
` ` Patient Information     Patient Name Sex     Jacquie Harris ([id number]) Female 1955       Room Bed    2213 2213-01      Patient Demographics     Address Phone    [address] [place] [address] [phone] (Home)  none (Work)  [phone] (Mobile)      Patient Ethnicity & Race     Ethnic Group Patient Race    American White      Emergency Contact(s)     Name Relation Home Work Mobile    Hima Harris Spouse [phone] none [phone]    EMMA HARRIS Son none none [phone]      Documents on File        Status Date Received Description       Documents for the Patient    Insurance Card  () 04     Face Sheet  () 04     Privacy Notice - [place] Received 11/19/10     Face Sheet  () 06     Other  06 incident report/wc doi 5.31.2006    Other  06  INSURANCE INFORMATION    Face Sheet  () 07     Insurance Card  () 07     Insurance Card  () 07     Insurance Card  () 07     Consent Form  03/10/08     Insurance Card  () 08     Face Sheet Received () 09     Consent Form  08     Face Sheet Received () 09     Waiver - Payment  () 09     Insurance Card  () 09     Face Sheet Received () 09     External Medication Information Consent Accepted () 09     Face Sheet Received () 04/09/10     Consent for Services - Hospital/Clinic Received () 12/08/10     External Medication Information Consent Accepted () 11     Insurance Card Received () 11 blue advantage    Consent for Services - Hospital/Clinic Received () 11     Consent for Services - Hospital/Clinic Received () 12     Insurance Card Received 08/15/16 ma    Insurance Card Received () 10/18/11     HIM BARBARA Authorization - File Only   Triston Greenfield  Sched    CMS IM for Patient Signature Received  17     External Medication Information Consent Accepted () 12     HIM BARBARA Authorization  () 12 dds    HIM BARBARA Authorization  () 12 dds    Other Patient Refused 12 pt bill of rights    Consent for Services - Hospital/Clinic Received () 12     HIM BARBARA Authorization  () 12 dds    Waiver - Payment Accepted () 10/24/12     Insurance Card Received () 13     External Medication Information Consent Accepted 13     Consent for EHR Access  13 Copied from existing Consent for services - C/HOD collected on 2012    [facility] Specified Other       Consent for Services - Hospital/Clinic Received () 13     Waiver - Payment Accepted () 10/10/13     Physical Therapy Certification Received 10/15/13 Medicare cert 10/15-11/15/13 elec signed by MD    Consent for Services - Hospital/Clinic Received () 14     Consent for Services - Hospital/Clinic Received () 05/26/15     HIM BARBARA Authorization  07/09/15     Consent for Services/Privacy Notice - Hospital/Clinic Received () 16     Insurance Card Received 16 medicare    Physical Therapy Certification Received 16 Electronic cert sent to Amy Sen for dates 16 thru16; signed cert sent to [facility] for scanningo MD at [phone] for signature    Patient ID Scan Refused 16     Physical Therapy Certification Received 10/05/16 Cert (10/5/16-16) Elect signed by Nolberto    Physical Therapy Certification Received 17-3/10/17 elec signed by MD    Consent for Services/Privacy Notice - Hospital/Clinic Received () 17     Consent for Services - [facility]       Physical Therapy Certification Received 17-17 elec signed by MD    Physical Therapy Certification Received 09/15/17 paper cert faxed to [facility] Everywhere Prospective Auth Received 17     Insurance Card Received  01/08/18 [facility] 2018    Consent for Services/Privacy Notice - Hospital/Clinic Received 03/13/18     Privacy Notice - [place]  (Deleted)      Patient ID Received (Deleted) 03/05/11     Consent for Services/Privacy Notice - Hospital/Clinic  (Deleted)         Documents for the Encounter    CMS IM for Patient Signature Received 03/17/18       Admission Information     Attending Provider Admitting Provider Admission Type Admission Date/Time     Poncho Galindo MD Emergency 03/17/18  1249    Discharge Date Hospital Service Auth/Cert Status Service Area    03/18/18 Hospitalist Incomplete [facility]    Unit Room/Bed Admission Status       [facility] 2213/2213-01 Discharged (Confirmed)       Admission     Complaint    Upper GI bleed, .      Hospital Account     Name Acct ID Class Status Primary Coverage    Jacquie Harris [id number] Observation Open UCARE - [employer] FOR S[employer]ORS            Guarantor Account (for Hospital Account #[id number])     Name Relation to Pt Service Area Active? Acct Type    Jacquie Harris  FCS Yes Personal/Family    Address Phone          [address] [place] [address] [phone](H)              Coverage Information (for Hospital Account #[id number])     F/O Payor/Plan Precert #    UCARE/UCARE FOR SENIORS     Subscriber Subscriber #    Jacquie Harris [id number]    Address Phone    [address]  [place] [address] [phone]

## 2018-03-17 NOTE — IP AVS SNAPSHOT
"    [facility] SURGICAL: [phone]                                              INTERAGENCY TRANSFER FORM - PHYSICIAN ORDERS   3/17/2018                    Hospital Admission Date: 3/17/2018  DESTINY KILPATRICK   : 1955  Sex: Female        Attending Provider: (none)    Allergies:  Vioxx    Infection:  None   Service:  HOSPITALIST    Ht:  1.6 m (5' 3\")   Wt:  109.2 kg (240 lb 11.9 oz)   Admission Wt:  94.3 kg (208 lb)    BMI:  42.65 kg/m 2   BSA:  2.2 m 2            Patient PCP Information     Provider PCP Type    Jennie Suazo MD General      ED Clinical Impression     Diagnosis Description Comment Added By Time Added    Upper GI bleed [K92.2] Upper GI bleed [K92.2]  Deepak Lemus MD 3/17/2018  2:45 PM    Anemia, unspecified type [D64.9] Anemia, unspecified type [D64.9]  Deepak Lemus MD 3/17/2018  5:43 PM      Hospital Problems as of 3/18/2018              Priority Class Noted POA    s/p gastric bypass x 2 Low  2005 Yes    Obstructive sleep apnea Medium  2006 Yes    Obesity, Class III, BMI 40-49.9 (morbid obesity) (H) Medium  2012 Yes    HTN, goal below 140/80 Medium  2013 Yes    * (Principal)GI bleed Medium  3/17/2018 Yes    Upper GI bleed Medium  3/17/2018 Yes      Non-Hospital Problems as of 3/18/2018              Priority Class Noted    Allergic rhinitis Low  3/27/2006    Carpal tunnel syndrome Medium  2006    Rosacea Medium  2009    CARDIOVASCULAR SCREENING; LDL GOAL LESS THAN 160 Low  10/31/2010    Tick bite, infected, positive Lyme test 1996 not treated Medium  2011    Vitamin D deficiency Medium  2011    Osteoporosis Medium  2012    S/P TKR (total knee replacement) Medium  4/3/2012    Back pain, left below scapula, strained muscles Medium  10/24/2013    Encounter for routine gynecological examination  Medium  2015    Colles' fracture Medium  2015    Bilateral cold feet Medium  1/3/2017    Elevated levels of transaminase & lactic " acid dehydrogenase Medium  4/15/2017    Fatty liver Medium  4/20/2017    Shoulder injury, right, subsequent encounter Medium  8/21/2017      Code Status History     Date Active Date Inactive Code Status Order ID Comments User Context    4/16/2013  1:43 PM  Full Code [id number]  Mariel Sabillon MD Outpatient    4/2/2012  6:28 PM 4/5/2012  3:10 PM Full Code [id number]  Hebert Aranda RN Inpatient         Medication Review      START taking        Dose / Directions Comments    acetaminophen 325 MG tablet   Commonly known as:  TYLENOL   Used for:  Shoulder injury, right, subsequent encounter        Dose:  650 mg   Take 2 tablets (650 mg) by mouth every 4 hours as needed for mild pain or fever   Quantity:  100 tablet   Refills:  0        pantoprazole 40 MG EC tablet   Commonly known as:  PROTONIX   Used for:  Upper GI bleed        Dose:  40 mg   Take 1 tablet (40 mg) by mouth 2 times daily Take 30-60 minutes before a meal.   Quantity:  60 tablet   Refills:  0        ranitidine 150 MG tablet   Commonly known as:  ZANTAC   Used for:  Upper GI bleed        Dose:  150 mg   Take 1 tablet (150 mg) by mouth 2 times daily   Quantity:  60 tablet   Refills:  0          CONTINUE these medications which may have CHANGED, or have new prescriptions. If we are uncertain of the size of tablets/capsules you have at home, strength may be listed as something that might have changed.        Dose / Directions Comments    clobetasol 0.05 % ointment   Commonly known as:  TEMOVATE   This may have changed:    - when to take this  - reasons to take this  - additional instructions   Used for:  Vaginal itching        Apply sparingly to affected area twice daily for 14 days.  Do not apply to face.   Quantity:  15 g   Refills:  0          CONTINUE these medications which have NOT CHANGED        Dose / Directions Comments    atenolol 50 MG tablet   Commonly known as:  TENORMIN   Used for:  Benign essential hypertension        Dose:  50 mg   Take 1  tablet (50 mg) by mouth daily   Quantity:  90 tablet   Refills:  3    Refill at patient's request       CERAVE Crea   Used for:  Sunburn, Itching        Externally apply topically 2 times daily as needed   Quantity:  1 Bottle   Refills:  3        hydrochlorothiazide 12.5 MG Tabs tablet   Used for:  Benign essential hypertension        Dose:  12.5 mg   Take 1 tablet (12.5 mg) by mouth daily   Quantity:  90 tablet   Refills:  3    Refill at patient's request       * nystatin cream   Commonly known as:  MYCOSTATIN   Used for:  Candidiasis of skin        Apply topically 2 times daily Reported on 3/6/2017   Quantity:  30 g   Refills:  11        * nystatin [id number] UNIT/GM Powd   Commonly known as:  MYCOSTATIN   Used for:  Candidiasis of skin        Apply topically 3 times daily as needed Refill at patient's request   Quantity:  30 g   Refills:  1        * Notice:  This list has 2 medication(s) that are the same as other medications prescribed for you. Read the directions carefully, and ask your doctor or other care provider to review them with you.            Your next 10 appointments already scheduled     Mar 20, 2018  9:00 AM CDT   Ortho Treatment with Dasia Wilson PT   [facility] ([facility])    [address] [id number] [phone]            Mar 23, 2018  9:00 AM CDT   Office Visit with Jennie Suazo MD   [facility]    [address] [id number] [phone]           Bring a current list of meds and any records pertaining to this visit. For Physicals, please bring immunization records and any forms needing to be filled out. Please arrive 10 minutes early to complete paperwork.            Mar 27, 2018  9:00 AM CDT   Ortho Treatment with Dasia Wilson PT   [facility] ([facility])    [address]  [address]  [place] [address]    [phone]            Apr 03, 2018  9:00 AM CDT   Ortho Treatment with Dasia Wilson PT   [facility] ([facility])    [address]  [address]  [facility] [place] [address]   [phone]            Apr 27, 2018   Procedure with Hernando Thompson MD   [facility] (--)    [address] [address]   [phone]           The medical center is located at [address] (between [address] in [place], [place] north of [place]).              Statement of Approval     Ordered          03/18/18 1256  I have reviewed and agree with all the recommendations and orders detailed in this document.  EFFECTIVE NOW     Approved and electronically signed by:  Stanley Robertson MD

## 2018-03-17 NOTE — IP AVS SNAPSHOT
"` `           [facility] SURGICAL: [phone]                                              INTERAGENCY TRANSFER FORM - NURSING   3/17/2018                    Hospital Admission Date: 3/17/2018  DESTINY KILPATRICK   : 1955  Sex: Female        Attending Provider: (none)    Allergies:  Vioxx    Infection:  None   Service:  HOSPITALIST    Ht:  1.6 m (5' 3\")   Wt:  109.2 kg (240 lb 11.9 oz)   Admission Wt:  94.3 kg (208 lb)    BMI:  42.65 kg/m 2   BSA:  2.2 m 2            Patient PCP Information     Provider PCP Type    Jennie Suazo MD General      Current Code Status     Date Active Code Status Order ID Comments User Context       Prior      Code Status History     Date Active Date Inactive Code Status Order ID Comments User Context    2013  1:43 PM  Full Code [id number]  Mariel Sabillon MD Outpatient    2012  6:28 PM 2012  3:10 PM Full Code [id number]  Hebert Aranda RN Inpatient      Advance Directives        Scanned docmt in ACP Activity?           No scanned doc        Hospital Problems as of 3/18/2018              Priority Class Noted POA    s/p gastric bypass x 2 Low  2005 Yes    Obstructive sleep apnea Medium  2006 Yes    Obesity, Class III, BMI 40-49.9 (morbid obesity) (H) Medium  2012 Yes    HTN, goal below 140/80 Medium  2013 Yes    * (Principal)GI bleed Medium  3/17/2018 Yes    Upper GI bleed Medium  3/17/2018 Yes      Non-Hospital Problems as of 3/18/2018              Priority Class Noted    Allergic rhinitis Low  3/27/2006    Carpal tunnel syndrome Medium  2006    Rosacea Medium  2009    CARDIOVASCULAR SCREENING; LDL GOAL LESS THAN 160 Low  10/31/2010    Tick bite, infected, positive Lyme test 1996 not treated Medium  2011    Vitamin D deficiency Medium  2011    Osteoporosis Medium  2012    S/P TKR (total knee replacement) Medium  4/3/2012    Back pain, left below scapula, strained muscles Medium  10/24/2013    Encounter " "for routine gynecological examination  Medium  6/4/2015    Colles' fracture Medium  7/6/2015    Bilateral cold feet Medium  1/3/2017    Elevated levels of transaminase & lactic acid dehydrogenase Medium  4/15/2017    Fatty liver Medium  4/20/2017    Shoulder injury, right, subsequent encounter Medium  8/21/2017      Immunizations     Name Date      Influenza (IIV3) PF 10/24/13     Influenza (IIV3) PF 10/26/12     Influenza (IIV3) PF 11/19/10     Influenza (IIV3) PF 11/11/09     Influenza (IIV3) PF 10/23/08     Influenza (IIV3) PF 11/27/07     Influenza (IIV3) PF 10/31/05     Influenza Vaccine IM 3yrs+ 4 Valent IIV4 12/04/17     Influenza Vaccine IM 3yrs+ 4 Valent IIV4 10/20/16     Influenza Vaccine IM 3yrs+ 4 Valent IIV4 10/23/14     TD (ADULT, 7+) 06/13/05     TDAP Vaccine (Adacel) 05/26/15          END      ASSESSMENT     Discharge Profile Flowsheet     GASTROINTESTINAL (ADULT,PEDIATRIC,OB)     Skin Color/Characteristics  pale 03/18/18 0403    GI WDL  WDL 03/18/18 1041   Skin Temperature  warm 03/18/18 0403    Last Bowel Movement  03/18/18 03/18/18 0845   Skin Moisture  dry 03/18/18 0403    COMMUNICATION ASSESSMENT     Skin Integrity  rash(es) 03/18/18 0403    Patient's communication style  spoken language (English or Bilingual) 03/17/18 1726   SAFETY      SKIN     Safety WDL  WDL 03/18/18 1041    Inspection of bony prominences  Unable to assess (only assessed heels, please see note ) 03/18/18 0845   All Alarms  none present 03/18/18 1041    Skin WDL  ex;color;characteristics 03/18/18 0403                      Assessment WDL (Within Defined Limits) Definitions           Safety WDL     Effective: 09/28/15    Row Information: <b>WDL Definition:</b> Bed in low position, wheels locked; call light in reach; upper side rails up x 2; ID band on<br> <font color=\"gray\"><i>Item=AS safety wdl>>List=AS safety wdl>>Version=F14</i></font>      Skin WDL     Effective: 09/28/15    Row Information: <b>WDL Definition:</b> Warm; " "dry; intact; elastic; without discoloration; pressure points without redness<br> <font color=\"gray\"><i>Item=AS skin wdl>>List=AS skin wdl>>Version=F14</i></font>      Vitals     Vital Signs Flowsheet     VITAL SIGNS     Pain Control  -- (declined) 03/17/18 1716    Temp  98.4  F (36.9  C) 03/18/18 1120   Functioning  can do most things, but pain gets in the way of some 03/17/18 1716    Temp src  Oral 03/18/18 1120   Sleep  normal sleep 03/17/18 1716    Resp  16 03/18/18 1120   HEIGHT AND WEIGHT      Pulse  68 03/18/18 1120   Height  1.6 m (5' 3\") 03/17/18 1716    Heart Rate  70 03/17/18 2340   Height Method  Stated 03/17/18 1249    Pulse/Heart Rate Source  Monitor 03/18/18 1120   Weight  109.2 kg (240 lb 11.9 oz) 03/17/18 1716    BP  114/60 03/18/18 1102   BSA (Calculated - sq m)  2.2 03/17/18 1716    BP Location  Left arm 03/18/18 1120   BMI (Calculated)  42.73 03/17/18 1716    OXYGEN THERAPY     DAILY CARE      SpO2  99 % 03/18/18 1120   Activity Management  ambulated in room;ambulated to bathroom 03/18/18 0846    O2 Device  None (Room air) 03/18/18 1120   Activity Assistance Provided  independent 03/18/18 0846    PAIN/COMFORT     POSITIONING      Patient Currently in Pain  denies 03/18/18 1102   Body Position  independently positioning 03/18/18 1041    Preferred Pain Scale  CAPA (Clinically Aligned Pain Assessment) ([facility] Adults Only) 03/17/18 1716   Head of Bed (HOB)  HOB at 30-45 degrees 03/18/18 1041    0-10 Pain Scale  7 03/18/18 0413   ANALGESIA SIDE EFFECTS MONITORING      CLINICALLY ALIGNED PAIN ASSESSMENT (CAPA) ([facility] ADULTS ONLY)     Side Effects Monitoring: Respiratory Quality  R 03/18/18 1102    Comfort  negligible pain 03/18/18 1102   Side Effects Monitoring: Respiratory Depth  N 03/18/18 1102    Change in Pain  about the same 03/17/18 1291   Side Effects Monitoring: Sedation Level  1 03/18/18 1102            Patient Lines/Drains/Airways Status    Active " LINES/DRAINS/AIRWAYS     None            Patient Lines/Drains/Airways Status    Active PICC/CVC     None            Intake/Output Detail Report     Date Intake     Output Net    Shift P.O. I.V. IV Piggyback Total Urine Total       Noc 03/16/18 2300 - 03/17/18 0659 -- -- -- -- -- -- 0    Day 03/17/18 0700 - 03/17/18 1459 -- -- -- -- -- -- 0    Marta 03/17/18 1500 - 03/17/18 2259 860 560.42 -- 1420.42 -- -- 1420.42    Noc 03/17/18 2300 - 03/18/18 0659 -- 472.92 -- 472.92 1150 1150 -677.08    Day 03/18/18 0700 - 03/18/18 1459 100 100 -- 200 -- -- 200      Last Void/BM       Most Recent Value    Urine Occurrence 1 at 03/17/2018 2100    Stool Occurrence       Case Management/Discharge Planning     Case Management/Discharge Planning Flowsheet     LIVING ENVIRONMENT     QUESTION TO PATIENT: Do you feel safe going back to the place where you are living?  yes 03/17/18 1726    Lives With  spouse 12/13/17 1424   OBSERVATION: Is there reason to believe there has been maltreatment of a vulnerable adult (ie. Physical/Sexual/Emotional abuse, self neglect, lack of adequate food, shelter, medical care, or financial exploitation)?  no 03/17/18 1726    Living Arrangements  [place] 04/03/12 1745   OTHER      COPING/STRESS     Are you depressed or being treated for depression?  No 03/17/18 1726    Major Change/Loss/Stressor  death of a loved one 04/16/17 1626   HOMICIDE RISK      ABUSE RISK SCREEN     Feels Like Hurting Others  no 03/17/18 1726    QUESTION TO PATIENT:  Has a member of your family or a partner(now or in the past) intimidated, hurt, manipulated, or controlled you in any way?  no 03/17/18 1726

## 2018-03-17 NOTE — ED NOTES
"Patient has  [place] to Observation  order. Patient has been given the Observation brochure -  What does Observation mean to me.\"  Patient has been given the opportunity to ask questions about observation status and their plan of care.      Jacki Yip  "

## 2018-03-17 NOTE — IP AVS SNAPSHOT
"     Jacquie Harris #[id number] (CSN: [id number])  (62 year old F)  (Adm: 18)     [id number]               [facility] SURGICAL: [phone]            Patient Demographics     Patient Name Sex          Age SSN Address Phone    Jacquie Harris Female 1955 (62 year old) [id number] [facility] [address] [phone] (Home)  none (Work)  [phone] (Mobile)      Emergency Contact(s)     Name Relation Home Work Mobile    Hima Harris Spouse [phone] none [phone]    EMMA HARRIS Son none none [phone]      Admission Information     Attending Provider Admitting Provider Admission Type Admission Date/Time     Poncho Galindo MD Emergency 18  1249    Discharge Date Hospital Service Auth/Cert Status Service Area    18 Hospitalist Incomplete [facility]    Unit Room/Bed Admission Status       [facility] SURGICAL  Discharged (Confirmed)       Admission     Complaint    Upper GI bleed, .      Hospital Account     Name Acct ID Class Status Primary Coverage    Jacquie Harris [id number] Observation Open UCARE - [employer]            Guarantor Account (for Hospital Account #[id number])     Name Relation to Pt Service Area Active? Acct Type    Jacquie Harris  FCS Yes Personal/Family    Address Phone          [address] [place] [address] [phone](H)              Coverage Information (for Hospital Account #[id number])     F/O Payor/Plan Precert #    UCARE/UCARE FOR SENIORS     Subscriber Subscriber #    Jacquie Harris [id number]    Address Phone    [address]  [place] [address] [phone]                                                INTERAGENCY TRANSFER FORM - PHYSICIAN ORDERS   3/17/2018                       [facility] SURGICAL: [phone]            Attending Provider: (none)    Allergies:  Vioxx    Infection:  None   Service:  HOSPITALIST    Ht:  1.6 m (5' 3\")   Wt:  109.2 kg (240 lb 11.9 " oz)   Admission Wt:  94.3 kg (208 lb)    BMI:  42.65 kg/m 2   BSA:  2.2 m 2            ED Clinical Impression     Diagnosis Description Comment Added By Time Added    Upper GI bleed [K92.2] Upper GI bleed [K92.2]  Deepak Lemus MD 3/17/2018  2:45 PM    Anemia, unspecified type [D64.9] Anemia, unspecified type [D64.9]  Deepak Lemus MD 3/17/2018  5:43 PM      Hospital Problems as of 3/18/2018              Priority Class Noted POA    s/p gastric bypass x 2 Low  7/5/2005 Yes    Obstructive sleep apnea Medium  12/1/2006 Yes    Obesity, Class III, BMI 40-49.9 (morbid obesity) (H) Medium  2/19/2012 Yes    HTN, goal below 140/80 Medium  5/5/2013 Yes    * (Principal)GI bleed Medium  3/17/2018 Yes    Upper GI bleed Medium  3/17/2018 Yes      Non-Hospital Problems as of 3/18/2018              Priority Class Noted    Allergic rhinitis Low  3/27/2006    Carpal tunnel syndrome Medium  6/7/2006    Rosacea Medium  11/4/2009    CARDIOVASCULAR SCREENING; LDL GOAL LESS THAN 160 Low  10/31/2010    Tick bite, infected, positive Lyme test 1996 not treated Medium  7/8/2011    Vitamin D deficiency Medium  7/18/2011    Osteoporosis Medium  2/19/2012    S/P TKR (total knee replacement) Medium  4/3/2012    Back pain, left below scapula, strained muscles Medium  10/24/2013    Encounter for routine gynecological examination  Medium  6/4/2015    Colles' fracture Medium  7/6/2015    Bilateral cold feet Medium  1/3/2017    Elevated levels of transaminase & lactic acid dehydrogenase Medium  4/15/2017    Fatty liver Medium  4/20/2017    Shoulder injury, right, subsequent encounter Medium  8/21/2017      Code Status History     Date Active Date Inactive Code Status Order ID Comments User Context    4/16/2013  1:43 PM  Full Code [id number]  Mariel Sablilon MD Outpatient    4/2/2012  6:28 PM 4/5/2012  3:10 PM Full Code [id number]  Hebert Aranda RN Inpatient      Current Code Status     Date Active Code Status Order ID Comments User Context        Prior         Medication Review      START taking        Dose / Directions Comments    acetaminophen 325 MG tablet   Commonly known as:  TYLENOL   Used for:  Shoulder injury, right, subsequent encounter        Dose:  650 mg   Take 2 tablets (650 mg) by mouth every 4 hours as needed for mild pain or fever   Quantity:  100 tablet   Refills:  0        pantoprazole 40 MG EC tablet   Commonly known as:  PROTONIX   Used for:  Upper GI bleed        Dose:  40 mg   Take 1 tablet (40 mg) by mouth 2 times daily Take 30-60 minutes before a meal.   Quantity:  60 tablet   Refills:  0        ranitidine 150 MG tablet   Commonly known as:  ZANTAC   Used for:  Upper GI bleed        Dose:  150 mg   Take 1 tablet (150 mg) by mouth 2 times daily   Quantity:  60 tablet   Refills:  0          CONTINUE these medications which may have CHANGED, or have new prescriptions. If we are uncertain of the size of tablets/capsules you have at home, strength may be listed as something that might have changed.        Dose / Directions Comments    clobetasol 0.05 % ointment   Commonly known as:  TEMOVATE   This may have changed:    - when to take this  - reasons to take this  - additional instructions   Used for:  Vaginal itching        Apply sparingly to affected area twice daily for 14 days.  Do not apply to face.   Quantity:  15 g   Refills:  0          CONTINUE these medications which have NOT CHANGED        Dose / Directions Comments    atenolol 50 MG tablet   Commonly known as:  TENORMIN   Used for:  Benign essential hypertension        Dose:  50 mg   Take 1 tablet (50 mg) by mouth daily   Quantity:  90 tablet   Refills:  3    Refill at patient's request       CERAVE Crea   Used for:  Sunburn, Itching        Externally apply topically 2 times daily as needed   Quantity:  1 Bottle   Refills:  3        hydrochlorothiazide 12.5 MG Tabs tablet   Used for:  Benign essential hypertension        Dose:  12.5 mg   Take 1 tablet (12.5 mg) by mouth daily    Quantity:  90 tablet   Refills:  3    Refill at patient's request       * nystatin cream   Commonly known as:  MYCOSTATIN   Used for:  Candidiasis of skin        Apply topically 2 times daily Reported on 3/6/2017   Quantity:  30 g   Refills:  11        * nystatin [id number] UNIT/GM Powd   Commonly known as:  MYCOSTATIN   Used for:  Candidiasis of skin        Apply topically 3 times daily as needed Refill at patient's request   Quantity:  30 g   Refills:  1        * Notice:  This list has 2 medication(s) that are the same as other medications prescribed for you. Read the directions carefully, and ask your doctor or other care provider to review them with you.            Your next 10 appointments already scheduled     Mar 20, 2018  9:00 AM CDT   Ortho Treatment with Dasia Wilson PT   [facility] ([facility])    [address] [address]   [phone]            Mar 23, 2018  9:00 AM CDT   Office Visit with Jennie Suazo MD   [facility]    [address] [facility] [address]   [phone]           Bring a current list of meds and any records pertaining to this visit. For Physicals, please bring immunization records and any forms needing to be filled out. Please arrive 10 minutes early to complete paperwork.            Mar 27, 2018  9:00 AM CDT   Ortho Treatment with Dasia Wilson PT   [facility] ([facility])    [address] [address]   [phone]            Apr 03, 2018  9:00 AM CDT   Ortho Treatment with Dasia Wilson PT   [facility] ([facility])    [address] [address]   [phone]            Apr 27, 2018   Procedure with Hernando Thompson MD   [facility] (--)    [address] [facility] [address] [phone]  "          The medical center is located at [address] [facility] (between [address] in [place], [place] miles north of [place]).              Statement of Approval     Ordered          03/18/18 1256  I have reviewed and agree with all the recommendations and orders detailed in this document.  EFFECTIVE NOW     Approved and electronically signed by:  Stanley Robertson MD                                                 INTERAGENCY TRANSFER FORM - NURSING   3/17/2018                       [facility] SURGICAL: [phone]            Attending Provider: (none)    Allergies:  Vioxx    Infection:  None   Service:  HOSPITALIST    Ht:  1.6 m (5' 3\")   Wt:  109.2 kg (240 lb 11.9 oz)   Admission Wt:  94.3 kg (208 lb)    BMI:  42.65 kg/m 2   BSA:  2.2 m 2            Advance Directives        Scanned docmt in ACP Activity?           No scanned doc        Immunizations     Name Date      Influenza (IIV3) PF 10/24/13     Influenza (IIV3) PF 10/26/12     Influenza (IIV3) PF 11/19/10     Influenza (IIV3) PF 11/11/09     Influenza (IIV3) PF 10/23/08     Influenza (IIV3) PF 11/27/07     Influenza (IIV3) PF 10/31/05     Influenza Vaccine IM 3yrs+ 4 Valent IIV4 12/04/17     Influenza Vaccine IM 3yrs+ 4 Valent IIV4 10/20/16     Influenza Vaccine IM 3yrs+ 4 Valent IIV4 10/23/14     TD (ADULT, 7+) 06/13/05     TDAP Vaccine (Adacel) 05/26/15       ASSESSMENT     Discharge Profile Flowsheet     GASTROINTESTINAL (ADULT,PEDIATRIC,OB)     Skin Color/Characteristics  pale 03/18/18 0403    GI WDL  WDL 03/18/18 1041   Skin Temperature  warm 03/18/18 0403    Last Bowel Movement  03/18/18 03/18/18 0845   Skin Moisture  dry 03/18/18 0403    COMMUNICATION ASSESSMENT     Skin Integrity  rash(es) 03/18/18 0403    Patient's communication style  spoken language (English or Bilingual) 03/17/18 1726   SAFETY      SKIN     Safety WDL  WDL 03/18/18 1041    Inspection of bony prominences  Unable to assess (only assessed heels, please see " "note ) 03/18/18 0845   All Alarms  none present 03/18/18 1041    Skin WDL  ex;color;characteristics 03/18/18 0403                      Assessment WDL (Within Defined Limits) Definitions           Safety WDL     Effective: 09/28/15    Row Information: <b>WDL Definition:</b> Bed in low position, wheels locked; call light in reach; upper side rails up x 2; ID band on<br> <font color=\"gray\"><i>Item=AS safety wdl>>List=AS safety wdl>>Version=F14</i></font>      Skin WDL     Effective: 09/28/15    Row Information: <b>WDL Definition:</b> Warm; dry; intact; elastic; without discoloration; pressure points without redness<br> <font color=\"gray\"><i>Item=AS skin wdl>>List=AS skin wdl>>Version=F14</i></font>      Vitals     Vital Signs Flowsheet     VITAL SIGNS     Pain Control  -- (declined) 03/17/18 1716    Temp  98.4  F (36.9  C) 03/18/18 1120   Functioning  can do most things, but pain gets in the way of some 03/17/18 1716    Temp src  Oral 03/18/18 1120   Sleep  normal sleep 03/17/18 1716    Resp  16 03/18/18 1120   HEIGHT AND WEIGHT      Pulse  68 03/18/18 1120   Height  1.6 m (5' 3\") 03/17/18 1716    Heart Rate  70 03/17/18 2340   Height Method  Stated 03/17/18 1249    Pulse/Heart Rate Source  Monitor 03/18/18 1120   Weight  109.2 kg (240 lb 11.9 oz) 03/17/18 1716    BP  114/60 03/18/18 1102   BSA (Calculated - sq m)  2.2 03/17/18 1716    BP Location  Left arm 03/18/18 1120   BMI (Calculated)  42.73 03/17/18 1716    OXYGEN THERAPY     DAILY CARE      SpO2  99 % 03/18/18 1120   Activity Management  ambulated in room;ambulated to bathroom 03/18/18 0846    O2 Device  None (Room air) 03/18/18 1120   Activity Assistance Provided  independent 03/18/18 0846    PAIN/COMFORT     POSITIONING      Patient Currently in Pain  denies 03/18/18 1102   Body Position  independently positioning 03/18/18 1041    Preferred Pain Scale  CAPA (Clinically Aligned Pain Assessment) ([facility], [facility] Adults Only) 03/17/18 1716   Head of Bed " (HOB)  HOB at 30-45 degrees 03/18/18 1041    0-10 Pain Scale  7 03/18/18 0413   ANALGESIA SIDE EFFECTS MONITORING      CLINICALLY ALIGNED PAIN ASSESSMENT (CAPA) ([facility], [facility] AND [facility] ADULTS ONLY)     Side Effects Monitoring: Respiratory Quality  R 03/18/18 1102    Comfort  negligible pain 03/18/18 1102   Side Effects Monitoring: Respiratory Depth  N 03/18/18 1102    Change in Pain  about the same 03/17/18 1716   Side Effects Monitoring: Sedation Level  1 03/18/18 1102            Patient Lines/Drains/Airways Status    Active LINES/DRAINS/AIRWAYS     None            Patient Lines/Drains/Airways Status    Active PICC/CVC     None            Intake/Output Detail Report     Date Intake     Output Net    Shift P.O. I.V. IV Piggyback Total Urine Total       Noc 03/16/18 2300 - 03/17/18 0659 -- -- -- -- -- -- 0    Day 03/17/18 0700 - 03/17/18 1459 -- -- -- -- -- -- 0    Marta 03/17/18 1500 - 03/17/18 2259 860 560.42 -- 1420.42 -- -- 1420.42    Noc 03/17/18 2300 - 03/18/18 0659 -- 472.92 -- 472.92 1150 1150 -677.08    Day 03/18/18 0700 - 03/18/18 1459 100 100 -- 200 -- -- 200      Last Void/BM       Most Recent Value    Urine Occurrence 1 at 03/17/2018 2100    Stool Occurrence       Case Management/Discharge Planning     Case Management/Discharge Planning Flowsheet     LIVING ENVIRONMENT     QUESTION TO PATIENT: Do you feel safe going back to the place where you are living?  yes 03/17/18 1726    Lives With  spouse 12/13/17 1424   OBSERVATION: Is there reason to believe there has been maltreatment of a vulnerable adult (ie. Physical/Sexual/Emotional abuse, self neglect, lack of adequate food, shelter, medical care, or financial exploitation)?  no 03/17/18 1726    Living Arrangements  [place] 04/03/12 1745   OTHER      COPING/STRESS     Are you depressed or being treated for depression?  No 03/17/18 1726    Major Change/Loss/Stressor  death of a loved one 04/16/17 1626   HOMICIDE RISK      ABUSE RISK SCREEN     Feels Like  Hurting Others  no 03/17/18 1726    QUESTION TO PATIENT:  Has a member of your family or a partner(now or in the past) intimidated, hurt, manipulated, or controlled you in any way?  no 03/17/18 1726                       [facility] SURGICAL: [phone]            Medication Administration Report for Jacquie Harris as of 03/18/18 1408   Legend:    Given Hold Not Given Due Canceled Entry Other Actions    Time Time (Time) Time  Time-Action       Inactive    Active    Linked        Medications 03/12/18 03/13/18 03/14/18 03/15/18 03/16/18 03/17/18 03/18/18    acetaminophen (TYLENOL) tablet 650 mg  Dose: 650 mg  Freq: EVERY 4 HOURS PRN Route: PO  PRN Reasons: mild pain,fever  Start: 03/17/18 2147   Admin Instructions: Maximum acetaminophen dose from all sources = 75 mg/kg/day not to exceed 4 grams/day.          2151 (650 mg)-Given        0412 (650 mg)-Given       (0825)-Not Given [C]           atenolol (TENORMIN) tablet 50 mg  Dose: 50 mg  Freq: DAILY Route: PO  Start: 03/18/18 0800          0746 (50 mg)-Given           CERAVE CREA  Freq: HOLD Route: EX  PRN Comment: dry skin, OBS patient hold  Start: 03/17/18 1741              clobetasol (TEMOVATE) 0.05 % ointment  Freq: DAILY PRN Route: Top  PRN Comment: irritation  Start: 03/17/18 1711   Admin Instructions: Apply to affected area               dextrose 5% and 0.45% NaCl + KCl 20 mEq/L infusion  Rate: 125 mL/hr   Freq: CONTINUOUS Route: IV  Start: 03/17/18 1715         1727 ( )-New Bag        0143 ( )-New Bag           famotidine (PEPCID) infusion 20 mg  Dose: 20 mg  Freq: EVERY 12 HOURS Route: IV  Last Dose: 20 mg (03/18/18 0143)  Start: 03/18/18 0145          0143 (20 mg)-New Bag                  hydrochlorothiazide tablet 12.5 mg  Dose: 12.5 mg  Freq: DAILY Route: PO  Start: 03/18/18 0800          0747 (12.5 mg)-Given           naloxone (NARCAN) injection 0.1-0.4 mg  Dose: 0.1-0.4 mg  Freq: EVERY 2 MIN PRN Route: IV  PRN Reason: opioid reversal  Start:  03/17/18 1711   Admin Instructions: For respiratory rate LESS than or EQUAL to 8.  Partial reversal dose:  0.1 mg titrated q 2 minutes for Analgesia Side Effects Monitoring Sedation Level of 3 (frequently drowsy, arousable, drifts to sleep during conversation).Full reversal dose:  0.4 mg bolus for Analgesia Side Effects Monitoring Sedation Level of 4 (somnolent, minimal or no response to stimulation).  For ordered doses up to 2mg give IVP. Give each 0.4mg over 15 seconds in emergency situations. For non-emergent situations further dilute in 9mL of NS to facilitate titration of response.               nystatin (MYCOSTATIN) cream  Freq: HOLD Route: Top  PRN Comment: obs pt. hold  Start: 03/17/18 1744   Admin Instructions: Apply to affected area               ondansetron (ZOFRAN-ODT) ODT tab 4 mg  Dose: 4 mg  Freq: EVERY 6 HOURS PRN Route: PO  PRN Reasons: nausea,vomiting  Start: 03/17/18 1711   Admin Instructions: This is Step 1 of nausea and vomiting management.  If nausea not resolved in 15 minutes, go to Step 2 prochlorperazine (COMPAZINE). Do not push through foil backing. Peel back foil and gently remove. Place on tongue immediately. Administration with liquid unnecessary  With dry hands, peel back foil backing and gently remove tablet; do not push oral disintegrating tablet through foil backing; administer immediately on tongue and oral disintegrating tablet dissolves in seconds; then swallow with saliva; liquid not required.                     Or  ondansetron (ZOFRAN) injection 4 mg  Dose: 4 mg  Freq: EVERY 6 HOURS PRN Route: IV  PRN Reasons: nausea,vomiting  Start: 03/17/18 1711   Admin Instructions: This is Step 1 of nausea and vomiting management.  If nausea not resolved in 15 minutes, go to Step 2 prochlorperazine (COMPAZINE).  Irritant. For ordered doses up to 4 mg, give IV Push undiluted over 2-5 minutes.           1045 (4 mg)-Given           pantoprazole (PROTONIX) 40 mg IV push injection  Dose: 40  "mg  Freq: 2 TIMES DAILY Route: IV  Start: 03/17/18 2000   Admin Instructions: Irritant. For ordered doses up to 40 mg, give IV Push over 2 minutes when reconstituted with 10mL NS.          2005 (40 mg)-Given        0747 (40 mg)-Given       [ ] 2000          Discontinued Medications  Medications 03/12/18 03/13/18 03/14/18 03/15/18 03/16/18 03/17/18 03/18/18         Freq: PRN  Start: 03/18/18 1021   End: 03/18/18 1109          1021 (1 spray)-Given       1109-Med Discontinued         Dose: 20 mg  Freq: EVERY 12 HOURS Route: IV  Last Dose: Stopped (03/17/18 1425)  Start: 03/17/18 1345   End: 03/17/18 1711         1355 (20 mg)-New Bag       1425-Stopped       1711-Med Discontinued          Freq: EVERY 1 HOUR PRN Route: Top  PRN Reason: pain  PRN Comment: with VAD insertion or accessing implanted port.  Start: 03/18/18 0828   End: 03/18/18 1109   Admin Instructions: Do NOT give if patient has a history of allergy to any local anesthetic or any \"rogelio\" product.   Apply 30 minutes prior to VAD insertion or port access.  MAX Dose:  2.5 g (  of 5 g tube)           1109-Med Discontinued         Dose: 1 mL  Freq: EVERY 1 HOUR PRN Route: OTHER  PRN Comment: mild pain with VAD insertion or accessing implanted port  Start: 03/18/18 0828   End: 03/18/18 1109   Admin Instructions: Do NOT give if patient has a history of allergy to any local anesthetic or any \"rogelio\" product. MAX dose 1 mL subcutaneous OR intradermal in divided doses.           1109-Med Discontinued         Freq: CONTINUOUS PRN Route: XX  Start: 03/18/18 0828   End: 03/18/18 1109          1109-Med Discontinued         Freq: CONTINUOUS PRN Route: XX  Start: 03/18/18 0828   End: 03/18/18 1109   Admin Instructions: May take regular AM medications except those listed below           1109-Med Discontinued         Freq: 2 TIMES DAILY Route: Top  Start: 03/17/18 2000   End: 03/17/18 1745   Admin Instructions: Apply to affected area          1745-Med Discontinued        "   Freq: PRN  Start: 03/18/18 1021   End: 03/18/18 1109          1021 (40 mg)-Given       1109-Med Discontinued         Dose: 3 mL  Freq: EVERY 8 HOURS Route: IK  Start: 03/18/18 0828   End: 03/18/18 1109   Admin Instructions: And Q1H PRN, to lock peripheral IV dormant line.           (0828)-Not Given       1109-Med Discontinued         Dose: 3 mL  Freq: EVERY 1 HOUR PRN Route: IK  PRN Reason: line flush  PRN Comment: for peripheral IV flush post IV meds  Start: 03/18/18 0828   End: 03/18/18 1109          1109-Med Discontinued    Medications 03/12/18 03/13/18 03/14/18 03/15/18 03/16/18 03/17/18 03/18/18               INTERAGENCY TRANSFER FORM - NOTES (H&P, Discharge Summary, Consults, Procedures, Therapies)   3/17/2018                       [facility] SURGICAL: [phone]               History & Physicals      Interval H&P Note by Jazzmine Sebastian APRN CRNA at 3/18/2018  9:41 AM     Author:  Jazzmine Sebastian APRN CRNA Service:  (none) Author Type:  Nurse Anesthetist    Filed:  3/18/2018  9:41 AM Date of Service:  3/18/2018  9:41 AM Creation Time:  3/18/2018  9:41 AM    Status:  Signed :  Jazzmine Sebastian APRN CRNA (Nurse Anesthetist)         This H&P has been reviewed and there are no clinically significant changes in the patient s condition.  The Patient is approved for surgery.[TF1.1]         Revision History        User Key Date/Time User Provider Type Action    > TF1.1 3/18/2018  9:41 AM Jazzmine Sebastian APRN CRNA Nurse Anesthetist Sign          Source Note     Author:  Poncho Galindo MD Service:  Surgery Author Type:  Physician    Filed:  3/18/2018  8:24 AM Date of Service:  3/18/2018  8:24 AM Creation Time:  3/18/2018  8:23 AM    Status:  Signed :  Poncho Galindo MD (Physician)            62 year old year old female here for upper endoscopy for upper GI bleeding        Patient Active Problem List   Diagnosis     s/p gastric bypass x 2     Allergic  rhinitis     Carpal tunnel syndrome     Obstructive sleep apnea     Rosacea     CARDIOVASCULAR SCREENING; LDL GOAL LESS THAN 160     Tick bite, infected, positive Lyme test 1996 not treated     Vitamin D deficiency     Osteoporosis     Obesity, Class III, BMI 40-49.9 (morbid obesity) (H)     S/P TKR (total knee replacement)     HTN, goal below 140/80     Back pain, left below scapula, strained muscles     Encounter for routine gynecological examination      Colles' fracture     Bilateral cold feet     Elevated levels of transaminase & lactic acid dehydrogenase     Fatty liver     Shoulder injury, right, subsequent encounter     GI bleed     Upper GI bleed       Past Medical History:   Diagnosis Date     HTN (hypertension)      IRON DEFICIENCY ANEMIA 7/5/2005     Iron infusion/ resolved since injections     DARWIN (obstructive sleep apnea)        Past Surgical History:   Procedure Laterality Date     ARTHROPLASTY KNEE  4/2/2012    Procedure:ARTHROPLASTY KNEE; Left Total Knee Arthroplasty--Anesth.Choice; Surgeon:HERNANDO THOMPSON; Location:[facility]     ARTHROTOMY SHOULDER, ROTATOR CUFF REPAIR, COMBINED Right 8/25/2017    Procedure: COMBINED ARTHROTOMY SHOULDER, ROTATOR CUFF REPAIR;  Right shoulder distal clavicle excision, subacromial decompression, rotator cuff repair ;  Surgeon: Hernando Thompson MD;  Location: [facility] OR     ARTHROTOMY SHOULDER, ROTATOR CUFF REPAIR, COMBINED Right 12/18/2017    Procedure: COMBINED ARTHROTOMY SHOULDER, ROTATOR CUFF REPAIR;  Right Shoulder Rotator Cuff Revision;  Surgeon: Hernando Thompson MD;  Location: [facility] OR     C ORAL SURGERY PROCEDURE  age 18    wisdom teeth     C/SECTION, LOW TRANSVERSE  1978, 1984, 1994    x 3     COLONOSCOPY  2001    normal colonoscopy     COLONOSCOPY N/A 8/22/2017    Procedure: COLONOSCOPY;  Colonoscopy  ;  Surgeon: Delfino Yoo MD;  Location: [facility] GI     GASTRIC BYPASS  1980/2005    Gastric Bypass and revision     HERNIA REPAIR, INCISIONAL  6/16/2008     lap.repair with 10x8 mesh     TUBAL LIGATION  1994       Family History   Problem Relation Age of Onset     C.A.D. Father      MI at age 60     Hypertension Father      Alzheimer Disease Father      Hyperlipidemia Father      OSTEOPOROSIS Mother      on Fosamax     Glaucoma Mother      Other Cancer Brother      Stomach Cancer Maternal Aunt      Cervical Cancer Cousin      DIABETES No family hx of      CEREBROVASCULAR DISEASE No family hx of      Breast Cancer No family hx of      Cancer - colorectal No family hx of      Prostate Cancer No family hx of      Liver Disease No family hx of        No current outpatient prescriptions on file.       Allergies   Allergen Reactions     Vioxx Itching and Swelling     VIOXX (Swollen Feet,Hands itching), Okay with ibuprofen and aspirin       Pt reports that she has never smoked. She has never used smokeless tobacco. She reports that she drinks alcohol. She reports that she does not use illicit drugs.    Exam:    Awake, Alert OX3  Lungs - CTA bilaterally  CV - RRR, no murmurs, distal pulses intact  Abd - soft, non-distended, non-tender, +BS  Extr - No cyanosis or edema    A/P 62 year old year old female in need of upper endoscopy for upper GI bledding. Risks, benefits, alternatives, and complications were discussed including the possibility of perforation and the patient agreed to proceed.    Poncho Galindo MD[JC1.1]       Revision History        User Key Date/Time User Provider Type Action    > JC1.1 3/18/2018  8:24 AM Poncho Galindo MD Physician Sign                  H&P by Poncho Galindo MD at 3/18/2018  8:24 AM     Author:  Poncho Galindo MD Service:  Surgery Author Type:  Physician    Filed:  3/18/2018  8:24 AM Date of Service:  3/18/2018  8:24 AM Creation Time:  3/18/2018  8:23 AM    Status:  Signed :  Poncho Galindo MD (Physician)         62 year old year old female here for upper endoscopy for upper GI bleeding        Patient Active Problem List   Diagnosis     s/p  gastric bypass x 2     Allergic rhinitis     Carpal tunnel syndrome     Obstructive sleep apnea     Rosacea     CARDIOVASCULAR SCREENING; LDL GOAL LESS THAN 160     Tick bite, infected, positive Lyme test 1996 not treated     Vitamin D deficiency     Osteoporosis     Obesity, Class III, BMI 40-49.9 (morbid obesity) (H)     S/P TKR (total knee replacement)     HTN, goal below 140/80     Back pain, left below scapula, strained muscles     Encounter for routine gynecological examination      Colles' fracture     Bilateral cold feet     Elevated levels of transaminase & lactic acid dehydrogenase     Fatty liver     Shoulder injury, right, subsequent encounter     GI bleed     Upper GI bleed       Past Medical History:   Diagnosis Date     HTN (hypertension)      IRON DEFICIENCY ANEMIA 7/5/2005     Iron infusion/ resolved since injections     DARWIN (obstructive sleep apnea)        Past Surgical History:   Procedure Laterality Date     ARTHROPLASTY KNEE  4/2/2012    Procedure:ARTHROPLASTY KNEE; Left Total Knee Arthroplasty--Anesth.Choice; Surgeon:HERNANDO THOMPSON; Location:[facility]     ARTHROTOMY SHOULDER, ROTATOR CUFF REPAIR, COMBINED Right 8/25/2017    Procedure: COMBINED ARTHROTOMY SHOULDER, ROTATOR CUFF REPAIR;  Right shoulder distal clavicle excision, subacromial decompression, rotator cuff repair ;  Surgeon: Hernando Thompson MD;  Location: [facility]     ARTHROTOMY SHOULDER, ROTATOR CUFF REPAIR, COMBINED Right 12/18/2017    Procedure: COMBINED ARTHROTOMY SHOULDER, ROTATOR CUFF REPAIR;  Right Shoulder Rotator Cuff Revision;  Surgeon: Hernando Thompson MD;  Location: [facility]     C ORAL SURGERY PROCEDURE  age 18    wisdom teeth     C/SECTION, LOW TRANSVERSE  1978, 1984, 1994    x 3     COLONOSCOPY  2001    normal colonoscopy     COLONOSCOPY N/A 8/22/2017    Procedure: COLONOSCOPY;  Colonoscopy  ;  Surgeon: Delfino Yoo MD;  Location: [facility]     GASTRIC BYPASS  1980/2005    Gastric Bypass and revision     HERNIA  REPAIR, INCISIONAL  6/16/2008    lap.repair with 10x8 mesh     TUBAL LIGATION  1994       Family History   Problem Relation Age of Onset     C.A.D. Father      MI at age 60     Hypertension Father      Alzheimer Disease Father      Hyperlipidemia Father      OSTEOPOROSIS Mother      on Fosamax     Glaucoma Mother      Other Cancer Brother      Stomach Cancer Maternal Aunt      Cervical Cancer Cousin      DIABETES No family hx of      CEREBROVASCULAR DISEASE No family hx of      Breast Cancer No family hx of      Cancer - colorectal No family hx of      Prostate Cancer No family hx of      Liver Disease No family hx of        No current outpatient prescriptions on file.       Allergies   Allergen Reactions     Vioxx Itching and Swelling     VIOXX (Swollen Feet,Hands itching), Okay with ibuprofen and aspirin       Pt reports that she has never smoked. She has never used smokeless tobacco. She reports that she drinks alcohol. She reports that she does not use illicit drugs.    Exam:    Awake, Alert OX3  Lungs - CTA bilaterally  CV - RRR, no murmurs, distal pulses intact  Abd - soft, non-distended, non-tender, +BS  Extr - No cyanosis or edema    A/P 62 year old year old female in need of upper endoscopy for upper GI bledding. Risks, benefits, alternatives, and complications were discussed including the possibility of perforation and the patient agreed to proceed.    Poncho Galindo MD[JC1.1]      Revision History        User Key Date/Time User Provider Type Action    > JC1.1 3/18/2018  8:24 AM Poncho Galidno MD Physician Sign            H&P by Stanley Robertson MD at 3/17/2018  3:53 PM     Author:  Stanley Robertson MD Service:  Hospitalist Author Type:  Physician    Filed:  3/17/2018  4:36 PM Date of Service:  3/17/2018  3:53 PM Creation Time:  3/17/2018  3:45 PM    Status:  Signed :  Stanley Robertson MD (Physician)         [facility] History and Physical    Jacquie Harris MRN# [id number]   Age: 62 year old Date  of Birth: 1955     Date of Admission:  3/17/2018      Primary care provider: Jennie Suazo          Assessment and Plan:[KK1.1]       GI bleed[KK1.2],[KK1.3] s/p gastric bypass x 2[KK1.2]  3/17/2018 -- black stools x 4 days, hemoglobin 11.5 from apparent baseline 13-14.[KK1.3]  Appears upper.[KK1.4]  Had a normal colonoscopy in August of 2017 by report.[KK1.1] G[KK1.4]uiac positive in ER per report.  Starting on IVF @ 125/h,[KK1.3] clears and NPO after midnight for EGD in AM.  Started on pepcid and protonix IV.  Follow every 6 hours hemoglobins.[KK1.4]      Thoracic back pain  3/17/2018 -- x-ray negative for fracture.  Tender in muscles - suspect muscular pain although could be due to possible ulcer as above.[KK1.3]        HTN, goal below 140/80[KK1.2]  3/17/2018 -- blood pressure stable, continue home atenolol and hydrochlorothiazide[KK1.3]       Prophylaxis[KK1.1]  Low risk, ambulate - consider mechanical if unable to discharge tomorrow.[KK1.4]      Lines[KK1.1]  PIV[KK1.4]    Disposition[KK1.1]  Admit to observation - if hemoglobin dropping and/or needing inpatient cares past tomorrow would likely transition to inpatient at that point.[KK1.4]                Chief Complaint:[KK1.1]   Black stools  History is obtained from the patient[KK1.3]          History of Present Illness:   This patient is a 62 year old[KK1.1] [KK1.3] female[KK1.1] with a significant past medical history of shoulder pain managed by Dr. Thompson with planned upcoming surgery as well as hypertension[KK1.3] who presents with[KK1.1] black stools.  She has been taking Aleve, 1-2 per day for the past couple of weeks.  Then starting about 4 days ago noticed that she had black appearing stools.  Has continued to have black stools past 4 days, with new onset of some vague poorly localized abdominal pain and some lower mid back pain starting about the same time.  No aggravating or alleviating symptoms.  Today she felt  light-headed with standing and so presented to the ER.      No chest pain, dyspnea fever or chills.  No urinary symptoms      No alcohol, tobacco or drug use.[KK1.3]               Past Medical History:   I have reviewed this patient's past medical history.  Patient Active Problem List    Diagnosis Date Noted     Shoulder injury, right, subsequent encounter 08/21/2017     Priority: Medium     Fatty liver 04/20/2017     Priority: Medium     Elevated levels of transaminase & lactic acid dehydrogenase 04/15/2017     Priority: Medium     Bilateral cold feet 01/03/2017     Priority: Medium     Colles' fracture 07/06/2015     Priority: Medium     Encounter for routine gynecological examination  06/04/2015     Priority: Medium     Back pain, left below scapula, strained muscles 10/24/2013     Priority: Medium     HTN, goal below 140/80 05/05/2013     Priority: Medium     S/P TKR (total knee replacement) 04/03/2012     Priority: Medium     Osteoporosis 02/19/2012     Priority: Medium     Previous T scores -2.2  2/2012 Dexascan:  Lumbar spine L1-L4 T-score: -2.4, Left femoral neck T-score: -2.7, Right femoral neck T-score: -2.2   Percent change in lumbar spine: +6.6% since 4/16/2008   Percent change in femurs: +2.9% since 4/16/2008    IMPRESSION: Osteoporosis in the left femoral neck. Severe osteopenia  in the right femoral neck and lumbar spine.       Obesity, Class III, BMI 40-49.9 (morbid obesity) (H) 02/19/2012     Priority: Medium     ideal weight 120, goal weight 200, lose 58 lbs in 58 weeks.       Vitamin D deficiency 07/18/2011     Priority: Medium     Tick bite, infected, positive Lyme test 1996 not treated 07/08/2011     Priority: Medium     Rosacea 11/04/2009     Priority: Medium     Obstructive sleep apnea 12/01/2006     Priority: Medium     Sleep study 12/06 for EDS- AHI 36, desaturations to 65%. CPAP recommended but not tolerated.  01/16/07  ENT consult recommend CPAP and weight loss. Surgery discussed but  success rate less than CPAP  1/15/07 CPAP trial, was not able to tolerate. Retried 2008, tolerated better.   Problem list name updated by automated process. Provider to review       Carpal tunnel syndrome 06/07/2006     Priority: Medium     04/10/09 Dr.Meletiou [facility]. Finding consistant with CTS, but nerve studies and MRI of C-Spine are normal. Corticosteroid injection given in office       CARDIOVASCULAR SCREENING; LDL GOAL LESS THAN 160 10/31/2010     Priority: Low     Allergic rhinitis 03/27/2006     Priority: Low     Problem list name updated by automated process. Provider to review       s/p gastric bypass x 2 07/05/2005     Priority: Low     Gastric bypass 1980                Past Surgical History:   I have reviewed this patient's past surgical history   Past Surgical History:   Procedure Laterality Date     ARTHROPLASTY KNEE  4/2/2012    Procedure:ARTHROPLASTY KNEE; Left Total Knee Arthroplasty--Anesth.Choice; Surgeon:HERNANDO THOMPSON; Location:[facility]     ARTHROTOMY SHOULDER, ROTATOR CUFF REPAIR, COMBINED Right 8/25/2017    Procedure: COMBINED ARTHROTOMY SHOULDER, ROTATOR CUFF REPAIR;  Right shoulder distal clavicle excision, subacromial decompression, rotator cuff repair ;  Surgeon: Hernando Thompson MD;  Location: [facility]     ARTHROTOMY SHOULDER, ROTATOR CUFF REPAIR, COMBINED Right 12/18/2017    Procedure: COMBINED ARTHROTOMY SHOULDER, ROTATOR CUFF REPAIR;  Right Shoulder Rotator Cuff Revision;  Surgeon: Hernando Thompson MD;  Location: [facility]     C ORAL SURGERY PROCEDURE  age 18    wisdom teeth     C/SECTION, LOW TRANSVERSE  1978, 1984, 1994    x 3     COLONOSCOPY  2001    normal colonoscopy     COLONOSCOPY N/A 8/22/2017    Procedure: COLONOSCOPY;  Colonoscopy  ;  Surgeon: Delfino Yoo MD;  Location: [facility]     GASTRIC BYPASS  1980/2005    Gastric Bypass and revision     HERNIA REPAIR, INCISIONAL  6/16/2008    lap.repair with 10x8 mesh     TUBAL LIGATION  1994             Social  History:   I have reviewed this patient's social history   Social History   Substance Use Topics     Smoking status: Never Smoker     Smokeless tobacco: Never Used     Alcohol use 0.0 oz/week     0 Standard drinks or equivalent per week      Comment: mixed very light 1 a month if that             Family History:   I have reviewed this patient's family history  Family History   Problem Relation Age of Onset     C.A.D. Father      MI at age 60     Hypertension Father      Alzheimer Disease Father      Hyperlipidemia Father      OSTEOPOROSIS Mother      on Fosamax     Glaucoma Mother      Other Cancer Brother      Stomach Cancer Maternal Aunt      Cervical Cancer Cousin      DIABETES No family hx of      CEREBROVASCULAR DISEASE No family hx of      Breast Cancer No family hx of      Cancer - colorectal No family hx of      Prostate Cancer No family hx of      Liver Disease No family hx of              Immunizations:   Immunizations are current          Allergies:     Allergies   Allergen Reactions     Vioxx Itching and Swelling     VIOXX (Swollen Feet,Hands itching), Okay with ibuprofen and aspirin             Medications:[KK1.1]       No current facility-administered medications on file prior to encounter.   Current Outpatient Prescriptions on File Prior to Encounter:  triamcinolone acetonide (KENALOG) 40 MG/ML injection Given in clinic for shoulder pain   lidocaine 1 % injection Patient was given in clinic   atenolol (TENORMIN) 50 MG tablet Take 1 tablet (50 mg) by mouth daily   hydrochlorothiazide 12.5 MG TABS tablet Take 1 tablet (12.5 mg) by mouth daily   nystatin (MYCOSTATIN) 370054 UNIT/GM POWD Apply topically 3 times daily as needed Refill at patient's request   nystatin (MYCOSTATIN) cream Apply topically 2 times daily Reported on 3/6/2017   clobetasol (TEMOVATE) 0.05 % ointment Apply sparingly to affected area twice daily for 14 days.  Do not apply to face. (Patient taking differently: daily as needed Apply  sparingly to affected area twice daily for 14 days.  Do not apply to face.)   Emollient (CERAVE) CREA Externally apply topically 2 times daily as needed[KK1.5]              Review of Systems:[KK1.1]    ROS: 10 point ROS neg other than the symptoms noted above in the HPI.[KK1.3]             Physical Exam:   Blood pressure 117/76, temperature 96.8  F (36  C), temperature source Temporal, resp. rate 18, weight 94.3 kg (208 lb), last menstrual period 2006, SpO2 98 %, not currently breastfeeding.  Temperatures:  Current - Temp: 96.8  F (36  C); Max - Temp  Av.8  F (36  C)  Min: 96.8  F (36  C)  Max: 96.8  F (36  C)  Respiration range: Resp  Av  Min: 18  Max: 18  Pulse range: No Data Recorded  Blood pressure range: Systolic (24hrs), Av , Min:117 , Max:132   ; Diastolic (24hrs), Av, Min:76, Max:88    Pulse oximetry range: SpO2  Av %  Min: 98 %  Max: 98 %  No intake or output data in the 24 hours ending 18 1545[KK1.1]  EXAM:  General: awake and alert, NAD, oriented x 3  Head: normocephalic  Neck: unremarkable, no lymphadenopathy   HEENT: oropharynx pink and moist    Heart: Regular rate and rhythm, no murmurs, rubs, or gallops  Lungs: clear to auscultation bilaterally with good air movement throughout  Abdomen: soft, not really any tenderness, normal bowel sounds, no masses or organomegaly  Back: some tenderness in right mid back  Extremities: no edema in lower extremities   Skin unremarkable.[KK1.3]             Data:     Results for orders placed or performed during the hospital encounter of 18 (from the past 24 hour(s))   UA reflex to Microscopic   Result Value Ref Range    Color Urine Yellow     Appearance Urine Slightly Cloudy     Glucose Urine Negative NEG^Negative mg/dL    Bilirubin Urine Negative NEG^Negative    Ketones Urine Negative NEG^Negative mg/dL    Specific Gravity Urine 1.023 1.003 - 1.035    Blood Urine Negative NEG^Negative    pH Urine 5.0 5.0 - 7.0 pH    Protein  Albumin Urine Negative NEG^Negative mg/dL    Urobilinogen mg/dL 0.0 0.0 - 2.0 mg/dL    Nitrite Urine Negative NEG^Negative    Leukocyte Esterase Urine Negative NEG^Negative    Source Midstream Urine     RBC Urine 2 0 - 2 /HPF    WBC Urine 3 0 - 5 /HPF    Squamous Epithelial /HPF Urine 2 (H) 0 - 1 /HPF    Mucous Urine Present (A) NEG^Negative /LPF    Hyaline Casts 9 (H) 0 - 2 /LPF   CBC with platelets differential   Result Value Ref Range    WBC 7.5 4.0 - 11.0 10e9/L    RBC Count 3.83 3.8 - 5.2 10e12/L    Hemoglobin 11.5 (L) 11.7 - 15.7 g/dL    Hematocrit 36.0 35.0 - 47.0 %    MCV 94 78 - 100 fl    MCH 30.0 26.5 - 33.0 pg    MCHC 31.9 31.5 - 36.5 g/dL    RDW 13.9 10.0 - 15.0 %    Platelet Count 245 150 - 450 10e9/L    Diff Method Automated Method     % Neutrophils 59.3 %    % Lymphocytes 34.0 %    % Monocytes 5.1 %    % Eosinophils 0.9 %    % Basophils 0.3 %    % Immature Granulocytes 0.4 %    Absolute Neutrophil 4.4 1.6 - 8.3 10e9/L    Absolute Lymphocytes 2.5 0.8 - 5.3 10e9/L    Absolute Monocytes 0.4 0.0 - 1.3 10e9/L    Absolute Eosinophils 0.1 0.0 - 0.7 10e9/L    Absolute Basophils 0.0 0.0 - 0.2 10e9/L    Abs Immature Granulocytes 0.0 0 - 0.4 10e9/L   INR   Result Value Ref Range    INR 1.00 0.86 - 1.14   Comprehensive metabolic panel   Result Value Ref Range    Sodium 137 133 - 144 mmol/L    Potassium 3.8 3.4 - 5.3 mmol/L    Chloride 106 94 - 109 mmol/L    Carbon Dioxide 25 20 - 32 mmol/L    Anion Gap 6 3 - 14 mmol/L    Glucose 102 (H) 70 - 99 mg/dL    Urea Nitrogen 32 (H) 7 - 30 mg/dL    Creatinine 0.71 0.52 - 1.04 mg/dL    GFR Estimate 83 >60 mL/min/1.7m2    GFR Estimate If Black >90 >60 mL/min/1.7m2    Calcium 8.7 8.5 - 10.1 mg/dL    Bilirubin Total 0.2 0.2 - 1.3 mg/dL    Albumin 3.2 (L) 3.4 - 5.0 g/dL    Protein Total 6.9 6.8 - 8.8 g/dL    Alkaline Phosphatase 80 40 - 150 U/L    ALT 17 0 - 50 U/L    AST 16 0 - 45 U/L   XR Thoracic Spine 3 Views    Narrative    THORACIC SPINE THREE VIEWS  3/17/2018 1:58 PM      COMPARISON: Thoracic spine x-ray series 10/10/2013    HISTORY: Mid thoracic tenderness.      Impression    IMPRESSION: There is continued normal alignment of the thoracic  vertebrae. Vertebral body heights of the thoracic spine remain normal.  There is no evidence for fracture of the thoracic spine. Degenerative  endplate spurring at the T2-T3, T3-T4, T4-T5, T5-T6, T6-T7 and T7-T8  levels again noted.    ALLISON LEDBETTER MD[KK1.1]     All cardiac studies reviewed by me.  All imaging studies reviewed by me.[KK1.3]    Attestation:  I have reviewed today's vital signs, notes, medications, labs and imaging.  Amount of time performed on this history and physical:[KK1.1] 50[KK1.4] minutes.        Stanley Robertson MD[KK1.1]       Revision History        User Key Date/Time User Provider Type Action    > KK1.4 3/17/2018  4:36 PM Stanley Robertson MD Physician Sign     KK1.2 3/17/2018  4:25 PM Stanley Robertson MD Physician      KK1.5 3/17/2018  4:21 PM Stanley Robertson MD Physician      KK1.3 3/17/2018  4:14 PM Stanley Robertson MD Physician      KK1.1 3/17/2018  3:45 PM Stanley Robertson MD Physician                      Discharge Summaries      Discharge Summaries by Stanley Robertson MD at 3/18/2018 12:56 PM     Author:  Stanley Robertson MD Service:  Hospitalist Author Type:  Physician    Filed:  3/18/2018 12:56 PM Date of Service:  3/18/2018 12:56 PM Creation Time:  3/18/2018 12:47 PM    Status:  Signed :  Stanley Robertson MD (Physician)         [facility] Discharge Summary    Jacquie Harris MRN# [id number]   Age: 62 year old YOB: 1955     Date of Admission:  3/17/2018  Date of Discharge::  3/18/2018  Admitting Physician:  Pocnho Galindo MD  Discharge Physician:  Stanley Robertson MD, MD             Admission Diagnoses:[KK1.1]   Upper GI bleed [K92.2][KK1.2]          Principle Discharge Diagnosis:     Acute GI bleed - suspect gastritis/gastric ulcer now resolved on endoscopy, s/p gastric bypass     See  hospital course for further active diagnoses addressed during this admission.            Procedures:   See EMR for EGD results from today          Medications Prior to Admission:[KK1.1]     Prescriptions Prior to Admission   Medication Sig Dispense Refill Last Dose     atenolol (TENORMIN) 50 MG tablet Take 1 tablet (50 mg) by mouth daily 90 tablet 3 Taking     hydrochlorothiazide 12.5 MG TABS tablet Take 1 tablet (12.5 mg) by mouth daily 90 tablet 3      nystatin (MYCOSTATIN) 023326 UNIT/GM POWD Apply topically 3 times daily as needed Refill at patient's request 30 g 1 Taking     nystatin (MYCOSTATIN) cream Apply topically 2 times daily Reported on 3/6/2017 30 g 11 Taking     clobetasol (TEMOVATE) 0.05 % ointment Apply sparingly to affected area twice daily for 14 days.  Do not apply to face. (Patient taking differently: daily as needed Apply sparingly to affected area twice daily for 14 days.  Do not apply to face.) 15 g 0 12/17/2017 at Unknown time     Emollient (CERAVE) CREA Externally apply topically 2 times daily as needed 1 Bottle 3 Taking[KK1.2]             Discharge Medications:[KK1.1]     Current Discharge Medication List      START taking these medications    Details   acetaminophen (TYLENOL) 325 MG tablet Take 2 tablets (650 mg) by mouth every 4 hours as needed for mild pain or fever  Qty: 100 tablet    Associated Diagnoses: Shoulder injury, right, subsequent encounter      ranitidine (ZANTAC) 150 MG tablet Take 1 tablet (150 mg) by mouth 2 times daily  Qty: 60 tablet, Refills: 0    Associated Diagnoses: Upper GI bleed      pantoprazole (PROTONIX) 40 MG EC tablet Take 1 tablet (40 mg) by mouth 2 times daily Take 30-60 minutes before a meal.  Qty: 60 tablet, Refills: 0    Associated Diagnoses: Upper GI bleed         CONTINUE these medications which have NOT CHANGED    Details   atenolol (TENORMIN) 50 MG tablet Take 1 tablet (50 mg) by mouth daily  Qty: 90 tablet, Refills: 3    Comments: Refill at patient's  "request  Associated Diagnoses: Benign essential hypertension      hydrochlorothiazide 12.5 MG TABS tablet Take 1 tablet (12.5 mg) by mouth daily  Qty: 90 tablet, Refills: 3    Comments: Refill at patient's request  Associated Diagnoses: Benign essential hypertension      nystatin (MYCOSTATIN) 771597 UNIT/GM POWD Apply topically 3 times daily as needed Refill at patient's request  Qty: 30 g, Refills: 1    Associated Diagnoses: Candidiasis of skin      nystatin (MYCOSTATIN) cream Apply topically 2 times daily Reported on 3/6/2017  Qty: 30 g, Refills: 11    Associated Diagnoses: Candidiasis of skin      clobetasol (TEMOVATE) 0.05 % ointment Apply sparingly to affected area twice daily for 14 days.  Do not apply to face.  Qty: 15 g, Refills: 0    Associated Diagnoses: Vaginal itching      Emollient (CERAVE) CREA Externally apply topically 2 times daily as needed  Qty: 1 Bottle, Refills: 3    Associated Diagnoses: Sunburn; Itching[KK1.3]                   Consultations:   Consultation during this admission received from surgery          Brief History of Illness:     From Admission H+P:   This patient is a 62 year old  female with a significant past medical history of shoulder pain managed by Dr. Thompson with planned upcoming surgery as well as hypertension who presents with black stools.  She has been taking Aleve, 1-2 per day for the past couple of weeks.  Then starting about 4 days ago noticed that she had black appearing stools.  Has continued to have black stools past 4 days, with new onset of some vague poorly localized abdominal pain and some lower mid back pain starting about the same time.  No aggravating or alleviating symptoms.  Today she felt light-headed with standing and so presented to the ER.       No chest pain, dyspnea fever or chills.  No urinary symptoms       No alcohol, tobacco or drug use.             TODAY:     Subjective:  Doing well.  No abdominal pain at present- occasinoally has \"very " "mild cramps across her lower abdomen\" still today, but these are minimally present and only occasional.  No change with normal diet for lunch today.  No nausea or vomiting. Pain through to back has resolved.  No other concerns.  No other pain.       ROS:   ROS: 10 point ROS neg other than the symptoms noted above in the HPI.     /60  Pulse 68  Temp 98.4  F (36.9  C) (Oral)  Resp 16  Ht 1.6 m (5' 3\")  Wt 109.2 kg (240 lb 11.9 oz)  LMP 05/29/2006  SpO2 99%  BMI 42.65 kg/m2   EXAM:  General: awake and alert, NAD, oriented x 3  Head: normocephalic  Neck: unremarkable, no lymphadenopathy   HEENT: oropharynx pink and moist    Heart: Regular rate and rhythm, no murmurs, rubs, or gallops  Lungs: clear to auscultation bilaterally with good air movement throughout  Abdomen: soft, non-tender, no masses or organomegaly, normal bowel sounds - unremarkable exam  Back: tenderness resolved.    Extremities: no edema in lower extremities   Skin unremarkable.            Hospital Course:      Acute GI bleed - suspect gastritis/gastric ulcer now resolved on endoscopy, likely due to NSAID use, s/p gastric bypass   3/17/2018 -- black stools x 4 days, hemoglobin 11.5 from apparent baseline 13-14.  Appears upper.  Had a normal colonoscopy in August of 2017 by report. Guiac positive in ER per report.   Started on pepcid and protonix IV.    3/18/2018 -- hemoglobin remained fairly stable, stools tapering off, pain resolved, EGD unremarkable on visualized areas (unable to fully visualize stomach due to history of bypass).  Ok for discharge on oral ranitidine and protonix with plan to follow-up with primary care provider in 1 week - recheck hemoglobin at that time and likely stop ranitidine and/or decrease protonix to daily at that time if doing well.        Thoracic back pain - uncertain muscular vs due to ulcer/gastritis  3/17/2018 -- x-ray negative for fracture.  Tender in muscles - suspect muscular pain although could be due to " "possible ulcer as above.    3/18/2018 -- resolved with above treatment.         HTN, goal below 140/80  3/17/2018 -- blood pressure stable,   3/18/2018 -- continue home atenolol and hydrochlorothiazide               Discharge Instructions and Follow-Up:   Discharge diet: Advance to a regular diet as tolerated   Discharge activity: Activity as tolerated   Discharge follow-up: Follow up with primary care provider in 7 days, recheck hemoglobin and reassess medications at that time.              Discharge Disposition:   Discharged to home      Attestation:  I have reviewed today's vital signs, notes, medications, labs and imaging.  Amount of time performed on this discharge summary: 50 minutes.    Stanley Robertson MD, MD[KK1.1]          Revision History        User Key Date/Time User Provider Type Action    > KK1.3 3/18/2018 12:56 PM Stanley Robertson MD Physician Sign     KK1.2 3/18/2018 12:52 PM Stanley Robertson MD Physician      KK1.1 3/18/2018 12:47 PM Stanley Robertson MD Physician                   Consult Notes     No notes of this type exist for this encounter.         Progress Notes - Physician (Notes for yesterday and today)      ED Provider Notes by Deepak Lemus MD at 3/17/2018 12:36 PM     Author:  Deepak Lemus MD Service:  Emergency Medicine Author Type:  Physician    Filed:  3/17/2018  5:44 PM Date of Service:  3/17/2018 12:36 PM Creation Time:  3/17/2018  1:02 PM    Status:  Signed :  Deepak Lemus MD (Physician)           History[ML1.1]     Chief Complaint   Patient presents with     Melena     black tarry stools, overheating and dizziness, upper back pain, left lower abdominal pain.  \" I think I have a liver blockage\"[DK1.1]     RICHA Harris is a 62 year old female who presents to the ED for the evaluation of dark, tarry stools x3-4 days. She complains of constant upper, right back pain. Patient admits to lightheadedness when standing up. Admits to urinary frequency. Past abdominal " surgeries include gastric bypass and . Patient reports her gallbladder may have been taken out during gastric bypass surgery. She had a normal colonoscopy in 2017. Patient was informed of a liver blockage in 2017. Denies history of ulcers, pancreatitis, reflux, kidney stones, bleeding in intestinal track, appendectomy, fall or injury, ill contacts. Denies cough, SOB.     Problem List:[ML1.1]    Patient Active Problem List    Diagnosis Date Noted     GI bleed 2018     Priority: Medium     Upper GI bleed 2018     Priority: Medium     Shoulder injury, right, subsequent encounter 2017     Priority: Medium     Fatty liver 2017     Priority: Medium     Elevated levels of transaminase & lactic acid dehydrogenase 04/15/2017     Priority: Medium     Bilateral cold feet 2017     Priority: Medium     Colles' fracture 2015     Priority: Medium     Encounter for routine gynecological examination  2015     Priority: Medium     Back pain, left below scapula, strained muscles 10/24/2013     Priority: Medium     HTN, goal below 140/80 2013     Priority: Medium     S/P TKR (total knee replacement) 2012     Priority: Medium     Osteoporosis 2012     Priority: Medium     Previous T scores -2.2  2012 Dexascan:  Lumbar spine L1-L4 T-score: -2.4, Left femoral neck T-score: -2.7, Right femoral neck T-score: -2.2   Percent change in lumbar spine: +6.6% since 2008   Percent change in femurs: +2.9% since 2008    IMPRESSION: Osteoporosis in the left femoral neck. Severe osteopenia  in the right femoral neck and lumbar spine.       Obesity, Class III, BMI 40-49.9 (morbid obesity) (H) 2012     Priority: Medium     ideal weight 120, goal weight 200, lose 58 lbs in 58 weeks.       Vitamin D deficiency 2011     Priority: Medium     Tick bite, infected, positive Lyme test 1996 not treated 2011     Priority: Medium     Rosacea 2009      Priority: Medium     Obstructive sleep apnea 12/01/2006     Priority: Medium     Sleep study 12/06 for EDS- AHI 36, desaturations to 65%. CPAP recommended but not tolerated.  01/16/07  ENT consult recommend CPAP and weight loss. Surgery discussed but success rate less than CPAP  1/15/07 CPAP trial, was not able to tolerate. Retried 2008, tolerated better.   Problem list name updated by automated process. Provider to review       Carpal tunnel syndrome 06/07/2006     Priority: Medium     04/10/09 Dr.Meletiou [facility]. Finding consistant with CTS, but nerve studies and MRI of C-Spine are normal. Corticosteroid injection given in office       CARDIOVASCULAR SCREENING; LDL GOAL LESS THAN 160 10/31/2010     Priority: Low     Allergic rhinitis 03/27/2006     Priority: Low     Problem list name updated by automated process. Provider to review       s/p gastric bypass x 2 07/05/2005     Priority: Low     Gastric bypass 1980[DK1.1]          Past Medical History:[ML1.1]    Past Medical History:   Diagnosis Date     HTN (hypertension)      IRON DEFICIENCY ANEMIA 7/5/2005     DARWIN (obstructive sleep apnea)[DK1.1]        Past Surgical History:[ML1.1]    Past Surgical History:   Procedure Laterality Date     ARTHROPLASTY KNEE  4/2/2012    Procedure:ARTHROPLASTY KNEE; Left Total Knee Arthroplasty--Anesth.Choice; Surgeon:HERNANDO THOMPSON; Location:[facility] OR     ARTHROTOMY SHOULDER, ROTATOR CUFF REPAIR, COMBINED Right 8/25/2017    Procedure: COMBINED ARTHROTOMY SHOULDER, ROTATOR CUFF REPAIR;  Right shoulder distal clavicle excision, subacromial decompression, rotator cuff repair ;  Surgeon: Hernando Thompson MD;  Location: [facility] OR     ARTHROTOMY SHOULDER, ROTATOR CUFF REPAIR, COMBINED Right 12/18/2017    Procedure: COMBINED ARTHROTOMY SHOULDER, ROTATOR CUFF REPAIR;  Right Shoulder Rotator Cuff Revision;  Surgeon: Hernando Thopmson MD;  Location: [facility] OR     C ORAL SURGERY PROCEDURE  age 18    wisdom teeth     C/SECTION,  "LOW TRANSVERSE  1978, 1984, 1994    x 3     COLONOSCOPY  2001    normal colonoscopy     COLONOSCOPY N/A 8/22/2017    Procedure: COLONOSCOPY;  Colonoscopy  ;  Surgeon: Delfino Yoo MD;  Location: [facility] GI     GASTRIC BYPASS  1980/2005    Gastric Bypass and revision     HERNIA REPAIR, INCISIONAL  6/16/2008    lap.repair with 10x8 mesh     TUBAL LIGATION  1994[DK1.1]       Family History:[ML1.1]    Family History   Problem Relation Age of Onset     C.A.D. Father      MI at age 60     Hypertension Father      Alzheimer Disease Father      Hyperlipidemia Father      OSTEOPOROSIS Mother      on Fosamax     Glaucoma Mother      Other Cancer Brother      Stomach Cancer Maternal Aunt      Cervical Cancer Cousin      DIABETES No family hx of      CEREBROVASCULAR DISEASE No family hx of      Breast Cancer No family hx of      Cancer - colorectal No family hx of      Prostate Cancer No family hx of      Liver Disease No family hx of[DK1.2]        Social History:  Marital Status:   [2][ML1.1]  Social History   Substance Use Topics     Smoking status: Never Smoker     Smokeless tobacco: Never Used     Alcohol use 0.0 oz/week     0 Standard drinks or equivalent per week      Comment: mixed very light 1 a month if that[DK1.2]        Medications:[ML1.1]      No current outpatient prescriptions on file.     Allergies   Allergen Reactions     Vioxx Itching and Swelling     VIOXX (Swollen Feet,Hands itching), Okay with ibuprofen and aspirin[DK1.2]         Review of Systems   Respiratory: Negative for cough and shortness of breath.    Gastrointestinal:        Positive for black tarry stools   Musculoskeletal: Positive for back pain (upper right).   Neurological: Positive for light-headedness (with positional change).   All other systems reviewed and are negative.      Physical Exam[ML1.1]   BP: 132/88  Heart Rate: 77  Temp: 96.8  F (36  C)  Resp: 18  Height: 160 cm (5' 3\")  Weight: 94.3 kg (208 lb)  SpO2: 98 " %[DK1.2]      Physical Exam[ML1.1] general alert cooperative female in mild to moderate distress.  HEENT shows no scleral icterus.  No facial asymmetry.  Speech is clear and concise.  Neck is supple.  Lungs are clear without adventitious sounds.  Cardiac regular rate without murmur.  Back reveals no CVA tenderness.  She does have some mid thoracic spinous tenderness without crepitus or step-off.  Muscular spasm to the right of that area.  No skin rash or lesion over the area.  Abdomen is morbidly obese.  She is active bowel sounds.  On palpation she has tenderness in the midepigastrium moderately and mildly in the left lower quadrant.  No skin rashes over the abdomen.  She has intact surgical scars.  Remedies reveal no pitting edema, calf or thigh tenderness.  Skin exam shows no significant bruising issues.  Exam reveals no tenderness or masses to the examining digit.  Dark stool that is grossly guaiac positive.[DK1.3]    ED Course     ED Course     Procedures               Critical Care time:[ML1.1]  none[DK1.3]               Results for orders placed or performed during the hospital encounter of 03/17/18 (from the past 24 hour(s))   UA reflex to Microscopic   Result Value Ref Range    Color Urine Yellow     Appearance Urine Slightly Cloudy     Glucose Urine Negative NEG^Negative mg/dL    Bilirubin Urine Negative NEG^Negative    Ketones Urine Negative NEG^Negative mg/dL    Specific Gravity Urine 1.023 1.003 - 1.035    Blood Urine Negative NEG^Negative    pH Urine 5.0 5.0 - 7.0 pH    Protein Albumin Urine Negative NEG^Negative mg/dL    Urobilinogen mg/dL 0.0 0.0 - 2.0 mg/dL    Nitrite Urine Negative NEG^Negative    Leukocyte Esterase Urine Negative NEG^Negative    Source Midstream Urine     RBC Urine 2 0 - 2 /HPF    WBC Urine 3 0 - 5 /HPF    Squamous Epithelial /HPF Urine 2 (H) 0 - 1 /HPF    Mucous Urine Present (A) NEG^Negative /LPF    Hyaline Casts 9 (H) 0 - 2 /LPF   CBC with platelets differential   Result Value  Ref Range    WBC 7.5 4.0 - 11.0 10e9/L    RBC Count 3.83 3.8 - 5.2 10e12/L    Hemoglobin 11.5 (L) 11.7 - 15.7 g/dL    Hematocrit 36.0 35.0 - 47.0 %    MCV 94 78 - 100 fl    MCH 30.0 26.5 - 33.0 pg    MCHC 31.9 31.5 - 36.5 g/dL    RDW 13.9 10.0 - 15.0 %    Platelet Count 245 150 - 450 10e9/L    Diff Method Automated Method     % Neutrophils 59.3 %    % Lymphocytes 34.0 %    % Monocytes 5.1 %    % Eosinophils 0.9 %    % Basophils 0.3 %    % Immature Granulocytes 0.4 %    Absolute Neutrophil 4.4 1.6 - 8.3 10e9/L    Absolute Lymphocytes 2.5 0.8 - 5.3 10e9/L    Absolute Monocytes 0.4 0.0 - 1.3 10e9/L    Absolute Eosinophils 0.1 0.0 - 0.7 10e9/L    Absolute Basophils 0.0 0.0 - 0.2 10e9/L    Abs Immature Granulocytes 0.0 0 - 0.4 10e9/L   INR   Result Value Ref Range    INR 1.00 0.86 - 1.14   Comprehensive metabolic panel   Result Value Ref Range    Sodium 137 133 - 144 mmol/L    Potassium 3.8 3.4 - 5.3 mmol/L    Chloride 106 94 - 109 mmol/L    Carbon Dioxide 25 20 - 32 mmol/L    Anion Gap 6 3 - 14 mmol/L    Glucose 102 (H) 70 - 99 mg/dL    Urea Nitrogen 32 (H) 7 - 30 mg/dL    Creatinine 0.71 0.52 - 1.04 mg/dL    GFR Estimate 83 >60 mL/min/1.7m2    GFR Estimate If Black >90 >60 mL/min/1.7m2    Calcium 8.7 8.5 - 10.1 mg/dL    Bilirubin Total 0.2 0.2 - 1.3 mg/dL    Albumin 3.2 (L) 3.4 - 5.0 g/dL    Protein Total 6.9 6.8 - 8.8 g/dL    Alkaline Phosphatase 80 40 - 150 U/L    ALT 17 0 - 50 U/L    AST 16 0 - 45 U/L   XR Thoracic Spine 3 Views    Narrative    THORACIC SPINE THREE VIEWS  3/17/2018 1:58 PM     COMPARISON: Thoracic spine x-ray series 10/10/2013    HISTORY: Mid thoracic tenderness.      Impression    IMPRESSION: There is continued normal alignment of the thoracic  vertebrae. Vertebral body heights of the thoracic spine remain normal.  There is no evidence for fracture of the thoracic spine. Degenerative  endplate spurring at the T2-T3, T3-T4, T4-T5, T5-T6, T6-T7 and T7-T8  levels again noted.    ALLISON LEDBETTER MD        Medications   famotidine (PEPCID) infusion 20 mg (not administered)   naloxone (NARCAN) injection 0.1-0.4 mg (not administered)   dextrose 5% and 0.45% NaCl + KCl 20 mEq/L infusion ( Intravenous New Bag 3/17/18 8017)   ondansetron (ZOFRAN-ODT) ODT tab 4 mg (not administered)     Or   ondansetron (ZOFRAN) injection 4 mg (not administered)   pantoprazole (PROTONIX) 40 mg IV push injection (not administered)   atenolol (TENORMIN) tablet 50 mg (not administered)   clobetasol (TEMOVATE) 0.05 % ointment (not administered)   CERAVE CREA (not administered)   hydrochlorothiazide tablet 12.5 mg (not administered)   nystatin (MYCOSTATIN) cream (not administered)[DK1.2]     1:02 PM Patient assessed.[ML1.1]  IV was established and blood work was obtained.  Patient was given IV Pepcid.  X-ray of the thoracic spine is ordered and is described above[DK1.3]  Assessments & Plan (with Medical Decision Making)[ML1.1]   It is a 62-year-old female presents with complaints of black tarry stools, generalized weakness and dizziness, epigastric and left lower quadrant abdominal pains.  Patient has had previous gastric bypass ×2, cholecystectomy, and C-sections ×3 and previous hernia incisional repair.  Previously noted to have elevated LFTs but had an MRCP in April 2017 that did not show evidence of acute abnormality.  She denies history of ulcer or reflux.  She has had normal colonoscopy most recently 8/22/17.  She is not on blood thinners or antiplatelet therapy.  Denies recent exposure to infectious illness.  She has had no vomiting.  Denies unusual bleeding or bruising otherwise.  Has not been on antibiotics.  On presentation she was afebrile and vitally stable.  She is not tachycardic or hypotensive.  She was not hypoxic.  She had no scleral icterus.  Oral mucosa is moist.  Neck was supple.  Cardiac and respiratory auscultations were normal.  No CVA tenderness.  Abdomen is obese with active bowel sounds.  Intact surgical  incisions.  Moderately tender in the midepigastrium without guarding or rebound.  Mildly tender in the left lower quadrant.  Rectal exam revealed no masses or tenderness to the examining digit.  She had dark stool that was grossly guaiac positive.  Blood work was obtained and did show a drop in her hemoglobin from 14.8 in December 2017 to 11.5 today.  No unusual bruising on skin exam.  Patient's INR is normal.  Her platelets are 245.  Urinalysis was unremarkable.  She did have some midthoracic tenderness on palpation without crepitus or step-off.  Muscular spasm associated with this.  No skin rash that would suggest shingles.  X-ray did not show any acute compression fracture with just some degenerative changes.  Patient was given IV fluids and IV Pepcid.  I spoke with Dr. Addison from the hospitalist service and he will assume care on admission.  Dr. Galindo from surgical services will be contacted to discuss endoscopy.  I[DK1.3] have reviewed the nursing notes.    I have reviewed the findings, diagnosis, plan and need for follow up with the patient.[ML1.1]       Current Discharge Medication List          Final diagnoses:   Upper GI bleed   Anemia, unspecified type[DK1.2]     This document serves as a record of the services and decisions personally performed and made by Deepak Lemus MD. It was created on HIS/HER behalf by   Petra Pozo, a trained medical scribe. The creation of this document is based the provider's statements to the medical scribe.  Petra Pozo 1:02 PM 3/17/2018    Provider:   The information in this document, created by the medical scribe for me, accurately reflects the services I personally performed and the decisions made by me. I have reviewed and approved this document for accuracy prior to leaving the patient care area.  Deepak Lemus MD 1:02 PM 3/17/2018    3/17/2018   [facility] EMERGENCY DEPARTMENT[ML1.1]     Deepak Lemus MD  03/17/18 4921  [DK1.1]     Revision History        User Key  Date/Time User Provider Type Action    > DK1.1 3/17/2018  5:44 PM Deepak Lemus MD Physician Sign     DK1.2 3/17/2018  5:43 PM Deepak Lemus MD Physician      DK1.3 3/17/2018  3:58 PM Deepak Lemus MD Physician Share     ML1.1 3/17/2018  1:25 PM NoheliaRosaphilippeulalio Scribe Share                  Procedure Notes     No notes of this type exist for this encounter.      Progress Notes - Therapies (Notes from 03/15/18 through 03/18/18)     No notes of this type exist for this encounter.                                          INTERAGENCY TRANSFER FORM - LAB / IMAGING / EKG / EMG RESULTS   3/17/2018                       [facility]: [phone]            Unresulted Labs (24h ago through future)    Start       Ordered    03/17/18 1800  Hemoglobin  EVERY 6 HOURS,   Timed     Comments:  Please page provider with result if < 9    Last Lab Result: Hemoglobin (g/dL)       Date                     Value                 03/17/2018               11.5 (L)         ----------    03/17/18 1711         Lab Results - 3 Days      Hemoglobin [[id number]] (Abnormal)  Resulted: 03/18/18 1209, Result status: Final result    Ordering provider: Stanley Robertson MD  03/18/18 0600 Resulting lab: [facility]    Specimen Information    Type Source Collected On   Blood  03/18/18 1201          Components       Value Reference Range Flag Lab   Hemoglobin 10.7 11.7 - 15.7 g/dL L 59            Basic metabolic panel [[id number]] (Abnormal)  Resulted: 03/18/18 0719, Result status: Final result    Ordering provider: Stanley Robertson MD  03/18/18 0000 Resulting lab: [facility]    Specimen Information    Type Source Collected On   Blood  03/18/18 0648          Components       Value Reference Range Flag Lab   Sodium 140 133 - 144 mmol/L  59   Potassium 4.0 3.4 - 5.3 mmol/L  59   Chloride 106 94 - 109 mmol/L  59   Carbon Dioxide 29 20 - 32 mmol/L  59   Anion Gap 5 3 - 14 mmol/L  59   Glucose 116 70 - 99  mg/dL H 59   Urea Nitrogen 17 7 - 30 mg/dL  59   Creatinine 0.68 0.52 - 1.04 mg/dL  59   GFR Estimate 87 >60 mL/min/1.7m2  59   Comment:  Non  GFR Calc   GFR Estimate If Black >90 >60 mL/min/1.7m2  59   Comment:  African American GFR Calc   Calcium 8.3 8.5 - 10.1 mg/dL L 59            CBC with platelets differential [[id number]] (Abnormal)  Resulted: 03/18/18 0709, Result status: Final result    Ordering provider: Stanley Robertson MD  03/18/18 0000 Resulting lab: [facility]    Specimen Information    Type Source Collected On   Blood  03/18/18 0648          Components       Value Reference Range Flag Lab   WBC 5.5 4.0 - 11.0 10e9/L  59   RBC Count 3.36 3.8 - 5.2 10e12/L L 59   Hemoglobin 10.1 11.7 - 15.7 g/dL L 59   Hematocrit 31.8 35.0 - 47.0 % L 59   MCV 95 78 - 100 fl  59   MCH 30.1 26.5 - 33.0 pg  59   MCHC 31.8 31.5 - 36.5 g/dL  59   RDW 13.8 10.0 - 15.0 %  59   Platelet Count 195 150 - 450 10e9/L  59   Diff Method Automated Method   59   % Neutrophils 48.9 %  59   % Lymphocytes 40.0 %  59   % Monocytes 8.7 %  59   % Eosinophils 1.8 %  59   % Basophils 0.2 %  59   % Immature Granulocytes 0.4 %  59   Absolute Neutrophil 2.7 1.6 - 8.3 10e9/L  59   Absolute Lymphocytes 2.2 0.8 - 5.3 10e9/L  59   Absolute Monocytes 0.5 0.0 - 1.3 10e9/L  59   Absolute Eosinophils 0.1 0.0 - 0.7 10e9/L  59   Absolute Basophils 0.0 0.0 - 0.2 10e9/L  59   Abs Immature Granulocytes 0.0 0 - 0.4 10e9/L  59            Hemoglobin [[id number]] (Abnormal)  Resulted: 03/18/18 0206, Result status: Final result    Ordering provider: Stanley Robertson MD  03/17/18 1800 Resulting lab: [facility]    Specimen Information    Type Source Collected On   Blood  03/18/18 0020          Components       Value Reference Range Flag Lab   Hemoglobin 9.7 11.7 - 15.7 g/dL L 59            CBC with platelets differential [[id number]] (Abnormal)  Resulted: 03/17/18 1808, Result status: Final result    Ordering  provider: Deepak Lemus MD  03/17/18 1711 Resulting lab: [facility]    Specimen Information    Type Source Collected On   Blood  03/17/18 1756          Components       Value Reference Range Flag Lab   WBC 7.7 4.0 - 11.0 10e9/L  59   RBC Count 3.54 3.8 - 5.2 10e12/L L 59   Hemoglobin 10.5 11.7 - 15.7 g/dL L 59   Hematocrit 33.3 35.0 - 47.0 % L 59   MCV 94 78 - 100 fl  59   MCH 29.7 26.5 - 33.0 pg  59   MCHC 31.5 31.5 - 36.5 g/dL  59   RDW 14.1 10.0 - 15.0 %  59   Platelet Count 211 150 - 450 10e9/L  59   Diff Method Automated Method   59   % Neutrophils 54.9 %  59   % Lymphocytes 38.9 %  59   % Monocytes 4.7 %  59   % Eosinophils 0.8 %  59   % Basophils 0.4 %  59   % Immature Granulocytes 0.3 %  59   Absolute Neutrophil 4.3 1.6 - 8.3 10e9/L  59   Absolute Lymphocytes 3.0 0.8 - 5.3 10e9/L  59   Absolute Monocytes 0.4 0.0 - 1.3 10e9/L  59   Absolute Eosinophils 0.1 0.0 - 0.7 10e9/L  59   Absolute Basophils 0.0 0.0 - 0.2 10e9/L  59   Abs Immature Granulocytes 0.0 0 - 0.4 10e9/L  59            UA reflex to Microscopic [[id number]] (Abnormal)  Resulted: 03/17/18 1359, Result status: Final result    Ordering provider: Deepak Lemus MD  03/17/18 1324 Resulting lab: [facility]    Specimen Information    Type Source Collected On   Midstream Urine Urine clean catch 03/17/18 1252          Components       Value Reference Range Flag Lab   Color Urine Yellow   59   Appearance Urine Slightly Cloudy   59   Glucose Urine Negative NEG^Negative mg/dL  59   Bilirubin Urine Negative NEG^Negative  59   Ketones Urine Negative NEG^Negative mg/dL  59   Specific Gravity Urine 1.023 1.003 - 1.035  59   Blood Urine Negative NEG^Negative  59   pH Urine 5.0 5.0 - 7.0 pH  59   Protein Albumin Urine Negative NEG^Negative mg/dL  59   Urobilinogen mg/dL 0.0 0.0 - 2.0 mg/dL  59   Nitrite Urine Negative NEG^Negative  59   Leukocyte Esterase Urine Negative NEG^Negative  59   Source Midstream Urine   59   RBC  Urine 2 0 - 2 /HPF  59   WBC Urine 3 0 - 5 /HPF  59   Squamous Epithelial /HPF Urine 2 0 - 1 /HPF H 59   Mucous Urine Present NEG^Negative /LPF A 59   Hyaline Casts 9 0 - 2 /LPF H 59            INR [[id number]]  Resulted: 03/17/18 1350, Result status: Final result    Ordering provider: Deepak Lemus MD  03/17/18 1325 Resulting lab: [facility]    Specimen Information    Type Source Collected On   Blood  03/17/18 1315          Components       Value Reference Range Flag Lab   INR 1.00 0.86 - 1.14  59            Comprehensive metabolic panel [[id number]] (Abnormal)  Resulted: 03/17/18 1349, Result status: Final result    Ordering provider: Deepak Lemus MD  03/17/18 1315 Resulting lab: [facility]    Specimen Information    Type Source Collected On     03/17/18 1315          Components       Value Reference Range Flag Lab   Sodium 137 133 - 144 mmol/L  59   Potassium 3.8 3.4 - 5.3 mmol/L  59   Chloride 106 94 - 109 mmol/L  59   Carbon Dioxide 25 20 - 32 mmol/L  59   Anion Gap 6 3 - 14 mmol/L  59   Glucose 102 70 - 99 mg/dL H 59   Urea Nitrogen 32 7 - 30 mg/dL H 59   Creatinine 0.71 0.52 - 1.04 mg/dL  59   GFR Estimate 83 >60 mL/min/1.7m2  59   Comment:  Non  GFR Calc   GFR Estimate If Black >90 >60 mL/min/1.7m2  59   Comment:  African American GFR Calc   Calcium 8.7 8.5 - 10.1 mg/dL  59   Bilirubin Total 0.2 0.2 - 1.3 mg/dL  59   Albumin 3.2 3.4 - 5.0 g/dL L 59   Protein Total 6.9 6.8 - 8.8 g/dL  59   Alkaline Phosphatase 80 40 - 150 U/L  59   ALT 17 0 - 50 U/L  59   AST 16 0 - 45 U/L  59            CBC with platelets differential [[id number]] (Abnormal)  Resulted: 03/17/18 1329, Result status: Final result    Ordering provider: Deepak Lemus MD  03/17/18 1319 Resulting lab: [facility]    Specimen Information    Type Source Collected On   Blood  03/17/18 1315          Components       Value Reference Range Flag Lab   WBC 7.5 4.0 - 11.0 10e9/L  59    RBC Count 3.83 3.8 - 5.2 10e12/L  59   Hemoglobin 11.5 11.7 - 15.7 g/dL L 59   Hematocrit 36.0 35.0 - 47.0 %  59   MCV 94 78 - 100 fl  59   MCH 30.0 26.5 - 33.0 pg  59   MCHC 31.9 31.5 - 36.5 g/dL  59   RDW 13.9 10.0 - 15.0 %  59   Platelet Count 245 150 - 450 10e9/L  59   Diff Method Automated Method   59   % Neutrophils 59.3 %  59   % Lymphocytes 34.0 %  59   % Monocytes 5.1 %  59   % Eosinophils 0.9 %  59   % Basophils 0.3 %  59   % Immature Granulocytes 0.4 %  59   Absolute Neutrophil 4.4 1.6 - 8.3 10e9/L  59   Absolute Lymphocytes 2.5 0.8 - 5.3 10e9/L  59   Absolute Monocytes 0.4 0.0 - 1.3 10e9/L  59   Absolute Eosinophils 0.1 0.0 - 0.7 10e9/L  59   Absolute Basophils 0.0 0.0 - 0.2 10e9/L  59   Abs Immature Granulocytes 0.0 0 - 0.4 10e9/L  59            Basic metabolic panel [[id number]]  Resulted: 03/17/18 1324, Result status: In process    Ordering provider: Deepak Lemus MD  03/17/18 1319 Resulting lab: [facility]    Specimen Information    Type Source Collected On   Blood  03/17/18 1315            Testing Performed By     Lab - Abbreviation Name Director Address Valid Date Range    45 - IPU520 [facility] Unknown Unknown 01/28/02 0000 - Present    59 - Unknown [facility] Unknown [address] [facility] [id number] 12/31/14 1006 - Present               Imaging Results - 3 Days      XR Thoracic Spine 3 Views [[id number]]  Resulted: 03/17/18 1514, Result status: Final result    Ordering provider: Deepak Lemus MD  03/17/18 1324 Resulted by: Colby French MD    Performed: 03/17/18 1344 - 03/17/18 1358 Resulting lab: RADIOLOGY RESULTS    Narrative:       THORACIC SPINE THREE VIEWS  3/17/2018 1:58 PM     COMPARISON: Thoracic spine x-ray series 10/10/2013    HISTORY: Mid thoracic tenderness.      Impression:       IMPRESSION: There is continued normal alignment of the thoracic  vertebrae. Vertebral body heights of the thoracic spine remain normal.  There is no evidence for fracture of the thoracic  spine. Degenerative  endplate spurring at the T2-T3, T3-T4, T4-T5, T5-T6, T6-T7 and T7-T8  levels again noted.    ALLISON LEDBETTER MD      Testing Performed By     Lab - Abbreviation Name Director Address Valid Date Range    104 - Rad Rslts RADIOLOGY RESULTS Unknown Unknown 02/16/05 1553 - Present            Encounter-Level Documents:     There are no encounter-level documents.      Order-Level Documents:     There are no order-level documents.

## 2018-03-17 NOTE — H&P
[facility], [place] History and Physical    Jacquie Harris MRN# [id number]   Age: 62 year old YOB: 1955     Date of Admission:  3/17/2018      Primary care provider: Jennie Suazo          Assessment and Plan:       GI bleed, s/p gastric bypass x 2  3/17/2018 -- black stools x 4 days, hemoglobin 11.5 from apparent baseline 13-14.  Appears upper.  Had a normal colonoscopy in August of 2017 by report. Guiac positive in ER per report.  Starting on IVF @ 125/h, clears and NPO after midnight for EGD in AM.  Started on pepcid and protonix IV.  Follow every 6 hours hemoglobins.      Thoracic back pain  3/17/2018 -- x-ray negative for fracture.  Tender in muscles - suspect muscular pain although could be due to possible ulcer as above.        HTN, goal below 140/80  3/17/2018 -- blood pressure stable, continue home atenolol and hydrochlorothiazide       Prophylaxis  Low risk, ambulate - consider mechanical if unable to discharge tomorrow.      Lines  PIV    Disposition  Admit to observation - if hemoglobin dropping and/or needing inpatient cares past tomorrow would likely transition to inpatient at that point.                Chief Complaint:   Black stools  History is obtained from the patient          History of Present Illness:   This patient is a 62 year old  female with a significant past medical history of shoulder pain managed by Dr. Thompson with planned upcoming surgery as well as hypertension who presents with black stools.  She has been taking Aleve, 1-2 per day for the past couple of weeks.  Then starting about 4 days ago noticed that she had black appearing stools.  Has continued to have black stools past 4 days, with new onset of some vague poorly localized abdominal pain and some lower mid back pain starting about the same time.  No aggravating or alleviating symptoms.  Today she felt light-headed with standing and so presented to the ER.      No chest pain, dyspnea fever or  chills.  No urinary symptoms      No alcohol, tobacco or drug use.               Past Medical History:   I have reviewed this patient's past medical history.  Patient Active Problem List    Diagnosis Date Noted     Shoulder injury, right, subsequent encounter 08/21/2017     Priority: Medium     Fatty liver 04/20/2017     Priority: Medium     Elevated levels of transaminase & lactic acid dehydrogenase 04/15/2017     Priority: Medium     Bilateral cold feet 01/03/2017     Priority: Medium     Colles' fracture 07/06/2015     Priority: Medium     Encounter for routine gynecological examination  06/04/2015     Priority: Medium     Back pain, left below scapula, strained muscles 10/24/2013     Priority: Medium     HTN, goal below 140/80 05/05/2013     Priority: Medium     S/P TKR (total knee replacement) 04/03/2012     Priority: Medium     Osteoporosis 02/19/2012     Priority: Medium     Previous T scores -2.2  2/2012 Dexascan:  Lumbar spine L1-L4 T-score: -2.4, Left femoral neck T-score: -2.7, Right femoral neck T-score: -2.2   Percent change in lumbar spine: +6.6% since 4/16/2008   Percent change in femurs: +2.9% since 4/16/2008    IMPRESSION: Osteoporosis in the left femoral neck. Severe osteopenia  in the right femoral neck and lumbar spine.       Obesity, Class III, BMI 40-49.9 (morbid obesity) (H) 02/19/2012     Priority: Medium     ideal weight 120, goal weight 200, lose 58 lbs in 58 weeks.       Vitamin D deficiency 07/18/2011     Priority: Medium     Tick bite, infected, positive Lyme test 1996 not treated 07/08/2011     Priority: Medium     Rosacea 11/04/2009     Priority: Medium     Obstructive sleep apnea 12/01/2006     Priority: Medium     Sleep study 12/06 for EDS- AHI 36, desaturations to 65%. CPAP recommended but not tolerated.  01/16/07  ENT consult recommend CPAP and weight loss. Surgery discussed but success rate less than CPAP  1/15/07 CPAP trial, was not able to tolerate. Retried 2008, tolerated  better.   Problem list name updated by automated process. Provider to review       Carpal tunnel syndrome 06/07/2006     Priority: Medium     04/10/09 Dr.Meletiou Cronin. Finding consistant with CTS, but nerve studies and MRI of C-Spine are normal. Corticosteroid injection given in office       CARDIOVASCULAR SCREENING; LDL GOAL LESS THAN 160 10/31/2010     Priority: Low     Allergic rhinitis 03/27/2006     Priority: Low     Problem list name updated by automated process. Provider to review       s/p gastric bypass x 2 07/05/2005     Priority: Low     Gastric bypass 1980                Past Surgical History:   I have reviewed this patient's past surgical history   Past Surgical History:   Procedure Laterality Date     ARTHROPLASTY KNEE  4/2/2012    Procedure:ARTHROPLASTY KNEE; Left Total Knee Arthroplasty--Anesth.Choice; Surgeon:HERNANDO THOMPSON; Location:[facility] OR     ARTHROTOMY SHOULDER, ROTATOR CUFF REPAIR, COMBINED Right 8/25/2017    Procedure: COMBINED ARTHROTOMY SHOULDER, ROTATOR CUFF REPAIR;  Right shoulder distal clavicle excision, subacromial decompression, rotator cuff repair ;  Surgeon: Hernando Thompson MD;  Location: [facility] OR     ARTHROTOMY SHOULDER, ROTATOR CUFF REPAIR, COMBINED Right 12/18/2017    Procedure: COMBINED ARTHROTOMY SHOULDER, ROTATOR CUFF REPAIR;  Right Shoulder Rotator Cuff Revision;  Surgeon: Hernando Thompson MD;  Location: [facility] OR     C ORAL SURGERY PROCEDURE  age 18    wisdom teeth     C/SECTION, LOW TRANSVERSE  1978, 1984, 1994    x 3     COLONOSCOPY  2001    normal colonoscopy     COLONOSCOPY N/A 8/22/2017    Procedure: COLONOSCOPY;  Colonoscopy  ;  Surgeon: Delfino Yoo MD;  Location: [facility]     GASTRIC BYPASS  1980/2005    Gastric Bypass and revision     HERNIA REPAIR, INCISIONAL  6/16/2008    lap.repair with 10x8 mesh     TUBAL LIGATION  1994             Social History:   I have reviewed this patient's social history   Social History   Substance Use Topics      Smoking status: Never Smoker     Smokeless tobacco: Never Used     Alcohol use 0.0 oz/week     0 Standard drinks or equivalent per week      Comment: mixed very light 1 a month if that             Family History:   I have reviewed this patient's family history  Family History   Problem Relation Age of Onset     C.A.D. Father      MI at age 60     Hypertension Father      Alzheimer Disease Father      Hyperlipidemia Father      OSTEOPOROSIS Mother      on Fosamax     Glaucoma Mother      Other Cancer Brother      Stomach Cancer Maternal Aunt      Cervical Cancer Cousin      DIABETES No family hx of      CEREBROVASCULAR DISEASE No family hx of      Breast Cancer No family hx of      Cancer - colorectal No family hx of      Prostate Cancer No family hx of      Liver Disease No family hx of              Immunizations:   Immunizations are current          Allergies:     Allergies   Allergen Reactions     Vioxx Itching and Swelling     VIOXX (Swollen Feet,Hands itching), Okay with ibuprofen and aspirin             Medications:       No current facility-administered medications on file prior to encounter.   Current Outpatient Prescriptions on File Prior to Encounter:  triamcinolone acetonide (KENALOG) 40 MG/ML injection Given in clinic for shoulder pain   lidocaine 1 % injection Patient was given in clinic   atenolol (TENORMIN) 50 MG tablet Take 1 tablet (50 mg) by mouth daily   hydrochlorothiazide 12.5 MG TABS tablet Take 1 tablet (12.5 mg) by mouth daily   nystatin (MYCOSTATIN) 920444 UNIT/GM POWD Apply topically 3 times daily as needed Refill at patient's request   nystatin (MYCOSTATIN) cream Apply topically 2 times daily Reported on 3/6/2017   clobetasol (TEMOVATE) 0.05 % ointment Apply sparingly to affected area twice daily for 14 days.  Do not apply to face. (Patient taking differently: daily as needed Apply sparingly to affected area twice daily for 14 days.  Do not apply to face.)   Emollient (CERAVE) CREA  Externally apply topically 2 times daily as needed              Review of Systems:    ROS: 10 point ROS neg other than the symptoms noted above in the HPI.             Physical Exam:   Blood pressure 117/76, temperature 96.8  F (36  C), temperature source Temporal, resp. rate 18, weight 94.3 kg (208 lb), last menstrual period 2006, SpO2 98 %, not currently breastfeeding.  Temperatures:  Current - Temp: 96.8  F (36  C); Max - Temp  Av.8  F (36  C)  Min: 96.8  F (36  C)  Max: 96.8  F (36  C)  Respiration range: Resp  Av  Min: 18  Max: 18  Pulse range: No Data Recorded  Blood pressure range: Systolic (24hrs), Av , Min:117 , Max:132   ; Diastolic (24hrs), Av, Min:76, Max:88    Pulse oximetry range: SpO2  Av %  Min: 98 %  Max: 98 %  No intake or output data in the 24 hours ending 18 8715  EXAM:  General: awake and alert, NAD, oriented x 3  Head: normocephalic  Neck: unremarkable, no lymphadenopathy   HEENT: oropharynx pink and moist    Heart: Regular rate and rhythm, no murmurs, rubs, or gallops  Lungs: clear to auscultation bilaterally with good air movement throughout  Abdomen: soft, not really any tenderness, normal bowel sounds, no masses or organomegaly  Back: some tenderness in right mid back  Extremities: no edema in lower extremities   Skin unremarkable.             Data:     Results for orders placed or performed during the hospital encounter of 18 (from the past 24 hour(s))   UA reflex to Microscopic   Result Value Ref Range    Color Urine Yellow     Appearance Urine Slightly Cloudy     Glucose Urine Negative NEG^Negative mg/dL    Bilirubin Urine Negative NEG^Negative    Ketones Urine Negative NEG^Negative mg/dL    Specific Gravity Urine 1.023 1.003 - 1.035    Blood Urine Negative NEG^Negative    pH Urine 5.0 5.0 - 7.0 pH    Protein Albumin Urine Negative NEG^Negative mg/dL    Urobilinogen mg/dL 0.0 0.0 - 2.0 mg/dL    Nitrite Urine Negative NEG^Negative    Leukocyte  Esterase Urine Negative NEG^Negative    Source Midstream Urine     RBC Urine 2 0 - 2 /HPF    WBC Urine 3 0 - 5 /HPF    Squamous Epithelial /HPF Urine 2 (H) 0 - 1 /HPF    Mucous Urine Present (A) NEG^Negative /LPF    Hyaline Casts 9 (H) 0 - 2 /LPF   CBC with platelets differential   Result Value Ref Range    WBC 7.5 4.0 - 11.0 10e9/L    RBC Count 3.83 3.8 - 5.2 10e12/L    Hemoglobin 11.5 (L) 11.7 - 15.7 g/dL    Hematocrit 36.0 35.0 - 47.0 %    MCV 94 78 - 100 fl    MCH 30.0 26.5 - 33.0 pg    MCHC 31.9 31.5 - 36.5 g/dL    RDW 13.9 10.0 - 15.0 %    Platelet Count 245 150 - 450 10e9/L    Diff Method Automated Method     % Neutrophils 59.3 %    % Lymphocytes 34.0 %    % Monocytes 5.1 %    % Eosinophils 0.9 %    % Basophils 0.3 %    % Immature Granulocytes 0.4 %    Absolute Neutrophil 4.4 1.6 - 8.3 10e9/L    Absolute Lymphocytes 2.5 0.8 - 5.3 10e9/L    Absolute Monocytes 0.4 0.0 - 1.3 10e9/L    Absolute Eosinophils 0.1 0.0 - 0.7 10e9/L    Absolute Basophils 0.0 0.0 - 0.2 10e9/L    Abs Immature Granulocytes 0.0 0 - 0.4 10e9/L   INR   Result Value Ref Range    INR 1.00 0.86 - 1.14   Comprehensive metabolic panel   Result Value Ref Range    Sodium 137 133 - 144 mmol/L    Potassium 3.8 3.4 - 5.3 mmol/L    Chloride 106 94 - 109 mmol/L    Carbon Dioxide 25 20 - 32 mmol/L    Anion Gap 6 3 - 14 mmol/L    Glucose 102 (H) 70 - 99 mg/dL    Urea Nitrogen 32 (H) 7 - 30 mg/dL    Creatinine 0.71 0.52 - 1.04 mg/dL    GFR Estimate 83 >60 mL/min/1.7m2    GFR Estimate If Black >90 >60 mL/min/1.7m2    Calcium 8.7 8.5 - 10.1 mg/dL    Bilirubin Total 0.2 0.2 - 1.3 mg/dL    Albumin 3.2 (L) 3.4 - 5.0 g/dL    Protein Total 6.9 6.8 - 8.8 g/dL    Alkaline Phosphatase 80 40 - 150 U/L    ALT 17 0 - 50 U/L    AST 16 0 - 45 U/L   XR Thoracic Spine 3 Views    Narrative    THORACIC SPINE THREE VIEWS  3/17/2018 1:58 PM     COMPARISON: Thoracic spine x-ray series 10/10/2013    HISTORY: Mid thoracic tenderness.      Impression    IMPRESSION: There is  continued normal alignment of the thoracic  vertebrae. Vertebral body heights of the thoracic spine remain normal.  There is no evidence for fracture of the thoracic spine. Degenerative  endplate spurring at the T2-T3, T3-T4, T4-T5, T5-T6, T6-T7 and T7-T8  levels again noted.    ALLISON LEDBETTER MD     All cardiac studies reviewed by me.  All imaging studies reviewed by me.    Attestation:  I have reviewed today's vital signs, notes, medications, labs and imaging.  Amount of time performed on this history and physical: 50 minutes.        Stanley Robertson MD

## 2018-03-17 NOTE — IP AVS SNAPSHOT
"    [facility] SURGICAL: [phone]                                              INTERAGENCY TRANSFER FORM - LAB / IMAGING / EKG / EMG RESULTS   3/17/2018                    Hospital Admission Date: 3/17/2018  DESTINY KILPATRICK   : 1955  Sex: Female        Attending Provider: (none)    Allergies:  Vioxx    Infection:  None   Service:  HOSPITALIST    Ht:  1.6 m (5' 3\")   Wt:  109.2 kg (240 lb 11.9 oz)   Admission Wt:  94.3 kg (208 lb)    BMI:  42.65 kg/m 2   BSA:  2.2 m 2            Patient PCP Information     Provider PCP Type    Jennie Suazo MD General         Lab Results - 3 Days      Hemoglobin [[id number]] (Abnormal)  Resulted: 18 1209, Result status: Final result    Ordering provider: Stanley Robertson MD  18 0600 Resulting lab: [facility]    Specimen Information    Type Source Collected On   Blood  18 1201          Components       Value Reference Range Flag Lab   Hemoglobin 10.7 11.7 - 15.7 g/dL L 59            Basic metabolic panel [[id number]] (Abnormal)  Resulted: 18 0719, Result status: Final result    Ordering provider: Stanley Robertson MD  18 0000 Resulting lab: [facility]    Specimen Information    Type Source Collected On   Blood  18 0648          Components       Value Reference Range Flag Lab   Sodium 140 133 - 144 mmol/L  59   Potassium 4.0 3.4 - 5.3 mmol/L  59   Chloride 106 94 - 109 mmol/L  59   Carbon Dioxide 29 20 - 32 mmol/L  59   Anion Gap 5 3 - 14 mmol/L  59   Glucose 116 70 - 99 mg/dL H 59   Urea Nitrogen 17 7 - 30 mg/dL  59   Creatinine 0.68 0.52 - 1.04 mg/dL  59   GFR Estimate 87 >60 mL/min/1.7m2  59   Comment:  Non  GFR Calc   GFR Estimate If Black >90 >60 mL/min/1.7m2  59   Comment:  African American GFR Calc   Calcium 8.3 8.5 - 10.1 mg/dL L 59            CBC with platelets differential [[id number]] (Abnormal)  Resulted: 18 0709, Result status: Final result    " Ordering provider: Stanley Robertson MD  03/18/18 0000 Resulting lab: [facility]    Specimen Information    Type Source Collected On   Blood  03/18/18 0648          Components       Value Reference Range Flag Lab   WBC 5.5 4.0 - 11.0 10e9/L  59   RBC Count 3.36 3.8 - 5.2 10e12/L L 59   Hemoglobin 10.1 11.7 - 15.7 g/dL L 59   Hematocrit 31.8 35.0 - 47.0 % L 59   MCV 95 78 - 100 fl  59   MCH 30.1 26.5 - 33.0 pg  59   MCHC 31.8 31.5 - 36.5 g/dL  59   RDW 13.8 10.0 - 15.0 %  59   Platelet Count 195 150 - 450 10e9/L  59   Diff Method Automated Method   59   % Neutrophils 48.9 %  59   % Lymphocytes 40.0 %  59   % Monocytes 8.7 %  59   % Eosinophils 1.8 %  59   % Basophils 0.2 %  59   % Immature Granulocytes 0.4 %  59   Absolute Neutrophil 2.7 1.6 - 8.3 10e9/L  59   Absolute Lymphocytes 2.2 0.8 - 5.3 10e9/L  59   Absolute Monocytes 0.5 0.0 - 1.3 10e9/L  59   Absolute Eosinophils 0.1 0.0 - 0.7 10e9/L  59   Absolute Basophils 0.0 0.0 - 0.2 10e9/L  59   Abs Immature Granulocytes 0.0 0 - 0.4 10e9/L  59            Hemoglobin [[id number]] (Abnormal)  Resulted: 03/18/18 0206, Result status: Final result    Ordering provider: Stanley Robertson MD  03/17/18 1800 Resulting lab: [facility]    Specimen Information    Type Source Collected On   Blood  03/18/18 0020          Components       Value Reference Range Flag Lab   Hemoglobin 9.7 11.7 - 15.7 g/dL L 59            CBC with platelets differential [[id number]] (Abnormal)  Resulted: 03/17/18 1808, Result status: Final result    Ordering provider: Deepak Lemus MD  03/17/18 1711 Resulting lab: [facility]    Specimen Information    Type Source Collected On   Blood  03/17/18 1756          Components       Value Reference Range Flag Lab   WBC 7.7 4.0 - 11.0 10e9/L  59   RBC Count 3.54 3.8 - 5.2 10e12/L L 59   Hemoglobin 10.5 11.7 - 15.7 g/dL L 59   Hematocrit 33.3 35.0 - 47.0 % L 59   MCV 94 78 - 100 fl  59   MCH 29.7 26.5 - 33.0 pg   59   MCHC 31.5 31.5 - 36.5 g/dL  59   RDW 14.1 10.0 - 15.0 %  59   Platelet Count 211 150 - 450 10e9/L  59   Diff Method Automated Method   59   % Neutrophils 54.9 %  59   % Lymphocytes 38.9 %  59   % Monocytes 4.7 %  59   % Eosinophils 0.8 %  59   % Basophils 0.4 %  59   % Immature Granulocytes 0.3 %  59   Absolute Neutrophil 4.3 1.6 - 8.3 10e9/L  59   Absolute Lymphocytes 3.0 0.8 - 5.3 10e9/L  59   Absolute Monocytes 0.4 0.0 - 1.3 10e9/L  59   Absolute Eosinophils 0.1 0.0 - 0.7 10e9/L  59   Absolute Basophils 0.0 0.0 - 0.2 10e9/L  59   Abs Immature Granulocytes 0.0 0 - 0.4 10e9/L  59            UA reflex to Microscopic [[id number]] (Abnormal)  Resulted: 03/17/18 1359, Result status: Final result    Ordering provider: Deepak Lemus MD  03/17/18 1324 Resulting lab: [facility]    Specimen Information    Type Source Collected On   Midstream Urine Urine clean catch 03/17/18 1252          Components       Value Reference Range Flag Lab   Color Urine Yellow   59   Appearance Urine Slightly Cloudy   59   Glucose Urine Negative NEG^Negative mg/dL  59   Bilirubin Urine Negative NEG^Negative  59   Ketones Urine Negative NEG^Negative mg/dL  59   Specific Gravity Urine 1.023 1.003 - 1.035  59   Blood Urine Negative NEG^Negative  59   pH Urine 5.0 5.0 - 7.0 pH  59   Protein Albumin Urine Negative NEG^Negative mg/dL  59   Urobilinogen mg/dL 0.0 0.0 - 2.0 mg/dL  59   Nitrite Urine Negative NEG^Negative  59   Leukocyte Esterase Urine Negative NEG^Negative  59   Source Midstream Urine   59   RBC Urine 2 0 - 2 /HPF  59   WBC Urine 3 0 - 5 /HPF  59   Squamous Epithelial /HPF Urine 2 0 - 1 /HPF H 59   Mucous Urine Present NEG^Negative /LPF A 59   Hyaline Casts 9 0 - 2 /LPF H 59            INR [[id number]]  Resulted: 03/17/18 1350, Result status: Final result    Ordering provider: Deepak Lemus MD  03/17/18 1326 Resulting lab: [facility]    Specimen Information    Type Source Collected On   Blood   03/17/18 1315          Components       Value Reference Range Flag Lab   INR 1.00 0.86 - 1.14  59            Comprehensive metabolic panel [[id number]] (Abnormal)  Resulted: 03/17/18 1349, Result status: Final result    Ordering provider: Deepak Lemus MD  03/17/18 1315 Resulting lab: [facility]    Specimen Information    Type Source Collected On     03/17/18 1315          Components       Value Reference Range Flag Lab   Sodium 137 133 - 144 mmol/L  59   Potassium 3.8 3.4 - 5.3 mmol/L  59   Chloride 106 94 - 109 mmol/L  59   Carbon Dioxide 25 20 - 32 mmol/L  59   Anion Gap 6 3 - 14 mmol/L  59   Glucose 102 70 - 99 mg/dL H 59   Urea Nitrogen 32 7 - 30 mg/dL H 59   Creatinine 0.71 0.52 - 1.04 mg/dL  59   GFR Estimate 83 >60 mL/min/1.7m2  59   Comment:  Non  GFR Calc   GFR Estimate If Black >90 >60 mL/min/1.7m2  59   Comment:  African American GFR Calc   Calcium 8.7 8.5 - 10.1 mg/dL  59   Bilirubin Total 0.2 0.2 - 1.3 mg/dL  59   Albumin 3.2 3.4 - 5.0 g/dL L 59   Protein Total 6.9 6.8 - 8.8 g/dL  59   Alkaline Phosphatase 80 40 - 150 U/L  59   ALT 17 0 - 50 U/L  59   AST 16 0 - 45 U/L  59            CBC with platelets differential [[id number]] (Abnormal)  Resulted: 03/17/18 1329, Result status: Final result    Ordering provider: Deepak Lemus MD  03/17/18 1319 Resulting lab: [facility]    Specimen Information    Type Source Collected On   Blood  03/17/18 1315          Components       Value Reference Range Flag Lab   WBC 7.5 4.0 - 11.0 10e9/L  59   RBC Count 3.83 3.8 - 5.2 10e12/L  59   Hemoglobin 11.5 11.7 - 15.7 g/dL L 59   Hematocrit 36.0 35.0 - 47.0 %  59   MCV 94 78 - 100 fl  59   MCH 30.0 26.5 - 33.0 pg  59   MCHC 31.9 31.5 - 36.5 g/dL  59   RDW 13.9 10.0 - 15.0 %  59   Platelet Count 245 150 - 450 10e9/L  59   Diff Method Automated Method   59   % Neutrophils 59.3 %  59   % Lymphocytes 34.0 %  59   % Monocytes 5.1 %  59   % Eosinophils 0.9 %  59   %  Basophils 0.3 %  59   % Immature Granulocytes 0.4 %  59   Absolute Neutrophil 4.4 1.6 - 8.3 10e9/L  59   Absolute Lymphocytes 2.5 0.8 - 5.3 10e9/L  59   Absolute Monocytes 0.4 0.0 - 1.3 10e9/L  59   Absolute Eosinophils 0.1 0.0 - 0.7 10e9/L  59   Absolute Basophils 0.0 0.0 - 0.2 10e9/L  59   Abs Immature Granulocytes 0.0 0 - 0.4 10e9/L  59            Basic metabolic panel [[id number]]  Resulted: 03/17/18 1324, Result status: In process    Ordering provider: Deepak Lemus MD  03/17/18 1319 Resulting lab: [facility]    Specimen Information    Type Source Collected On   Blood  03/17/18 1315            Testing Performed By     Lab - Abbreviation Name Director Address Valid Date Range    45 - [id number] [facility] Unknown Unknown 01/28/02 0000 - Present    59 - Unknown [facility] Unknown [address] [address] 12/31/14 1006 - Present            Unresulted Labs (24h ago through future)    Start       Ordered    03/17/18 1800  Hemoglobin  EVERY 6 HOURS,   Timed     Comments:  Please page provider with result if < 9    Last Lab Result: Hemoglobin (g/dL)       Date                     Value                 03/17/2018               11.5 (L)         ----------    03/17/18 1711         Imaging Results - 3 Days      XR Thoracic Spine 3 Views [[id number]]  Resulted: 03/17/18 1514, Result status: Final result    Ordering provider: Deepak Lemus MD  03/17/18 1324 Resulted by: Colby French MD    Performed: 03/17/18 1344 - 03/17/18 1358 Resulting lab: RADIOLOGY RESULTS    Narrative:       THORACIC SPINE THREE VIEWS  3/17/2018 1:58 PM     COMPARISON: Thoracic spine x-ray series 10/10/2013    HISTORY: Mid thoracic tenderness.      Impression:       IMPRESSION: There is continued normal alignment of the thoracic  vertebrae. Vertebral body heights of the thoracic spine remain normal.  There is no evidence for fracture of the thoracic spine. Degenerative  endplate spurring at the T2-T3, T3-T4, T4-T5, T5-T6, T6-T7  and T7-T8  levels again noted.    ALLISON LEDBETTER MD      Testing Performed By     Lab - Abbreviation Name Director Address Valid Date Range    104 - Rad [facility] RADIOLOGY RESULTS Unknown Unknown 02/16/05 1553 - Present            Encounter-Level Documents:     There are no encounter-level documents.      Order-Level Documents:     There are no order-level documents.

## 2018-03-17 NOTE — IP AVS SNAPSHOT
` `     [facility] SURGICAL: [phone]                 INTERAGENCY TRANSFER FORM - NOTES (H&P, Discharge Summary, Consults, Procedures, Therapies)   3/17/2018                    Hospital Admission Date: 3/17/2018  DESTINY KILPATRICK   : 1955  Sex: Female        Patient PCP Information     Provider PCP Type    Jennie Suazo MD General         History & Physicals      Interval H&P Note by Jazzmine Sebastian APRN CRNA at 3/18/2018  9:41 AM     Author:  Jazzmine Sebastian APRN CRNA Service:  (none) Author Type:  Nurse Anesthetist    Filed:  3/18/2018  9:41 AM Date of Service:  3/18/2018  9:41 AM Creation Time:  3/18/2018  9:41 AM    Status:  Signed :  Jazzmine Sebastian APRN CRNA (Nurse Anesthetist)         This H&P has been reviewed and there are no clinically significant changes in the patient s condition.  The Patient is approved for surgery.[TF1.1]         Revision History        User Key Date/Time User Provider Type Action    > TF1.1 3/18/2018  9:41 AM Jazzmine Sebastian APRN CRNA Nurse Anesthetist Sign          Source Note     Author:  Poncho Galindo MD Service:  Surgery Author Type:  Physician    Filed:  3/18/2018  8:24 AM Date of Service:  3/18/2018  8:24 AM Creation Time:  3/18/2018  8:23 AM    Status:  Signed :  Poncho Galindo MD (Physician)            62 year old year old female here for upper endoscopy for upper GI bleeding        Patient Active Problem List   Diagnosis     s/p gastric bypass x 2     Allergic rhinitis     Carpal tunnel syndrome     Obstructive sleep apnea     Rosacea     CARDIOVASCULAR SCREENING; LDL GOAL LESS THAN 160     Tick bite, infected, positive Lyme test 1996 not treated     Vitamin D deficiency     Osteoporosis     Obesity, Class III, BMI 40-49.9 (morbid obesity) (H)     S/P TKR (total knee replacement)     HTN, goal below 140/80     Back pain, left below scapula, strained muscles     Encounter for routine gynecological  examination      Colles' fracture     Bilateral cold feet     Elevated levels of transaminase & lactic acid dehydrogenase     Fatty liver     Shoulder injury, right, subsequent encounter     GI bleed     Upper GI bleed       Past Medical History:   Diagnosis Date     HTN (hypertension)      IRON DEFICIENCY ANEMIA 7/5/2005     Iron infusion/ resolved since injections     DARWIN (obstructive sleep apnea)        Past Surgical History:   Procedure Laterality Date     ARTHROPLASTY KNEE  4/2/2012    Procedure:ARTHROPLASTY KNEE; Left Total Knee Arthroplasty--Anesth.Choice; Surgeon:HERNANDO THOMPSON; Location:[place] [facility]     ARTHROTOMY SHOULDER, ROTATOR CUFF REPAIR, COMBINED Right 8/25/2017    Procedure: COMBINED ARTHROTOMY SHOULDER, ROTATOR CUFF REPAIR;  Right shoulder distal clavicle excision, subacromial decompression, rotator cuff repair ;  Surgeon: Hernando Thompson MD;  Location: [facility]     ARTHROTOMY SHOULDER, ROTATOR CUFF REPAIR, COMBINED Right 12/18/2017    Procedure: COMBINED ARTHROTOMY SHOULDER, ROTATOR CUFF REPAIR;  Right Shoulder Rotator Cuff Revision;  Surgeon: Hernando Thompson MD;  Location: [facility] OR     C ORAL SURGERY PROCEDURE  age 18    wisdom teeth     C/SECTION, LOW TRANSVERSE  1978, 1984, 1994    x 3     COLONOSCOPY  2001    normal colonoscopy     COLONOSCOPY N/A 8/22/2017    Procedure: COLONOSCOPY;  Colonoscopy  ;  Surgeon: Delfino Yoo MD;  Location: [facility]     GASTRIC BYPASS  1980/2005    Gastric Bypass and revision     HERNIA REPAIR, INCISIONAL  6/16/2008    lap.repair with 10x8 mesh     TUBAL LIGATION  1994       Family History   Problem Relation Age of Onset     C.A.D. Father      MI at age 60     Hypertension Father      Alzheimer Disease Father      Hyperlipidemia Father      OSTEOPOROSIS Mother      on Fosamax     Glaucoma Mother      Other Cancer Brother      Stomach Cancer Maternal Aunt      Cervical Cancer Cousin      DIABETES No family hx of      CEREBROVASCULAR DISEASE No family hx of       Breast Cancer No family hx of      Cancer - colorectal No family hx of      Prostate Cancer No family hx of      Liver Disease No family hx of        No current outpatient prescriptions on file.       Allergies   Allergen Reactions     Vioxx Itching and Swelling     VIOXX (Swollen Feet,Hands itching), Okay with ibuprofen and aspirin       Pt reports that she has never smoked. She has never used smokeless tobacco. She reports that she drinks alcohol. She reports that she does not use illicit drugs.    Exam:    Awake, Alert OX3  Lungs - CTA bilaterally  CV - RRR, no murmurs, distal pulses intact  Abd - soft, non-distended, non-tender, +BS  Extr - No cyanosis or edema    A/P 62 year old year old female in need of upper endoscopy for upper GI bledding. Risks, benefits, alternatives, and complications were discussed including the possibility of perforation and the patient agreed to proceed.    Poncho Galindo MD[JC1.1]       Revision History        User Key Date/Time User Provider Type Action    > JC1.1 3/18/2018  8:24 AM Poncho Galindo MD Physician Sign                  H&P by Poncho Galindo MD at 3/18/2018  8:24 AM     Author:  Poncho Galindo MD Service:  Surgery Author Type:  Physician    Filed:  3/18/2018  8:24 AM Date of Service:  3/18/2018  8:24 AM Creation Time:  3/18/2018  8:23 AM    Status:  Signed :  Poncho Galindo MD (Physician)         62 year old year old female here for upper endoscopy for upper GI bleeding        Patient Active Problem List   Diagnosis     s/p gastric bypass x 2     Allergic rhinitis     Carpal tunnel syndrome     Obstructive sleep apnea     Rosacea     CARDIOVASCULAR SCREENING; LDL GOAL LESS THAN 160     Tick bite, infected, positive Lyme test 1996 not treated     Vitamin D deficiency     Osteoporosis     Obesity, Class III, BMI 40-49.9 (morbid obesity) (H)     S/P TKR (total knee replacement)     HTN, goal below 140/80     Back pain, left below scapula, strained muscles      Encounter for routine gynecological examination      Colles' fracture     Bilateral cold feet     Elevated levels of transaminase & lactic acid dehydrogenase     Fatty liver     Shoulder injury, right, subsequent encounter     GI bleed     Upper GI bleed       Past Medical History:   Diagnosis Date     HTN (hypertension)      IRON DEFICIENCY ANEMIA 7/5/2005     Iron infusion/ resolved since injections     DARWIN (obstructive sleep apnea)        Past Surgical History:   Procedure Laterality Date     ARTHROPLASTY KNEE  4/2/2012    Procedure:ARTHROPLASTY KNEE; Left Total Knee Arthroplasty--Anesth.Choice; Surgeon:HERNANDO THOMPSON; Location:[place]     ARTHROTOMY SHOULDER, ROTATOR CUFF REPAIR, COMBINED Right 8/25/2017    Procedure: COMBINED ARTHROTOMY SHOULDER, ROTATOR CUFF REPAIR;  Right shoulder distal clavicle excision, subacromial decompression, rotator cuff repair ;  Surgeon: Hernando Thompson MD;  Location: [facility]     ARTHROTOMY SHOULDER, ROTATOR CUFF REPAIR, COMBINED Right 12/18/2017    Procedure: COMBINED ARTHROTOMY SHOULDER, ROTATOR CUFF REPAIR;  Right Shoulder Rotator Cuff Revision;  Surgeon: Hernando Thompson MD;  Location: [facility]     C ORAL SURGERY PROCEDURE  age 18    wisdom teeth     C/SECTION, LOW TRANSVERSE  1978, 1984, 1994    x 3     COLONOSCOPY  2001    normal colonoscopy     COLONOSCOPY N/A 8/22/2017    Procedure: COLONOSCOPY;  Colonoscopy  ;  Surgeon: Delfino Yoo MD;  Location: [facility]     GASTRIC BYPASS  1980/2005    Gastric Bypass and revision     HERNIA REPAIR, INCISIONAL  6/16/2008    lap.repair with 10x8 mesh     TUBAL LIGATION  1994       Family History   Problem Relation Age of Onset     C.A.D. Father      MI at age 60     Hypertension Father      Alzheimer Disease Father      Hyperlipidemia Father      OSTEOPOROSIS Mother      on Fosamax     Glaucoma Mother      Other Cancer Brother      Stomach Cancer Maternal Aunt      Cervical Cancer Cousin      DIABETES No family hx of       CEREBROVASCULAR DISEASE No family hx of      Breast Cancer No family hx of      Cancer - colorectal No family hx of      Prostate Cancer No family hx of      Liver Disease No family hx of        No current outpatient prescriptions on file.       Allergies   Allergen Reactions     Vioxx Itching and Swelling     VIOXX (Swollen Feet,Hands itching), Okay with ibuprofen and aspirin       Pt reports that she has never smoked. She has never used smokeless tobacco. She reports that she drinks alcohol. She reports that she does not use illicit drugs.    Exam:    Awake, Alert OX3  Lungs - CTA bilaterally  CV - RRR, no murmurs, distal pulses intact  Abd - soft, non-distended, non-tender, +BS  Extr - No cyanosis or edema    A/P 62 year old year old female in need of upper endoscopy for upper GI bledding. Risks, benefits, alternatives, and complications were discussed including the possibility of perforation and the patient agreed to proceed.    Poncho Galindo MD[JC1.1]      Revision History        User Key Date/Time User Provider Type Action    > JC1.1 3/18/2018  8:24 AM Poncho Galindo MD Physician Sign            H&P by Stanley Robertson MD at 3/17/2018  3:53 PM     Author:  Stanley Robertson MD Service:  Hospitalist Author Type:  Physician    Filed:  3/17/2018  4:36 PM Date of Service:  3/17/2018  3:53 PM Creation Time:  3/17/2018  3:45 PM    Status:  Signed :  Stanley Robertson MD (Physician)         [facility] History and Physical    Jacquie Harris MRN# [id number]   Age: 62 year old YOB: 1955     Date of Admission:  3/17/2018      Primary care provider: Jennie Suazo          Assessment and Plan:[KK1.1]       GI bleed[KK1.2],[KK1.3] s/p gastric bypass x 2[KK1.2]  3/17/2018 -- black stools x 4 days, hemoglobin 11.5 from apparent baseline 13-14.[KK1.3]  Appears upper.[KK1.4]  Had a normal colonoscopy in August of 2017 by report.[KK1.1] G[KK1.4]uiac positive in ER per report.  Starting on IVF @  125/h,[KK1.3] clears and NPO after midnight for EGD in AM.  Started on pepcid and protonix IV.  Follow every 6 hours hemoglobins.[KK1.4]      Thoracic back pain  3/17/2018 -- x-ray negative for fracture.  Tender in muscles - suspect muscular pain although could be due to possible ulcer as above.[KK1.3]        HTN, goal below 140/80[KK1.2]  3/17/2018 -- blood pressure stable, continue home atenolol and hydrochlorothiazide[KK1.3]       Prophylaxis[KK1.1]  Low risk, ambulate - consider mechanical if unable to discharge tomorrow.[KK1.4]      Lines[KK1.1]  PIV[KK1.4]    Disposition[KK1.1]  Admit to observation - if hemoglobin dropping and/or needing inpatient cares past tomorrow would likely transition to inpatient at that point.[KK1.4]                Chief Complaint:[KK1.1]   Black stools  History is obtained from the patient[KK1.3]          History of Present Illness:   This patient is a 62 year old[KK1.1] [KK1.3] female[KK1.1] with a significant past medical history of shoulder pain managed by Dr. Thompson with planned upcoming surgery as well as hypertension[KK1.3] who presents with[KK1.1] black stools.  She has been taking Aleve, 1-2 per day for the past couple of weeks.  Then starting about 4 days ago noticed that she had black appearing stools.  Has continued to have black stools past 4 days, with new onset of some vague poorly localized abdominal pain and some lower mid back pain starting about the same time.  No aggravating or alleviating symptoms.  Today she felt light-headed with standing and so presented to the ER.      No chest pain, dyspnea fever or chills.  No urinary symptoms      No alcohol, tobacco or drug use.[KK1.3]               Past Medical History:   I have reviewed this patient's past medical history.  Patient Active Problem List    Diagnosis Date Noted     Shoulder injury, right, subsequent encounter 08/21/2017     Priority: Medium     Fatty liver 04/20/2017     Priority: Medium      Elevated levels of transaminase & lactic acid dehydrogenase 04/15/2017     Priority: Medium     Bilateral cold feet 01/03/2017     Priority: Medium     Colles' fracture 07/06/2015     Priority: Medium     Encounter for routine gynecological examination  06/04/2015     Priority: Medium     Back pain, left below scapula, strained muscles 10/24/2013     Priority: Medium     HTN, goal below 140/80 05/05/2013     Priority: Medium     S/P TKR (total knee replacement) 04/03/2012     Priority: Medium     Osteoporosis 02/19/2012     Priority: Medium     Previous T scores -2.2  2/2012 Dexascan:  Lumbar spine L1-L4 T-score: -2.4, Left femoral neck T-score: -2.7, Right femoral neck T-score: -2.2   Percent change in lumbar spine: +6.6% since 4/16/2008   Percent change in femurs: +2.9% since 4/16/2008    IMPRESSION: Osteoporosis in the left femoral neck. Severe osteopenia  in the right femoral neck and lumbar spine.       Obesity, Class III, BMI 40-49.9 (morbid obesity) (H) 02/19/2012     Priority: Medium     ideal weight 120, goal weight 200, lose 58 lbs in 58 weeks.       Vitamin D deficiency 07/18/2011     Priority: Medium     Tick bite, infected, positive Lyme test 1996 not treated 07/08/2011     Priority: Medium     Rosacea 11/04/2009     Priority: Medium     Obstructive sleep apnea 12/01/2006     Priority: Medium     Sleep study 12/06 for EDS- AHI 36, desaturations to 65%. CPAP recommended but not tolerated.  01/16/07  ENT consult recommend CPAP and weight loss. Surgery discussed but success rate less than CPAP  1/15/07 CPAP trial, was not able to tolerate. Retried 2008, tolerated better.   Problem list name updated by automated process. Provider to review       Carpal tunnel syndrome 06/07/2006     Priority: Medium     04/10/09 Dr.Meletiou [facility]. Finding consistant with CTS, but nerve studies and MRI of C-Spine are normal. Corticosteroid injection given in office       CARDIOVASCULAR SCREENING; LDL GOAL LESS THAN  160 10/31/2010     Priority: Low     Allergic rhinitis 03/27/2006     Priority: Low     Problem list name updated by automated process. Provider to review       s/p gastric bypass x 2 07/05/2005     Priority: Low     Gastric bypass 1980                Past Surgical History:   I have reviewed this patient's past surgical history   Past Surgical History:   Procedure Laterality Date     ARTHROPLASTY KNEE  4/2/2012    Procedure:ARTHROPLASTY KNEE; Left Total Knee Arthroplasty--Anesth.Choice; Surgeon:HERNANDO THOMPSON; Location:[facility]     ARTHROTOMY SHOULDER, ROTATOR CUFF REPAIR, COMBINED Right 8/25/2017    Procedure: COMBINED ARTHROTOMY SHOULDER, ROTATOR CUFF REPAIR;  Right shoulder distal clavicle excision, subacromial decompression, rotator cuff repair ;  Surgeon: Hernando Thompson MD;  Location: [facility]     ARTHROTOMY SHOULDER, ROTATOR CUFF REPAIR, COMBINED Right 12/18/2017    Procedure: COMBINED ARTHROTOMY SHOULDER, ROTATOR CUFF REPAIR;  Right Shoulder Rotator Cuff Revision;  Surgeon: Hernando Thompson MD;  Location: [facility]     C ORAL SURGERY PROCEDURE  age 18    wisdom teeth     C/SECTION, LOW TRANSVERSE  1978, 1984, 1994    x 3     COLONOSCOPY  2001    normal colonoscopy     COLONOSCOPY N/A 8/22/2017    Procedure: COLONOSCOPY;  Colonoscopy  ;  Surgeon: Delfino Yoo MD;  Location: [facility]     GASTRIC BYPASS  1980/2005    Gastric Bypass and revision     HERNIA REPAIR, INCISIONAL  6/16/2008    lap.repair with 10x8 mesh     TUBAL LIGATION  1994             Social History:   I have reviewed this patient's social history   Social History   Substance Use Topics     Smoking status: Never Smoker     Smokeless tobacco: Never Used     Alcohol use 0.0 oz/week     0 Standard drinks or equivalent per week      Comment: mixed very light 1 a month if that             Family History:   I have reviewed this patient's family history  Family History   Problem Relation Age of Onset     C.A.D. Father      MI at age 60      Hypertension Father      Alzheimer Disease Father      Hyperlipidemia Father      OSTEOPOROSIS Mother      on Fosamax     Glaucoma Mother      Other Cancer Brother      Stomach Cancer Maternal Aunt      Cervical Cancer Cousin      DIABETES No family hx of      CEREBROVASCULAR DISEASE No family hx of      Breast Cancer No family hx of      Cancer - colorectal No family hx of      Prostate Cancer No family hx of      Liver Disease No family hx of              Immunizations:   Immunizations are current          Allergies:     Allergies   Allergen Reactions     Vioxx Itching and Swelling     VIOXX (Swollen Feet,Hands itching), Okay with ibuprofen and aspirin             Medications:[KK1.1]       No current facility-administered medications on file prior to encounter.   Current Outpatient Prescriptions on File Prior to Encounter:  triamcinolone acetonide (KENALOG) 40 MG/ML injection Given in clinic for shoulder pain   lidocaine 1 % injection Patient was given in clinic   atenolol (TENORMIN) 50 MG tablet Take 1 tablet (50 mg) by mouth daily   hydrochlorothiazide 12.5 MG TABS tablet Take 1 tablet (12.5 mg) by mouth daily   nystatin (MYCOSTATIN) 920621 UNIT/GM POWD Apply topically 3 times daily as needed Refill at patient's request   nystatin (MYCOSTATIN) cream Apply topically 2 times daily Reported on 3/6/2017   clobetasol (TEMOVATE) 0.05 % ointment Apply sparingly to affected area twice daily for 14 days.  Do not apply to face. (Patient taking differently: daily as needed Apply sparingly to affected area twice daily for 14 days.  Do not apply to face.)   Emollient (CERAVE) CREA Externally apply topically 2 times daily as needed[KK1.5]              Review of Systems:[KK1.1]    ROS: 10 point ROS neg other than the symptoms noted above in the HPI.[KK1.3]             Physical Exam:   Blood pressure 117/76, temperature 96.8  F (36  C), temperature source Temporal, resp. rate 18, weight 94.3 kg (208 lb), last menstrual period  2006, SpO2 98 %, not currently breastfeeding.  Temperatures:  Current - Temp: 96.8  F (36  C); Max - Temp  Av.8  F (36  C)  Min: 96.8  F (36  C)  Max: 96.8  F (36  C)  Respiration range: Resp  Av  Min: 18  Max: 18  Pulse range: No Data Recorded  Blood pressure range: Systolic (24hrs), Av , Min:117 , Max:132   ; Diastolic (24hrs), Av, Min:76, Max:88    Pulse oximetry range: SpO2  Av %  Min: 98 %  Max: 98 %  No intake or output data in the 24 hours ending 18 1545[KK1.1]  EXAM:  General: awake and alert, NAD, oriented x 3  Head: normocephalic  Neck: unremarkable, no lymphadenopathy   HEENT: oropharynx pink and moist    Heart: Regular rate and rhythm, no murmurs, rubs, or gallops  Lungs: clear to auscultation bilaterally with good air movement throughout  Abdomen: soft, not really any tenderness, normal bowel sounds, no masses or organomegaly  Back: some tenderness in right mid back  Extremities: no edema in lower extremities   Skin unremarkable.[KK1.3]             Data:     Results for orders placed or performed during the hospital encounter of 18 (from the past 24 hour(s))   UA reflex to Microscopic   Result Value Ref Range    Color Urine Yellow     Appearance Urine Slightly Cloudy     Glucose Urine Negative NEG^Negative mg/dL    Bilirubin Urine Negative NEG^Negative    Ketones Urine Negative NEG^Negative mg/dL    Specific Gravity Urine 1.023 1.003 - 1.035    Blood Urine Negative NEG^Negative    pH Urine 5.0 5.0 - 7.0 pH    Protein Albumin Urine Negative NEG^Negative mg/dL    Urobilinogen mg/dL 0.0 0.0 - 2.0 mg/dL    Nitrite Urine Negative NEG^Negative    Leukocyte Esterase Urine Negative NEG^Negative    Source Midstream Urine     RBC Urine 2 0 - 2 /HPF    WBC Urine 3 0 - 5 /HPF    Squamous Epithelial /HPF Urine 2 (H) 0 - 1 /HPF    Mucous Urine Present (A) NEG^Negative /LPF    Hyaline Casts 9 (H) 0 - 2 /LPF   CBC with platelets differential   Result Value Ref Range    WBC 7.5  4.0 - 11.0 10e9/L    RBC Count 3.83 3.8 - 5.2 10e12/L    Hemoglobin 11.5 (L) 11.7 - 15.7 g/dL    Hematocrit 36.0 35.0 - 47.0 %    MCV 94 78 - 100 fl    MCH 30.0 26.5 - 33.0 pg    MCHC 31.9 31.5 - 36.5 g/dL    RDW 13.9 10.0 - 15.0 %    Platelet Count 245 150 - 450 10e9/L    Diff Method Automated Method     % Neutrophils 59.3 %    % Lymphocytes 34.0 %    % Monocytes 5.1 %    % Eosinophils 0.9 %    % Basophils 0.3 %    % Immature Granulocytes 0.4 %    Absolute Neutrophil 4.4 1.6 - 8.3 10e9/L    Absolute Lymphocytes 2.5 0.8 - 5.3 10e9/L    Absolute Monocytes 0.4 0.0 - 1.3 10e9/L    Absolute Eosinophils 0.1 0.0 - 0.7 10e9/L    Absolute Basophils 0.0 0.0 - 0.2 10e9/L    Abs Immature Granulocytes 0.0 0 - 0.4 10e9/L   INR   Result Value Ref Range    INR 1.00 0.86 - 1.14   Comprehensive metabolic panel   Result Value Ref Range    Sodium 137 133 - 144 mmol/L    Potassium 3.8 3.4 - 5.3 mmol/L    Chloride 106 94 - 109 mmol/L    Carbon Dioxide 25 20 - 32 mmol/L    Anion Gap 6 3 - 14 mmol/L    Glucose 102 (H) 70 - 99 mg/dL    Urea Nitrogen 32 (H) 7 - 30 mg/dL    Creatinine 0.71 0.52 - 1.04 mg/dL    GFR Estimate 83 >60 mL/min/1.7m2    GFR Estimate If Black >90 >60 mL/min/1.7m2    Calcium 8.7 8.5 - 10.1 mg/dL    Bilirubin Total 0.2 0.2 - 1.3 mg/dL    Albumin 3.2 (L) 3.4 - 5.0 g/dL    Protein Total 6.9 6.8 - 8.8 g/dL    Alkaline Phosphatase 80 40 - 150 U/L    ALT 17 0 - 50 U/L    AST 16 0 - 45 U/L   XR Thoracic Spine 3 Views    Narrative    THORACIC SPINE THREE VIEWS  3/17/2018 1:58 PM     COMPARISON: Thoracic spine x-ray series 10/10/2013    HISTORY: Mid thoracic tenderness.      Impression    IMPRESSION: There is continued normal alignment of the thoracic  vertebrae. Vertebral body heights of the thoracic spine remain normal.  There is no evidence for fracture of the thoracic spine. Degenerative  endplate spurring at the T2-T3, T3-T4, T4-T5, T5-T6, T6-T7 and T7-T8  levels again noted.    ALLISON LEDBETTER MD[KK1.1]     All cardiac  studies reviewed by me.  All imaging studies reviewed by me.[KK1.3]    Attestation:  I have reviewed today's vital signs, notes, medications, labs and imaging.  Amount of time performed on this history and physical:[KK1.1] 50[KK1.4] minutes.        Stanley Robretson MD[KK1.1]       Revision History        User Key Date/Time User Provider Type Action    > KK1.4 3/17/2018  4:36 PM Stanley Robertson MD Physician Sign     KK1.2 3/17/2018  4:25 PM Stanley Robertson MD Physician      KK1.5 3/17/2018  4:21 PM Stanley Robertson MD Physician      KK1.3 3/17/2018  4:14 PM Stanley Robertson MD Physician      KK1.1 3/17/2018  3:45 PM Stanley Robertson MD Physician                      Discharge Summaries      Discharge Summaries by Stanley Robertson MD at 3/18/2018 12:56 PM     Author:  Stanley Robertson MD Service:  Hospitalist Author Type:  Physician    Filed:  3/18/2018 12:56 PM Date of Service:  3/18/2018 12:56 PM Creation Time:  3/18/2018 12:47 PM    Status:  Signed :  Stanley Robertson MD (Physician)         [facility] Discharge Summary    Jacquie Harris MRN# [id number]   Age: 62 year old YOB: 1955     Date of Admission:  3/17/2018  Date of Discharge::  3/18/2018  Admitting Physician:  Poncho Galindo MD  Discharge Physician:  Stanley Robertson MD, MD             Admission Diagnoses:[KK1.1]   Upper GI bleed [K92.2][KK1.2]          Principle Discharge Diagnosis:     Acute GI bleed - suspect gastritis/gastric ulcer now resolved on endoscopy, s/p gastric bypass     See hospital course for further active diagnoses addressed during this admission.            Procedures:   See EMR for EGD results from today          Medications Prior to Admission:[KK1.1]     Prescriptions Prior to Admission   Medication Sig Dispense Refill Last Dose     atenolol (TENORMIN) 50 MG tablet Take 1 tablet (50 mg) by mouth daily 90 tablet 3 Taking     hydrochlorothiazide 12.5 MG TABS tablet Take 1 tablet (12.5 mg) by mouth daily 90 tablet  3      nystatin (MYCOSTATIN) 541125 UNIT/GM POWD Apply topically 3 times daily as needed Refill at patient's request 30 g 1 Taking     nystatin (MYCOSTATIN) cream Apply topically 2 times daily Reported on 3/6/2017 30 g 11 Taking     clobetasol (TEMOVATE) 0.05 % ointment Apply sparingly to affected area twice daily for 14 days.  Do not apply to face. (Patient taking differently: daily as needed Apply sparingly to affected area twice daily for 14 days.  Do not apply to face.) 15 g 0 12/17/2017 at Unknown time     Emollient (CERAVE) CREA Externally apply topically 2 times daily as needed 1 Bottle 3 Taking[KK1.2]             Discharge Medications:[KK1.1]     Current Discharge Medication List      START taking these medications    Details   acetaminophen (TYLENOL) 325 MG tablet Take 2 tablets (650 mg) by mouth every 4 hours as needed for mild pain or fever  Qty: 100 tablet    Associated Diagnoses: Shoulder injury, right, subsequent encounter      ranitidine (ZANTAC) 150 MG tablet Take 1 tablet (150 mg) by mouth 2 times daily  Qty: 60 tablet, Refills: 0    Associated Diagnoses: Upper GI bleed      pantoprazole (PROTONIX) 40 MG EC tablet Take 1 tablet (40 mg) by mouth 2 times daily Take 30-60 minutes before a meal.  Qty: 60 tablet, Refills: 0    Associated Diagnoses: Upper GI bleed         CONTINUE these medications which have NOT CHANGED    Details   atenolol (TENORMIN) 50 MG tablet Take 1 tablet (50 mg) by mouth daily  Qty: 90 tablet, Refills: 3    Comments: Refill at patient's request  Associated Diagnoses: Benign essential hypertension      hydrochlorothiazide 12.5 MG TABS tablet Take 1 tablet (12.5 mg) by mouth daily  Qty: 90 tablet, Refills: 3    Comments: Refill at patient's request  Associated Diagnoses: Benign essential hypertension      nystatin (MYCOSTATIN) 043272 UNIT/GM POWD Apply topically 3 times daily as needed Refill at patient's request  Qty: 30 g, Refills: 1    Associated Diagnoses: Candidiasis of skin  "     nystatin (MYCOSTATIN) cream Apply topically 2 times daily Reported on 3/6/2017  Qty: 30 g, Refills: 11    Associated Diagnoses: Candidiasis of skin      clobetasol (TEMOVATE) 0.05 % ointment Apply sparingly to affected area twice daily for 14 days.  Do not apply to face.  Qty: 15 g, Refills: 0    Associated Diagnoses: Vaginal itching      Emollient (CERAVE) CREA Externally apply topically 2 times daily as needed  Qty: 1 Bottle, Refills: 3    Associated Diagnoses: Sunburn; Itching[KK1.3]                   Consultations:   Consultation during this admission received from surgery          Brief History of Illness:     From Admission H+P:   This patient is a 62 year old  female with a significant past medical history of shoulder pain managed by Dr. Thompson with planned upcoming surgery as well as hypertension who presents with black stools.  She has been taking Aleve, 1-2 per day for the past couple of weeks.  Then starting about 4 days ago noticed that she had black appearing stools.  Has continued to have black stools past 4 days, with new onset of some vague poorly localized abdominal pain and some lower mid back pain starting about the same time.  No aggravating or alleviating symptoms.  Today she felt light-headed with standing and so presented to the ER.       No chest pain, dyspnea fever or chills.  No urinary symptoms       No alcohol, tobacco or drug use.             TODAY:     Subjective:  Doing well.  No abdominal pain at present- occasinoally has \"very mild cramps across her lower abdomen\" still today, but these are minimally present and only occasional.  No change with normal diet for lunch today.  No nausea or vomiting. Pain through to back has resolved.  No other concerns.  No other pain.       ROS:   ROS: 10 point ROS neg other than the symptoms noted above in the HPI.     /60  Pulse 68  Temp 98.4  F (36.9  C) (Oral)  Resp 16  Ht 1.6 m (5' 3\")  Wt 109.2 kg (240 lb 11.9 oz)  LMP " 05/29/2006  SpO2 99%  BMI 42.65 kg/m2   EXAM:  General: awake and alert, NAD, oriented x 3  Head: normocephalic  Neck: unremarkable, no lymphadenopathy   HEENT: oropharynx pink and moist    Heart: Regular rate and rhythm, no murmurs, rubs, or gallops  Lungs: clear to auscultation bilaterally with good air movement throughout  Abdomen: soft, non-tender, no masses or organomegaly, normal bowel sounds - unremarkable exam  Back: tenderness resolved.    Extremities: no edema in lower extremities   Skin unremarkable.            Hospital Course:      Acute GI bleed - suspect gastritis/gastric ulcer now resolved on endoscopy, likely due to NSAID use, s/p gastric bypass   3/17/2018 -- black stools x 4 days, hemoglobin 11.5 from apparent baseline 13-14.  Appears upper.  Had a normal colonoscopy in August of 2017 by report. Guiac positive in ER per report.   Started on pepcid and protonix IV.    3/18/2018 -- hemoglobin remained fairly stable, stools tapering off, pain resolved, EGD unremarkable on visualized areas (unable to fully visualize stomach due to history of bypass).  Ok for discharge on oral ranitidine and protonix with plan to follow-up with primary care provider in 1 week - recheck hemoglobin at that time and likely stop ranitidine and/or decrease protonix to daily at that time if doing well.        Thoracic back pain - uncertain muscular vs due to ulcer/gastritis  3/17/2018 -- x-ray negative for fracture.  Tender in muscles - suspect muscular pain although could be due to possible ulcer as above.    3/18/2018 -- resolved with above treatment.         HTN, goal below 140/80  3/17/2018 -- blood pressure stable,   3/18/2018 -- continue home atenolol and hydrochlorothiazide               Discharge Instructions and Follow-Up:   Discharge diet: Advance to a regular diet as tolerated   Discharge activity: Activity as tolerated   Discharge follow-up: Follow up with primary care provider in 7 days, recheck hemoglobin and  reassess medications at that time.              Discharge Disposition:   Discharged to home      Attestation:  I have reviewed today's vital signs, notes, medications, labs and imaging.  Amount of time performed on this discharge summary: 50 minutes.    Stanley Robertson MD, MD[KK1.1]          Revision History        User Key Date/Time User Provider Type Action    > KK1.3 3/18/2018 12:56 PM Stanley Robertson MD Physician Sign     KK1.2 3/18/2018 12:52 PM Stanley Robertson MD Physician      KK1.1 3/18/2018 12:47 PM Stanley Robertson MD Physician                   Consult Notes     No notes of this type exist for this encounter.         Progress Notes - Physician (Notes from 03/15/18 through 03/18/18)      Progress Notes by Ginny Mendoza RN at 3/17/2018 10:28 PM     Author:  Ginny Mendoza, RN Service:  (none) Author Type:  Registered Nurse    Filed:  3/17/2018 10:29 PM Date of Service:  3/17/2018 10:28 PM Creation Time:  3/17/2018 10:28 PM    Status:  Signed :  Ginny Mendoza RN (Registered Nurse)         Secondary assessment done with Emi BROWNE and I agree with her assessment.  ASHWIN Mendoza RN[LJ1.1]     Revision History        User Key Date/Time User Provider Type Action    > LJ1.1 3/17/2018 10:29 PM Ginny Mendoza, RN Registered Nurse Sign            Progress Notes by Val Mcqueen RN at 3/17/2018  9:57 PM     Author:  Val Mcqueen RN Service:  (none) Author Type:  Registered Nurse    Filed:  3/17/2018  9:57 PM Date of Service:  3/17/2018  9:57 PM Creation Time:  3/17/2018  9:57 PM    Status:  Signed :  Val Mcqueen RN (Registered Nurse)         Tylenol 650 mg given po for HA. Pt drinking H20 until MN, then she is NPO.[SC1.1]     Revision History        User Key Date/Time User Provider Type Action    > SC1.1 3/17/2018  9:57 PM Val Mcqueen, RN Registered Nurse Sign            Progress Notes by Val Mcqueen RN at 3/17/2018  8:23 PM     Author:  Val Mcqueen RN Service:   (none) Author Type:  Registered Nurse    Filed:  3/17/2018  8:24 PM Date of Service:  3/17/2018  8:23 PM Creation Time:  3/17/2018  8:23 PM    Status:  Signed :  Val Mcqueen RN (Registered Nurse)         IV retaped per pt request for her comfort.[SC1.1]     Revision History        User Key Date/Time User Provider Type Action    > SC1.1 3/17/2018  8:24 PM Val Mcqueen RN Registered Nurse Sign            Progress Notes by Val Mcqueen RN at 3/17/2018  8:21 PM     Author:  Val Mcqueen RN Service:  (none) Author Type:  Registered Nurse    Filed:  3/17/2018  8:22 PM Date of Service:  3/17/2018  8:21 PM Creation Time:  3/17/2018  8:21 PM    Status:  Signed :  Val Mcqueen RN (Registered Nurse)         Pt requests Tylenol for headache prn. Tameka MITCHELL paged.[SC1.1]     Revision History        User Key Date/Time User Provider Type Action    > SC1.1 3/17/2018  8:22 PM Val Mcqueen RN Registered Nurse Sign            Progress Notes by Emi Ambrosio RN at 3/17/2018  7:00 PM     Author:  Emi Ambrosio RN Service:  (none) Author Type:  Registered Nurse    Filed:  3/17/2018  7:02 PM Date of Service:  3/17/2018  7:00 PM Creation Time:  3/17/2018  7:00 PM    Status:  Signed :  Emi Ambrosio RN (Registered Nurse)         Pt tolerated 460 cc clear liquids without increased pain, denies nausea. Pt reports intermittent abdominal pain, declined intervention. Pt has not void since ED.[TL1.1]     Revision History        User Key Date/Time User Provider Type Action    > TL1.1 3/17/2018  7:02 PM Emi Ambrosio, RN Registered Nurse Sign            ED Provider Notes by Deepak Lemus MD at 3/17/2018 12:36 PM     Author:  Deepak Lemus MD Service:  Emergency Medicine Author Type:  Physician    Filed:  3/17/2018  5:44 PM Date of Service:  3/17/2018 12:36 PM Creation Time:  3/17/2018  1:02 PM    Status:  Signed :  Deepak Lemus MD (Physician)           History[ML1.1]     Chief Complaint  "  Patient presents with     Melena     black tarry stools, overheating and dizziness, upper back pain, left lower abdominal pain.  \" I think I have a liver blockage\"[DK1.1]     RICHA Harris is a 62 year old female who presents to the ED for the evaluation of dark, tarry stools x3-4 days. She complains of constant upper, right back pain. Patient admits to lightheadedness when standing up. Admits to urinary frequency. Past abdominal surgeries include gastric bypass and . Patient reports her gallbladder may have been taken out during gastric bypass surgery. She had a normal colonoscopy in 2017. Patient was informed of a liver blockage in 2017. Denies history of ulcers, pancreatitis, reflux, kidney stones, bleeding in intestinal track, appendectomy, fall or injury, ill contacts. Denies cough, SOB.     Problem List:[ML1.1]    Patient Active Problem List    Diagnosis Date Noted     GI bleed 2018     Priority: Medium     Upper GI bleed 2018     Priority: Medium     Shoulder injury, right, subsequent encounter 2017     Priority: Medium     Fatty liver 2017     Priority: Medium     Elevated levels of transaminase & lactic acid dehydrogenase 04/15/2017     Priority: Medium     Bilateral cold feet 2017     Priority: Medium     Colles' fracture 2015     Priority: Medium     Encounter for routine gynecological examination  2015     Priority: Medium     Back pain, left below scapula, strained muscles 10/24/2013     Priority: Medium     HTN, goal below 140/80 2013     Priority: Medium     S/P TKR (total knee replacement) 2012     Priority: Medium     Osteoporosis 2012     Priority: Medium     Previous T scores -2.2  2012 Dexascan:  Lumbar spine L1-L4 T-score: -2.4, Left femoral neck T-score: -2.7, Right femoral neck T-score: -2.2   Percent change in lumbar spine: +6.6% since 2008   Percent change in femurs: +2.9% since " 4/16/2008    IMPRESSION: Osteoporosis in the left femoral neck. Severe osteopenia  in the right femoral neck and lumbar spine.       Obesity, Class III, BMI 40-49.9 (morbid obesity) (H) 02/19/2012     Priority: Medium     ideal weight 120, goal weight 200, lose 58 lbs in 58 weeks.       Vitamin D deficiency 07/18/2011     Priority: Medium     Tick bite, infected, positive Lyme test 1996 not treated 07/08/2011     Priority: Medium     Rosacea 11/04/2009     Priority: Medium     Obstructive sleep apnea 12/01/2006     Priority: Medium     Sleep study 12/06 for EDS- AHI 36, desaturations to 65%. CPAP recommended but not tolerated.  01/16/07  ENT consult recommend CPAP and weight loss. Surgery discussed but success rate less than CPAP  1/15/07 CPAP trial, was not able to tolerate. Retried 2008, tolerated better.   Problem list name updated by automated process. Provider to review       Carpal tunnel syndrome 06/07/2006     Priority: Medium     04/10/09 Dr.Meletiou Cronin. Finding consistant with CTS, but nerve studies and MRI of C-Spine are normal. Corticosteroid injection given in office       CARDIOVASCULAR SCREENING; LDL GOAL LESS THAN 160 10/31/2010     Priority: Low     Allergic rhinitis 03/27/2006     Priority: Low     Problem list name updated by automated process. Provider to review       s/p gastric bypass x 2 07/05/2005     Priority: Low     Gastric bypass 1980[DK1.1]          Past Medical History:[ML1.1]    Past Medical History:   Diagnosis Date     HTN (hypertension)      IRON DEFICIENCY ANEMIA 7/5/2005     DARWIN (obstructive sleep apnea)[DK1.1]        Past Surgical History:[ML1.1]    Past Surgical History:   Procedure Laterality Date     ARTHROPLASTY KNEE  4/2/2012    Procedure:ARTHROPLASTY KNEE; Left Total Knee Arthroplasty--Anesth.Choice; Surgeon:VICENTE MORALES; Location:[facility] OR     ARTHROTOMY SHOULDER, ROTATOR CUFF REPAIR, COMBINED Right 8/25/2017    Procedure: COMBINED ARTHROTOMY SHOULDER,  ROTATOR CUFF REPAIR;  Right shoulder distal clavicle excision, subacromial decompression, rotator cuff repair ;  Surgeon: Hernando Thompson MD;  Location: [facility] OR     ARTHROTOMY SHOULDER, ROTATOR CUFF REPAIR, COMBINED Right 12/18/2017    Procedure: COMBINED ARTHROTOMY SHOULDER, ROTATOR CUFF REPAIR;  Right Shoulder Rotator Cuff Revision;  Surgeon: Hernando Thompson MD;  Location: [facility] OR      ORAL SURGERY PROCEDURE  age 18    wisdom teeth     C/SECTION, LOW TRANSVERSE  1978, 1984, 1994    x 3     COLONOSCOPY  2001    normal colonoscopy     COLONOSCOPY N/A 8/22/2017    Procedure: COLONOSCOPY;  Colonoscopy  ;  Surgeon: Delfino Yoo MD;  Location: [facility]     GASTRIC BYPASS  1980/2005    Gastric Bypass and revision     HERNIA REPAIR, INCISIONAL  6/16/2008    lap.repair with 10x8 mesh     TUBAL LIGATION  1994[DK1.1]       Family History:[ML1.1]    Family History   Problem Relation Age of Onset     C.A.D. Father      MI at age 60     Hypertension Father      Alzheimer Disease Father      Hyperlipidemia Father      OSTEOPOROSIS Mother      on Fosamax     Glaucoma Mother      Other Cancer Brother      Stomach Cancer Maternal Aunt      Cervical Cancer Cousin      DIABETES No family hx of      CEREBROVASCULAR DISEASE No family hx of      Breast Cancer No family hx of      Cancer - colorectal No family hx of      Prostate Cancer No family hx of      Liver Disease No family hx of[DK1.2]        Social History:  Marital Status:   [2][ML1.1]  Social History   Substance Use Topics     Smoking status: Never Smoker     Smokeless tobacco: Never Used     Alcohol use 0.0 oz/week     0 Standard drinks or equivalent per week      Comment: mixed very light 1 a month if that[DK1.2]        Medications:[ML1.1]      No current outpatient prescriptions on file.     Allergies   Allergen Reactions     Vioxx Itching and Swelling     VIOXX (Swollen Feet,Hands itching), Okay with ibuprofen and aspirin[DK1.2]         Review of Systems  "  Respiratory: Negative for cough and shortness of breath.    Gastrointestinal:        Positive for black tarry stools   Musculoskeletal: Positive for back pain (upper right).   Neurological: Positive for light-headedness (with positional change).   All other systems reviewed and are negative.      Physical Exam[ML1.1]   BP: 132/88  Heart Rate: 77  Temp: 96.8  F (36  C)  Resp: 18  Height: 160 cm (5' 3\")  Weight: 94.3 kg (208 lb)  SpO2: 98 %[DK1.2]      Physical Exam[ML1.1] general alert cooperative female in mild to moderate distress.  HEENT shows no scleral icterus.  No facial asymmetry.  Speech is clear and concise.  Neck is supple.  Lungs are clear without adventitious sounds.  Cardiac regular rate without murmur.  Back reveals no CVA tenderness.  She does have some mid thoracic spinous tenderness without crepitus or step-off.  Muscular spasm to the right of that area.  No skin rash or lesion over the area.  Abdomen is morbidly obese.  She is active bowel sounds.  On palpation she has tenderness in the midepigastrium moderately and mildly in the left lower quadrant.  No skin rashes over the abdomen.  She has intact surgical scars.  Remedies reveal no pitting edema, calf or thigh tenderness.  Skin exam shows no significant bruising issues.  Exam reveals no tenderness or masses to the examining digit.  Dark stool that is grossly guaiac positive.[DK1.3]    ED Course     ED Course     Procedures               Critical Care time:[ML1.1]  none[DK1.3]               Results for orders placed or performed during the hospital encounter of 03/17/18 (from the past 24 hour(s))   UA reflex to Microscopic   Result Value Ref Range    Color Urine Yellow     Appearance Urine Slightly Cloudy     Glucose Urine Negative NEG^Negative mg/dL    Bilirubin Urine Negative NEG^Negative    Ketones Urine Negative NEG^Negative mg/dL    Specific Gravity Urine 1.023 1.003 - 1.035    Blood Urine Negative NEG^Negative    pH Urine 5.0 5.0 - 7.0 pH "    Protein Albumin Urine Negative NEG^Negative mg/dL    Urobilinogen mg/dL 0.0 0.0 - 2.0 mg/dL    Nitrite Urine Negative NEG^Negative    Leukocyte Esterase Urine Negative NEG^Negative    Source Midstream Urine     RBC Urine 2 0 - 2 /HPF    WBC Urine 3 0 - 5 /HPF    Squamous Epithelial /HPF Urine 2 (H) 0 - 1 /HPF    Mucous Urine Present (A) NEG^Negative /LPF    Hyaline Casts 9 (H) 0 - 2 /LPF   CBC with platelets differential   Result Value Ref Range    WBC 7.5 4.0 - 11.0 10e9/L    RBC Count 3.83 3.8 - 5.2 10e12/L    Hemoglobin 11.5 (L) 11.7 - 15.7 g/dL    Hematocrit 36.0 35.0 - 47.0 %    MCV 94 78 - 100 fl    MCH 30.0 26.5 - 33.0 pg    MCHC 31.9 31.5 - 36.5 g/dL    RDW 13.9 10.0 - 15.0 %    Platelet Count 245 150 - 450 10e9/L    Diff Method Automated Method     % Neutrophils 59.3 %    % Lymphocytes 34.0 %    % Monocytes 5.1 %    % Eosinophils 0.9 %    % Basophils 0.3 %    % Immature Granulocytes 0.4 %    Absolute Neutrophil 4.4 1.6 - 8.3 10e9/L    Absolute Lymphocytes 2.5 0.8 - 5.3 10e9/L    Absolute Monocytes 0.4 0.0 - 1.3 10e9/L    Absolute Eosinophils 0.1 0.0 - 0.7 10e9/L    Absolute Basophils 0.0 0.0 - 0.2 10e9/L    Abs Immature Granulocytes 0.0 0 - 0.4 10e9/L   INR   Result Value Ref Range    INR 1.00 0.86 - 1.14   Comprehensive metabolic panel   Result Value Ref Range    Sodium 137 133 - 144 mmol/L    Potassium 3.8 3.4 - 5.3 mmol/L    Chloride 106 94 - 109 mmol/L    Carbon Dioxide 25 20 - 32 mmol/L    Anion Gap 6 3 - 14 mmol/L    Glucose 102 (H) 70 - 99 mg/dL    Urea Nitrogen 32 (H) 7 - 30 mg/dL    Creatinine 0.71 0.52 - 1.04 mg/dL    GFR Estimate 83 >60 mL/min/1.7m2    GFR Estimate If Black >90 >60 mL/min/1.7m2    Calcium 8.7 8.5 - 10.1 mg/dL    Bilirubin Total 0.2 0.2 - 1.3 mg/dL    Albumin 3.2 (L) 3.4 - 5.0 g/dL    Protein Total 6.9 6.8 - 8.8 g/dL    Alkaline Phosphatase 80 40 - 150 U/L    ALT 17 0 - 50 U/L    AST 16 0 - 45 U/L   XR Thoracic Spine 3 Views    Narrative    THORACIC SPINE THREE VIEWS  3/17/2018  1:58 PM     COMPARISON: Thoracic spine x-ray series 10/10/2013    HISTORY: Mid thoracic tenderness.      Impression    IMPRESSION: There is continued normal alignment of the thoracic  vertebrae. Vertebral body heights of the thoracic spine remain normal.  There is no evidence for fracture of the thoracic spine. Degenerative  endplate spurring at the T2-T3, T3-T4, T4-T5, T5-T6, T6-T7 and T7-T8  levels again noted.    ALLISON LEDBETTER MD       Medications   famotidine (PEPCID) infusion 20 mg (not administered)   naloxone (NARCAN) injection 0.1-0.4 mg (not administered)   dextrose 5% and 0.45% NaCl + KCl 20 mEq/L infusion ( Intravenous New Bag 3/17/18 8939)   ondansetron (ZOFRAN-ODT) ODT tab 4 mg (not administered)     Or   ondansetron (ZOFRAN) injection 4 mg (not administered)   pantoprazole (PROTONIX) 40 mg IV push injection (not administered)   atenolol (TENORMIN) tablet 50 mg (not administered)   clobetasol (TEMOVATE) 0.05 % ointment (not administered)   CERAVE CREA (not administered)   hydrochlorothiazide tablet 12.5 mg (not administered)   nystatin (MYCOSTATIN) cream (not administered)[DK1.2]     1:02 PM Patient assessed.[ML1.1]  IV was established and blood work was obtained.  Patient was given IV Pepcid.  X-ray of the thoracic spine is ordered and is described above[DK1.3]  Assessments & Plan (with Medical Decision Making)[ML1.1]   It is a 62-year-old female presents with complaints of black tarry stools, generalized weakness and dizziness, epigastric and left lower quadrant abdominal pains.  Patient has had previous gastric bypass ×2, cholecystectomy, and C-sections ×3 and previous hernia incisional repair.  Previously noted to have elevated LFTs but had an MRCP in April 2017 that did not show evidence of acute abnormality.  She denies history of ulcer or reflux.  She has had normal colonoscopy most recently 8/22/17.  She is not on blood thinners or antiplatelet therapy.  Denies recent exposure to infectious  illness.  She has had no vomiting.  Denies unusual bleeding or bruising otherwise.  Has not been on antibiotics.  On presentation she was afebrile and vitally stable.  She is not tachycardic or hypotensive.  She was not hypoxic.  She had no scleral icterus.  Oral mucosa is moist.  Neck was supple.  Cardiac and respiratory auscultations were normal.  No CVA tenderness.  Abdomen is obese with active bowel sounds.  Intact surgical incisions.  Moderately tender in the midepigastrium without guarding or rebound.  Mildly tender in the left lower quadrant.  Rectal exam revealed no masses or tenderness to the examining digit.  She had dark stool that was grossly guaiac positive.  Blood work was obtained and did show a drop in her hemoglobin from 14.8 in December 2017 to 11.5 today.  No unusual bruising on skin exam.  Patient's INR is normal.  Her platelets are 245.  Urinalysis was unremarkable.  She did have some midthoracic tenderness on palpation without crepitus or step-off.  Muscular spasm associated with this.  No skin rash that would suggest shingles.  X-ray did not show any acute compression fracture with just some degenerative changes.  Patient was given IV fluids and IV Pepcid.  I spoke with Dr. Addison from the hospitalist service and he will assume care on admission.  Dr. Galindo from surgical services will be contacted to discuss endoscopy.  I[DK1.3] have reviewed the nursing notes.    I have reviewed the findings, diagnosis, plan and need for follow up with the patient.[ML1.1]       Current Discharge Medication List          Final diagnoses:   Upper GI bleed   Anemia, unspecified type[DK1.2]     This document serves as a record of the services and decisions personally performed and made by Deepak Lemus MD. It was created on HIS/HER behalf by   Petra Pozo, a trained medical scribe. The creation of this document is based the provider's statements to the medical scribe.  Petra Pozo 1:02 PM 3/17/2018    Provider:  "  The information in this document, created by the medical scribe for me, accurately reflects the services I personally performed and the decisions made by me. I have reviewed and approved this document for accuracy prior to leaving the patient care area.  Deepak Lemus MD 1:02 PM 3/17/2018    3/17/2018   [facility] EMERGENCY DEPARTMENT[ML1.1]     Deepak Lemus MD  03/17/18 1744  [DK1.1]     Revision History        User Key Date/Time User Provider Type Action    > DK1.1 3/17/2018  5:44 PM Deepak Lemus MD Physician Sign     DK1.2 3/17/2018  5:43 PM Deepak Lemus MD Physician      DK1.3 3/17/2018  3:58 PM Deepak Lemus MD Physician Share     ML1.1 3/17/2018  1:25 PM Petra Pozo Scribe Share            Progress Notes by Emi Ambrosio RN at 3/17/2018  5:39 PM     Author:  Emi Ambrosio RN Service:  (none) Author Type:  Registered Nurse    Filed:  3/17/2018  5:41 PM Date of Service:  3/17/2018  5:39 PM Creation Time:  3/17/2018  5:39 PM    Status:  Signed :  Emi Ambrosio RN (Registered Nurse)         [facility] ADMISSION NOTE    Patient admitted to room 2213 at approximately 1710 via wheel chair from emergency room. Patient was accompanied by transport tech.     Verbal SBAR report received from Wendi  prior to patient arrival.     Patient ambulated to bed with stand-by assist. Patient alert and oriented X 3. Pain is controlled without any medications.  . Admission vital signs: Blood pressure 123/65, temperature 97  F (36.1  C), temperature source Oral, resp. rate 18, height 1.6 m (5' 3\"), weight 109.2 kg (240 lb 11.9 oz), last menstrual period 05/29/2006, SpO2 96 %, not currently breastfeeding. Patient was oriented to plan of care, call light, bed controls, tv, telephone, bathroom and visiting hours.     The following safety risks were identified during admission: none. Yellow risk band applied: JASEN Ambrosio[TL1.1]       Revision History        User Key Date/Time User Provider Type " "Action    > TL1.1 3/17/2018  5:41 PM Emi Ambrosio RN Registered Nurse Sign            ED Notes by Jacki Yip RN at 3/17/2018  4:26 PM     Author:  Jacki Yip RN Service:  Emergency Medicine Author Type:  Registered Nurse    Filed:  3/17/2018  4:26 PM Date of Service:  3/17/2018  4:26 PM Creation Time:  3/17/2018  4:26 PM    Status:  Signed :  Jacki Yip RN (Registered Nurse)         Patient has  [place] to Observation  order. Patient has been given the Observation brochure -  What does Observation mean to me.\"  Patient has been given the opportunity to ask questions about observation status and their plan of care.      Jacki Yip[DL1.1]     Revision History        User Key Date/Time User Provider Type Action    > DL1.1 3/17/2018  4:26 PM Jacki Yip RN Registered Nurse Sign            ED Notes by Wendi Shah RN at 3/17/2018  1:07 PM     Author:  Wendi Shah RN Service:  (none) Author Type:  Registered Nurse    Filed:  3/17/2018  1:21 PM Date of Service:  3/17/2018  1:07 PM Creation Time:  3/17/2018  1:08 PM    Status:  Addendum :  Wendi Shah RN (Registered Nurse)         Patient c/o right upper back pain and intermittent LLQ pain and black tarry stools[LR1.1] and dizziness[LR1.2]     Revision History        User Key Date/Time User Provider Type Action    > LR1.2 3/17/2018  1:21 PM Wendi Shah RN Registered Nurse Addend     LR1.1 3/17/2018  1:08 PM Wendi Shah RN Registered Nurse Sign                  Procedure Notes     No notes of this type exist for this encounter.      Progress Notes - Therapies (Notes from 03/15/18 through 03/18/18)     No notes of this type exist for this encounter.      "

## 2018-03-17 NOTE — PROGRESS NOTES
"[facility] [facility] ADMISSION NOTE    Patient admitted to room 2213 at approximately 1710 via wheel chair from emergency room. Patient was accompanied by transport tech.     Verbal SBAR report received from Wendi  prior to patient arrival.     Patient ambulated to bed with stand-by assist. Patient alert and oriented X 3. Pain is controlled without any medications.  . Admission vital signs: Blood pressure 123/65, temperature 97  F (36.1  C), temperature source Oral, resp. rate 18, height 1.6 m (5' 3\"), weight 109.2 kg (240 lb 11.9 oz), last menstrual period 05/29/2006, SpO2 96 %, not currently breastfeeding. Patient was oriented to plan of care, call light, bed controls, tv, telephone, bathroom and visiting hours.     The following safety risks were identified during admission: none. Yellow risk band applied: NO.     Emi Ambrosio    "

## 2018-03-17 NOTE — IP AVS SNAPSHOT
MRN:[id number]                      After Visit Summary   3/17/2018    Jacquie Harris    MRN: [id number]           Thank you!     Thank you for choosing [facility] for your care. Our goal is always to provide you with excellent care. Hearing back from our patients is one way we can continue to improve our services. Please take a few minutes to complete the written survey that you may receive in the mail after you visit with us. Thank you!        Patient Information     Date Of Birth          1955        About your hospital stay     You were admitted on:  March 17, 2018 You last received care in the:  [facility]    You were discharged on:  March 18, 2018       Who to Call     For medical emergencies, please call 911.  For non-urgent questions about your medical care, please call your primary care provider or clinic, [phone]  For questions related to your surgery, please call your surgery clinic        Attending Provider     Provider Specialty    Deepak Lemus MD Emergency Medicine    Poncho Galindo MD Surgery    [facility]       Primary Care Provider Office Phone # Fax #    Jennie Suazo [phone]      Your next 10 appointments already scheduled     Mar 20, 2018  9:00 AM CDT   Ortho Treatment with Dasia Wilson PT   [facility] ([facility])    [address]  [address]  [facility] [place] [id number] [phone]            Mar 23, 2018  9:00 AM CDT   Office Visit with Jennie Suazo MD   [facility] ([facility])    [address] [address] [phone]           Bring a current list of meds and any records pertaining to this visit. For Physicals, please bring immunization records and any forms needing to be filled out. Please arrive 10 minutes early to complete paperwork.            Mar 27, 2018  9:00 AM CDT   Ortho  "Treatment with Dasia Wilson, PT   [facility] ([facility])    [address]  [address]  [facility] [facility] [id number] [phone]            Apr 03, 2018  9:00 AM CDT   Ortho Treatment with Dasia Wilson, PT   [facility] ([facility])    [address]  [address]  [place] [facility] [place] [address]   [phone]            Apr 27, 2018   Procedure with Hernando Thompson MD   [facility] (--)    [address] [address] [phone]           The medical center is located at [address] (between [address] in [place], [place] north of [place]).              Pending Results     No orders found for last 3 day(s).            Statement of Approval     Ordered          03/18/18 1256  I have reviewed and agree with all the recommendations and orders detailed in this document.  EFFECTIVE NOW     Approved and electronically signed by:  Stanley Robertson MD             Admission Information     Date & Time Department Dept. Phone    3/17/2018 [facility] [phone]      Your Vitals Were     Blood Pressure Pulse Temperature Respirations Height Weight    114/60 68 98.4  F (36.9  C) (Oral) 16 1.6 m (5' 3\") 109.2 kg (240 lb 11.9 oz)    Last Period Pulse Oximetry BMI (Body Mass Index)             05/29/2006 99% 42.65 kg/m2         [employer] Information     [employer] lets you send messages to your doctor, view your test results, renew your prescriptions, schedule appointments and more. To sign up, go to [email or web address] . Click on \"Log in\" on the left side of the screen, which will take you to the Welcome page. Then click on \"Sign up Now\" on the right side of the page.     You will be asked to enter the access code listed below, as well as some personal information. Please follow the directions to create your username and password.     Your access code is: " [email or web address]  Expires: 6/15/2018  3:11 PM     Your access code will  in 90 days. If you need help or a new code, please call your [facility] clinic or [phone].        Care EveryWhere ID     This is your Care EveryWhere ID. This could be used by other organizations to access your [facility] medical records  [id number]        Equal Access to Services     PREM NGO : Hadii aad ku hadasho Soomaali, waaxda luqadaha, qaybta kaalmada adeegyada, waxay idiin haychaparron adepolly wolf lawintershana . So [facility] [phone].    ATENCIÓN: Si habstefany rosenbaum, tiene a booth disposición servicios gratuitos de asistencia lingüística. Llame al [phone].    We comply with applicable federal civil rights laws and [place] laws. We do not discriminate on the basis of race, color, national origin, age, disability, sex, sexual orientation, or gender identity.               Review of your medicines      START taking        Dose / Directions    acetaminophen 325 MG tablet   Commonly known as:  TYLENOL   Used for:  Shoulder injury, right, subsequent encounter        Dose:  650 mg   Take 2 tablets (650 mg) by mouth every 4 hours as needed for mild pain or fever   Quantity:  100 tablet   Refills:  0       pantoprazole 40 MG EC tablet   Commonly known as:  PROTONIX   Used for:  Upper GI bleed        Dose:  40 mg   Take 1 tablet (40 mg) by mouth 2 times daily Take 30-60 minutes before a meal.   Quantity:  60 tablet   Refills:  0       ranitidine 150 MG tablet   Commonly known as:  ZANTAC   Used for:  Upper GI bleed        Dose:  150 mg   Take 1 tablet (150 mg) by mouth 2 times daily   Quantity:  60 tablet   Refills:  0         CONTINUE these medicines which may have CHANGED, or have new prescriptions. If we are uncertain of the size of tablets/capsules you have at home, strength may be listed as something that might have changed.        Dose / Directions    clobetasol 0.05 % ointment   Commonly known as:  TEMOVATE   This may have changed:    -  when to take this  - reasons to take this  - additional instructions   Used for:  Vaginal itching        Apply sparingly to affected area twice daily for 14 days.  Do not apply to face.   Quantity:  15 g   Refills:  0         CONTINUE these medicines which have NOT CHANGED        Dose / Directions    atenolol 50 MG tablet   Commonly known as:  TENORMIN   Used for:  Benign essential hypertension        Dose:  50 mg   Take 1 tablet (50 mg) by mouth daily   Quantity:  90 tablet   Refills:  3       CERAVE Crea   Used for:  Sunburn, Itching        Externally apply topically 2 times daily as needed   Quantity:  1 Bottle   Refills:  3       hydrochlorothiazide 12.5 MG Tabs tablet   Used for:  Benign essential hypertension        Dose:  12.5 mg   Take 1 tablet (12.5 mg) by mouth daily   Quantity:  90 tablet   Refills:  3       * nystatin cream   Commonly known as:  MYCOSTATIN   Used for:  Candidiasis of skin        Apply topically 2 times daily Reported on 3/6/2017   Quantity:  30 g   Refills:  11       * nystatin 119267 UNIT/GM Powd   Commonly known as:  MYCOSTATIN   Used for:  Candidiasis of skin        Apply topically 3 times daily as needed Refill at patient's request   Quantity:  30 g   Refills:  1       * Notice:  This list has 2 medication(s) that are the same as other medications prescribed for you. Read the directions carefully, and ask your doctor or other care provider to review them with you.         Where to get your medicines      These medications were sent to [facility] [place], [place] - [address]  [address] [address]     Phone:  [phone]     pantoprazole 40 MG EC tablet    ranitidine 150 MG tablet                Protect others around you: Learn how to safely use, store and throw away your medicines at www.disposemymeds.org.             Medication List: This is a list of all your medications and when to take them. Check marks below indicate your daily home schedule.  Keep this list as a reference.      Medications           Morning Afternoon Evening Bedtime As Needed    acetaminophen 325 MG tablet   Commonly known as:  TYLENOL   Take 2 tablets (650 mg) by mouth every 4 hours as needed for mild pain or fever   Last time this was given:  650 mg on 3/18/2018  4:12 AM                                   atenolol 50 MG tablet   Commonly known as:  TENORMIN   Take 1 tablet (50 mg) by mouth daily   Last time this was given:  50 mg on 3/18/2018  7:46 AM                                   CERAVE Crea   Externally apply topically 2 times daily as needed                                clobetasol 0.05 % ointment   Commonly known as:  TEMOVATE   Apply sparingly to affected area twice daily for 14 days.  Do not apply to face.                                hydrochlorothiazide 12.5 MG Tabs tablet   Take 1 tablet (12.5 mg) by mouth daily   Last time this was given:  12.5 mg on 3/18/2018  7:47 AM                                   * nystatin cream   Commonly known as:  MYCOSTATIN   Apply topically 2 times daily Reported on 3/6/2017                                * nystatin 904504 UNIT/GM Powd   Commonly known as:  MYCOSTATIN   Apply topically 3 times daily as needed Refill at patient's request                                pantoprazole 40 MG EC tablet   Commonly known as:  PROTONIX   Take 1 tablet (40 mg) by mouth 2 times daily Take 30-60 minutes before a meal.                                      ranitidine 150 MG tablet   Commonly known as:  ZANTAC   Take 1 tablet (150 mg) by mouth 2 times daily                                      * Notice:  This list has 2 medication(s) that are the same as other medications prescribed for you. Read the directions carefully, and ask your doctor or other care provider to review them with you.

## 2018-03-18 ENCOUNTER — SURGERY (OUTPATIENT)
Age: 63
End: 2018-03-18

## 2018-03-18 ENCOUNTER — ANESTHESIA EVENT (OUTPATIENT)
Dept: GASTROENTEROLOGY | Facility: CLINIC | Age: 63
End: 2018-03-18
Payer: COMMERCIAL

## 2018-03-18 ENCOUNTER — ANESTHESIA (OUTPATIENT)
Dept: GASTROENTEROLOGY | Facility: CLINIC | Age: 63
End: 2018-03-18
Payer: COMMERCIAL

## 2018-03-18 VITALS
OXYGEN SATURATION: 99 % | RESPIRATION RATE: 16 BRPM | SYSTOLIC BLOOD PRESSURE: 114 MMHG | HEIGHT: 63 IN | WEIGHT: 240.74 LBS | DIASTOLIC BLOOD PRESSURE: 60 MMHG | TEMPERATURE: 98.4 F | HEART RATE: 68 BPM | BODY MASS INDEX: 42.66 KG/M2

## 2018-03-18 LAB
ANION GAP SERPL CALCULATED.3IONS-SCNC: 5 MMOL/L (ref 3–14)
BASOPHILS # BLD AUTO: 0 10E9/L (ref 0–0.2)
BASOPHILS NFR BLD AUTO: 0.2 %
BUN SERPL-MCNC: 17 MG/DL (ref 7–30)
CALCIUM SERPL-MCNC: 8.3 MG/DL (ref 8.5–10.1)
CHLORIDE SERPL-SCNC: 106 MMOL/L (ref 94–109)
CO2 SERPL-SCNC: 29 MMOL/L (ref 20–32)
CREAT SERPL-MCNC: 0.68 MG/DL (ref 0.52–1.04)
DIFFERENTIAL METHOD BLD: ABNORMAL
EOSINOPHIL # BLD AUTO: 0.1 10E9/L (ref 0–0.7)
EOSINOPHIL NFR BLD AUTO: 1.8 %
ERYTHROCYTE [DISTWIDTH] IN BLOOD BY AUTOMATED COUNT: 13.8 % (ref 10–15)
GFR SERPL CREATININE-BSD FRML MDRD: 87 ML/MIN/1.7M2
GLUCOSE SERPL-MCNC: 116 MG/DL (ref 70–99)
HCT VFR BLD AUTO: 31.8 % (ref 35–47)
HGB BLD-MCNC: 10.1 G/DL (ref 11.7–15.7)
HGB BLD-MCNC: 10.7 G/DL (ref 11.7–15.7)
HGB BLD-MCNC: 9.7 G/DL (ref 11.7–15.7)
IMM GRANULOCYTES # BLD: 0 10E9/L (ref 0–0.4)
IMM GRANULOCYTES NFR BLD: 0.4 %
LYMPHOCYTES # BLD AUTO: 2.2 10E9/L (ref 0.8–5.3)
LYMPHOCYTES NFR BLD AUTO: 40 %
MCH RBC QN AUTO: 30.1 PG (ref 26.5–33)
MCHC RBC AUTO-ENTMCNC: 31.8 G/DL (ref 31.5–36.5)
MCV RBC AUTO: 95 FL (ref 78–100)
MONOCYTES # BLD AUTO: 0.5 10E9/L (ref 0–1.3)
MONOCYTES NFR BLD AUTO: 8.7 %
NEUTROPHILS # BLD AUTO: 2.7 10E9/L (ref 1.6–8.3)
NEUTROPHILS NFR BLD AUTO: 48.9 %
PLATELET # BLD AUTO: 195 10E9/L (ref 150–450)
POTASSIUM SERPL-SCNC: 4 MMOL/L (ref 3.4–5.3)
RBC # BLD AUTO: 3.36 10E12/L (ref 3.8–5.2)
SODIUM SERPL-SCNC: 140 MMOL/L (ref 133–144)
UPPER GI ENDOSCOPY: NORMAL
WBC # BLD AUTO: 5.5 10E9/L (ref 4–11)

## 2018-03-18 PROCEDURE — G0378 HOSPITAL OBSERVATION PER HR: HCPCS

## 2018-03-18 PROCEDURE — 85018 HEMOGLOBIN: CPT | Mod: 91 | Performed by: FAMILY MEDICINE

## 2018-03-18 PROCEDURE — 36415 COLL VENOUS BLD VENIPUNCTURE: CPT | Performed by: FAMILY MEDICINE

## 2018-03-18 PROCEDURE — 80048 BASIC METABOLIC PNL TOTAL CA: CPT | Performed by: FAMILY MEDICINE

## 2018-03-18 PROCEDURE — 25000132 ZZH RX MED GY IP 250 OP 250 PS 637: Performed by: FAMILY MEDICINE

## 2018-03-18 PROCEDURE — 43235 EGD DIAGNOSTIC BRUSH WASH: CPT | Performed by: SURGERY

## 2018-03-18 PROCEDURE — 96376 TX/PRO/DX INJ SAME DRUG ADON: CPT

## 2018-03-18 PROCEDURE — 25000125 ZZHC RX 250: Performed by: FAMILY MEDICINE

## 2018-03-18 PROCEDURE — 25000128 H RX IP 250 OP 636: Performed by: EMERGENCY MEDICINE

## 2018-03-18 PROCEDURE — 25000132 ZZH RX MED GY IP 250 OP 250 PS 637: Performed by: PHYSICIAN ASSISTANT

## 2018-03-18 PROCEDURE — 25000128 H RX IP 250 OP 636: Performed by: NURSE ANESTHETIST, CERTIFIED REGISTERED

## 2018-03-18 PROCEDURE — 25000125 ZZHC RX 250: Performed by: NURSE ANESTHETIST, CERTIFIED REGISTERED

## 2018-03-18 PROCEDURE — 25000125 ZZHC RX 250: Performed by: SURGERY

## 2018-03-18 PROCEDURE — 37000008 ZZH ANESTHESIA TECHNICAL FEE, 1ST 30 MIN: Performed by: SURGERY

## 2018-03-18 PROCEDURE — 85025 COMPLETE CBC W/AUTO DIFF WBC: CPT | Performed by: FAMILY MEDICINE

## 2018-03-18 PROCEDURE — 99217 ZZC OBSERVATION CARE DISCHARGE: CPT | Performed by: FAMILY MEDICINE

## 2018-03-18 PROCEDURE — 25800025 ZZH RX 258: Performed by: NURSE ANESTHETIST, CERTIFIED REGISTERED

## 2018-03-18 PROCEDURE — 25800025 ZZH RX 258: Performed by: EMERGENCY MEDICINE

## 2018-03-18 RX ORDER — PROPOFOL 10 MG/ML
INJECTION, EMULSION INTRAVENOUS CONTINUOUS PRN
Status: DISCONTINUED | OUTPATIENT
Start: 2018-03-18 | End: 2018-03-18

## 2018-03-18 RX ORDER — GLYCOPYRROLATE 0.2 MG/ML
INJECTION, SOLUTION INTRAMUSCULAR; INTRAVENOUS PRN
Status: DISCONTINUED | OUTPATIENT
Start: 2018-03-18 | End: 2018-03-18

## 2018-03-18 RX ORDER — LIDOCAINE HYDROCHLORIDE 10 MG/ML
INJECTION, SOLUTION INFILTRATION; PERINEURAL PRN
Status: DISCONTINUED | OUTPATIENT
Start: 2018-03-18 | End: 2018-03-18

## 2018-03-18 RX ORDER — LIDOCAINE 40 MG/G
CREAM TOPICAL
Status: DISCONTINUED | OUTPATIENT
Start: 2018-03-18 | End: 2018-03-18 | Stop reason: HOSPADM

## 2018-03-18 RX ORDER — PROPOFOL 10 MG/ML
INJECTION, EMULSION INTRAVENOUS PRN
Status: DISCONTINUED | OUTPATIENT
Start: 2018-03-18 | End: 2018-03-18

## 2018-03-18 RX ORDER — ACETAMINOPHEN 325 MG/1
650 TABLET ORAL EVERY 4 HOURS PRN
Qty: 100 TABLET | COMMUNITY
Start: 2018-03-18 | End: 2020-01-07

## 2018-03-18 RX ORDER — PANTOPRAZOLE SODIUM 40 MG/1
40 TABLET, DELAYED RELEASE ORAL 2 TIMES DAILY
Qty: 60 TABLET | Refills: 0 | Status: SHIPPED | OUTPATIENT
Start: 2018-03-18 | End: 2018-08-28

## 2018-03-18 RX ADMIN — DEXTROSE MONOHYDRATE AND SODIUM CHLORIDE: 5; .45 INJECTION, SOLUTION INTRAVENOUS at 10:15

## 2018-03-18 RX ADMIN — FAMOTIDINE 20 MG: 20 INJECTION, SOLUTION INTRAVENOUS at 01:43

## 2018-03-18 RX ADMIN — ONDANSETRON 4 MG: 2 INJECTION INTRAMUSCULAR; INTRAVENOUS at 10:45

## 2018-03-18 RX ADMIN — ATENOLOL 50 MG: 50 TABLET ORAL at 07:46

## 2018-03-18 RX ADMIN — SIMETHICONE 40 MG: 63.3; 3.7 SOLUTION/ DROPS ORAL at 10:21

## 2018-03-18 RX ADMIN — BENZOCAINE 1 SPRAY: 220 SPRAY, METERED PERIODONTAL at 10:21

## 2018-03-18 RX ADMIN — TOPICAL ANESTHETIC 1 SPRAY: 200 SPRAY DENTAL; PERIODONTAL at 10:16

## 2018-03-18 RX ADMIN — POTASSIUM CHLORIDE, DEXTROSE MONOHYDRATE AND SODIUM CHLORIDE: 150; 5; 450 INJECTION, SOLUTION INTRAVENOUS at 01:43

## 2018-03-18 RX ADMIN — PANTOPRAZOLE SODIUM 40 MG: 40 INJECTION, POWDER, FOR SOLUTION INTRAVENOUS at 07:47

## 2018-03-18 RX ADMIN — GLYCOPYRROLATE 0.2 MG: 0.2 INJECTION, SOLUTION INTRAMUSCULAR; INTRAVENOUS at 10:18

## 2018-03-18 RX ADMIN — ACETAMINOPHEN 650 MG: 325 TABLET, FILM COATED ORAL at 04:12

## 2018-03-18 RX ADMIN — LIDOCAINE HYDROCHLORIDE 50 MG: 10 INJECTION, SOLUTION INFILTRATION; PERINEURAL at 10:17

## 2018-03-18 RX ADMIN — HYDROCHLOROTHIAZIDE 12.5 MG: 12.5 CAPSULE ORAL at 07:47

## 2018-03-18 RX ADMIN — PROPOFOL 50 MG: 10 INJECTION, EMULSION INTRAVENOUS at 10:17

## 2018-03-18 RX ADMIN — PROPOFOL 200 MCG/KG/MIN: 10 INJECTION, EMULSION INTRAVENOUS at 10:18

## 2018-03-18 NOTE — ANESTHESIA POSTPROCEDURE EVALUATION
Patient: Jacquie Steven    Procedure(s):   - Wound Class: II-Clean Contaminated    Diagnosis:.  Diagnosis Additional Information: No value filed.    Anesthesia Type:  MAC    Note:  Anesthesia Post Evaluation    Patient location during evaluation: Bedside  Patient participation: Able to fully participate in evaluation  Level of consciousness: awake and alert  Pain management: adequate  Airway patency: patent  Cardiovascular status: acceptable  Respiratory status: acceptable  Hydration status: acceptable  PONV: none     Anesthetic complications: None          Last vitals:  Vitals:    03/17/18 1711 03/17/18 2339 03/18/18 0813   BP: 123/65 105/48 113/65   Pulse:   72   Resp: 18 18 18   Temp: 36.1  C (97  F) 36.4  C (97.6  F) 36.7  C (98.1  F)   SpO2: 96% 96% 99%         Electronically Signed By: Jazzmine Sebastian CRNA, APRN CRNA  March 18, 2018  10:29 AM

## 2018-03-18 NOTE — ANESTHESIA PREPROCEDURE EVALUATION
Anesthesia Evaluation     . Pt has had prior anesthetic. Type: General and MAC           ROS/MED HX    ENT/Pulmonary:     (+)sleep apnea, DARWIN risk factors allergic rhinitis, , . .    Neurologic:       Cardiovascular:     (+) hypertension----. : . . . :. .       METS/Exercise Tolerance:     Hematologic:     (+) Anemia, -      Musculoskeletal:   (+) arthritis, , , -       GI/Hepatic:     (+) liver disease, Other GI/Hepatic gastric bypass      Renal/Genitourinary:         Endo:     (+) Obesity, .      Psychiatric:         Infectious Disease:         Malignancy:         Other:    (+) H/O Chronic Pain,                   Physical Exam  Normal systems: cardiovascular, pulmonary and dental    Airway   Mallampati: II  TM distance: >3 FB  Neck ROM: full    Dental     Cardiovascular       Pulmonary                     Anesthesia Plan      History & Physical Review  History and physical reviewed and following examination; no interval change.    ASA Status:  2 emergent.    NPO Status:  > 6 hours    Plan for MAC Reason for MAC:  Deep or markedly invasive procedure (G8)         Postoperative Care      Consents  Anesthetic plan, risks, benefits and alternatives discussed with:  Patient..                          .

## 2018-03-18 NOTE — DISCHARGE SUMMARY
[facility] Discharge Summary    Jacquie Harris MRN# [id number]   Age: 62 year old YOB: 1955     Date of Admission:  3/17/2018  Date of Discharge::  3/18/2018  Admitting Physician:  Poncho Galindo MD  Discharge Physician:  Stanley Robertson MD, MD             Admission Diagnoses:   Upper GI bleed [K92.2]          Principle Discharge Diagnosis:     Acute GI bleed - suspect gastritis/gastric ulcer now resolved on endoscopy, s/p gastric bypass     See hospital course for further active diagnoses addressed during this admission.            Procedures:   See EMR for EGD results from today          Medications Prior to Admission:     Prescriptions Prior to Admission   Medication Sig Dispense Refill Last Dose     atenolol (TENORMIN) 50 MG tablet Take 1 tablet (50 mg) by mouth daily 90 tablet 3 Taking     hydrochlorothiazide 12.5 MG TABS tablet Take 1 tablet (12.5 mg) by mouth daily 90 tablet 3      nystatin (MYCOSTATIN) 924512 UNIT/GM POWD Apply topically 3 times daily as needed Refill at patient's request 30 g 1 Taking     nystatin (MYCOSTATIN) cream Apply topically 2 times daily Reported on 3/6/2017 30 g 11 Taking     clobetasol (TEMOVATE) 0.05 % ointment Apply sparingly to affected area twice daily for 14 days.  Do not apply to face. (Patient taking differently: daily as needed Apply sparingly to affected area twice daily for 14 days.  Do not apply to face.) 15 g 0 12/17/2017 at Unknown time     Emollient (CERAVE) CREA Externally apply topically 2 times daily as needed 1 Bottle 3 Taking             Discharge Medications:     Current Discharge Medication List      START taking these medications    Details   acetaminophen (TYLENOL) 325 MG tablet Take 2 tablets (650 mg) by mouth every 4 hours as needed for mild pain or fever  Qty: 100 tablet    Associated Diagnoses: Shoulder injury, right, subsequent encounter      ranitidine (ZANTAC) 150 MG tablet Take 1 tablet (150 mg) by mouth 2 times daily  Qty: 60  tablet, Refills: 0    Associated Diagnoses: Upper GI bleed      pantoprazole (PROTONIX) 40 MG EC tablet Take 1 tablet (40 mg) by mouth 2 times daily Take 30-60 minutes before a meal.  Qty: 60 tablet, Refills: 0    Associated Diagnoses: Upper GI bleed         CONTINUE these medications which have NOT CHANGED    Details   atenolol (TENORMIN) 50 MG tablet Take 1 tablet (50 mg) by mouth daily  Qty: 90 tablet, Refills: 3    Comments: Refill at patient's request  Associated Diagnoses: Benign essential hypertension      hydrochlorothiazide 12.5 MG TABS tablet Take 1 tablet (12.5 mg) by mouth daily  Qty: 90 tablet, Refills: 3    Comments: Refill at patient's request  Associated Diagnoses: Benign essential hypertension      nystatin (MYCOSTATIN) [id number] UNIT/GM POWD Apply topically 3 times daily as needed Refill at patient's request  Qty: 30 g, Refills: 1    Associated Diagnoses: Candidiasis of skin      nystatin (MYCOSTATIN) cream Apply topically 2 times daily Reported on 3/6/2017  Qty: 30 g, Refills: 11    Associated Diagnoses: Candidiasis of skin      clobetasol (TEMOVATE) 0.05 % ointment Apply sparingly to affected area twice daily for 14 days.  Do not apply to face.  Qty: 15 g, Refills: 0    Associated Diagnoses: Vaginal itching      Emollient (CERAVE) CREA Externally apply topically 2 times daily as needed  Qty: 1 Bottle, Refills: 3    Associated Diagnoses: Sunburn; Itching                   Consultations:   Consultation during this admission received from surgery          Brief History of Illness:     From Admission H+P:   This patient is a 62 year old  female with a significant past medical history of shoulder pain managed by Dr. Thompson with planned upcoming surgery as well as hypertension who presents with black stools.  She has been taking Aleve, 1-2 per day for the past couple of weeks.  Then starting about 4 days ago noticed that she had black appearing stools.  Has continued to have black stools past 4  "days, with new onset of some vague poorly localized abdominal pain and some lower mid back pain starting about the same time.  No aggravating or alleviating symptoms.  Today she felt light-headed with standing and so presented to the ER.       No chest pain, dyspnea fever or chills.  No urinary symptoms       No alcohol, tobacco or drug use.             TODAY:     Subjective:  Doing well.  No abdominal pain at present- occasinoally has \"very mild cramps across her lower abdomen\" still today, but these are minimally present and only occasional.  No change with normal diet for lunch today.  No nausea or vomiting. Pain through to back has resolved.  No other concerns.  No other pain.       ROS:   ROS: 10 point ROS neg other than the symptoms noted above in the HPI.     /60  Pulse 68  Temp 98.4  F (36.9  C) (Oral)  Resp 16  Ht 1.6 m (5' 3\")  Wt 109.2 kg (240 lb 11.9 oz)  LMP 05/29/2006  SpO2 99%  BMI 42.65 kg/m2   EXAM:  General: awake and alert, NAD, oriented x 3  Head: normocephalic  Neck: unremarkable, no lymphadenopathy   HEENT: oropharynx pink and moist    Heart: Regular rate and rhythm, no murmurs, rubs, or gallops  Lungs: clear to auscultation bilaterally with good air movement throughout  Abdomen: soft, non-tender, no masses or organomegaly, normal bowel sounds - unremarkable exam  Back: tenderness resolved.    Extremities: no edema in lower extremities   Skin unremarkable.            Hospital Course:      Acute GI bleed - suspect gastritis/gastric ulcer now resolved on endoscopy, likely due to NSAID use, s/p gastric bypass   3/17/2018 -- black stools x 4 days, hemoglobin 11.5 from apparent baseline 13-14.  Appears upper.  Had a normal colonoscopy in August of 2017 by report. Guiac positive in ER per report.   Started on pepcid and protonix IV.    3/18/2018 -- hemoglobin remained fairly stable, stools tapering off, pain resolved, EGD unremarkable on visualized areas (unable to fully visualize " stomach due to history of bypass).  Ok for discharge on oral ranitidine and protonix with plan to follow-up with primary care provider in 1 week - recheck hemoglobin at that time and likely stop ranitidine and/or decrease protonix to daily at that time if doing well.        Thoracic back pain - uncertain muscular vs due to ulcer/gastritis  3/17/2018 -- x-ray negative for fracture.  Tender in muscles - suspect muscular pain although could be due to possible ulcer as above.    3/18/2018 -- resolved with above treatment.         HTN, goal below 140/80  3/17/2018 -- blood pressure stable,   3/18/2018 -- continue home atenolol and hydrochlorothiazide               Discharge Instructions and Follow-Up:   Discharge diet: Advance to a regular diet as tolerated   Discharge activity: Activity as tolerated   Discharge follow-up: Follow up with primary care provider in 7 days, recheck hemoglobin and reassess medications at that time.              Discharge Disposition:   Discharged to home      Attestation:  I have reviewed today's vital signs, notes, medications, labs and imaging.  Amount of time performed on this discharge summary: 50 minutes.    Stanley Robertson MD, MD

## 2018-03-18 NOTE — H&P
62 year old year old female here for upper endoscopy for upper GI bleeding        Patient Active Problem List   Diagnosis     s/p gastric bypass x 2     Allergic rhinitis     Carpal tunnel syndrome     Obstructive sleep apnea     Rosacea     CARDIOVASCULAR SCREENING; LDL GOAL LESS THAN 160     Tick bite, infected, positive Lyme test 1996 not treated     Vitamin D deficiency     Osteoporosis     Obesity, Class III, BMI 40-49.9 (morbid obesity) (H)     S/P TKR (total knee replacement)     HTN, goal below 140/80     Back pain, left below scapula, strained muscles     Encounter for routine gynecological examination      Colles' fracture     Bilateral cold feet     Elevated levels of transaminase & lactic acid dehydrogenase     Fatty liver     Shoulder injury, right, subsequent encounter     GI bleed     Upper GI bleed       Past Medical History:   Diagnosis Date     HTN (hypertension)      IRON DEFICIENCY ANEMIA 7/5/2005     Iron infusion/ resolved since injections     DARWIN (obstructive sleep apnea)        Past Surgical History:   Procedure Laterality Date     ARTHROPLASTY KNEE  4/2/2012    Procedure:ARTHROPLASTY KNEE; Left Total Knee Arthroplasty--Anesth.Choice; Surgeon:HERNANDO THOMPSON; Location:[facility]     ARTHROTOMY SHOULDER, ROTATOR CUFF REPAIR, COMBINED Right 8/25/2017    Procedure: COMBINED ARTHROTOMY SHOULDER, ROTATOR CUFF REPAIR;  Right shoulder distal clavicle excision, subacromial decompression, rotator cuff repair ;  Surgeon: Hernando Thompson MD;  Location: [facility]     ARTHROTOMY SHOULDER, ROTATOR CUFF REPAIR, COMBINED Right 12/18/2017    Procedure: COMBINED ARTHROTOMY SHOULDER, ROTATOR CUFF REPAIR;  Right Shoulder Rotator Cuff Revision;  Surgeon: Hernando Thompson MD;  Location: [facility]     C ORAL SURGERY PROCEDURE  age 18    wisdom teeth     C/SECTION, LOW TRANSVERSE  1978, 1984, 1994    x 3     COLONOSCOPY  2001    normal colonoscopy     COLONOSCOPY N/A 8/22/2017    Procedure: COLONOSCOPY;   Colonoscopy  ;  Surgeon: Delfino Yoo MD;  Location: [facility]     GASTRIC BYPASS  1980/2005    Gastric Bypass and revision     HERNIA REPAIR, INCISIONAL  6/16/2008    lap.repair with 10x8 mesh     TUBAL LIGATION  1994       Family History   Problem Relation Age of Onset     C.A.D. Father      MI at age 60     Hypertension Father      Alzheimer Disease Father      Hyperlipidemia Father      OSTEOPOROSIS Mother      on Fosamax     Glaucoma Mother      Other Cancer Brother      Stomach Cancer Maternal Aunt      Cervical Cancer Cousin      DIABETES No family hx of      CEREBROVASCULAR DISEASE No family hx of      Breast Cancer No family hx of      Cancer - colorectal No family hx of      Prostate Cancer No family hx of      Liver Disease No family hx of        No current outpatient prescriptions on file.       Allergies   Allergen Reactions     Vioxx Itching and Swelling     VIOXX (Swollen Feet,Hands itching), Okay with ibuprofen and aspirin       Pt reports that she has never smoked. She has never used smokeless tobacco. She reports that she drinks alcohol. She reports that she does not use illicit drugs.    Exam:    Awake, Alert OX3  Lungs - CTA bilaterally  CV - RRR, no murmurs, distal pulses intact  Abd - soft, non-distended, non-tender, +BS  Extr - No cyanosis or edema    A/P 62 year old year old female in need of upper endoscopy for upper GI bledding. Risks, benefits, alternatives, and complications were discussed including the possibility of perforation and the patient agreed to proceed.    Poncho Galindo MD

## 2018-03-18 NOTE — PLAN OF CARE
"Problem: Pain, Acute (Adult)  Goal: Acceptable Pain Control/Comfort Level  Patient will demonstrate the desired outcomes by discharge/transition of care.   Outcome: No Change  Patient alert, oriented, SBA. No stools overnight. IV ffluids at 125 ml/hr. Prn tylenol given x 1 for headache. NPO since midnight. Denies nausea, SOB. /48 (BP Location: Left arm)  Temp 97.6  F (36.4  C) (Oral)  Resp 18  Ht 1.6 m (5' 3\")  Wt 109.2 kg (240 lb 11.9 oz)  LMP 05/29/2006  SpO2 96%  BMI 42.65 kg/m2        "

## 2018-03-18 NOTE — ANESTHESIA CARE TRANSFER NOTE
Patient: Jacquie Harris    Procedure(s):   - Wound Class: II-Clean Contaminated    Diagnosis: .  Diagnosis Additional Information: No value filed.    Anesthesia Type:   MAC     Note:    Patient transferred to:Phase II  Handoff Report: Identifed the Patient, Identified the Reponsible Provider, Reviewed the pertinent medical history, Discussed the surgical course, Reviewed Intra-OP anesthesia mangement and issues during anesthesia, Set expectations for post-procedure period and Allowed opportunity for questions and acknowledgement of understanding      Vitals: (Last set prior to Anesthesia Care Transfer)    CRNA VITALS  3/18/2018 0959 - 3/18/2018 1029      3/18/2018             Pulse: 82    SpO2: 99 %                Electronically Signed By: Jazzmine Sebastian CRNA, APRN CRNA  March 18, 2018  10:29 AM

## 2018-03-18 NOTE — PROGRESS NOTES
Pt tolerated 460 cc clear liquids without increased pain, denies nausea. Pt reports intermittent abdominal pain, declined intervention. Pt has not void since ED.

## 2018-03-19 ENCOUNTER — TELEPHONE (OUTPATIENT)
Dept: FAMILY MEDICINE | Facility: CLINIC | Age: 63
End: 2018-03-19

## 2018-03-19 NOTE — TELEPHONE ENCOUNTER
ED/UC/IP follow up phone call:3/18/2018 [facility] Upper GI Blood, Shoulder Injury    RN please call to follow up.    Number of ED visits in past 12 months = 1/1  G[facility]nes[facility] Campa  Clinic Station  [facility]

## 2018-03-19 NOTE — TELEPHONE ENCOUNTER
"ED / Discharge Outreach Protocol    Patient Contact    Attempt # 1    Was call answered?  Yes.  \"May I please speak with <patient name>\"  Is patient available?   Yes    Hospital/TCU/ED for chronic condition Discharge Protocol    \"Hi, my name is Tamela Chand, a registered nurse, and I am calling from [facility].  I am calling to follow up and see how things are going for you after your recent emergency visit/hospital/TCU stay.\"    Tell me how you are doing now that you are home?\"  Still pretty light-headed but the stool color is improving.  Less black appearing stools.      Discharge Instructions    \"Let's review your discharge instructions.  What is/are the follow-up recommendations?  Pt. Response: to follow up with PCP in 7 days.      \"Has an appointment with your primary care provider been scheduled?\"   Yes. (confirm) 3/23/18    \"When you see the provider, I would recommend that you bring your medications with you.\"    Medications    \"Tell me what changed about your medicines when you discharged?\"    Changes to chronic meds?    0-1    \"What questions do you have about your medications?\"    None     New diagnoses of heart failure, COPD, diabetes, or MI?    No              Medication reconciliation completed? Yes  Was MTM referral placed (*Make sure to put transitions as reason for referral)?   No    Call Summary    \"What questions or concerns do you have about your recent visit and your follow-up care?\"     none   Urged pt to return to ED if increasing symptoms such as worsened dizziness or blood in her stools.  Advised pt to use extra caution with position changes.    \"If you have questions or things don't continue to improve, we encourage you contact us through the main clinic number (give number).  Even if the clinic is not open, triage nurses are available 24/7 to help you.     We would like you to know that our clinic has extended hours (provide information).  We also have urgent care (provide details " "on closest location and hours/contact info)\"    \"Thank you for your time and take care!\"               "

## 2018-03-23 ENCOUNTER — OFFICE VISIT (OUTPATIENT)
Dept: FAMILY MEDICINE | Facility: CLINIC | Age: 63
End: 2018-03-23
Payer: COMMERCIAL

## 2018-03-23 VITALS
TEMPERATURE: 98 F | SYSTOLIC BLOOD PRESSURE: 116 MMHG | WEIGHT: 241 LBS | DIASTOLIC BLOOD PRESSURE: 70 MMHG | BODY MASS INDEX: 42.7 KG/M2 | HEART RATE: 59 BPM | HEIGHT: 63 IN

## 2018-03-23 DIAGNOSIS — E61.1 IRON DEFICIENCY: ICD-10-CM

## 2018-03-23 DIAGNOSIS — D64.9 ANEMIA, UNSPECIFIED TYPE: ICD-10-CM

## 2018-03-23 DIAGNOSIS — Z09 HOSPITAL DISCHARGE FOLLOW-UP: Primary | ICD-10-CM

## 2018-03-23 LAB
FERRITIN SERPL-MCNC: 7 NG/ML (ref 8–252)
HGB BLD-MCNC: 11.1 G/DL (ref 11.7–15.7)
IRON SATN MFR SERPL: 7 % (ref 15–46)
IRON SERPL-MCNC: 28 UG/DL (ref 35–180)
TIBC SERPL-MCNC: 428 UG/DL (ref 240–430)

## 2018-03-23 PROCEDURE — 83540 ASSAY OF IRON: CPT | Performed by: FAMILY MEDICINE

## 2018-03-23 PROCEDURE — 83550 IRON BINDING TEST: CPT | Performed by: FAMILY MEDICINE

## 2018-03-23 PROCEDURE — 36415 COLL VENOUS BLD VENIPUNCTURE: CPT | Performed by: FAMILY MEDICINE

## 2018-03-23 PROCEDURE — 85018 HEMOGLOBIN: CPT | Performed by: FAMILY MEDICINE

## 2018-03-23 PROCEDURE — 99214 OFFICE O/P EST MOD 30 MIN: CPT | Performed by: FAMILY MEDICINE

## 2018-03-23 PROCEDURE — 82728 ASSAY OF FERRITIN: CPT | Performed by: FAMILY MEDICINE

## 2018-03-23 NOTE — PROGRESS NOTES
Please inform patient that test result shows severe iron deficiency , I will recommend replacing this as she has upcoming surgery . I will fax this to her pharmacy .  Thank you.     Jennie Suazo M.D.

## 2018-03-23 NOTE — NURSING NOTE
"Chief Complaint   Patient presents with     Pre-Op Exam       Initial /70 (BP Location: Left arm, Cuff Size: Adult Regular)  Pulse 59  Temp 98  F (36.7  C) (Tympanic)  Ht 5' 3\" (1.6 m)  Wt 241 lb (109.3 kg)  LMP 05/29/2006  BMI 42.69 kg/m2 Estimated body mass index is 42.69 kg/(m^2) as calculated from the following:    Height as of this encounter: 5' 3\" (1.6 m).    Weight as of this encounter: 241 lb (109.3 kg).  Medication Reconciliation: complete  "

## 2018-03-23 NOTE — MR AVS SNAPSHOT
After Visit Summary   3/23/2018    Jacquie Harris    MRN: [id number]           Patient Information     Date Of Birth          1955        Visit Information        Provider Department      3/23/2018 9:00 AM Jennie Suazo MD [facility]        Today's Diagnoses     Anemia, unspecified type    -  1      Care Instructions                Follow-ups after your visit        Your next 10 appointments already scheduled     Apr 03, 2018  9:00 AM CDT   Ortho Treatment with Dasia Wilson PT   [facility] ([facility])    [address]  [address]  [place] [id number] [phone]            Apr 27, 2018   Procedure with Hernando Thompson MD   [facility] (--)    [address] [id number] [phone]           The medical center is located at [address] (between [address] and [address] in [place], [place] miles north of [place]).              Who to contact     If you have questions or need follow up information about today's clinic visit or your schedule please contact [facility] directly at [phone].  Normal or non-critical lab and imaging results will be communicated to you by MyChart, letter or phone within 4 business days after the clinic has received the results. If you do not hear from us within 7 days, please contact the clinic through MyChart or phone. If you have a critical or abnormal lab result, we will notify you by phone as soon as possible.  Submit refill requests through [employer] or call your pharmacy and they will forward the refill request to us. Please allow 3 business days for your refill to be completed.          Additional Information About Your Visit        MyChart Information     [employer] lets you send messages to your doctor, view your test results, renew your prescriptions, schedule appointments and more. To sign up, go to [email or web address] .  "Click on \"Log in\" on the left side of the screen, which will take you to the Welcome page. Then click on \"Sign up Now\" on the right side of the page.     You will be asked to enter the access code listed below, as well as some personal information. Please follow the directions to create your username and password.     Your access code is: [email or web address]  Expires: 6/15/2018  3:11 PM     Your access code will  in 90 days. If you need help or a new code, please call your [place] clinic or [phone].        Care EveryWhere ID     This is your Care EveryWhere ID. This could be used by other organizations to access your [facility] medical records  [id number]        Your Vitals Were     Pulse Temperature Height Last Period BMI (Body Mass Index)       59 98  F (36.7  C) (Tympanic) 5' 3\" (1.6 m) 2006 42.69 kg/m2        Blood Pressure from Last 3 Encounters:   18 116/70   18 114/60   18 125/75    Weight from Last 3 Encounters:   18 241 lb (109.3 kg)   18 240 lb 11.9 oz (109.2 kg)   18 239 lb (108.4 kg)              We Performed the Following     Ferritin     Hemoglobin     Iron and iron binding capacity          Today's Medication Changes          These changes are accurate as of 3/23/18  9:30 AM.  If you have any questions, ask your nurse or doctor.               These medicines have changed or have updated prescriptions.        Dose/Directions    clobetasol 0.05 % ointment   Commonly known as:  TEMOVATE   This may have changed:    - when to take this  - reasons to take this  - additional instructions   Used for:  Vaginal itching        Apply sparingly to affected area twice daily for 14 days.  Do not apply to face.   Quantity:  15 g   Refills:  0                Primary Care Provider Office Phone # Fax #    Jennie Suazo [phone] [address] [address]        Equal Access to Services     KUNAL NGO AH: Kris quiroz " Sobg, waaxda luqadaha, qaybta kaalmada shaun, mildred willinghamshana carlos. So [place] -[phone].    ATENCIÓN: Si korin rosenbaum, tiene a booth disposición servicios gratuitos de asistencia lingüística. Sandhya al [phone].    We comply with applicable federal civil rights laws and [place] laws. We do not discriminate on the basis of race, color, national origin, age, disability, sex, sexual orientation, or gender identity.            Thank you!     Thank you for choosing [facility]  for your care. Our goal is always to provide you with excellent care. Hearing back from our patients is one way we can continue to improve our services. Please take a few minutes to complete the written survey that you may receive in the mail after your visit with us. Thank you!             Your Updated Medication List - Protect others around you: Learn how to safely use, store and throw away your medicines at www.disposemymeds.org.          This list is accurate as of 3/23/18  9:30 AM.  Always use your most recent med list.                   Brand Name Dispense Instructions for use Diagnosis    acetaminophen 325 MG tablet    TYLENOL    100 tablet    Take 2 tablets (650 mg) by mouth every 4 hours as needed for mild pain or fever    Shoulder injury, right, subsequent encounter       atenolol 50 MG tablet    TENORMIN    90 tablet    Take 1 tablet (50 mg) by mouth daily    Benign essential hypertension       CERAVE Crea     1 Bottle    Externally apply topically 2 times daily as needed    Sunburn, Itching       clobetasol 0.05 % ointment    TEMOVATE    15 g    Apply sparingly to affected area twice daily for 14 days.  Do not apply to face.    Vaginal itching       hydrochlorothiazide 12.5 MG Tabs tablet     90 tablet    Take 1 tablet (12.5 mg) by mouth daily    Benign essential hypertension       * nystatin cream    MYCOSTATIN    30 g    Apply topically 2 times daily Reported on 3/6/2017    Candidiasis of  skin       * nystatin 954853 UNIT/GM Powd    MYCOSTATIN    30 g    Apply topically 3 times daily as needed Refill at patient's request    Candidiasis of skin       pantoprazole 40 MG EC tablet    PROTONIX    60 tablet    Take 1 tablet (40 mg) by mouth 2 times daily Take 30-60 minutes before a meal.    Upper GI bleed       ranitidine 150 MG tablet    ZANTAC    60 tablet    Take 1 tablet (150 mg) by mouth 2 times daily    Upper GI bleed       * Notice:  This list has 2 medication(s) that are the same as other medications prescribed for you. Read the directions carefully, and ask your doctor or other care provider to review them with you.

## 2018-03-23 NOTE — PROGRESS NOTES
SUBJECTIVE:   Jacquie Harris is a 62 year old female who presents to clinic today for the following health issues:    Patient is a 62 yr old female here for hospital follow up . She was admitted for a GI bleed . She had an upper endoscopy which fortunately was within normal limits. Patient states that she is feeling better today . She reports no dark stools and no feeling of being lightheaded. Patient is overall feeling better.          Hospital Follow-up Visit:    Hospital/Nursing Home/ Rehab Facility: [facility]  Date of Admission: 3-17-18  Date of Discharge: 3-18-18  Reason(s) for Admission: dizziness bloody stools             Problems taking medications regularly:  None       Medication changes since discharge: protonix and zantac has trouble taking medication x2 daily and discontinue aleve and use tylenol       Problems adhering to non-medication therapy:  None    Summary of hospitalization:  [facility] discharge summary reviewed  Diagnostic Tests/Treatments reviewed.  Follow up needed: none  Other Healthcare Providers Involved in Patient s Care:         None  Update since discharge: improved.     Post Discharge Medication Reconciliation: discharge medications reconciled, continue medications without change.  Plan of care communicated with patient     Coding guidelines for this visit:  Type of Medical   Decision Making Face-to-Face Visit       within 7 Days of discharge Face-to-Face Visit        within 14 days of discharge   Moderate Complexity [id number]   High Complexity 01552 67125                Problem list and histories reviewed & adjusted, as indicated.  Additional history: as documented    Patient Active Problem List   Diagnosis     s/p gastric bypass x 2     Allergic rhinitis     Carpal tunnel syndrome     Obstructive sleep apnea     Rosacea     CARDIOVASCULAR SCREENING; LDL GOAL LESS THAN 160     Tick bite, infected, positive Lyme test 1996 not treated     Vitamin D deficiency      Osteoporosis     Obesity, Class III, BMI 40-49.9 (morbid obesity) (H)     S/P TKR (total knee replacement)     HTN, goal below 140/80     Back pain, left below scapula, strained muscles     Encounter for routine gynecological examination      Colles' fracture     Bilateral cold feet     Elevated levels of transaminase & lactic acid dehydrogenase     Fatty liver     Shoulder injury, right, subsequent encounter     GI bleed     Upper GI bleed     Past Surgical History:   Procedure Laterality Date     ARTHROPLASTY KNEE  4/2/2012    Procedure:ARTHROPLASTY KNEE; Left Total Knee Arthroplasty--Anesth.Choice; Surgeon:HERNANDO THOMPSON; Location:[facility]     ARTHROTOMY SHOULDER, ROTATOR CUFF REPAIR, COMBINED Right 8/25/2017    Procedure: COMBINED ARTHROTOMY SHOULDER, ROTATOR CUFF REPAIR;  Right shoulder distal clavicle excision, subacromial decompression, rotator cuff repair ;  Surgeon: Hernando Thompson MD;  Location: [facility]     ARTHROTOMY SHOULDER, ROTATOR CUFF REPAIR, COMBINED Right 12/18/2017    Procedure: COMBINED ARTHROTOMY SHOULDER, ROTATOR CUFF REPAIR;  Right Shoulder Rotator Cuff Revision;  Surgeon: Hernando Thompson MD;  Location: [facility]     C ORAL SURGERY PROCEDURE  age 18    wisdom teeth     C/SECTION, LOW TRANSVERSE  1978, 1984, 1994    x 3     COLONOSCOPY  2001    normal colonoscopy     COLONOSCOPY N/A 8/22/2017    Procedure: COLONOSCOPY;  Colonoscopy  ;  Surgeon: Delfino Yoo MD;  Location: [facility]     ESOPHAGOSCOPY, GASTROSCOPY, DUODENOSCOPY (EGD), COMBINED N/A 3/18/2018    Procedure: COMBINED ESOPHAGOSCOPY, GASTROSCOPY, DUODENOSCOPY (EGD);;  Surgeon: Poncho Galindo MD;  Location: [facility]     GASTRIC BYPASS  1980/2005    Gastric Bypass and revision     HERNIA REPAIR, INCISIONAL  6/16/2008    lap.repair with 10x8 mesh     TUBAL LIGATION  1994       Social History   Substance Use Topics     Smoking status: Never Smoker     Smokeless tobacco: Never Used     Alcohol use 0.0 oz/week     0 Standard  drinks or equivalent per week      Comment: mixed very light 1 a month if that     Family History   Problem Relation Age of Onset     C.A.D. Father      MI at age 60     Hypertension Father      Alzheimer Disease Father      Hyperlipidemia Father      OSTEOPOROSIS Mother      on Fosamax     Glaucoma Mother      Other Cancer Brother      Stomach Cancer Maternal Aunt      Cervical Cancer Cousin      DIABETES No family hx of      CEREBROVASCULAR DISEASE No family hx of      Breast Cancer No family hx of      Cancer - colorectal No family hx of      Prostate Cancer No family hx of      Liver Disease No family hx of          Current Outpatient Prescriptions   Medication Sig Dispense Refill     acetaminophen (TYLENOL) 325 MG tablet Take 2 tablets (650 mg) by mouth every 4 hours as needed for mild pain or fever 100 tablet      ranitidine (ZANTAC) 150 MG tablet Take 1 tablet (150 mg) by mouth 2 times daily 60 tablet 0     pantoprazole (PROTONIX) 40 MG EC tablet Take 1 tablet (40 mg) by mouth 2 times daily Take 30-60 minutes before a meal. 60 tablet 0     atenolol (TENORMIN) 50 MG tablet Take 1 tablet (50 mg) by mouth daily 90 tablet 3     hydrochlorothiazide 12.5 MG TABS tablet Take 1 tablet (12.5 mg) by mouth daily 90 tablet 3     nystatin (MYCOSTATIN) 245088 UNIT/GM POWD Apply topically 3 times daily as needed Refill at patient's request 30 g 1     nystatin (MYCOSTATIN) cream Apply topically 2 times daily Reported on 3/6/2017 30 g 11     Emollient (CERAVE) CREA Externally apply topically 2 times daily as needed 1 Bottle 3     clobetasol (TEMOVATE) 0.05 % ointment Apply sparingly to affected area twice daily for 14 days.  Do not apply to face. (Patient taking differently: daily as needed Apply sparingly to affected area twice daily for 14 days.  Do not apply to face.) 15 g 0     Allergies   Allergen Reactions     Vioxx Itching and Swelling     VIOXX (Swollen Feet,Hands itching), Okay with ibuprofen and aspirin     BP  "Readings from Last 3 Encounters:   03/23/18 116/70   03/18/18 114/60   01/29/18 125/75    Wt Readings from Last 3 Encounters:   03/23/18 241 lb (109.3 kg)   03/17/18 240 lb 11.9 oz (109.2 kg)   01/29/18 239 lb (108.4 kg)                  Labs reviewed in EPIC    Reviewed and updated as needed this visit by clinical staff  Tobacco  Allergies       Reviewed and updated as needed this visit by Provider         ROS:  Constitutional, HEENT, cardiovascular, pulmonary, gi and gu systems are negative, except as otherwise noted.    OBJECTIVE:     /70 (BP Location: Left arm, Cuff Size: Adult Regular)  Pulse 59  Temp 98  F (36.7  C) (Tympanic)  Ht 5' 3\" (1.6 m)  Wt 241 lb (109.3 kg)  LMP 05/29/2006  BMI 42.69 kg/m2  Body mass index is 42.69 kg/(m^2).  GENERAL: healthy, alert and no distress  EYES: Eyes grossly normal to inspection, PERRL and conjunctivae and sclerae normal  HENT: ear canals and TM's normal, nose and mouth without ulcers or lesions  NECK: no adenopathy, no asymmetry, masses, or scars and thyroid normal to palpation  RESP: lungs clear to auscultation - no rales, rhonchi or wheezes  CV: regular rate and rhythm, normal S1 S2, no S3 or S4, no murmur, click or rub, no peripheral edema and peripheral pulses strong  MS: no gross musculoskeletal defects noted, no edema    Diagnostic Test Results:  none     ASSESSMENT/PLAN:       (Z09) Hospital discharge follow-up  (primary encounter diagnosis)  Comment: Patient doing better   Plan: Asked to avoid NSAIDs in general     (D64.9) Anemia, unspecified type  Comment: Check iron levels.   Plan: Iron and iron binding capacity, Ferritin,         Hemoglobin, CANCELED: Hemoglobin              FUTURE APPOINTMENTS:       - Follow-up visit as needed.    Jennie Suazo MD  [facility]  "

## 2018-03-27 ENCOUNTER — HOSPITAL ENCOUNTER (OUTPATIENT)
Dept: PHYSICAL THERAPY | Facility: CLINIC | Age: 63
Setting detail: THERAPIES SERIES
End: 2018-03-27
Attending: ORTHOPAEDIC SURGERY
Payer: COMMERCIAL

## 2018-03-27 PROCEDURE — 97110 THERAPEUTIC EXERCISES: CPT | Mod: GP | Performed by: PHYSICAL THERAPIST

## 2018-03-27 PROCEDURE — 40000718 ZZHC STATISTIC PT DEPARTMENT ORTHO VISIT: Performed by: PHYSICAL THERAPIST

## 2018-03-27 PROCEDURE — G8982 BODY POS GOAL STATUS: HCPCS | Mod: GP,CI | Performed by: PHYSICAL THERAPIST

## 2018-03-27 PROCEDURE — G8981 BODY POS CURRENT STATUS: HCPCS | Mod: GP,CJ | Performed by: PHYSICAL THERAPIST

## 2018-03-27 NOTE — PROGRESS NOTES
OUTPATIENT PHYSICAL THERAPY PROGRESS NOTE   Dr. Thompson 03/27/18 0800   Signing Clinician's Name / Credentials   Signing clinician's name / credentials Dasia Wilson, PT [id number]   Session Number   Session Number 7 [employer]    PT Medicare Only G-code   G-code Body Position: Changing & Maintaining Body Position   Body Position: Changing & Maintaining Body Position   Body Position Current Status,  (eval/re-eval & every progress note) CJ: 20-39% impairment   Current Body Position Modifier Rationale professional judgement based on ongoing limitations w/ arm mobility, decreased tolerance to SL   Body Position Goal,  (eval/re-eval, every progress note, & discharge) CI: 1-19% impairment   Ortho Goal 1   Goal Description 1.  Pt will be able to reach to shoulder ht for ADL's w/ minimal difficulty.  2/2/18 slight difficulty MET   Target Date 02/22/18   Ortho Goal 2   Goal Description 2.  Pt will be able to reach behind back for ADL's w/ minimal difficulty.  2/21/18 able to pull up pants w/o difficulty but unable to hook bra.   3/27/18 no complaints pulling up pants, has not attempted hooking bra.  Partially met.   Target Date 02/22/18   Ortho Goal 3   Goal Description 3.  Pt will be able to sleep 5-6 hours/ night.  2/21/18 able to lie on side but increases pain.  Sleeping 2 hours at a time.  3/13/18 sleeping 8 hours --waking 2X/night MET   3/27/18 sleeps 2.5 hours at a time overall sleeping 6-8 hours.  MET   Target Date 03/24/18   Ortho Goal 4   Goal Description 4.  Pt will be able to lift and carry up to 10 # for groceries/ laundry.  2/21/18 has not been doing..  3/27/18 limited lifting due to L arm complaints   Target Date 03/24/18   Ortho Goal 5   Goal Description 5.  Pt will be able to reach overhead for ADL's w/ minimal difficulty. 3/27/18 ongoing difficulty w/ L UE   Target Date 03/24/18   Ortho Goal 6   Goal Description 6.  Pt will be independent and consistent w/ HEP.  2/21/8 consistent w/ HEP MET.  3/13/18  doing ex 3 X/wk MET   Target Date 03/24/18   Subjective Report   Subjective Report Pt states she got her hair cut as she was unable to use her arms to do her hair.   Pt notes L shoulder is bothering anterior shoulder into upper arm--which is getting worse --constant 7-8/10--keeps her awake at night.  R shoulder is pretty good during the day @ worst 2/10 but will increase w/ lying on it at night.     Objective Measures   Objective Measure AAROM flex R 148*;  scaption R 155*,   ER R WNL,   IR  R 65*   Details AROM shoulder flex R 122*, L  140*,  scaption R 90*, L 120*, ER R 75*,  L 55*;  IR  R to L1,  L to T11   Therapeutic Procedure/exercise   Patient Response Pt to focus on maintaining L shoulder ROM, progress strength on R.  Decreased program some to decrease stress.     Treatment Detail Scifit seat 6 elevated L 1 X 2 min   Wand ER standing B x 5.   Wall flex shoulder R  X 5.  Table flex x 10 B.   RTB shoulder ext  w/ LT set X 30 B.      shoulder flex 5oz X 20.    scaption 5 oz X 20 .    ER in SL 5 oz X 25 .   Brief AAROM w/ PT R shoulder.    Plan   Home program ex as above--strengthening 3-4X/wk, ice   Plan Pt plans to cont on own until surgery unless she has further problems.  Chart will be open until surgery   Comments   Comments L shoulder surgery 4/27/18.   Pt will be on vacation 4/15 to 4/ 25/18.  Progress towards goals as noted above

## 2018-03-30 ENCOUNTER — TELEPHONE (OUTPATIENT)
Dept: FAMILY MEDICINE | Facility: CLINIC | Age: 63
End: 2018-03-30

## 2018-04-03 ENCOUNTER — OFFICE VISIT (OUTPATIENT)
Dept: FAMILY MEDICINE | Facility: CLINIC | Age: 63
End: 2018-04-03
Payer: COMMERCIAL

## 2018-04-03 VITALS
DIASTOLIC BLOOD PRESSURE: 72 MMHG | HEIGHT: 63 IN | HEART RATE: 86 BPM | TEMPERATURE: 98.5 F | WEIGHT: 241 LBS | SYSTOLIC BLOOD PRESSURE: 102 MMHG | BODY MASS INDEX: 42.7 KG/M2

## 2018-04-03 DIAGNOSIS — E66.01 OBESITY, CLASS III, BMI 40-49.9 (MORBID OBESITY) (H): ICD-10-CM

## 2018-04-03 DIAGNOSIS — E88.01 ALPHA-1-ANTITRYPSIN DEFICIENCY (H): ICD-10-CM

## 2018-04-03 DIAGNOSIS — Z01.818 PREOP GENERAL PHYSICAL EXAM: Primary | ICD-10-CM

## 2018-04-03 DIAGNOSIS — S46.002A ROTATOR CUFF INJURY, LEFT, INITIAL ENCOUNTER: ICD-10-CM

## 2018-04-03 PROCEDURE — 99214 OFFICE O/P EST MOD 30 MIN: CPT | Performed by: FAMILY MEDICINE

## 2018-04-03 NOTE — PATIENT INSTRUCTIONS
Before Your Surgery      Call your surgeon if there is any change in your health. This includes signs of a cold or flu (such as a sore throat, runny nose, cough, rash or fever).    Do not smoke, drink alcohol or take over the counter medicine (unless your surgeon or primary care doctor tells you to) for the 24 hours before and after surgery.    If you take prescribed drugs: Follow your doctor s orders about which medicines to take and which to stop until after surgery.    Eating and drinking prior to surgery: follow the instructions from your surgeon    Take a shower or bath the night before surgery. Use the soap your surgeon gave you to gently clean your skin. If you do not have soap from your surgeon, use your regular soap. Do not shave or scrub the surgery site.  Wear clean pajamas and have clean sheets on your bed.     Presurgery Checklist  You are scheduled to have surgery. The healthcare staff will try to make your stay comfortable. Use the guidelines below to remind yourself what to do before surgery. Be sure to follow any specific pre-op instructions from your surgeon or nurse.  Preparing for Surgery  Ask your surgeon if you ll need a blood transfusion during surgery and if so, how to prepare for it. In some cases, you can donate blood before surgery. If needed, this blood can be given back (transfused) to you during or after surgery.  If you are having abdominal surgery, ask what you need to do to clear your bowel.  Tell your surgeon if you have allergies to any medications or foods.  Arrange for an adult family member or friend to drive you home after surgery. If possible, have someone ready to help you at home as you recover.  Call the surgeon if you get a cold, fever, sore throat, diarrhea, or other health problem just before surgery. Your surgeon can decide whether or not to postpone the surgery.  Medications  Tell your surgeon about all medications you take, including prescription and  over-the-counter products such as herbal remedies and vitamins. Ask if you should continue taking them.  If you take ibuprofen, naproxen, or  blood thinners  such as aspirin, clopidogrel (Plavix), or warfarin (Coumadin), ask your surgeon whether you should stop taking them and how long before surgery you should stop.  You may be told to take antibiotics just before surgery to prevent infection. If so, follow instructions carefully on how to take them.  If you are told to take medications called anticoagulants to prevent blood clots after surgery, be sure to follow the instructions on how to take them.  Stop Smoking  If you smoke, healing may take longer. So at least 2 week(s) before surgery, stop smoking.  Bathing or Showering Before Surgery  If instructed, wash with antibacterial soap. Afterward, do not use lotions or powders.  If you are having surgery on the head, you may be asked to shampoo with antibacterial soap. Follow instructions for doing so.  Do Not Remove Hair from the Surgery Site  Do not shave hair from the incision site, unless you are given specific instructions to do so. Usually, if hair needs to be removed, it will be done at the hospital right before surgery.  Don t Eat or Drink  Your doctor will tell you when to stop eating and drinking. If you do not follow your doctor's instructions, your procedure may be postponed or rescheduled for another day.  If your surgeon tells you to continue any medications, take them with small sips of water.  You can brush your teeth and rinse your mouth, but don t swallow any water.  Day of Surgery  Do not wear makeup. Do not use perfume, deodorant, or hairspray. Remove nail polish and artificial nails.  Leave jewelry (including rings), watches, and other valuables at home.  Be sure to bring health insurance cards or forms and a photo ID.  Bring a list of your medications (include the name, dose, how often you take them, and the time last dose was taken).  Arrive  on time at the hospital or surgery facility.    [id number] [email or web address]. [address] [place] [address], [place] [address]. All rights reserved. This information is not intended as a substitute for professional medical care. Always follow your healthcare professional's instructions.  This information has been modified by your health care provider with permission from the publisher.

## 2018-04-03 NOTE — PROGRESS NOTES
[facility]  [address] [facility] [address]  [phone]  Dept: [phone]    PRE-OP EVALUATION:  Today's date: 4/3/2018    Jacquie Harris (: 1955) presents for pre-operative evaluation assessment as requested by Dr. Thompson.  She requires evaluation and anesthesia risk assessment prior to undergoing surgery/procedure for treatment ofLeft Shoulder Mini Open Subacromial Decompression,Distal Clavicle Excision,Rotator Cuff Repair,Pasta Repair.    Proposed Surgery/ Procedure: COMBINED ARTHROTOMY SHOULDER, ROTATOR CUFF REPAIR  Date of Surgery/ Procedure: 18  Time of Surgery/ Procedure: 12:00pm  [place] [facility] [place] Physician: Jennie Suazo  Type of Anesthesia Anticipated: General    Patient has a Health Care Directive or Living Will:  NO    1. NO - Do you have a history of heart attack, stroke, stent, bypass or surgery on an artery in the head, neck, heart or legs?  2. NO - Do you ever have any pain or discomfort in your chest?  3. NO - Do you have a history of  Heart Failure?  4. NO - Are you troubled by shortness of breath when: walking on the level, up a slight hill or at night?  5. NO - Do you currently have a cold, bronchitis or other respiratory infection?  6. NO - Do you have a cough, shortness of breath or wheezing?  7. NO - Do you sometimes get pains in the calves of your legs when you walk?  8. no - Do you or anyone in your family have previous history of blood clots?  9. yes - Do you or does anyone in your family have a serious bleeding problem such as prolonged bleeding following surgeries or cuts?Patient prolonged bleeding   10. yes - Have you ever had problems with anemia or been told to take iron pills?Patient is on iron now   11. yes - Have you had any abnormal blood loss such as black, tarry or bloody stools, or abnormal vaginal bleeding?Patient  3-2018 GI bleed   12. yes- Have you ever had a blood transfusion?Patient  1994 birth of babies and has many others  13. NO - Have you or any of your relatives ever had problems with anesthesia?  14. yes - Do you have sleep apnea, excessive snoring or daytime drowsiness?patient has a c-pap  15. NO - Do you have any prosthetic heart valves?  16. yes - Do you have prosthetic joints?L knee   17. NO - Is there any chance that you may be pregnant?      HPI:     HPI related to upcoming procedure: Patient is a 63 yr old female here for pre op evaluation. She is having left shoulder arthoplasty. She denies any acute symptoms today. She has has a past medical history that is significant for hypertension. Iron deficiency anemia, sleep apnea. Her blood pressure has been under fair control . She is tolerating her medication well      HYPERTENSION - Patient has longstanding history of HTN , currently denies any symptoms referable to elevated blood pressure. Specifically denies chest pain, palpitations, dyspnea, orthopnea, PND or peripheral edema. Blood pressure readings have been in normal range. Current medication regimen is as listed below. Patient denies any side effects of medication.                                                                                                                                                                                          .    MEDICAL HISTORY:     Patient Active Problem List    Diagnosis Date Noted     GI bleed 03/17/2018     Priority: Medium     Upper GI bleed 03/17/2018     Priority: Medium     Shoulder injury, right, subsequent encounter 08/21/2017     Priority: Medium     Fatty liver 04/20/2017     Priority: Medium     Elevated levels of transaminase & lactic acid dehydrogenase 04/15/2017     Priority: Medium     Bilateral cold feet 01/03/2017     Priority: Medium     Colles' fracture 07/06/2015     Priority: Medium     Encounter for routine gynecological examination  06/04/2015     Priority: Medium     Back pain, left below scapula, strained muscles  10/24/2013     Priority: Medium     HTN, goal below 140/80 05/05/2013     Priority: Medium     S/P TKR (total knee replacement) 04/03/2012     Priority: Medium     Osteoporosis 02/19/2012     Priority: Medium     Previous T scores -2.2  2/2012 Dexascan:  Lumbar spine L1-L4 T-score: -2.4, Left femoral neck T-score: -2.7, Right femoral neck T-score: -2.2   Percent change in lumbar spine: +6.6% since 4/16/2008   Percent change in femurs: +2.9% since 4/16/2008    IMPRESSION: Osteoporosis in the left femoral neck. Severe osteopenia  in the right femoral neck and lumbar spine.       Obesity, Class III, BMI 40-49.9 (morbid obesity) (H) 02/19/2012     Priority: Medium     ideal weight 120, goal weight 200, lose 58 lbs in 58 weeks.       Vitamin D deficiency 07/18/2011     Priority: Medium     Tick bite, infected, positive Lyme test 1996 not treated 07/08/2011     Priority: Medium     Rosacea 11/04/2009     Priority: Medium     Obstructive sleep apnea 12/01/2006     Priority: Medium     Sleep study 12/06 for EDS- AHI 36, desaturations to 65%. CPAP recommended but not tolerated.  01/16/07  ENT consult recommend CPAP and weight loss. Surgery discussed but success rate less than CPAP  1/15/07 CPAP trial, was not able to tolerate. Retried 2008, tolerated better.   Problem list name updated by automated process. Provider to review       Carpal tunnel syndrome 06/07/2006     Priority: Medium     04/10/09 Dr.Meletiou Mercado [facility]. Finding consistant with CTS, but nerve studies and MRI of C-Spine are normal. Corticosteroid injection given in office       CARDIOVASCULAR SCREENING; LDL GOAL LESS THAN 160 10/31/2010     Priority: Low     Allergic rhinitis 03/27/2006     Priority: Low     Problem list name updated by automated process. Provider to review       s/p gastric bypass x 2 07/05/2005     Priority: Low     Gastric bypass 1980        Past Medical History:   Diagnosis Date     HTN (hypertension)      IRON DEFICIENCY ANEMIA  7/5/2005     Iron infusion/ resolved since injections     DARWIN (obstructive sleep apnea)      Past Surgical History:   Procedure Laterality Date     ARTHROPLASTY KNEE  4/2/2012    Procedure:ARTHROPLASTY KNEE; Left Total Knee Arthroplasty--Anesth.Choice; Surgeon:VICENTE THOMPSON; Location:[facility]     ARTHROTOMY SHOULDER, ROTATOR CUFF REPAIR, COMBINED Right 8/25/2017    Procedure: COMBINED ARTHROTOMY SHOULDER, ROTATOR CUFF REPAIR;  Right shoulder distal clavicle excision, subacromial decompression, rotator cuff repair ;  Surgeon: Vicente Thompson MD;  Location: [facility]     ARTHROTOMY SHOULDER, ROTATOR CUFF REPAIR, COMBINED Right 12/18/2017    Procedure: COMBINED ARTHROTOMY SHOULDER, ROTATOR CUFF REPAIR;  Right Shoulder Rotator Cuff Revision;  Surgeon: Vicente Thompson MD;  Location: [facility] OR     C ORAL SURGERY PROCEDURE  age 18    wisdom teeth     C/SECTION, LOW TRANSVERSE  1978, 1984, 1994    x 3     COLONOSCOPY  2001    normal colonoscopy     COLONOSCOPY N/A 8/22/2017    Procedure: COLONOSCOPY;  Colonoscopy  ;  Surgeon: Delfino Yoo MD;  Location: [facility]     ESOPHAGOSCOPY, GASTROSCOPY, DUODENOSCOPY (EGD), COMBINED N/A 3/18/2018    Procedure: COMBINED ESOPHAGOSCOPY, GASTROSCOPY, DUODENOSCOPY (EGD);;  Surgeon: Poncho Galindo MD;  Location: [facility]     GASTRIC BYPASS  1980/2005    Gastric Bypass and revision     HERNIA REPAIR, INCISIONAL  6/16/2008    lap.repair with 10x8 mesh     TUBAL LIGATION  1994     Current Outpatient Prescriptions   Medication Sig Dispense Refill     ferrous sulfate (SLO-FE) 142 (45 FE) MG TBCR Take 1 tablet (142 mg) by mouth daily 30 tablet 3     ranitidine (ZANTAC) 150 MG tablet Take 1 tablet (150 mg) by mouth 2 times daily 60 tablet 0     pantoprazole (PROTONIX) 40 MG EC tablet Take 1 tablet (40 mg) by mouth 2 times daily Take 30-60 minutes before a meal. 60 tablet 0     atenolol (TENORMIN) 50 MG tablet Take 1 tablet (50 mg) by mouth daily 90 tablet 3     hydrochlorothiazide 12.5 MG  TABS tablet Take 1 tablet (12.5 mg) by mouth daily 90 tablet 3     nystatin (MYCOSTATIN) 194119 UNIT/GM POWD Apply topically 3 times daily as needed Refill at patient's request 30 g 1     nystatin (MYCOSTATIN) cream Apply topically 2 times daily Reported on 3/6/2017 30 g 11     Emollient (CERAVE) CREA Externally apply topically 2 times daily as needed 1 Bottle 3     acetaminophen (TYLENOL) 325 MG tablet Take 2 tablets (650 mg) by mouth every 4 hours as needed for mild pain or fever 100 tablet      clobetasol (TEMOVATE) 0.05 % ointment Apply sparingly to affected area twice daily for 14 days.  Do not apply to face. (Patient not taking: Reported on 4/3/2018) 15 g 0     OTC products: None, except as noted above    Allergies   Allergen Reactions     Vioxx Itching and Swelling     VIOXX (Swollen Feet,Hands itching), Okay with ibuprofen and aspirin      Latex Allergy: NO    Social History   Substance Use Topics     Smoking status: Never Smoker     Smokeless tobacco: Never Used     Alcohol use 0.0 oz/week     0 Standard drinks or equivalent per week      Comment: mixed very light 1 a month if that     History   Drug Use No       REVIEW OF SYSTEMS:   CONSTITUTIONAL: NEGATIVE for fever, chills, change in weight  INTEGUMENTARY/SKIN: NEGATIVE for worrisome rashes, moles or lesions  EYES: NEGATIVE for vision changes or irritation  ENT/MOUTH: NEGATIVE for ear, mouth and throat problems  RESP: NEGATIVE for significant cough or SOB  BREAST: NEGATIVE for masses, tenderness or discharge  CV: NEGATIVE for chest pain, palpitations or peripheral edema  GI: NEGATIVE for nausea, abdominal pain, heartburn, or change in bowel habits  : NEGATIVE for frequency, dysuria, or hematuria  MUSCULOSKELETAL:POSITIVE  for joint pain left shoulder  NEURO: NEGATIVE for weakness, dizziness or paresthesias  ENDOCRINE: NEGATIVE for temperature intolerance, skin/hair changes  HEME: NEGATIVE for bleeding problems  PSYCHIATRIC: NEGATIVE for changes in mood  "or affect    EXAM:   /72  Pulse 86  Temp 98.5  F (36.9  C) (Tympanic)  Ht 5' 3\" (1.6 m)  Wt 241 lb (109.3 kg)  LMP 05/29/2006  BMI 42.69 kg/m2    GENERAL APPEARANCE: healthy, alert and no distress     EYES: EOMI, PERRL     HENT: ear canals and TM's normal and nose and mouth without ulcers or lesions     NECK: no adenopathy, no asymmetry, masses, or scars and thyroid normal to palpation     RESP: lungs clear to auscultation - no rales, rhonchi or wheezes     CV: regular rates and rhythm, normal S1 S2, no S3 or S4 and no murmur, click or rub     ABDOMEN:  soft, nontender, no HSM or masses and bowel sounds normal     SKIN: no suspicious lesions or rashes     NEURO: Normal strength and tone, sensory exam grossly normal, mentation intact and speech normal     PSYCH: mentation appears normal. and affect normal/bright    DIAGNOSTICS:   EKG: appears normal, NSR, normal axis, normal intervals, no acute ST/T changes c/w ischemia, no LVH by voltage criteria, unchanged from previous tracings    Recent Labs   Lab Test  03/23/18   0933  03/18/18   1201  03/18/18   0648   03/17/18   1756  03/17/18   1315   08/16/17   0930   HGB  11.1*  10.7*  10.1*   < >  10.5*  11.5*   < >  14.8   PLT   --    --   195   --   211  245   < >  213   INR   --    --    --    --    --   1.00   --   1.05   NA   --    --   140   --    --   137   < >  139   POTASSIUM   --    --   4.0   --    --   3.8   < >  4.0   CR   --    --   0.68   --    --   0.71   < >  0.74    < > = values in this interval not displayed.        IMPRESSION:   Reason for surgery/procedure: Left shoulder osteoarthritis   Diagnosis/reason for consult: Pre op evaluation    The proposed surgical procedure is considered LOW risk.    REVISED CARDIAC RISK INDEX  The patient has the following serious cardiovascular risks for perioperative complications such as (MI, PE, VFib and 3  AV Block):  No serious cardiac risks  INTERPRETATION: 0 risks: Class I (very low risk - 0.4% " complication rate)    The patient has the following additional risks for perioperative complications:  No identified additional risks      ICD-10-CM    1. Preop general physical exam Z01.818    2. Rotator cuff injury, left, initial encounter S46.002A    3. Obesity, Class III, BMI 40-49.9 (morbid obesity) (H) E66.01    4. Alpha-1-antitrypsin deficiency (H) E88.01        RECOMMENDATIONS:       Cardiovascular Risk  Performs 4 METs exercise without symptoms (Light housework (dusting, washing dishes) and Climb a flight of stairs) .   Patient is already on a Beta Blocker. Continue Betablocker therapy after surgery, using Beta blocker order set as necessary for NPO status.      Pulmonary Risk  Incentive spirometry post op  Respiratory Therapy (Respiratory Care IP Consult)  post op  NG tube decompression if abdominal distension or significant vomiting       Anemia  Anemia and requires treatment prior to surgery. On Iron pils [resently       --Patient is to take all scheduled medications on the day of surgery EXCEPT for modifications listed below.  Asked to avoid aspirin and any NSAIDs about five days before the procedure   Also asked to hold hydrochlorothiazide on day of surgery.    APPROVAL GIVEN to proceed with proposed procedure, without further diagnostic evaluation       Signed Electronically by: Jennie Suazo MD    Copy of this evaluation report is provided to requesting physician.    [facility] Preop Guidelines

## 2018-04-03 NOTE — MR AVS SNAPSHOT
After Visit Summary   4/3/2018    Jacquie Harris    MRN: [id number]           Patient Information     Date Of Birth          1955        Visit Information        Provider Department      4/3/2018 9:40 AM Jennie Suazo MD [facility]        Today's Diagnoses     Preop general physical exam    -  1      Care Instructions      Before Your Surgery      Call your surgeon if there is any change in your health. This includes signs of a cold or flu (such as a sore throat, runny nose, cough, rash or fever).    Do not smoke, drink alcohol or take over the counter medicine (unless your surgeon or primary care doctor tells you to) for the 24 hours before and after surgery.    If you take prescribed drugs: Follow your doctor s orders about which medicines to take and which to stop until after surgery.    Eating and drinking prior to surgery: follow the instructions from your surgeon    Take a shower or bath the night before surgery. Use the soap your surgeon gave you to gently clean your skin. If you do not have soap from your surgeon, use your regular soap. Do not shave or scrub the surgery site.  Wear clean pajamas and have clean sheets on your bed.     Presurgery Checklist  You are scheduled to have surgery. The healthcare staff will try to make your stay comfortable. Use the guidelines below to remind yourself what to do before surgery. Be sure to follow any specific pre-op instructions from your surgeon or nurse.  Preparing for Surgery  Ask your surgeon if you ll need a blood transfusion during surgery and if so, how to prepare for it. In some cases, you can donate blood before surgery. If needed, this blood can be given back (transfused) to you during or after surgery.  If you are having abdominal surgery, ask what you need to do to clear your bowel.  Tell your surgeon if you have allergies to any medications or foods.  Arrange for an adult family member or friend to drive you home  after surgery. If possible, have someone ready to help you at home as you recover.  Call the surgeon if you get a cold, fever, sore throat, diarrhea, or other health problem just before surgery. Your surgeon can decide whether or not to postpone the surgery.  Medications  Tell your surgeon about all medications you take, including prescription and over-the-counter products such as herbal remedies and vitamins. Ask if you should continue taking them.  If you take ibuprofen, naproxen, or  blood thinners  such as aspirin, clopidogrel (Plavix), or warfarin (Coumadin), ask your surgeon whether you should stop taking them and how long before surgery you should stop.  You may be told to take antibiotics just before surgery to prevent infection. If so, follow instructions carefully on how to take them.  If you are told to take medications called anticoagulants to prevent blood clots after surgery, be sure to follow the instructions on how to take them.  Stop Smoking  If you smoke, healing may take longer. So at least 2 week(s) before surgery, stop smoking.  Bathing or Showering Before Surgery  If instructed, wash with antibacterial soap. Afterward, do not use lotions or powders.  If you are having surgery on the head, you may be asked to shampoo with antibacterial soap. Follow instructions for doing so.  Do Not Remove Hair from the Surgery Site  Do not shave hair from the incision site, unless you are given specific instructions to do so. Usually, if hair needs to be removed, it will be done at the hospital right before surgery.  Don t Eat or Drink  Your doctor will tell you when to stop eating and drinking. If you do not follow your doctor's instructions, your procedure may be postponed or rescheduled for another day.  If your surgeon tells you to continue any medications, take them with small sips of water.  You can brush your teeth and rinse your mouth, but don t swallow any water.  Day of Surgery  Do not wear makeup. Do  not use perfume, deodorant, or hairspray. Remove nail polish and artificial nails.  Leave jewelry (including rings), watches, and other valuables at home.  Be sure to bring health insurance cards or forms and a photo ID.  Bring a list of your medications (include the name, dose, how often you take them, and the time last dose was taken).  Arrive on time at the hospital or surgery facility.    4064-2257 [employer]. [address] [place] [address]. All rights reserved. This information is not intended as a substitute for professional medical care. Always follow your healthcare professional's instructions.  This information has been modified by your health care provider with permission from the publisher.              Follow-ups after your visit        Your next 10 appointments already scheduled     Apr 27, 2018   Procedure with Hernando Thompson MD   [facility] (--)    [address] [place] [id number]   [phone]           The medical center is located at [address] [facility]. (between [address] in [place], [place] miles north of [place]).              Who to contact     If you have questions or need follow up information about today's clinic visit or your schedule please contact [facility] directly at [phone].  Normal or non-critical lab and imaging results will be communicated to you by MyChart, letter or phone within 4 business days after the clinic has received the results. If you do not hear from us within 7 days, please contact the clinic through MyChart or phone. If you have a critical or abnormal lab result, we will notify you by phone as soon as possible.  Submit refill requests through [employer] or call your pharmacy and they will forward the refill request to us. Please allow 3 business days for your refill to be completed.          Additional Information About Your Visit        [employer] Information     [employer] lets you  "send messages to your doctor, view your test results, renew your prescriptions, schedule appointments and more. To sign up, go to [email or web address] . Click on \"Log in\" on the left side of the screen, which will take you to the Welcome page. Then click on \"Sign up Now\" on the right side of the page.     You will be asked to enter the access code listed below, as well as some personal information. Please follow the directions to create your username and password.     Your access code is: [email or web address]  Expires: 6/15/2018  3:11 PM     Your access code will  in 90 days. If you need help or a new code, please call your [place] clinic or [phone].        Care EveryWhere ID     This is your Care EveryWhere ID. This could be used by other organizations to access your [place] medical records  [id number]        Your Vitals Were     Pulse Temperature Height Last Period BMI (Body Mass Index)       86 98.5  F (36.9  C) (Tympanic) 5' 3\" (1.6 m) 2006 42.69 kg/m2        Blood Pressure from Last 3 Encounters:   18 102/72   18 116/70   18 114/60    Weight from Last 3 Encounters:   18 241 lb (109.3 kg)   18 241 lb (109.3 kg)   18 240 lb 11.9 oz (109.2 kg)              Today, you had the following     No orders found for display       Primary Care Provider Office Phone # Fax #    Georgiazo Amanda Suazo [phone] [address] [facility] [address]        Equal Access to Services     PREM NGO AH: Hadii jose Lynch, greyson dexter, jagruti kaalmada shaun, mildred gonzalez. So [facility] [phone].    ATENCIÓN: Si habla español, tiene a booth disposición servicios gratuitos de asistencia lingüística. Llame al [phone].    We comply with applicable federal civil rights laws and [place] laws. We do not discriminate on the basis of race, color, national origin, age, disability, sex, sexual orientation, or " gender identity.            Thank you!     Thank you for choosing [facility]  for your care. Our goal is always to provide you with excellent care. Hearing back from our patients is one way we can continue to improve our services. Please take a few minutes to complete the written survey that you may receive in the mail after your visit with us. Thank you!             Your Updated Medication List - Protect others around you: Learn how to safely use, store and throw away your medicines at www.disposemymeds.org.          This list is accurate as of 4/3/18 10:08 AM.  Always use your most recent med list.                   Brand Name Dispense Instructions for use Diagnosis    acetaminophen 325 MG tablet    TYLENOL    100 tablet    Take 2 tablets (650 mg) by mouth every 4 hours as needed for mild pain or fever    Shoulder injury, right, subsequent encounter       atenolol 50 MG tablet    TENORMIN    90 tablet    Take 1 tablet (50 mg) by mouth daily    Benign essential hypertension       CERAVE Crea     1 Bottle    Externally apply topically 2 times daily as needed    Sunburn, Itching       clobetasol 0.05 % ointment    TEMOVATE    15 g    Apply sparingly to affected area twice daily for 14 days.  Do not apply to face.    Vaginal itching       ferrous sulfate 142 (45 FE) MG Tbcr    SLO-FE    30 tablet    Take 1 tablet (142 mg) by mouth daily    Iron deficiency       hydrochlorothiazide 12.5 MG Tabs tablet     90 tablet    Take 1 tablet (12.5 mg) by mouth daily    Benign essential hypertension       * nystatin cream    MYCOSTATIN    30 g    Apply topically 2 times daily Reported on 3/6/2017    Candidiasis of skin       * nystatin [id number] UNIT/GM Powd    MYCOSTATIN    30 g    Apply topically 3 times daily as needed Refill at patient's request    Candidiasis of skin       pantoprazole 40 MG EC tablet    PROTONIX    60 tablet    Take 1 tablet (40 mg) by mouth 2 times daily Take 30-60 minutes before a meal.     Upper GI bleed       ranitidine 150 MG tablet    ZANTAC    60 tablet    Take 1 tablet (150 mg) by mouth 2 times daily    Upper GI bleed       * Notice:  This list has 2 medication(s) that are the same as other medications prescribed for you. Read the directions carefully, and ask your doctor or other care provider to review them with you.

## 2018-04-03 NOTE — NURSING NOTE
"Chief Complaint   Patient presents with     Pre-Op Exam       Initial /72  Pulse 86  Temp 98.5  F (36.9  C) (Tympanic)  Ht 5' 3\" (1.6 m)  Wt 241 lb (109.3 kg)  LMP 05/29/2006  BMI 42.69 kg/m2 Estimated body mass index is 42.69 kg/(m^2) as calculated from the following:    Height as of this encounter: 5' 3\" (1.6 m).    Weight as of this encounter: 241 lb (109.3 kg).  Medication Reconciliation: complete  "

## 2018-04-04 ENCOUNTER — TRANSFERRED RECORDS (OUTPATIENT)
Dept: HEALTH INFORMATION MANAGEMENT | Facility: CLINIC | Age: 63
End: 2018-04-04

## 2018-04-10 DIAGNOSIS — K92.2 UPPER GI BLEED: ICD-10-CM

## 2018-04-10 NOTE — TELEPHONE ENCOUNTER
"Requested Prescriptions   Pending Prescriptions Disp Refills     ranitidine (ZANTAC) 150 MG tablet  Last Written Prescription Date:  03/18/18  Last Fill Quantity: 60,  # refills: 0   Last office visit: 4/3/2018 with prescribing provider:  04/03/18   Future Office Visit:     60 tablet 0     Sig: Take 1 tablet (150 mg) by mouth 2 times daily    H2 Blockers Protocol Passed    4/10/2018  4:18 PM       Passed - Patient is age 12 or older       Passed - Recent (12 mo) or future (30 days) visit within the authorizing provider's specialty    Patient had office visit in the last 12 months or has a visit in the next 30 days with authorizing provider or within the authorizing provider's specialty.  See \"Patient Info\" tab in inbasket, or \"Choose Columns\" in Meds & Orders section of the refill encounter.            "

## 2018-04-17 ENCOUNTER — ANESTHESIA EVENT (OUTPATIENT)
Dept: SURGERY | Facility: CLINIC | Age: 63
End: 2018-04-17
Payer: COMMERCIAL

## 2018-04-17 NOTE — ANESTHESIA PREPROCEDURE EVALUATION
Anesthesia Evaluation     . Pt has had prior anesthetic. Type: General and MAC    No history of anesthetic complications          ROS/MED HX    ENT/Pulmonary:     (+)sleep apnea, DARWIN risk factors allergic rhinitis, doesn't use CPAP , . .    Neurologic:  - neg neurologic ROS     Cardiovascular:     (+) Dyslipidemia, hypertension----. : . . . :. . Previous cardiac testing date:results:date: results:ECG reviewed date:8/21/17 results:Marked sinus Bradycardia   BORDERLINE RHYTHM   date: results:          METS/Exercise Tolerance:     Hematologic:     (+) Anemia, History of Transfusion -      Musculoskeletal: Comment: S/p TKR  Osteoporosis  Back pain  Bilateral cold feet  Hx right shoulder injury  Hx of Colles' fracture   (+) arthritis, , , other musculoskeletal- left shoulder pain      GI/Hepatic:     (+) liver disease, Other GI/Hepatic gastric bypass x2, hx of upper GI bleed, fatty liver       Renal/Genitourinary:         Endo:     (+) Obesity, Other Endocrine Disorder morbid obesity.      Psychiatric:  - neg psychiatric ROS       Infectious Disease:  - neg infectious disease ROS       Malignancy:      - no malignancy   Other: Comment: Rosacea    Vit. D deficiency    Hx of elevated levels of transaminase and lactic acid dehydrogenase       (+) H/O Chronic Pain,  - neg other ROS                 Physical Exam  Normal systems: cardiovascular, pulmonary and dental    Airway   Mallampati: II  TM distance: >3 FB  Neck ROM: full    Dental     Cardiovascular       Pulmonary                         Anesthesia Plan      History & Physical Review      ASA Status:  3 .        Plan for Periph. Nerve Block for postop pain, General and ETT with Propofol induction. Maintenance will be Balanced.    PONV prophylaxis:  Ondansetron (or other 5HT-3) and Dexamethasone or Solumedrol  Additional equipment: Videolaryngoscope      Postoperative Care  Postoperative pain management:  Oral pain medications, Peripheral nerve block (Single Shot) and  IV analgesics.      Consents  Anesthetic plan, risks, benefits and alternatives discussed with:  Patient..                          .

## 2018-04-27 ENCOUNTER — ANESTHESIA (OUTPATIENT)
Dept: SURGERY | Facility: CLINIC | Age: 63
End: 2018-04-27
Payer: COMMERCIAL

## 2018-04-27 ENCOUNTER — HOSPITAL ENCOUNTER (OUTPATIENT)
Facility: CLINIC | Age: 63
Discharge: HOME | End: 2018-04-27
Attending: ORTHOPAEDIC SURGERY | Admitting: ORTHOPAEDIC SURGERY
Payer: COMMERCIAL

## 2018-04-27 VITALS
BODY MASS INDEX: 42.52 KG/M2 | HEIGHT: 63 IN | OXYGEN SATURATION: 91 % | SYSTOLIC BLOOD PRESSURE: 140 MMHG | DIASTOLIC BLOOD PRESSURE: 79 MMHG | WEIGHT: 240 LBS | RESPIRATION RATE: 16 BRPM | TEMPERATURE: 98 F

## 2018-04-27 PROCEDURE — 25000128 H RX IP 250 OP 636: Performed by: ORTHOPAEDIC SURGERY

## 2018-04-27 PROCEDURE — 25000125 ZZHC RX 250: Performed by: NURSE ANESTHETIST, CERTIFIED REGISTERED

## 2018-04-27 PROCEDURE — 36000063 ZZH SURGERY LEVEL 4 EA 15 ADDTL MIN: Performed by: ORTHOPAEDIC SURGERY

## 2018-04-27 PROCEDURE — 27210794 ZZH OR GENERAL SUPPLY STERILE: Performed by: ORTHOPAEDIC SURGERY

## 2018-04-27 PROCEDURE — 37000008 ZZH ANESTHESIA TECHNICAL FEE, 1ST 30 MIN: Performed by: ORTHOPAEDIC SURGERY

## 2018-04-27 PROCEDURE — 25000128 H RX IP 250 OP 636: Performed by: NURSE ANESTHETIST, CERTIFIED REGISTERED

## 2018-04-27 PROCEDURE — 25000564 ZZH DESFLURANE, EA 15 MIN: Performed by: ORTHOPAEDIC SURGERY

## 2018-04-27 PROCEDURE — C1713 ANCHOR/SCREW BN/BN,TIS/BN: HCPCS | Performed by: ORTHOPAEDIC SURGERY

## 2018-04-27 PROCEDURE — 36000093 ZZH SURGERY LEVEL 4 1ST 30 MIN: Performed by: ORTHOPAEDIC SURGERY

## 2018-04-27 PROCEDURE — 40000305 ZZH STATISTIC PRE PROC ASSESS I: Performed by: ORTHOPAEDIC SURGERY

## 2018-04-27 PROCEDURE — 71000027 ZZH RECOVERY PHASE 2 EACH 15 MINS: Performed by: ORTHOPAEDIC SURGERY

## 2018-04-27 PROCEDURE — 71000012 ZZH RECOVERY PHASE 1 LEVEL 1 FIRST HR: Performed by: ORTHOPAEDIC SURGERY

## 2018-04-27 PROCEDURE — 37000009 ZZH ANESTHESIA TECHNICAL FEE, EACH ADDTL 15 MIN: Performed by: ORTHOPAEDIC SURGERY

## 2018-04-27 DEVICE — IMP ANCHOR ARTHREX BIO-SWIVELOCK 4.75X19.1MM AR-2324BCC: Type: IMPLANTABLE DEVICE | Site: SHOULDER | Status: FUNCTIONAL

## 2018-04-27 RX ORDER — MEPERIDINE HYDROCHLORIDE 25 MG/ML
12.5 INJECTION INTRAMUSCULAR; INTRAVENOUS; SUBCUTANEOUS
Status: DISCONTINUED | OUTPATIENT
Start: 2018-04-27 | End: 2018-04-27 | Stop reason: HOSPADM

## 2018-04-27 RX ORDER — LIDOCAINE 40 MG/G
CREAM TOPICAL
Status: DISCONTINUED | OUTPATIENT
Start: 2018-04-27 | End: 2018-04-27 | Stop reason: HOSPADM

## 2018-04-27 RX ORDER — SODIUM CHLORIDE, SODIUM LACTATE, POTASSIUM CHLORIDE, CALCIUM CHLORIDE 600; 310; 30; 20 MG/100ML; MG/100ML; MG/100ML; MG/100ML
INJECTION, SOLUTION INTRAVENOUS CONTINUOUS
Status: DISCONTINUED | OUTPATIENT
Start: 2018-04-27 | End: 2018-04-27 | Stop reason: HOSPADM

## 2018-04-27 RX ORDER — HYDRALAZINE HYDROCHLORIDE 20 MG/ML
2.5-5 INJECTION INTRAMUSCULAR; INTRAVENOUS EVERY 10 MIN PRN
Status: DISCONTINUED | OUTPATIENT
Start: 2018-04-27 | End: 2018-04-27 | Stop reason: HOSPADM

## 2018-04-27 RX ORDER — ONDANSETRON 2 MG/ML
4 INJECTION INTRAMUSCULAR; INTRAVENOUS EVERY 30 MIN PRN
Status: DISCONTINUED | OUTPATIENT
Start: 2018-04-27 | End: 2018-04-27 | Stop reason: HOSPADM

## 2018-04-27 RX ORDER — FENTANYL CITRATE 50 UG/ML
25-50 INJECTION, SOLUTION INTRAMUSCULAR; INTRAVENOUS
Status: DISCONTINUED | OUTPATIENT
Start: 2018-04-27 | End: 2018-04-27 | Stop reason: HOSPADM

## 2018-04-27 RX ORDER — DEXAMETHASONE SODIUM PHOSPHATE 4 MG/ML
INJECTION, SOLUTION INTRA-ARTICULAR; INTRALESIONAL; INTRAMUSCULAR; INTRAVENOUS; SOFT TISSUE PRN
Status: DISCONTINUED | OUTPATIENT
Start: 2018-04-27 | End: 2018-04-27

## 2018-04-27 RX ORDER — CEFAZOLIN SODIUM 1 G/50ML
1 INJECTION, SOLUTION INTRAVENOUS SEE ADMIN INSTRUCTIONS
Status: DISCONTINUED | OUTPATIENT
Start: 2018-04-27 | End: 2018-04-27 | Stop reason: HOSPADM

## 2018-04-27 RX ORDER — LIDOCAINE HYDROCHLORIDE 10 MG/ML
INJECTION, SOLUTION INFILTRATION; PERINEURAL PRN
Status: DISCONTINUED | OUTPATIENT
Start: 2018-04-27 | End: 2018-04-27

## 2018-04-27 RX ORDER — CEFAZOLIN SODIUM 2 G/100ML
2 INJECTION, SOLUTION INTRAVENOUS
Status: COMPLETED | OUTPATIENT
Start: 2018-04-27 | End: 2018-04-27

## 2018-04-27 RX ORDER — FENTANYL CITRATE 50 UG/ML
INJECTION, SOLUTION INTRAMUSCULAR; INTRAVENOUS PRN
Status: DISCONTINUED | OUTPATIENT
Start: 2018-04-27 | End: 2018-04-27

## 2018-04-27 RX ORDER — ALBUTEROL SULFATE 0.83 MG/ML
2.5 SOLUTION RESPIRATORY (INHALATION) EVERY 4 HOURS PRN
Status: DISCONTINUED | OUTPATIENT
Start: 2018-04-27 | End: 2018-04-27 | Stop reason: HOSPADM

## 2018-04-27 RX ORDER — DEXAMETHASONE SODIUM PHOSPHATE 4 MG/ML
4 INJECTION, SOLUTION INTRA-ARTICULAR; INTRALESIONAL; INTRAMUSCULAR; INTRAVENOUS; SOFT TISSUE EVERY 10 MIN PRN
Status: DISCONTINUED | OUTPATIENT
Start: 2018-04-27 | End: 2018-04-27 | Stop reason: HOSPADM

## 2018-04-27 RX ORDER — NALOXONE HYDROCHLORIDE 0.4 MG/ML
.1-.4 INJECTION, SOLUTION INTRAMUSCULAR; INTRAVENOUS; SUBCUTANEOUS
Status: DISCONTINUED | OUTPATIENT
Start: 2018-04-27 | End: 2018-04-27 | Stop reason: HOSPADM

## 2018-04-27 RX ORDER — ONDANSETRON 4 MG/1
4 TABLET, ORALLY DISINTEGRATING ORAL EVERY 30 MIN PRN
Status: DISCONTINUED | OUTPATIENT
Start: 2018-04-27 | End: 2018-04-27 | Stop reason: HOSPADM

## 2018-04-27 RX ORDER — ROPIVACAINE HYDROCHLORIDE 7.5 MG/ML
INJECTION, SOLUTION EPIDURAL; PERINEURAL PRN
Status: DISCONTINUED | OUTPATIENT
Start: 2018-04-27 | End: 2018-04-27

## 2018-04-27 RX ORDER — LIDOCAINE HYDROCHLORIDE AND EPINEPHRINE 15; 5 MG/ML; UG/ML
INJECTION, SOLUTION EPIDURAL PRN
Status: DISCONTINUED | OUTPATIENT
Start: 2018-04-27 | End: 2018-04-27

## 2018-04-27 RX ORDER — HYDROMORPHONE HYDROCHLORIDE 1 MG/ML
.3-.5 INJECTION, SOLUTION INTRAMUSCULAR; INTRAVENOUS; SUBCUTANEOUS EVERY 10 MIN PRN
Status: DISCONTINUED | OUTPATIENT
Start: 2018-04-27 | End: 2018-04-27 | Stop reason: HOSPADM

## 2018-04-27 RX ORDER — PROPOFOL 10 MG/ML
INJECTION, EMULSION INTRAVENOUS PRN
Status: DISCONTINUED | OUTPATIENT
Start: 2018-04-27 | End: 2018-04-27

## 2018-04-27 RX ADMIN — MIDAZOLAM 2 MG: 1 INJECTION INTRAMUSCULAR; INTRAVENOUS at 11:29

## 2018-04-27 RX ADMIN — LIDOCAINE HYDROCHLORIDE 5 ML: 10 INJECTION, SOLUTION INFILTRATION; PERINEURAL at 12:19

## 2018-04-27 RX ADMIN — ROCURONIUM BROMIDE 20 MG: 10 INJECTION INTRAVENOUS at 12:19

## 2018-04-27 RX ADMIN — PROPOFOL 150 MG: 10 INJECTION, EMULSION INTRAVENOUS at 12:19

## 2018-04-27 RX ADMIN — CEFAZOLIN SODIUM 2 G: 2 INJECTION, SOLUTION INTRAVENOUS at 12:14

## 2018-04-27 RX ADMIN — FENTANYL CITRATE 100 MCG: 50 INJECTION, SOLUTION INTRAMUSCULAR; INTRAVENOUS at 11:29

## 2018-04-27 RX ADMIN — LIDOCAINE HYDROCHLORIDE 2 ML: 10 INJECTION, SOLUTION INFILTRATION; PERINEURAL at 11:38

## 2018-04-27 RX ADMIN — DEXAMETHASONE SODIUM PHOSPHATE 8 MG: 4 INJECTION, SOLUTION INTRA-ARTICULAR; INTRALESIONAL; INTRAMUSCULAR; INTRAVENOUS; SOFT TISSUE at 12:19

## 2018-04-27 RX ADMIN — FENTANYL CITRATE 100 MCG: 50 INJECTION, SOLUTION INTRAMUSCULAR; INTRAVENOUS at 12:17

## 2018-04-27 RX ADMIN — MIDAZOLAM 2 MG: 1 INJECTION INTRAMUSCULAR; INTRAVENOUS at 12:20

## 2018-04-27 RX ADMIN — LIDOCAINE HYDROCHLORIDE AND EPINEPHRINE 5 ML: 15; 5 INJECTION, SOLUTION EPIDURAL at 11:40

## 2018-04-27 RX ADMIN — ROPIVACAINE HYDROCHLORIDE 30 ML: 7.5 INJECTION, SOLUTION EPIDURAL; PERINEURAL at 11:42

## 2018-04-27 RX ADMIN — LIDOCAINE HYDROCHLORIDE 1 ML: 10 INJECTION, SOLUTION EPIDURAL; INFILTRATION; INTRACAUDAL; PERINEURAL at 10:54

## 2018-04-27 RX ADMIN — SODIUM CHLORIDE, POTASSIUM CHLORIDE, SODIUM LACTATE AND CALCIUM CHLORIDE: 600; 310; 30; 20 INJECTION, SOLUTION INTRAVENOUS at 13:03

## 2018-04-27 RX ADMIN — SODIUM CHLORIDE, POTASSIUM CHLORIDE, SODIUM LACTATE AND CALCIUM CHLORIDE: 600; 310; 30; 20 INJECTION, SOLUTION INTRAVENOUS at 10:54

## 2018-04-27 NOTE — BRIEF OP NOTE
[facility] Thompson [facility] Brief Operative Note    Pre-operative diagnosis: left shoulder pain   Post-operative diagnosis: Same   Procedure: Rotator cuff repair (Left)   Surgeon: Hernando Thompson MD   Assistant(s): Vishnu Mclean PA-C   Anesthesia: General endotracheal anesthesia   Estimated blood loss: Minimal   Drains: None   Complications: None   Condition: Stable

## 2018-04-27 NOTE — IP AVS SNAPSHOT
MRN:[id number]                      After Visit Summary   4/27/2018    Jacquie Harris    MRN: [id number]           Thank you!     Thank you for choosing [facility] for your care. Our goal is always to provide you with excellent care. Hearing back from our patients is one way we can continue to improve our services. Please take a few minutes to complete the written survey that you may receive in the mail after you visit with us. Thank you!        Patient Information     Date Of Birth          1955        About your hospital stay     You were admitted on:  April 27, 2018 You last received care in the:  [facility] PreOP/Phase II    You were discharged on:  April 27, 2018       Who to Call     For medical emergencies, please call 911.  For non-urgent questions about your medical care, please call your primary care provider or clinic, [phone]  For questions related to your surgery, please call your surgery clinic        Attending Provider     Provider Specialty    Hernando Thompson MD Orthopaedic Surgery       Primary Care Provider Office Phone # Fax #    [facility] Amanda Suazo [phone]      After Care Instructions      Diet as Tolerated       Return to diet before surgery, unless instructed otherwise.            Discharge Instructions       Review outpatient procedure discharge instructions with patient as directed by Provider            Dressing Change        Daily and as needed            Follow-up Physical Therapy appointment       Schedule outpatient PT, Begin Next Week, S/P Small Rotator Cuff Repair            Ice to affected area       Ice pack to surgical site every 15 minutes per hour for 24 hours            Return to clinic       Return to clinic in 2 weeks            Shower        Cover dressing if dressing is not going to be changed today            Weight bearing - As tolerated           Wound care       Do not immerse wound in water until sutures  removed                  Further instructions from your care team                           Same Day Surgery Discharge Instructions  Special Precautions After Surgery - Adult    1. It is not unusual to feel lightheaded or faint, up to 24 hours after surgery or while taking pain medication.  If you have these symptoms; sit for a few minutes before standing and have someone assist you when getting up.  2. You should rest and relax for the next 24 hours and must have someone stay with you for at least 24 hours after your discharge.  3. DO NOT DRIVE any vehicle or operate mechanical equipment for 24 hours following the end of your surgery.  DO NOT DRIVE while taking narcotic pain medications that have been prescribed by your physician.  If you had a limb operated on, you must be able to use it fully to drive.  4. DO NOT drink alcoholic beverages for 24 hours following surgery or while taking prescription pain medication.  5. Drink clear liquids (apple juice, ginger ale, broth, 7-Up, etc.).  Progress to your regular diet as you feel able.  6. Any questions call your physician and do not make important decisions for 24 hours.    ACTIVITY  ? Up as tolerated with immobilizer on 24/7 unless in the shower.  May want to spend a night or two in your recliner to elevate shoulders to decrease swelling & pain.     INCISIONAL CARE  ? May shower tomorrow.     Call for an appointment to return to the clinic.    Medications:  ? Start a pain pill whenever you start to feel any pain or at the latest, bedtime, even if you have no pain, then set an alarm thru the 1st night for every 4 hours to check on patient & give a pain pill.  May take them as need tomorrow after the block has worn off.     Additional discharge instructions: Cold pack & off to shoulder to decrease swelling & pain.  __________________________________________________________________________________________________________________________________  IMPORTANT NUMBERS:   "  [facility] [place] Main Number:  [phone], [phone]  Pharmacy:  [phone]  Same Day Surgery:  [phone], Monday - Friday until 8:30 p.m.  Urgent Care:  [phone]  Emergency Room:  [phone]      [facility]:  [phone]                                                                             [facility]:  [phone] option 1  [facility]:  [phone]     OB Clinic:  [phone]   Surgery Specialty Clinic:  [phone]   Home Medical Equipment: [phone]  [facility]:  [phone]        Pending Results     No orders found from 2018 to 2018.            Admission Information     Date & Time Provider Department Dept. Phone    2018 Hernando Thompson MD [facility] PreOP/Phase [phone]      Your Vitals Were     Blood Pressure Temperature Respirations Height Weight Last Period    147/80 98  F (36.7  C) 16 1.6 m (5' 3\") 108.9 kg (240 lb) 2006    Pulse Oximetry BMI (Body Mass Index)                98% 42.51 kg/m2          MyChart Information     [employer] lets you send messages to your doctor, view your test results, renew your prescriptions, schedule appointments and more. To sign up, go to [email or web address] . Click on \"Log in\" on the left side of the screen, which will take you to the Welcome page. Then click on \"Sign up Now\" on the right side of the page.     You will be asked to enter the access code listed below, as well as some personal information. Please follow the directions to create your username and password.     Your access code is: [email or web address]  Expires: 6/15/2018  3:11 PM     Your access code will  in 90 days. If you need help or a new code, please call your [facility] or [phone].        Care EveryWhere ID     This is your Care EveryWhere ID. This could be used by other organizations to access your [place] medical records  [id number]        Equal Access to Services     KUNAL" GAAR : Hadii aad ku richie Juddali, waaxda luqadaha, qaybta kaalmada adejayashree, mildred patriziain hayaan billiepolly wolf daniel . So [facility] [phone].    ATENCIÓN: Si habla español, tiene a booth disposición servicios gratuitos de asistencia lingüística. Llame al [phone].    We comply with applicable federal civil rights laws and [place] laws. We do not discriminate on the basis of race, color, national origin, age, disability, sex, sexual orientation, or gender identity.               Review of your medicines      CONTINUE these medicines which have NOT CHANGED        Dose / Directions    acetaminophen 325 MG tablet   Commonly known as:  TYLENOL   Used for:  Shoulder injury, right, subsequent encounter        Dose:  650 mg   Take 2 tablets (650 mg) by mouth every 4 hours as needed for mild pain or fever   Quantity:  100 tablet   Refills:  0       atenolol 50 MG tablet   Commonly known as:  TENORMIN   Used for:  Benign essential hypertension        Dose:  50 mg   Take 1 tablet (50 mg) by mouth daily   Quantity:  90 tablet   Refills:  3       CERAVE Crea   Used for:  Sunburn, Itching        Externally apply topically 2 times daily as needed   Quantity:  1 Bottle   Refills:  3       clobetasol 0.05 % ointment   Commonly known as:  TEMOVATE   Used for:  Vaginal itching        Apply sparingly to affected area twice daily for 14 days.  Do not apply to face.   Quantity:  15 g   Refills:  0       ferrous sulfate 142 (45 Fe) MG Tbcr   Commonly known as:  SLO-FE   Used for:  Iron deficiency        Dose:  142 mg   Take 1 tablet (142 mg) by mouth daily   Quantity:  30 tablet   Refills:  3       hydrochlorothiazide 12.5 MG Tabs tablet   Used for:  Benign essential hypertension        Dose:  12.5 mg   Take 1 tablet (12.5 mg) by mouth daily   Quantity:  90 tablet   Refills:  3       * nystatin cream   Commonly known as:  MYCOSTATIN   Used for:  Candidiasis of skin        Apply topically 2 times daily Reported on 3/6/2017    Quantity:  30 g   Refills:  11       * nystatin 244480 UNIT/GM Powd   Commonly known as:  MYCOSTATIN   Used for:  Candidiasis of skin        Apply topically 3 times daily as needed Refill at patient's request   Quantity:  30 g   Refills:  1       pantoprazole 40 MG EC tablet   Commonly known as:  PROTONIX   Used for:  Upper GI bleed        Dose:  40 mg   Take 1 tablet (40 mg) by mouth 2 times daily Take 30-60 minutes before a meal.   Quantity:  60 tablet   Refills:  0       ranitidine 150 MG tablet   Commonly known as:  ZANTAC   Used for:  Upper GI bleed        Dose:  150 mg   Take 1 tablet (150 mg) by mouth 2 times daily   Quantity:  60 tablet   Refills:  3       * Notice:  This list has 2 medication(s) that are the same as other medications prescribed for you. Read the directions carefully, and ask your doctor or other care provider to review them with you.             Protect others around you: Learn how to safely use, store and throw away your medicines at www.disposemymeds.org.             Medication List: This is a list of all your medications and when to take them. Check marks below indicate your daily home schedule. Keep this list as a reference.      Medications           Morning Afternoon Evening Bedtime As Needed    acetaminophen 325 MG tablet   Commonly known as:  TYLENOL   Take 2 tablets (650 mg) by mouth every 4 hours as needed for mild pain or fever                                atenolol 50 MG tablet   Commonly known as:  TENORMIN   Take 1 tablet (50 mg) by mouth daily                                CERAVE Crea   Externally apply topically 2 times daily as needed                                clobetasol 0.05 % ointment   Commonly known as:  TEMOVATE   Apply sparingly to affected area twice daily for 14 days.  Do not apply to face.                                ferrous sulfate 142 (45 Fe) MG Tbcr   Commonly known as:  SLO-FE   Take 1 tablet (142 mg) by mouth daily                                 hydrochlorothiazide 12.5 MG Tabs tablet   Take 1 tablet (12.5 mg) by mouth daily                                * nystatin cream   Commonly known as:  MYCOSTATIN   Apply topically 2 times daily Reported on 3/6/2017                                * nystatin 061724 UNIT/GM Powd   Commonly known as:  MYCOSTATIN   Apply topically 3 times daily as needed Refill at patient's request                                pantoprazole 40 MG EC tablet   Commonly known as:  PROTONIX   Take 1 tablet (40 mg) by mouth 2 times daily Take 30-60 minutes before a meal.                                ranitidine 150 MG tablet   Commonly known as:  ZANTAC   Take 1 tablet (150 mg) by mouth 2 times daily                                * Notice:  This list has 2 medication(s) that are the same as other medications prescribed for you. Read the directions carefully, and ask your doctor or other care provider to review them with you.

## 2018-04-27 NOTE — DISCHARGE INSTRUCTIONS
Same Day Surgery Discharge Instructions  Special Precautions After Surgery - Adult    1. It is not unusual to feel lightheaded or faint, up to 24 hours after surgery or while taking pain medication.  If you have these symptoms; sit for a few minutes before standing and have someone assist you when getting up.  2. You should rest and relax for the next 24 hours and must have someone stay with you for at least 24 hours after your discharge.  3. DO NOT DRIVE any vehicle or operate mechanical equipment for 24 hours following the end of your surgery.  DO NOT DRIVE while taking narcotic pain medications that have been prescribed by your physician.  If you had a limb operated on, you must be able to use it fully to drive.  4. DO NOT drink alcoholic beverages for 24 hours following surgery or while taking prescription pain medication.  5. Drink clear liquids (apple juice, ginger ale, broth, 7-Up, etc.).  Progress to your regular diet as you feel able.  6. Any questions call your physician and do not make important decisions for 24 hours.    ACTIVITY  ? Up as tolerated with immobilizer on 24/7 unless in the shower.  May want to spend a night or two in your recliner to elevate shoulders to decrease swelling & pain.     INCISIONAL CARE  ? May shower tomorrow.     Call for an appointment to return to the clinic.    Medications:  ? Start a pain pill whenever you start to feel any pain or at the latest, bedtime, even if you have no pain, then set an alarm thru the 1st night for every 4 hours to check on patient & give a pain pill.  May take them as need tomorrow after the block has worn off.     Additional discharge instructions: Cold pack & off to shoulder to decrease swelling & pain.  __________________________________________________________________________________________________________________________________  IMPORTANT NUMBERS:    [facility]:  [phone], [phone]  Pharmacy:   [phone]  Same Day Surgery:  [phone], Monday - Friday until 8:30 p.m.  Urgent Care:  [phone]  Emergency Room:  [phone]      [facility]:  [phone]                                                                             [facility]:  [phone] option 1  [facility]:  [phone]     OB Clinic:  [phone]   Surgery Specialty Clinic:  [phone]   Home Medical Equipment: [phone]  [facility]:  [phone]

## 2018-04-27 NOTE — ANESTHESIA POSTPROCEDURE EVALUATION
Patient: Jacquie Harris    Procedure(s):  Left Shoulder Open Subacromial Decompression,Distal Clavicle Excision,Rotator Cuff Repair - Wound Class: I-Clean    Diagnosis:left shoulder pain  Diagnosis Additional Information: No value filed.    Anesthesia Type:  Periph. Nerve Block for postop pain, General, ETT    Note:  Anesthesia Post Evaluation    Patient location during evaluation: Phase 2  Patient participation: Able to participate in evaluation but full recovery from regional anesthesia has not yet ocurrred but is anticipated to occur within 48 hours  Level of consciousness: awake and alert  Pain management: adequate  Airway patency: patent  Cardiovascular status: acceptable and hemodynamically stable  Respiratory status: acceptable and nasal cannula  Hydration status: acceptable  PONV: none     Anesthetic complications: None          Last vitals:  Vitals:    04/27/18 1345 04/27/18 1400 04/27/18 1408   BP: (!) 139/91 128/89 147/80   Resp: 12 16 16   Temp:      SpO2: 100% 97% 98%         Electronically Signed By: MARGARITA Flores CRNA  April 27, 2018  2:29 PM

## 2018-04-27 NOTE — OP NOTE
Procedure Date: 04/27/2018      PREOPERATIVE DIAGNOSES:  Left shoulder rotator cuff tear, acromioclavicular arthrosis, subacromial impingement syndrome.      POSTOPERATIVE DIAGNOSES:  Left shoulder rotator cuff tear, acromioclavicular arthrosis, subacromial impingement syndrome.      PROCEDURES PERFORMED:  Left shoulder mini open medium rotator cuff repair, distal clavicle excision, subacromial decompression.      SURGEON:  Hernando Thompson MD      ASSISTANT:  Vishnu Mclean PA-C      ANESTHESIA:  General endotracheal.      ESTIMATED BLOOD LOSS:  Minimal.      COMPLICATIONS:  None apparent.      INDICATIONS:  Jacquie is a 63-year-old female who presents with pain and weakness of the left shoulder recalcitrant to conservative treatment.  After alternatives, risks and possible complications were carefully discussed, the patient desired surgical intervention and therefore consent was obtained.      DESCRIPTION OF PROCEDURE:  The patient was brought to main operating after general anesthesia was obtained and placed in a beach chair position.  Two grams of Ancef were given intravenously.  The left upper extremity was prepped and draped in the usual sterile fashion.      An oblique incision was made centered off the distal clavicle.  Subcutaneous tissue was divided with cautery.  The fascia incised in line with the incision.  Mini-Weitlaner retractor was placed.  I then elevated up a small portion of the deltoid off the anterior acromion.  Weitlaner retractor was placed deeper and Army[employer]Nav[employer] retractor was placed underneath the split of the deltoid.  Immediately encountered was fluid in the subacromial space consistent with a full-thickness tear.  I knew that she had had a PASTA lesion.  Actually, the undersurface fibers were completely torn, now showing a small gap.  Therefore, I placed a channel retractor underneath the acromion, I resected the anterior and inferior osteophytes at all sites.  Following removal of the bony  "fragments, I smoothed off the undersurface with a rasp.  The wound was irrigated out.      I then used a #15 blade to resect out the devitalized tissue and complete the repair, as again 90% of it had already been torn free underneath.  I was able to trim back devitalized tissue to approximately 1.5 cm.  Not a good U-shaped vital tendon remaining; therefore, broke down all adhesions.  Likewise, made a bony trough within the greater tuberosity, then utilized the Arthrex, placing a single medial and a single lateral anchor and 2 double arms there to repair the tendon down beautifully into the bed.  I used additional medial and lateral suture to complete the \"dog ears.\"  Now, I had a beautiful complete repair.  Therefore, I placed baby Hohmann retractors on the distal clavicle and excised approximately 4-5 mm of bone, removing the bony fragments.  Surfaces were smoothed off with a rongeur.  I irrigated out the wound and removed the retractors.  I then closed the deep fascia with #1 Vicryl suture, closed the subcutaneous tissue with 2-0 Vicryl suture and completed closure with 3-0 Stratafix.  Dermabond was placed over the skin followed by a sterile compression bandage and a shoulder immobilizer.  The patient was awakened, extubated and returned to PAR in stable condition, without apparent complication.         VICENTE MORALES MD             D: 2018   T: 2018   MT: GIOR      Name:     DESTINY KILPATRICK   MRN:      [id number]        Account:        [id number]   :      1955           Procedure Date: 2018      Document: [id number]    "

## 2018-04-27 NOTE — ANESTHESIA PROCEDURE NOTES
Peripheral nerve/Neuraxial procedure note : Interscalene  Pre-Procedure  Performed by  DAVIDE DIXON   Location: pre-op    Procedure Times:4/27/2018 11:34 AM and 4/27/2018 11:44 AM  Pre-Anesthestic Checklist: patient identified, IV checked, site marked, risks and benefits discussed, informed consent, monitors and equipment checked, pre-op evaluation, at physician/surgeon's request and post-op pain management    Timeout  Correct Patient: Yes   Correct Procedure: Yes   Correct Site: Yes   Correct Laterality: Yes   Correct Position: Yes   Site Marked: Yes   .   Procedure Documentation    .    Procedure:  left  Interscalene.  Local skin infiltrated with 1 mL of 1% lidocaine.     Ultrasound used to identify targeted nerve, plexus, or vascular marker and placed a needle adjacent to it., Ultrasound was used to visualize the spread of the anesthetic in close proximity to the above stated nerve. A permanent image is entered into the patient's record.  Patient Prep;mask, sterile gloves, chlorhexidine gluconate and isopropyl alcohol, patient draped.  Nerve Stim: Initial Level 1 mA.  Lowest motor response 0.38 mA..  Needle: insulated, short bevel Needle Gauge: 20.    Needle Length (Inches) 4  Insertion Method: Single Shot.       Assessment/Narrative  Paresthesias: No.  Injection made incrementally with aspirations every 5 mL..  The placement was negative for: blood aspirated, painful injection and site bleeding.  Bolus given via needle. No blood aspirated via catheter.   Secured via.   Complications: none. Test dose of 3 mL lidocaine 1.5% w/ 1:200,000 epinephrine at 11:40.  Test dose negative for signs of intravascular, subdural or intrathecal injection.

## 2018-04-27 NOTE — ANESTHESIA CARE TRANSFER NOTE
Patient: Jacquie Steven    Procedure(s):  Left Shoulder Open Subacromial Decompression,Distal Clavicle Excision,Rotator Cuff Repair - Wound Class: I-Clean    Diagnosis: left shoulder pain  Diagnosis Additional Information: No value filed.    Anesthesia Type:   Periph. Nerve Block for postop pain, General, ETT     Note:  Airway :Face Mask  Patient transferred to:PACU  Handoff Report: Identifed the Patient, Identified the Reponsible Provider, Reviewed the pertinent medical history, Discussed the surgical course, Reviewed Intra-OP anesthesia mangement and issues during anesthesia, Set expectations for post-procedure period and Allowed opportunity for questions and acknowledgement of understanding      Vitals: (Last set prior to Anesthesia Care Transfer)    CRNA VITALS  4/27/2018 1256 - 4/27/2018 1327      4/27/2018             Pulse: 78    SpO2: (!)  88 %    Resp Rate (observed): (!)  7                Electronically Signed By: MARGARITA Cornejo CRNA  April 27, 2018  1:27 PM

## 2018-04-27 NOTE — IP AVS SNAPSHOT
[facility] PreOP/Phase II    [address] [address]    Phone:  [phone]    Fax:  [phone]                                       After Visit Summary   4/27/2018    Jacquie Harris    MRN: [id number]           After Visit Summary Signature Page     I have received my discharge instructions, and my questions have been answered. I have discussed any challenges I see with this plan with the nurse or doctor.    ..........................................................................................................................................  Patient/Patient Representative Signature      ..........................................................................................................................................  Patient Representative Print Name and Relationship to Patient    ..................................................               ................................................  Date                                            Time    ..........................................................................................................................................  Reviewed by Signature/Title    ...................................................              ..............................................  Date                                                            Time

## 2018-04-30 NOTE — PROGRESS NOTES
OUTPATIENT PHYSICAL THERAPY DISCHARGE SUMMARY   Dr. Thompson 1/23/18 to 03/27/18 0800   Signing Clinician's Name / Credentials   Signing clinician's name / credentials Dasiaalessio Wilson, PT [id number]   Session Number   Session Number 7 [facility]    Ortho Goal 1   Goal Description 1.  Pt will be able to reach to shoulder ht for ADL's w/ minimal difficulty.  2/2/18 slight difficulty MET   Target Date 02/22/18   Ortho Goal 2   Goal Description 2.  Pt will be able to reach behind back for ADL's w/ minimal difficulty.  2/21/18 able to pull up pants w/o difficulty but unable to hook bra.   3/27/18 no complaints pulling up pants, has not attempted hooking bra.  Partially met.   Target Date 02/22/18   Ortho Goal 3   Goal Description 3.  Pt will be able to sleep 5-6 hours/ night.  2/21/18 able to lie on side but increases pain.  Sleeping 2 hours at a time.  3/13/18 sleeping 8 hours --waking 2X/night MET   3/27/18 sleeps 2.5 hours at a time overall sleeping 6-8 hours.  MET   Target Date 03/24/18   Ortho Goal 4   Goal Description 4.  Pt will be able to lift and carry up to 10 # for groceries/ laundry.  2/21/18 has not been doing..  3/27/18 limited lifting due to L arm complaints   Target Date 03/24/18   Ortho Goal 5   Goal Description 5.  Pt will be able to reach overhead for ADL's w/ minimal difficulty. 3/27/18 ongoing difficulty w/ L UE   Target Date 03/24/18   Ortho Goal 6   Goal Description 6.  Pt will be independent and consistent w/ HEP.  2/21/8 consistent w/ HEP MET.  3/13/18 doing ex 3 X/wk MET   Target Date 03/24/18   Subjective Report   Subjective Report Pt states she got her hair cut as she was unable to use her arms to do her hair.   Pt notes L shoulder is bothering anterior shoulder into upper arm--which is getting worse --constant 7-8/10--keeps her awake at night.  R shoulder is pretty good during the day @ worst 2/10 but will increase w/ lying on it at night.     Objective Measures   Objective Measure AAROM flex R 148*;   scaption R 155*,   ER R WNL,   IR  R 65*   Details AROM shoulder flex R 122*, L  140*,  scaption R 90*, L 120*, ER R 75*,  L 55*;  IR  R to L1,  L to T11   Therapeutic Procedure/exercise   Treatment Detail Scifit seat 6 elevated L 1 X 2 min   Wand ER standing B x 5.   Wall flex shoulder R  X 5.  Table flex x 10 B.   RTB shoulder ext  w/ LT set X 30 B.      shoulder flex 5oz X 20.    scaption 5 oz X 20 .    ER in SL 5 oz X 25 .   Brief AAROM w/ PT R shoulder.    Plan   Home program ex as above--strengthening 3-4X/wk, ice   Plan Pt plans to cont on own with HEP until L shoulder surgery 4/27/18.  Discharge from physical therapy.   Comments   Comments Progress towards goals as noted above.   Chart was kept open in case of problems therefore no final G-codes reported.

## 2018-04-30 NOTE — ADDENDUM NOTE
Encounter addended by: Dasia Wilson, PT on: 4/30/2018  9:51 AM<BR>     Actions taken: Sign clinical note, Flowsheet accepted, Episode resolved

## 2018-05-04 ENCOUNTER — HOSPITAL ENCOUNTER (OUTPATIENT)
Dept: PHYSICAL THERAPY | Facility: CLINIC | Age: 63
Setting detail: THERAPIES SERIES
End: 2018-05-04
Attending: ORTHOPAEDIC SURGERY
Payer: COMMERCIAL

## 2018-05-04 PROCEDURE — 97161 PT EVAL LOW COMPLEX 20 MIN: CPT | Mod: GP | Performed by: PHYSICAL THERAPIST

## 2018-05-04 PROCEDURE — 40000718 ZZHC STATISTIC PT DEPARTMENT ORTHO VISIT: Performed by: PHYSICAL THERAPIST

## 2018-05-04 PROCEDURE — G8982 BODY POS GOAL STATUS: HCPCS | Mod: GP,CI | Performed by: PHYSICAL THERAPIST

## 2018-05-04 PROCEDURE — G8981 BODY POS CURRENT STATUS: HCPCS | Mod: GP,CL | Performed by: PHYSICAL THERAPIST

## 2018-05-04 PROCEDURE — 97110 THERAPEUTIC EXERCISES: CPT | Mod: GP | Performed by: PHYSICAL THERAPIST

## 2018-05-04 NOTE — PROGRESS NOTES
05/04/18 1000   General Information   Type of Visit Initial OP Ortho PT Evaluation   Start of Care Date 05/04/18   Referring Physician Dr. Thompson   Patient/Family Goals Statement To be able to move the arm and raise it overhead   Orders Evaluate and Treat   Date of Order 04/30/18   Insurance Type [employer]   Medical Diagnosis L RCR   Body Part(s)   Body Part(s) Shoulder   Presentation and Etiology   Pertinent history of current problem (include personal factors and/or comorbidities that impact the POC) Pt is s/p L RCR on 4/27/18.  Pt states she has an immobilzer (but did not wear into clinic).   Pain @ best 4/10,  @ worst 9/10.   Meds:  hydrocodone , tylenol 325.   Pt is R handed.   PMHX  R RCR X 2.  anemia,  HBP,  bladder urgency, arthritis.  Moderate complexity w/ recent R RCR   Impairments A. Pain;B. Decreased WB tolerance;C. Swelling;D. Decreased ROM;E. Decreased flexibility;F. Decreased strength and endurance;L. Tingling   Onset date of current episode/exacerbation 04/27/18   Pain quality A. Sharp;B. Dull;C. Aching;E. Shooting;F. Stabbing   Pain exacerbation comment no use of L arm currently.  Pt is sleeping in recliner.  Pt is not driving.   Pain/symptoms eased by A. Sitting;C. Rest;H. Cold   Prior Level of Function   Functional Level Prior Comment sleeps in bed.  Pt drives. Pt normally carries laundry/ groceries.     Current Level of Function   Patient role/employment history C. Homemaker   Fall Risk Screen   Fall screen completed by PT   Have you fallen 2 or more times in the past year? No   Have you fallen and had an injury in the past year? No   Is patient a fall risk? No   Shoulder Objective Findings   Side (if bilateral, select both right and left) Left   Observation incision healing as expected   Shoulder ROM Comment L Elbow AROM 0-125*   Left Shoulder Flexion AROM AROM not tested    Left Shoulder Flexion PROM 77*   Left Shoulder ER PROM 30* w/ arm at side.   Left Shoulder IR PROM 37* in 20* abd    Planned Therapy Interventions   Planned Therapy Interventions ROM;manual therapy;strengthening;stretching  (follow RC protocol)   Clinical Impression   Criteria for Skilled Therapeutic Interventions Met yes, treatment indicated   PT Diagnosis s/p L RCR   Influenced by the following impairments pain, decreased ROM   Functional limitations due to impairments all daily activities --no active use of L UE   Clinical Presentation Stable/Uncomplicated   Clinical Presentation Rationale progressing as expected following RCR   Clinical Decision Making (Complexity) Low complexity   Therapy Frequency 1 time/week   Predicted Duration of Therapy Intervention (days/wks) 12 weeks   Risk & Benefits of therapy have been explained Yes   Patient, Family & other staff in agreement with plan of care Yes   Education Assessment   Barriers to Learning No barriers   Ortho Goal 1   Goal Description 1.  Pt will be able to raise arm to shoulder ht w/ minimal difficulty for ADL's and pain no > 3/10   Target Date 06/18/18   Ortho Goal 2   Goal Description 2.  Pt will return to driving w/ minimal difficulty   Target Date 06/23/18   Ortho Goal 3   Goal Description 3.  Pt will be sleeping in bed waking no > 4X/wk due to shoulder   Target Date 06/23/18   Ortho Goal 4   Goal Description 4.  Pt will be able to reach overhead and behind back w/ minimal difficulty and pain no > 2/10   Target Date 08/02/18   Ortho Goal 5   Goal Description 5.  Pt will be able to lift 10# for carrying groceries/ laundry w/ pain no > 3/10   Target Date 08/02/18   Ortho Goal 6   Goal Description 6. Pt will be independent and consisitent w/HEP   Target Date 08/02/18   Total Evaluation Time   Total Evaluation Time 15     Thank you for this referral,    Dasia Wilson, PT,  CEAS   #[id number]  [facility]  [phone]

## 2018-05-15 ENCOUNTER — TRANSFERRED RECORDS (OUTPATIENT)
Dept: HEALTH INFORMATION MANAGEMENT | Facility: CLINIC | Age: 63
End: 2018-05-15

## 2018-05-18 ENCOUNTER — HOSPITAL ENCOUNTER (OUTPATIENT)
Dept: PHYSICAL THERAPY | Facility: CLINIC | Age: 63
Setting detail: THERAPIES SERIES
End: 2018-05-18
Attending: ORTHOPAEDIC SURGERY
Payer: COMMERCIAL

## 2018-05-18 PROCEDURE — 40000718 ZZHC STATISTIC PT DEPARTMENT ORTHO VISIT: Performed by: PHYSICAL THERAPIST

## 2018-05-18 PROCEDURE — 97110 THERAPEUTIC EXERCISES: CPT | Mod: GP | Performed by: PHYSICAL THERAPIST

## 2018-05-30 ENCOUNTER — HOSPITAL ENCOUNTER (OUTPATIENT)
Dept: PHYSICAL THERAPY | Facility: CLINIC | Age: 63
Setting detail: THERAPIES SERIES
End: 2018-05-30
Attending: ORTHOPAEDIC SURGERY
Payer: COMMERCIAL

## 2018-05-30 PROCEDURE — 97110 THERAPEUTIC EXERCISES: CPT | Mod: GP | Performed by: PHYSICAL THERAPIST

## 2018-05-30 PROCEDURE — 40000718 ZZHC STATISTIC PT DEPARTMENT ORTHO VISIT: Performed by: PHYSICAL THERAPIST

## 2018-06-08 ENCOUNTER — HOSPITAL ENCOUNTER (OUTPATIENT)
Dept: PHYSICAL THERAPY | Facility: CLINIC | Age: 63
Setting detail: THERAPIES SERIES
End: 2018-06-08
Attending: ORTHOPAEDIC SURGERY
Payer: COMMERCIAL

## 2018-06-08 PROCEDURE — 40000718 ZZHC STATISTIC PT DEPARTMENT ORTHO VISIT: Performed by: PHYSICAL THERAPIST

## 2018-06-08 PROCEDURE — 97110 THERAPEUTIC EXERCISES: CPT | Mod: GP | Performed by: PHYSICAL THERAPIST

## 2018-06-18 ENCOUNTER — HOSPITAL ENCOUNTER (OUTPATIENT)
Dept: PHYSICAL THERAPY | Facility: CLINIC | Age: 63
Setting detail: THERAPIES SERIES
End: 2018-06-18
Attending: ORTHOPAEDIC SURGERY
Payer: COMMERCIAL

## 2018-06-18 PROCEDURE — 40000718 ZZHC STATISTIC PT DEPARTMENT ORTHO VISIT: Performed by: PHYSICAL THERAPIST

## 2018-06-18 PROCEDURE — 97110 THERAPEUTIC EXERCISES: CPT | Mod: GP | Performed by: PHYSICAL THERAPIST

## 2018-06-18 NOTE — PROGRESS NOTES
OUTPATIENT PHYSICAL THERAPY PROGRESS NOTE   Dr. Thompson 5/4/18 to 06/18/18 0800   Signing Clinician's Name / Credentials   Signing clinician's name / credentials Dasia Wilson, PT [id number]   Session Number   Session Number 5  [facility]   Authorization status 12 visits approved thru 8/2/18   PT Medicare Only G-code   G-code Body Position: Changing & Maintaining Body Position   Body Position: Changing & Maintaining Body Position   Body Position Current Status,  (eval/re-eval & every progress note) CJ: 20-39% impairment   Current Body Position Modifier Rationale professional judgment based on ongoing limitations w/ sleep and overall function   Body Position Goal,  (eval/re-eval, every progress note, & discharge) CI: 1-19% impairment   Ortho Goal 1   Goal Description 1.  Pt will be able to raise arm to shoulder ht w/ minimal difficulty for ADL's and pain no > 3/10.   6/18/18 mild difficulty and pain 4-5/10   Target Date 07/02/18   Ortho Goal 2   Goal Description 2.  Pt will return to driving w/ minimal difficulty.   5/30/18 pt is driving for the most part 1 handed.  6/18/18  MET   Target Date 06/23/18   Ortho Goal 3   Goal Description 3.  Pt will be sleeping in bed waking no > 4X/wk due to shoulder.  6/18/18 sleeping in bed waking 1X/night   Target Date 07/18/18   Ortho Goal 4   Goal Description 4.  Pt will be able to reach overhead and behind back w/ minimal difficulty and pain no > 2/10   Target Date 08/02/18   Ortho Goal 5   Goal Description 5.  Pt will be able to lift 10# for carrying groceries/ laundry w/ pain no > 3/10   Target Date 08/02/18   Ortho Goal 6   Goal Description 6. Pt will be independent and consisitent w/HEP   Target Date 08/02/18   Subjective Report   Subjective Report Pt notes intermittent pain at worst can go up to 8/10.      Objective Measures   Objective Measure AROM  L shoulder flex 108*,  scaption 82*,  ER 63*,  IR to L 1.     Details PROM L shoulder Flex 135*, scaption 125*   ER 78* in  45* abd;  IR 67* in 45* abd (improved)   Therapeutic Procedure/exercise   Patient Response Pendulums / wand ER on own.     Treatment Detail  Pulley flex and scaption x 10 each.  wall flex and ER X 5 each.    Wand IR x 10.  LT sets.   Shoulder ext X 10 leaning on plinth.  ER in SL X 10.     AAROM flex/ scaption/ER/ IR-- oscillations w/ flexion to relax the arm    Plan   Home program ex as above, ice,   Plan  cont 1X / 7-10 days to progress ex up to 7 additional visits   Comments   Comments Pt has met goal 2 ; cont to work towards other goals.

## 2018-06-27 ENCOUNTER — HOSPITAL ENCOUNTER (OUTPATIENT)
Dept: PHYSICAL THERAPY | Facility: CLINIC | Age: 63
Setting detail: THERAPIES SERIES
End: 2018-06-27
Attending: ORTHOPAEDIC SURGERY
Payer: COMMERCIAL

## 2018-06-27 PROCEDURE — 40000718 ZZHC STATISTIC PT DEPARTMENT ORTHO VISIT: Performed by: PHYSICAL THERAPIST

## 2018-06-27 PROCEDURE — 97110 THERAPEUTIC EXERCISES: CPT | Mod: GP | Performed by: PHYSICAL THERAPIST

## 2018-08-04 ENCOUNTER — HEALTH MAINTENANCE LETTER (OUTPATIENT)
Age: 63
End: 2018-08-04

## 2018-08-13 NOTE — TELEPHONE ENCOUNTER
atenolol       Last Written Prescription Date: 8/15/16  Last Fill Quantity: 90, # refills: 3    Last Office Visit with [facility], [facility] or [facility] prescribing provider:  9/12/17   Future Office Visit:        BP Readings from Last 3 Encounters:   09/12/17 118/57   08/25/17 131/74   08/22/17 112/64        ear drops as directed

## 2018-08-28 ENCOUNTER — OFFICE VISIT (OUTPATIENT)
Dept: FAMILY MEDICINE | Facility: CLINIC | Age: 63
End: 2018-08-28
Payer: COMMERCIAL

## 2018-08-28 VITALS
HEIGHT: 63 IN | OXYGEN SATURATION: 98 % | SYSTOLIC BLOOD PRESSURE: 110 MMHG | HEART RATE: 69 BPM | DIASTOLIC BLOOD PRESSURE: 68 MMHG | BODY MASS INDEX: 43.77 KG/M2 | WEIGHT: 247 LBS | TEMPERATURE: 97.1 F

## 2018-08-28 DIAGNOSIS — M25.512 CHRONIC LEFT SHOULDER PAIN: Primary | ICD-10-CM

## 2018-08-28 DIAGNOSIS — G89.29 CHRONIC LEFT SHOULDER PAIN: Primary | ICD-10-CM

## 2018-08-28 DIAGNOSIS — Z12.4 SCREENING FOR CERVICAL CANCER: ICD-10-CM

## 2018-08-28 PROCEDURE — G0476 HPV COMBO ASSAY CA SCREEN: HCPCS | Performed by: FAMILY MEDICINE

## 2018-08-28 PROCEDURE — G0145 SCR C/V CYTO,THINLAYER,RESCR: HCPCS | Performed by: FAMILY MEDICINE

## 2018-08-28 PROCEDURE — 99213 OFFICE O/P EST LOW 20 MIN: CPT | Performed by: FAMILY MEDICINE

## 2018-08-28 NOTE — MR AVS SNAPSHOT
After Visit Summary   8/28/2018    Jacquie Harris    MRN: [id number]           Patient Information     Date Of Birth          1955        Visit Information        Provider Department      8/28/2018 9:40 AM Jennie Suazo MD [facility]        Today's Diagnoses     Screening for cervical cancer    -  1    Chronic left shoulder pain          Care Instructions          Thank you for choosing [facility].  You may be receiving a survey in the mail from [facility] regarding your visit today.  Please take a few minutes to complete and return the survey to let us know how we are doing.      If you have questions or concerns, please contact us via Plum or you can contact your care team at [phone].    Our Clinic hours are:  Monday 6:40 am  to 7:00 pm  Tuesday -Friday 6:40 am to 5:00 pm    The [place] outpatient lab hours are:  Monday - Friday 6:10 am to 4:45 pm  Saturdays 7:00 am to 11:00 am  Appointments are required, call [phone]    If you have clinical questions after hours or would like to schedule an appointment,  call the clinic at [phone].          Follow-ups after your visit        Future tests that were ordered for you today     Open Future Orders        Priority Expected Expires Ordered    MR Shoulder Left w/o Contrast Routine  8/28/2019 8/28/2018            Who to contact     If you have questions or need follow up information about today's clinic visit or your schedule please contact [facility] directly at [phone].  Normal or non-critical lab and imaging results will be communicated to you by Tittathart, letter or phone within 4 business days after the clinic has received the results. If you do not hear from us within 7 days, please contact the clinic through Modo[employer] or phone. If you have a critical or abnormal lab result, we will notify you by phone as soon as possible.  Submit refill requests through Plum or call your pharmacy  "and they will forward the refill request to us. Please allow 3 business days for your refill to be completed.          Additional Information About Your Visit        Care EveryWhere ID     This is your Care EveryWhere ID. This could be used by other organizations to access your [facility] medical records  [id number]        Your Vitals Were     Pulse Temperature Height Last Period Pulse Oximetry BMI (Body Mass Index)    69 97.1  F (36.2  C) (Tympanic) 5' 3\" (1.6 m) 05/29/2006 98% 43.75 kg/m2       Blood Pressure from Last 3 Encounters:   08/28/18 110/68   04/27/18 140/79   04/03/18 102/72    Weight from Last 3 Encounters:   08/28/18 247 lb (112 kg)   04/27/18 240 lb (108.9 kg)   04/03/18 241 lb (109.3 kg)              We Performed the Following     Pap imaged thin layer screen with HPV - recommended age 30 - 65 years (select HPV order below)          Today's Medication Changes          These changes are accurate as of 8/28/18 10:16 AM.  If you have any questions, ask your nurse or doctor.               Stop taking these medicines if you haven't already. Please contact your care team if you have questions.     ferrous sulfate 142 (45 Fe) MG Tbcr   Commonly known as:  SLO-FE   Stopped by:  Jennie Suazo MD           pantoprazole 40 MG EC tablet   Commonly known as:  PROTONIX   Stopped by:  Jennie Suazo MD           ranitidine 150 MG tablet   Commonly known as:  ZANTAC   Stopped by:  Jennie Suaoz MD                    Primary Care Provider Office Phone # Fax #    Jennie Suazo [phone] [address] [facility] [address]        Equal Access to Services     PREM NGO : Kris Lynch, greyson dexter, jagruti dunn, mildred gonzalez. So [facility] [phone].    ATENCIÓN: Si habla español, tiene a booth disposición servicios gratuitos de asistencia lingüística. Llame al [phone].    We " comply with applicable federal civil rights laws and [place] laws. We do not discriminate on the basis of race, color, national origin, age, disability, sex, sexual orientation, or gender identity.            Thank you!     Thank you for choosing [facility]  for your care. Our goal is always to provide you with excellent care. Hearing back from our patients is one way we can continue to improve our services. Please take a few minutes to complete the written survey that you may receive in the mail after your visit with us. Thank you!             Your Updated Medication List - Protect others around you: Learn how to safely use, store and throw away your medicines at www.disposemymeds.org.          This list is accurate as of 8/28/18 10:16 AM.  Always use your most recent med list.                   Brand Name Dispense Instructions for use Diagnosis    acetaminophen 325 MG tablet    TYLENOL    100 tablet    Take 2 tablets (650 mg) by mouth every 4 hours as needed for mild pain or fever    Shoulder injury, right, subsequent encounter       atenolol 50 MG tablet    TENORMIN    90 tablet    Take 1 tablet (50 mg) by mouth daily    Benign essential hypertension       CERAVE Crea     1 Bottle    Externally apply topically 2 times daily as needed    Sunburn, Itching       clobetasol 0.05 % ointment    TEMOVATE    15 g    Apply sparingly to affected area twice daily for 14 days.  Do not apply to face.    Vaginal itching       hydrochlorothiazide 12.5 MG Tabs tablet     90 tablet    Take 1 tablet (12.5 mg) by mouth daily    Benign essential hypertension       * nystatin cream    MYCOSTATIN    30 g    Apply topically 2 times daily Reported on 3/6/2017    Candidiasis of skin       * nystatin 482644 UNIT/GM Powd    MYCOSTATIN    30 g    Apply topically 3 times daily as needed Refill at patient's request    Candidiasis of skin       * Notice:  This list has 2 medication(s) that are the same as other medications  prescribed for you. Read the directions carefully, and ask your doctor or other care provider to review them with you.

## 2018-08-28 NOTE — PATIENT INSTRUCTIONS
Thank you for choosing [facility].  You may be receiving a survey in the mail from [facility] regarding your visit today.  Please take a few minutes to complete and return the survey to let us know how we are doing.      If you have questions or concerns, please contact us via S[employer] or you can contact your care team at [phone].    Our Clinic hours are:  Monday 6:40 am  to 7:00 pm  Tuesday -Friday 6:40 am to 5:00 pm    The [place] outpatient lab hours are:  Monday - Friday 6:10 am to 4:45 pm  Saturdays 7:00 am to 11:00 am  Appointments are required, call [phone]    If you have clinical questions after hours or would like to schedule an appointment,  call the clinic at [phone].

## 2018-08-28 NOTE — LETTER
September 6, 2018    Jacquie Harris  [address] [place] [id number]    Dear Jacquie,  We are happy to inform you that your PAP smear result from 8/28/18 is normal.  We are now able to do a follow up test on PAP smears. The DNA test is for HPV (Human Papilloma Virus). Cervical cancer is closely linked with certain types of HPV. Your results showed no evidence of high risk HPV.  Therefore we recommend you return in 5 years for your next pap smear and HPV test.  You will still need to return to the clinic every year for an annual exam and other preventive tests.  Please contact the clinic at [phone] with any questions.  Sincerely,    Jennie Suazo MD/arlene

## 2018-08-29 ENCOUNTER — HOSPITAL ENCOUNTER (OUTPATIENT)
Dept: MRI IMAGING | Facility: CLINIC | Age: 63
Discharge: HOME | End: 2018-08-29
Attending: FAMILY MEDICINE | Admitting: FAMILY MEDICINE
Payer: COMMERCIAL

## 2018-08-29 DIAGNOSIS — M25.512 CHRONIC LEFT SHOULDER PAIN: ICD-10-CM

## 2018-08-29 DIAGNOSIS — G89.29 CHRONIC LEFT SHOULDER PAIN: ICD-10-CM

## 2018-08-29 PROCEDURE — 73221 MRI JOINT UPR EXTREM W/O DYE: CPT | Mod: LT

## 2018-08-29 NOTE — PROGRESS NOTES
OUTPATIENT PHYSICAL THERAPY DISCHARGE SUMMARY    Dr. Thompson      5/4/18 to 06/27/18 0800   Signing Clinician's Name / Credentials   Signing clinician's name / credentials Dasiaalessio Wilson, PT [id number]   Session Number   Session Number 6 [facility]   Ortho Goal 1   Goal Description 1.  Pt will be able to raise arm to shoulder ht w/ minimal difficulty for ADL's and pain no > 3/10.   6/18/18 mild difficulty and pain 4-5/10   Target Date 07/02/18   Ortho Goal 2   Goal Description 2.  Pt will return to driving w/ minimal difficulty.   5/30/18 pt is driving for the most part 1 handed.  6/18/18  MET   Target Date 06/23/18   Ortho Goal 3   Goal Description 3.  Pt will be sleeping in bed waking no > 4X/wk due to shoulder.  6/18/18 sleeping in bed waking 1X/night   Target Date 07/18/18   Ortho Goal 4   Goal Description 4.  Pt will be able to reach overhead and behind back w/ minimal difficulty and pain no > 2/10   Target Date 08/02/18   Ortho Goal 5   Goal Description 5.  Pt will be able to lift 10# for carrying groceries/ laundry w/ pain no > 3/10   Target Date 08/02/18   Ortho Goal 6   Goal Description 6. Pt will be independent and consisitent w/HEP.  6/27/18 consistent w/ current ex   Target Date 08/02/18   Subjective Report   Subjective Report Pt states the shoulder is feeling pretty good.  Pt notes intermittent pinch L interscap area.  L shoulder pain @ worst 4-5/10 after lying on it.  Pt cont to ice.   Pt cancelled her MD appt on 6/25/18 and did not reschedule until September.  Pt states she has been folding laundry w/ both arms.     Objective Measures   Objective Measure AROM  L shoulder flex 115*,  scaption 90*,  ER 65*,  IR to T 12   Details PROM L shoulder Flex 145*, scaption 150*   ER 78* in 45* abd;  IR 72* in 45* abd (improved)   Therapeutic Procedure/exercise   Treatment Detail Scifit seat 5 elevated L 1 x 2 min.    Pulley flex and scaption x 10 each.  wall flex and ER X 5 each.    Wand IR x 10.  LT sets.  YTB Shoulder  ext w/ LT set x 20.  AROM shoulder flex and scaption X 10 each.  ER in SL 1/2# X 20.    AROM flex supine X 5.   AAROM flex/ scaption/ER/ IR.     Plan   Home program ex as above, ice,   Plan  Pt cancelled appointment on 7/20/18 due to family emergency.  Pt did not reschedule.  Discharge from physical therapy.   Comments   Comments Progress towards goals as noted above.  No final G code as patient did not return.

## 2018-08-29 NOTE — ADDENDUM NOTE
Encounter addended by: Dasia Wilson, PT on: 8/29/2018  2:44 PM<BR>     Actions taken: Sign clinical note, Flowsheet accepted, Episode resolved

## 2018-08-30 ENCOUNTER — TELEPHONE (OUTPATIENT)
Dept: FAMILY MEDICINE | Facility: CLINIC | Age: 63
End: 2018-08-30

## 2018-08-30 DIAGNOSIS — M25.512 CHRONIC LEFT SHOULDER PAIN: Primary | ICD-10-CM

## 2018-08-30 DIAGNOSIS — G89.29 CHRONIC LEFT SHOULDER PAIN: Primary | ICD-10-CM

## 2018-08-30 LAB
COPATH REPORT: NORMAL
PAP: NORMAL

## 2018-08-30 NOTE — TELEPHONE ENCOUNTER
Reason for Call:  Other results    Detailed comments: Patient is asking for her MRI results please.    Phone Number Patient can be reached at: Home number on file [phone] (home)    Best Time: any    Can we leave a detailed message on this number? YES    Call taken on 8/30/2018 at 3:38 PM by Niyah Newby

## 2018-08-31 LAB
FINAL DIAGNOSIS: NORMAL
HPV HR 12 DNA CVX QL NAA+PROBE: NEGATIVE
HPV16 DNA SPEC QL NAA+PROBE: NEGATIVE
HPV18 DNA SPEC QL NAA+PROBE: NEGATIVE
SPECIMEN DESCRIPTION: NORMAL
SPECIMEN SOURCE CVX/VAG CYTO: NORMAL

## 2018-08-31 NOTE — TELEPHONE ENCOUNTER
MRI results from 8-29-18 have not been reviewed by MD yet.    Routing to provider.  Lovely MEYERS RN

## 2018-08-31 NOTE — TELEPHONE ENCOUNTER
Reason for Call:  Other results     Detailed comments: Patient is asking for her MRI results please.     Phone Number Patient can be reached at: Home number on file [phone] (home)     Best Time: any     Can we leave a detailed message on this number? YES

## 2018-08-31 NOTE — TELEPHONE ENCOUNTER
"Patient informed of message below from provider.  Patient is wondering why \"it hurts so bad\" and what provider would recommend for next steps?  Provider please review and advise. Thank you.    "

## 2018-08-31 NOTE — TELEPHONE ENCOUNTER
See her orthopedist . Physical therapy may also be a good idea. I will place a referral for Physical therapy .She may also want to consider a cortisone shot with orthopedics.

## 2018-09-10 ENCOUNTER — TRANSFERRED RECORDS (OUTPATIENT)
Dept: HEALTH INFORMATION MANAGEMENT | Facility: CLINIC | Age: 63
End: 2018-09-10

## 2018-09-11 ENCOUNTER — RADIANT APPOINTMENT (OUTPATIENT)
Dept: GENERAL RADIOLOGY | Facility: CLINIC | Age: 63
End: 2018-09-11
Attending: FAMILY MEDICINE
Payer: COMMERCIAL

## 2018-09-11 ENCOUNTER — OFFICE VISIT (OUTPATIENT)
Dept: FAMILY MEDICINE | Facility: CLINIC | Age: 63
End: 2018-09-11
Payer: COMMERCIAL

## 2018-09-11 VITALS
SYSTOLIC BLOOD PRESSURE: 106 MMHG | DIASTOLIC BLOOD PRESSURE: 68 MMHG | HEART RATE: 61 BPM | TEMPERATURE: 98.2 F | OXYGEN SATURATION: 98 % | HEIGHT: 63 IN | WEIGHT: 247 LBS | BODY MASS INDEX: 43.77 KG/M2

## 2018-09-11 DIAGNOSIS — R07.81 RIB PAIN ON RIGHT SIDE: ICD-10-CM

## 2018-09-11 DIAGNOSIS — R07.81 RIB PAIN ON RIGHT SIDE: Primary | ICD-10-CM

## 2018-09-11 PROCEDURE — 99213 OFFICE O/P EST LOW 20 MIN: CPT | Performed by: FAMILY MEDICINE

## 2018-09-11 PROCEDURE — 71101 X-RAY EXAM UNILAT RIBS/CHEST: CPT | Mod: RT

## 2018-09-11 NOTE — MR AVS SNAPSHOT
After Visit Summary   9/11/2018    Jacquie Harris    MRN: [id number]           Patient Information     Date Of Birth          1955        Visit Information        Provider Department      9/11/2018 8:20 AM Jennie Suazo MD [facility]        Today's Diagnoses     Rib pain on right side    -  1      Care Instructions          Thank you for choosing [facility].  You may be receiving a survey in the mail from [employer] regarding your visit today.  Please take a few minutes to complete and return the survey to let us know how we are doing.      If you have questions or concerns, please contact us via [employer] or you can contact your care team at [phone].    Our Clinic hours are:  Monday 6:40 am  to 7:00 pm  Tuesday -Friday 6:40 am to 5:00 pm    The [place] outpatient lab hours are:  Monday - Friday 6:10 am to 4:45 pm  Saturdays 7:00 am to 11:00 am  Appointments are required, call [phone]    If you have clinical questions after hours or would like to schedule an appointment,  call the clinic at [phone].  Rib Contusion     A rib contusion is a bruise to one or more rib bones. It may cause pain, tenderness, swelling and a purplish discoloration. There may be a sharp pain while breathing.  You will be assessed for other injuries. You will likely be given pain medicine. Rib contusions heal on their own, without further treatment. However, pain may take weeks to months to go away.   Note that a small crack (fracture) in the rib may cause the same symptoms as a rib contusion. The small crack may not be seen on a chest X-ray. However, the conditions are managed in the same way.  Home care    Rest. Avoid heavy lifting, strenuous exertion, or any activity that causes pain.    Ice the area to reduce pain and swelling. Put ice cubes in a plastic bag or use a cold pack. (Wrap the cold source in a thin towel. Do not place it directly on your skin.) Ice the injured area  for 20 minutes every 1 to 2 hours the first day. Continue with ice packs 3 to 4 times a day for the next 2 days, then as needed for the relief of pain and swelling.    Take any prescribed pain medicine as directed by your healthcare provider. If none was prescribed, take acetaminophen, ibuprofen, or naproxen to control pain.    If you have a significant injury, you may be given a device called an incentive spirometer to keep your lungs healthy. Use as directed.  Follow-up care  Follow up with your healthcare provider during the next week or as directed.  When to seek medical advice  Call your healthcare provider for any of the following:    Shortness of breath or trouble breathing    Increasing chest pain with breathing    Coughing    Dizziness, weakness, or fainting    New or worsening pain    Fever of 100.4 F (38 C) or higher, or as directed by your healthcare provider  Date Last Reviewed: 2/1/2017 2000-2017 [employer]. [address] [place] [address]. All rights reserved. This information is not intended as a substitute for professional medical care. Always follow your healthcare professional's instructions.                Follow-ups after your visit        Who to contact     If you have questions or need follow up information about today's clinic visit or your schedule please contact [facility] directly at [phone].  Normal or non-critical lab and imaging results will be communicated to you by MyChart, letter or phone within 4 business days after the clinic has received the results. If you do not hear from us within 7 days, please contact the clinic through MyChart or phone. If you have a critical or abnormal lab result, we will notify you by phone as soon as possible.  Submit refill requests through [employer] or call your pharmacy and they will forward the refill request to us. Please allow 3 business days for your refill to be completed.          Additional  "Information About Your Visit        Care EveryWhere ID     This is your Care EveryWhere ID. This could be used by other organizations to access your [place] medical records  [id number]        Your Vitals Were     Pulse Temperature Height Last Period Pulse Oximetry BMI (Body Mass Index)    61 98.2  F (36.8  C) (Tympanic) 5' 3\" (1.6 m) 05/29/2006 98% 43.75 kg/m2       Blood Pressure from Last 3 Encounters:   09/11/18 106/68   08/28/18 110/68   04/27/18 140/79    Weight from Last 3 Encounters:   09/11/18 247 lb (112 kg)   08/28/18 247 lb (112 kg)   04/27/18 240 lb (108.9 kg)               Primary Care Provider Office Phone # Fax #    Jennie Suazo [phone] [address] [facility] [address]        Equal Access to Services     [employer] AH: Hadii aad ku hadasho Sobg, waaxda luqadaha, qaybta kaalmada adeegyada, mildred sanchez . So [facility] [phone].    ATENCIÓN: Si korin rosenbaum, tiene a booth disposición servicios gratuitos de asistencia lingüística. Llame al [phone].    We comply with applicable federal civil rights laws and [place] laws. We do not discriminate on the basis of race, color, national origin, age, disability, sex, sexual orientation, or gender identity.            Thank you!     Thank you for choosing [facility]  for your care. Our goal is always to provide you with excellent care. Hearing back from our patients is one way we can continue to improve our services. Please take a few minutes to complete the written survey that you may receive in the mail after your visit with us. Thank you!             Your Updated Medication List - Protect others around you: Learn how to safely use, store and throw away your medicines at www.disposemymeds.org.          This list is accurate as of 9/11/18  9:05 AM.  Always use your most recent med list.                   Brand Name Dispense Instructions for use Diagnosis    " acetaminophen 325 MG tablet    TYLENOL    100 tablet    Take 2 tablets (650 mg) by mouth every 4 hours as needed for mild pain or fever    Shoulder injury, right, subsequent encounter       atenolol 50 MG tablet    TENORMIN    90 tablet    Take 1 tablet (50 mg) by mouth daily    Benign essential hypertension       CERAVE Crea     1 Bottle    Externally apply topically 2 times daily as needed    Sunburn, Itching       clobetasol 0.05 % ointment    TEMOVATE    15 g    Apply sparingly to affected area twice daily for 14 days.  Do not apply to face.    Vaginal itching       hydrochlorothiazide 12.5 MG Tabs tablet     90 tablet    Take 1 tablet (12.5 mg) by mouth daily    Benign essential hypertension       * nystatin cream    MYCOSTATIN    30 g    Apply topically 2 times daily Reported on 3/6/2017    Candidiasis of skin       * nystatin 636175 UNIT/GM Powd    MYCOSTATIN    30 g    Apply topically 3 times daily as needed Refill at patient's request    Candidiasis of skin       * Notice:  This list has 2 medication(s) that are the same as other medications prescribed for you. Read the directions carefully, and ask your doctor or other care provider to review them with you.

## 2018-09-11 NOTE — PROGRESS NOTES
SUBJECTIVE:   Jacquie Harris is a 63 year old female who presents to clinic today for the following health issues:      Joint Pain    Onset: 1 week ago     Description: Patient was pulling a week in her garden about 1 week ago and felt something pop on the R side of ribs. Patient can't bend or pull and deep breath is painful  Location: right hip  Character: Sharp, Dull ache and Stabbing    Intensity: moderate    Progression of Symptoms: worse    Accompanying Signs & Symptoms:  Other symptoms: none    History:   Previous similar pain: no       Precipitating factors:   Trauma or overuse: YES- trauma    Alleviating factors:  Improved by: heat    Therapies Tried and outcome: patient has tried heat         Patient is a 63 yr old female here for right rib pain. She was pulling weeds out of her porch a week ago and with the pulling she heard a loud pop. Since then she has been hurting and when she takes a deep breath then she has more pain. Lying on her right side also causes her more pain. Heat has helped.       Problem list and histories reviewed & adjusted, as indicated.  Additional history: as documented    Patient Active Problem List   Diagnosis     s/p gastric bypass x 2     Allergic rhinitis     Carpal tunnel syndrome     Obstructive sleep apnea     Rosacea     CARDIOVASCULAR SCREENING; LDL GOAL LESS THAN 160     Tick bite, infected, positive Lyme test 1996 not treated     Vitamin D deficiency     Osteoporosis     Obesity, Class III, BMI 40-49.9 (morbid obesity) (H)     S/P TKR (total knee replacement)     HTN, goal below 140/80     Back pain, left below scapula, strained muscles     Encounter for routine gynecological examination      Colles' fracture     Bilateral cold feet     Elevated levels of transaminase & lactic acid dehydrogenase     Fatty liver     Shoulder injury, right, subsequent encounter     GI bleed     Upper GI bleed     Past Surgical History:   Procedure Laterality Date     ARTHROPLASTY KNEE   4/2/2012    Procedure:ARTHROPLASTY KNEE; Left Total Knee Arthroplasty--Anesth.Choice; Surgeon:HERNANDO THOMPSON; Location:[place] [facility]     ARTHROTOMY SHOULDER, ROTATOR CUFF REPAIR, COMBINED Right 8/25/2017    Procedure: COMBINED ARTHROTOMY SHOULDER, ROTATOR CUFF REPAIR;  Right shoulder distal clavicle excision, subacromial decompression, rotator cuff repair ;  Surgeon: Hernando Thompson MD;  Location: [facility]     ARTHROTOMY SHOULDER, ROTATOR CUFF REPAIR, COMBINED Right 12/18/2017    Procedure: COMBINED ARTHROTOMY SHOULDER, ROTATOR CUFF REPAIR;  Right Shoulder Rotator Cuff Revision;  Surgeon: Hernando Thompson MD;  Location: [facility]     ARTHROTOMY SHOULDER, ROTATOR CUFF REPAIR, COMBINED Left 4/27/2018    Procedure: COMBINED ARTHROTOMY SHOULDER, ROTATOR CUFF REPAIR;  Left Shoulder Open Subacromial Decompression,Distal Clavicle Excision,Rotator Cuff Repair;  Surgeon: Hernando Thompson MD;  Location: [facility] OR     C ORAL SURGERY PROCEDURE  age 18    wisdom teeth     C/SECTION, LOW TRANSVERSE  1978, 1984, 1994    x 3     COLONOSCOPY  2001    normal colonoscopy     COLONOSCOPY N/A 8/22/2017    Procedure: COLONOSCOPY;  Colonoscopy  ;  Surgeon: Delfino Yoo MD;  Location: [facility]     ESOPHAGOSCOPY, GASTROSCOPY, DUODENOSCOPY (EGD), COMBINED N/A 3/18/2018    Procedure: COMBINED ESOPHAGOSCOPY, GASTROSCOPY, DUODENOSCOPY (EGD);;  Surgeon: Poncho Galindo MD;  Location: [facility]     GASTRIC BYPASS  1980/2005    Gastric Bypass and revision     HERNIA REPAIR, INCISIONAL  6/16/2008    lap.repair with 10x8 mesh     TUBAL LIGATION  1994       Social History   Substance Use Topics     Smoking status: Never Smoker     Smokeless tobacco: Never Used     Alcohol use 0.0 oz/week     0 Standard drinks or equivalent per week      Comment: mixed very light 1 a month if that     Family History   Problem Relation Age of Onset     C.A.D. Father      MI at age 60     Hypertension Father      Alzheimer Disease Father      Hyperlipidemia Father       Osteoporosis Mother      on Fosamax     Glaucoma Mother      Other Cancer Brother      Stomach Cancer Maternal Aunt      Cervical Cancer Cousin      Diabetes No family hx of      Cerebrovascular Disease No family hx of      Breast Cancer No family hx of      Cancer - colorectal No family hx of      Prostate Cancer No family hx of      Liver Disease No family hx of          Current Outpatient Prescriptions   Medication Sig Dispense Refill     acetaminophen (TYLENOL) 325 MG tablet Take 2 tablets (650 mg) by mouth every 4 hours as needed for mild pain or fever 100 tablet      atenolol (TENORMIN) 50 MG tablet Take 1 tablet (50 mg) by mouth daily 90 tablet 3     clobetasol (TEMOVATE) 0.05 % ointment Apply sparingly to affected area twice daily for 14 days.  Do not apply to face. 15 g 0     Emollient (CERAVE) CREA Externally apply topically 2 times daily as needed 1 Bottle 3     nystatin (MYCOSTATIN) 961558 UNIT/GM POWD Apply topically 3 times daily as needed Refill at patient's request 30 g 1     nystatin (MYCOSTATIN) cream Apply topically 2 times daily Reported on 3/6/2017 30 g 11     hydrochlorothiazide 12.5 MG TABS tablet Take 1 tablet (12.5 mg) by mouth daily (Patient not taking: Reported on 9/11/2018) 90 tablet 3     Allergies   Allergen Reactions     Vioxx Itching and Swelling     VIOXX (Swollen Feet,Hands itching), Okay with ibuprofen and aspirin     BP Readings from Last 3 Encounters:   09/11/18 106/68   08/28/18 110/68   04/27/18 140/79    Wt Readings from Last 3 Encounters:   09/11/18 247 lb (112 kg)   08/28/18 247 lb (112 kg)   04/27/18 240 lb (108.9 kg)                  Labs reviewed in EPIC    Reviewed and updated as needed this visit by clinical staff  Tobacco  Allergies  Med Hx  Surg Hx  Fam Hx  Soc Hx      Reviewed and updated as needed this visit by Provider         ROS:  Constitutional, HEENT, cardiovascular, pulmonary, gi and gu systems are negative, except as otherwise noted.    OBJECTIVE:  "    /68 (BP Location: Left arm, Cuff Size: Adult Regular)  Pulse 61  Temp 98.2  F (36.8  C) (Tympanic)  Ht 5' 3\" (1.6 m)  Wt 247 lb (112 kg)  LMP 05/29/2006  SpO2 98%  BMI 43.75 kg/m2  Body mass index is 43.75 kg/(m^2).  GENERAL: healthy, alert and no distress  EYES: Eyes grossly normal to inspection, PERRL and conjunctivae and sclerae normal  HENT: ear canals and TM's normal, nose and mouth without ulcers or lesions  NECK: no adenopathy, no asymmetry, masses, or scars and thyroid normal to palpation  RESP: lungs clear to auscultation - no rales, rhonchi or wheezes  CV: regular rate and rhythm, normal S1 S2, no S3 or S4, no murmur, click or rub, no peripheral edema and peripheral pulses strong  ABDOMEN: soft, nontender, no hepatosplenomegaly, no masses and bowel sounds normal  MS: tenderness to palpation on right rib , there is some mild swelling observed and the skin is warm to the touch     Diagnostic Test Results:  Rib x-rays     Appear normal but will await radiology report  IMPRESSION: A single view of the chair shows no acute cardiopulmonary  disease. Two views of the right lower ribs show no discrete fracture  or other osseous abnormality.   ASSESSMENT/PLAN:   1. Rib pain on right side  Rib contusion probably since the x-rays are normal . Asked that she continue with heat and tylenol/ibuprofen as needed.  - XR Ribs & Chest Right G/E 3 Views; Future    FUTURE APPOINTMENTS:       - Follow-up visit as needed.    Jennie Suazo MD  [facility]  "

## 2018-09-11 NOTE — PATIENT INSTRUCTIONS
Thank you for choosing [facility].  You may be receiving a survey in the mail from [employer] regarding your visit today.  Please take a few minutes to complete and return the survey to let us know how we are doing.      If you have questions or concerns, please contact us via [employer] or you can contact your care team at [phone].    Our Clinic hours are:  Monday 6:40 am  to 7:00 pm  Tuesday -Friday 6:40 am to 5:00 pm    The [place] outpatient lab hours are:  Monday - Friday 6:10 am to 4:45 pm  Saturdays 7:00 am to 11:00 am  Appointments are required, call [phone]    If you have clinical questions after hours or would like to schedule an appointment,  call the clinic at [phone].  Rib Contusion     A rib contusion is a bruise to one or more rib bones. It may cause pain, tenderness, swelling and a purplish discoloration. There may be a sharp pain while breathing.  You will be assessed for other injuries. You will likely be given pain medicine. Rib contusions heal on their own, without further treatment. However, pain may take weeks to months to go away.   Note that a small crack (fracture) in the rib may cause the same symptoms as a rib contusion. The small crack may not be seen on a chest X-ray. However, the conditions are managed in the same way.  Home care    Rest. Avoid heavy lifting, strenuous exertion, or any activity that causes pain.    Ice the area to reduce pain and swelling. Put ice cubes in a plastic bag or use a cold pack. (Wrap the cold source in a thin towel. Do not place it directly on your skin.) Ice the injured area for 20 minutes every 1 to 2 hours the first day. Continue with ice packs 3 to 4 times a day for the next 2 days, then as needed for the relief of pain and swelling.    Take any prescribed pain medicine as directed by your healthcare provider. If none was prescribed, take acetaminophen, ibuprofen, or naproxen to control pain.    If you have a significant injury,  you may be given a device called an incentive spirometer to keep your lungs healthy. Use as directed.  Follow-up care  Follow up with your healthcare provider during the next week or as directed.  When to seek medical advice  Call your healthcare provider for any of the following:    Shortness of breath or trouble breathing    Increasing chest pain with breathing    Coughing    Dizziness, weakness, or fainting    New or worsening pain    Fever of 100.4 F (38 C) or higher, or as directed by your healthcare provider  Date Last Reviewed: 2/1/2017 2000-2017 [employer]. [address] [place] [address]. All rights reserved. This information is not intended as a substitute for professional medical care. Always follow your healthcare professional's instructions.

## 2018-10-01 ENCOUNTER — TRANSFERRED RECORDS (OUTPATIENT)
Dept: HEALTH INFORMATION MANAGEMENT | Facility: CLINIC | Age: 63
End: 2018-10-01

## 2018-10-03 ENCOUNTER — HOSPITAL ENCOUNTER (OUTPATIENT)
Dept: PHYSICAL THERAPY | Facility: CLINIC | Age: 63
Setting detail: THERAPIES SERIES
End: 2018-10-03
Attending: FAMILY MEDICINE
Payer: COMMERCIAL

## 2018-10-03 PROCEDURE — 40000718 ZZHC STATISTIC PT DEPARTMENT ORTHO VISIT: Performed by: PHYSICAL THERAPIST

## 2018-10-03 PROCEDURE — 97162 PT EVAL MOD COMPLEX 30 MIN: CPT | Mod: GP | Performed by: PHYSICAL THERAPIST

## 2018-10-03 PROCEDURE — G8982 BODY POS GOAL STATUS: HCPCS | Mod: GP,CI | Performed by: PHYSICAL THERAPIST

## 2018-10-03 PROCEDURE — G8981 BODY POS CURRENT STATUS: HCPCS | Mod: GP,CK | Performed by: PHYSICAL THERAPIST

## 2018-10-03 PROCEDURE — 97110 THERAPEUTIC EXERCISES: CPT | Mod: GP | Performed by: PHYSICAL THERAPIST

## 2018-10-03 NOTE — PROGRESS NOTES
10/03/18 1300   General Information   Type of Visit Initial OP Ortho PT Evaluation   Start of Care Date 10/03/18   Referring Physician Jennie Suazo MD   Patient/Family Goals Statement to make the L shoulder quit hurting   Orders Evaluate and Treat   Date of Order 08/31/18   Insurance Type Rebecca;MA   Medical Diagnosis chronic L shoulder pain   Presentation and Etiology   Pertinent history of current problem (include personal factors and/or comorbidities that impact the POC) Pt was pulling wheelchair w/ spouse in it up a steep ramp ---pt hurt the L shoulder--states she felt like something snapped.    Pt let the arm rest but cont to have symptoms.  Pt had MRI in chart:  Supraspinatus degenerative tendinopathy , no evidence of reoccuring tear.  Pain   @ best 3/10 at rest,  w/ movement 8-9/10.  No meds.   Pt is R dominant.   PMHX:  L RCR  April 2018,  R RCR X 2.  L wrist fx 5-6 years ago,  HBP,  bladder control issues,  Arthritis.   Moderate: recent RCR   Impairments A. Pain;D. Decreased ROM;E. Decreased flexibility;F. Decreased strength and endurance;J. Burning;L. Tingling   Onset date of current episode/exacerbation 08/08/18   Pain quality A. Sharp;B. Dull;D. Burning;E. Shooting;F. Stabbing   Pain exacerbation comment holding book / turning the page.  L sidelying.  Very restricted ability to reach.    Limited ability to do house/ yardwork   Pain/symptoms eased by D. Nothing;E. Changing positions   Progression of symptoms since onset: Unchanged   Prior Level of Function   Functional Level Prior Comment assisting spouse some in the house but notes he can do most of it independently.      Current Level of Function   Patient role/employment history F. Retired;H. Other;C. Homemaker   Fall Risk Screen   Fall screen completed by PT   Have you fallen 2 or more times in the past year? No   Have you fallen and had an injury in the past year? No   Is patient a fall risk? No   Shoulder Objective Findings   Side  (if bilateral, select both right and left) Left   Left Shoulder Flexion AROM R 128*,  L 60*   Left Shoulder Flexion PROM L 110*   Left Shoulder ER PROM 75* in 45*   Left Shoulder IR PROM 53* in 45* abd   Posture dowagers hump   Cervical Screen (ROM, quadrant) cervical flex WNL but notes pain down L UE to the elbow,  ext/ B rot WNL no complaints   Left Shoulder Abduction AROM R 110*,  L 42*   Left Shoulder ER AROM R 70*,  L 20*   Left Shoulder IR AROM R to T10,  L to PSIS   Shoulder Special Tests Comments Lift off test--difficult but able to do.  Empty can position no pain,  strength 4-/5   Left Shoulder ER Strength 3/5   Left Shoulder IR Strength 4-/5 noted pain along outside of arm   Palpation severe tenderness L AC joint and supraspinatus   Left Shoulder Abduction PROM L 90*   Left Shoulder Flexion Strength strength deferred due to limited ROM.     Left Shoulder Abduction Strength strength deferred due to limited ROM.     Planned Therapy Interventions   Planned Therapy Interventions manual therapy;ROM;strengthening;stretching   Clinical Impression   Criteria for Skilled Therapeutic Interventions Met yes, treatment indicated   PT Diagnosis L shoulder pain   Influenced by the following impairments pain, significant decrease in ROM   Functional limitations due to impairments reaching, house/ yardwork, sleeping , reading   Clinical Presentation Evolving/Changing   Clinical Presentation Rationale recent flareup of symptoms in Aug --now w/ significant limitations;  hx of RCR early 2018   Clinical Decision Making (Complexity) Moderate complexity   Therapy Frequency 1 time/week   Predicted Duration of Therapy Intervention (days/wks) 8 weeks   Risk & Benefits of therapy have been explained Yes   Patient, Family & other staff in agreement with plan of care Yes   Education Assessment   Barriers to Learning No barriers   Ortho Goal 1   Goal Description 1.  Pt will be able to hold her book and turn pages w/ L shoulder pain no  > 2/10   Target Date 11/02/18   Ortho Goal 2   Goal Description 2.  Pt will be able to reach above shoulder height w/ ADL's and L shoulder pain no > 2/10   Target Date 12/02/18   Ortho Goal 3   Goal Description 3.  Pt will be able to reach behind her back w/ ADL's w/ shoulder pain no > 2/10   Target Date 12/02/18   Ortho Goal 4   Goal Description 4.  Pt will be able to lie on L side w/ minimal to no complaints   Target Date 12/02/18   Ortho Goal 5   Goal Description 5.  Pt will be independent and consistent w/HEP   Target Date 12/02/18   Total Evaluation Time   Total Evaluation Time 30   Therapy Certification   Certification date from 10/03/18   Certification date to 12/02/18   Medical Diagnosis chronic L shoulder pain     Thank you for this referral,    Dasia Wilson, PT,  CEAS   [id number]  [facility]  [phone]

## 2018-10-03 NOTE — PROGRESS NOTES
[facility]          OUTPATIENT PHYSICAL THERAPY ORTHOPEDIC EVALUATION  PLAN OF TREATMENT FOR OUTPATIENT REHABILITATION  (COMPLETE FOR INITIAL CLAIMS ONLY)  Patient's Last Name, First Name, M.I.  YOB: 1955  Jacquie Harris       Provider s Name:  [facility]   Medical Record No.  [id number]   Start of Care Date:  10/03/18   Onset Date:  08/08/18   Type:     _X__PT   ___OT   ___SLP Medical Diagnosis:  chronic L shoulder pain     PT Diagnosis:  L shoulder pain   Visits from SOC:  1      _________________________________________________________________________________  Plan of Treatment/Functional Goals:  manual therapy, ROM, strengthening, stretching      Goals  Goal Description: 1.  Pt will be able to hold her book and turn pages w/ L shoulder pain no > 2/10  Target Date: 11/02/18     Goal Description: 2.  Pt will be able to reach above shoulder height w/ ADL's and L shoulder pain no > 2/10  Target Date: 12/02/18     Goal Description: 3.  Pt will be able to reach behind her back w/ ADL's w/ shoulder pain no > 2/10  Target Date: 12/02/18     Goal Description: 4.  Pt will be able to lie on L side w/ minimal to no complaints  Target Date: 12/02/18     Goal Description: 5.  Pt will be independent and consistent w/HEP  Target Date: 12/02/18    Therapy Frequency:  1 time/week  Predicted Duration of Therapy Intervention:  8 weeks    Dasia Wilson, PT                 I CERTIFY THE NEED FOR THESE SERVICES FURNISHED UNDER        THIS PLAN OF TREATMENT AND WHILE UNDER MY CARE     (Physician co-signature of this document indicates review and certification of the therapy plan).                       Certification Date From:  10/03/18   Certification Date To:  12/02/18    Referring Provider:  Jennie Suazo MD    Initial Assessment        See Epic Evaluation Start of Care Date: 10/03/18

## 2018-10-17 ENCOUNTER — HOSPITAL ENCOUNTER (OUTPATIENT)
Dept: PHYSICAL THERAPY | Facility: CLINIC | Age: 63
Setting detail: THERAPIES SERIES
End: 2018-10-17
Attending: FAMILY MEDICINE
Payer: COMMERCIAL

## 2018-10-17 PROCEDURE — 40000718 ZZHC STATISTIC PT DEPARTMENT ORTHO VISIT: Performed by: PHYSICAL THERAPIST

## 2018-10-17 PROCEDURE — 97110 THERAPEUTIC EXERCISES: CPT | Mod: GP | Performed by: PHYSICAL THERAPIST

## 2018-10-24 ENCOUNTER — HOSPITAL ENCOUNTER (OUTPATIENT)
Dept: PHYSICAL THERAPY | Facility: CLINIC | Age: 63
Setting detail: THERAPIES SERIES
End: 2018-10-24
Attending: FAMILY MEDICINE
Payer: COMMERCIAL

## 2018-10-24 PROCEDURE — 40000718 ZZHC STATISTIC PT DEPARTMENT ORTHO VISIT: Performed by: PHYSICAL THERAPIST

## 2018-10-24 PROCEDURE — 97110 THERAPEUTIC EXERCISES: CPT | Mod: GP | Performed by: PHYSICAL THERAPIST

## 2018-10-31 ENCOUNTER — HOSPITAL ENCOUNTER (OUTPATIENT)
Dept: PHYSICAL THERAPY | Facility: CLINIC | Age: 63
Setting detail: THERAPIES SERIES
End: 2018-10-31
Attending: FAMILY MEDICINE
Payer: COMMERCIAL

## 2018-10-31 PROCEDURE — 97110 THERAPEUTIC EXERCISES: CPT | Mod: GP | Performed by: PHYSICAL THERAPIST

## 2018-10-31 PROCEDURE — 40000718 ZZHC STATISTIC PT DEPARTMENT ORTHO VISIT: Performed by: PHYSICAL THERAPIST

## 2018-11-09 ENCOUNTER — HOSPITAL ENCOUNTER (OUTPATIENT)
Dept: PHYSICAL THERAPY | Facility: CLINIC | Age: 63
Setting detail: THERAPIES SERIES
End: 2018-11-09
Attending: FAMILY MEDICINE
Payer: COMMERCIAL

## 2018-11-09 PROCEDURE — 40000718 ZZHC STATISTIC PT DEPARTMENT ORTHO VISIT: Performed by: PHYSICAL THERAPIST

## 2018-11-09 PROCEDURE — 97110 THERAPEUTIC EXERCISES: CPT | Mod: GP | Performed by: PHYSICAL THERAPIST

## 2018-11-16 ENCOUNTER — HOSPITAL ENCOUNTER (OUTPATIENT)
Dept: PHYSICAL THERAPY | Facility: CLINIC | Age: 63
Setting detail: THERAPIES SERIES
End: 2018-11-16
Attending: FAMILY MEDICINE
Payer: COMMERCIAL

## 2018-11-16 PROCEDURE — 97110 THERAPEUTIC EXERCISES: CPT | Mod: GP | Performed by: PHYSICAL THERAPIST

## 2018-11-16 PROCEDURE — 40000718 ZZHC STATISTIC PT DEPARTMENT ORTHO VISIT: Performed by: PHYSICAL THERAPIST

## 2018-12-11 ENCOUNTER — HOSPITAL ENCOUNTER (OUTPATIENT)
Dept: PHYSICAL THERAPY | Facility: CLINIC | Age: 63
Setting detail: THERAPIES SERIES
End: 2018-12-11
Attending: FAMILY MEDICINE
Payer: COMMERCIAL

## 2018-12-11 PROCEDURE — 97110 THERAPEUTIC EXERCISES: CPT | Mod: GP | Performed by: PHYSICAL THERAPIST

## 2018-12-11 NOTE — PROGRESS NOTES
PHYSICAL THERAPY PROGRESS NOTE/ DISCHARGE SUMMARY      10/3/18 to 12/11/18 0900   Signing Clinician's Name / Credentials   Signing clinician's name / credentials Dasia Wilson, PT [id number]   Session Number   Session Number 7 [employer],  Medicaid   Ortho Goal 1   Goal Description 1.  Pt will be able to hold her book and turn pages w/ L shoulder pain no > 2/10.  12/11/18 no pain MET   Target Date 11/02/18   Ortho Goal 2   Goal Description 2.  Pt will be able to reach above shoulder height w/ ADL's and L shoulder pain no > 2/10.  12/11/18 pt states she does not reach too often   Target Date 12/02/18   Ortho Goal 3   Goal Description 3.  Pt will be able to reach behind her back w/ ADL's w/ shoulder pain no > 2/10.  12/11/18 putting on coat no problem,  reaching up behind back is just limited.  PARTIALLY MET   Target Date 12/02/18   Ortho Goal 4   Goal Description 4.  Pt will be able to lie on L side w/ minimal to no complaints.  12/11/18  able to lie on L side up to 3 hours (same as R side) MET   Target Date 12/02/18   Ortho Goal 5   Goal Description 5.  Pt will be independent and consistent w/HEP.  12/11/18 semiconsistent   Target Date 12/02/18   Subjective Report   Subjective Report Pt notes intermittent pain L biceps 4-5/10.   Objective Measures   Objective Measure L shoulder AROM flex 120*,  scaption 90* mild hike,  ER 65*, IR to T 10   Details L shoulder AAROM flex 145*,  abd 150*,  ER 70* in 45* abd,  IR 75* in 45* abd   Objective Measure MMT: in limited range:  L shoulder flex 4/5, abd 4-/5,  ER 4+/5,  IR 5/5   Therapeutic Procedure/exercise   Patient Response pendulum circles, LT sets and wand ER on own.   Treatment Detail Scifit for UE: seat 5 elevated L 2 X 2 min.    Wall flex and ER X 5 each.   self assisted IR X 5.    AAROM w/ PT X 4 directions.     ER in SL 1/2#  X 25..   shoulder flex 6 oz X 20.   GTB shoulder ext w/ LT set x 30 B.   Scaption 6oz X 20 B .  shoulder flex supine 1/2# x 5.     Plan   Home  program ex as above, strengthening 3X/wk   Plan Pt to cont on own w/ HEP.   Discharge from physical therapy   Comments   Comments Progress on goals as noted above     RECERTIFICATION    Jacquie Harris  1955    Session Number: 7/ 8 [employer],  Medicaid since start of care.    Reasons for Continuing Treatment:   To finalize home program    Frequency/Duration  1 visit today.    Recertification Period  12/2/18 - 12/11/18    Physician Signature:    Date:    X_______________________________________________________    Physician Name: Jennie Suazo MD    I certify the need for these services furnished under this plan of treatment and while under my care. Physician co-signature of this document indicates review and certification of the therapy plan.  This signature may be written on paper, or electronically signed within EPIC.

## 2018-12-18 ENCOUNTER — OFFICE VISIT (OUTPATIENT)
Dept: FAMILY MEDICINE | Facility: CLINIC | Age: 63
End: 2018-12-18
Payer: COMMERCIAL

## 2018-12-18 ENCOUNTER — HOSPITAL ENCOUNTER (OUTPATIENT)
Dept: ULTRASOUND IMAGING | Facility: CLINIC | Age: 63
Discharge: HOME | End: 2018-12-18
Attending: FAMILY MEDICINE | Admitting: FAMILY MEDICINE
Payer: COMMERCIAL

## 2018-12-18 VITALS
RESPIRATION RATE: 14 BRPM | WEIGHT: 255 LBS | DIASTOLIC BLOOD PRESSURE: 82 MMHG | TEMPERATURE: 97.4 F | HEIGHT: 63 IN | SYSTOLIC BLOOD PRESSURE: 126 MMHG | BODY MASS INDEX: 45.18 KG/M2 | HEART RATE: 66 BPM | OXYGEN SATURATION: 98 %

## 2018-12-18 DIAGNOSIS — M79.622 PAIN OF LEFT UPPER ARM: ICD-10-CM

## 2018-12-18 DIAGNOSIS — G89.29 CHRONIC LEFT SHOULDER PAIN: Primary | ICD-10-CM

## 2018-12-18 DIAGNOSIS — M25.512 CHRONIC LEFT SHOULDER PAIN: Primary | ICD-10-CM

## 2018-12-18 PROCEDURE — 99213 OFFICE O/P EST LOW 20 MIN: CPT | Performed by: FAMILY MEDICINE

## 2018-12-18 PROCEDURE — 93971 EXTREMITY STUDY: CPT | Mod: LT

## 2018-12-18 ASSESSMENT — MIFFLIN-ST. JEOR: SCORE: 1675.8

## 2018-12-18 NOTE — PROGRESS NOTES
SUBJECTIVE:   Jacquie Harris is a 63 year old female who presents to clinic today for the following health issues:      Joint Pain L arm and shoulder     Onset: 8-2018    Description:   Location: left shoulder and arm   Character: Sharp  Stabbing Burning with movement,Dull ache all the time     Intensity: moderate, severe    Progression of Symptoms: same    Accompanying Signs & Symptoms:  Other symptoms: radiation of pain to L side of neck , weakness of L arm, warmth and swelling at times     History:   Previous similar pain: no       Precipitating factors:   Trauma or overuse: YES- trauma from pulling     Alleviating factors:  Improved by: chiropractor help some     Therapies Tried and outcome: Patient has tried many thing ice heat chiropractor exercise PT        Patient is a 63-year-old woman who comes in with left shoulder pain.  Pain has been ongoing since the Summer of this year.  She reports a dull to sharp ache in her left shoulder/left upper arm.      She had an MRI done in the summer of this year which showed supraspinatus degenerative changes but no tear.  She has had surgery in this left shoulder before.  She has also had physical therapy for several months with no relief.  She was given a cortisone shots at some point.    She has received consults from 2 different orthopedic surgeons who have told her that there is nothing wrong with her left shoulder and patient is pretty frustrated about the pain.  She says it keeps her up at night.  Pain is excruciating.      Problem list and histories reviewed & adjusted, as indicated.  Additional history: as documented    Patient Active Problem List   Diagnosis     s/p gastric bypass x 2     Allergic rhinitis     Carpal tunnel syndrome     Obstructive sleep apnea     Rosacea     CARDIOVASCULAR SCREENING; LDL GOAL LESS THAN 160     Tick bite, infected, positive Lyme test 1996 not treated     Vitamin D deficiency     Osteoporosis     Obesity, Class III, BMI 40-49.9  (morbid obesity) (H)     S/P TKR (total knee replacement)     HTN, goal below 140/80     Back pain, left below scapula, strained muscles     Encounter for routine gynecological examination      Colles' fracture     Bilateral cold feet     Elevated levels of transaminase & lactic acid dehydrogenase     Fatty liver     Shoulder injury, right, subsequent encounter     GI bleed     Upper GI bleed     Past Surgical History:   Procedure Laterality Date     ARTHROPLASTY KNEE  4/2/2012    Procedure:ARTHROPLASTY KNEE; Left Total Knee Arthroplasty--Anesth.Choice; Surgeon:HERNANDO THOMPSON; Location:[facility]     ARTHROTOMY SHOULDER, ROTATOR CUFF REPAIR, COMBINED Right 8/25/2017    Procedure: COMBINED ARTHROTOMY SHOULDER, ROTATOR CUFF REPAIR;  Right shoulder distal clavicle excision, subacromial decompression, rotator cuff repair ;  Surgeon: Hernando Thompson MD;  Location: [facility]     ARTHROTOMY SHOULDER, ROTATOR CUFF REPAIR, COMBINED Right 12/18/2017    Procedure: COMBINED ARTHROTOMY SHOULDER, ROTATOR CUFF REPAIR;  Right Shoulder Rotator Cuff Revision;  Surgeon: Hernando Thompson MD;  Location: [facility]     ARTHROTOMY SHOULDER, ROTATOR CUFF REPAIR, COMBINED Left 4/27/2018    Procedure: COMBINED ARTHROTOMY SHOULDER, ROTATOR CUFF REPAIR;  Left Shoulder Open Subacromial Decompression,Distal Clavicle Excision,Rotator Cuff Repair;  Surgeon: Hernando Thompson MD;  Location: [facility]     C ORAL SURGERY PROCEDURE  age 18    wisdom teeth     C/SECTION, LOW TRANSVERSE  1978, 1984, 1994    x 3     COLONOSCOPY  2001    normal colonoscopy     COLONOSCOPY N/A 8/22/2017    Procedure: COLONOSCOPY;  Colonoscopy  ;  Surgeon: Delfino Yoo MD;  Location: [facility]     ESOPHAGOSCOPY, GASTROSCOPY, DUODENOSCOPY (EGD), COMBINED N/A 3/18/2018    Procedure: COMBINED ESOPHAGOSCOPY, GASTROSCOPY, DUODENOSCOPY (EGD);;  Surgeon: Poncho Galindo MD;  Location: [facility]     GASTRIC BYPASS  1980/2005    Gastric Bypass and revision     HERNIA REPAIR,  INCISIONAL  6/16/2008    lap.repair with 10x8 mesh     TUBAL LIGATION  1994       Social History     Tobacco Use     Smoking status: Never Smoker     Smokeless tobacco: Never Used   Substance Use Topics     Alcohol use: Yes     Alcohol/week: 0.0 oz     Comment: mixed very light 1 a month if that     Family History   Problem Relation Age of Onset     C.A.D. Father         MI at age 60     Hypertension Father      Alzheimer Disease Father      Hyperlipidemia Father      Osteoporosis Mother         on Fosamax     Glaucoma Mother      Other Cancer Brother      Stomach Cancer Maternal Aunt      Cervical Cancer Cousin      Diabetes No family hx of      Cerebrovascular Disease No family hx of      Breast Cancer No family hx of      Cancer - colorectal No family hx of      Prostate Cancer No family hx of      Liver Disease No family hx of          Current Outpatient Medications   Medication Sig Dispense Refill     atenolol (TENORMIN) 50 MG tablet Take 1 tablet (50 mg) by mouth daily 90 tablet 3     hydrochlorothiazide 12.5 MG TABS tablet Take 1 tablet (12.5 mg) by mouth daily 90 tablet 3     nystatin (MYCOSTATIN) 398438 UNIT/GM POWD Apply topically 3 times daily as needed Refill at patient's request 30 g 1     nystatin (MYCOSTATIN) cream Apply topically 2 times daily Reported on 3/6/2017 30 g 11     acetaminophen (TYLENOL) 325 MG tablet Take 2 tablets (650 mg) by mouth every 4 hours as needed for mild pain or fever 100 tablet      clobetasol (TEMOVATE) 0.05 % ointment Apply sparingly to affected area twice daily for 14 days.  Do not apply to face. 15 g 0     Emollient (CERAVE) CREA Externally apply topically 2 times daily as needed 1 Bottle 3     Allergies   Allergen Reactions     Vioxx Itching and Swelling     VIOXX (Swollen Feet,Hands itching), Okay with ibuprofen and aspirin     BP Readings from Last 3 Encounters:   12/18/18 126/82   09/11/18 106/68   08/28/18 110/68    Wt Readings from Last 3 Encounters:   12/18/18  "115.7 kg (255 lb)   09/11/18 112 kg (247 lb)   08/28/18 112 kg (247 lb)                  Labs reviewed in EPIC    Reviewed and updated as needed this visit by clinical staff  Tobacco  Allergies  Meds  Med Hx  Surg Hx  Fam Hx  Soc Hx      Reviewed and updated as needed this visit by Provider         ROS:  Constitutional, HEENT, cardiovascular, pulmonary, gi and gu systems are negative, except as otherwise noted.    OBJECTIVE:     /82   Pulse 66   Temp 97.4  F (36.3  C) (Tympanic)   Resp 14   Ht 1.6 m (5' 3\")   Wt 115.7 kg (255 lb)   LMP 05/29/2006   SpO2 98%   BMI 45.17 kg/m    Body mass index is 45.17 kg/m .  GENERAL: healthy, alert and no distress  EYES: Eyes grossly normal to inspection, PERRL and conjunctivae and sclerae normal  HENT: ear canals and TM's normal, nose and mouth without ulcers or lesions  NECK: no adenopathy, no asymmetry, masses, or scars and thyroid normal to palpation  RESP: lungs clear to auscultation - no rales, rhonchi or wheezes  CV: regular rate and rhythm, normal S1 S2, no S3 or S4, no murmur, click or rub, no peripheral edema and peripheral pulses strong  MS: decreased range of motion of left shoulder and tenderness to palpation on left upper arm . There is some mild palpable swelling in her upper arm    Diagnostic Test Results:  none     ASSESSMENT/PLAN:         1. Chronic left shoulder pain  Referred to sports medicine for cortisone shot under ultrasound guidance   - ORTHO  REFERRAL    2. Pain of left upper arm  Ultrasound ordered, rule out lipoma, clots  - US Extremity Upper Venous  lt; Future    FUTURE APPOINTMENTS:       - Follow-up visit as needed.    Jennie Suazo MD  [facility]  "

## 2018-12-18 NOTE — PATIENT INSTRUCTIONS
Thank you for choosing [facility].  You may be receiving a survey in the mail from Nnamdi Hopkins regarding your visit today.  Please take a few minutes to complete and return the survey to let us know how we are doing.      If you have questions or concerns, please contact us via [email or web address] or you can contact your care team at [phone].    Our Clinic hours are:  Monday 6:40 am  to 7:00 pm  Tuesday -Friday 6:40 am to 5:00 pm    The [place] outpatient lab hours are:  Monday - Friday 6:10 am to 4:45 pm  Saturdays 7:00 am to 11:00 am  Appointments are required, call [phone]    If you have clinical questions after hours or would like to schedule an appointment,  call the clinic at [phone].

## 2018-12-19 NOTE — RESULT ENCOUNTER NOTE
Please inform patient that test result ultrasound was within normal parameters.   Thank you.     Jennie Suazo M.D.

## 2018-12-28 ENCOUNTER — ANCILLARY PROCEDURE (OUTPATIENT)
Dept: GENERAL RADIOLOGY | Facility: CLINIC | Age: 63
End: 2018-12-28
Attending: FAMILY MEDICINE
Payer: COMMERCIAL

## 2018-12-28 ENCOUNTER — OFFICE VISIT (OUTPATIENT)
Dept: ORTHOPEDICS | Facility: CLINIC | Age: 63
End: 2018-12-28
Payer: COMMERCIAL

## 2018-12-28 VITALS
WEIGHT: 255 LBS | HEART RATE: 69 BPM | DIASTOLIC BLOOD PRESSURE: 76 MMHG | BODY MASS INDEX: 45.18 KG/M2 | HEIGHT: 63 IN | SYSTOLIC BLOOD PRESSURE: 113 MMHG

## 2018-12-28 DIAGNOSIS — G89.29 CHRONIC LEFT SHOULDER PAIN: ICD-10-CM

## 2018-12-28 DIAGNOSIS — G89.29 CHRONIC LEFT SHOULDER PAIN: Primary | ICD-10-CM

## 2018-12-28 DIAGNOSIS — M25.512 CHRONIC LEFT SHOULDER PAIN: Primary | ICD-10-CM

## 2018-12-28 DIAGNOSIS — M25.512 CHRONIC LEFT SHOULDER PAIN: ICD-10-CM

## 2018-12-28 PROCEDURE — 20611 DRAIN/INJ JOINT/BURSA W/US: CPT | Mod: LT | Performed by: FAMILY MEDICINE

## 2018-12-28 PROCEDURE — 99203 OFFICE O/P NEW LOW 30 MIN: CPT | Mod: 25 | Performed by: FAMILY MEDICINE

## 2018-12-28 PROCEDURE — 73030 X-RAY EXAM OF SHOULDER: CPT | Mod: LT

## 2018-12-28 RX ORDER — BETAMETHASONE SODIUM PHOSPHATE AND BETAMETHASONE ACETATE 3; 3 MG/ML; MG/ML
6 INJECTION, SUSPENSION INTRA-ARTICULAR; INTRALESIONAL; INTRAMUSCULAR; SOFT TISSUE
Status: DISCONTINUED | OUTPATIENT
Start: 2018-12-28 | End: 2019-05-07

## 2018-12-28 RX ORDER — ROPIVACAINE HYDROCHLORIDE 5 MG/ML
3 INJECTION, SOLUTION EPIDURAL; INFILTRATION; PERINEURAL
Status: DISCONTINUED | OUTPATIENT
Start: 2018-12-28 | End: 2019-05-07

## 2018-12-28 RX ADMIN — BETAMETHASONE SODIUM PHOSPHATE AND BETAMETHASONE ACETATE 6 MG: 3; 3 INJECTION, SUSPENSION INTRA-ARTICULAR; INTRALESIONAL; INTRAMUSCULAR; SOFT TISSUE at 11:55

## 2018-12-28 RX ADMIN — ROPIVACAINE HYDROCHLORIDE 3 ML: 5 INJECTION, SOLUTION EPIDURAL; INFILTRATION; PERINEURAL at 11:55

## 2018-12-28 ASSESSMENT — MIFFLIN-ST. JEOR: SCORE: 1675.8

## 2018-12-28 NOTE — LETTER
12/28/2018         RE: Jacquie Harris  [address] [facility] [id number]        Dear Colleague,    Thank you for referring your patient, Jacquie Harris, to the [facility]. Please see a copy of my visit note below.    ASSESSMENT & PLAN  Jacquie was seen today for pain.    Diagnoses and all orders for this visit:    Chronic left shoulder pain  -     XR Shoulder Left G/E 3 Views; Future  -     Large Joint Injection/Arthocentesis: L glenohumeral joint      Pt is a 64 yo f presenting for chronic left shoulder pain initially improved after subacromial decompression and rotator cuff repair worsened after acute lifting event in 8/18.  Pt has limited ROM 2/2 pain but some stiffness noted as well.  There is pain with empty can test but no weakness.  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  XR showing no sig degenerative changes.  Given subacromial injection did not provider relief in Sept of this year and shoulder stiffness, a glenohumeral injection was completed with pt reporting significant improvement in pain afterwards.  HEP given to pt today with recommendations to work on ROM and strengthening.  Pt can f/sariah with me on an as needed basis.  Pt understands and agrees with plan.       -----    SUBJECTIVE  Jacquie Harris is a/an 63 year old Right handed female who is seen in consultation at the request of  Jennie Suazo M.D. for evaluation of left shoulder pain. The patient is seen by themselves.     Onset: 5 month(s) ago. Patient describes injury as helping pull her  up a ramp in his wheelchair, felt a pop had neg MRI, Dr. Thompson and Tria provider both said no surgery referred to PT.  Pt had steroid injection 9/18 no help.    Location of Pain: left anterior shoulder down to the elbow  Rating of Pain at worst: 8/10  Rating of Pain Currently: 0/10  Worsened by: movement, turning her  head and breathing can cause pain at times  Better with: rest, keeping it still  Treatments tried: rest/activity avoidance, ice, heat and physical therapy  Associated symptoms: tingling, weakness, locking or catching and feeling of instability  Orthopedic history: YES - Date: RCR tear  Relevant surgical history: YES - Date: 4/27/18 Open Subacromial Decompression,Distal Clavicle Excision,Rotator Cuff Repair by Jay  Past Medical History:   Diagnosis Date     HTN (hypertension)      IRON DEFICIENCY ANEMIA 7/5/2005     Iron infusion/ resolved since injections     DARWIN (obstructive sleep apnea)      Social History     Socioeconomic History     Marital status:      Spouse name: None     Number of children: None     Years of education: None     Highest education level: None   Social Needs     Financial resource strain: None     Food insecurity - worry: None     Food insecurity - inability: None     Transportation needs - medical: None     Transportation needs - non-medical: None   Occupational History     None   Tobacco Use     Smoking status: Never Smoker     Smokeless tobacco: Never Used   Substance and Sexual Activity     Alcohol use: Yes     Alcohol/week: 0.0 oz     Comment: mixed very light 1 a month if that     Drug use: No     Sexual activity: Yes     Partners: Male     Birth control/protection: Surgical   Other Topics Concern     Parent/sibling w/ CABG, MI or angioplasty before 65F 55M? No      Service No     Blood Transfusions Yes     Comment: 1976, 1978, 1978, 1984     Caffeine Concern No     Occupational Exposure No     Hobby Hazards No     Sleep Concern No     Stress Concern No     Weight Concern Yes     Special Diet No     Back Care No     Exercise No     Bike Helmet No     Seat Belt Yes     Self-Exams Not Asked   Social History Narrative     None         Patient's past medical, surgical, social, and family histories were reviewed today and no changes are noted.    REVIEW OF SYSTEMS:  10  "point ROS is negative other than symptoms noted above in HPI, Past Medical History or as stated below  Constitutional: NEGATIVE for fever, chills, change in weight  Skin: NEGATIVE for worrisome rashes, moles or lesions  GI/: NEGATIVE for bowel or bladder changes  Neuro: NEGATIVE for weakness, dizziness or paresthesias    OBJECTIVE:  /76 (BP Location: Right arm, Patient Position: Chair, Cuff Size: Adult Large)   Pulse 69   Ht 1.6 m (5' 3\")   Wt 115.7 kg (255 lb)   LMP 05/29/2006   BMI 45.17 kg/m      General: healthy, alert and in no distress  HEENT: no scleral icterus or conjunctival erythema  Skin: no suspicious lesions or rash. No jaundice.  CV: regular rhythm by palpation  Resp: normal respiratory effort without conversational dyspnea   Psych: normal mood and affect  Gait: normal steady gait with appropriate coordination and balance  Neuro: normal light touch sensory exam of the bilateral upper extremities.    MSK:  LEFT SHOULDER  Inspection:    no swelling, bruising, discoloration, or obvious deformity or asymmetry, well healed anterior/superior scar, atophy of posterior shoulder musculature compared to contralateral side  Palpation:    Tender about the acromion, anterior capsule, proximal biceps tendon, greater tuberosity and supraspinatus insertion. Remainder of bony and tendinous landmarks are nontender.  Active Range of Motion:     Abduction 900, , , IR L4.      Scapular dyskinesis None  Strength:    Scapular plane abduction 5-/5, painful,  ER 5-/5, painful, IR 5-/5, painful, biceps 5/5, triceps 5/5  Special Tests:    Positive: Neer's, Phillips', Hall's and GIRD with mildly positive supraspinatus    Negative: Speed's and Yergason's    CERVICAL SPINE  Inspection:    normal cervical lordosis present, rounded shoulders, forward head posture  Palpation:   Nontender.  Range of Motion:     Flexion show full range    Extension show full range    Right side bend show full range    Left " side bend show full range    Right rotation show full range    Left rotation show full range  Strength:    Full strength throughout all neck muscles  Special Tests:    Positive: None    Negative: Spurling's (bilateral)    Independent visualization of the below image:  No results found for this or any previous visit (from the past 24 hour(s)).    MR SHOULDER LEFT WITHOUT CONTRAST 8/29/2018 3:14 PM     HISTORY: Left shoulder pain for one month after a pulling injury.  Rotator cuff surgery April 2018.     TECHNIQUE: Oblique coronal T1, T2 and T2 fat suppressed images,  oblique sagittal T2 and axial proton density and T2 weighted images  were obtained.      COMPARISON: 2/16/2018.     FINDINGS:  Osseous acromial outlet: Interval distal clavicular resection and  distal acromioplasty. Remaining distal acromion is a type one  morphology with negative slope on oblique sagittal images and no  lateral downsloping on oblique coronal images. There is a small amount  of fluid in subacromial/subdeltoid bursa.     Rotator cuff: Status post rotator cuff repair with numerous suture  anchors related to the greater tuberosity. There is heterogeneous  signal intensity throughout the supraspinatus tendon consistent with  degenerative tendinopathy and/or postoperative changes. However, there  is no MR evidence for a recurrent partial or full-thickness rotator  cuff tear. The supraspinatus muscle is normal. The infraspinatus  tendon shows mild degenerative tendinopathy without tear and the  infraspinatus muscle is normal. There is marked fatty atrophy of the  teres minor muscle of uncertain etiology. This can be seen with  quadrilateral space syndrome and this finding is unchanged. The  subscapularis tendon is mildly thinned from degenerative tendinopathy  without evidence for tear or muscle atrophy.     Labrum: The glenoid labrum is normal as visualized.     Biceps tendon: The biceps tendon is normal proximally at the biceps  anchor and  distally in the bicipital groove.       Osseous structures and cartilaginous surfaces: No acute bony  abnormalities. Articular cartilages of the glenohumeral joint are  normal.     Additional findings: None.                                                                      IMPRESSION:   1. Interval distal clavicular resection, acromioplasty and rotator  cuff repair.  2. Supraspinatus degenerative tendinopathy and/or postoperative  changes with no evidence for recurrent rotator cuff tear.  3. Fatty atrophy of the teres minor muscle.     SHANE INTERIANO MD    Large Joint Injection/Arthocentesis: L glenohumeral joint  Date/Time: 12/28/2018 11:55 AM  Performed by: Rex Figueroa MD  Authorized by: Rex Figueroa MD     Indications:  Pain  Needle Size:  25 G  Guidance: ultrasound    Approach:  Lateral  Location:  Shoulder  Site:  L glenohumeral joint  Medications:  6 mg betamethasone acet & sod phos 6 (3-3) MG/ML; 3 mL ropivacaine 5 MG/ML  Medications comment:  4 mL of Ropivacaine used and 1 mL of Celestone    Outcome:  Tolerated well, no immediate complications  Procedure discussed: discussed risks, benefits, and alternatives    Timeout: timeout called immediately prior to procedure    Prep: patient was prepped and draped in usual sterile fashion            Patient's conditions were thoroughly discussed during today's visit with greater than 50% of the visit spent counseling the patient with total time spent face-to-face with the patient being 40 minutes.    Rex Figueroa MD [facility]    Again, thank you for allowing me to participate in the care of your patient.        Sincerely,        Rex Figueroa MD

## 2018-12-28 NOTE — PROGRESS NOTES
ASSESSMENT & PLAN  Jacquie was seen today for pain.    Diagnoses and all orders for this visit:    Chronic left shoulder pain  -     XR Shoulder Left G/E 3 Views; Future  -     Large Joint Injection/Arthocentesis: L glenohumeral joint      Pt is a 64 yo f presenting for chronic left shoulder pain initially improved after subacromial decompression and rotator cuff repair worsened after acute lifting event in 8/18.  Pt has limited ROM 2/2 pain but some stiffness noted as well.  There is pain with empty can test but no weakness.  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  XR showing no sig degenerative changes.  Given subacromial injection did not provider relief in Sept of this year and shoulder stiffness, a glenohumeral injection was completed with pt reporting significant improvement in pain afterwards.  HEP given to pt today with recommendations to work on ROM and strengthening.  Pt can f/sariah with me on an as needed basis.  Pt understands and agrees with plan.       -----    SUBJECTIVE  Jacquie Harris is a/an 63 year old Right handed female who is seen in consultation at the request of  Jennie Suazo M.D. for evaluation of left shoulder pain. The patient is seen by themselves.     Onset: 5 month(s) ago. Patient describes injury as helping pull her  up a ramp in his wheelchair, felt a pop had neg MRI, Dr. Thompson and Tria provider both said no surgery referred to PT.  Pt had steroid injection 9/18 no help.    Location of Pain: left anterior shoulder down to the elbow  Rating of Pain at worst: 8/10  Rating of Pain Currently: 0/10  Worsened by: movement, turning her head and breathing can cause pain at times  Better with: rest, keeping it still  Treatments tried: rest/activity avoidance, ice, heat and physical therapy  Associated symptoms: tingling, weakness, locking or catching and feeling of  instability  Orthopedic history: YES - Date: RCR tear  Relevant surgical history: YES - Date: 4/27/18 Open Subacromial Decompression,Distal Clavicle Excision,Rotator Cuff Repair by Jay  Past Medical History:   Diagnosis Date     HTN (hypertension)      IRON DEFICIENCY ANEMIA 7/5/2005     Iron infusion/ resolved since injections     DARWIN (obstructive sleep apnea)      Social History     Socioeconomic History     Marital status:      Spouse name: None     Number of children: None     Years of education: None     Highest education level: None   Social Needs     Financial resource strain: None     Food insecurity - worry: None     Food insecurity - inability: None     Transportation needs - medical: None     Transportation needs - non-medical: None   Occupational History     None   Tobacco Use     Smoking status: Never Smoker     Smokeless tobacco: Never Used   Substance and Sexual Activity     Alcohol use: Yes     Alcohol/week: 0.0 oz     Comment: mixed very light 1 a month if that     Drug use: No     Sexual activity: Yes     Partners: Male     Birth control/protection: Surgical   Other Topics Concern     Parent/sibling w/ CABG, MI or angioplasty before 65F 55M? No      Service No     Blood Transfusions Yes     Comment: 1976, 1978, 1978, 1984     Caffeine Concern No     Occupational Exposure No     Hobby Hazards No     Sleep Concern No     Stress Concern No     Weight Concern Yes     Special Diet No     Back Care No     Exercise No     Bike Helmet No     Seat Belt Yes     Self-Exams Not Asked   Social History Narrative     None         Patient's past medical, surgical, social, and family histories were reviewed today and no changes are noted.    REVIEW OF SYSTEMS:  10 point ROS is negative other than symptoms noted above in HPI, Past Medical History or as stated below  Constitutional: NEGATIVE for fever, chills, change in weight  Skin: NEGATIVE for worrisome rashes, moles or lesions  GI/: NEGATIVE  "for bowel or bladder changes  Neuro: NEGATIVE for weakness, dizziness or paresthesias    OBJECTIVE:  /76 (BP Location: Right arm, Patient Position: Chair, Cuff Size: Adult Large)   Pulse 69   Ht 1.6 m (5' 3\")   Wt 115.7 kg (255 lb)   LMP 05/29/2006   BMI 45.17 kg/m     General: healthy, alert and in no distress  HEENT: no scleral icterus or conjunctival erythema  Skin: no suspicious lesions or rash. No jaundice.  CV: regular rhythm by palpation  Resp: normal respiratory effort without conversational dyspnea   Psych: normal mood and affect  Gait: normal steady gait with appropriate coordination and balance  Neuro: normal light touch sensory exam of the bilateral upper extremities.    MSK:  LEFT SHOULDER  Inspection:    no swelling, bruising, discoloration, or obvious deformity or asymmetry, well healed anterior/superior scar, atophy of posterior shoulder musculature compared to contralateral side  Palpation:    Tender about the acromion, anterior capsule, proximal biceps tendon, greater tuberosity and supraspinatus insertion. Remainder of bony and tendinous landmarks are nontender.  Active Range of Motion:     Abduction 900, , , IR L4.      Scapular dyskinesis None  Strength:    Scapular plane abduction 5-/5, painful,  ER 5-/5, painful, IR 5-/5, painful, biceps 5/5, triceps 5/5  Special Tests:    Positive: Neer's, Phillips', [place]'s and GIRD with mildly positive supraspinatus    Negative: Speed's and Yergason's    CERVICAL SPINE  Inspection:    normal cervical lordosis present, rounded shoulders, forward head posture  Palpation:   Nontender.  Range of Motion:     Flexion show full range    Extension show full range    Right side bend show full range    Left side bend show full range    Right rotation show full range    Left rotation show full range  Strength:    Full strength throughout all neck muscles  Special Tests:    Positive: None    Negative: Spurling's (bilateral)    Independent " visualization of the below image:  No results found for this or any previous visit (from the past 24 hour(s)).    MR SHOULDER LEFT WITHOUT CONTRAST 8/29/2018 3:14 PM     HISTORY: Left shoulder pain for one month after a pulling injury.  Rotator cuff surgery April 2018.     TECHNIQUE: Oblique coronal T1, T2 and T2 fat suppressed images,  oblique sagittal T2 and axial proton density and T2 weighted images  were obtained.      COMPARISON: 2/16/2018.     FINDINGS:  Osseous acromial outlet: Interval distal clavicular resection and  distal acromioplasty. Remaining distal acromion is a type one  morphology with negative slope on oblique sagittal images and no  lateral downsloping on oblique coronal images. There is a small amount  of fluid in subacromial/subdeltoid bursa.     Rotator cuff: Status post rotator cuff repair with numerous suture  anchors related to the greater tuberosity. There is heterogeneous  signal intensity throughout the supraspinatus tendon consistent with  degenerative tendinopathy and/or postoperative changes. However, there  is no MR evidence for a recurrent partial or full-thickness rotator  cuff tear. The supraspinatus muscle is normal. The infraspinatus  tendon shows mild degenerative tendinopathy without tear and the  infraspinatus muscle is normal. There is marked fatty atrophy of the  teres minor muscle of uncertain etiology. This can be seen with  quadrilateral space syndrome and this finding is unchanged. The  subscapularis tendon is mildly thinned from degenerative tendinopathy  without evidence for tear or muscle atrophy.     Labrum: The glenoid labrum is normal as visualized.     Biceps tendon: The biceps tendon is normal proximally at the biceps  anchor and distally in the bicipital groove.       Osseous structures and cartilaginous surfaces: No acute bony  abnormalities. Articular cartilages of the glenohumeral joint are  normal.     Additional findings: None.                                                                       IMPRESSION:   1. Interval distal clavicular resection, acromioplasty and rotator  cuff repair.  2. Supraspinatus degenerative tendinopathy and/or postoperative  changes with no evidence for recurrent rotator cuff tear.  3. Fatty atrophy of the teres minor muscle.     SHANE INTERIANO MD    Large Joint Injection/Arthocentesis: L glenohumeral joint  Date/Time: 12/28/2018 11:55 AM  Performed by: Rex Figueroa MD  Authorized by: Rex Figueroa MD     Indications:  Pain  Needle Size:  25 G  Guidance: ultrasound    Approach:  Lateral  Location:  Shoulder  Site:  L glenohumeral joint  Medications:  6 mg betamethasone acet & sod phos 6 (3-3) MG/ML; 3 mL ropivacaine 5 MG/ML  Medications comment:  4 mL of Ropivacaine used and 1 mL of Celestone    Outcome:  Tolerated well, no immediate complications  Procedure discussed: discussed risks, benefits, and alternatives    Timeout: timeout called immediately prior to procedure    Prep: patient was prepped and draped in usual sterile fashion            Patient's conditions were thoroughly discussed during today's visit with greater than 50% of the visit spent counseling the patient with total time spent face-to-face with the patient being 40 minutes.    Rex Figueroa MD [facility] [place]

## 2018-12-28 NOTE — PATIENT INSTRUCTIONS
[facility] Injection Discharge Instructions      You may shower, however avoid swimming, tub baths or hot tubs for 24 hours following your procedure    You may have a mild to moderate increase in pain for several days following the injection.    It may take up to 14 days for the steroid medication to start working although you may feel the effect as early as a few days after the procedure.    You may use ice packs for 10-15 minutes, 3 to 4 times a day at the injection site for comfort    You may use anti-inflammatory medications (such as Ibuprofen or Aleve or Advil) or Tylenol for pain control if necessary    If you were fasting, you may resume your normal diet and medications after the procedure    If you have diabetes, check your blood sugar more frequently than usual as your blood sugar may be higher than normal for 10-14 days following a steroid injection. Contact your doctor who manages your diabetes if your blood sugar is higher than usual      If you experience any of the following, call [facility] @ [phone] or [phone]  -Fever over 100 degree F  -Swelling, bleeding, redness, drainage, warmth at the injection site  - New or worsening pain

## 2019-01-07 ENCOUNTER — OFFICE VISIT (OUTPATIENT)
Dept: FAMILY MEDICINE | Facility: CLINIC | Age: 64
End: 2019-01-07
Payer: COMMERCIAL

## 2019-01-07 ENCOUNTER — TELEPHONE (OUTPATIENT)
Dept: FAMILY MEDICINE | Facility: CLINIC | Age: 64
End: 2019-01-07

## 2019-01-07 VITALS
BODY MASS INDEX: 44.83 KG/M2 | WEIGHT: 253 LBS | RESPIRATION RATE: 16 BRPM | OXYGEN SATURATION: 94 % | HEART RATE: 73 BPM | TEMPERATURE: 98.5 F | DIASTOLIC BLOOD PRESSURE: 82 MMHG | SYSTOLIC BLOOD PRESSURE: 120 MMHG | HEIGHT: 63 IN

## 2019-01-07 DIAGNOSIS — J98.01 BRONCHOSPASM: ICD-10-CM

## 2019-01-07 DIAGNOSIS — N64.4 BREAST PAIN: Primary | ICD-10-CM

## 2019-01-07 DIAGNOSIS — H65.91 OME (OTITIS MEDIA WITH EFFUSION), RIGHT: Primary | ICD-10-CM

## 2019-01-07 PROCEDURE — 99214 OFFICE O/P EST MOD 30 MIN: CPT | Performed by: FAMILY MEDICINE

## 2019-01-07 RX ORDER — AMOXICILLIN 500 MG/1
500 CAPSULE ORAL 3 TIMES DAILY
Qty: 30 CAPSULE | Refills: 0 | Status: SHIPPED | OUTPATIENT
Start: 2019-01-07 | End: 2019-04-23

## 2019-01-07 RX ORDER — ALBUTEROL SULFATE 90 UG/1
2 AEROSOL, METERED RESPIRATORY (INHALATION) EVERY 4 HOURS PRN
Qty: 1 INHALER | Refills: 3 | Status: SHIPPED | OUTPATIENT
Start: 2019-01-07 | End: 2020-01-07

## 2019-01-07 ASSESSMENT — MIFFLIN-ST. JEOR: SCORE: 1666.73

## 2019-01-07 NOTE — TELEPHONE ENCOUNTER
Patient reports:  She was trying to schedule her annual mammogram and told imaging scheduling that she has had breast pain  They asked patient to get a new referral for diagnostic mammogram  Patient has had bilateral breast pain in both breasts, worse with movement  Pain feels like aching, is intermittent, and moderate in pain rating  She denies fever, lump present, nipple discharge, redness, swelling, warm to touch    Please advise on referral  Cued order for review     Routing to provider.    Lacey MEYERS Rn

## 2019-01-07 NOTE — PROGRESS NOTES
SUBJECTIVE:   Jacquie Harris is a 63 year old female who presents to clinic today for the following health issues:      ENT Symptoms             Symptoms: cc Present Absent Comment   Fever/Chills  x  Chills off and on.   Fatigue  x     Muscle Aches  x     Eye Irritation  x     Sneezing   x    Nasal Sudeep/Drg  x  Not major nasal congestion.    Sinus Pressure/Pain  x     Loss of smell   x    Dental pain  x  States her teeth ache all of the time.   Sore Throat  x     Swollen Glands  x  States this is normal for her.   Ear Pain/Fullness  x  Bilateral ear pain with a plugged feeling.  Did use a hot oil treatment.    Cough  x  Coughing out very little phlegm.  Most of the time she is not able to cough the phlegm out.   Wheeze  x  Some wheezing.   Chest Pain  x  Chest feels tight.   Shortness of breath  x     Rash   x    Other  x  Headaches.     Symptom duration:  Nasal drainage started in the Fall.  Started with a sore throat and plugged ears around [place].  More symptoms since New Year.   Symptom severity:  Not getting better.   Treatments tried:  None.   Contacts:  Unknown.  Her granddaughter did have pneumonia around [place] but she was on an antibiotic before coming over.       Current Outpatient Medications:      acetaminophen (TYLENOL) 325 MG tablet, Take 2 tablets (650 mg) by mouth every 4 hours as needed for mild pain or fever, Disp: 100 tablet, Rfl:      atenolol (TENORMIN) 50 MG tablet, Take 1 tablet (50 mg) by mouth daily, Disp: 90 tablet, Rfl: 3     clobetasol (TEMOVATE) 0.05 % ointment, Apply sparingly to affected area twice daily for 14 days.  Do not apply to face., Disp: 15 g, Rfl: 0     Emollient (CERAVE) CREA, Externally apply topically 2 times daily as needed, Disp: 1 Bottle, Rfl: 3     hydrochlorothiazide 12.5 MG TABS tablet, Take 1 tablet (12.5 mg) by mouth daily, Disp: 90 tablet, Rfl: 3     nystatin (MYCOSTATIN) 260320 UNIT/GM POWD, Apply topically 3 times daily as needed Refill at patient's  "request, Disp: 30 g, Rfl: 1     nystatin (MYCOSTATIN) cream, Apply topically 2 times daily Reported on 3/6/2017, Disp: 30 g, Rfl: 11    Current Facility-Administered Medications:      betamethasone acet & sod phos (CELESTONE) injection 6 mg, 6 mg, , , Rex Figueroa MD, 6 mg at 12/28/18 1155     ropivacaine (NAROPIN) injection 3 mL, 3 mL, , , Rex Figueroa MD, 3 mL at 12/28/18 1155    Patient Active Problem List   Diagnosis     s/p gastric bypass x 2     Allergic rhinitis     Carpal tunnel syndrome     Obstructive sleep apnea     Rosacea     CARDIOVASCULAR SCREENING; LDL GOAL LESS THAN 160     Tick bite, infected, positive Lyme test 1996 not treated     Vitamin D deficiency     Osteoporosis     Obesity, Class III, BMI 40-49.9 (morbid obesity) (H)     S/P TKR (total knee replacement)     HTN, goal below 140/80     Back pain, left below scapula, strained muscles     Encounter for routine gynecological examination      Colles' fracture     Bilateral cold feet     Elevated levels of transaminase & lactic acid dehydrogenase     Fatty liver     Shoulder injury, right, subsequent encounter     GI bleed     Upper GI bleed       Blood pressure 120/82, pulse 73, temperature 98.5  F (36.9  C), temperature source Tympanic, resp. rate 16, height 1.6 m (5' 3\"), weight 114.8 kg (253 lb), last menstrual period 05/29/2006, SpO2 94 %, not currently breastfeeding.    Exam:  GENERAL APPEARANCE: healthy, alert and no distress  EYES: EOMI,  PERRL  HENT: TM congested/bulging right and rhinorrhea clear  RESP: rhonchi bilateral and expiratory wheezes bilateral  CV: regular rates and rhythm, normal S1 S2, no S3 or S4 and no murmur, click or rub -  PSYCH: mentation appears normal and affect normal/bright    The PFM is 250/430.     (H65.91) OME (otitis media with effusion), right  (primary encounter diagnosis)  Comment:   Plan: amoxicillin (AMOXIL) 500 MG capsule        Take the med three times daily for 10 days. Call if any side " effects, or if not better.   Recheck as needed if doing well.     (J98.01) Bronchospasm  Comment:   Plan: albuterol (PROAIR HFA/PROVENTIL HFA/VENTOLIN         HFA) 108 (90 Base) MCG/ACT inhaler        Use the inhaler as discussed every 3-4 hours as needed. Identify and avoid triggers.  Stay well hydrated and use the Robitussin DM cough med. Elevate the head of the bed.     Delfino King

## 2019-01-07 NOTE — PATIENT INSTRUCTIONS
Thank you for choosing [facility].  You may be receiving a survey in the mail from [facility] regarding your visit today.  Please take a few minutes to complete and return the survey to let us know how we are doing.      If you have questions or concerns, please contact us via Zomazz or you can contact your care team at [phone].    Our Clinic hours are:  Monday 6:40 am  to 7:00 pm  Tuesday -Friday 6:40 am to 5:00 pm    The [place] outpatient lab hours are:  Monday - Friday 6:10 am to 4:45 pm  Saturdays 7:00 am to 11:00 am  Appointments are required, call [phone]    If you have clinical questions after hours or would like to schedule an appointment,  call the clinic at [phone].    (J98.01) Bronchospasm  Comment:   Plan: albuterol (PROAIR HFA/PROVENTIL HFA/VENTOLIN         HFA) 108 (90 Base) MCG/ACT inhaler        Use the inhaler as discussed every 3-4 hours as needed. Identify and avoid triggers.  Stay well hydrated and use the Robitussin DM cough med. Elevate the head of the bed.

## 2019-01-07 NOTE — TELEPHONE ENCOUNTER
Reason for call:  Patient reporting a symptom    Symptom or request: Pt has had breast pain in both breasts x 2-3 months.  Please call patient and advise.      Duration (how long have symptoms been present): 2-3 months     Have you been treated for this before? No    Additional comments:     Phone Number patient can be reached at:  Home number on file [phone] (home)    Best Time:  any    Can we leave a detailed message on this number:  YES    Call taken on 1/7/2019 at 4:08 PM by Diana Dominguez

## 2019-01-17 ENCOUNTER — TELEPHONE (OUTPATIENT)
Dept: FAMILY MEDICINE | Facility: CLINIC | Age: 64
End: 2019-01-17

## 2019-01-17 ENCOUNTER — OFFICE VISIT (OUTPATIENT)
Dept: FAMILY MEDICINE | Facility: CLINIC | Age: 64
End: 2019-01-17
Payer: COMMERCIAL

## 2019-01-17 VITALS
SYSTOLIC BLOOD PRESSURE: 104 MMHG | HEIGHT: 63 IN | WEIGHT: 252 LBS | RESPIRATION RATE: 14 BRPM | TEMPERATURE: 97.5 F | DIASTOLIC BLOOD PRESSURE: 68 MMHG | BODY MASS INDEX: 44.65 KG/M2 | OXYGEN SATURATION: 99 % | HEART RATE: 66 BPM

## 2019-01-17 DIAGNOSIS — M25.532 LEFT WRIST PAIN: Primary | ICD-10-CM

## 2019-01-17 PROCEDURE — 99213 OFFICE O/P EST LOW 20 MIN: CPT | Performed by: FAMILY MEDICINE

## 2019-01-17 ASSESSMENT — MIFFLIN-ST. JEOR: SCORE: 1662.19

## 2019-01-17 NOTE — TELEPHONE ENCOUNTER
Patient called stating that she left wrist injury-- she had a heat registar fall on it. Swelling and does not have range of motion. 800a today. Has OV today with Dr Gabriele [facility]

## 2019-01-17 NOTE — TELEPHONE ENCOUNTER
S-(situation): Wrist Injury    B-(background): Patient does have an appointment today     A-(assessment): Patient states she has an appointment today and just needs to know how to care for it in the mean time. She states she was hanging up clothes in the upper closet and the heat register fell and hit my wrist. Patient advised to rest wrist, ice wrist, compress wrist and elevate wrist in the mean time. She was also advised to keep appointment today and use normal pain medication, I.e tylenol or ibuprofen.    R-(recommendations): RICE. Patient agrees with plan and will keep appointment with PCP today. Teri DAVIS RN

## 2019-01-17 NOTE — PATIENT INSTRUCTIONS
Thank you for choosing [facility].  You may be receiving a survey in the mail from [facility] regarding your visit today.  Please take a few minutes to complete and return the survey to let us know how we are doing.      If you have questions or concerns, please contact us via Elation[employer] or you can contact your care team at [phone].    Our Clinic hours are:  Monday 6:40 am  to 7:00 pm  Tuesday -Friday 6:40 am to 5:00 pm    The [place] outpatient lab hours are:  Monday - Friday 6:10 am to 4:45 pm  Saturdays 7:00 am to 11:00 am  Appointments are required, call [phone]    If you have clinical questions after hours or would like to schedule an appointment,  call the clinic at [phone].  Patient Education     Wrist Sprain  A sprain is an injury to the ligaments or capsule that holds a joint together. There are no broken bones. Most sprains take about 3 to 6 weeks to heal. If it a severe sprain where the ligament is completely torn, it can take months to recover.  Most wrist sprains are treated with a splint, wrist brace, or elastic wrap for support. Severe sprains may require surgery.  Home care    Keep your arm elevated to reduce pain and swelling. This is very important during the first 48 hours.    Apply an ice pack over the injured area for 15 to 20 minutes every 3 to 6 hours. You should do this for the first 24 to 48 hours. You can make an ice pack by filling a plastic bag that seals at the top with ice cubes and then wrapping it with a thin towel. Continue to use ice packs for relief of pain and swelling as needed. As the ice melts, be careful to avoid getting your wrap, splint, or cast wet. After 48 hours, apply heat (warm shower or warm bath) for 15 to 20 minutes several times a day, or alternate ice and heat.     You may use over-the-counter pain medicine to control pain, unless another pain medicine was prescribed. If you have chronic liver or kidney disease or ever had a stomach ulcer  or gastrointestinal bleeding, talk with your doctor before using these medicines.    If you were given a splint or brace, wear it for the time advised by your doctor.  Follow-up care  Follow up with your healthcare provider, or as advised. Any X-rays you had today don t show any broken bones, breaks, or fractures. Sometimes fractures don t show up on the first X-ray. Bruises and sprains can sometimes hurt as much as a fracture. These injuries can take time to heal completely. If your symptoms don t improve or they get worse, talk with your doctor. You may need a repeat X-ray. If X-rays were taken, you will be told of any new findings that may affect your care.  When to seek medical advice  Call your healthcare provider right away if any of these occur:    Pain or swelling increases    Fingers or hand becomes cold, blue, numb, or tingly   Date Last Reviewed: 5/1/2018 2000-2018 [employer]. [address] [place] [address]. All rights reserved. This information is not intended as a substitute for professional medical care. Always follow your healthcare professional's instructions.

## 2019-01-18 ENCOUNTER — HOSPITAL ENCOUNTER (OUTPATIENT)
Dept: MAMMOGRAPHY | Facility: CLINIC | Age: 64
Discharge: HOME | End: 2019-01-18
Attending: FAMILY MEDICINE | Admitting: FAMILY MEDICINE
Payer: COMMERCIAL

## 2019-01-18 DIAGNOSIS — N64.4 BREAST PAIN: ICD-10-CM

## 2019-01-18 PROCEDURE — G0279 TOMOSYNTHESIS, MAMMO: HCPCS

## 2019-01-18 PROCEDURE — 77066 DX MAMMO INCL CAD BI: CPT

## 2019-01-21 DIAGNOSIS — I10 BENIGN ESSENTIAL HYPERTENSION: ICD-10-CM

## 2019-01-21 RX ORDER — HYDROCHLOROTHIAZIDE 12.5 MG/1
TABLET ORAL
Qty: 90 TABLET | Refills: 0 | Status: SHIPPED | OUTPATIENT
Start: 2019-01-21 | End: 2019-02-01

## 2019-01-21 RX ORDER — ATENOLOL 50 MG/1
TABLET ORAL
Qty: 90 TABLET | Refills: 3 | Status: SHIPPED | OUTPATIENT
Start: 2019-01-21 | End: 2019-02-01

## 2019-01-21 NOTE — TELEPHONE ENCOUNTER
Prescriptions approved per [facility] Refill Protocol.    Cordelia TYLER RN

## 2019-01-21 NOTE — TELEPHONE ENCOUNTER
"Requested Prescriptions   Pending Prescriptions Disp Refills     atenolol (TENORMIN) 50 MG tablet [Pharmacy Med Name: ATENOLOL 50 MG TABLET] 90 tablet 2     Sig: TAKE 1 TABLET BY MOUTH DAILY    Beta-Blockers Protocol Passed - 1/21/2019 12:58 AM       Passed - Blood pressure under 140/90 in past 12 months    BP Readings from Last 3 Encounters:   01/17/19 104/68   01/07/19 120/82   12/28/18 113/76                Passed - Patient is age 6 or older       Passed - Recent (12 mo) or future (30 days) visit within the authorizing provider's specialty    Patient had office visit in the last 12 months or has a visit in the next 30 days with authorizing provider or within the authorizing provider's specialty.  See \"Patient Info\" tab in inbasket, or \"Choose Columns\" in Meds & Orders section of the refill encounter.             Passed - Medication is active on med list        hydrochlorothiazide (HYDRODIURIL) 12.5 MG tablet [Pharmacy Med Name: HYDROCHLOROTHIAZIDE 12.5 MG TB] 90 tablet 2    Last Written Prescription Date:  1/8/18  Last Fill Quantity: 90 tab,  # refills: 3   Last office visit: previous visit found with prescribing provider:  1/17/19 [place]   Future Office Visit:     Sig: TAKE 1 TABLET BY MOUTH DAILY    Diuretics (Including Combos) Protocol Passed - 1/21/2019 12:58 AM       Passed - Blood pressure under 140/90 in past 12 months    BP Readings from Last 3 Encounters:   01/17/19 104/68   01/07/19 120/82   12/28/18 113/76                Passed - Recent (12 mo) or future (30 days) visit within the authorizing provider's specialty    Patient had office visit in the last 12 months or has a visit in the next 30 days with authorizing provider or within the authorizing provider's specialty.  See \"Patient Info\" tab in inbasket, or \"Choose Columns\" in Meds & Orders section of the refill encounter.             Passed - Medication is active on med list       Passed - Patient is age 18 or older       Passed - No active " pregancy on record       Passed - Normal serum creatinine on file in past 12 months    Recent Labs   Lab Test 03/18/18  0648   CR 0.68             Passed - Normal serum potassium on file in past 12 months    Recent Labs   Lab Test 03/18/18  0648   POTASSIUM 4.0                   Passed - Normal serum sodium on file in past 12 months    Recent Labs   Lab Test 03/18/18  0648                Passed - No positive pregnancy test in past 12 months

## 2019-01-29 NOTE — PROGRESS NOTES
SUBJECTIVE:   Jacquie Harris is a 63 year old female who presents to clinic today for the following health issues:      Joint Pain    Onset: 8:00am today     Description: Patient was putting away some laundry today and an air vent fell on her hand causing pain, bruising, redness  and swelling   Location: l hand   Character: throbbing     Intensity: moderate    Progression of Symptoms: same    Accompanying Signs & Symptoms:  Other symptoms: numbness  In thumb and swelling    History:   Previous similar pain: no       Precipitating factors:   Trauma or overuse: YES- trauma     Alleviating factors:  Improved by: ice and support wrap    Therapies Tried and outcome: same           Problem list and histories reviewed & adjusted, as indicated.  Additional history: as documented    Patient Active Problem List   Diagnosis     s/p gastric bypass x 2     Allergic rhinitis     Carpal tunnel syndrome     Obstructive sleep apnea     Rosacea     CARDIOVASCULAR SCREENING; LDL GOAL LESS THAN 160     Tick bite, infected, positive Lyme test 1996 not treated     Vitamin D deficiency     Osteoporosis     Obesity, Class III, BMI 40-49.9 (morbid obesity) (H)     S/P TKR (total knee replacement)     HTN, goal below 140/80     Back pain, left below scapula, strained muscles     Encounter for routine gynecological examination      Colles' fracture     Bilateral cold feet     Elevated levels of transaminase & lactic acid dehydrogenase     Fatty liver     Shoulder injury, right, subsequent encounter     GI bleed     Upper GI bleed     Bronchospasm     Past Surgical History:   Procedure Laterality Date     ARTHROPLASTY KNEE  4/2/2012    Procedure:ARTHROPLASTY KNEE; Left Total Knee Arthroplasty--Anesth.Choice; Surgeon:VICENTE MORALES; Location:[facility]     ARTHROTOMY SHOULDER, ROTATOR CUFF REPAIR, COMBINED Right 8/25/2017    Procedure: COMBINED ARTHROTOMY SHOULDER, ROTATOR CUFF REPAIR;  Right shoulder distal clavicle excision, subacromial  decompression, rotator cuff repair ;  Surgeon: Hernando Thompson MD;  Location: [facility]     ARTHROTOMY SHOULDER, ROTATOR CUFF REPAIR, COMBINED Right 12/18/2017    Procedure: COMBINED ARTHROTOMY SHOULDER, ROTATOR CUFF REPAIR;  Right Shoulder Rotator Cuff Revision;  Surgeon: Hernando Thompson MD;  Location: [facility]     ARTHROTOMY SHOULDER, ROTATOR CUFF REPAIR, COMBINED Left 4/27/2018    Procedure: COMBINED ARTHROTOMY SHOULDER, ROTATOR CUFF REPAIR;  Left Shoulder Open Subacromial Decompression,Distal Clavicle Excision,Rotator Cuff Repair;  Surgeon: Hernando Thompson MD;  Location: [facility]     C ORAL SURGERY PROCEDURE  age 18    wisdom teeth     C/SECTION, LOW TRANSVERSE  1978, 1984, 1994    x 3     COLONOSCOPY  2001    normal colonoscopy     COLONOSCOPY N/A 8/22/2017    Procedure: COLONOSCOPY;  Colonoscopy  ;  Surgeon: Delfino Yoo MD;  [facility]     ESOPHAGOSCOPY, GASTROSCOPY, DUODENOSCOPY (EGD), COMBINED N/A 3/18/2018    Procedure: COMBINED ESOPHAGOSCOPY, GASTROSCOPY, DUODENOSCOPY (EGD);;  Surgeon: Poncho Galindo MD;  Location: [facility]     GASTRIC BYPASS  1980/2005    Gastric Bypass and revision     HERNIA REPAIR, INCISIONAL  6/16/2008    lap.repair with 10x8 mesh     TUBAL LIGATION  1994       Social History     Tobacco Use     Smoking status: Never Smoker     Smokeless tobacco: Never Used   Substance Use Topics     Alcohol use: Yes     Alcohol/week: 0.0 oz     Comment: mixed very light 1 a month if that     Family History   Problem Relation Age of Onset     C.A.D. Father         MI at age 60     Hypertension Father      Alzheimer Disease Father      Hyperlipidemia Father      Osteoporosis Mother         on Fosamax     Glaucoma Mother      Other Cancer Brother      Stomach Cancer Maternal Aunt      Cervical Cancer Cousin      Diabetes No family hx of      Cerebrovascular Disease No family hx of      Breast Cancer No family hx of      Cancer - colorectal No family hx of      Prostate Cancer No family  "hx of      Liver Disease No family hx of          Current Outpatient Medications   Medication Sig Dispense Refill     acetaminophen (TYLENOL) 325 MG tablet Take 2 tablets (650 mg) by mouth every 4 hours as needed for mild pain or fever 100 tablet      albuterol (PROAIR HFA/PROVENTIL HFA/VENTOLIN HFA) 108 (90 Base) MCG/ACT inhaler Inhale 2 puffs into the lungs every 4 hours as needed 1 Inhaler 3     atenolol (TENORMIN) 50 MG tablet TAKE 1 TABLET BY MOUTH DAILY 90 tablet 3     clobetasol (TEMOVATE) 0.05 % ointment Apply sparingly to affected area twice daily for 14 days.  Do not apply to face. 15 g 0     Emollient (CERAVE) CREA Externally apply topically 2 times daily as needed 1 Bottle 3     hydrochlorothiazide (HYDRODIURIL) 12.5 MG tablet TAKE 1 TABLET BY MOUTH DAILY 90 tablet 0     nystatin (MYCOSTATIN) 266280 UNIT/GM POWD Apply topically 3 times daily as needed Refill at patient's request 30 g 1     nystatin (MYCOSTATIN) cream Apply topically 2 times daily Reported on 3/6/2017 30 g 11     Allergies   Allergen Reactions     Vioxx Itching and Swelling     VIOXX (Swollen Feet,Hands itching), Okay with ibuprofen and aspirin     BP Readings from Last 3 Encounters:   01/17/19 104/68   01/07/19 120/82   12/28/18 113/76    Wt Readings from Last 3 Encounters:   01/17/19 114.3 kg (252 lb)   01/07/19 114.8 kg (253 lb)   12/28/18 115.7 kg (255 lb)                  Labs reviewed in EPIC    Reviewed and updated as needed this visit by clinical staff  Tobacco  Allergies  Med Hx  Surg Hx  Fam Hx  Soc Hx      Reviewed and updated as needed this visit by Provider         ROS:  Constitutional, HEENT, cardiovascular, pulmonary, gi and gu systems are negative, except as otherwise noted.    OBJECTIVE:     /68   Pulse 66   Temp 97.5  F (36.4  C) (Tympanic)   Resp 14   Ht 1.6 m (5' 3\")   Wt 114.3 kg (252 lb)   LMP 05/29/2006   SpO2 99%   BMI 44.64 kg/m    Body mass index is 44.64 kg/m .  GENERAL: healthy, alert and no " distress  MS: normal range of motion.mild swelling on the distal left forearm. Tender to touch    Diagnostic Test Results:  none     ASSESSMENT/PLAN:         1. Left wrist pain  Patient reassured, asked to use a wrist brace and apply ice.      FUTURE APPOINTMENTS:       - Follow-up visit as needed.  Patient Instructions           Thank you for choosing [facility].  You may be receiving a survey in the mail from [employer] regarding your visit today.  Please take a few minutes to complete and return the survey to let us know how we are doing.      If you have questions or concerns, please contact us via [facility] or you can contact your care team at [phone].    Our Clinic hours are:  Monday 6:40 am  to 7:00 pm  Tuesday -Friday 6:40 am to 5:00 pm    The [place] outpatient lab hours are:  Monday - Friday 6:10 am to 4:45 pm  Saturdays 7:00 am to 11:00 am  Appointments are required, call [phone]    If you have clinical questions after hours or would like to schedule an appointment,  call the clinic at [phone].  Patient Education     Wrist Sprain  A sprain is an injury to the ligaments or capsule that holds a joint together. There are no broken bones. Most sprains take about 3 to 6 weeks to heal. If it a severe sprain where the ligament is completely torn, it can take months to recover.  Most wrist sprains are treated with a splint, wrist brace, or elastic wrap for support. Severe sprains may require surgery.  Home care    Keep your arm elevated to reduce pain and swelling. This is very important during the first 48 hours.    Apply an ice pack over the injured area for 15 to 20 minutes every 3 to 6 hours. You should do this for the first 24 to 48 hours. You can make an ice pack by filling a plastic bag that seals at the top with ice cubes and then wrapping it with a thin towel. Continue to use ice packs for relief of pain and swelling as needed. As the ice melts, be careful to avoid getting your wrap,  splint, or cast wet. After 48 hours, apply heat (warm shower or warm bath) for 15 to 20 minutes several times a day, or alternate ice and heat.     You may use over-the-counter pain medicine to control pain, unless another pain medicine was prescribed. If you have chronic liver or kidney disease or ever had a stomach ulcer or gastrointestinal bleeding, talk with your doctor before using these medicines.    If you were given a splint or brace, wear it for the time advised by your doctor.  Follow-up care  Follow up with your healthcare provider, or as advised. Any X-rays you had today don t show any broken bones, breaks, or fractures. Sometimes fractures don t show up on the first X-ray. Bruises and sprains can sometimes hurt as much as a fracture. These injuries can take time to heal completely. If your symptoms don t improve or they get worse, talk with your doctor. You may need a repeat X-ray. If X-rays were taken, you will be told of any new findings that may affect your care.  When to seek medical advice  Call your healthcare provider right away if any of these occur:    Pain or swelling increases    Fingers or hand becomes cold, blue, numb, or tingly   Date Last Reviewed: 5/1/2018 2000-2018 [employer]. [address] [place] [address], [place] [address]. All rights reserved. This information is not intended as a substitute for professional medical care. Always follow your healthcare professional's instructions.               Jennie Suazo MD  [facility]

## 2019-02-01 ENCOUNTER — TELEPHONE (OUTPATIENT)
Dept: FAMILY MEDICINE | Facility: CLINIC | Age: 64
End: 2019-02-01

## 2019-02-01 DIAGNOSIS — I10 BENIGN ESSENTIAL HYPERTENSION: ICD-10-CM

## 2019-02-01 RX ORDER — ATENOLOL 50 MG/1
50 TABLET ORAL DAILY
Qty: 90 TABLET | Refills: 1 | Status: SHIPPED | OUTPATIENT
Start: 2019-02-01 | End: 2019-08-08

## 2019-02-01 RX ORDER — HYDROCHLOROTHIAZIDE 12.5 MG/1
12.5 TABLET ORAL DAILY
Qty: 90 TABLET | Refills: 1 | Status: SHIPPED | OUTPATIENT
Start: 2019-02-01 | End: 2019-08-08

## 2019-02-01 NOTE — TELEPHONE ENCOUNTER
Reason for Call:  Medication or medication refill:  Atenolol,   Hydrochlorothiazide    Do you use a [facility]?  Name of the pharmacy and phone number for the current request:  [facility] [phone]    Name of the medication requested:   Atenolol  Last Written Prescription Date:  1/21/2019  Last Fill Quantity: 90,  # refills: 3   Last office visit: 1/17/2019 with prescribing provider:  Gabriele Goodrich Office Visit:      Hydrochlorothiazide  Last Written Prescription Date:  1/21/19  Last Fill Quantity: 90,  # refills: 0   Last office visit: 1/17/2019 with prescribing provider:  Gabriele Goodrich Office Visit:        Other request: Has been trying to get all her medications sent to  in [place]. Pt. Is very upset.    Can we leave a detailed message on this number? YES    Phone number patient can be reached at: Home number on file [phone] (home)    Best Time: any    Call taken on 2/1/2019 at 9:31 AM by Coreen Cortes

## 2019-02-01 NOTE — TELEPHONE ENCOUNTER
"Requested Prescriptions   Pending Prescriptions Disp Refills     atenolol (TENORMIN) 50 MG tablet [Pharmacy Med Name: ATENOLOL 50 MG TABLET] 90 tablet 2       Sig: TAKE 1 TABLET BY MOUTH DAILY     Beta-Blockers Protocol Passed - 1/21/2019 12:58 AM         Passed - Blood pressure under 140/90 in past 12 months         BP Readings from Last 3 Encounters:   01/17/19 104/68   01/07/19 120/82   12/28/18 113/76                      Passed - Patient is age 6 or older         Passed - Recent (12 mo) or future (30 days) visit within the authorizing provider's specialty     Patient had office visit in the last 12 months or has a visit in the next 30 days with authorizing provider or within the authorizing provider's specialty.  See \"Patient Info\" tab in inbasket, or \"Choose Columns\" in Meds & Orders section of the refill encounter.                 Passed - Medication is active on med list         hydrochlorothiazide (HYDRODIURIL) 12.5 MG tablet [Pharmacy Med Name: HYDROCHLOROTHIAZIDE 12.5 MG TB] 90 tablet 2     Last Written Prescription Date:  1/8/18  Last Fill Quantity: 90 tab,  # refills: 3   Last office visit: previous visit found with prescribing provider:  1/17/19 [place]   Future Office Visit:      Sig: TAKE 1 TABLET BY MOUTH DAILY     Diuretics (Including Combos) Protocol Passed - 1/21/2019 12:58 AM         Passed - Blood pressure under 140/90 in past 12 months         BP Readings from Last 3 Encounters:   01/17/19 104/68   01/07/19 120/82   12/28/18 113/76                      Passed - Recent (12 mo) or future (30 days) visit within the authorizing provider's specialty     Patient had office visit in the last 12 months or has a visit in the next 30 days with authorizing provider or within the authorizing provider's specialty.  See \"Patient Info\" tab in inbasket, or \"Choose Columns\" in Meds & Orders section of the refill encounter.                 Passed - Medication is active on med list         Passed - Patient is " age 18 or older         Passed - No active pregancy on record         Passed - Normal serum creatinine on file in past 12 months         Recent Labs   Lab Test 03/18/18  0648   CR 0.68                Passed - Normal serum potassium on file in past 12 months         Recent Labs   Lab Test 03/18/18  0648   POTASSIUM 4.0                         Passed - Normal serum sodium on file in past 12 months         Recent Labs   Lab Test 03/18/18  0648                   Passed - No positive pregnancy test in past 12 months         Prescription approved per [facility] Refill Protocol per correction order.    Patient informed.

## 2019-03-04 ENCOUNTER — HOSPITAL ENCOUNTER (OUTPATIENT)
Dept: ULTRASOUND IMAGING | Facility: CLINIC | Age: 64
Discharge: HOME | End: 2019-03-04
Attending: FAMILY MEDICINE | Admitting: FAMILY MEDICINE
Payer: COMMERCIAL

## 2019-03-04 ENCOUNTER — OFFICE VISIT (OUTPATIENT)
Dept: FAMILY MEDICINE | Facility: CLINIC | Age: 64
End: 2019-03-04
Payer: COMMERCIAL

## 2019-03-04 VITALS
DIASTOLIC BLOOD PRESSURE: 62 MMHG | SYSTOLIC BLOOD PRESSURE: 100 MMHG | BODY MASS INDEX: 44.65 KG/M2 | TEMPERATURE: 98.2 F | HEART RATE: 72 BPM | WEIGHT: 252 LBS | HEIGHT: 63 IN | OXYGEN SATURATION: 96 % | RESPIRATION RATE: 14 BRPM

## 2019-03-04 DIAGNOSIS — M79.661 TENDERNESS OF RIGHT CALF: Primary | ICD-10-CM

## 2019-03-04 DIAGNOSIS — M79.661 TENDERNESS OF RIGHT CALF: ICD-10-CM

## 2019-03-04 PROCEDURE — 93971 EXTREMITY STUDY: CPT | Mod: RT

## 2019-03-04 PROCEDURE — 99213 OFFICE O/P EST LOW 20 MIN: CPT | Performed by: FAMILY MEDICINE

## 2019-03-04 ASSESSMENT — MIFFLIN-ST. JEOR: SCORE: 1662.19

## 2019-03-04 NOTE — PATIENT INSTRUCTIONS
Thank you for choosing [facility].  You may be receiving a survey in the mail from [employer] regarding your visit today.  Please take a few minutes to complete and return the survey to let us know how we are doing.      If you have questions or concerns, please contact us via [employer] or you can contact your care team at [phone].    Our Clinic hours are:  Monday 6:40 am  to 7:00 pm  Tuesday -Friday 6:40 am to 5:00 pm    The [place] outpatient lab hours are:  Monday - Friday 6:10 am to 4:45 pm  Saturdays 7:00 am to 11:00 am  Appointments are required, call [phone]    If you have clinical questions after hours or would like to schedule an appointment,  call the clinic at [phone].

## 2019-03-04 NOTE — PROGRESS NOTES
SUBJECTIVE:   Jacquie Harris is a 63 year old female who presents to clinic today for the following health issues:      Joint Pain/both leg pain     Onset: ongoing increased 3 mos ago     Description:   Location: both legs   Character: Dull ache, Stabbing and Burning    Intensity: moderate    Progression of Symptoms: worse    Accompanying Signs & Symptoms:  Other symptoms: swelling side ogf R knee     History:   Previous similar pain: YES      Precipitating factors:   Trauma or overuse: no     Alleviating factors:  Improved by: heat and pillow some relief     Therapies Tried and outcome: same       Patient is a 63 yr old female here for right lower extremity pain. Pain has been ongoing for a couple of months. Right leg is worse than left. She has osteoarthritis in right knee. She reports +ve  calf tenderness on right calf. No shortness or breath.    Problem list and histories reviewed & adjusted, as indicated.  Additional history: as documented    Patient Active Problem List   Diagnosis     s/p gastric bypass x 2     Allergic rhinitis     Carpal tunnel syndrome     Obstructive sleep apnea     Rosacea     CARDIOVASCULAR SCREENING; LDL GOAL LESS THAN 160     Tick bite, infected, positive Lyme test 1996 not treated     Vitamin D deficiency     Osteoporosis     Obesity, Class III, BMI 40-49.9 (morbid obesity) (H)     S/P TKR (total knee replacement)     HTN, goal below 140/80     Back pain, left below scapula, strained muscles     Encounter for routine gynecological examination      Colles' fracture     Bilateral cold feet     Elevated levels of transaminase & lactic acid dehydrogenase     Fatty liver     Shoulder injury, right, subsequent encounter     GI bleed     Upper GI bleed     Bronchospasm     Past Surgical History:   Procedure Laterality Date     ARTHROPLASTY KNEE  4/2/2012    Procedure:ARTHROPLASTY KNEE; Left Total Knee Arthroplasty--Anesth.Choice; Surgeon:VICENTE MORALES; Location:[facility]     ARTHROTOMY  SHOULDER, ROTATOR CUFF REPAIR, COMBINED Right 8/25/2017    Procedure: COMBINED ARTHROTOMY SHOULDER, ROTATOR CUFF REPAIR;  Right shoulder distal clavicle excision, subacromial decompression, rotator cuff repair ;  Surgeon: Hernando Thompson MD;  Location: [facility]     ARTHROTOMY SHOULDER, ROTATOR CUFF REPAIR, COMBINED Right 12/18/2017    Procedure: COMBINED ARTHROTOMY SHOULDER, ROTATOR CUFF REPAIR;  Right Shoulder Rotator Cuff Revision;  Surgeon: Hernando Thompson MD;  Location: [facility]     ARTHROTOMY SHOULDER, ROTATOR CUFF REPAIR, COMBINED Left 4/27/2018    Procedure: COMBINED ARTHROTOMY SHOULDER, ROTATOR CUFF REPAIR;  Left Shoulder Open Subacromial Decompression,Distal Clavicle Excision,Rotator Cuff Repair;  Surgeon: Hernando Thompson MD;  Location: [facility]     C ORAL SURGERY PROCEDURE  age 18    wisdom teeth     C/SECTION, LOW TRANSVERSE  1978, 1984, 1994    x 3     COLONOSCOPY  2001    normal colonoscopy     COLONOSCOPY N/A 8/22/2017    Procedure: COLONOSCOPY;  Colonoscopy  ;  Surgeon: Delfino Yoo MD;  [facility]     ESOPHAGOSCOPY, GASTROSCOPY, DUODENOSCOPY (EGD), COMBINED N/A 3/18/2018    Procedure: COMBINED ESOPHAGOSCOPY, GASTROSCOPY, DUODENOSCOPY (EGD);;  Surgeon: Poncho Galindo MD;  Location: [facility]     GASTRIC BYPASS  1980/2005    Gastric Bypass and revision     HERNIA REPAIR, INCISIONAL  6/16/2008    lap.repair with 10x8 mesh     TUBAL LIGATION  1994       Social History     Tobacco Use     Smoking status: Never Smoker     Smokeless tobacco: Never Used   Substance Use Topics     Alcohol use: Yes     Alcohol/week: 0.0 oz     Comment: mixed very light 1 a month if that     Family History   Problem Relation Age of Onset     C.A.D. Father         MI at age 60     Hypertension Father      Alzheimer Disease Father      Hyperlipidemia Father      Osteoporosis Mother         on Fosamax     Glaucoma Mother      Other Cancer Brother      Stomach Cancer Maternal Aunt      Cervical Cancer Cousin       Diabetes No family hx of      Cerebrovascular Disease No family hx of      Breast Cancer No family hx of      Cancer - colorectal No family hx of      Prostate Cancer No family hx of      Liver Disease No family hx of          Current Outpatient Medications   Medication Sig Dispense Refill     atenolol (TENORMIN) 50 MG tablet Take 1 tablet (50 mg) by mouth daily 90 tablet 1     hydrochlorothiazide (HYDRODIURIL) 12.5 MG tablet Take 1 tablet (12.5 mg) by mouth daily 90 tablet 1     acetaminophen (TYLENOL) 325 MG tablet Take 2 tablets (650 mg) by mouth every 4 hours as needed for mild pain or fever 100 tablet      albuterol (PROAIR HFA/PROVENTIL HFA/VENTOLIN HFA) 108 (90 Base) MCG/ACT inhaler Inhale 2 puffs into the lungs every 4 hours as needed 1 Inhaler 3     clobetasol (TEMOVATE) 0.05 % ointment Apply sparingly to affected area twice daily for 14 days.  Do not apply to face. 15 g 0     Emollient (CERAVE) CREA Externally apply topically 2 times daily as needed 1 Bottle 3     nystatin (MYCOSTATIN) 334725 UNIT/GM POWD Apply topically 3 times daily as needed Refill at patient's request 30 g 1     nystatin (MYCOSTATIN) cream Apply topically 2 times daily Reported on 3/6/2017 30 g 11     Allergies   Allergen Reactions     Vioxx Itching and Swelling     VIOXX (Swollen Feet,Hands itching), Okay with ibuprofen and aspirin     BP Readings from Last 3 Encounters:   03/04/19 100/62   01/17/19 104/68   01/07/19 120/82    Wt Readings from Last 3 Encounters:   03/04/19 114.3 kg (252 lb)   01/17/19 114.3 kg (252 lb)   01/07/19 114.8 kg (253 lb)                  Labs reviewed in EPIC    Reviewed and updated as needed this visit by clinical staff  Tobacco  Allergies  Meds  Med Hx  Surg Hx  Fam Hx  Soc Hx      Reviewed and updated as needed this visit by Provider         ROS:  Constitutional, HEENT, cardiovascular, pulmonary, gi and gu systems are negative, except as otherwise noted.    OBJECTIVE:     /62   Pulse 72    "Temp 98.2  F (36.8  C)   Resp 14   Ht 1.6 m (5' 3\")   Wt 114.3 kg (252 lb)   LMP 05/29/2006   SpO2 96%   BMI 44.64 kg/m    Body mass index is 44.64 kg/m .  GENERAL: healthy, alert and no distress  MS: tenderness to palpation on right calf , no swelling seen.no redness.    Diagnostic Test Results:  Ultrasound  FINDINGS: No DVT is demonstrated.    ASSESSMENT/PLAN:   1. Tenderness of right calf  Ultrasound is negative . Relayed results to patient.Bushra that her knee osteoarthritis is worsening.   - US Lower Extremity Venous Duplex Right; Future    FUTURE APPOINTMENTS:       - Follow-up visit in one month or sooner as needed    Jennie Suazo MD  [facility]  "

## 2019-04-02 ENCOUNTER — HOSPITAL ENCOUNTER (OUTPATIENT)
Dept: PHYSICAL THERAPY | Facility: CLINIC | Age: 64
Setting detail: THERAPIES SERIES
End: 2019-04-02
Attending: PHYSICIAN ASSISTANT
Payer: COMMERCIAL

## 2019-04-02 PROCEDURE — 97161 PT EVAL LOW COMPLEX 20 MIN: CPT | Mod: GP | Performed by: PHYSICAL THERAPIST

## 2019-04-02 PROCEDURE — 97110 THERAPEUTIC EXERCISES: CPT | Mod: GP | Performed by: PHYSICAL THERAPIST

## 2019-04-02 NOTE — PROGRESS NOTES
[facility]          OUTPATIENT PHYSICAL THERAPY ORTHOPEDIC EVALUATION  PLAN OF TREATMENT FOR OUTPATIENT REHABILITATION  (COMPLETE FOR INITIAL CLAIMS ONLY)  Patient's Last Name, First Name, M.I.  YOB: 1955  Jacquie Harris       Provider s Name:  [facility]   Medical Record No.  [id number]   Start of Care Date:  04/02/19   Onset Date:  03/26/19   Type:     _X__PT   ___OT   ___SLP Medical Diagnosis:  L knee : pre TKA     PT Diagnosis:  L knee pain,  pre- TKA    Visits from SOC:  1      _________________________________________________________________________________  Plan of Treatment/Functional Goals:  ROM, strengthening      Goals  Goal Description: 1.  Pt will be independent and consistent w/ HEP  Target Date: 04/16/19           Therapy Frequency:  other (see comments)(1 time visit)  Predicted Duration of Therapy Intervention:       Dasia Wilson, PT                 I CERTIFY THE NEED FOR THESE SERVICES FURNISHED UNDER        THIS PLAN OF TREATMENT AND WHILE UNDER MY CARE .             Physician Signature               Date    X_____________________________________________________                             Certification Date From:  04/02/19   Certification Date To:  04/16/19    Referring Provider:  Flavia Lanier    Initial Assessment        See Epic Evaluation Start of Care Date: 04/02/19

## 2019-04-02 NOTE — PROGRESS NOTES
04/02/19 0800   General Information   Type of Visit Initial OP Ortho PT Evaluation/ Discharge summary   Start of Care Date 04/02/19   Referring Physician Flavia Lanier   Patient/Family Goals Statement To learn what to do before surgery.   Orders Evaluate and Treat   Orders Comment 1 x visit pre TKA   Date of Order 03/26/19   Insurance Type [employer]   Medical Diagnosis L knee  pre-TKA   Body Part(s)   Body Part(s) Knee   Presentation and Etiology   Pertinent history of current problem (include personal factors and/or comorbidities that impact the POC) Pt had L TKA April 2012.  Pt notes the knee components are loose and she will be having 2nd TKA on 5/29/19.  Pt notes her R knee has significant arthritis.   R knee pain  7-8/10,  L knee notes numbness and pain 6/10.    No recent injections.   Meds:   none.   PMHX:  L TKA,  anemia, HBP,  bladder control issues.   B RCR.   LBP.     Impairments A. Pain;B. Decreased WB tolerance;C. Swelling;D. Decreased ROM;E. Decreased flexibility;F. Decreased strength and endurance;G. Impaired balance;H. Impaired gait;K. Numbness;L. Tingling;M. Locking or catching   Onset date of current episode/exacerbation 03/26/19   Pain quality A. Sharp;B. Dull;C. Aching;F. Stabbing;G. Cramping   Pain exacerbation comment marked time on stairs  (3 steps into house and uses UE support).  Limps w/ walking---notes she can walk thru grocery store w/ cart.   After sitting has to stand for a moment before walking.   Not getting down on her knees.  Walking w/o device 50 yards at most.   Pain/symptoms eased by A. Sitting;C. Rest;D. Nothing;E. Changing positions;F. Certain positions;G. Heat   Progression of symptoms since onset: Worsened   Prior Level of Function   Functional Level Prior Comment Overall less active due to knees--pt has stationary bike at home---not using currently   Current Level of Function   Patient role/employment history C. Homemaker;F. Retired   Fall Risk Screen   Fall screen  completed by PT   Have you fallen 2 or more times in the past year? No   Have you fallen and had an injury in the past year? No   Is patient a fall risk? No   Knee Objective Findings   Side (if bilateral, select both right and left) Left   Observation Transitions sit to stand w/ UE support.  Pt assists her legs onto the bed.    Gait/Locomotion walking w/o device mild to mild + limp   Knee/Hip Strength Comments Hip flex R 4/5,  L 4-/5   Palpation Mod to mod + tenderness L medial patella and lateral joint line.  severe tenderness L medial joint line.    Left Knee Extension AROM WNL,  very slight hyperextension   Left Knee Flexion AROM R 123*,  L 120*   Left Knee Flexion Strength R 4+/5,  L 5-/5   Left Knee Extension Strength B 5-/5   Left Hip Abduction Strength R 3+/5,  L 4/5   Left Hamstring Flexibility very mild tightness   Planned Therapy Interventions   Planned Therapy Interventions ROM;strengthening   Clinical Impression   Criteria for Skilled Therapeutic Interventions Met yes, treatment indicated   PT Diagnosis L knee pain,  pre- TKA    Influenced by the following impairments pain, swelling, weakness   Functional limitations due to impairments walking, stairs, sit <> stand   Clinical Presentation Stable/Uncomplicated   Clinical Presentation Rationale as expected pre TKA   Clinical Decision Making (Complexity) Low complexity   Therapy Frequency other (see comments)  (1 time visit)   Risk & Benefits of therapy have been explained Yes   Patient, Family & other staff in agreement with plan of care Yes   Education Assessment   Barriers to Learning No barriers   Ortho Goal 1   Goal Description 1.  Pt will be independent and consistent w/ HEP   Target Date 04/16/19   Therapy Certification   Certification date from 04/02/19   Certification date to 04/16/19   Medical Diagnosis L knee : pre TKA     Thank you for this referral,    Dasia Wilson, PT,  CEAS   #[id number]  [facility].  [phone]

## 2019-04-23 ENCOUNTER — OFFICE VISIT (OUTPATIENT)
Dept: FAMILY MEDICINE | Facility: CLINIC | Age: 64
End: 2019-04-23
Payer: COMMERCIAL

## 2019-04-23 VITALS
TEMPERATURE: 100.4 F | BODY MASS INDEX: 46.67 KG/M2 | DIASTOLIC BLOOD PRESSURE: 89 MMHG | WEIGHT: 263.4 LBS | OXYGEN SATURATION: 96 % | HEART RATE: 78 BPM | SYSTOLIC BLOOD PRESSURE: 129 MMHG | HEIGHT: 63 IN | RESPIRATION RATE: 16 BRPM

## 2019-04-23 DIAGNOSIS — J02.9 SORE THROAT: Primary | ICD-10-CM

## 2019-04-23 LAB
DEPRECATED S PYO AG THROAT QL EIA: NORMAL
FLUAV+FLUBV AG SPEC QL: NEGATIVE
FLUAV+FLUBV AG SPEC QL: NEGATIVE
SPECIMEN SOURCE: NORMAL
SPECIMEN SOURCE: NORMAL

## 2019-04-23 PROCEDURE — 87081 CULTURE SCREEN ONLY: CPT | Performed by: NURSE PRACTITIONER

## 2019-04-23 PROCEDURE — 87880 STREP A ASSAY W/OPTIC: CPT | Performed by: NURSE PRACTITIONER

## 2019-04-23 PROCEDURE — 99213 OFFICE O/P EST LOW 20 MIN: CPT | Performed by: NURSE PRACTITIONER

## 2019-04-23 PROCEDURE — 87804 INFLUENZA ASSAY W/OPTIC: CPT | Performed by: NURSE PRACTITIONER

## 2019-04-23 ASSESSMENT — MIFFLIN-ST. JEOR: SCORE: 1708.9

## 2019-04-23 NOTE — PROGRESS NOTES
SUBJECTIVE:   Jacquie Harris is a 64 year old female who presents to clinic today for the following   health issues:  Chief Complaint   Patient presents with     Pharyngitis     Pt here for st.       ENT Symptoms             Symptoms: cc Present Absent Comment   Fever/Chills  x     Fatigue  x     Muscle Aches   x    Eye Irritation   x    Sneezing  x     Nasal Sudeep/Drg  x  little   Sinus Pressure/Pain   x    Loss of smell   x    Dental pain   x    Sore Throat x      Swollen Glands x      Ear Pain/Fullness   x    Cough   x    Wheeze   x    Chest Pain   x    Shortness of breath   x    Rash   x    Other         Symptom duration:  has woke up with st the past couple mornings, usually goes away, not as bad now but still has a little one   Symptom severity:  moderate this morning, mild right now   Treatments tried:  none   Contacts:  none, grandkids over the weekend       Additional history: as documented    Reviewed  and updated as needed this visit by clinical staff  Tobacco  Allergies  Meds  Problems  Med Hx  Surg Hx  Fam Hx  Soc Hx          Reviewed and updated as needed this visit by Provider  Tobacco  Allergies  Meds  Problems  Med Hx  Surg Hx  Fam Hx         Patient Active Problem List   Diagnosis     s/p gastric bypass x 2     Allergic rhinitis     Carpal tunnel syndrome     Obstructive sleep apnea     Rosacea     CARDIOVASCULAR SCREENING; LDL GOAL LESS THAN 160     Tick bite, infected, positive Lyme test 1996 not treated     Vitamin D deficiency     Osteoporosis     Obesity, Class III, BMI 40-49.9 (morbid obesity) (H)     S/P TKR (total knee replacement)     HTN, goal below 140/80     Back pain, left below scapula, strained muscles     Encounter for routine gynecological examination      Colles' fracture     Bilateral cold feet     Elevated levels of transaminase & lactic acid dehydrogenase     Fatty liver     Shoulder injury, right, subsequent encounter     GI bleed     Upper GI bleed      Bronchospasm     Past Surgical History:   Procedure Laterality Date     ARTHROPLASTY KNEE  4/2/2012    Procedure:ARTHROPLASTY KNEE; Left Total Knee Arthroplasty--Anesth.Choice; Surgeon:HERNANDO THOMPSON; Location:[facility]     ARTHROTOMY SHOULDER, ROTATOR CUFF REPAIR, COMBINED Right 8/25/2017    Procedure: COMBINED ARTHROTOMY SHOULDER, ROTATOR CUFF REPAIR;  Right shoulder distal clavicle excision, subacromial decompression, rotator cuff repair ;  Surgeon: Hernando Thompson MD;  Location: [facility]     ARTHROTOMY SHOULDER, ROTATOR CUFF REPAIR, COMBINED Right 12/18/2017    Procedure: COMBINED ARTHROTOMY SHOULDER, ROTATOR CUFF REPAIR;  Right Shoulder Rotator Cuff Revision;  Surgeon: Hernando Thompson MD;  Location: [facility]     ARTHROTOMY SHOULDER, ROTATOR CUFF REPAIR, COMBINED Left 4/27/2018    Procedure: COMBINED ARTHROTOMY SHOULDER, ROTATOR CUFF REPAIR;  Left Shoulder Open Subacromial Decompression,Distal Clavicle Excision,Rotator Cuff Repair;  Surgeon: Hernando Thompson MD;  Location: [facility]     C ORAL SURGERY PROCEDURE  age 18    wisdom teeth     C/SECTION, LOW TRANSVERSE  1978, 1984, 1994    x 3     COLONOSCOPY  2001    normal colonoscopy     COLONOSCOPY N/A 8/22/2017    Procedure: COLONOSCOPY;  Colonoscopy  ;  Surgeon: Delfino Yoo MD;  Location: [facility]     ESOPHAGOSCOPY, GASTROSCOPY, DUODENOSCOPY (EGD), COMBINED N/A 3/18/2018    Procedure: COMBINED ESOPHAGOSCOPY, GASTROSCOPY, DUODENOSCOPY (EGD);;  Surgeon: Poncho Galindo MD;  Location: [facility]     GASTRIC BYPASS  1980/2005    Gastric Bypass and revision     HERNIA REPAIR, INCISIONAL  6/16/2008    lap.repair with 10x8 mesh     TUBAL LIGATION  1994       Social History     Tobacco Use     Smoking status: Never Smoker     Smokeless tobacco: Never Used   Substance Use Topics     Alcohol use: Yes     Alcohol/week: 0.0 oz     Comment: mixed very light 1 a month if that     Family History   Problem Relation Age of Onset     C.A.D. Father         MI at age 60      Hypertension Father      Alzheimer Disease Father      Hyperlipidemia Father      Osteoporosis Mother         on Fosamax     Glaucoma Mother      Other Cancer Brother      Stomach Cancer Maternal Aunt      Cervical Cancer Cousin      Diabetes No family hx of      Cerebrovascular Disease No family hx of      Breast Cancer No family hx of      Cancer - colorectal No family hx of      Prostate Cancer No family hx of      Liver Disease No family hx of          Current Outpatient Medications   Medication Sig Dispense Refill     atenolol (TENORMIN) 50 MG tablet Take 1 tablet (50 mg) by mouth daily 90 tablet 1     hydrochlorothiazide (HYDRODIURIL) 12.5 MG tablet Take 1 tablet (12.5 mg) by mouth daily 90 tablet 1     acetaminophen (TYLENOL) 325 MG tablet Take 2 tablets (650 mg) by mouth every 4 hours as needed for mild pain or fever 100 tablet      albuterol (PROAIR HFA/PROVENTIL HFA/VENTOLIN HFA) 108 (90 Base) MCG/ACT inhaler Inhale 2 puffs into the lungs every 4 hours as needed (Patient not taking: Reported on 4/23/2019) 1 Inhaler 3     clobetasol (TEMOVATE) 0.05 % ointment Apply sparingly to affected area twice daily for 14 days.  Do not apply to face. (Patient not taking: Reported on 4/23/2019) 15 g 0     Emollient (CERAVE) CREA Externally apply topically 2 times daily as needed (Patient not taking: Reported on 4/23/2019) 1 Bottle 3     nystatin (MYCOSTATIN) 615559 UNIT/GM POWD Apply topically 3 times daily as needed Refill at patient's request (Patient not taking: Reported on 4/23/2019) 30 g 1     nystatin (MYCOSTATIN) cream Apply topically 2 times daily Reported on 3/6/2017 (Patient not taking: Reported on 4/23/2019) 30 g 11     Allergies   Allergen Reactions     Vioxx Itching and Swelling     VIOXX (Swollen Feet,Hands itching), Okay with ibuprofen and aspirin     Recent Labs   Lab Test 03/18/18  0648 03/17/18  1315 12/04/17  1013 08/16/17  0930 05/17/17  0804  07/13/16  0648 02/26/16  0855  05/01/13  0722   LDL   "--   --  79  --   --   --  94  --   --  127   HDL  --   --  56  --   --   --  45*  --   --  47*   TRIG  --   --  144  --   --   --  158*  --   --  163*   ALT  --  17  --  21 23   < >  --  24   < >  --    CR 0.68 0.71 0.82 0.74 0.76   < >  --  0.75   < >  --    GFRESTIMATED 87 83 71 79 77   < >  --  78   < >  --    GFRESTBLACK >90 >90 86 >90 >90   GFR Calc     < >  --  >90   GFR Calc     < >  --    POTASSIUM 4.0 3.8 3.4 4.0 3.8   < >  --  3.7   < >  --    TSH  --   --   --   --   --   --   --  1.82  --   --     < > = values in this interval not displayed.      BP Readings from Last 3 Encounters:   04/23/19 129/89   03/04/19 100/62   01/17/19 104/68    Wt Readings from Last 3 Encounters:   04/23/19 119.5 kg (263 lb 6.4 oz)   03/04/19 114.3 kg (252 lb)   01/17/19 114.3 kg (252 lb)                  Labs reviewed in EPIC    ROS:  Constitutional, HEENT, cardiovascular, pulmonary, GI, , musculoskeletal, neuro, skin, endocrine and psych systems are negative, except as otherwise noted.    OBJECTIVE:     /89   Pulse 78   Temp 100.4  F (38  C) (Tympanic)   Resp 16   Ht 1.6 m (5' 3\")   Wt 119.5 kg (263 lb 6.4 oz)   LMP 05/29/2006   SpO2 96%   BMI 46.66 kg/m    Body mass index is 46.66 kg/m .  GENERAL: healthy, alert and no distress  HENT: normal cephalic/atraumatic, ear canals and TM's normal, nose and mouth without ulcers or lesions, oral mucous membranes moist and tonsillar erythema  NECK:pos bilateral cervical adenopathy, no asymmetry, masses, or scars and thyroid normal to palpation  RESP: lungs clear to auscultation - no rales, rhonchi or wheezes  CV: regular rate and rhythm, normal S1 S2, no S3 or S4, no murmur, click or rub, no peripheral edema and peripheral pulses strong  ABDOMEN: soft, nontender, no hepatosplenomegaly, no masses and bowel sounds normal  MS: no gross musculoskeletal defects noted, no edema    Diagnostic Test Results:  Results for orders placed or " performed in visit on 04/23/19 (from the past 24 hour(s))   Rapid strep screen   Result Value Ref Range    Specimen Description Throat     Rapid Strep A Screen       NEGATIVE: No Group A streptococcal antigen detected by immunoassay, await culture report.   Influenza A/B antigen   Result Value Ref Range    Influenza A/B Agn Specimen Nasal     Influenza A Negative NEG^Negative    Influenza B Negative NEG^Negative       ASSESSMENT/PLAN:     1. Sore throat  Negative Rapid strep and Influenza  Most likely viral - discussed treatment and red flags today.  Will follow up if not improving over the next 2-3 days.   Can not tolerate Ibuprofen per patient - advised Tylenol 325-650 mg every 4-6 hours for pain and fevers.    - Rapid strep screen  - Influenza A/B antigen  - Beta strep group A culture    Patient Instructions   Your rapid strep test was negative.  We'll call you if the confirmatory culture shows anything different.    We will call you in the am tomorrow with final results on your influenza testing and will be treating if positive.  This looks like a viral infection causing your sore throat.  Unfortunately there's not much we can do to get this better faster, it's mostly letting it run its course.    What we can do is work on symptomatic measures including increased fluids, rest, appropriate use of tylenol and ibuprofen, throat drops/lozenges, increased fluids, and licorice/mint teas.    We'll contact you by phone if your confirmatory throat culture is positive.    Follow-up if symptoms persist without improvement for more than 5 days or if symptoms worsening or changing notably in the meantime.          Lakisha Morgan NP  [facility]

## 2019-04-23 NOTE — PATIENT INSTRUCTIONS
Your rapid strep test was negative.  We'll call you if the confirmatory culture shows anything different.    We will call you in the am tomorrow with final results on your influenza testing and will be treating if positive.  This looks like a viral infection causing your sore throat.  Unfortunately there's not much we can do to get this better faster, it's mostly letting it run its course.    What we can do is work on symptomatic measures including increased fluids, rest, appropriate use of tylenol and ibuprofen, throat drops/lozenges, increased fluids, and licorice/mint teas.    We'll contact you by phone if your confirmatory throat culture is positive.    Follow-up if symptoms persist without improvement for more than 5 days or if symptoms worsening or changing notably in the meantime.

## 2019-04-23 NOTE — LETTER
April 25, 2019      Jacquie Harris  [address] [place] [id number]        Dear Jacquie,       The results of your recent throat culture were negative.  If you have any questions or concerns please call your care team at the number listed at the top of this letter.            Sincerely,        Lakisha Morgan NP

## 2019-04-24 LAB
BACTERIA SPEC CULT: NORMAL
SPECIMEN SOURCE: NORMAL

## 2019-04-25 NOTE — PROGRESS NOTES
SUBJECTIVE:   Jacquie Harris is a 63 year old female who presents to clinic today for the following health issues:  Chief Complaint   Patient presents with     Shoulder     l/shoulder sorness      Gyn Exam         Joint Pain/L shoulder     Onset: 1 mo ago     Description: Patient believes she strained herself lifting a wheelchair into a vehicle and has constant pain, in L shoulder, arm and neck the pain  increases with movement and will wake her up at night     Location: left shoulder  Character: Sharp, Stabbing and sorness    Intensity: moderate, severe can't lift anything     Progression of Symptoms: worse    Accompanying Signs & Symptoms:  Other symptoms: radiation of pain to neck  and swelling with warmth    History:   Previous similar pain: YES  On right side upper arm     Precipitating factors:   Trauma or overuse: YES- with lifting and straining     Alleviating factors:  Improved by: nothing    Therapies Tried and outcome: heat but only feels good when it is on the shoulder         63 yr old female here for left shoulder pain. Pain has been ongoing for over a month. Patient had surgery on the same shoulder a few months ago. She reports that when the pain started she was lifting a wheel chair.She has not been able to move it hardly since then. She also reports some swelling in her shoulder.     Patient due for a pap smear today .    Problem list and histories reviewed & adjusted, as indicated.  Additional history: as documented    Patient Active Problem List   Diagnosis     s/p gastric bypass x 2     Allergic rhinitis     Carpal tunnel syndrome     Obstructive sleep apnea     Rosacea     CARDIOVASCULAR SCREENING; LDL GOAL LESS THAN 160     Tick bite, infected, positive Lyme test 1996 not treated     Vitamin D deficiency     Osteoporosis     Obesity, Class III, BMI 40-49.9 (morbid obesity) (H)     S/P TKR (total knee replacement)     HTN, goal below 140/80     Back pain, left below scapula, strained muscles      This H&P (on paper) has been reviewed and there are no clinically significant changes in the patient s condition.  The Patient is approved for surgery.     Encounter for routine gynecological examination      Colles' fracture     Bilateral cold feet     Elevated levels of transaminase & lactic acid dehydrogenase     Fatty liver     Shoulder injury, right, subsequent encounter     GI bleed     Upper GI bleed     Past Surgical History:   Procedure Laterality Date     ARTHROPLASTY KNEE  4/2/2012    Procedure:ARTHROPLASTY KNEE; Left Total Knee Arthroplasty--Anesth.Choice; Surgeon:HERNANDO THOMPSON; Location:[facility]     ARTHROTOMY SHOULDER, ROTATOR CUFF REPAIR, COMBINED Right 8/25/2017    Procedure: COMBINED ARTHROTOMY SHOULDER, ROTATOR CUFF REPAIR;  Right shoulder distal clavicle excision, subacromial decompression, rotator cuff repair ;  Surgeon: Hernando Thompson MD;  Location: [facility]     ARTHROTOMY SHOULDER, ROTATOR CUFF REPAIR, COMBINED Right 12/18/2017    Procedure: COMBINED ARTHROTOMY SHOULDER, ROTATOR CUFF REPAIR;  Right Shoulder Rotator Cuff Revision;  Surgeon: Hernando Thompson MD;  Location: [facility]     ARTHROTOMY SHOULDER, ROTATOR CUFF REPAIR, COMBINED Left 4/27/2018    Procedure: COMBINED ARTHROTOMY SHOULDER, ROTATOR CUFF REPAIR;  Left Shoulder Open Subacromial Decompression,Distal Clavicle Excision,Rotator Cuff Repair;  Surgeon: Hernando Thompson MD;  Location: [facility]     C ORAL SURGERY PROCEDURE  age 18    wisdom teeth     C/SECTION, LOW TRANSVERSE  1978, 1984, 1994    x 3     COLONOSCOPY  2001    normal colonoscopy     COLONOSCOPY N/A 8/22/2017    Procedure: COLONOSCOPY;  Colonoscopy  ;  Surgeon: Delfino Yoo MD;  Location: [facility]     ESOPHAGOSCOPY, GASTROSCOPY, DUODENOSCOPY (EGD), COMBINED N/A 3/18/2018    Procedure: COMBINED ESOPHAGOSCOPY, GASTROSCOPY, DUODENOSCOPY (EGD);;  Surgeon: Poncho Galindo MD;  Location: [facility]     GASTRIC BYPASS  1980/2005    Gastric Bypass and revision     HERNIA REPAIR, INCISIONAL  6/16/2008    lap.repair with 10x8 mesh     TUBAL LIGATION  1994       Social History   Substance Use Topics     Smoking status: Never  Smoker     Smokeless tobacco: Never Used     Alcohol use 0.0 oz/week     0 Standard drinks or equivalent per week      Comment: mixed very light 1 a month if that     Family History   Problem Relation Age of Onset     C.A.D. Father      MI at age 60     Hypertension Father      Alzheimer Disease Father      Hyperlipidemia Father      Osteoperosis Mother      on Fosamax     Glaucoma Mother      Other Cancer Brother      Stomach Cancer Maternal Aunt      Cervical Cancer Cousin      Diabetes No family hx of      Cerebrovascular Disease No family hx of      Breast Cancer No family hx of      Cancer - colorectal No family hx of      Prostate Cancer No family hx of      Liver Disease No family hx of          Current Outpatient Prescriptions   Medication Sig Dispense Refill     atenolol (TENORMIN) 50 MG tablet Take 1 tablet (50 mg) by mouth daily 90 tablet 3     clobetasol (TEMOVATE) 0.05 % ointment Apply sparingly to affected area twice daily for 14 days.  Do not apply to face. 15 g 0     Emollient (CERAVE) CREA Externally apply topically 2 times daily as needed 1 Bottle 3     hydrochlorothiazide 12.5 MG TABS tablet Take 1 tablet (12.5 mg) by mouth daily 90 tablet 3     nystatin (MYCOSTATIN) 365067 UNIT/GM POWD Apply topically 3 times daily as needed Refill at patient's request 30 g 1     nystatin (MYCOSTATIN) cream Apply topically 2 times daily Reported on 3/6/2017 30 g 11     acetaminophen (TYLENOL) 325 MG tablet Take 2 tablets (650 mg) by mouth every 4 hours as needed for mild pain or fever 100 tablet      Allergies   Allergen Reactions     Vioxx Itching and Swelling     VIOXX (Swollen Feet,Hands itching), Okay with ibuprofen and aspirin     BP Readings from Last 3 Encounters:   08/28/18 110/68   04/27/18 140/79   04/03/18 102/72    Wt Readings from Last 3 Encounters:   08/28/18 247 lb (112 kg)   04/27/18 240 lb (108.9 kg)   04/03/18 241 lb (109.3 kg)                  Labs reviewed in EPIC    Reviewed and updated as  "needed this visit by clinical staff  Allergies  Meds       Reviewed and updated as needed this visit by Provider         ROS:  Constitutional, HEENT, cardiovascular, pulmonary, gi and gu systems are negative, except as otherwise noted.    OBJECTIVE:     /68 (BP Location: Left arm, Cuff Size: Adult Large)  Pulse 69  Temp 97.1  F (36.2  C) (Tympanic)  Ht 5' 3\" (1.6 m)  Wt 247 lb (112 kg)  LMP 05/29/2006  SpO2 98%  BMI 43.75 kg/m2  Body mass index is 43.75 kg/(m^2).  GENERAL: healthy, alert and no distress  EYES: Eyes grossly normal to inspection, PERRL and conjunctivae and sclerae normal  HENT: ear canals and TM's normal, nose and mouth without ulcers or lesions  NECK: no adenopathy, no asymmetry, masses, or scars and thyroid normal to palpation  RESP: lungs clear to auscultation - no rales, rhonchi or wheezes  CV: regular rate and rhythm, normal S1 S2, no S3 or S4, no murmur, click or rub, no peripheral edema and peripheral pulses strong  ABDOMEN: soft, nontender, no hepatosplenomegaly, no masses and bowel sounds normal  MS: no gross musculoskeletal defects noted, no edema  SKIN: no suspicious lesions or rashes   - Normal cervix and pap obtained.     Diagnostic Test Results:  none     ASSESSMENT/PLAN:   1. Chronic left shoulder pain  - MR Shoulder Left w/o Contrast; Future    2. Screening for cervical cancer  - Pap imaged thin layer screen with HPV - recommended age 30 - 65 years (select HPV order below)    FUTURE APPOINTMENTS:       - Follow-up visit as needed.    Jennie Suazo MD  [facility]  "

## 2019-05-01 ENCOUNTER — HOSPITAL ENCOUNTER (EMERGENCY)
Facility: CLINIC | Age: 64
Discharge: HOME | End: 2019-05-01
Attending: NURSE PRACTITIONER | Admitting: NURSE PRACTITIONER
Payer: COMMERCIAL

## 2019-05-01 ENCOUNTER — APPOINTMENT (OUTPATIENT)
Dept: GENERAL RADIOLOGY | Facility: CLINIC | Age: 64
End: 2019-05-01
Attending: NURSE PRACTITIONER
Payer: COMMERCIAL

## 2019-05-01 VITALS
TEMPERATURE: 97.5 F | RESPIRATION RATE: 18 BRPM | DIASTOLIC BLOOD PRESSURE: 82 MMHG | SYSTOLIC BLOOD PRESSURE: 159 MMHG | OXYGEN SATURATION: 98 % | WEIGHT: 261 LBS | HEIGHT: 63 IN | BODY MASS INDEX: 46.25 KG/M2

## 2019-05-01 DIAGNOSIS — M25.561 ACUTE PAIN OF RIGHT KNEE: ICD-10-CM

## 2019-05-01 PROCEDURE — G0463 HOSPITAL OUTPT CLINIC VISIT: HCPCS

## 2019-05-01 PROCEDURE — 99213 OFFICE O/P EST LOW 20 MIN: CPT | Mod: Z6 | Performed by: NURSE PRACTITIONER

## 2019-05-01 PROCEDURE — 73562 X-RAY EXAM OF KNEE 3: CPT | Mod: RT

## 2019-05-01 ASSESSMENT — ENCOUNTER SYMPTOMS
DIARRHEA: 0
WHEEZING: 0
DYSURIA: 0
DIAPHORESIS: 0
JOINT SWELLING: 1
DIFFICULTY URINATING: 0
VOMITING: 0
FEVER: 0
ABDOMINAL PAIN: 0
CONFUSION: 0
HEADACHES: 0
NAUSEA: 0
ARTHRALGIAS: 1
SHORTNESS OF BREATH: 0
CHILLS: 0
EYE REDNESS: 0
CONSTIPATION: 0
COUGH: 0

## 2019-05-01 ASSESSMENT — MIFFLIN-ST. JEOR: SCORE: 1698.02

## 2019-05-01 NOTE — ED AVS SNAPSHOT
[facility]  [address] [facility] [address]  Phone:  [phone]  Fax:  [phone]                                    Jacquie Harris   MRN: [id number]    Department:  [facility]   Date of Visit:  5/1/2019           After Visit Summary Signature Page    I have received my discharge instructions, and my questions have been answered. I have discussed any challenges I see with this plan with the nurse or doctor.    ..........................................................................................................................................  Patient/Patient Representative Signature      ..........................................................................................................................................  Patient Representative Print Name and Relationship to Patient    ..................................................               ................................................  Date                                   Time    ..........................................................................................................................................  Reviewed by Signature/Title    ...................................................              ..............................................  Date                                               Time          22EPIC Rev 08/18

## 2019-05-02 NOTE — ED PROVIDER NOTES
History     Chief Complaint   Patient presents with     Leg Pain     right leg pain since 4/22/19 after chiropractor manipulation.     HPI  Jacquie Harris is a 64 year old female who admits to past medical history of gastric bypass, upper GI bleed, osteoarthritis presenting to urgent care with persistent acute right knee pain with onset of symptoms April 22 of 2019.  Patient reports that she was being seen by a chiropractor on April 22 and he adjusted her knee with subsequent acute pain within 2 hours after the adjustment and reports of persistent swelling of the knee and pain with weightbearing activity.  Patient states that she normally takes Tylenol thousand milligrams once or twice daily with some improvement and has also tried heat and cold packs without any relief in symptoms.  Patient has her orthopedic surgeon who she normally sees who works at [facility] but has not called to schedule an appointment with them.  Patient states she has surgery scheduled for a left total knee replacement on May 29 but this is her right knee that is bothering her.  Patient reports allergies to Vioxx and reports she does not take NSAIDs due to previous history of GI bleed and gastric bypass but does admit that she tolerates them.    Allergies:  Allergies   Allergen Reactions     Vioxx Itching and Swelling     VIOXX (Swollen Feet,Hands itching), Okay with ibuprofen and aspirin       Problem List:    Patient Active Problem List    Diagnosis Date Noted     Bronchospasm 01/07/2019     Priority: Medium     GI bleed 03/17/2018     Priority: Medium     Upper GI bleed 03/17/2018     Priority: Medium     Shoulder injury, right, subsequent encounter 08/21/2017     Priority: Medium     Fatty liver 04/20/2017     Priority: Medium     Elevated levels of transaminase & lactic acid dehydrogenase 04/15/2017     Priority: Medium     Bilateral cold feet 01/03/2017     Priority: Medium     Colles' fracture 07/06/2015     Priority: Medium      Encounter for routine gynecological examination  06/04/2015     Priority: Medium     Back pain, left below scapula, strained muscles 10/24/2013     Priority: Medium     HTN, goal below 140/80 05/05/2013     Priority: Medium     S/P TKR (total knee replacement) 04/03/2012     Priority: Medium     Osteoporosis 02/19/2012     Priority: Medium     Previous T scores -2.2  2/2012 Dexascan:  Lumbar spine L1-L4 T-score: -2.4, Left femoral neck T-score: -2.7, Right femoral neck T-score: -2.2   Percent change in lumbar spine: +6.6% since 4/16/2008   Percent change in femurs: +2.9% since 4/16/2008    IMPRESSION: Osteoporosis in the left femoral neck. Severe osteopenia  in the right femoral neck and lumbar spine.       Obesity, Class III, BMI 40-49.9 (morbid obesity) (H) 02/19/2012     Priority: Medium     ideal weight 120, goal weight 200, lose 58 lbs in 58 weeks.       Vitamin D deficiency 07/18/2011     Priority: Medium     Tick bite, infected, positive Lyme test 1996 not treated 07/08/2011     Priority: Medium     Rosacea 11/04/2009     Priority: Medium     Obstructive sleep apnea 12/01/2006     Priority: Medium     Sleep study 12/06 for EDS- AHI 36, desaturations to 65%. CPAP recommended but not tolerated.  01/16/07  ENT consult recommend CPAP and weight loss. Surgery discussed but success rate less than CPAP  1/15/07 CPAP trial, was not able to tolerate. Retried 2008, tolerated better.   Problem list name updated by automated process. Provider to review       Carpal tunnel syndrome 06/07/2006     Priority: Medium     04/10/09  [facility]. Finding consistant with CTS, but nerve studies and MRI of C-Spine are normal. Corticosteroid injection given in office       CARDIOVASCULAR SCREENING; LDL GOAL LESS THAN 160 10/31/2010     Priority: Low     Allergic rhinitis 03/27/2006     Priority: Low     Problem list name updated by automated process. Provider to review       s/p gastric bypass x 2 07/05/2005      Priority: Low     Gastric bypass 1980          Past Medical History:    Past Medical History:   Diagnosis Date     HTN (hypertension)      IRON DEFICIENCY ANEMIA 7/5/2005     DARWIN (obstructive sleep apnea)        Past Surgical History:    Past Surgical History:   Procedure Laterality Date     ARTHROPLASTY KNEE  4/2/2012    Procedure:ARTHROPLASTY KNEE; Left Total Knee Arthroplasty--Anesth[facility]Choice; Surgeon:VICENTE THOMPSON; Location:[facility]     ARTHROTOMY SHOULDER, ROTATOR CUFF REPAIR, COMBINED Right 8/25/2017    Procedure: COMBINED ARTHROTOMY SHOULDER, ROTATOR CUFF REPAIR;  Right shoulder distal clavicle excision, subacromial decompression, rotator cuff repair ;  Surgeon: Vicente Thompson MD;  Location: [facility]     ARTHROTOMY SHOULDER, ROTATOR CUFF REPAIR, COMBINED Right 12/18/2017    Procedure: COMBINED ARTHROTOMY SHOULDER, ROTATOR CUFF REPAIR;  Right Shoulder Rotator Cuff Revision;  Surgeon: Vicente Thompson MD;  Location: [facility]     ARTHROTOMY SHOULDER, ROTATOR CUFF REPAIR, COMBINED Left 4/27/2018    Procedure: COMBINED ARTHROTOMY SHOULDER, ROTATOR CUFF REPAIR;  Left Shoulder Open Subacromial Decompression,Distal Clavicle Excision,Rotator Cuff Repair;  Surgeon: Vicente Thompson MD;  Location: [facility]     C ORAL SURGERY PROCEDURE  age 18    wisdom teeth     C/SECTION, LOW TRANSVERSE  1978, 1984, 1994    x 3     COLONOSCOPY  2001    normal colonoscopy     COLONOSCOPY N/A 8/22/2017    Procedure: COLONOSCOPY;  Colonoscopy  ;  Surgeon: Delfino Yoo MD;  Location: [facility]     ESOPHAGOSCOPY, GASTROSCOPY, DUODENOSCOPY (EGD), COMBINED N/A 3/18/2018    Procedure: COMBINED ESOPHAGOSCOPY, GASTROSCOPY, DUODENOSCOPY (EGD);;  Surgeon: Poncho Galindo MD;  Location: [facility]     GASTRIC BYPASS  1980/2005    Gastric Bypass and revision     HERNIA REPAIR, INCISIONAL  6/16/2008    lap.repair with 10x8 mesh     TUBAL LIGATION  1994       Family History:    Family History   Problem Relation Age of Onset     C.A.D. Father          MI at age 60     Hypertension Father      Alzheimer Disease Father      Hyperlipidemia Father      Osteoporosis Mother         on Fosamax     Glaucoma Mother      Other Cancer Brother      Stomach Cancer Maternal Aunt      Cervical Cancer Cousin      Diabetes No family hx of      Cerebrovascular Disease No family hx of      Breast Cancer No family hx of      Cancer - colorectal No family hx of      Prostate Cancer No family hx of      Liver Disease No family hx of        Social History:  Marital Status:   [2]  Social History     Tobacco Use     Smoking status: Never Smoker     Smokeless tobacco: Never Used   Substance Use Topics     Alcohol use: Yes     Alcohol/week: 0.0 oz     Comment: mixed very light 1 a month if that     Drug use: No        Medications:      diclofenac (VOLTAREN) 1 % topical gel   acetaminophen (TYLENOL) 325 MG tablet   albuterol (PROAIR HFA/PROVENTIL HFA/VENTOLIN HFA) 108 (90 Base) MCG/ACT inhaler   atenolol (TENORMIN) 50 MG tablet   clobetasol (TEMOVATE) 0.05 % ointment   Emollient (CERAVE) CREA   hydrochlorothiazide (HYDRODIURIL) 12.5 MG tablet   nystatin (MYCOSTATIN) 828822 UNIT/GM POWD   nystatin (MYCOSTATIN) cream         Review of Systems   Constitutional: Negative for chills, diaphoresis and fever.   HENT: Negative for congestion and ear pain.    Eyes: Negative for redness and visual disturbance.   Respiratory: Negative for cough, shortness of breath and wheezing.    Cardiovascular: Negative for chest pain.   Gastrointestinal: Negative for abdominal pain, constipation, diarrhea, nausea and vomiting.   Genitourinary: Negative for difficulty urinating and dysuria.   Musculoskeletal: Positive for arthralgias (right knee pain) and joint swelling (right knee).   Neurological: Negative for headaches.   Psychiatric/Behavioral: Negative for confusion.   All other systems reviewed and are negative.      Physical Exam   BP: 159/82  Heart Rate: 79  Temp: 97.5  F (36.4  C)  Resp:  "18  Height: 160 cm (5' 3\")  Weight: 118.4 kg (261 lb)  SpO2: 98 %      Physical Exam   Constitutional: She is oriented to person, place, and time. She appears well-developed and well-nourished. No distress.   HENT:   Head: Normocephalic and atraumatic.   Right Ear: External ear normal.   Left Ear: External ear normal.   Eyes: Conjunctivae are normal. Right eye exhibits no discharge. Left eye exhibits no discharge. No scleral icterus.   Neck: Normal range of motion. Neck supple.   Cardiovascular: Regular rhythm and normal heart sounds. Exam reveals no friction rub.   No murmur heard.  Pulmonary/Chest: Effort normal and breath sounds normal. No stridor. No respiratory distress. She has no wheezes. She has no rales. She exhibits no tenderness.   Musculoskeletal:        Right knee: She exhibits normal range of motion, no swelling, no effusion, no ecchymosis, no deformity, no laceration and no erythema. Tenderness found. Lateral joint line tenderness noted.        Left knee: She exhibits normal range of motion, no swelling, no effusion, no ecchymosis, no deformity, no laceration and no erythema. Tenderness found. Medial joint line and lateral joint line tenderness noted.   Neurological: She is alert and oriented to person, place, and time. Coordination normal.   Skin: Skin is warm and dry. No rash noted. She is not diaphoretic. No erythema. No pallor.   Psychiatric: She has a normal mood and affect.   Nursing note and vitals reviewed.      ED Course        Procedures    Results for orders placed or performed during the hospital encounter of 05/01/19 (from the past 24 hour(s))   XR Knee Right 3 Views    Narrative    KNEE RIGHT THREE VIEW   5/1/2019 6:36 PM     HISTORY: Knee pain for 3 weeks after adjustment by chiropractor.    COMPARISON: None.      Impression    IMPRESSION: No acute fracture or dislocation is identified. Alignment  appears anatomic. Small well-corticated incidental ossicle noted  medial to the distal " right femur.    PATY SANDOVAL MD       Medications - No data to display    Assessments & Plan (with Medical Decision Making)     I have reviewed the nursing notes.    I have reviewed the findings, diagnosis, plan and need for follow up with the patient.  Jacquie Harris is a 64 year old female who admits to past medical history of gastric bypass, upper GI bleed, osteoarthritis presenting to urgent care with persistent acute right knee pain with onset of symptoms April 22 of 2019.  Patient reports onset of symptoms after getting adjustment by chiropractor without improvement in her symptoms.  Patient reports ongoing pain with ambulation.  This is the first episode of evaluation after the initial onset of pain on April 22.  Patient denies fever, aches, redness at the site.  Exam as noted above and patient is diffusely tender without obvious erythema noted x-ray obtained and no acute fracture noted.  Reviewed with patient the findings.  Due to patient having surgery in the future discussed with patient I am reluctant to initiate any steroid therapy.  Discussed increasing Tylenol usage for better pain control and a trial of topical diclofenac and encourage discontinuation if rash recurs.  Patient in agreement with all of the above.  Encouraged follow-up with orthopedist within the near future for further evaluation.  Encouraged walker usage as needed.  Patient verbalizes understanding regarding all of the above denies any questions at this point in time.  Patient was discharged in stable condition.       Medication List      Started    diclofenac 1 % topical gel  Commonly known as:  VOLTAREN  2 g, Transdermal, 4 TIMES DAILY            Final diagnoses:   Acute pain of right knee       5/1/2019   [facility] EMERGENCY DEPARTMENT     Migdalia Padilla, MARGARITA CNP  05/01/19 2049

## 2019-05-02 NOTE — DISCHARGE INSTRUCTIONS
Start taking tylenol 1000 mg three to four times daily for pain  Use the voltaren gel up to 4 times daily for pain, stop if you develop a rash  Call your orthopedist to get in to further evaluate your knee.

## 2019-05-07 ENCOUNTER — OFFICE VISIT (OUTPATIENT)
Dept: FAMILY MEDICINE | Facility: CLINIC | Age: 64
End: 2019-05-07
Payer: COMMERCIAL

## 2019-05-07 VITALS
HEART RATE: 70 BPM | TEMPERATURE: 98.2 F | SYSTOLIC BLOOD PRESSURE: 106 MMHG | RESPIRATION RATE: 14 BRPM | WEIGHT: 265 LBS | BODY MASS INDEX: 46.95 KG/M2 | OXYGEN SATURATION: 97 % | HEIGHT: 63 IN | DIASTOLIC BLOOD PRESSURE: 64 MMHG

## 2019-05-07 DIAGNOSIS — E88.01 ALPHA-1-ANTITRYPSIN DEFICIENCY (H): ICD-10-CM

## 2019-05-07 DIAGNOSIS — Z98.890 S/P LEFT KNEE SURGERY: ICD-10-CM

## 2019-05-07 DIAGNOSIS — E66.01 OBESITY, CLASS III, BMI 40-49.9 (MORBID OBESITY) (H): ICD-10-CM

## 2019-05-07 DIAGNOSIS — Z01.818 PREOP GENERAL PHYSICAL EXAM: Primary | ICD-10-CM

## 2019-05-07 DIAGNOSIS — I10 HTN, GOAL BELOW 140/80: ICD-10-CM

## 2019-05-07 LAB
ANION GAP SERPL CALCULATED.3IONS-SCNC: 6 MMOL/L (ref 3–14)
BUN SERPL-MCNC: 19 MG/DL (ref 7–30)
CALCIUM SERPL-MCNC: 9.1 MG/DL (ref 8.5–10.1)
CHLORIDE SERPL-SCNC: 103 MMOL/L (ref 94–109)
CO2 SERPL-SCNC: 28 MMOL/L (ref 20–32)
CREAT SERPL-MCNC: 0.67 MG/DL (ref 0.52–1.04)
GFR SERPL CREATININE-BSD FRML MDRD: >90 ML/MIN/{1.73_M2}
GLUCOSE SERPL-MCNC: 96 MG/DL (ref 70–99)
HGB BLD-MCNC: 11.9 G/DL (ref 11.7–15.7)
POTASSIUM SERPL-SCNC: 3.8 MMOL/L (ref 3.4–5.3)
SODIUM SERPL-SCNC: 137 MMOL/L (ref 133–144)

## 2019-05-07 PROCEDURE — 80048 BASIC METABOLIC PNL TOTAL CA: CPT | Performed by: FAMILY MEDICINE

## 2019-05-07 PROCEDURE — 36415 COLL VENOUS BLD VENIPUNCTURE: CPT | Performed by: FAMILY MEDICINE

## 2019-05-07 PROCEDURE — 93000 ELECTROCARDIOGRAM COMPLETE: CPT | Performed by: FAMILY MEDICINE

## 2019-05-07 PROCEDURE — 85018 HEMOGLOBIN: CPT | Performed by: FAMILY MEDICINE

## 2019-05-07 PROCEDURE — 99215 OFFICE O/P EST HI 40 MIN: CPT | Performed by: FAMILY MEDICINE

## 2019-05-07 ASSESSMENT — MIFFLIN-ST. JEOR: SCORE: 1716.16

## 2019-05-07 NOTE — PATIENT INSTRUCTIONS
Before Your Surgery    No food or drink after midnight the night before surgery  Please do not take the hydrochlorothiazide on morning of surgery  You may take you atenolol on morning of surgery  Stop using Voltaren gel a couple of days before your surgery       Call your surgeon if there is any change in your health. This includes signs of a cold or flu (such as a sore throat, runny nose, cough, rash or fever).    Do not smoke, drink alcohol or take over the counter medicine (unless your surgeon or primary care doctor tells you to) for the 24 hours before and after surgery.    If you take prescribed drugs: Follow your doctor s orders about which medicines to take and which to stop until after surgery.    Eating and drinking prior to surgery: follow the instructions from your surgeon    Take a shower or bath the night before surgery. Use the soap your surgeon gave you to gently clean your skin. If you do not have soap from your surgeon, use your regular soap. Do not shave or scrub the surgery site.  Wear clean pajamas and have clean sheets on your bed.   Patient Education     Presurgery Checklist  You are scheduled to have surgery. The healthcare staff will try to make your stay comfortable. Use the guidelines below to remind yourself what to do before surgery. Be sure to follow any specific pre-op instructions from your surgeon or nurse.  Preparing for surgery  If you are having abdominal surgery, ask what you need to do to clear your bowel.  Tell your surgeon if you have allergies to any medicines, latex, or foods.  Ask your surgeon if you might need a blood transfusion during surgery and if so, how to prepare for it. In some cases, you can donate blood before surgery. If needed, this blood can be given back (transfused) to you during or after surgery.  Arrange for an adult family member or friend to drive you home after surgery. If possible, have someone ready to help you at home as you recover.  Call the  surgeon if you get a cold, fever, sore throat, diarrhea, or other health problem just before surgery. Your surgeon can decide whether or not to postpone the surgery.  Medicines  Tell your surgeon about all medicines you take, including prescription and over-the-counter products such as herbal remedies and vitamins. Ask if you should continue taking them.  If you take ibuprofen, naproxen, or blood thinners (anticoagulants) such as aspirin, clopidogrel, or warfarin, ask your surgeon whether you should stop taking them and how long before surgery you should stop.  You may be told to take antibiotics just before surgery to prevent infection. If so, follow instructions carefully on how to take them.  If you are told to take blood thinners to help prevent blood clots after surgery, be sure to follow the instructions on how to take them.  Stop smoking  If you smoke, healing may take longer. So at least 2 week(s) before surgery, stop smoking.  Bathing or showering before surgery  If instructed, wash with antibacterial soap. Afterward, do not use lotions, oils, or powders.  If you are having surgery on the head, you may be asked to shampoo with antibacterial soap. Follow instructions for doing so.  Do not remove hair from the surgery site  Do not shave hair from the incision site, unless you are given specific instructions to do so. Usually, if hair needs to be removed, it will be done at the hospital right before surgery.  Don t eat or drink  Follow any directions you are given for not eating or drinking before surgery. If you don't follow instructions about when to stop eating and drinking, your procedure may be postponed or rescheduled for another day. This is a safety issue.  You can brush your teeth and rinse your mouth, but don t swallow any water.  Day of surgery  Don't wear makeup. Don't use perfume, deodorant, or hairspray. Remove nail polish and artificial nails.  Leave jewelry (including rings), watches, and other  valuables at home.  Be sure to bring health insurance cards or forms and a photo ID.  Bring a list of your medicines (include the name, dose, how often you take them, and the time last dose was taken).  Arrive on time at the hospital or surgery facility.  Date Last Reviewed: 12/1/2016 2000-2018 [employer]. [address] [place] [address], [place] [id number]. All rights reserved. This information is not intended as a substitute for professional medical care. Always follow your healthcare professional's instructions.

## 2019-05-07 NOTE — PROGRESS NOTES
[facility] [place]  [address] [facility] [address]  [phone]  Dept: [phone]    PRE-OP EVALUATION:  Today's date: 2019    Jacquie Harris (: 1955) presents for pre-operative evaluation assessment as requested by Dr.Scott Fuller .  She requires evaluation and anesthesia risk assessment prior to undergoing surgery/procedure for treatment of revision Left  total joint replacement knee.    Proposed Surgery/ Procedure: Left Revision total joint replacement knee.  Date of Surgery/ Procedure: 2019  Time of Surgery/ Procedure: Patient will be updated 5-8 at her [place] [facility] Facility: [facility]  Fax number for surgical facility: [phone]  Primary Physician: Jennie Suazo  Type of Anesthesia Anticipated: to be determined    Patient has a Health Care Directive or Living Will:  NO    1. NO - Do you have a history of heart attack, stroke, stent, bypass or surgery on an artery in the head, neck, heart or legs?  2. NO - Do you ever have any pain or discomfort in your chest?  3. NO - Do you have a history of  Heart Failure?  4. Yes  - Are you troubled by shortness of breath when: walking on the level, up a slight hill or at night? Lack of exercise   5. NO - Do you currently have a cold, bronchitis or other respiratory infection?  6. NO - Do you have a cough, shortness of breath or wheezing?  7.yes  - Do you sometimes get pains in the calves of your legs when you walk?lack of exercise and problems with knee   8. NO - Do you or anyone in your family have previous history of blood clots?  9. Yes  - Do you or does anyone in your family have a serious bleeding problem such as prolonged bleeding following surgeries or cuts?Patient prolonged bleeding 40yrs ago   10. Yes  - Have you ever had problems with anemia or been told to take iron pills?anemia 2-3 yrs ago   11. Yes - Have you had any abnormal blood loss such as black, tarry or bloody  stools, or abnormal vaginal bleeding?black tarry stool 4-2018  12. Yes - Have you ever had a blood transfusion? Patient had 1980s  13. NO - Have you or any of your relatives ever had problems with anesthesia?  14. Yes - Do you have sleep apnea, Patient has c-pap excessive snoring or daytime drowsiness?  15. NO - Do you have any prosthetic heart valves?  16. Yes  - Do you have prosthetic joints?Left knee  17. NO - Is there any chance that you may be pregnant?      HPI:     HPI related to upcoming procedure: Patient is a 64 yr old female here for a pre op. She is having a revision of her total left knee replacement surgery. She denies any acute symptoms today. She has a past medical history that is significant for hypertension which has been well controlled on her blood pressure medication. Denies any side effects to these medication.       HYPERTENSION - Patient has longstanding history of HTN , currently denies any symptoms referable to elevated blood pressure. Specifically denies chest pain, palpitations, dyspnea, orthopnea, PND or peripheral edema. Blood pressure readings have been in normal range. Current medication regimen is as listed below. Patient denies any side effects of medication.                                                                                                                                                                                          .    MEDICAL HISTORY:     Patient Active Problem List    Diagnosis Date Noted     Alpha-1-antitrypsin deficiency (H) 05/07/2019     Priority: Medium     Bronchospasm 01/07/2019     Priority: Medium     GI bleed 03/17/2018     Priority: Medium     Upper GI bleed 03/17/2018     Priority: Medium     Shoulder injury, right, subsequent encounter 08/21/2017     Priority: Medium     Fatty liver 04/20/2017     Priority: Medium     Elevated levels of transaminase & lactic acid dehydrogenase 04/15/2017     Priority: Medium     Bilateral cold feet  01/03/2017     Priority: Medium     Colles' fracture 07/06/2015     Priority: Medium     Encounter for routine gynecological examination  06/04/2015     Priority: Medium     Back pain, left below scapula, strained muscles 10/24/2013     Priority: Medium     HTN, goal below 140/80 05/05/2013     Priority: Medium     S/P TKR (total knee replacement) 04/03/2012     Priority: Medium     Osteoporosis 02/19/2012     Priority: Medium     Previous T scores -2.2  2/2012 Dexascan:  Lumbar spine L1-L4 T-score: -2.4, Left femoral neck T-score: -2.7, Right femoral neck T-score: -2.2   Percent change in lumbar spine: +6.6% since 4/16/2008   Percent change in femurs: +2.9% since 4/16/2008    IMPRESSION: Osteoporosis in the left femoral neck. Severe osteopenia  in the right femoral neck and lumbar spine.       Obesity, Class III, BMI 40-49.9 (morbid obesity) (H) 02/19/2012     Priority: Medium     ideal weight 120, goal weight 200, lose 58 lbs in 58 weeks.       Vitamin D deficiency 07/18/2011     Priority: Medium     Tick bite, infected, positive Lyme test 1996 not treated 07/08/2011     Priority: Medium     Rosacea 11/04/2009     Priority: Medium     Obstructive sleep apnea 12/01/2006     Priority: Medium     Sleep study 12/06 for EDS- AHI 36, desaturations to 65%. CPAP recommended but not tolerated.  01/16/07  ENT consult recommend CPAP and weight loss. Surgery discussed but success rate less than CPAP  1/15/07 CPAP trial, was not able to tolerate. Retried 2008, tolerated better.   Problem list name updated by automated process. Provider to review       Carpal tunnel syndrome 06/07/2006     Priority: Medium     04/10/09 [facility]. Finding consistant with CTS, but nerve studies and MRI of C-Spine are normal. Corticosteroid injection given in office       CARDIOVASCULAR SCREENING; LDL GOAL LESS THAN 160 10/31/2010     Priority: Low     Allergic rhinitis 03/27/2006     Priority: Low     Problem list name updated by  automated process. Provider to review       s/p gastric bypass x 2 07/05/2005     Priority: Low     Gastric bypass 1980        Past Medical History:   Diagnosis Date     HTN (hypertension)      IRON DEFICIENCY ANEMIA 7/5/2005     Iron infusion/ resolved since injections     DARWIN (obstructive sleep apnea)      Past Surgical History:   Procedure Laterality Date     ARTHROPLASTY KNEE  4/2/2012    Procedure:ARTHROPLASTY KNEE; Left Total Knee Arthroplasty--Anesth.Choice; Surgeon:VICENTE THOMPSON; Location:[facility]     ARTHROTOMY SHOULDER, ROTATOR CUFF REPAIR, COMBINED Right 8/25/2017    Procedure: COMBINED ARTHROTOMY SHOULDER, ROTATOR CUFF REPAIR;  Right shoulder distal clavicle excision, subacromial decompression, rotator cuff repair ;  Surgeon: Vicente Thompson MD;  Location: [facility] OR     ARTHROTOMY SHOULDER, ROTATOR CUFF REPAIR, COMBINED Right 12/18/2017    Procedure: COMBINED ARTHROTOMY SHOULDER, ROTATOR CUFF REPAIR;  Right Shoulder Rotator Cuff Revision;  Surgeon: Vicente Thompson MD;  Location: [facility]     ARTHROTOMY SHOULDER, ROTATOR CUFF REPAIR, COMBINED Left 4/27/2018    Procedure: COMBINED ARTHROTOMY SHOULDER, ROTATOR CUFF REPAIR;  Left Shoulder Open Subacromial Decompression,Distal Clavicle Excision,Rotator Cuff Repair;  Surgeon: Vicente Thompson MD;  Location: [facility] OR     C ORAL SURGERY PROCEDURE  age 18    wisdom teeth     C/SECTION, LOW TRANSVERSE  1978, 1984, 1994    x 3     COLONOSCOPY  2001    normal colonoscopy     COLONOSCOPY N/A 8/22/2017    Procedure: COLONOSCOPY;  Colonoscopy  ;  Surgeon: Delfino Yoo MD;  Location: [facility]     ESOPHAGOSCOPY, GASTROSCOPY, DUODENOSCOPY (EGD), COMBINED N/A 3/18/2018    Procedure: COMBINED ESOPHAGOSCOPY, GASTROSCOPY, DUODENOSCOPY (EGD);;  Surgeon: Poncho Galindo MD;  Location: [facility]     GASTRIC BYPASS  1980/2005    Gastric Bypass and revision     HERNIA REPAIR, INCISIONAL  6/16/2008    lap.repair with 10x8 mesh     TUBAL LIGATION  1994     Current  Outpatient Medications   Medication Sig Dispense Refill     acetaminophen (TYLENOL) 325 MG tablet Take 2 tablets (650 mg) by mouth every 4 hours as needed for mild pain or fever 100 tablet      atenolol (TENORMIN) 50 MG tablet Take 1 tablet (50 mg) by mouth daily 90 tablet 1     diclofenac (VOLTAREN) 1 % topical gel Place 2 g onto the skin 4 times daily 100 g 0     hydrochlorothiazide (HYDRODIURIL) 12.5 MG tablet Take 1 tablet (12.5 mg) by mouth daily 90 tablet 1     albuterol (PROAIR HFA/PROVENTIL HFA/VENTOLIN HFA) 108 (90 Base) MCG/ACT inhaler Inhale 2 puffs into the lungs every 4 hours as needed (Patient not taking: Reported on 4/23/2019) 1 Inhaler 3     clobetasol (TEMOVATE) 0.05 % ointment Apply sparingly to affected area twice daily for 14 days.  Do not apply to face. (Patient not taking: Reported on 4/23/2019) 15 g 0     Emollient (CERAVE) CREA Externally apply topically 2 times daily as needed 1 Bottle 3     nystatin (MYCOSTATIN) 993072 UNIT/GM POWD Apply topically 3 times daily as needed Refill at patient's request (Patient not taking: Reported on 4/23/2019) 30 g 1     nystatin (MYCOSTATIN) cream Apply topically 2 times daily Reported on 3/6/2017 (Patient not taking: Reported on 4/23/2019) 30 g 11     OTC products: None, except as noted above    Allergies   Allergen Reactions     Vioxx Itching and Swelling     VIOXX (Swollen Feet,Hands itching), Okay with ibuprofen and aspirin      Latex Allergy: NO    Social History     Tobacco Use     Smoking status: Never Smoker     Smokeless tobacco: Never Used   Substance Use Topics     Alcohol use: Yes     Alcohol/week: 0.0 oz     Comment: mixed very light 1 a month if that     History   Drug Use No       REVIEW OF SYSTEMS:   CONSTITUTIONAL: NEGATIVE for fever, chills, change in weight  INTEGUMENTARY/SKIN: NEGATIVE for worrisome rashes, moles or lesions  EYES: NEGATIVE for vision changes or irritation  ENT/MOUTH: NEGATIVE for ear, mouth and throat problems  RESP:  "NEGATIVE for significant cough or SOB  CV: NEGATIVE for chest pain, palpitations or peripheral edema  GI: NEGATIVE for nausea, abdominal pain, heartburn, or change in bowel habits  : NEGATIVE for frequency, dysuria, or hematuria  MUSCULOSKELETAL:POSITIVE  for joint pain left knee joint  ENDOCRINE: NEGATIVE for temperature intolerance, skin/hair changes  HEME: NEGATIVE for bleeding problems  PSYCHIATRIC: NEGATIVE for changes in mood or affect    EXAM:   /64   Pulse 70   Temp 98.2  F (36.8  C) (Tympanic)   Resp 14   Ht 1.6 m (5' 3\")   Wt 120.2 kg (265 lb)   LMP 05/29/2006   SpO2 97%   BMI 46.94 kg/m      GENERAL APPEARANCE: healthy, alert and no distress     EYES: EOMI, PERRL     HENT: ear canals and TM's normal and nose and mouth without ulcers or lesions     NECK: no adenopathy, no asymmetry, masses, or scars and thyroid normal to palpation     RESP: lungs clear to auscultation - no rales, rhonchi or wheezes     CV: regular rates and rhythm, normal S1 S2, no S3 or S4 and no murmur, click or rub     SKIN: no suspicious lesions or rashes     PSYCH: mentation appears normal. and affect normal/bright     LYMPHATICS: No cervical adenopathy    DIAGNOSTICS:   EKG: appears normal, NSR, normal axis, normal intervals, no acute ST/T changes c/w ischemia, no LVH by voltage criteria, unchanged from previous tracings    Recent Labs   Lab Test 03/23/18  0933 03/18/18  1201 03/18/18  0648  03/17/18  1756 03/17/18  1315  08/16/17  0930   HGB 11.1* 10.7* 10.1*   < > 10.5* 11.5*   < > 14.8   PLT  --   --  195  --  211 245   < > 213   INR  --   --   --   --   --  1.00  --  1.05   NA  --   --  140  --   --  137   < > 139   POTASSIUM  --   --  4.0  --   --  3.8   < > 4.0   CR  --   --  0.68  --   --  0.71   < > 0.74    < > = values in this interval not displayed.      Results for orders placed or performed in visit on 05/07/19   Hemoglobin   Result Value Ref Range    Hemoglobin 11.9 11.7 - 15.7 g/dL   Basic metabolic " panel   Result Value Ref Range    Sodium 137 133 - 144 mmol/L    Potassium 3.8 3.4 - 5.3 mmol/L    Chloride 103 94 - 109 mmol/L    Carbon Dioxide 28 20 - 32 mmol/L    Anion Gap 6 3 - 14 mmol/L    Glucose 96 70 - 99 mg/dL    Urea Nitrogen 19 7 - 30 mg/dL    Creatinine 0.67 0.52 - 1.04 mg/dL    GFR Estimate >90 >60 mL/min/[1.73_m2]    GFR Estimate If Black >90 >60 mL/min/[1.73_m2]    Calcium 9.1 8.5 - 10.1 mg/dL        IMPRESSION:   Reason for surgery/procedure: s/p left knee replacement   Diagnosis/reason for consult: Pre op evaluation    The proposed surgical procedure is considered LOW risk.    REVISED CARDIAC RISK INDEX  The patient has the following serious cardiovascular risks for perioperative complications such as (MI, PE, VFib and 3  AV Block):  No serious cardiac risks  INTERPRETATION: 0 risks: Class I (very low risk - 0.4% complication rate)    The patient has the following additional risks for perioperative complications:  No identified additional risks  The 10-year ASCVD risk score (Jolie BRITO Jr., et al., 2013) is: 4.6%    Values used to calculate the score:      Age: 64 years      Sex: Female      Is Non- : No      Diabetic: No      Tobacco smoker: No      Systolic Blood Pressure: 106 mmHg      Is BP treated: Yes      HDL Cholesterol: 56 mg/dL      Total Cholesterol: 164 mg/dL      ICD-10-CM    1. Preop general physical exam Z01.818 Hemoglobin     EKG 12-lead complete w/read - Clinics     Basic metabolic panel   2. S/P left knee surgery Z98.890    3. Obesity, Class III, BMI 40-49.9 (morbid obesity) (H) E66.01    4. Alpha-1-antitrypsin deficiency (H) E88.01    5. HTN, goal below 140/80 I10        RECOMMENDATIONS:       Cardiovascular Risk  Performs 4 METs exercise without symptoms (Light housework (dusting, washing dishes) and Climb a flight of stairs) .   Patient is already on a Beta Blocker. Continue Betablocker therapy after surgery, using Beta blocker order set as necessary for  NPO status.      Pulmonary Risk  Incentive spirometry post op  Respiratory Therapy (Respiratory Care IP Consult)  post op  NG tube decompression if abdominal distension or significant vomiting       Anemia  Anemia and does not require treatment prior to surgery.  Monitor Hemoglobin postoperatively.      --Patient is to take all scheduled medications on the day of surgery EXCEPT for modifications listed below.    Anticoagulant or Antiplatelet Medication Use  ASPIRIN: Discontinue ASA 7-10 days prior to procedure to reduce bleeding risk.   NSAIDS: Ibuprofen (Motrin):         Stop one week prior to surgery  Naproxen (Naprosyn):   Stop one week  prior to surgery    Hypertension  Asked to hold hydrochlorothiazide on morning of surgery.       APPROVAL GIVEN to proceed with proposed procedure, without further diagnostic evaluation       Signed Electronically by: Jennie Suazo MD    Copy of this evaluation report is provided to requesting physician.    [facility] Preop Guidelines    Revised Cardiac Risk Index

## 2019-05-07 NOTE — LETTER
May 7, 2019      Jacquie Harris  [address] [place] [id number]        Dear ,    We are writing to inform you of your test results.  Your test results fall within the expected range(s) or remain unchanged from previous results.  Please continue with current treatment plan.    Resulted Orders   Hemoglobin   Result Value Ref Range    Hemoglobin 11.9 11.7 - 15.7 g/dL   Basic metabolic panel   Result Value Ref Range    Sodium 137 133 - 144 mmol/L    Potassium 3.8 3.4 - 5.3 mmol/L    Chloride 103 94 - 109 mmol/L    Carbon Dioxide 28 20 - 32 mmol/L    Anion Gap 6 3 - 14 mmol/L    Glucose 96 70 - 99 mg/dL    Urea Nitrogen 19 7 - 30 mg/dL    Creatinine 0.67 0.52 - 1.04 mg/dL    GFR Estimate >90 >60 mL/min/[1.73_m2]      Comment:      Non  GFR Calc  Starting 12/18/2018, serum creatinine based estimated GFR (eGFR) will be   calculated using the Chronic Kidney Disease Epidemiology Collaboration   (CKD-EPI) equation.      GFR Estimate If Black >90 >60 mL/min/[1.73_m2]      Comment:       GFR Calc  Starting 12/18/2018, serum creatinine based estimated GFR (eGFR) will be   calculated using the Chronic Kidney Disease Epidemiology Collaboration   (CKD-EPI) equation.      Calcium 9.1 8.5 - 10.1 mg/dL   If you have any questions or concerns, please call the clinic at the number listed above.   Sincerely,  Jennie Suazo MD

## 2019-05-08 ENCOUNTER — TRANSFERRED RECORDS (OUTPATIENT)
Dept: HEALTH INFORMATION MANAGEMENT | Facility: CLINIC | Age: 64
End: 2019-05-08

## 2019-05-29 ENCOUNTER — TRANSFERRED RECORDS (OUTPATIENT)
Dept: HEALTH INFORMATION MANAGEMENT | Facility: CLINIC | Age: 64
End: 2019-05-29

## 2019-06-05 ENCOUNTER — HOSPITAL ENCOUNTER (OUTPATIENT)
Dept: PHYSICAL THERAPY | Facility: CLINIC | Age: 64
Setting detail: THERAPIES SERIES
End: 2019-06-05
Attending: FAMILY MEDICINE
Payer: COMMERCIAL

## 2019-06-05 PROCEDURE — 97161 PT EVAL LOW COMPLEX 20 MIN: CPT | Mod: GP | Performed by: PHYSICAL THERAPIST

## 2019-06-05 PROCEDURE — 97110 THERAPEUTIC EXERCISES: CPT | Mod: GP | Performed by: PHYSICAL THERAPIST

## 2019-06-05 NOTE — PROGRESS NOTES
"   06/05/19 1000   General Information   Type of Visit Initial OP Ortho PT Evaluation   Start of Care Date 06/05/19   Referring Physician Flavia Lanier PA-C   Patient/Family Goals Statement To get a long term HEP so she can stay at the [facility] for months at a time   Orders Evaluate and Treat   Date of Order 05/31/19   Certification Required? Yes   Medical Diagnosis Aftercare following left knee joint replacement surgery   Surgical/Medical history reviewed Yes   Precautions/Limitations no known precautions/limitations   Body Part(s)   Body Part(s) Knee   Presentation and Etiology   Pertinent history of current problem (include personal factors and/or comorbidities that impact the POC) Patient reports: 7 year history of a \"bad\" left TKA.  Underwent a revision surgery 1 week ago.  Can already tell that the knee is doing better.  Does not want to come back for PT for about a month - will [place] at [place] [facility].   Per chart review: patient underwent left TKA revision on 5/29/19 after experiencing instability and pain with the prosthetic knee.  Surgery was performed by Cedric Fuller at [facility].   Impairments A. Pain;B. Decreased WB tolerance;C. Swelling;D. Decreased ROM;E. Decreased flexibility;F. Decreased strength and endurance;G. Impaired balance;H. Impaired gait;J. Burning;K. Numbness;L. Tingling   Functional Limitations perform activities of daily living;perform desired leisure / sports activities   Symptom Location Left global knee; some feeling of swelling on cherrie plantar feet   How/Where did it occur From Degenerative Joint Disease   Onset date of current episode/exacerbation 05/29/19   Chronicity Chronic   Pain rating (0-10 point scale) Best (/10);Worst (/10)   Best (/10) 2   Worst (/10) 7   Pain quality A. Sharp;C. Aching   Frequency of pain/symptoms C. With activity   Pain/symptoms are: The same all the time   Pain/symptoms exacerbated by B. Walking;G. Certain positions   Pain/symptoms eased by " A. Sitting;C. Rest;H. Cold   Progression of symptoms since onset: Improved   Prior Level of Function   Prior Level of Function-Mobility Independent with household mobility however did not climb stairs - has a chair lift   Current Level of Function   Current Community Support Family/friend caregiver   Patient role/employment history G. Disabled   [facility]   Home/community accessibility Stair lift   Fall Risk Screen   Fall screen completed by PT   Have you fallen 2 or more times in the past year? No   Have you fallen and had an injury in the past year? No   Is patient a fall risk? No   Functional Scales   Functional Scales Other   Other Scales  See LEFS   Knee Objective Findings   Left Knee Extension AROM 0 deg   Left Knee Flexion AROM 101 deg   Left Knee Flexion Strength 4/5   Left Knee Extension Strength 3+/5   Left Hip Abduction Strength 4/5   Left Gastrocnemius Flexibility lacking 5 deg; right=5 deg   Side (if bilateral, select both right and left) Left   Gait/Locomotion With standard walker: reduced gait velocity, reduced stance time left LE, cherrie compensated Trendelenburg   Balance/Proprioception (Single Leg Stance) 0 sec   Integumentary  Incision clean and dry, no evidence of infection   Planned Therapy Interventions   Planned Therapy Interventions ADL retraining;balance training;gait training;joint mobilization;manual therapy;neuromuscular re-education;motor coordination training;ROM;strengthening;stretching   Planned Modality Interventions   Planned Modality Interventions Cryotherapy   Clinical Impression   Criteria for Skilled Therapeutic Interventions Met yes, treatment indicated   PT Diagnosis s/p left TKA revision performed 5/29/19   Influenced by the following impairments Pain; weakness; ROM loss; impaired gait and proprioception   Functional limitations due to impairments Difficulty walking, performing sit <> stand, performing ADL's   Clinical Presentation Stable/Uncomplicated    Clinical Presentation Rationale (+) motivation; understanding of PT (-) only able to follow up with PT in 1 month   Clinical Decision Making (Complexity) Low complexity   Therapy Frequency other (see comments)   Predicted Duration of Therapy Intervention (days/wks) 1x every 3 weeks x 12 weeks (patient will be at cabin for 3-4 week stretches of time)   Risk & Benefits of therapy have been explained Yes   Patient, Family & other staff in agreement with plan of care Yes   Clinical Impression Comments Karen arrives today 1 week s/p left TKA revision surgery; she will benefit from skilled PT to address the above impairments and functional limitations.   Education Assessment   Preferred Learning Style Listening;Demonstration;Pictures/video   Barriers to Learning No barriers   ORTHO GOALS   PT Ortho Eval Goals 1;2;3   Ortho Goal 1   Goal Description Patient will have >=0-125 degrees of left knee ROM to allow for normalized ADL's.   Target Date 07/17/19   Ortho Goal 2   Goal Description Patient will be able to walk household distances without an assistive device.   Target Date 08/28/19   Ortho Goal 3   Goal Description Patient will be able to perform sit <> stand off standard chair without difficulty.   Target Date 07/17/19   Total Evaluation Time   PT Eval, Low Complexity Minutes (37617) 15   Therapy Certification   Certification date from 06/05/19   Certification date to 08/28/19       Priyanka Tyson, PT, DPT  Doctor of Physical Therapy #[id number]  [facility]  [phone]  [email or web address]

## 2019-06-05 NOTE — PROGRESS NOTES
[facility]          OUTPATIENT PHYSICAL THERAPY ORTHOPEDIC EVALUATION  PLAN OF TREATMENT FOR OUTPATIENT REHABILITATION  (COMPLETE FOR INITIAL CLAIMS ONLY)  Patient's Last Name, First Name, M.I.  YOB: 1955  Jacquie Harris       Provider s Name:  [facility]   Medical Record No.  [id number]   Start of Care Date:  06/05/19   Onset Date:  05/29/19   Type:     _X__PT   ___OT   ___SLP Medical Diagnosis:        PT Diagnosis:  s/p left TKA revision performed 5/29/19   Visits from SOC:  1      _________________________________________________________________________________  Plan of Treatment/Functional Goals:  ADL retraining, balance training, gait training, joint mobilization, manual therapy, neuromuscular re-education, motor coordination training, ROM, strengthening, stretching     Cryotherapy     Goals     Goal Description: Patient will have >=0-125 degrees of left knee ROM to allow for normalized ADL's.  Target Date: 07/17/19       Goal Description: Patient will be able to walk household distances without an assistive device.  Target Date: 08/28/19       Goal Description: Patient will be able to perform sit <> stand off standard chair without difficulty.  Target Date: 07/17/19              Therapy Frequency:  other (see comments)  Predicted Duration of Therapy Intervention:  1x every 3 weeks x 12 weeks (patient will be at cabin for 3-4 week stretches of time)    Priyanka Tyson, PT                 I CERTIFY THE NEED FOR THESE SERVICES FURNISHED UNDER        THIS PLAN OF TREATMENT AND WHILE UNDER MY CARE .             Physician Signature               Date    X_____________________________________________________                             Certification Date From:  06/05/19   Certification Date To:  08/28/19    Referring Provider:  Flavia Lanier PA-C    Initial Assessment        See Epic Evaluation Start of Care Date: 06/05/19

## 2019-07-15 ENCOUNTER — OFFICE VISIT (OUTPATIENT)
Dept: ORTHOPEDICS | Facility: CLINIC | Age: 64
End: 2019-07-15
Payer: COMMERCIAL

## 2019-07-15 ENCOUNTER — ANCILLARY PROCEDURE (OUTPATIENT)
Dept: GENERAL RADIOLOGY | Facility: CLINIC | Age: 64
End: 2019-07-15
Attending: FAMILY MEDICINE
Payer: COMMERCIAL

## 2019-07-15 VITALS — BODY MASS INDEX: 46.94 KG/M2 | DIASTOLIC BLOOD PRESSURE: 80 MMHG | HEIGHT: 63 IN | SYSTOLIC BLOOD PRESSURE: 118 MMHG

## 2019-07-15 DIAGNOSIS — M25.511 ACUTE PAIN OF RIGHT SHOULDER: Primary | ICD-10-CM

## 2019-07-15 DIAGNOSIS — M25.511 ACUTE PAIN OF RIGHT SHOULDER: ICD-10-CM

## 2019-07-15 PROCEDURE — 73030 X-RAY EXAM OF SHOULDER: CPT | Mod: RT

## 2019-07-15 PROCEDURE — 99214 OFFICE O/P EST MOD 30 MIN: CPT | Mod: 25 | Performed by: FAMILY MEDICINE

## 2019-07-15 PROCEDURE — 20611 DRAIN/INJ JOINT/BURSA W/US: CPT | Mod: RT | Performed by: FAMILY MEDICINE

## 2019-07-15 RX ORDER — ROPIVACAINE HYDROCHLORIDE 5 MG/ML
3 INJECTION, SOLUTION EPIDURAL; INFILTRATION; PERINEURAL
Status: DISCONTINUED | OUTPATIENT
Start: 2019-07-15 | End: 2020-01-07

## 2019-07-15 RX ORDER — BETAMETHASONE SODIUM PHOSPHATE AND BETAMETHASONE ACETATE 3; 3 MG/ML; MG/ML
6 INJECTION, SUSPENSION INTRA-ARTICULAR; INTRALESIONAL; INTRAMUSCULAR; SOFT TISSUE
Status: DISCONTINUED | OUTPATIENT
Start: 2019-07-15 | End: 2020-01-07

## 2019-07-15 RX ADMIN — BETAMETHASONE SODIUM PHOSPHATE AND BETAMETHASONE ACETATE 6 MG: 3; 3 INJECTION, SUSPENSION INTRA-ARTICULAR; INTRALESIONAL; INTRAMUSCULAR; SOFT TISSUE at 11:00

## 2019-07-15 RX ADMIN — ROPIVACAINE HYDROCHLORIDE 3 ML: 5 INJECTION, SOLUTION EPIDURAL; INFILTRATION; PERINEURAL at 11:00

## 2019-07-15 NOTE — PATIENT INSTRUCTIONS
Diagnosis: Rotator cuff irritation  Image Findings: neg for fracture, acromioclavicular joint widening (AC joint)  Treatment: subacromial steroid injection, then home exercises start when feeling better usually 3-4 days  Medications: limited tylenol/ibuprofen  Follow-up: If pain not improved over the next 2-3 weeks    It was great seeing you again today!    Rex Figueroa    [facility] Injection Discharge Instructions      You may shower, however avoid swimming, tub baths or hot tubs for 24 hours following your procedure    You may have a mild to moderate increase in pain for several days following the injection.    It may take up to 14 days for the steroid medication to start working although you may feel the effect as early as a few days after the procedure.    You may use ice packs for 10-15 minutes, 3 to 4 times a day at the injection site for comfort    You may use anti-inflammatory medications (such as Ibuprofen or Aleve or Advil) or Tylenol for pain control if necessary    If you experience any of the following, call [facility] @ [phone] or [phone]  -Fever over 100 degree F  -Swelling, bleeding, redness, drainage, warmth at the injection site  - New or worsening pain

## 2019-07-15 NOTE — PROGRESS NOTES
ASSESSMENT & PLAN  Jacquie was seen today for pain.    Diagnoses and all orders for this visit:    Acute pain of right shoulder  -     XR Shoulder Right G/E 3 Views; Future  -     Large Joint Injection/Arthocentesis: R subacromial bursa      Patient is a 64 year old female presenting for evaluation of   Chief Complaint   Patient presents with     Right Shoulder - Pain      Right Shoulder Pain: Notable after inc use in the setting of recent Lt TKA in getting out of chairs.  Pain over ant/lateral shoulder with limitations in ROM and pain with empty can testing with no known injury new rotator cuff tear less likely.  Plan to tx as below, f/u PRN  Treatment: relative rest  Physical Therapy HEP  Injection subacromial steroid injection  Medications  Limited NSAIDs/Tylenol    Concerning signs/sx that would warrant urgent evaluation were discussed.  All questions were answered, patient understands and agrees with plan.      Return in about 3 weeks (around 8/5/2019).    -----    SUBJECTIVE  Jacquie Harris is a/an 64 year old Right handed female who is seen as a self referral for evaluation of right shoulder pain. The patient is seen by themselves.    Onset: 7 week(s) ago. Patient describes injury as having to increase use of arms since left knee revision.    Location of Pain: right shoulder radiating down biceps   Rating of Pain at worst: 10/10  Rating of Pain Currently: 2/10  Worsened by: abduction, overhead movement    Better with: rest   Treatments tried: rest/activity avoidance  Associated symptoms: no distal numbness or tingling; denies swelling or warmth  Orthopedic history: YES  Relevant surgical history: YES -DCE, SAD, RCR 8/25/17 and RCR revision 12/18/17 by Dr. Thompson   Past Medical History:   Diagnosis Date     HTN (hypertension)      IRON DEFICIENCY ANEMIA 7/5/2005     Iron infusion/ resolved since injections     DARWIN (obstructive sleep apnea)      Social History     Socioeconomic History     Marital status:       Spouse name: Not on file     Number of children: Not on file     Years of education: Not on file     Highest education level: Not on file   Occupational History     Not on file   Social Needs     Financial resource strain: Not on file     Food insecurity:     Worry: Not on file     Inability: Not on file     Transportation needs:     Medical: Not on file     Non-medical: Not on file   Tobacco Use     Smoking status: Never Smoker     Smokeless tobacco: Never Used   Substance and Sexual Activity     Alcohol use: Yes     Alcohol/week: 0.0 oz     Comment: mixed very light 1 a month if that     Drug use: No     Sexual activity: Yes     Partners: Male     Birth control/protection: Surgical   Lifestyle     Physical activity:     Days per week: Not on file     Minutes per session: Not on file     Stress: Not on file   Relationships     Social connections:     Talks on phone: Not on file     Gets together: Not on file     Attends Anabaptist service: Not on file     Active member of club or organization: Not on file     Attends meetings of clubs or organizations: Not on file     Relationship status: Not on file     Intimate partner violence:     Fear of current or ex partner: Not on file     Emotionally abused: Not on file     Physically abused: Not on file     Forced sexual activity: Not on file   Other Topics Concern     Parent/sibling w/ CABG, MI or angioplasty before 65F 55M? No      Service No     Blood Transfusions Yes     Comment: 1976, 1978, 1978, 1984     Caffeine Concern No     Occupational Exposure No     Hobby Hazards No     Sleep Concern No     Stress Concern No     Weight Concern Yes     Special Diet No     Back Care No     Exercise No     Bike Helmet No     Seat Belt Yes     Self-Exams Not Asked   Social History Narrative     Not on file         Patient's past medical, surgical, social, and family histories were reviewed today and no changes are noted.    REVIEW OF SYSTEMS:  10 point ROS is negative  "other than symptoms noted above in HPI, Past Medical History or as stated below  Constitutional: NEGATIVE for fever, chills, change in weight  Skin: NEGATIVE for worrisome rashes, moles or lesions  GI/: NEGATIVE for bowel or bladder changes  Neuro: NEGATIVE for weakness, dizziness or paresthesias    OBJECTIVE:  /80   Ht 1.6 m (5' 3\")   LMP 05/29/2006   BMI 46.94 kg/m     General: healthy, alert and in no distress  HEENT: no scleral icterus or conjunctival erythema  Skin: no suspicious lesions or rash. No jaundice.  CV: regular rhythm by palpation  Resp: normal respiratory effort without conversational dyspnea   Psych: normal mood and affect  Gait: normal steady gait with appropriate coordination and balance  Neuro: normal light touch sensory exam of the bilateral upper extremities.    MSK:  RIGHT SHOULDER  Inspection:    no swelling, bruising, discoloration, or obvious deformity or asymmetry  Palpation:    Tender about the proximal humerus and supraspinatus insertion. Remainder of bony and tendinous landmarks are nontender.  Active Range of Motion:     Abduction 900, , , IR hip pocket.    Strength:    Scapular plane abduction 5-/5, painful,  ER 5/5, IR 5/5, biceps 5/5, triceps 5/5  Special Tests:    Positive: Neer's, Phillips' and supraspinatus (empty can)    Negative: Terry's, Speed's and Yergason's    CERVICAL SPINE  Inspection:    normal cervical lordosis present, rounded shoulders, forward head posture  Palpation:  Nontender.  Range of Motion:     Flexion full    Extension full    Right side bend full    Left side bend full    Right rotation full    Left rotation full  Strength:    Full strength throughout all neck muscles  Special Tests:    Positive: None    Negative: Spurling's (bilateral)    Independent visualization of the below image:  Recent Results (from the past 24 hour(s))   XR Shoulder Right G/E 3 Views    Narrative    SHOULDER RIGHT THREE OR MORE VIEWS July 15, 2019 10:28 AM "     HISTORY: Acute pain of right shoulder.    FINDINGS: Cystic resorptive type change of the greater tuberosity  region. Osteopenia suspected.      Impression    IMPRESSION: The AC joint is wider than usual suggesting age  indeterminate AC separation. Otherwise unremarkable for technique.       Large Joint Injection/Arthocentesis: R subacromial bursa  Date/Time: 7/15/2019 11:00 AM  Performed by: Rex Figueroa MD  Authorized by: Rex Figueroa MD     Indications:  Pain  Needle Size:  25 G  Guidance: ultrasound    Approach:  Lateral  Location:  Shoulder      Site:  R subacromial bursa  Medications:  6 mg betamethasone acet & sod phos 6 (3-3) MG/ML; 3 mL ropivacaine 5 MG/ML  Medications comment:  Actual amount of ropivacaine used 4 mL  Outcome:  Tolerated well, no immediate complications  Procedure discussed: discussed risks, benefits, and alternatives    Consent Given by:  Patient  Timeout: timeout called immediately prior to procedure    Prep: patient was prepped and draped in usual sterile fashion     Patient reported significant improvement of pain after subacromial injection.  Aftercare instructions given to patient.  Plan to follow-up as discussed above.     MD MARIA DE JESUS [facility]          Patient's conditions were thoroughly discussed during today's visit with greater than 50% of the visit spent counseling the patient with total time spent face-to-face with the patient being 30 minutes.    MD MARIA DE JESUS [facility]

## 2019-07-15 NOTE — LETTER
7/15/2019         RE: Jacquie Harris  [address] [place] [place] [address]        Dear Colleague,    Thank you for referring your patient, Jacquie Harris, to the [facility] [place]. Please see a copy of my visit note below.    ASSESSMENT & PLAN  Jacquie was seen today for pain.    Diagnoses and all orders for this visit:    Acute pain of right shoulder  -     XR Shoulder Right G/E 3 Views; Future  -     Large Joint Injection/Arthocentesis: R subacromial bursa      Patient is a 64 year old female presenting for evaluation of   Chief Complaint   Patient presents with     Right Shoulder - Pain      Right Shoulder Pain: Notable after inc use in the setting of recent Lt TKA in getting out of chairs.  Pain over ant/lateral shoulder with limitations in ROM and pain with empty can testing with no known injury new rotator cuff tear less likely.  Plan to tx as below, f/u PRN  Treatment: relative rest  Physical Therapy HEP  Injection subacromial steroid injection  Medications  Limited NSAIDs/Tylenol    Concerning signs/sx that would warrant urgent evaluation were discussed.  All questions were answered, patient understands and agrees with plan.      Return in about 3 weeks (around 8/5/2019).    -----    SUBJECTIVE  Jacquie Harris is a/an 64 year old Right handed female who is seen as a self referral for evaluation of right shoulder pain. The patient is seen by themselves.    Onset: 7 week(s) ago. Patient describes injury as having to increase use of arms since left knee revision.    Location of Pain: right shoulder radiating down biceps   Rating of Pain at worst: 10/10  Rating of Pain Currently: 2/10  Worsened by: abduction, overhead movement    Better with: rest   Treatments tried: rest/activity avoidance  Associated symptoms: no distal numbness or tingling; denies swelling or warmth  Orthopedic history: YES  Relevant surgical history: YES -DCE, SAD, RCR 8/25/17 and RCR revision 12/18/17 by Dr. Thompson    Past Medical History:   Diagnosis Date     HTN (hypertension)      IRON DEFICIENCY ANEMIA 7/5/2005     Iron infusion/ resolved since injections     DARWIN (obstructive sleep apnea)      Social History     Socioeconomic History     Marital status:      Spouse name: Not on file     Number of children: Not on file     Years of education: Not on file     Highest education level: Not on file   Occupational History     Not on file   Social Needs     Financial resource strain: Not on file     Food insecurity:     Worry: Not on file     Inability: Not on file     Transportation needs:     Medical: Not on file     Non-medical: Not on file   Tobacco Use     Smoking status: Never Smoker     Smokeless tobacco: Never Used   Substance and Sexual Activity     Alcohol use: Yes     Alcohol/week: 0.0 oz     Comment: mixed very light 1 a month if that     Drug use: No     Sexual activity: Yes     Partners: Male     Birth control/protection: Surgical   Lifestyle     Physical activity:     Days per week: Not on file     Minutes per session: Not on file     Stress: Not on file   Relationships     Social connections:     Talks on phone: Not on file     Gets together: Not on file     Attends Jew service: Not on file     Active member of club or organization: Not on file     Attends meetings of clubs or organizations: Not on file     Relationship status: Not on file     Intimate partner violence:     Fear of current or ex partner: Not on file     Emotionally abused: Not on file     Physically abused: Not on file     Forced sexual activity: Not on file   Other Topics Concern     Parent/sibling w/ CABG, MI or angioplasty before 65F 55M? No      Service No     Blood Transfusions Yes     Comment: 1976, 1978, 1978, 1984     Caffeine Concern No     Occupational Exposure No     Hobby Hazards No     Sleep Concern No     Stress Concern No     Weight Concern Yes     Special Diet No     Back Care No     Exercise No     Bike Helmet  "No     Seat Belt Yes     Self-Exams Not Asked   Social History Narrative     Not on file         Patient's past medical, surgical, social, and family histories were reviewed today and no changes are noted.    REVIEW OF SYSTEMS:  10 point ROS is negative other than symptoms noted above in HPI, Past Medical History or as stated below  Constitutional: NEGATIVE for fever, chills, change in weight  Skin: NEGATIVE for worrisome rashes, moles or lesions  GI/: NEGATIVE for bowel or bladder changes  Neuro: NEGATIVE for weakness, dizziness or paresthesias    OBJECTIVE:  /80   Ht 1.6 m (5' 3\")   LMP 05/29/2006   BMI 46.94 kg/m      General: healthy, alert and in no distress  HEENT: no scleral icterus or conjunctival erythema  Skin: no suspicious lesions or rash. No jaundice.  CV: regular rhythm by palpation  Resp: normal respiratory effort without conversational dyspnea   Psych: normal mood and affect  Gait: normal steady gait with appropriate coordination and balance  Neuro: normal light touch sensory exam of the bilateral upper extremities.    MSK:  RIGHT SHOULDER  Inspection:    no swelling, bruising, discoloration, or obvious deformity or asymmetry  Palpation:    Tender about the proximal humerus and supraspinatus insertion. Remainder of bony and tendinous landmarks are nontender.  Active Range of Motion:     Abduction 900, , , IR hip pocket.    Strength:    Scapular plane abduction 5-/5, painful,  ER 5/5, IR 5/5, biceps 5/5, triceps 5/5  Special Tests:    Positive: Neer's, Phillips' and supraspinatus (empty can)    Negative: Wharton's, Speed's and Yergason's    CERVICAL SPINE  Inspection:    normal cervical lordosis present, rounded shoulders, forward head posture  Palpation:  Nontender.  Range of Motion:     Flexion full    Extension full    Right side bend full    Left side bend full    Right rotation full    Left rotation full  Strength:    Full strength throughout all neck muscles  Special " Tests:    Positive: None    Negative: Spurling's (bilateral)    Independent visualization of the below image:  Recent Results (from the past 24 hour(s))   XR Shoulder Right G/E 3 Views    Narrative    SHOULDER RIGHT THREE OR MORE VIEWS July 15, 2019 10:28 AM     HISTORY: Acute pain of right shoulder.    FINDINGS: Cystic resorptive type change of the greater tuberosity  region. Osteopenia suspected.      Impression    IMPRESSION: The AC joint is wider than usual suggesting age  indeterminate AC separation. Otherwise unremarkable for technique.       Large Joint Injection/Arthocentesis: R subacromial bursa  Date/Time: 7/15/2019 11:00 AM  Performed by: Rex Figueroa MD  Authorized by: Rex Figueroa MD     Indications:  Pain  Needle Size:  25 G  Guidance: ultrasound    Approach:  Lateral  Location:  Shoulder      Site:  R subacromial bursa  Medications:  6 mg betamethasone acet & sod phos 6 (3-3) MG/ML; 3 mL ropivacaine 5 MG/ML  Medications comment:  Actual amount of ropivacaine used 4 mL  Outcome:  Tolerated well, no immediate complications  Procedure discussed: discussed risks, benefits, and alternatives    Consent Given by:  Patient  Timeout: timeout called immediately prior to procedure    Prep: patient was prepped and draped in usual sterile fashion     Patient reported significant improvement of pain after subacromial injection.  Aftercare instructions given to patient.  Plan to follow-up as discussed above.     MD [facility]          Patient's conditions were thoroughly discussed during today's visit with greater than 50% of the visit spent counseling the patient with total time spent face-to-face with the patient being 30 minutes.    MD [facility]      Again, thank you for allowing me to participate in the care of your patient.        Sincerely,        Rex Figueroa MD

## 2019-07-25 ENCOUNTER — TELEPHONE (OUTPATIENT)
Dept: ORTHOPEDICS | Facility: CLINIC | Age: 64
End: 2019-07-25

## 2019-07-25 DIAGNOSIS — S46.001D INJURY OF RIGHT ROTATOR CUFF, SUBSEQUENT ENCOUNTER: Primary | ICD-10-CM

## 2019-07-25 NOTE — TELEPHONE ENCOUNTER
Reason for Call:  Other     Detailed comments: Pt had an injection in rt shoulder on 07/15 - calling with update:  States once the numbing medicine wore off the pain returned. She is still very sore. Wondering if an MRI is going to be ordered - Please advise    Phone Number Patient can be reached at: Home number on file [phone] (home)    Best Time: Any    Can we leave a detailed message on this number? YES    Call taken on 7/25/2019 at 1:12 PM by Denise Behrendt

## 2019-07-26 NOTE — TELEPHONE ENCOUNTER
Pain not improved after steroid injection.  Given persisting pain over right shoulder will order MRI and call pt with MRI results.  Pt understands and agrees with plan.      Rex Figueroa

## 2019-07-27 ENCOUNTER — HOSPITAL ENCOUNTER (OUTPATIENT)
Dept: MRI IMAGING | Facility: CLINIC | Age: 64
Discharge: HOME | End: 2019-07-27
Attending: FAMILY MEDICINE | Admitting: FAMILY MEDICINE
Payer: COMMERCIAL

## 2019-07-27 DIAGNOSIS — S46.001D INJURY OF RIGHT ROTATOR CUFF, SUBSEQUENT ENCOUNTER: ICD-10-CM

## 2019-07-27 PROCEDURE — 73221 MRI JOINT UPR EXTREM W/O DYE: CPT | Mod: RT

## 2019-07-30 ENCOUNTER — TELEPHONE (OUTPATIENT)
Dept: ORTHOPEDICS | Facility: CLINIC | Age: 64
End: 2019-07-30

## 2019-07-30 NOTE — TELEPHONE ENCOUNTER
Called pt about MRI results including new full thickness cartilage loss.  Pt now still in a fair amount pain despite subacromial injection.  Given this will have pt make f/u for possible GH injection.  Pt understands and agrees with plan.    Rex Figueroa

## 2019-08-05 ENCOUNTER — OFFICE VISIT (OUTPATIENT)
Dept: ORTHOPEDICS | Facility: CLINIC | Age: 64
End: 2019-08-05
Payer: COMMERCIAL

## 2019-08-05 DIAGNOSIS — M25.511 CHRONIC RIGHT SHOULDER PAIN: Primary | ICD-10-CM

## 2019-08-05 DIAGNOSIS — G89.29 CHRONIC RIGHT SHOULDER PAIN: Primary | ICD-10-CM

## 2019-08-05 PROCEDURE — 20611 DRAIN/INJ JOINT/BURSA W/US: CPT | Mod: RT | Performed by: FAMILY MEDICINE

## 2019-08-05 RX ORDER — BETAMETHASONE SODIUM PHOSPHATE AND BETAMETHASONE ACETATE 3; 3 MG/ML; MG/ML
6 INJECTION, SUSPENSION INTRA-ARTICULAR; INTRALESIONAL; INTRAMUSCULAR; SOFT TISSUE
Status: DISCONTINUED | OUTPATIENT
Start: 2019-08-05 | End: 2020-01-07

## 2019-08-05 RX ORDER — ROPIVACAINE HYDROCHLORIDE 5 MG/ML
3 INJECTION, SOLUTION EPIDURAL; INFILTRATION; PERINEURAL
Status: DISCONTINUED | OUTPATIENT
Start: 2019-08-05 | End: 2020-01-07

## 2019-08-05 RX ADMIN — ROPIVACAINE HYDROCHLORIDE 3 ML: 5 INJECTION, SOLUTION EPIDURAL; INFILTRATION; PERINEURAL at 09:20

## 2019-08-05 RX ADMIN — BETAMETHASONE SODIUM PHOSPHATE AND BETAMETHASONE ACETATE 6 MG: 3; 3 INJECTION, SUSPENSION INTRA-ARTICULAR; INTRALESIONAL; INTRAMUSCULAR; SOFT TISSUE at 09:20

## 2019-08-05 NOTE — PROGRESS NOTES
Large Joint Injection/Arthocentesis: R glenohumeral joint  Date/Time: 8/5/2019 9:20 AM  Performed by: Rex Figueroa MD  Authorized by: Rex Figueroa MD     Indications:  Pain  Needle Size:  25 G  Guidance: ultrasound    Approach:  Posterolateral  Location:  Shoulder      Site:  R glenohumeral joint  Medications:  6 mg betamethasone acet & sod phos 6 (3-3) MG/ML; 3 mL ropivacaine 5 MG/ML  Medications comment:  Actual amount of ropivacaine used 4 mL  Outcome:  Tolerated well, no immediate complications  Procedure discussed: discussed risks, benefits, and alternatives    Consent Given by:  Patient  Timeout: timeout called immediately prior to procedure    Prep: patient was prepped and draped in usual sterile fashion     Patient reported significant improvement of pain after injection.  MRI showing possible biceps tenosynovitis.  Can f/u if injection not helping.  Aftercare instructions given to patient.  Plan to follow-up as previously discussed with referring provider.     Rex Figueroa MD [facility]

## 2019-08-05 NOTE — PATIENT INSTRUCTIONS
[facility] Injection Discharge Instructions      You may shower, however avoid swimming, tub baths or hot tubs for 24 hours following your procedure    You may have a mild to moderate increase in pain for several days following the injection.    It may take up to 14 days for the steroid medication to start working although you may feel the effect as early as a few days after the procedure.    You may use ice packs for 10-15 minutes, 3 to 4 times a day at the injection site for comfort    You may use anti-inflammatory medications (such as Ibuprofen or Aleve or Advil) or Tylenol for pain control if necessary    If you were fasting, you may resume your normal diet and medications after the procedure    If you have diabetes, check your blood sugar more frequently than usual as your blood sugar may be higher than normal for 10-14 days following a steroid injection. Contact your doctor who manages your diabetes if your blood sugar is higher than usual      If you experience any of the following, call [facility] @ [phone] or [phone]  -Fever over 100 degree F  -Swelling, bleeding, redness, drainage, warmth at the injection site  - New or worsening pain

## 2019-08-05 NOTE — LETTER
8/5/2019         RE: Jacquie Harris  [address] [facility] [address]        Dear Colleague,    Thank you for referring your patient, Jacquie Harris, to the [facility]. Please see a copy of my visit note below.    Large Joint Injection/Arthocentesis: R glenohumeral joint  Date/Time: 8/5/2019 9:20 AM  Performed by: Rex Figueroa MD  Authorized by: Rex Figueroa MD     Indications:  Pain  Needle Size:  25 G  Guidance: ultrasound    Approach:  Posterolateral  Location:  Shoulder      Site:  R glenohumeral joint  Medications:  6 mg betamethasone acet & sod phos 6 (3-3) MG/ML; 3 mL ropivacaine 5 MG/ML  Medications comment:  Actual amount of ropivacaine used 4 mL  Outcome:  Tolerated well, no immediate complications  Procedure discussed: discussed risks, benefits, and alternatives    Consent Given by:  Patient  Timeout: timeout called immediately prior to procedure    Prep: patient was prepped and draped in usual sterile fashion     Patient reported significant improvement of pain after injection.  MRI showing possible biceps tenosynovitis.  Can f/u if injection not helping.  Aftercare instructions given to patient.  Plan to follow-up as previously discussed with referring provider.     eRx Figueroa MD [facility]            Again, thank you for allowing me to participate in the care of your patient.        Sincerely,        Rex Figueroa MD

## 2019-08-08 DIAGNOSIS — I10 BENIGN ESSENTIAL HYPERTENSION: ICD-10-CM

## 2019-08-08 RX ORDER — HYDROCHLOROTHIAZIDE 12.5 MG/1
TABLET ORAL
Qty: 90 TABLET | Refills: 2 | Status: SHIPPED | OUTPATIENT
Start: 2019-08-08 | End: 2020-02-12

## 2019-08-08 RX ORDER — ATENOLOL 50 MG/1
TABLET ORAL
Qty: 90 TABLET | Refills: 2 | Status: SHIPPED | OUTPATIENT
Start: 2019-08-08 | End: 2020-02-12

## 2019-08-08 NOTE — TELEPHONE ENCOUNTER
"Both meds:  Last Written Prescription Date:  2/1/2019  Last Fill Quantity: 90,  # refills: 1   Last office visit: 5/7/2019 with prescribing provider:  Gabriele   Future Office Visit:      Requested Prescriptions   Pending Prescriptions Disp Refills     hydrochlorothiazide (HYDRODIURIL) 12.5 MG tablet [Pharmacy Med Name: HYDROCHLOROTHIAZIDE 12.5MG TABS] 90 tablet 1     Sig: TAKE ONE TABLET BY MOUTH ONCE DAILY       Diuretics (Including Combos) Protocol Passed - 8/8/2019 12:21 PM        Passed - Blood pressure under 140/90 in past 12 months     BP Readings from Last 3 Encounters:   07/15/19 118/80   05/07/19 106/64   05/01/19 159/82                 Passed - Recent (12 mo) or future (30 days) visit within the authorizing provider's specialty     Patient had office visit in the last 12 months or has a visit in the next 30 days with authorizing provider or within the authorizing provider's specialty.  See \"Patient Info\" tab in inbasket, or \"Choose Columns\" in Meds & Orders section of the refill encounter.              Passed - Medication is active on med list        Passed - Patient is age 18 or older        Passed - No active pregancy on record        Passed - Normal serum creatinine on file in past 12 months     Recent Labs   Lab Test 05/07/19  1031   CR 0.67              Passed - Normal serum potassium on file in past 12 months     Recent Labs   Lab Test 05/07/19  1031   POTASSIUM 3.8                    Passed - Normal serum sodium on file in past 12 months     Recent Labs   Lab Test 05/07/19  1031                 Passed - No positive pregnancy test in past 12 months        atenolol (TENORMIN) 50 MG tablet [Pharmacy Med Name: ATENOLOL 50MG TABS] 90 tablet 1     Sig: TAKE ONE TABLET BY MOUTH ONCE DAILY       Beta-Blockers Protocol Passed - 8/8/2019 12:21 PM        Passed - Blood pressure under 140/90 in past 12 months     BP Readings from Last 3 Encounters:   07/15/19 118/80   05/07/19 106/64   05/01/19 159/82 " "                Passed - Patient is age 6 or older        Passed - Recent (12 mo) or future (30 days) visit within the authorizing provider's specialty     Patient had office visit in the last 12 months or has a visit in the next 30 days with authorizing provider or within the authorizing provider's specialty.  See \"Patient Info\" tab in inbasket, or \"Choose Columns\" in Meds & Orders section of the refill encounter.              Passed - Medication is active on med list          "

## 2019-08-30 ENCOUNTER — OFFICE VISIT (OUTPATIENT)
Dept: ORTHOPEDICS | Facility: CLINIC | Age: 64
End: 2019-08-30
Payer: COMMERCIAL

## 2019-08-30 VITALS — DIASTOLIC BLOOD PRESSURE: 62 MMHG | BODY MASS INDEX: 46.94 KG/M2 | HEIGHT: 63 IN | SYSTOLIC BLOOD PRESSURE: 114 MMHG

## 2019-08-30 DIAGNOSIS — M25.511 CHRONIC RIGHT SHOULDER PAIN: Primary | ICD-10-CM

## 2019-08-30 DIAGNOSIS — G89.29 CHRONIC RIGHT SHOULDER PAIN: Primary | ICD-10-CM

## 2019-08-30 PROCEDURE — 99213 OFFICE O/P EST LOW 20 MIN: CPT | Performed by: FAMILY MEDICINE

## 2019-08-30 NOTE — LETTER
8/30/2019         RE: Jacquie Harris  [address] [facility] [id number]        Dear Colleague,    Thank you for referring your patient, Jacquie Harris, to the [facility]. Please see a copy of my visit note below.    ASSESSMENT & PLAN  There are no diagnoses linked to this encounter.  Patient is a 64 year old female presenting for evaluation of   Chief Complaint   Patient presents with     Right Shoulder - Pain      Right Shoulder Pain: Notable after inc use in the setting of recent Lt TKA in getting out of chairs.  Pain over ant/lateral shoulder with limitations in ROM and pain with empty can testing with no known injury new rotator cuff tear less likely.  Plan to tx as below, f/u PRN  Treatment: relative rest  Physical Therapy HEP  Injection subacromial steroid injection  Medications  Limited NSAIDs/Tylenol    Concerning signs/sx that would warrant urgent evaluation were discussed.  All questions were answered, patient understands and agrees with plan.      No follow-ups on file.    -----    SUBJECTIVE  Jacquie Harris is a/an 64 year old Right handed female who is seen as a self referral for evaluation of right shoulder pain. The patient is seen by themselves.    Onset: 7 week(s) ago. Patient describes injury as having to increase use of arms since left knee revision.    Location of Pain: right shoulder radiating down biceps   Rating of Pain at worst: 10/10  Rating of Pain Currently: 2/10  Worsened by: abduction, overhead movement    Better with: rest   Treatments tried: rest/activity avoidance  Associated symptoms: no distal numbness or tingling; denies swelling or warmth  Orthopedic history: YES  Relevant surgical history: YES -DCE, SAD, RCR 8/25/17 and RCR revision 12/18/17 by Dr. Thompson     Interim History - August 30, 2019  Since last visit on 8/5/2019 patient has persisting right shoulder pain.  GH steroid injection on 8/5/19 provided minimal relief. No interim injury.    Pain with lifting, moving shoulder.  Pt hoping for 2nd injection     Past Medical History:   Diagnosis Date     HTN (hypertension)      IRON DEFICIENCY ANEMIA 7/5/2005     Iron infusion/ resolved since injections     DARWIN (obstructive sleep apnea)      Social History     Socioeconomic History     Marital status:      Spouse name: Not on file     Number of children: Not on file     Years of education: Not on file     Highest education level: Not on file   Occupational History     Not on file   Social Needs     Financial resource strain: Not on file     Food insecurity:     Worry: Not on file     Inability: Not on file     Transportation needs:     Medical: Not on file     Non-medical: Not on file   Tobacco Use     Smoking status: Never Smoker     Smokeless tobacco: Never Used   Substance and Sexual Activity     Alcohol use: Yes     Alcohol/week: 0.0 oz     Comment: mixed very light 1 a month if that     Drug use: No     Sexual activity: Yes     Partners: Male     Birth control/protection: Surgical   Lifestyle     Physical activity:     Days per week: Not on file     Minutes per session: Not on file     Stress: Not on file   Relationships     Social connections:     Talks on phone: Not on file     Gets together: Not on file     Attends Jehovah's witness service: Not on file     Active member of club or organization: Not on file     Attends meetings of clubs or organizations: Not on file     Relationship status: Not on file     Intimate partner violence:     Fear of current or ex partner: Not on file     Emotionally abused: Not on file     Physically abused: Not on file     Forced sexual activity: Not on file   Other Topics Concern     Parent/sibling w/ CABG, MI or angioplasty before 65F 55M? No      Service No     Blood Transfusions Yes     Comment: 1976, 1978, 1978, 1984     Caffeine Concern No     Occupational Exposure No     Hobby Hazards No     Sleep Concern No     Stress Concern No     Weight Concern Yes  "    Special Diet No     Back Care No     Exercise No     Bike Helmet No     Seat Belt Yes     Self-Exams Not Asked   Social History Narrative     Not on file         Patient's past medical, surgical, social, and family histories were reviewed today and no changes are noted.    REVIEW OF SYSTEMS:  10 point ROS is negative other than symptoms noted above in HPI, Past Medical History or as stated below  Constitutional: NEGATIVE for fever, chills, change in weight  Skin: NEGATIVE for worrisome rashes, moles or lesions  GI/: NEGATIVE for bowel or bladder changes  Neuro: NEGATIVE for weakness, dizziness or paresthesias    OBJECTIVE:  /62   Ht 1.6 m (5' 3\")   LMP 05/29/2006   BMI 46.94 kg/m      General: healthy, alert and in no distress  HEENT: no scleral icterus or conjunctival erythema  Skin: no suspicious lesions or rash. No jaundice.  CV: regular rhythm by palpation  Resp: normal respiratory effort without conversational dyspnea   Psych: normal mood and affect  Gait: normal steady gait with appropriate coordination and balance  Neuro: normal light touch sensory exam of the bilateral upper extremities.    MSK:  RIGHT SHOULDER  Inspection:    no swelling, bruising, discoloration, or obvious deformity or asymmetry  Palpation:    Tender about the proximal humerus and supraspinatus insertion. Remainder of bony and tendinous landmarks are nontender.  Active Range of Motion:     Abduction 900, , , IR hip pocket.    Strength:    Scapular plane abduction 5-/5, painful,  ER 5/5, IR 5/5, biceps 5/5, triceps 5/5  Special Tests:    Positive: Neer's, Phillips' and supraspinatus (empty can)    Negative: Jenkins's, Speed's and Yergason's    CERVICAL SPINE  Inspection:    normal cervical lordosis present, rounded shoulders, forward head posture  Palpation:  Nontender.  Range of Motion:     Flexion full    Extension full    Right side bend full    Left side bend full    Right rotation full    Left rotation " full  Strength:    Full strength throughout all neck muscles  Special Tests:    Positive: None    Negative: Spurling's (bilateral)    Independent visualization of the below image:  No results found for this or any previous visit (from the past 24 hour(s)).        Patient's conditions were thoroughly discussed during today's visit with greater than 50% of the visit spent counseling the patient with total time spent face-to-face with the patient being 30 minutes.    Rex Figueroa MD [facility]      ASSESSMENT & PLAN  Jacquie was seen today for pain.    Diagnoses and all orders for this visit:    Chronic right shoulder pain  -     ORTHO  REFERRAL      Patient is a 64 year old female presenting for evaluation of   Chief Complaint   Patient presents with     Right Shoulder - Pain      Right Shoulder Pain: Notable after inc use in the setting of recent Lt TKA in getting out of chairs.  Pain not improved with steroid injections in the subacromial space and glenohumeral joint and sx causing pain and weakness as well as limited ROM noted on exam today.  MRI showing post RTC repair with cartilage loss of humeral head.  Given this pt may need surgery to help with pain.  Pt referred to [facility] for consideration of this.  F/u with me PRN.  Treatment: relative rest, refer to ortho surgery  Physical Therapy HEP  Injection none today  Medications  Limited NSAIDs/Tylenol    Concerning signs/sx that would warrant urgent evaluation were discussed.  All questions were answered, patient understands and agrees with plan.      Return if symptoms worsen or fail to improve.    -----    SUBJECTIVE  Jacquie Harris is a/an 64 year old Right handed female who is seen as a self referral for evaluation of right shoulder pain. The patient is seen by themselves.    Onset: 7 week(s) ago. Patient describes injury as having to increase use of arms since left knee revision.    Location of Pain: right shoulder  radiating down biceps   Rating of Pain at worst: 10/10  Rating of Pain Currently: 2/10  Worsened by: abduction, overhead movement    Better with: rest   Treatments tried: rest/activity avoidance  Associated symptoms: no distal numbness or tingling; denies swelling or warmth  Orthopedic history: YES  Relevant surgical history: YES -DCE, SAD, RCR 8/25/17 and RCR revision 12/18/17 by Dr. Thompson     Interim History - August 30, 2019  Since last visit on 8/5/2019 patient has persisting right shoulder pain.  GH steroid injection on 8/5/19 provided minimal relief. No interim injury.   Pain with lifting, moving shoulder.  Pt hoping for 2nd injection     Past Medical History:   Diagnosis Date     HTN (hypertension)      IRON DEFICIENCY ANEMIA 7/5/2005     Iron infusion/ resolved since injections     DARWIN (obstructive sleep apnea)      Social History     Socioeconomic History     Marital status:      Spouse name: Not on file     Number of children: Not on file     Years of education: Not on file     Highest education level: Not on file   Occupational History     Not on file   Social Needs     Financial resource strain: Not on file     Food insecurity:     Worry: Not on file     Inability: Not on file     Transportation needs:     Medical: Not on file     Non-medical: Not on file   Tobacco Use     Smoking status: Never Smoker     Smokeless tobacco: Never Used   Substance and Sexual Activity     Alcohol use: Yes     Alcohol/week: 0.0 oz     Comment: mixed very light 1 a month if that     Drug use: No     Sexual activity: Yes     Partners: Male     Birth control/protection: Surgical   Lifestyle     Physical activity:     Days per week: Not on file     Minutes per session: Not on file     Stress: Not on file   Relationships     Social connections:     Talks on phone: Not on file     Gets together: Not on file     Attends Tenriism service: Not on file     Active member of club or organization: Not on file     Attends  "meetings of clubs or organizations: Not on file     Relationship status: Not on file     Intimate partner violence:     Fear of current or ex partner: Not on file     Emotionally abused: Not on file     Physically abused: Not on file     Forced sexual activity: Not on file   Other Topics Concern     Parent/sibling w/ CABG, MI or angioplasty before 65F 55M? No      Service No     Blood Transfusions Yes     Comment: 1976, 1978, 1978, 1984     Caffeine Concern No     Occupational Exposure No     Hobby Hazards No     Sleep Concern No     Stress Concern No     Weight Concern Yes     Special Diet No     Back Care No     Exercise No     Bike Helmet No     Seat Belt Yes     Self-Exams Not Asked   Social History Narrative     Not on file         Patient's past medical, surgical, social, and family histories were reviewed today and no changes are noted.    REVIEW OF SYSTEMS:  10 point ROS is negative other than symptoms noted above in HPI, Past Medical History or as stated below  Constitutional: NEGATIVE for fever, chills, change in weight  Skin: NEGATIVE for worrisome rashes, moles or lesions  GI/: NEGATIVE for bowel or bladder changes  Neuro: NEGATIVE for weakness, dizziness or paresthesias    OBJECTIVE:  /62   Ht 1.6 m (5' 3\")   LMP 05/29/2006   BMI 46.94 kg/m      General: healthy, alert and in no distress  HEENT: no scleral icterus or conjunctival erythema  Skin: no suspicious lesions or rash. No jaundice.  CV: regular rhythm by palpation  Resp: normal respiratory effort without conversational dyspnea   Psych: normal mood and affect  Gait: normal steady gait with appropriate coordination and balance  Neuro: normal light touch sensory exam of the bilateral upper extremities.    MSK:  RIGHT SHOULDER  Inspection:    no swelling, bruising, discoloration, or obvious deformity or asymmetry  Palpation:    Tender about the proximal humerus and supraspinatus insertion. Remainder of bony and tendinous landmarks " are nontender.  Active Range of Motion:     Abduction 900, , , IR hip pocket.    Strength:    Scapular plane abduction 5-/5, painful,  ER 5/5, IR 5/5, biceps 5/5, triceps 5/5  Special Tests:    Positive: Neer's, Phillips' and supraspinatus (empty can)    Negative: [place]'s, Speed's and Yergason's    CERVICAL SPINE  Inspection:    normal cervical lordosis present, rounded shoulders, forward head posture  Palpation:  Nontender.  Range of Motion:     Flexion full    Extension full    Right side bend full    Left side bend full    Right rotation full    Left rotation full  Strength:    Full strength throughout all neck muscles  Special Tests:    Positive: None    Negative: Spurling's (bilateral)    Independent visualization of the below image:  No results found for this or any previous visit (from the past 24 hour(s)).  IMPRESSION:   1. Interval supraspinatus tendon repair since 8/12/2017. No evidence  of residual or recurrent tear. However, mild to moderate supraspinatus  muscle atrophy has developed.  2. Acromioplasty and distal clavicular resection without residual or  recurrent narrowing of the subacromial space by the coracoacromial  arch configuration. No evidence of underlying bursitis.  3. Small glenohumeral joint effusion. Small amount of fluid distending  the biceps tendon sheath which may be joint fluid decompressing into  the sheath, although, a biceps tenosynovitis could also give this  appearance.  4. New full-thickness cartilage loss in the superomedial aspect of the  humeral head.     FRANCESCA LEO MD    MRI reviewed with patient    Patient's conditions were thoroughly discussed during today's visit with greater than 50% of the visit spent counseling the patient with total time spent face-to-face with the patient being 30 minutes.    Rex Figueroa MD [facility]      Again, thank you for allowing me to participate in the care of your patient.         Sincerely,        Rex Figueroa MD

## 2019-08-30 NOTE — PATIENT INSTRUCTIONS
Diagnosis: Chronic right shoulder pain with arthritis and chronic rotator cuff pain  Image Findings: Shoulder arthritis, weakening of the rotator cuff muscles  Treatment: Refer to orthopedic surgery  Medications: Limited tylenol/ibuprofen for pain for 1-2 weeks  Follow-up: As needed for repeat injection    It was great seeing you today!    Rex Figueroa

## 2019-08-30 NOTE — PROGRESS NOTES
ASSESSMENT & PLAN  There are no diagnoses linked to this encounter.  Patient is a 64 year old female presenting for evaluation of   Chief Complaint   Patient presents with     Right Shoulder - Pain      Right Shoulder Pain: Notable after inc use in the setting of recent Lt TKA in getting out of chairs.  Pain over ant/lateral shoulder with limitations in ROM and pain with empty can testing with no known injury new rotator cuff tear less likely.  Plan to tx as below, f/u PRN  Treatment: relative rest  Physical Therapy HEP  Injection subacromial steroid injection  Medications  Limited NSAIDs/Tylenol    Concerning signs/sx that would warrant urgent evaluation were discussed.  All questions were answered, patient understands and agrees with plan.      No follow-ups on file.    -----    SUBJECTIVE  Jacquie Harris is a/an 64 year old Right handed female who is seen as a self referral for evaluation of right shoulder pain. The patient is seen by themselves.    Onset: 7 week(s) ago. Patient describes injury as having to increase use of arms since left knee revision.    Location of Pain: right shoulder radiating down biceps   Rating of Pain at worst: 10/10  Rating of Pain Currently: 2/10  Worsened by: abduction, overhead movement    Better with: rest   Treatments tried: rest/activity avoidance  Associated symptoms: no distal numbness or tingling; denies swelling or warmth  Orthopedic history: YES  Relevant surgical history: YES -DCE, SAD, RCR 8/25/17 and RCR revision 12/18/17 by Dr. Thompson     Interim History - August 30, 2019  Since last visit on 8/5/2019 patient has persisting right shoulder pain.  GH steroid injection on 8/5/19 provided minimal relief. No interim injury.   Pain with lifting, moving shoulder.  Pt hoping for 2nd injection     Past Medical History:   Diagnosis Date     HTN (hypertension)      IRON DEFICIENCY ANEMIA 7/5/2005     Iron infusion/ resolved since injections     DARWIN (obstructive sleep apnea)       Social History     Socioeconomic History     Marital status:      Spouse name: Not on file     Number of children: Not on file     Years of education: Not on file     Highest education level: Not on file   Occupational History     Not on file   Social Needs     Financial resource strain: Not on file     Food insecurity:     Worry: Not on file     Inability: Not on file     Transportation needs:     Medical: Not on file     Non-medical: Not on file   Tobacco Use     Smoking status: Never Smoker     Smokeless tobacco: Never Used   Substance and Sexual Activity     Alcohol use: Yes     Alcohol/week: 0.0 oz     Comment: mixed very light 1 a month if that     Drug use: No     Sexual activity: Yes     Partners: Male     Birth control/protection: Surgical   Lifestyle     Physical activity:     Days per week: Not on file     Minutes per session: Not on file     Stress: Not on file   Relationships     Social connections:     Talks on phone: Not on file     Gets together: Not on file     Attends Bahai service: Not on file     Active member of club or organization: Not on file     Attends meetings of clubs or organizations: Not on file     Relationship status: Not on file     Intimate partner violence:     Fear of current or ex partner: Not on file     Emotionally abused: Not on file     Physically abused: Not on file     Forced sexual activity: Not on file   Other Topics Concern     Parent/sibling w/ CABG, MI or angioplasty before 65F 55M? No      Service No     Blood Transfusions Yes     Comment: 1976, 1978, 1978, 1984     Caffeine Concern No     Occupational Exposure No     Hobby Hazards No     Sleep Concern No     Stress Concern No     Weight Concern Yes     Special Diet No     Back Care No     Exercise No     Bike Helmet No     Seat Belt Yes     Self-Exams Not Asked   Social History Narrative     Not on file         Patient's past medical, surgical, social, and family histories were reviewed today  "and no changes are noted.    REVIEW OF SYSTEMS:  10 point ROS is negative other than symptoms noted above in HPI, Past Medical History or as stated below  Constitutional: NEGATIVE for fever, chills, change in weight  Skin: NEGATIVE for worrisome rashes, moles or lesions  GI/: NEGATIVE for bowel or bladder changes  Neuro: NEGATIVE for weakness, dizziness or paresthesias    OBJECTIVE:  /62   Ht 1.6 m (5' 3\")   LMP 05/29/2006   BMI 46.94 kg/m     General: healthy, alert and in no distress  HEENT: no scleral icterus or conjunctival erythema  Skin: no suspicious lesions or rash. No jaundice.  CV: regular rhythm by palpation  Resp: normal respiratory effort without conversational dyspnea   Psych: normal mood and affect  Gait: normal steady gait with appropriate coordination and balance  Neuro: normal light touch sensory exam of the bilateral upper extremities.    MSK:  RIGHT SHOULDER  Inspection:    no swelling, bruising, discoloration, or obvious deformity or asymmetry  Palpation:    Tender about the proximal humerus and supraspinatus insertion. Remainder of bony and tendinous landmarks are nontender.  Active Range of Motion:     Abduction 900, , , IR hip pocket.    Strength:    Scapular plane abduction 5-/5, painful,  ER 5/5, IR 5/5, biceps 5/5, triceps 5/5  Special Tests:    Positive: Neer's, Phillips' and supraspinatus (empty can)    Negative: [place]'s, Speed's and Yergason's    CERVICAL SPINE  Inspection:    normal cervical lordosis present, rounded shoulders, forward head posture  Palpation:  Nontender.  Range of Motion:     Flexion full    Extension full    Right side bend full    Left side bend full    Right rotation full    Left rotation full  Strength:    Full strength throughout all neck muscles  Special Tests:    Positive: None    Negative: Spurling's (bilateral)    Independent visualization of the below image:  No results found for this or any previous visit (from the past 24 " hour(s)).        Patient's conditions were thoroughly discussed during today's visit with greater than 50% of the visit spent counseling the patient with total time spent face-to-face with the patient being 30 minutes.    Rex Figueroa MD [facility]

## 2019-09-04 NOTE — PROGRESS NOTES
ASSESSMENT & PLAN  Jacquie was seen today for pain.    Diagnoses and all orders for this visit:    Chronic right shoulder pain  -     ORTHO  REFERRAL      Patient is a 64 year old female presenting for evaluation of   Chief Complaint   Patient presents with     Right Shoulder - Pain      Right Shoulder Pain: Notable after inc use in the setting of recent Lt TKA in getting out of chairs.  Pain not improved with steroid injections in the subacromial space and glenohumeral joint and sx causing pain and weakness as well as limited ROM noted on exam today.  MRI showing post RTC repair with cartilage loss of humeral head.  Given this pt may need surgery to help with pain.  Pt referred to [facility] for consideration of this.  F/u with me PRN.  Treatment: relative rest, refer to ortho surgery  Physical Therapy HEP  Injection none today  Medications  Limited NSAIDs/Tylenol    Concerning signs/sx that would warrant urgent evaluation were discussed.  All questions were answered, patient understands and agrees with plan.      Return if symptoms worsen or fail to improve.    -----    SUBJECTIVE  Jacquie Harris is a/an 64 year old Right handed female who is seen as a self referral for evaluation of right shoulder pain. The patient is seen by themselves.    Onset: 7 week(s) ago. Patient describes injury as having to increase use of arms since left knee revision.    Location of Pain: right shoulder radiating down biceps   Rating of Pain at worst: 10/10  Rating of Pain Currently: 2/10  Worsened by: abduction, overhead movement    Better with: rest   Treatments tried: rest/activity avoidance  Associated symptoms: no distal numbness or tingling; denies swelling or warmth  Orthopedic history: YES  Relevant surgical history: YES -DCE, SAD, RCR 8/25/17 and RCR revision 12/18/17 by Dr. Thompson     Interim History - August 30, 2019  Since last visit on 8/5/2019 patient has persisting right shoulder pain.  GH steroid injection on 8/5/19  provided minimal relief. No interim injury.   Pain with lifting, moving shoulder.  Pt hoping for 2nd injection     Past Medical History:   Diagnosis Date     HTN (hypertension)      IRON DEFICIENCY ANEMIA 7/5/2005     Iron infusion/ resolved since injections     DARWIN (obstructive sleep apnea)      Social History     Socioeconomic History     Marital status:      Spouse name: Not on file     Number of children: Not on file     Years of education: Not on file     Highest education level: Not on file   Occupational History     Not on file   Social Needs     Financial resource strain: Not on file     Food insecurity:     Worry: Not on file     Inability: Not on file     Transportation needs:     Medical: Not on file     Non-medical: Not on file   Tobacco Use     Smoking status: Never Smoker     Smokeless tobacco: Never Used   Substance and Sexual Activity     Alcohol use: Yes     Alcohol/week: 0.0 oz     Comment: mixed very light 1 a month if that     Drug use: No     Sexual activity: Yes     Partners: Male     Birth control/protection: Surgical   Lifestyle     Physical activity:     Days per week: Not on file     Minutes per session: Not on file     Stress: Not on file   Relationships     Social connections:     Talks on phone: Not on file     Gets together: Not on file     Attends L[employer]-day service: Not on file     Active member of club or organization: Not on file     Attends meetings of clubs or organizations: Not on file     Relationship status: Not on file     Intimate partner violence:     Fear of current or ex partner: Not on file     Emotionally abused: Not on file     Physically abused: Not on file     Forced sexual activity: Not on file   Other Topics Concern     Parent/sibling w/ CABG, MI or angioplasty before 65F 55M? No      Service No     Blood Transfusions Yes     Comment: 1976, 1978, 1978, 1984     Caffeine Concern No     Occupational Exposure No     Hobby Hazards No     Sleep Concern No  "    Stress Concern No     Weight Concern Yes     Special Diet No     Back Care No     Exercise No     Bike Helmet No     Seat Belt Yes     Self-Exams Not Asked   Social History Narrative     Not on file         Patient's past medical, surgical, social, and family histories were reviewed today and no changes are noted.    REVIEW OF SYSTEMS:  10 point ROS is negative other than symptoms noted above in HPI, Past Medical History or as stated below  Constitutional: NEGATIVE for fever, chills, change in weight  Skin: NEGATIVE for worrisome rashes, moles or lesions  GI/: NEGATIVE for bowel or bladder changes  Neuro: NEGATIVE for weakness, dizziness or paresthesias    OBJECTIVE:  /62   Ht 1.6 m (5' 3\")   LMP 05/29/2006   BMI 46.94 kg/m     General: healthy, alert and in no distress  HEENT: no scleral icterus or conjunctival erythema  Skin: no suspicious lesions or rash. No jaundice.  CV: regular rhythm by palpation  Resp: normal respiratory effort without conversational dyspnea   Psych: normal mood and affect  Gait: normal steady gait with appropriate coordination and balance  Neuro: normal light touch sensory exam of the bilateral upper extremities.    MSK:  RIGHT SHOULDER  Inspection:    no swelling, bruising, discoloration, or obvious deformity or asymmetry  Palpation:    Tender about the proximal humerus and supraspinatus insertion. Remainder of bony and tendinous landmarks are nontender.  Active Range of Motion:     Abduction 900, , , IR hip pocket.    Strength:    Scapular plane abduction 5-/5, painful,  ER 5/5, IR 5/5, biceps 5/5, triceps 5/5  Special Tests:    Positive: Neer's, Phillips' and supraspinatus (empty can)    Negative: [place]'s, Speed's and Yergason's    CERVICAL SPINE  Inspection:    normal cervical lordosis present, rounded shoulders, forward head posture  Palpation:  Nontender.  Range of Motion:     Flexion full    Extension full    Right side bend full    Left side bend full    " Right rotation full    Left rotation full  Strength:    Full strength throughout all neck muscles  Special Tests:    Positive: None    Negative: Spurling's (bilateral)    Independent visualization of the below image:  No results found for this or any previous visit (from the past 24 hour(s)).  IMPRESSION:   1. Interval supraspinatus tendon repair since 8/12/2017. No evidence  of residual or recurrent tear. However, mild to moderate supraspinatus  muscle atrophy has developed.  2. Acromioplasty and distal clavicular resection without residual or  recurrent narrowing of the subacromial space by the coracoacromial  arch configuration. No evidence of underlying bursitis.  3. Small glenohumeral joint effusion. Small amount of fluid distending  the biceps tendon sheath which may be joint fluid decompressing into  the sheath, although, a biceps tenosynovitis could also give this  appearance.  4. New full-thickness cartilage loss in the superomedial aspect of the  humeral head.     FRANCESCA LEO MD    MRI reviewed with patient    Patient's conditions were thoroughly discussed during today's visit with greater than 50% of the visit spent counseling the patient with total time spent face-to-face with the patient being 30 minutes.    Rex Figueroa MD [facility]

## 2019-09-16 ENCOUNTER — HOSPITAL ENCOUNTER (OUTPATIENT)
Dept: PHYSICAL THERAPY | Facility: CLINIC | Age: 64
Setting detail: THERAPIES SERIES
End: 2019-09-16
Attending: FAMILY MEDICINE
Payer: COMMERCIAL

## 2019-09-16 PROCEDURE — 97110 THERAPEUTIC EXERCISES: CPT | Mod: GP

## 2019-09-16 PROCEDURE — 97161 PT EVAL LOW COMPLEX 20 MIN: CPT | Mod: GP

## 2019-09-16 NOTE — PROGRESS NOTES
[facility]          OUTPATIENT PHYSICAL THERAPY ORTHOPEDIC EVALUATION  PLAN OF TREATMENT FOR OUTPATIENT REHABILITATION  (COMPLETE FOR INITIAL CLAIMS ONLY)  Patient's Last Name, First Name, M.I.  YOB: 1955  Jacquie Harris       Provider s Name:  [facility]   Medical Record No.  [id number]   Start of Care Date:  09/16/19   Onset Date:  06/01/19   Type:     _X__PT   ___OT   ___SLP Medical Diagnosis:  s/p  revision oftotal replacement of left knee     PT Diagnosis:  s/p left TKA revision performed 5/29/19 with LE weakness and subsequent impaired functional mobility   Visits from SOC:  1      _________________________________________________________________________________  Plan of Treatment/Functional Goals:  balance training, gait training, joint mobilization, manual therapy, neuromuscular re-education, ROM, strengthening, stretching, transfer training     Cryotherapy, Ultrasound  PT: Modalities PRN based on patient need, tolerance and response.  Goals  Goal Identifier: 1  Goal Description: Patient will be able to negotiate 10 stairs with less than 2/10 pain in order to show improvements in fucntional mobility  Target Date: 11/25/19    Goal Identifier: 2  Goal Description: Patient will be able to ambulate 0.25 miles with less than 2/10 pain   Target Date: 11/25/19    Goal Identifier: 3  Goal Description: Patient will be IND with her HEP in order to improve functional mobility and decrease risk for future disaiblity.  Target Date: 10/14/19        Therapy Frequency:  1 time/week  Predicted Duration of Therapy Intervention:  1x/week for 6 weeks, every other week for 4 additional visits. 10 visits total over 14 weeks    Elisa Sanders, PT                 I CERTIFY THE NEED FOR THESE SERVICES FURNISHED UNDER        THIS PLAN OF TREATMENT AND WHILE UNDER MY CARE .             Physician Signature                Date    X_____________________________________________________                             Certification Date From:  09/16/19   Certification Date To:  12/23/19    Referring Provider:  Flavia Lanier PA-C    Initial Assessment        See Epic Evaluation Start of Care Date: 09/16/19

## 2019-09-16 NOTE — PROGRESS NOTES
09/16/19 1400   General Information   Type of Visit Initial OP Ortho PT Evaluation   Start of Care Date 09/16/19   Referring Physician Flavia Lanier PA-C   Patient/Family Goals Statement Patient would like to have her knee be pain free, to walk normal and to be able to dance in her son's wedding in June 2020.   Orders Evaluate and Treat   Date of Order 09/10/19   Certification Required? Yes   Medical Diagnosis s/p  revision oftotal replacement of left knee   Surgical/Medical history reviewed Yes   Precautions/Limitations no known precautions/limitations   General Information Comments L TKA revision with extensor mechanism irritation   Body Part(s)   Body Part(s) Knee   Presentation and Etiology   Pertinent history of current problem (include personal factors and/or comorbidities that impact the POC) Patient had left knee revision on MAY 27th, 2019. She did well after surgery, did not have PT but did HEPs given after surgery in the hospital. Patient reports ~June 2019 her knee started hurting, feeling weak, and swelling. No history of falls or injury that could have lead to knee pain. Patient wakes up at night with pain. Activities that are the most painful and difficult include: stairs, walking, sit<>stand transitions, and sometimes sitting can be uncomfortable.   Impairments A. Pain;B. Decreased WB tolerance;C. Swelling;D. Decreased ROM;E. Decreased flexibility;F. Decreased strength and endurance;G. Impaired balance;H. Impaired gait;K. Numbness;M. Locking or catching   Functional Limitations perform desired leisure / sports activities;perform activities of daily living   Symptom Location Left knee   How/Where did it occur From insidious onset   Onset date of current episode/exacerbation 06/01/19   Chronicity New   Pain rating (0-10 point scale) Best (/10);Worst (/10)   Best (/10) 1   Worst (/10) 8   Pain quality A. Sharp;C. Aching;E. Shooting;F. Stabbing   Frequency of pain/symptoms A. Constant    Pain/symptoms are: Worse during the day  (Pain mostly with walking and standing)   Pain/symptoms exacerbated by B. Walking   Pain exacerbation comment stairs and walking   Pain/symptoms eased by A. Sitting;C. Rest   Progression of symptoms since onset: Unchanged   Prior Level of Function   Prior Level of Function-Mobility IND with functional mobility   Prior Level of Function-ADLs IND with ADLs   Functional Level Prior Comment Knee pain make functional mobility and ADLs more challenging and gets more fatigued.   Current Level of Function   Patient role/employment history F. Retired;C. Homemaker   [facility]   Fall Risk Screen   Fall screen completed by PT   Have you fallen 2 or more times in the past year? No   Have you fallen and had an injury in the past year? No   Is patient a fall risk? Yes;Department fall risk interventions implemented   Fall screen comments Increased lateral sway, weak LEs.    Functional Scales   Functional Scales Other   Other Scales  LEFS: 19/80   Knee Objective Findings   Gait/Locomotion Increased lateral sway with ambulation, decreased stance time on RLE   Left Knee Extension AROM 0 degrees    Left Knee Flexion AROM 120 degrees on L; 134 on R   Left Knee Flexion Strength 4+/5   Left Knee Extension Strength 3+/5 painful at patellar tendon   Integumentary  Patient reporting a lot of swelling in her knee along with numbness in the anterior and lateral aspects of her left knee   Side (if bilateral, select both right and left) Left   Left Hamstring Flexibility 70 degrees on L; 75-80 degrees on R   Left Quadricep Flexibility Tight   Knee/Hip Strength Comments Left hip flexion: 3-/5 2/2 pain and weakness   Palpation Significant swelling around entire knee. Significant tenderness in Hamstring tendons with palpation and mild around patellar and quad tendons.   Accessory Motion/Joint Mobility Good patellar mobility     Planned Therapy Interventions   Planned Therapy  Interventions balance training;gait training;joint mobilization;manual therapy;neuromuscular re-education;ROM;strengthening;stretching;transfer training   Planned Modality Interventions   Planned Modality Interventions Cryotherapy;Ultrasound   Planned Modality Interventions Comments PT: Modalities PRN based on patient need, tolerance and response.   Clinical Impression   Criteria for Skilled Therapeutic Interventions Met yes, treatment indicated   PT Diagnosis s/p left TKA revision performed 5/29/19 with LE weakness and subsequent impaired functional mobility   Influenced by the following impairments Pain, decreased ROM, weakness, decreased activity tolerance and impaired gait, Swelling   Functional limitations due to impairments Gait, stairs, transfers and transitions, leisure activityies   Clinical Presentation Stable/Uncomplicated   Clinical Presentation Rationale Chronic at this point(-), patient needing encouragement (-), clinical judgement   Clinical Decision Making (Complexity) Low complexity   Therapy Frequency 1 time/week   Predicted Duration of Therapy Intervention (days/wks) 1x/week for 6 weeks, every other week for 4 additional visits. 10 visits total over 14 weeks   Risk & Benefits of therapy have been explained Yes   Patient, Family & other staff in agreement with plan of care Yes   Clinical Impression Comments Patient presents to OP PT after L TKA revision in May 2019 with continued pain, sweling and decreased functional mobiltiy. Exam revealed decreased strenght, ROM, activity tolerance, swelling and impaired balance.She would greatly benefit from skilled PT to progress impariments and maximize functional mobiltiy   Education Assessment   Preferred Learning Style Listening;Demonstration;Pictures/video   Barriers to Learning No barriers   ORTHO GOALS   PT Ortho Eval Goals 1;2;3   Ortho Goal 1   Goal Identifier 1   Goal Description Patient will be able to negotiate 10 stairs with less than 2/10 pain in  order to show improvements in fucntional mobility   Target Date 11/25/19   Ortho Goal 2   Goal Identifier 2   Goal Description Patient will be able to ambulate 0.25 miles with less than 2/10 pain    Target Date 11/25/19   Ortho Goal 3   Goal Identifier 3   Goal Description Patient will be IND with her HEP in order to improve functional mobility and decrease risk for future disaiblity.   Target Date 10/14/19   Total Evaluation Time   PT Eval, Low Complexity Minutes (92731) 10   Therapy Certification   Certification date from 09/16/19   Certification date to 12/23/19   Medical Diagnosis s/p  revision oftotal replacement of left knee     Elisa Sanders, PT, DTP  [employer]  [email or web address]  Pager: [phone]

## 2019-09-23 ENCOUNTER — HOSPITAL ENCOUNTER (OUTPATIENT)
Dept: PHYSICAL THERAPY | Facility: CLINIC | Age: 64
Setting detail: THERAPIES SERIES
End: 2019-09-23
Attending: ORTHOPAEDIC SURGERY
Payer: COMMERCIAL

## 2019-09-23 PROCEDURE — 97110 THERAPEUTIC EXERCISES: CPT | Mod: GP

## 2019-10-09 ENCOUNTER — OFFICE VISIT (OUTPATIENT)
Dept: FAMILY MEDICINE | Facility: CLINIC | Age: 64
End: 2019-10-09
Payer: COMMERCIAL

## 2019-10-09 VITALS
RESPIRATION RATE: 16 BRPM | HEART RATE: 91 BPM | HEIGHT: 63 IN | SYSTOLIC BLOOD PRESSURE: 112 MMHG | BODY MASS INDEX: 46.42 KG/M2 | WEIGHT: 262 LBS | OXYGEN SATURATION: 100 % | TEMPERATURE: 99.1 F | DIASTOLIC BLOOD PRESSURE: 76 MMHG

## 2019-10-09 DIAGNOSIS — N63.0 PAINFUL LUMPY BREASTS: Primary | ICD-10-CM

## 2019-10-09 DIAGNOSIS — G89.29 CHRONIC PAIN OF LEFT KNEE: ICD-10-CM

## 2019-10-09 DIAGNOSIS — N64.4 PAINFUL LUMPY BREASTS: Primary | ICD-10-CM

## 2019-10-09 DIAGNOSIS — Z23 NEED FOR PROPHYLACTIC VACCINATION AND INOCULATION AGAINST INFLUENZA: ICD-10-CM

## 2019-10-09 DIAGNOSIS — M25.562 CHRONIC PAIN OF LEFT KNEE: ICD-10-CM

## 2019-10-09 DIAGNOSIS — R09.1 PLEURISY: ICD-10-CM

## 2019-10-09 LAB
BASOPHILS # BLD AUTO: 0 10E9/L (ref 0–0.2)
BASOPHILS NFR BLD AUTO: 0.3 %
CRP SERPL-MCNC: <2.9 MG/L (ref 0–8)
DIFFERENTIAL METHOD BLD: ABNORMAL
EOSINOPHIL # BLD AUTO: 0.2 10E9/L (ref 0–0.7)
EOSINOPHIL NFR BLD AUTO: 2.1 %
ERYTHROCYTE [DISTWIDTH] IN BLOOD BY AUTOMATED COUNT: 16.2 % (ref 10–15)
ERYTHROCYTE [SEDIMENTATION RATE] IN BLOOD BY WESTERGREN METHOD: 33 MM/H (ref 0–30)
HCT VFR BLD AUTO: 35.8 % (ref 35–47)
HGB BLD-MCNC: 10.5 G/DL (ref 11.7–15.7)
LYMPHOCYTES # BLD AUTO: 1.6 10E9/L (ref 0.8–5.3)
LYMPHOCYTES NFR BLD AUTO: 20.9 %
MCH RBC QN AUTO: 22.6 PG (ref 26.5–33)
MCHC RBC AUTO-ENTMCNC: 29.3 G/DL (ref 31.5–36.5)
MCV RBC AUTO: 77 FL (ref 78–100)
MONOCYTES # BLD AUTO: 0.5 10E9/L (ref 0–1.3)
MONOCYTES NFR BLD AUTO: 6.7 %
NEUTROPHILS # BLD AUTO: 5.3 10E9/L (ref 1.6–8.3)
NEUTROPHILS NFR BLD AUTO: 70 %
PLATELET # BLD AUTO: 305 10E9/L (ref 150–450)
RBC # BLD AUTO: 4.64 10E12/L (ref 3.8–5.2)
WBC # BLD AUTO: 7.5 10E9/L (ref 4–11)

## 2019-10-09 PROCEDURE — 99214 OFFICE O/P EST MOD 30 MIN: CPT | Mod: 25 | Performed by: FAMILY MEDICINE

## 2019-10-09 PROCEDURE — 36415 COLL VENOUS BLD VENIPUNCTURE: CPT | Performed by: FAMILY MEDICINE

## 2019-10-09 PROCEDURE — 85025 COMPLETE CBC W/AUTO DIFF WBC: CPT | Performed by: FAMILY MEDICINE

## 2019-10-09 PROCEDURE — 86140 C-REACTIVE PROTEIN: CPT | Performed by: FAMILY MEDICINE

## 2019-10-09 PROCEDURE — 85652 RBC SED RATE AUTOMATED: CPT | Performed by: FAMILY MEDICINE

## 2019-10-09 PROCEDURE — 90662 IIV NO PRSV INCREASED AG IM: CPT | Performed by: FAMILY MEDICINE

## 2019-10-09 PROCEDURE — G0008 ADMIN INFLUENZA VIRUS VAC: HCPCS | Performed by: FAMILY MEDICINE

## 2019-10-09 RX ORDER — PREDNISONE 20 MG/1
40 TABLET ORAL DAILY
Qty: 10 TABLET | Refills: 0 | Status: SHIPPED | OUTPATIENT
Start: 2019-10-09 | End: 2020-01-07

## 2019-10-09 ASSESSMENT — MIFFLIN-ST. JEOR: SCORE: 1702.55

## 2019-10-09 NOTE — PATIENT INSTRUCTIONS
Thank you for choosing [facility].  You may be receiving an email and/or telephone survey request from [facility] [employer] regarding your visit today.  Please take a few minutes to respond to the survey to let us know how we are doing.      If you have questions or concerns, please contact us via [email or web address] or you can contact your care team at [phone].    Our Clinic hours are:  Monday 6:40 am  to 7:00 pm  Tuesday -Friday 6:40 am to 5:00 pm    The [place] outpatient lab hours are:  Monday - Friday 6:10 am to 4:45 pm  Saturdays 7:00 am to 11:00 am  Appointments are required, call [phone]    If you have clinical questions after hours or would like to schedule an appointment,  call the clinic at [phone].

## 2019-10-25 ENCOUNTER — OFFICE VISIT (OUTPATIENT)
Dept: FAMILY MEDICINE | Facility: CLINIC | Age: 64
End: 2019-10-25
Payer: COMMERCIAL

## 2019-10-25 VITALS
WEIGHT: 256 LBS | RESPIRATION RATE: 16 BRPM | TEMPERATURE: 98.9 F | HEIGHT: 63 IN | OXYGEN SATURATION: 95 % | HEART RATE: 72 BPM | BODY MASS INDEX: 45.36 KG/M2 | SYSTOLIC BLOOD PRESSURE: 110 MMHG | DIASTOLIC BLOOD PRESSURE: 68 MMHG

## 2019-10-25 DIAGNOSIS — D50.9 IRON DEFICIENCY ANEMIA, UNSPECIFIED IRON DEFICIENCY ANEMIA TYPE: ICD-10-CM

## 2019-10-25 DIAGNOSIS — D50.9 MICROCYTIC ANEMIA: ICD-10-CM

## 2019-10-25 DIAGNOSIS — R06.09 DYSPNEA ON EXERTION: ICD-10-CM

## 2019-10-25 DIAGNOSIS — M25.50 MULTIPLE JOINT PAIN: Primary | ICD-10-CM

## 2019-10-25 DIAGNOSIS — E55.9 HYPOVITAMINOSIS D: ICD-10-CM

## 2019-10-25 LAB
CK SERPL-CCNC: 48 U/L (ref 30–225)
ERYTHROCYTE [DISTWIDTH] IN BLOOD BY AUTOMATED COUNT: 16.9 % (ref 10–15)
FERRITIN SERPL-MCNC: 5 NG/ML (ref 8–252)
HCT VFR BLD AUTO: 36.2 % (ref 35–47)
HGB BLD-MCNC: 10.6 G/DL (ref 11.7–15.7)
IRON SATN MFR SERPL: 4 % (ref 15–46)
IRON SERPL-MCNC: 20 UG/DL (ref 35–180)
MCH RBC QN AUTO: 22.2 PG (ref 26.5–33)
MCHC RBC AUTO-ENTMCNC: 29.3 G/DL (ref 31.5–36.5)
MCV RBC AUTO: 76 FL (ref 78–100)
PLATELET # BLD AUTO: 325 10E9/L (ref 150–450)
RBC # BLD AUTO: 4.78 10E12/L (ref 3.8–5.2)
T4 FREE SERPL-MCNC: 1.13 NG/DL (ref 0.76–1.46)
TIBC SERPL-MCNC: 463 UG/DL (ref 240–430)
TSH SERPL DL<=0.005 MIU/L-ACNC: 0.23 MU/L (ref 0.4–4)
WBC # BLD AUTO: 14.7 10E9/L (ref 4–11)

## 2019-10-25 PROCEDURE — 84439 ASSAY OF FREE THYROXINE: CPT | Performed by: INTERNAL MEDICINE

## 2019-10-25 PROCEDURE — 83550 IRON BINDING TEST: CPT | Performed by: INTERNAL MEDICINE

## 2019-10-25 PROCEDURE — 99214 OFFICE O/P EST MOD 30 MIN: CPT | Performed by: INTERNAL MEDICINE

## 2019-10-25 PROCEDURE — 86038 ANTINUCLEAR ANTIBODIES: CPT | Performed by: INTERNAL MEDICINE

## 2019-10-25 PROCEDURE — 82306 VITAMIN D 25 HYDROXY: CPT | Performed by: INTERNAL MEDICINE

## 2019-10-25 PROCEDURE — 36415 COLL VENOUS BLD VENIPUNCTURE: CPT | Performed by: INTERNAL MEDICINE

## 2019-10-25 PROCEDURE — 83540 ASSAY OF IRON: CPT | Performed by: INTERNAL MEDICINE

## 2019-10-25 PROCEDURE — 82728 ASSAY OF FERRITIN: CPT | Performed by: INTERNAL MEDICINE

## 2019-10-25 PROCEDURE — 85027 COMPLETE CBC AUTOMATED: CPT | Performed by: INTERNAL MEDICINE

## 2019-10-25 PROCEDURE — 84443 ASSAY THYROID STIM HORMONE: CPT | Performed by: INTERNAL MEDICINE

## 2019-10-25 PROCEDURE — 82550 ASSAY OF CK (CPK): CPT | Performed by: INTERNAL MEDICINE

## 2019-10-25 PROCEDURE — 86618 LYME DISEASE ANTIBODY: CPT | Performed by: INTERNAL MEDICINE

## 2019-10-25 ASSESSMENT — PAIN SCALES - GENERAL: PAINLEVEL: EXTREME PAIN (8)

## 2019-10-25 ASSESSMENT — MIFFLIN-ST. JEOR: SCORE: 1675.34

## 2019-10-25 NOTE — PROGRESS NOTES
"Subjective     Jacquie Harris is a 64 year old female who presents to clinic today for the following health issues:    HPI   Joint Pain    Onset: x 10 or more years    Description:   Location: left shoulder, right shoulder, left elbow, right elbow, left knee and right knee.  Occasional hand pain \"feel like clubs\" and are very achy- but typically doesn't have issues in the hands.  Had some brief left ankle pain recently.  A lot more headaches unable to sleep  - Shortness of Breath with activity  Character: Sharp for knees, shoulders, elbow feel painful to wake her up at night    Intensity: Severe.  Unable to do crafts and painting anymore    Progression of Symptoms: worse    Accompanying Signs & Symptoms:  Other symptoms: swelling in L>R knee (no joint swelling elsewhere), feels heat in the left knee, feels like there are bugs under the skin of the knee cap, intermittent numbness around the left kneecap    History:   Previous similar pain: YES.  Has been seeing sports medicine and orthopedics and has had multiple joints imaged.  Had right shoulder injection just yesterday.  She has had many injections in the past; these do help.      Precipitating factors:   Trauma or overuse: YES- Well, many many years ago fell as a kid a lot onto knees  - got bit by ticks a few times, got doxycycline for 2 weeks in 2011 (she says this was the most recent episode).  She reports she had a positive Lyme disease in the past and wasn't treated (chart indicates 1996).  She had a negative Lyme test in 2017    Alleviating factors:  Improved by: nothing    Therapies Tried and outcome: PT makes it worse, she is unable to do home exercises due to pain          Patient Active Problem List   Diagnosis     s/p gastric bypass x 2     Allergic rhinitis     Carpal tunnel syndrome     Obstructive sleep apnea     Rosacea     CARDIOVASCULAR SCREENING; LDL GOAL LESS THAN 160     Tick bite, infected, positive Lyme test 1996 not treated     Vitamin D " deficiency     Osteoporosis     Obesity, Class III, BMI 40-49.9 (morbid obesity) (H)     S/P TKR (total knee replacement)     HTN, goal below 140/80     Back pain, left below scapula, strained muscles     Encounter for routine gynecological examination      Colles' fracture     Bilateral cold feet     Elevated levels of transaminase & lactic acid dehydrogenase     Fatty liver     Shoulder injury, right, subsequent encounter     GI bleed     Upper GI bleed     Bronchospasm     Alpha-1-antitrypsin deficiency (H)     Past Surgical History:   Procedure Laterality Date     ARTHROPLASTY KNEE  4/2/2012    Procedure:ARTHROPLASTY KNEE; Left Total Knee Arthroplasty--Anesth.Choice; Surgeon:HERNANDO THOMPSON; Location:[facility]     ARTHROTOMY SHOULDER, ROTATOR CUFF REPAIR, COMBINED Right 8/25/2017    Procedure: COMBINED ARTHROTOMY SHOULDER, ROTATOR CUFF REPAIR;  Right shoulder distal clavicle excision, subacromial decompression, rotator cuff repair ;  Surgeon: Hernando Thompson MD;  Location: [facility]     ARTHROTOMY SHOULDER, ROTATOR CUFF REPAIR, COMBINED Right 12/18/2017    Procedure: COMBINED ARTHROTOMY SHOULDER, ROTATOR CUFF REPAIR;  Right Shoulder Rotator Cuff Revision;  Surgeon: Hernando Thompson MD;  Location: [facility]     ARTHROTOMY SHOULDER, ROTATOR CUFF REPAIR, COMBINED Left 4/27/2018    Procedure: COMBINED ARTHROTOMY SHOULDER, ROTATOR CUFF REPAIR;  Left Shoulder Open Subacromial Decompression,Distal Clavicle Excision,Rotator Cuff Repair;  Surgeon: Hernando Thompson MD;  Location: [facility]     C ORAL SURGERY PROCEDURE  age 18    wisdom teeth     C/SECTION, LOW TRANSVERSE  1978, 1984, 1994    x 3     COLONOSCOPY  2001    normal colonoscopy     COLONOSCOPY N/A 8/22/2017    Procedure: COLONOSCOPY;  Colonoscopy  ;  Surgeon: Delfino Yoo MD;  Location: [facility]     ESOPHAGOSCOPY, GASTROSCOPY, DUODENOSCOPY (EGD), COMBINED N/A 3/18/2018    Procedure: COMBINED ESOPHAGOSCOPY, GASTROSCOPY, DUODENOSCOPY (EGD);;  Surgeon:  Poncho Galindo MD;  Location: [facility]     GASTRIC BYPASS  1980/2005    Gastric Bypass and revision     HERNIA REPAIR, INCISIONAL  6/16/2008    lap.repair with 10x8 mesh     TUBAL LIGATION  1994       Social History     Tobacco Use     Smoking status: Never Smoker     Smokeless tobacco: Never Used   Substance Use Topics     Alcohol use: Yes     Alcohol/week: 0.0 standard drinks     Comment: mixed very light 1 a month if that     Family History   Problem Relation Age of Onset     C.A.D. Father         MI at age 60     Hypertension Father      Alzheimer Disease Father      Hyperlipidemia Father      Osteoporosis Mother         on Fosamax     Glaucoma Mother      Other Cancer Brother      Stomach Cancer Maternal Aunt      Cervical Cancer Cousin      Diabetes No family hx of      Cerebrovascular Disease No family hx of      Breast Cancer No family hx of      Cancer - colorectal No family hx of      Prostate Cancer No family hx of      Liver Disease No family hx of          Current Outpatient Medications   Medication Sig Dispense Refill     acetaminophen (TYLENOL) 325 MG tablet Take 2 tablets (650 mg) by mouth every 4 hours as needed for mild pain or fever 100 tablet      albuterol (PROAIR HFA/PROVENTIL HFA/VENTOLIN HFA) 108 (90 Base) MCG/ACT inhaler Inhale 2 puffs into the lungs every 4 hours as needed (Patient not taking: Reported on 4/23/2019) 1 Inhaler 3     atenolol (TENORMIN) 50 MG tablet TAKE ONE TABLET BY MOUTH ONCE DAILY 90 tablet 2     clobetasol (TEMOVATE) 0.05 % ointment Apply sparingly to affected area twice daily for 14 days.  Do not apply to face. (Patient not taking: Reported on 4/23/2019) 15 g 0     diclofenac (VOLTAREN) 1 % topical gel Place 2 g onto the skin 4 times daily (Patient not taking: Reported on 10/9/2019) 100 g 0     Emollient (CERAVE) CREA Externally apply topically 2 times daily as needed (Patient not taking: Reported on 10/9/2019) 1 Bottle 3     hydrochlorothiazide (HYDRODIURIL) 12.5 MG  "tablet TAKE ONE TABLET BY MOUTH ONCE DAILY 90 tablet 2     nystatin (MYCOSTATIN) 689774 UNIT/GM POWD Apply topically 3 times daily as needed Refill at patient's request 30 g 1     nystatin (MYCOSTATIN) cream Apply topically 2 times daily Reported on 3/6/2017 (Patient not taking: Reported on 10/9/2019) 30 g 11     Allergies   Allergen Reactions     Vioxx Itching and Swelling     VIOXX (Swollen Feet,Hands itching), Okay with ibuprofen and aspirin         Reviewed and updated as needed this visit by Provider         Review of Systems   ROS COMP: Constitutional, MSK systems are negative, except as otherwise noted.      Objective    /68 (BP Location: Right arm, Patient Position: Sitting, Cuff Size: Adult Large)   Pulse 72   Temp 98.9  F (37.2  C) (Tympanic)   Resp 16   Ht 1.6 m (5' 3\")   Wt 116.1 kg (256 lb)   LMP 05/29/2006   SpO2 95%   Breastfeeding? No   BMI 45.35 kg/m    Body mass index is 45.35 kg/m .  Physical Exam   GENERAL: healthy, alert and no distress  MS: She reports pain to palpation of the lateral bilateral elbows, lateral shoulders, inferior to the left patella, no joint swelling or redness noted in any locations    Diagnostic Test Results:  Labs reviewed in Epic  Results for orders placed or performed in visit on 10/25/19 (from the past 24 hour(s))   CBC with platelets   Result Value Ref Range    WBC 14.7 (H) 4.0 - 11.0 10e9/L    RBC Count 4.78 3.8 - 5.2 10e12/L    Hemoglobin 10.6 (L) 11.7 - 15.7 g/dL    Hematocrit 36.2 35.0 - 47.0 %    MCV 76 (L) 78 - 100 fl    MCH 22.2 (L) 26.5 - 33.0 pg    MCHC 29.3 (L) 31.5 - 36.5 g/dL    RDW 16.9 (H) 10.0 - 15.0 %    Platelet Count 325 150 - 450 10e9/L   Iron and iron binding capacity   Result Value Ref Range    Iron 20 (L) 35 - 180 ug/dL    Iron Binding Cap 463 (H) 240 - 430 ug/dL    Iron Saturation Index 4 (L) 15 - 46 %   Ferritin   Result Value Ref Range    Ferritin 5 (L) 8 - 252 ng/mL   CK total   Result Value Ref Range    CK Total 48 30 - 225 U/L "   TSH with free T4 reflex   Result Value Ref Range    TSH 0.23 (L) 0.40 - 4.00 mU/L   T4 free   Result Value Ref Range    T4 Free 1.13 0.76 - 1.46 ng/dL           Assessment & Plan     1. Multiple joint pain    Karen presents with multiple joint pain and some general malaise and is concerned about a systemic cause.  We are checking labs as below, but I suspect her symptoms are most likely due to mechanical issues of the joints.  Recent ESR was just barely elevated at 33 and CRP was normal, so unlikely PMR.  She does have known degenerative changes in the joints, and her elbow exam appears consistent with lateral epicondylitis.  She is ready following with sports medicine and orthopedics for her joint issues.    - CK total  - TSH with free T4 reflex  - Vitamin D Deficiency  - Anti Nuclear Crystal IgG by IFA with Reflex  - Lyme Disease Crystal with reflex to WB Serum  - T4 free  - T4 free    2. Microcytic anemia  3. Dyspnea on exertion     She reports some dyspnea on exertion, which may be related to her iron deficiency anemia.  Will recommend resuming iron supplement and recheck levels in 2 months.  She reports no recent blood in stool.    - CBC with platelets  - Iron and iron binding capacity  - Ferritin    ADDENDUM:  Vit D also low, recommend high dose supplement and recheck in 2 months.     Return in about 2 months (around 12/25/2019) for Lab Work.    Valdo Amezquita MD  [facility]

## 2019-10-26 LAB — DEPRECATED CALCIDIOL+CALCIFEROL SERPL-MC: 12 UG/L (ref 20–75)

## 2019-10-27 NOTE — RESULT ENCOUNTER NOTE
Please inform patient that test result was within normal parameters except that the ESR was slightly elevated but improved compared to last time. She is still anemic . Needs to be on iron.   Thank you.     Jennie Suazo M.D.

## 2019-10-28 LAB
ANA SER QL IF: NEGATIVE
B BURGDOR IGG+IGM SER QL: 0.06 (ref 0–0.89)

## 2019-11-22 ENCOUNTER — OFFICE VISIT (OUTPATIENT)
Dept: FAMILY MEDICINE | Facility: CLINIC | Age: 64
End: 2019-11-22
Payer: COMMERCIAL

## 2019-11-22 VITALS
WEIGHT: 258.8 LBS | BODY MASS INDEX: 45.84 KG/M2 | SYSTOLIC BLOOD PRESSURE: 126 MMHG | OXYGEN SATURATION: 96 % | HEART RATE: 75 BPM | DIASTOLIC BLOOD PRESSURE: 78 MMHG | TEMPERATURE: 98.2 F

## 2019-11-22 DIAGNOSIS — M25.50 MULTIPLE JOINT PAIN: Primary | ICD-10-CM

## 2019-11-22 PROCEDURE — 99213 OFFICE O/P EST LOW 20 MIN: CPT | Performed by: INTERNAL MEDICINE

## 2019-11-22 RX ORDER — NORTRIPTYLINE HCL 25 MG
CAPSULE ORAL
Qty: 30 CAPSULE | Refills: 1 | Status: SHIPPED | OUTPATIENT
Start: 2019-11-22 | End: 2019-11-22

## 2019-11-22 RX ORDER — NORTRIPTYLINE HCL 10 MG
10 CAPSULE ORAL AT BEDTIME
Qty: 7 CAPSULE | Refills: 0 | Status: SHIPPED | OUTPATIENT
Start: 2019-11-22 | End: 2019-12-20

## 2019-11-22 RX ORDER — NORTRIPTYLINE HCL 25 MG
25 CAPSULE ORAL AT BEDTIME
Qty: 30 CAPSULE | Refills: 1 | Status: SHIPPED | OUTPATIENT
Start: 2019-11-29 | End: 2019-12-20

## 2019-11-22 NOTE — PATIENT INSTRUCTIONS
Patient Education     Understanding Fibromyalgia     People with fibromyalgia tend to have at least 11 of the 18 tender points shown above.     Fibromyalgia is a condition that causes pain at specific points on the body, stiffness, and fatigue.  What are the symptoms of fibromyalgia?  In most cases, you will have tender points on your body. These points are very sore, especially when touched. Finding several of these points helps your healthcare provider diagnose fibromyalgia.  Along with the tender points, you may have some or all of the following symptoms:    Constant tiredness (fatigue), even after a full night s sleep (nonrestorative sleep)    A burning or throbbing pain in many parts of the body (this pain may vary during the day)    Stiffness or aching all over your body    Numbness or tingling in your arms and legs    Trouble sleeping    Bowel problems (bloating, diarrhea, constipation)    Headaches    Depression  How is fibromyalgia diagnosed?  There is no lab test to diagnose fibromyalgia. Instead, your healthcare provider will take your health history and examine your joints and muscles. Certain criteria are used when diagnosing fibromyalgia. Symptoms need to be present for at least 3 months. Tests may be done to rule out other conditions with similar symptoms. Then you and your provider can design a plan to help you manage your symptoms.   How is fibromyalgia treated?  Several medicines are approved to treat fibromyalgia. Two were originally made to treat depression. They are duloxetine, and milnacipran. A third, called pregaballin, was developed to treat nerve pain. Certain medicines used to treat depression have been helpful with fibromyalgia. Other medicines include pain relievers such as acetaminophen or stronger opioids. These may be prescribed short term.  Nonsteroidal anti-inflammatory drugs (NSAIDs) are used to relieve pain. These include ibuprofen and naproxen.  Getting enough sleep, regular  exercise, and eating a healthy diet can help manage symptoms. A form of talk therapy called cognitive behavioral therapy can be helpful for fibromyalgia. Some people find alternative treatments such as massage, chiropractic treatments, biofeedback, or acupuncture also help with symptoms.  Date Last Reviewed: 6/1/2018 2000-2018 [employer]. [address] [place] [address], [place] [address]. All rights reserved. This information is not intended as a substitute for professional medical care. Always follow your healthcare professional's instructions.

## 2019-12-09 NOTE — ADDENDUM NOTE
Encounter addended by: Priyanka Tyson, PT on: 12/9/2019 11:29 AM   Actions taken: Clinical Note Signed, Episode resolved

## 2019-12-09 NOTE — PROGRESS NOTES
Outpatient Physical Therapy Discharge Note     Patient: Jacquie Harris  : 1955    Beginning/End Dates of Reporting Period:  19 to 2019    Referring Provider: Flavia Lanier PA-C    Therapy Diagnosis: left TKA     Client Self Report: Patient reports: 7 year history of a bad left TKA.  Underwent a revision surgery 1 week ago.  Can already tell that the knee is doing better.  Does not want to come back for PT for about a month - [place]   Per chart review: patient underwent left TKA revision on 19 after experiencing instability and pain with the prosthetic knee.  Surgery was performed by Cedric Fuller at [facility].    Objective Measurements:  Objective Measure: Left knee PROM  Details: 0-101 deg               Goals:  Goal Identifier     Goal Description Patient will have >=0-125 degrees of left knee ROM to allow for normalized ADL's.   Target Date 19   Date Met      Progress:     Goal Identifier     Goal Description Patient will be able to walk household distances without an assistive device.   Target Date 19   Date Met      Progress:     Goal Identifier     Goal Description Patient will be able to perform sit <> stand off standard chair without difficulty.   Target Date 19   Date Met      Progress:     Progress Toward Goals:   Progress this reporting period: Pt attended 1 PT session to address her left knee pain; unable to further assess progress.        Plan:  Discharge from therapy.    Discharge:    Reason for Discharge: Patient has failed to schedule further appointments.    Equipment Issued: none    Discharge Plan: Patient to continue home program.    Priyanka Tyson, PT, DTP, SCS  Doctor of Physical Therapy #[id number]  [facility]  [phone]  [email or web address]

## 2019-12-19 NOTE — PROGRESS NOTES
"Subjective     Jacquie Harris is a 64 year old female who presents to clinic today for the following health issues:    HPI     I saw Karen last on 11/22: \"Karen presents with ongoing diffuse pain that she describes is both in the joints and muscles.  Pain is interfering with sleep.  Negative prior work-up as noted in HPI.  Due to her diffuse pain as well as fatigue and headaches, I wonder about fibromyalgia.  Discussed lifestyle management and also medication.  Would recommend med trial to see if that is help and also to help support diagnosis if med is helpful.  Discussed a few options and decided to try nortriptyline.  Follow-up in 1 month to reassess.\"    -Patient states her joint is actually doing better. Patient states right now her only pain is in her right arm but she thinks that is from her blood draw. Patient states she is sleeping much better. She says she didn't wake up at all last night. Patient states she is taking the Nortriptyline and it is working. Patient states it did take a little bit for it to start working.  She is tolerating the med fine.       Acute Illness   Acute illness concerns: Sinus issue  Onset: x 4 days    Fever: no    Chills/Sweats: YES- both    Headache (location?): YES- some    Sinus Pressure- No pain, but having a lot of post-nasal drainage and sneezing    Conjunctivitis:  Having some watery discharge from the eyes    Ear Pain: no    Rhinorrhea: YES- funny wet sensation on her nose.  \"I feel like I have a dog's nose\"    Congestion: no    Sore Throat: YES- Throat and tongue feel swollen, swallowing takes some work     Cough: no    Wheeze: no    Decreased Appetite: YES- little bit    Nausea: no    Vomiting: no    Diarrhea:  no    Dysuria/Freq.: no    Fatigue/Achiness: no    Sick/Strep Exposure: no     Therapies Tried and outcome: nothing      Patient Active Problem List   Diagnosis     s/p gastric bypass x 2     Allergic rhinitis     Carpal tunnel syndrome     Obstructive sleep apnea     " Rosacea     CARDIOVASCULAR SCREENING; LDL GOAL LESS THAN 160     Tick bite, infected, positive Lyme test 1996 not treated     Vitamin D deficiency     Osteoporosis     Obesity, Class III, BMI 40-49.9 (morbid obesity) (H)     S/P TKR (total knee replacement)     HTN, goal below 140/80     Back pain, left below scapula, strained muscles     Encounter for routine gynecological examination      Colles' fracture     Bilateral cold feet     Elevated levels of transaminase & lactic acid dehydrogenase     Fatty liver     Shoulder injury, right, subsequent encounter     GI bleed     Upper GI bleed     Bronchospasm     Alpha-1-antitrypsin deficiency (H)     Past Surgical History:   Procedure Laterality Date     ARTHROPLASTY KNEE  4/2/2012    Procedure:ARTHROPLASTY KNEE; Left Total Knee Arthroplasty--Anesth.Choice; Surgeon:HERNANDO THOMPSON; [facility]     ARTHROTOMY SHOULDER, ROTATOR CUFF REPAIR, COMBINED Right 8/25/2017    Procedure: COMBINED ARTHROTOMY SHOULDER, ROTATOR CUFF REPAIR;  Right shoulder distal clavicle excision, subacromial decompression, rotator cuff repair ;  Surgeon: Hernando Thompson MD;  Location: [facility]     ARTHROTOMY SHOULDER, ROTATOR CUFF REPAIR, COMBINED Right 12/18/2017    Procedure: COMBINED ARTHROTOMY SHOULDER, ROTATOR CUFF REPAIR;  Right Shoulder Rotator Cuff Revision;  Surgeon: Hernando Thompson MD;  [facility]: [facility]     ARTHROTOMY SHOULDER, ROTATOR CUFF REPAIR, COMBINED Left 4/27/2018    Procedure: COMBINED ARTHROTOMY SHOULDER, ROTATOR CUFF REPAIR;  Left Shoulder Open Subacromial Decompression,Distal Clavicle Excision,Rotator Cuff Repair;  Surgeon: Hernando Thompson MD;  Location: [facility]      ORAL SURGERY PROCEDURE  age 18    wisdom teeth     C/SECTION, LOW TRANSVERSE  1978, 1984, 1994    x 3     COLONOSCOPY  2001    normal colonoscopy     COLONOSCOPY N/A 8/22/2017    Procedure: COLONOSCOPY;  Colonoscopy  ;  Surgeon: Delfino Yoo MD;  [facility]: [facility]     ESOPHAGOSCOPY,  GASTROSCOPY, DUODENOSCOPY (EGD), COMBINED N/A 3/18/2018    Procedure: COMBINED ESOPHAGOSCOPY, GASTROSCOPY, DUODENOSCOPY (EGD);;  Surgeon: Poncho Galindo MD;  Location: [facility]     GASTRIC BYPASS  1980/2005    Gastric Bypass and revision     HERNIA REPAIR, INCISIONAL  6/16/2008    lap.repair with 10x8 mesh     TUBAL LIGATION  1994       Social History     Tobacco Use     Smoking status: Never Smoker     Smokeless tobacco: Never Used   Substance Use Topics     Alcohol use: Yes     Alcohol/week: 0.0 standard drinks     Comment: mixed very light 1 a month if that     Family History   Problem Relation Age of Onset     C.A.D. Father         MI at age 60     Hypertension Father      Alzheimer Disease Father      Hyperlipidemia Father      Osteoporosis Mother         on Fosamax     Glaucoma Mother      Other Cancer Brother      Stomach Cancer Maternal Aunt      Cervical Cancer Cousin      Diabetes No family hx of      Cerebrovascular Disease No family hx of      Breast Cancer No family hx of      Cancer - colorectal No family hx of      Prostate Cancer No family hx of      Liver Disease No family hx of          Current Outpatient Medications   Medication Sig Dispense Refill     acetaminophen (TYLENOL) 325 MG tablet Take 2 tablets (650 mg) by mouth every 4 hours as needed for mild pain or fever 100 tablet      albuterol (PROAIR HFA/PROVENTIL HFA/VENTOLIN HFA) 108 (90 Base) MCG/ACT inhaler Inhale 2 puffs into the lungs every 4 hours as needed 1 Inhaler 3     atenolol (TENORMIN) 50 MG tablet TAKE ONE TABLET BY MOUTH ONCE DAILY 90 tablet 2     clobetasol (TEMOVATE) 0.05 % ointment Apply sparingly to affected area twice daily for 14 days.  Do not apply to face. 15 g 0     diclofenac (VOLTAREN) 1 % topical gel Place 2 g onto the skin 4 times daily 100 g 0     Emollient (CERAVE) CREA Externally apply topically 2 times daily as needed 1 Bottle 3     ferrous sulfate (SLO-FE) 142 (45 Fe) MG CR tablet Take 1 tablet (142 mg) by mouth  daily 90 tablet 3     fluticasone (FLONASE) 50 MCG/ACT nasal spray Spray 1 spray into both nostrils daily 16 g 0     hydrochlorothiazide (HYDRODIURIL) 12.5 MG tablet TAKE ONE TABLET BY MOUTH ONCE DAILY 90 tablet 2     nortriptyline (PAMELOR) 25 MG capsule Take 1 capsule (25 mg) by mouth At Bedtime 90 capsule 3     nystatin (MYCOSTATIN) 464351 UNIT/GM POWD Apply topically 3 times daily as needed Refill at patient's request 30 g 1     nystatin (MYCOSTATIN) cream Apply topically 2 times daily Reported on 3/6/2017 30 g 11     Allergies   Allergen Reactions     Aleve [Naproxen] GI Disturbance     Vioxx Itching and Swelling     VIOXX (Swollen Feet,Hands itching), Okay with ibuprofen and aspirin       Reviewed and updated as needed this visit by Provider         Review of Systems   ROS COMP: Constitutional, HEENT, pulmonary systems are negative, except as otherwise noted.      Objective    /82   Pulse 73   Temp 98.5  F (36.9  C) (Tympanic)   Resp 20   Wt 112.3 kg (247 lb 9.6 oz)   LMP 05/29/2006   SpO2 94%   BMI 43.86 kg/m    Body mass index is 43.86 kg/m .  Physical Exam     GENERAL: alert and no distress  EYES: Eyes grossly normal to inspection, PERRL and conjunctivae and sclerae normal  HENT: ear canals and TMs normal, sinuses non tender to percussion, nose and mouth without ulcers or lesions, oropharynx normal  NECK: no adenopathy, no asymmetry, masses, or scars  RESP: lungs clear to auscultation - no rales, rhonchi or wheezes  CV: regular rate and rhythm, normal S1 S2, no S3 or S4, no murmur, click or rub     Diagnostic Test Results:  Labs reviewed in Epic  Results for orders placed or performed in visit on 12/20/19 (from the past 24 hour(s))   **Ferritin FUTURE 2mo   Result Value Ref Range    Ferritin 14 8 - 252 ng/mL   **Iron and iron binding capacity FUTURE 2mo   Result Value Ref Range    Iron 80 35 - 180 ug/dL    Iron Binding Cap 439 (H) 240 - 430 ug/dL    Iron Saturation Index 18 15 - 46 %   **CBC  with platelets FUTURE 2mo   Result Value Ref Range    WBC 14.2 (H) 4.0 - 11.0 10e9/L    RBC Count 5.80 (H) 3.8 - 5.2 10e12/L    Hemoglobin 13.9 11.7 - 15.7 g/dL    Hematocrit 46.0 35.0 - 47.0 %    MCV 79 78 - 100 fl    MCH 24.0 (L) 26.5 - 33.0 pg    MCHC 30.2 (L) 31.5 - 36.5 g/dL    RDW 20.2 (H) 10.0 - 15.0 %    Platelet Count 288 150 - 450 10e9/L           Assessment & Plan     1. Multiple joint pain    Karen reports that her pain is much improved on the nortriptyline, which could be supportive of a diagnosis of fibromyalgia.  She is tolerating this well, so we will continue the current dose.      - nortriptyline (PAMELOR) 25 MG capsule; Take 1 capsule (25 mg) by mouth At Bedtime  Dispense: 90 capsule; Refill: 3    2. Rhinorrhea    She presents with about 4 days of rhinorrhea and throat irritation.  Possible URI, does have elevated WBC count.  Could also be allergic in etiology but she has not prior history of allergies.  Recommended trial of Flonase, though she is not a big fan of nose sprays.  Also doesn't like swallowing pills.  Will continue to monitor; advised her to call with any new symptoms.     - fluticasone (FLONASE) 50 MCG/ACT nasal spray; Spray 1 spray into both nostrils daily  Dispense: 16 g; Refill: 0    3. Iron deficiency anemia, unspecified iron deficiency anemia type    Anemia has resolved and iron improved on supplement.  Discussed she could continue this for maintenance or discontinue to see how she does without it- she is not sure what she wants to do.  Recommend recheck of labs in 2 months if she decides to stop.      - **CBC with platelets FUTURE 2mo; Future  - **Ferritin FUTURE 2mo; Future  - Iron and iron binding capacity; Future       Return in about 1 week (around 12/27/2019), or if symptoms worsen or fail to improve.    Valdo Amezquita MD  [facility]

## 2019-12-20 ENCOUNTER — OFFICE VISIT (OUTPATIENT)
Dept: FAMILY MEDICINE | Facility: CLINIC | Age: 64
End: 2019-12-20
Payer: COMMERCIAL

## 2019-12-20 VITALS
DIASTOLIC BLOOD PRESSURE: 82 MMHG | RESPIRATION RATE: 20 BRPM | TEMPERATURE: 98.5 F | SYSTOLIC BLOOD PRESSURE: 114 MMHG | BODY MASS INDEX: 43.86 KG/M2 | WEIGHT: 247.6 LBS | OXYGEN SATURATION: 94 % | HEART RATE: 73 BPM

## 2019-12-20 DIAGNOSIS — D50.9 IRON DEFICIENCY ANEMIA, UNSPECIFIED IRON DEFICIENCY ANEMIA TYPE: ICD-10-CM

## 2019-12-20 DIAGNOSIS — J34.89 RHINORRHEA: ICD-10-CM

## 2019-12-20 DIAGNOSIS — E55.9 HYPOVITAMINOSIS D: ICD-10-CM

## 2019-12-20 DIAGNOSIS — M25.50 MULTIPLE JOINT PAIN: Primary | ICD-10-CM

## 2019-12-20 LAB
ERYTHROCYTE [DISTWIDTH] IN BLOOD BY AUTOMATED COUNT: 20.2 % (ref 10–15)
FERRITIN SERPL-MCNC: 14 NG/ML (ref 8–252)
HCT VFR BLD AUTO: 46 % (ref 35–47)
HGB BLD-MCNC: 13.9 G/DL (ref 11.7–15.7)
IRON SATN MFR SERPL: 18 % (ref 15–46)
IRON SERPL-MCNC: 80 UG/DL (ref 35–180)
MCH RBC QN AUTO: 24 PG (ref 26.5–33)
MCHC RBC AUTO-ENTMCNC: 30.2 G/DL (ref 31.5–36.5)
MCV RBC AUTO: 79 FL (ref 78–100)
PLATELET # BLD AUTO: 288 10E9/L (ref 150–450)
RBC # BLD AUTO: 5.8 10E12/L (ref 3.8–5.2)
TIBC SERPL-MCNC: 439 UG/DL (ref 240–430)
WBC # BLD AUTO: 14.2 10E9/L (ref 4–11)

## 2019-12-20 PROCEDURE — 83540 ASSAY OF IRON: CPT | Performed by: INTERNAL MEDICINE

## 2019-12-20 PROCEDURE — 83550 IRON BINDING TEST: CPT | Performed by: INTERNAL MEDICINE

## 2019-12-20 PROCEDURE — 85027 COMPLETE CBC AUTOMATED: CPT | Performed by: INTERNAL MEDICINE

## 2019-12-20 PROCEDURE — 99214 OFFICE O/P EST MOD 30 MIN: CPT | Performed by: INTERNAL MEDICINE

## 2019-12-20 PROCEDURE — 82728 ASSAY OF FERRITIN: CPT | Performed by: INTERNAL MEDICINE

## 2019-12-20 PROCEDURE — 82306 VITAMIN D 25 HYDROXY: CPT | Performed by: INTERNAL MEDICINE

## 2019-12-20 PROCEDURE — 36415 COLL VENOUS BLD VENIPUNCTURE: CPT | Performed by: INTERNAL MEDICINE

## 2019-12-20 RX ORDER — NORTRIPTYLINE HCL 25 MG
25 CAPSULE ORAL AT BEDTIME
Qty: 90 CAPSULE | Refills: 3 | Status: SHIPPED | OUTPATIENT
Start: 2019-12-20 | End: 2020-02-12

## 2019-12-20 RX ORDER — FLUTICASONE PROPIONATE 50 MCG
1 SPRAY, SUSPENSION (ML) NASAL DAILY
Qty: 16 G | Refills: 0 | Status: SHIPPED | OUTPATIENT
Start: 2019-12-20 | End: 2020-01-07

## 2019-12-20 NOTE — PATIENT INSTRUCTIONS
Try some Flonase to help your symptoms.  When spraying into the nose, aim towards the ears for the best effect.

## 2019-12-23 LAB — DEPRECATED CALCIDIOL+CALCIFEROL SERPL-MC: 35 UG/L (ref 20–75)

## 2020-01-07 ENCOUNTER — ANCILLARY PROCEDURE (OUTPATIENT)
Dept: GENERAL RADIOLOGY | Facility: CLINIC | Age: 65
End: 2020-01-07
Attending: FAMILY MEDICINE
Payer: COMMERCIAL

## 2020-01-07 ENCOUNTER — OFFICE VISIT (OUTPATIENT)
Dept: FAMILY MEDICINE | Facility: CLINIC | Age: 65
End: 2020-01-07
Payer: COMMERCIAL

## 2020-01-07 VITALS
BODY MASS INDEX: 44.12 KG/M2 | HEART RATE: 79 BPM | RESPIRATION RATE: 16 BRPM | HEIGHT: 63 IN | DIASTOLIC BLOOD PRESSURE: 68 MMHG | TEMPERATURE: 99 F | OXYGEN SATURATION: 96 % | SYSTOLIC BLOOD PRESSURE: 100 MMHG | WEIGHT: 249 LBS

## 2020-01-07 DIAGNOSIS — Z13.220 LIPID SCREENING: ICD-10-CM

## 2020-01-07 DIAGNOSIS — R07.81 RIB PAIN ON RIGHT SIDE: Primary | ICD-10-CM

## 2020-01-07 DIAGNOSIS — E88.01 ALPHA-1-ANTITRYPSIN DEFICIENCY (H): ICD-10-CM

## 2020-01-07 DIAGNOSIS — R07.81 RIB PAIN ON RIGHT SIDE: ICD-10-CM

## 2020-01-07 LAB
CHOLEST SERPL-MCNC: 204 MG/DL
HDLC SERPL-MCNC: 60 MG/DL
LDLC SERPL CALC-MCNC: 115 MG/DL
NONHDLC SERPL-MCNC: 144 MG/DL
TRIGL SERPL-MCNC: 145 MG/DL

## 2020-01-07 PROCEDURE — 80061 LIPID PANEL: CPT | Performed by: FAMILY MEDICINE

## 2020-01-07 PROCEDURE — 36415 COLL VENOUS BLD VENIPUNCTURE: CPT | Performed by: FAMILY MEDICINE

## 2020-01-07 PROCEDURE — 99213 OFFICE O/P EST LOW 20 MIN: CPT | Performed by: FAMILY MEDICINE

## 2020-01-07 PROCEDURE — 71101 X-RAY EXAM UNILAT RIBS/CHEST: CPT | Mod: RT

## 2020-01-07 ASSESSMENT — MIFFLIN-ST. JEOR: SCORE: 1643.59

## 2020-01-07 NOTE — PATIENT INSTRUCTIONS
Thank you for choosing [facility].  You may be receiving an email and/or telephone survey request from [facility] Customer Experience regarding your visit today.  Please take a few minutes to respond to the survey to let us know how we are doing.      If you have questions or concerns, please contact us via [employer]Jar or you can contact your care team at [phone].    Our Clinic hours are:  Monday 6:40 am  to 7:00 pm  Tuesday -Friday 6:40 am to 5:00 pm    The [place] outpatient lab hours are:  Monday - Friday 6:10 am to 4:45 pm  Saturdays 7:00 am to 11:00 am  Appointments are required, call [phone]    If you have clinical questions after hours or would like to schedule an appointment,  call the clinic at [phone].  Patient Education     Rib Contusion     A rib contusion is a bruise to one or more rib bones. It may cause pain, tenderness, swelling and a purplish discoloration. There may be a sharp pain while breathing.  You will be assessed for other injuries. You will likely be given pain medicine. Rib contusions heal on their own, without further treatment. However, pain may take weeks to months to go away.   Note that a small crack (fracture) in the rib may cause the same symptoms as a rib contusion. The small crack may not be seen on a chest X-ray. However, the conditions are managed in the same way.  Home care    Rest. Avoid heavy lifting, strenuous exertion, or any activity that causes pain.    Ice the area to reduce pain and swelling. Put ice cubes in a plastic bag or use a cold pack. (Wrap the cold source in a thin towel. Do not place it directly on your skin.) Ice the injured area for 20 minutes every 1 to 2 hours the first day. Continue with ice packs 3 to 4 times a day for the next 2 days, then as needed for the relief of pain and swelling.    Take any prescribed pain medicine as directed by your healthcare provider. If none was prescribed, take acetaminophen, ibuprofen, or naproxen to  control pain.    If you have a significant injury, you may be given a device called an incentive spirometer to keep your lungs healthy. Use as directed.  Follow-up care  Follow up with your healthcare provider during the next week or as directed.  When to seek medical advice  Call your healthcare provider for any of the following:    Shortness of breath or trouble breathing    Increasing chest pain with breathing    Coughing    Dizziness, weakness, or fainting    New or worsening pain    Fever of 100.4 F (38 C) or higher, or as directed by your healthcare provider  Date Last Reviewed: 2/1/2017 2000-2019 [employer]. [address] [place] [id number]. All rights reserved. This information is not intended as a substitute for professional medical care. Always follow your healthcare professional's instructions.

## 2020-01-07 NOTE — RESULT ENCOUNTER NOTE
Please inform patient that test result was within normal parameters. Her x-rays did not show any rib fractures.  Thank you.     Jennie Suazo M.D.

## 2020-01-07 NOTE — RESULT ENCOUNTER NOTE
Please inform patient that test result showed mild increase in cholesterol levels. . Recommend working on diet.    Thank you.     Jennie Suazo M.D.

## 2020-01-07 NOTE — PROGRESS NOTES
Subjective     Jacquie Harris is a 64 year old female who presents to clinic today for the following health issues:     64 year old female here for a fall. This happened a few days ago. She fell on her right rib area. She reports pain when she lays down. Patient states that she has also experienced pain when she takes deep breath. She did not hit her head and she has no dizziness or loss of consciousness.     HPI   Chief Complaint   Patient presents with     Fall     rib pain      Health Maintenance     patient is due for lipid and is fasting        Concern - Patient feel hurting R side rib and breast   Onset: 1-1-20    Description:   Patient fell on 1-1-20 hurting R side breast and rib     Intensity: moderate, severe    Progression of Symptoms:  same    Accompanying Signs & Symptoms:  Patient fell     Previous history of similar problem:   None     Precipitating factors:   Worsened by: deep breath and lying down     Alleviating factors:  Improved by: sitting resting     Therapies Tried and outcome: none       Patient Active Problem List   Diagnosis     s/p gastric bypass x 2     Allergic rhinitis     Carpal tunnel syndrome     Obstructive sleep apnea     Rosacea     CARDIOVASCULAR SCREENING; LDL GOAL LESS THAN 160     Tick bite, infected, positive Lyme test 1996 not treated     Vitamin D deficiency     Osteoporosis     Obesity, Class III, BMI 40-49.9 (morbid obesity) (H)     S/P TKR (total knee replacement)     HTN, goal below 140/80     Back pain, left below scapula, strained muscles     Encounter for routine gynecological examination      Colles' fracture     Bilateral cold feet     Elevated levels of transaminase & lactic acid dehydrogenase     Fatty liver     Shoulder injury, right, subsequent encounter     GI bleed     Upper GI bleed     Bronchospasm     Alpha-1-antitrypsin deficiency (H)     Multiple joint pain, possible fibromyalgia     Past Surgical History:   Procedure Laterality Date     ARTHROPLASTY KNEE   4/2/2012    Procedure:ARTHROPLASTY KNEE; Left Total Knee Arthroplasty--Anesth.Choice; Surgeon:HERNANDO THOMPSON; Location:[facility]     ARTHROTOMY SHOULDER, ROTATOR CUFF REPAIR, COMBINED Right 8/25/2017    Procedure: COMBINED ARTHROTOMY SHOULDER, ROTATOR CUFF REPAIR;  Right shoulder distal clavicle excision, subacromial decompression, rotator cuff repair ;  Surgeon: Hernando Thompson MD;  Location: [facility]     ARTHROTOMY SHOULDER, ROTATOR CUFF REPAIR, COMBINED Right 12/18/2017    Procedure: COMBINED ARTHROTOMY SHOULDER, ROTATOR CUFF REPAIR;  Right Shoulder Rotator Cuff Revision;  Surgeon: Hernando Thompson MD;  Location: [facility]     ARTHROTOMY SHOULDER, ROTATOR CUFF REPAIR, COMBINED Left 4/27/2018    Procedure: COMBINED ARTHROTOMY SHOULDER, ROTATOR CUFF REPAIR;  Left Shoulder Open Subacromial Decompression,Distal Clavicle Excision,Rotator Cuff Repair;  Surgeon: Hernando Thompson MD;  Location: [facility] OR     C ORAL SURGERY PROCEDURE  age 18    wisdom teeth     C/SECTION, LOW TRANSVERSE  1978, 1984, 1994    x 3     COLONOSCOPY  2001    normal colonoscopy     COLONOSCOPY N/A 8/22/2017    Procedure: COLONOSCOPY;  Colonoscopy  ;  Surgeon: Delfino Yoo MD;  Location: [facility]     ESOPHAGOSCOPY, GASTROSCOPY, DUODENOSCOPY (EGD), COMBINED N/A 3/18/2018    Procedure: COMBINED ESOPHAGOSCOPY, GASTROSCOPY, DUODENOSCOPY (EGD);;  Surgeon: Poncho Galindo MD;  Location: [facility]     GASTRIC BYPASS  1980/2005    Gastric Bypass and revision     HERNIA REPAIR, INCISIONAL  6/16/2008    lap.repair with 10x8 mesh     TUBAL LIGATION  1994       Social History     Tobacco Use     Smoking status: Never Smoker     Smokeless tobacco: Never Used   Substance Use Topics     Alcohol use: Yes     Alcohol/week: 0.0 standard drinks     Comment: mixed very light 1 a month if that     Family History   Problem Relation Age of Onset     C.A.D. Father         MI at age 60     Hypertension Father      Alzheimer Disease Father      Hyperlipidemia Father       Osteoporosis Mother         on Fosamax     Glaucoma Mother      Other Cancer Brother      Stomach Cancer Maternal Aunt      Cervical Cancer Cousin      Diabetes No family hx of      Cerebrovascular Disease No family hx of      Breast Cancer No family hx of      Cancer - colorectal No family hx of      Prostate Cancer No family hx of      Liver Disease No family hx of          Current Outpatient Medications   Medication Sig Dispense Refill     atenolol (TENORMIN) 50 MG tablet TAKE ONE TABLET BY MOUTH ONCE DAILY 90 tablet 2     ferrous sulfate (SLO-FE) 142 (45 Fe) MG CR tablet Take 1 tablet (142 mg) by mouth daily 90 tablet 3     hydrochlorothiazide (HYDRODIURIL) 12.5 MG tablet TAKE ONE TABLET BY MOUTH ONCE DAILY 90 tablet 2     nortriptyline (PAMELOR) 25 MG capsule Take 1 capsule (25 mg) by mouth At Bedtime 90 capsule 3     clobetasol (TEMOVATE) 0.05 % ointment Apply sparingly to affected area twice daily for 14 days.  Do not apply to face. 15 g 0     diclofenac (VOLTAREN) 1 % topical gel Place 2 g onto the skin 4 times daily 100 g 0     Emollient (CERAVE) CREA Externally apply topically 2 times daily as needed 1 Bottle 3     nystatin (MYCOSTATIN) 899388 UNIT/GM POWD Apply topically 3 times daily as needed Refill at patient's request 30 g 1     nystatin (MYCOSTATIN) cream Apply topically 2 times daily Reported on 3/6/2017 30 g 11     Allergies   Allergen Reactions     Aleve [Naproxen] GI Disturbance     Vioxx Itching and Swelling     VIOXX (Swollen Feet,Hands itching), Okay with ibuprofen and aspirin     BP Readings from Last 3 Encounters:   01/07/20 100/68   12/20/19 114/82   11/22/19 126/78    Wt Readings from Last 3 Encounters:   01/07/20 112.9 kg (249 lb)   12/20/19 112.3 kg (247 lb 9.6 oz)   11/22/19 117.4 kg (258 lb 12.8 oz)                      Reviewed and updated as needed this visit by Provider         Review of Systems   ROS COMP: Constitutional, HEENT, cardiovascular, pulmonary, gi and gu systems are  "negative, except as otherwise noted.      Objective    /68   Pulse 79   Temp 99  F (37.2  C) (Tympanic)   Resp 16   Ht 1.6 m (5' 3\")   Wt 112.9 kg (249 lb)   LMP 05/29/2006   SpO2 96%   BMI 44.11 kg/m    Body mass index is 44.11 kg/m .  Physical Exam   GENERAL: healthy, alert and no distress  EYES: Eyes grossly normal to inspection, PERRL and conjunctivae and sclerae normal  HENT: ear canals and TM's normal, nose and mouth without ulcers or lesions  NECK: no adenopathy, no asymmetry, masses, or scars and thyroid normal to palpation  RESP: lungs clear to auscultation - no rales, rhonchi or wheezes  CV: regular rate and rhythm, normal S1 S2, no S3 or S4, no murmur, click or rub, no peripheral edema and peripheral pulses strong  MS: tenderness to palpation on right lateral chest wall.     Diagnostic Test Results:  Xray - appear normal but will await radiology report.         Assessment & Plan       ICD-10-CM    1. Rib pain on right side R07.81 XR Ribs & Chest Right G/E 3 Views   2. Lipid screening Z13.220 Lipid panel reflex to direct LDL Fasting   3. Body mass index (BMI) 45.0-49.9, adult (H) Z68.42    4. Alpha-1-antitrypsin deficiency (H) E88.01    Patient was reassured . Recommend heat/icing. Recommend topical analgesics. We will call her with the [facility] for rib x-rays.         FUTURE APPOINTMENTS:       - Follow-up visit in one month or sooner as needed.  Patient Instructions         Thank you for choosing [facility].  You may be receiving an email and/or telephone survey request from [facility] Customer Experience regarding your visit today.  Please take a few minutes to respond to the survey to let us know how we are doing.      If you have questions or concerns, please contact us via MobileIgniter or you can contact your care team at [phone].    Our Clinic hours are:  Monday 6:40 am  to 7:00 pm  Tuesday -Friday 6:40 am to 5:00 pm    The [place] outpatient lab hours are:  Monday - Friday " 6:10 am to 4:45 pm  Saturdays 7:00 am to 11:00 am  Appointments are required, call [phone]    If you have clinical questions after hours or would like to schedule an appointment,  call the clinic at [phone].  Patient Education     Rib Contusion     A rib contusion is a bruise to one or more rib bones. It may cause pain, tenderness, swelling and a purplish discoloration. There may be a sharp pain while breathing.  You will be assessed for other injuries. You will likely be given pain medicine. Rib contusions heal on their own, without further treatment. However, pain may take weeks to months to go away.   Note that a small crack (fracture) in the rib may cause the same symptoms as a rib contusion. The small crack may not be seen on a chest X-ray. However, the conditions are managed in the same way.  Home care    Rest. Avoid heavy lifting, strenuous exertion, or any activity that causes pain.    Ice the area to reduce pain and swelling. Put ice cubes in a plastic bag or use a cold pack. (Wrap the cold source in a thin towel. Do not place it directly on your skin.) Ice the injured area for 20 minutes every 1 to 2 hours the first day. Continue with ice packs 3 to 4 times a day for the next 2 days, then as needed for the relief of pain and swelling.    Take any prescribed pain medicine as directed by your healthcare provider. If none was prescribed, take acetaminophen, ibuprofen, or naproxen to control pain.    If you have a significant injury, you may be given a device called an incentive spirometer to keep your lungs healthy. Use as directed.  Follow-up care  Follow up with your healthcare provider during the next week or as directed.  When to seek medical advice  Call your healthcare provider for any of the following:    Shortness of breath or trouble breathing    Increasing chest pain with breathing    Coughing    Dizziness, weakness, or fainting    New or worsening pain    Fever of 100.4 F (38 C) or  higher, or as directed by your healthcare provider  Date Last Reviewed: 2/1/2017 2000-2019 [employer]. [address] [place] [address], [place] [address]. All rights reserved. This information is not intended as a substitute for professional medical care. Always follow your healthcare professional's instructions.               No follow-ups on file.    Jennie Suazo MD  [facility]

## 2020-01-14 ENCOUNTER — OFFICE VISIT (OUTPATIENT)
Dept: FAMILY MEDICINE | Facility: CLINIC | Age: 65
End: 2020-01-14
Payer: COMMERCIAL

## 2020-01-14 VITALS
DIASTOLIC BLOOD PRESSURE: 68 MMHG | OXYGEN SATURATION: 95 % | RESPIRATION RATE: 18 BRPM | TEMPERATURE: 98.4 F | HEIGHT: 63 IN | SYSTOLIC BLOOD PRESSURE: 100 MMHG | BODY MASS INDEX: 44.12 KG/M2 | HEART RATE: 84 BPM | WEIGHT: 249 LBS

## 2020-01-14 DIAGNOSIS — M75.101 ROTATOR CUFF SYNDROME, RIGHT: ICD-10-CM

## 2020-01-14 DIAGNOSIS — I10 HTN, GOAL BELOW 140/80: ICD-10-CM

## 2020-01-14 DIAGNOSIS — D50.8 OTHER IRON DEFICIENCY ANEMIA: ICD-10-CM

## 2020-01-14 DIAGNOSIS — Z01.818 PREOP GENERAL PHYSICAL EXAM: Primary | ICD-10-CM

## 2020-01-14 LAB
BASOPHILS # BLD AUTO: 0 10E9/L (ref 0–0.2)
BASOPHILS NFR BLD AUTO: 0.3 %
DIFFERENTIAL METHOD BLD: ABNORMAL
EOSINOPHIL # BLD AUTO: 0.2 10E9/L (ref 0–0.7)
EOSINOPHIL NFR BLD AUTO: 2.1 %
ERYTHROCYTE [DISTWIDTH] IN BLOOD BY AUTOMATED COUNT: 20 % (ref 10–15)
HCT VFR BLD AUTO: 42.7 % (ref 35–47)
HGB BLD-MCNC: 13.3 G/DL (ref 11.7–15.7)
LYMPHOCYTES # BLD AUTO: 2 10E9/L (ref 0.8–5.3)
LYMPHOCYTES NFR BLD AUTO: 28.5 %
MCH RBC QN AUTO: 25.5 PG (ref 26.5–33)
MCHC RBC AUTO-ENTMCNC: 31.1 G/DL (ref 31.5–36.5)
MCV RBC AUTO: 82 FL (ref 78–100)
MONOCYTES # BLD AUTO: 0.6 10E9/L (ref 0–1.3)
MONOCYTES NFR BLD AUTO: 7.7 %
NEUTROPHILS # BLD AUTO: 4.4 10E9/L (ref 1.6–8.3)
NEUTROPHILS NFR BLD AUTO: 61.4 %
PLATELET # BLD AUTO: 240 10E9/L (ref 150–450)
RBC # BLD AUTO: 5.22 10E12/L (ref 3.8–5.2)
WBC # BLD AUTO: 7.1 10E9/L (ref 4–11)

## 2020-01-14 PROCEDURE — 85025 COMPLETE CBC W/AUTO DIFF WBC: CPT | Performed by: FAMILY MEDICINE

## 2020-01-14 PROCEDURE — 36415 COLL VENOUS BLD VENIPUNCTURE: CPT | Performed by: FAMILY MEDICINE

## 2020-01-14 PROCEDURE — 99215 OFFICE O/P EST HI 40 MIN: CPT | Performed by: FAMILY MEDICINE

## 2020-01-14 ASSESSMENT — MIFFLIN-ST. JEOR: SCORE: 1643.59

## 2020-01-14 NOTE — PATIENT INSTRUCTIONS
Before Your Surgery      Call your surgeon if there is any change in your health. This includes signs of a cold or flu (such as a sore throat, runny nose, cough, rash or fever).    Do not smoke, drink alcohol or take over the counter medicine (unless your surgeon or primary care doctor tells you to) for the 24 hours before and after surgery.    If you take prescribed drugs: Follow your doctor s orders about which medicines to take and which to stop until after surgery.    Eating and drinking prior to surgery: follow the instructions from your surgeon    Take a shower or bath the night before surgery. Use the soap your surgeon gave you to gently clean your skin. If you do not have soap from your surgeon, use your regular soap. Do not shave or scrub the surgery site.  Wear clean pajamas and have clean sheets on your bed.   Patient Education     Presurgery Checklist  You are scheduled to have surgery. The healthcare staff will try to make your stay comfortable. Use the guidelines below to remind yourself what to do before surgery. Be sure to follow any specific pre-op instructions from your surgeon or nurse.  Preparing for surgery  If you are having abdominal surgery, ask what you need to do to clear your bowel.  Tell your surgeon if you have allergies to any medicines, latex, or foods.  Ask your surgeon if you might need a blood transfusion during surgery and if so, how to prepare for it. In some cases, you can donate blood before surgery. If needed, this blood can be given back (transfused) to you during or after surgery.  Arrange for an adult family member or friend to drive you home after surgery. If possible, have someone ready to help you at home as you recover.  Call the surgeon if you get a cold, fever, sore throat, diarrhea, or other health problem just before surgery. Your surgeon can decide whether or not to postpone the surgery.  Medicines  Tell your surgeon about all medicines you take, including  prescription and over-the-counter products such as herbal remedies and vitamins. Ask if you should continue taking them.  If you take ibuprofen, naproxen, or blood thinners (anticoagulants) such as aspirin, clopidogrel, or warfarin, ask your surgeon whether you should stop taking them and how long before surgery you should stop.  You may be told to take antibiotics just before surgery to prevent infection. If so, follow instructions carefully on how to take them.  If you are told to take blood thinners to help prevent blood clots after surgery, be sure to follow the instructions on how to take them.  Stop smoking  If you smoke, healing may take longer. So at least 2 week(s) before surgery, stop smoking.  Bathing or showering before surgery  If instructed, wash with antibacterial soap. Afterward, do not use lotions, oils, or powders.  If you are having surgery on the head, you may be asked to shampoo with antibacterial soap. Follow instructions for doing so.  Do not remove hair from the surgery site  Do not shave hair from the incision site, unless you are given specific instructions to do so. Usually, if hair needs to be removed, it will be done at the hospital right before surgery.  Don t eat or drink  Follow any directions you are given for not eating or drinking before surgery. If you don't follow instructions about when to stop eating and drinking, your procedure may be postponed or rescheduled for another day. This is a safety issue.  You can brush your teeth and rinse your mouth, but don t swallow any water.  Day of surgery  Don't wear makeup. Don't use perfume, deodorant, or hairspray. Remove nail polish and artificial nails.  Leave jewelry (including rings), watches, and other valuables at home.  Be sure to bring health insurance cards or forms and a photo ID.  Bring a list of your medicines (include the name, dose, how often you take them, and the time last dose was taken).  Arrive on time at the hospital  or surgery facility.  Date Last Reviewed: 12/1/2016 2000-2018 [employer]. [address] [place] [address], [place] [address]. All rights reserved. This information is not intended as a substitute for professional medical care. Always follow your healthcare professional's instructions.

## 2020-01-14 NOTE — PROGRESS NOTES
[facility]  [address] [facility] [address]  [phone]  Dept: [phone]    PRE-OP EVALUATION:  Today's date: 2020    Jacquie Harris (: 1955) presents for pre-operative evaluation assessment as requested by Dr. dudley.  She requires evaluation and anesthesia risk assessment prior to undergoing surgery/procedure for treatment of R shoulder rotator cuff tear and arthroscopic surgery  .    Proposed Surgery/ Procedure: R shoulder rotator cuff tear and arthroscopic surgery  .  Date of Surgery/ Procedure: 1-15-20  Time of Surgery/ Procedure: 12:00pm  Hospital/Surgical Facility: [facility]   Fax number for surgical facility: [phone]  Primary Physician: Jennie Suazo  Type of Anesthesia Anticipated: General    Patient has a Health Care Directive or Living Will:  NO    1. NO - Do you have a history of heart attack, stroke, stent, bypass or surgery on an artery in the head, neck, heart or legs?  2. NO - Do you ever have any pain or discomfort in your chest?  3. NO - Do you have a history of  Heart Failure?  4. NO - Are you troubled by shortness of breath when: walking on the level, up a slight hill or at night?  5. NO - Do you currently have a cold, bronchitis or other respiratory infection?  6. NO - Do you have a cough, shortness of breath or wheezing?  7. NO - Do you sometimes get pains in the calves of your legs when you walk?  8. NO - Do you or anyone in your family have previous history of blood clots?  9.yes  - Do you or does anyone in your family have a serious bleeding problem such as prolonged bleeding following surgeries or cuts?Patient pregn   10.yes  - Have you ever had problems with anemia or been told to take iron pills? With pregn and patient is on iron   11. Yes  - Have you had any abnormal blood loss such as black, tarry or bloody stools, or abnormal vaginal bleeding?patient was taking aleve and ibuprofen had GI bleed   12.yes - Have you  ever had a blood transfusion?birth of children   13. NO - Have you or any of your relatives ever had problems with anesthesia?  14. Yes  - Do you have sleep apnea, excessive snoring or daytime drowsiness?sleep apnea  15. NO - Do you have any prosthetic heart valves?  16. Yes - Do you have prosthetic joints?L knee   17. NO - Is there any chance that you may be pregnant?      HPI:     HPI related to upcoming procedure: Patient is a 64 yr old female here for her pre op. She is having a rotator cuff surgery to repair her right rotator cuff. She denies any acute symptoms today. She reports no chest pain or shortness of breath. She has hypertension and she is on atenolol and hydrochlorothiazide for this.       ANEMIA - Patient has a recent history of moderate-severe anemia, which has not been symptomatic. Work up to date has revealed low iron. Treatment has been iron supplements .     HYPERTENSION - Patient has longstanding history of HTN , currently denies any symptoms referable to elevated blood pressure. Specifically denies chest pain, palpitations, dyspnea, orthopnea, PND or peripheral edema. Blood pressure readings have been in normal range. Current medication regimen is as listed below. Patient denies any side effects of medication.       MEDICAL HISTORY:     Patient Active Problem List    Diagnosis Date Noted     Rotator cuff syndrome, right 01/14/2020     Priority: Medium     Multiple joint pain, possible fibromyalgia 12/20/2019     Priority: Medium     Alpha-1-antitrypsin deficiency (H) 05/07/2019     Priority: Medium     Bronchospasm 01/07/2019     Priority: Medium     GI bleed 03/17/2018     Priority: Medium     Upper GI bleed 03/17/2018     Priority: Medium     Shoulder injury, right, subsequent encounter 08/21/2017     Priority: Medium     Fatty liver 04/20/2017     Priority: Medium     Elevated levels of transaminase & lactic acid dehydrogenase 04/15/2017     Priority: Medium     Bilateral cold feet  01/03/2017     Priority: Medium     Colles' fracture 07/06/2015     Priority: Medium     Encounter for routine gynecological examination  06/04/2015     Priority: Medium     Back pain, left below scapula, strained muscles 10/24/2013     Priority: Medium     HTN, goal below 140/80 05/05/2013     Priority: Medium     S/P TKR (total knee replacement) 04/03/2012     Priority: Medium     Osteoporosis 02/19/2012     Priority: Medium     Previous T scores -2.2  2/2012 Dexascan:  Lumbar spine L1-L4 T-score: -2.4, Left femoral neck T-score: -2.7, Right femoral neck T-score: -2.2   Percent change in lumbar spine: +6.6% since 4/16/2008   Percent change in femurs: +2.9% since 4/16/2008    IMPRESSION: Osteoporosis in the left femoral neck. Severe osteopenia  in the right femoral neck and lumbar spine.       Obesity, Class III, BMI 40-49.9 (morbid obesity) (H) 02/19/2012     Priority: Medium     ideal weight 120, goal weight 200, lose 58 lbs in 58 weeks.       Vitamin D deficiency 07/18/2011     Priority: Medium     Tick bite, infected, positive Lyme test 1996 not treated 07/08/2011     Priority: Medium     Rosacea 11/04/2009     Priority: Medium     Obstructive sleep apnea 12/01/2006     Priority: Medium     Sleep study 12/06 for EDS- AHI 36, desaturations to 65%. CPAP recommended but not tolerated.  01/16/07  ENT consult recommend CPAP and weight loss. Surgery discussed but success rate less than CPAP  1/15/07 CPAP trial, was not able to tolerate. Retried 2008, tolerated better.   Problem list name updated by automated process. Provider to review       Carpal tunnel syndrome 06/07/2006     Priority: Medium     04/10/09 [facility]. Finding consistant with CTS, but nerve studies and MRI of C-Spine are normal. Corticosteroid injection given in office       CARDIOVASCULAR SCREENING; LDL GOAL LESS THAN 160 10/31/2010     Priority: Low     Allergic rhinitis 03/27/2006     Priority: Low     Problem list name updated by  automated process. Provider to review       s/p gastric bypass x 2 07/05/2005     Priority: Low     Gastric bypass 1980        Past Medical History:   Diagnosis Date     HTN (hypertension)      IRON DEFICIENCY ANEMIA 7/5/2005     Iron infusion/ resolved since injections     DARWIN (obstructive sleep apnea)      Past Surgical History:   Procedure Laterality Date     ARTHROPLASTY KNEE  4/2/2012    Procedure:ARTHROPLASTY KNEE; Left Total Knee Arthroplasty--Anesth.Choice; Surgeon:VICENTE THOMPSON; Location:[facility]     ARTHROTOMY SHOULDER, ROTATOR CUFF REPAIR, COMBINED Right 8/25/2017    Procedure: COMBINED ARTHROTOMY SHOULDER, ROTATOR CUFF REPAIR;  Right shoulder distal clavicle excision, subacromial decompression, rotator cuff repair ;  Surgeon: Vicente Thompson MD;  Location: [facility]     ARTHROTOMY SHOULDER, ROTATOR CUFF REPAIR, COMBINED Right 12/18/2017    Procedure: COMBINED ARTHROTOMY SHOULDER, ROTATOR CUFF REPAIR;  Right Shoulder Rotator Cuff Revision;  Surgeon: Vicente Thompson MD;  Location: [facility] OR     ARTHROTOMY SHOULDER, ROTATOR CUFF REPAIR, COMBINED Left 4/27/2018    Procedure: COMBINED ARTHROTOMY SHOULDER, ROTATOR CUFF REPAIR;  Left Shoulder Open Subacromial Decompression,Distal Clavicle Excision,Rotator Cuff Repair;  Surgeon: Vicente Thompson MD;  Location: [facility] OR     C ORAL SURGERY PROCEDURE  age 18    wisdom teeth     C/SECTION, LOW TRANSVERSE  1978, 1984, 1994    x 3     COLONOSCOPY  2001    normal colonoscopy     COLONOSCOPY N/A 8/22/2017    Procedure: COLONOSCOPY;  Colonoscopy  ;  Surgeon: Delfino Yoo MD;  Location: [facility]     ESOPHAGOSCOPY, GASTROSCOPY, DUODENOSCOPY (EGD), COMBINED N/A 3/18/2018    Procedure: COMBINED ESOPHAGOSCOPY, GASTROSCOPY, DUODENOSCOPY (EGD);;  Surgeon: Poncho Galindo MD;  Location: [facility]     GASTRIC BYPASS  1980/2005    Gastric Bypass and revision     HERNIA REPAIR, INCISIONAL  6/16/2008    lap.repair with 10x8 mesh     TUBAL LIGATION  1994     Current  Outpatient Medications   Medication Sig Dispense Refill     atenolol (TENORMIN) 50 MG tablet TAKE ONE TABLET BY MOUTH ONCE DAILY 90 tablet 2     clobetasol (TEMOVATE) 0.05 % ointment Apply sparingly to affected area twice daily for 14 days.  Do not apply to face. 15 g 0     diclofenac (VOLTAREN) 1 % topical gel Place 2 g onto the skin 4 times daily 100 g 0     Emollient (CERAVE) CREA Externally apply topically 2 times daily as needed 1 Bottle 3     ferrous sulfate (SLO-FE) 142 (45 Fe) MG CR tablet Take 1 tablet (142 mg) by mouth daily 90 tablet 3     hydrochlorothiazide (HYDRODIURIL) 12.5 MG tablet TAKE ONE TABLET BY MOUTH ONCE DAILY 90 tablet 2     nortriptyline (PAMELOR) 25 MG capsule Take 1 capsule (25 mg) by mouth At Bedtime 90 capsule 3     nystatin (MYCOSTATIN) 206224 UNIT/GM POWD Apply topically 3 times daily as needed Refill at patient's request 30 g 1     nystatin (MYCOSTATIN) cream Apply topically 2 times daily Reported on 3/6/2017 30 g 11     OTC products: None, except as noted above    Allergies   Allergen Reactions     Aleve [Naproxen] GI Disturbance     Vioxx Itching and Swelling     VIOXX (Swollen Feet,Hands itching), Okay with ibuprofen and aspirin      Latex Allergy: NO    Social History     Tobacco Use     Smoking status: Never Smoker     Smokeless tobacco: Never Used   Substance Use Topics     Alcohol use: Yes     Alcohol/week: 0.0 standard drinks     Comment: mixed very light 1 a month if that     History   Drug Use No       REVIEW OF SYSTEMS:   CONSTITUTIONAL: NEGATIVE for fever, chills, change in weight  INTEGUMENTARY/SKIN: NEGATIVE for worrisome rashes, moles or lesions  EYES: NEGATIVE for vision changes or irritation  ENT/MOUTH: NEGATIVE for ear, mouth and throat problems  RESP: NEGATIVE for significant cough or SOB  BREAST: NEGATIVE for masses, tenderness or discharge  CV: NEGATIVE for chest pain, palpitations or peripheral edema  GI: NEGATIVE for nausea, abdominal pain, heartburn, or change  "in bowel habits  : NEGATIVE for frequency, dysuria, or hematuria  MUSCULOSKELETAL:POSITIVE  for joint pain right shoulder   ENDOCRINE: NEGATIVE for temperature intolerance, skin/hair changes  HEME: NEGATIVE for bleeding problems  PSYCHIATRIC: NEGATIVE for changes in mood or affect    EXAM:   /68   Pulse 84   Temp 98.4  F (36.9  C) (Tympanic)   Resp 18   Ht 1.6 m (5' 3\")   Wt 112.9 kg (249 lb)   LMP 05/29/2006   SpO2 95%   BMI 44.11 kg/m      GENERAL APPEARANCE: healthy, alert and no distress     EYES: EOMI, PERRL     HENT: ear canals and TM's normal and nose and mouth without ulcers or lesions     NECK: no adenopathy, no asymmetry, masses, or scars and thyroid normal to palpation     RESP: lungs clear to auscultation - no rales, rhonchi or wheezes     CV: regular rates and rhythm, normal S1 S2, no S3 or S4 and no murmur, click or rub     ABDOMEN:  soft, nontender, no HSM or masses and bowel sounds normal     SKIN: no suspicious lesions or rashes     PSYCH: mentation appears normal. and affect normal/bright     LYMPHATICS: No cervical adenopathy    DIAGNOSTICS:   EKG: appears normal, NSR, normal axis, normal intervals, no acute ST/T changes c/w ischemia, no LVH by voltage criteria, unchanged from previous tracings    Recent Labs   Lab Test 12/20/19  0742 10/25/19  1322  05/07/19  1031  03/18/18  0648  03/17/18  1315  08/16/17  0930   HGB 13.9 10.6*   < > 11.9   < > 10.1*   < > 11.5*   < > 14.8    325   < >  --   --  195   < > 245   < > 213   INR  --   --   --   --   --   --   --  1.00  --  1.05   NA  --   --   --  137  --  140  --  137   < > 139   POTASSIUM  --   --   --  3.8  --  4.0  --  3.8   < > 4.0   CR  --   --   --  0.67  --  0.68  --  0.71   < > 0.74    < > = values in this interval not displayed.      Results for orders placed or performed in visit on 01/14/20   CBC with platelets and differential     Status: Abnormal   Result Value Ref Range    WBC 7.1 4.0 - 11.0 10e9/L    RBC Count " 5.22 (H) 3.8 - 5.2 10e12/L    Hemoglobin 13.3 11.7 - 15.7 g/dL    Hematocrit 42.7 35.0 - 47.0 %    MCV 82 78 - 100 fl    MCH 25.5 (L) 26.5 - 33.0 pg    MCHC 31.1 (L) 31.5 - 36.5 g/dL    RDW 20.0 (H) 10.0 - 15.0 %    Platelet Count 240 150 - 450 10e9/L    % Neutrophils 61.4 %    % Lymphocytes 28.5 %    % Monocytes 7.7 %    % Eosinophils 2.1 %    % Basophils 0.3 %    Absolute Neutrophil 4.4 1.6 - 8.3 10e9/L    Absolute Lymphocytes 2.0 0.8 - 5.3 10e9/L    Absolute Monocytes 0.6 0.0 - 1.3 10e9/L    Absolute Eosinophils 0.2 0.0 - 0.7 10e9/L    Absolute Basophils 0.0 0.0 - 0.2 10e9/L    Diff Method Automated Method        IMPRESSION:   Reason for surgery/procedure: Rotator cuff syndrome   Diagnosis/reason for consult: Pre op evaluation     The proposed surgical procedure is considered LOW risk.    REVISED CARDIAC RISK INDEX  The patient has the following serious cardiovascular risks for perioperative complications such as (MI, PE, VFib and 3  AV Block):  No serious cardiac risks  INTERPRETATION: 0 risks: Class I (very low risk - 0.4% complication rate)    The patient has the following additional risks for perioperative complications:  No identified additional risks  The 10-year ASCVD risk score (Falls Churchannmarie BRITO Jr., et al., 2013) is: 4.5%    Values used to calculate the score:      Age: 64 years      Sex: Female      Is Non- : No      Diabetic: No      Tobacco smoker: No      Systolic Blood Pressure: 100 mmHg      Is BP treated: Yes      HDL Cholesterol: 60 mg/dL      Total Cholesterol: 204 mg/dL      ICD-10-CM    1. Preop general physical exam Z01.818 CBC with platelets and differential   2. Rotator cuff syndrome, right M75.101    3. HTN, goal below 140/80 I10    4. Other iron deficiency anemia D50.8        RECOMMENDATIONS:       Cardiovascular Risk  Performs 4 METs exercise without symptoms (Light housework (dusting, washing dishes) and Climb a flight of stairs) .   Patient is already on a Beta Blocker.  Continue Betablocker therapy after surgery, using Beta blocker order set as necessary for NPO status.      Pulmonary Risk  Incentive spirometry post op  Respiratory Therapy (Respiratory Care IP Consult)  post op  NG tube decompression if abdominal distension or significant vomiting       --Patient is to take all scheduled medications on the day of surgery EXCEPT for modifications listed below.    Anticoagulant or Antiplatelet Medication Use  ASPIRIN: Discontinue ASA 7-10 days prior to procedure to reduce bleeding risk.    NSAIDS: Ibuprofen (Motrin):  Stop one week prior to surgery  Naproxen (Naprosyn):   Stop one week  prior to surgery        Hydrochlorothiazide Use   Should HOLD this medication on morning of surgery.      APPROVAL GIVEN to proceed with proposed procedure, without further diagnostic evaluation       Signed Electronically by: Jennie Suazo MD    Copy of this evaluation report is provided to requesting physician.    [facility] Preop Guidelines    Revised Cardiac Risk Index

## 2020-02-06 ENCOUNTER — HOSPITAL ENCOUNTER (OUTPATIENT)
Dept: PHYSICAL THERAPY | Facility: CLINIC | Age: 65
Setting detail: THERAPIES SERIES
End: 2020-02-06
Attending: ORTHOPAEDIC SURGERY
Payer: COMMERCIAL

## 2020-02-06 PROCEDURE — 97161 PT EVAL LOW COMPLEX 20 MIN: CPT | Mod: GP | Performed by: PHYSICAL THERAPIST

## 2020-02-06 PROCEDURE — 97110 THERAPEUTIC EXERCISES: CPT | Mod: GP | Performed by: PHYSICAL THERAPIST

## 2020-02-06 NOTE — PROGRESS NOTES
Jacquie Harris   PHYSICAL THERAPY EVALUATION    02/06/20 1000   General Information   Type of Visit Initial OP Ortho PT Evaluation   Start of Care Date 02/06/20   Referring Physician DR Hines   Patient/Family Goals Statement regain use of arm, housework, boating, camping   Orders Evaluate and Treat   Orders Comment PROM only til 6wks post op, limit to flex 130*, abd 60*, ER 30*   Date of Order 01/30/20   Certification Required? No   Medical Diagnosis POST R SHOULDER SURGERY, ROTATOR CUFF REVSION   Body Part(s)   Body Part(s) Shoulder   Presentation and Etiology   Pertinent history of current problem (include personal factors and/or comorbidities that impact the POC) R RC revision , this is 3rd surgery, 2nd revision. RCR Aug 2017 and then 2nd surgery on 12/18/17 . NOw 3rd time. Different doctor this time. Papi got full ROM after second surgery, hurt. R hand dominant.    Onset date of current episode/exacerbation 01/15/20   Prior Level of Function   Functional Level Prior Comment camp/RV, boat, fish, gardening, housework, like to tube behind boat   Current Level of Function   Patient role/employment history F. Retired;C. Homemaker   Living environment [place]   Fall Risk Screen   Fall screen completed by PT   Have you fallen 2 or more times in the past year? No   Have you fallen and had an injury in the past year? No   Is patient a fall risk? No   Shoulder Objective Findings   Right Shoulder Flexion PROM 90*   Right Shoulder Abduction PROM 60*   Right Shoulder ER PROM 20* initially, easily to 30* limit by end of session   Side (if bilateral, select both right and left) Right   Planned Therapy Interventions   Planned Therapy Interventions strengthening;ROM;manual therapy   Clinical Impression   Criteria for Skilled Therapeutic Interventions Met yes, treatment indicated   PT Diagnosis decreased ROM and stength post rotator cuff revision   Functional limitations due to impairments any and all R UE use    Clinical Presentation Stable/Uncomplicated   Clinical Decision Making (Complexity) Low complexity   Therapy Frequency 2 times/Week   Predicted Duration of Therapy Intervention (days/wks) 4-6 weeks, then likely decrease to 1x/wk x 6 more weeks   Risk & Benefits of therapy have been explained Yes   Patient, Family & other staff in agreement with plan of care Yes   Education Assessment   Preferred Learning Style Listening;Pictures/video   Barriers to Learning No barriers   ORTHO GOALS   PT Ortho Eval Goals 1;2;3;4   Ortho Goal 1   Goal Identifier 1   Goal Description pt will be able to reach lowest shelf in cupboad in 6wks   Target Date 03/19/20   Ortho Goal 2   Goal Identifier 2   Goal Description pt will be able to dress, do hair with ease in 8wks   Target Date 04/06/20   Ortho Goal 3   Goal Identifier 3   Goal Description pt will be able to lift up to 10# for groceries, cooking in 12 weeks   Target Date 04/30/20   Ortho Goal 4   Goal Identifier 4   Goal Description pt will be able to do house cleaing chores as usual for her in 12 weeks   Target Date 04/30/20   Total Evaluation Time   PT Eval, Low Complexity Minutes (97722) 15   Kris Hoenk, PT #[id number]  [facility]  [phone]

## 2020-02-12 ENCOUNTER — HOSPITAL ENCOUNTER (OUTPATIENT)
Dept: PHYSICAL THERAPY | Facility: CLINIC | Age: 65
Setting detail: THERAPIES SERIES
End: 2020-02-12
Attending: ORTHOPAEDIC SURGERY
Payer: COMMERCIAL

## 2020-02-12 DIAGNOSIS — M25.50 MULTIPLE JOINT PAIN: ICD-10-CM

## 2020-02-12 DIAGNOSIS — I10 BENIGN ESSENTIAL HYPERTENSION: ICD-10-CM

## 2020-02-12 DIAGNOSIS — D50.9 IRON DEFICIENCY ANEMIA, UNSPECIFIED IRON DEFICIENCY ANEMIA TYPE: ICD-10-CM

## 2020-02-12 PROCEDURE — 97110 THERAPEUTIC EXERCISES: CPT | Mod: GP | Performed by: PHYSICAL THERAPIST

## 2020-02-12 RX ORDER — HYDROCHLOROTHIAZIDE 12.5 MG/1
12.5 TABLET ORAL DAILY
Qty: 90 TABLET | Refills: 3 | Status: SHIPPED | OUTPATIENT
Start: 2020-02-12 | End: 2020-06-03

## 2020-02-12 RX ORDER — NORTRIPTYLINE HCL 25 MG
25 CAPSULE ORAL AT BEDTIME
Qty: 90 CAPSULE | Refills: 3 | Status: SHIPPED | OUTPATIENT
Start: 2020-02-12 | End: 2020-09-24

## 2020-02-12 RX ORDER — ATENOLOL 50 MG/1
50 TABLET ORAL DAILY
Qty: 90 TABLET | Refills: 3 | Status: SHIPPED | OUTPATIENT
Start: 2020-02-12 | End: 2020-12-30

## 2020-02-12 NOTE — TELEPHONE ENCOUNTER
Reason for Call:  Medication or medication refill:    Do you use a [facility]?  Name of the pharmacy and phone number for the current request:  [facility]    Name of the medication requested: Nortriptyline, Hydrochlorothizaide, Ferrous Sulfate, Atenolol    Can we leave a detailed message on this number? YES    Phone number patient can be reached at: Home number on file [phone] (home)    Best Time: Any    Call taken on 2/12/2020 at 10:14 AM by Génesis Rawls

## 2020-02-12 NOTE — TELEPHONE ENCOUNTER
"Requested Prescriptions   Pending Prescriptions Disp Refills     atenolol (TENORMIN) 50 MG tablet 90 tablet 2     Sig: Take 1 tablet (50 mg) by mouth daily       Beta-Blockers Protocol Passed - 2/12/2020 11:31 AM        Passed - Blood pressure under 140/90 in past 12 months     BP Readings from Last 3 Encounters:   01/14/20 100/68   01/07/20 100/68   12/20/19 114/82                 Passed - Patient is age 6 or older        Passed - Recent (12 mo) or future (30 days) visit within the authorizing provider's specialty     Patient has had an office visit with the authorizing provider or a provider within the authorizing providers department within the previous 12 mos or has a future within next 30 days. See \"Patient Info\" tab in inbasket, or \"Choose Columns\" in Meds & Orders section of the refill encounter.              Passed - Medication is active on med list        ferrous sulfate (SLO-FE) 142 (45 Fe) MG CR tablet 90 tablet 2     Sig: Take 1 tablet (142 mg) by mouth daily       Iron Supplements Passed - 2/12/2020 11:31 AM        Passed - Patient is 12 years of age or older        Passed - Recent (12 mo) or future (30 days) visit within the authorizing provider's specialty     Patient has had an office visit with the authorizing provider or a provider within the authorizing providers department within the previous 12 mos or has a future within next 30 days. See \"Patient Info\" tab in inbasket, or \"Choose Columns\" in Meds & Orders section of the refill encounter.              Passed - Hgb OR Hct on record within the past 12 mos.     Patient need only have had a HGB or HCT on file in the past 12 mos. That result does not need to be normal.    Recent Labs   Lab Test 01/14/20  1007 12/20/19  0742 10/25/19  1322   HGB 13.3 13.9 10.6*     Recent Labs   Lab Test 01/14/20  1007 12/20/19  0742 10/25/19  1322   HCT 42.7 46.0 36.2       Please verify a HGB or HCT has been checked SINCE THE LAST DOSE CHANGE.            Passed - " "Medication is active on med list        hydrochlorothiazide (HYDRODIURIL) 12.5 MG tablet 90 tablet 2     Sig: Take 1 tablet (12.5 mg) by mouth daily       Diuretics (Including Combos) Protocol Passed - 2/12/2020 11:31 AM        Passed - Blood pressure under 140/90 in past 12 months     BP Readings from Last 3 Encounters:   01/14/20 100/68   01/07/20 100/68 12/20/19 114/82                 Passed - Recent (12 mo) or future (30 days) visit within the authorizing provider's specialty     Patient has had an office visit with the authorizing provider or a provider within the authorizing providers department within the previous 12 mos or has a future within next 30 days. See \"Patient Info\" tab in inbasket, or \"Choose Columns\" in Meds & Orders section of the refill encounter.              Passed - Medication is active on med list        Passed - Patient is age 18 or older        Passed - No active pregancy on record        Passed - Normal serum creatinine on file in past 12 months     Recent Labs   Lab Test 05/07/19  1031   CR 0.67              Passed - Normal serum potassium on file in past 12 months     Recent Labs   Lab Test 05/07/19  1031   POTASSIUM 3.8                    Passed - Normal serum sodium on file in past 12 months     Recent Labs   Lab Test 05/07/19  1031                 Passed - No positive pregnancy test in past 12 months        nortriptyline (PAMELOR) 25 MG capsule 90 capsule 2     Sig: Take 1 capsule (25 mg) by mouth At Bedtime       Tricyclic Agents ( Annual appt and no PHQ9) Passed - 2/12/2020 11:31 AM        Passed - Blood Pressure under 140/90 in past 12 mos     BP Readings from Last 3 Encounters:   01/14/20 100/68   01/07/20 100/68 12/20/19 114/82                 Passed - Recent (12 mo) or future (30 days) visit within authorizing provider's specialty     Patient has had an office visit with the authorizing provider or a provider within the authorizing providers department within the " "previous 12 mos or has a future within next 30 days. See \"Patient Info\" tab in inbasket, or \"Choose Columns\" in Meds & Orders section of the refill encounter.              Passed - Medication is active on med list        Passed - Patient is age 18 or older        Passed - Patient is not pregnant        Passed - No positive pregnancy test on record in past 12 mos        Prescription approved per [facility] Refill Protocol.     MARRY GoodenN, RN    "

## 2020-02-14 ENCOUNTER — HOSPITAL ENCOUNTER (OUTPATIENT)
Dept: PHYSICAL THERAPY | Facility: CLINIC | Age: 65
Setting detail: THERAPIES SERIES
End: 2020-02-14
Attending: ORTHOPAEDIC SURGERY
Payer: COMMERCIAL

## 2020-02-14 PROCEDURE — 97110 THERAPEUTIC EXERCISES: CPT | Mod: GP | Performed by: PHYSICAL THERAPIST

## 2020-02-19 ENCOUNTER — HOSPITAL ENCOUNTER (OUTPATIENT)
Dept: PHYSICAL THERAPY | Facility: CLINIC | Age: 65
Setting detail: THERAPIES SERIES
End: 2020-02-19
Attending: ORTHOPAEDIC SURGERY
Payer: COMMERCIAL

## 2020-02-19 PROCEDURE — 97110 THERAPEUTIC EXERCISES: CPT | Mod: GP | Performed by: PHYSICAL THERAPIST

## 2020-02-21 ENCOUNTER — TELEPHONE (OUTPATIENT)
Dept: FAMILY MEDICINE | Facility: CLINIC | Age: 65
End: 2020-02-21

## 2020-02-21 NOTE — TELEPHONE ENCOUNTER
Reason for Call:  Other     Detailed comments:  Pt is having trouble with her right knee and will not have surgery 4/1/20 and cannot get her leg to straighten out which makes it difficult to move and cant bend it. She is wondering about a brace or some sorts that she could get.  The orthopedic says to ice and elevate.  Please advise    Phone Number Patient can be reached at: Home number on file [phone] (home)    Best Time: any    Can we leave a detailed message on this number? YES    Call taken on 2/21/2020 at 4:34 PM by Winter Lewis

## 2020-02-24 NOTE — TELEPHONE ENCOUNTER
RN advised patient to contact ortho to determine if they recommend a brace to help with knee pain while waiting for surgery.  She sees ortho via [facility].  Knee surgery 4-1-20.  She has not seen PCP regarding this and recommended appt if she is seeking a brace.  Lovely MEYERS RN

## 2020-02-25 ENCOUNTER — HOSPITAL ENCOUNTER (OUTPATIENT)
Dept: PHYSICAL THERAPY | Facility: CLINIC | Age: 65
Setting detail: THERAPIES SERIES
End: 2020-02-25
Attending: ORTHOPAEDIC SURGERY
Payer: COMMERCIAL

## 2020-02-25 DIAGNOSIS — D50.9 IRON DEFICIENCY ANEMIA, UNSPECIFIED IRON DEFICIENCY ANEMIA TYPE: ICD-10-CM

## 2020-02-25 LAB
ERYTHROCYTE [DISTWIDTH] IN BLOOD BY AUTOMATED COUNT: 17.1 % (ref 10–15)
FERRITIN SERPL-MCNC: 24 NG/ML (ref 8–252)
HCT VFR BLD AUTO: 42.2 % (ref 35–47)
HGB BLD-MCNC: 13 G/DL (ref 11.7–15.7)
IRON SATN MFR SERPL: 11 % (ref 15–46)
IRON SERPL-MCNC: 42 UG/DL (ref 35–180)
MCH RBC QN AUTO: 27.6 PG (ref 26.5–33)
MCHC RBC AUTO-ENTMCNC: 30.8 G/DL (ref 31.5–36.5)
MCV RBC AUTO: 90 FL (ref 78–100)
PLATELET # BLD AUTO: 269 10E9/L (ref 150–450)
RBC # BLD AUTO: 4.71 10E12/L (ref 3.8–5.2)
TIBC SERPL-MCNC: 367 UG/DL (ref 240–430)
WBC # BLD AUTO: 7.6 10E9/L (ref 4–11)

## 2020-02-25 PROCEDURE — 36415 COLL VENOUS BLD VENIPUNCTURE: CPT | Performed by: INTERNAL MEDICINE

## 2020-02-25 PROCEDURE — 83540 ASSAY OF IRON: CPT | Performed by: INTERNAL MEDICINE

## 2020-02-25 PROCEDURE — 82728 ASSAY OF FERRITIN: CPT | Performed by: INTERNAL MEDICINE

## 2020-02-25 PROCEDURE — 83550 IRON BINDING TEST: CPT | Performed by: INTERNAL MEDICINE

## 2020-02-25 PROCEDURE — 97110 THERAPEUTIC EXERCISES: CPT | Mod: GP | Performed by: PHYSICAL THERAPIST

## 2020-02-25 PROCEDURE — 85027 COMPLETE CBC AUTOMATED: CPT | Performed by: INTERNAL MEDICINE

## 2020-02-26 DIAGNOSIS — D50.9 IRON DEFICIENCY ANEMIA, UNSPECIFIED IRON DEFICIENCY ANEMIA TYPE: Primary | ICD-10-CM

## 2020-03-03 ENCOUNTER — HOSPITAL ENCOUNTER (OUTPATIENT)
Dept: PHYSICAL THERAPY | Facility: CLINIC | Age: 65
Setting detail: THERAPIES SERIES
End: 2020-03-03
Attending: ORTHOPAEDIC SURGERY
Payer: COMMERCIAL

## 2020-03-03 PROCEDURE — 97110 THERAPEUTIC EXERCISES: CPT | Mod: GP | Performed by: PHYSICAL THERAPIST

## 2020-05-13 ENCOUNTER — VIRTUAL VISIT (OUTPATIENT)
Dept: FAMILY MEDICINE | Facility: CLINIC | Age: 65
End: 2020-05-13
Payer: COMMERCIAL

## 2020-05-13 DIAGNOSIS — L72.3 SEBACEOUS CYST: Primary | ICD-10-CM

## 2020-05-13 PROCEDURE — 99213 OFFICE O/P EST LOW 20 MIN: CPT | Mod: 95 | Performed by: FAMILY MEDICINE

## 2020-05-13 RX ORDER — CEPHALEXIN 500 MG/1
500 CAPSULE ORAL 3 TIMES DAILY
Qty: 30 CAPSULE | Refills: 0 | Status: SHIPPED | OUTPATIENT
Start: 2020-05-13 | End: 2020-06-03

## 2020-05-13 NOTE — PROGRESS NOTES
"Jacquie Harris is a 65 year old female who is being evaluated via a billable telephone visit.      Patient is a 65 yr old female here for possible cyst infection.She says she had a pimple on her back. She says that the pimple appears infected. The area is red and swollen and the area is warm to the touch. She denies any fevers .    The patient has been notified of following:     \"This telephone visit will be conducted via a call between you and your physician/provider. We have found that certain health care needs can be provided without the need for a physical exam.  This service lets us provide the care you need with a short phone conversation.  If a prescription is necessary we can send it directly to your pharmacy.  If lab work is needed we can place an order for that and you can then stop by our lab to have the test done at a later time.    Telephone visits are billed at different rates depending on your insurance coverage. During this emergency period, for some insurers they may be billed the same as an in-person visit.  Please reach out to your insurance provider with any questions.    If during the course of the call the physician/provider feels a telephone visit is not appropriate, you will not be charged for this service.\"    Patient has given verbal consent for Telephone visit?  Yes    What phone number would you like to be contacted at? [phone]    How would you like to obtain your AVS? Not needed     Subjective     Jacquie Harris is a 65 year old female who presents to clinic today for the following health issues:    HPI    * Has spot on her back - Treating like it was a pimple/ boil , tried popping it yesterday ( was able to get some white puss out of it )  * Cant sit or lay without it hurting    * unable to lit feet / having a hard time walking due to pain        Duration: 2-3 weeks ago - worse over the past few days     Description (location/character/radiation): Red boil around 4-5 inches above waist " - around the size of quarter     Intensity:  moderate    Accompanying signs and symptoms: none     History (similar episodes/previous evaluation):  Has had similar issues in the past - was able to walk more with that     Precipitating or alleviating factors: None     Therapies tried and outcome: Peroxide -     No fever noted              Patient Active Problem List   Diagnosis     s/p gastric bypass x 2     Allergic rhinitis     Carpal tunnel syndrome     Obstructive sleep apnea     Rosacea     CARDIOVASCULAR SCREENING; LDL GOAL LESS THAN 160     Tick bite, infected, positive Lyme test 1996 not treated     Vitamin D deficiency     Osteoporosis     Obesity, Class III, BMI 40-49.9 (morbid obesity) (H)     S/P TKR (total knee replacement)     HTN, goal below 140/80     Back pain, left below scapula, strained muscles     Encounter for routine gynecological examination      Colles' fracture     Bilateral cold feet     Elevated levels of transaminase & lactic acid dehydrogenase     Fatty liver     Shoulder injury, right, subsequent encounter     GI bleed     Upper GI bleed     Bronchospasm     Alpha-1-antitrypsin deficiency (H)     Multiple joint pain, possible fibromyalgia     Rotator cuff syndrome, right     Past Surgical History:   Procedure Laterality Date     ARTHROPLASTY KNEE  4/2/2012    Procedure:ARTHROPLASTY KNEE; Left Total Knee Arthroplasty--Anesth.Choice; Surgeon:HERNANDO THOMPSON; Location:[facility]     ARTHROTOMY SHOULDER, ROTATOR CUFF REPAIR, COMBINED Right 8/25/2017    Procedure: COMBINED ARTHROTOMY SHOULDER, ROTATOR CUFF REPAIR;  Right shoulder distal clavicle excision, subacromial decompression, rotator cuff repair ;  Surgeon: Hernando Thompson MD;  Location: [facility]     ARTHROTOMY SHOULDER, ROTATOR CUFF REPAIR, COMBINED Right 12/18/2017    Procedure: COMBINED ARTHROTOMY SHOULDER, ROTATOR CUFF REPAIR;  Right Shoulder Rotator Cuff Revision;  Surgeon: Hernando Thompson MD;  Location: [facility]      ARTHROTOMY SHOULDER, ROTATOR CUFF REPAIR, COMBINED Left 4/27/2018    Procedure: COMBINED ARTHROTOMY SHOULDER, ROTATOR CUFF REPAIR;  Left Shoulder Open Subacromial Decompression,Distal Clavicle Excision,Rotator Cuff Repair;  Surgeon: Hernando Thompson MD;  Location: [facility] ORAL SURGERY PROCEDURE  age 18    wisdom teeth     C/SECTION, LOW TRANSVERSE  1978, 1984, 1994    x 3     COLONOSCOPY  2001    normal colonoscopy     COLONOSCOPY N/A 8/22/2017    Procedure: COLONOSCOPY;  Colonoscopy  ;  Surgeon: Delfino Yoo MD;  Location: [facility]     ESOPHAGOSCOPY, GASTROSCOPY, DUODENOSCOPY (EGD), COMBINED N/A 3/18/2018    Procedure: COMBINED ESOPHAGOSCOPY, GASTROSCOPY, DUODENOSCOPY (EGD);;  Surgeon: Poncho Galindo MD;  Location: [facility]     GASTRIC BYPASS  1980/2005    Gastric Bypass and revision     HERNIA REPAIR, INCISIONAL  6/16/2008    lap.repair with 10x8 mesh     TUBAL LIGATION  1994       Social History     Tobacco Use     Smoking status: Never Smoker     Smokeless tobacco: Never Used   Substance Use Topics     Alcohol use: Yes     Alcohol/week: 0.0 standard drinks     Comment: mixed very light 1 a month if that     Family History   Problem Relation Age of Onset     C.A.D. Father         MI at age 60     Hypertension Father      Alzheimer Disease Father      Hyperlipidemia Father      Osteoporosis Mother         on Fosamax     Glaucoma Mother      Other Cancer Brother      Stomach Cancer Maternal Aunt      Cervical Cancer Cousin      Diabetes No family hx of      Cerebrovascular Disease No family hx of      Breast Cancer No family hx of      Cancer - colorectal No family hx of      Prostate Cancer No family hx of      Liver Disease No family hx of          Current Outpatient Medications   Medication Sig Dispense Refill     atenolol (TENORMIN) 50 MG tablet Take 1 tablet (50 mg) by mouth daily 90 tablet 3     cephALEXin (KEFLEX) 500 MG capsule Take 1 capsule (500 mg) by mouth 3 times daily 30 capsule 0      clobetasol (TEMOVATE) 0.05 % ointment Apply sparingly to affected area twice daily for 14 days.  Do not apply to face. 15 g 0     hydrochlorothiazide (HYDRODIURIL) 12.5 MG tablet Take 1 tablet (12.5 mg) by mouth daily 90 tablet 3     diclofenac (VOLTAREN) 1 % topical gel Place 2 g onto the skin 4 times daily (Patient not taking: Reported on 5/13/2020) 100 g 0     Emollient (CERAVE) CREA Externally apply topically 2 times daily as needed (Patient not taking: Reported on 5/13/2020) 1 Bottle 3     nortriptyline (PAMELOR) 25 MG capsule Take 1 capsule (25 mg) by mouth At Bedtime (Patient not taking: Reported on 5/13/2020) 90 capsule 3     nystatin (MYCOSTATIN) 653374 UNIT/GM POWD Apply topically 3 times daily as needed Refill at patient's request (Patient not taking: Reported on 5/13/2020) 30 g 1     nystatin (MYCOSTATIN) cream Apply topically 2 times daily Reported on 3/6/2017 (Patient not taking: Reported on 5/13/2020) 30 g 11     Allergies   Allergen Reactions     Aleve [Naproxen] GI Disturbance     Vioxx Itching and Swelling     VIOXX (Swollen Feet,Hands itching), Okay with ibuprofen and aspirin     BP Readings from Last 3 Encounters:   01/14/20 100/68   01/07/20 100/68   12/20/19 114/82    Wt Readings from Last 3 Encounters:   01/14/20 112.9 kg (249 lb)   01/07/20 112.9 kg (249 lb)   12/20/19 112.3 kg (247 lb 9.6 oz)                    Reviewed and updated as needed this visit by Provider  Tobacco  Allergies  Meds  Problems  Med Hx  Surg Hx  Fam Hx         Review of Systems   Constitutional, HEENT, cardiovascular, pulmonary, gi and gu systems are negative, except as otherwise noted.       Objective   Reported vitals:  LMP 05/29/2006    healthy, alert and no distress  PSYCH: Alert and oriented times 3; coherent speech, normal   rate and volume, able to articulate logical thoughts, able   to abstract reason, no tangential thoughts, no hallucinations   or delusions  Her affect is normal  RESP: No cough, no  audible wheezing, able to talk in full sentences  Remainder of exam unable to be completed due to telephone visits    Diagnostic Test Results:  none         Assessment/Plan:  1. Sebaceous cyst  Medication faxed,patient asked to apply warm compress.   - cephALEXin (KEFLEX) 500 MG capsule; Take 1 capsule (500 mg) by mouth 3 times daily  Dispense: 30 capsule; Refill: 0    Return in about 2 days (around 5/15/2020).      Phone call duration:  5-10 minutes    Jennie Suazo MD

## 2020-06-03 ENCOUNTER — OFFICE VISIT (OUTPATIENT)
Dept: FAMILY MEDICINE | Facility: CLINIC | Age: 65
End: 2020-06-03
Payer: COMMERCIAL

## 2020-06-03 ENCOUNTER — TELEPHONE (OUTPATIENT)
Dept: FAMILY MEDICINE | Facility: CLINIC | Age: 65
End: 2020-06-03

## 2020-06-03 VITALS
TEMPERATURE: 99 F | SYSTOLIC BLOOD PRESSURE: 112 MMHG | HEIGHT: 63 IN | HEART RATE: 78 BPM | WEIGHT: 268 LBS | OXYGEN SATURATION: 95 % | DIASTOLIC BLOOD PRESSURE: 70 MMHG | BODY MASS INDEX: 47.48 KG/M2

## 2020-06-03 DIAGNOSIS — M17.11 PRIMARY OSTEOARTHRITIS OF RIGHT KNEE: ICD-10-CM

## 2020-06-03 DIAGNOSIS — G89.29 CHRONIC RIGHT SHOULDER PAIN: ICD-10-CM

## 2020-06-03 DIAGNOSIS — N39.41 URGE INCONTINENCE OF URINE: ICD-10-CM

## 2020-06-03 DIAGNOSIS — M25.511 CHRONIC RIGHT SHOULDER PAIN: ICD-10-CM

## 2020-06-03 DIAGNOSIS — R22.43 LOCALIZED SWELLING OF BOTH LOWER LEGS: Primary | ICD-10-CM

## 2020-06-03 PROCEDURE — 20610 DRAIN/INJ JOINT/BURSA W/O US: CPT | Mod: RT | Performed by: FAMILY MEDICINE

## 2020-06-03 PROCEDURE — 99214 OFFICE O/P EST MOD 30 MIN: CPT | Mod: 25 | Performed by: FAMILY MEDICINE

## 2020-06-03 RX ORDER — TRIAMCINOLONE ACETONIDE 40 MG/ML
40 INJECTION, SUSPENSION INTRA-ARTICULAR; INTRAMUSCULAR ONCE
Status: DISCONTINUED | OUTPATIENT
Start: 2020-06-03 | End: 2020-06-03

## 2020-06-03 RX ORDER — OXYBUTYNIN CHLORIDE 5 MG/1
5 TABLET, EXTENDED RELEASE ORAL DAILY
Qty: 90 TABLET | Refills: 1 | Status: SHIPPED | OUTPATIENT
Start: 2020-06-03 | End: 2020-09-24

## 2020-06-03 RX ORDER — FUROSEMIDE 20 MG
20 TABLET ORAL DAILY
Qty: 60 TABLET | Refills: 0 | Status: SHIPPED | OUTPATIENT
Start: 2020-06-03 | End: 2020-09-24

## 2020-06-03 RX ORDER — TRIAMCINOLONE ACETONIDE 40 MG/ML
40 INJECTION, SUSPENSION INTRA-ARTICULAR; INTRAMUSCULAR ONCE
Status: COMPLETED | OUTPATIENT
Start: 2020-06-03 | End: 2020-06-03

## 2020-06-03 RX ADMIN — TRIAMCINOLONE ACETONIDE 40 MG: 40 INJECTION, SUSPENSION INTRA-ARTICULAR; INTRAMUSCULAR at 12:17

## 2020-06-03 ASSESSMENT — MIFFLIN-ST. JEOR: SCORE: 1724.77

## 2020-06-03 NOTE — TELEPHONE ENCOUNTER
Pt has requested the OV from today be faxed to Dr. Olga man, at [phone] for her upcoming knee surgery on 6/22/20    Winter Lewis  [facility]

## 2020-06-03 NOTE — PATIENT INSTRUCTIONS
Patient Education     Treating Incontinence in Women with Medicine    Urinary incontinence is the leaking of urine from the bladder. In some cases, medicine can reduce or stop the leaking. It is mainly given for urge incontinence. Your healthcare provider will talk with you about your options. Make sure to ask what side effects to expect.  Below are some types of medicines that may help with urge incontinence.  Types of medicine    Anticholinergics. These may increase how much urine the bladder can hold. They may also help relax bladder muscles.    Estrogen. This may help improve muscle tone in the urethra and bladder.    Antibiotics. These are used to treat urinary tract infections.    Botulinum toxin. Injection of botulinum toxin into the bladder muscle is an option when other medicines are not effective.  Tips for taking medicine    Take your medicine on time and as your healthcare provider tell you to.    Tell your healthcare provider if you have any side effects, your dosage may be adjusted if necessary.    Be patient. It may take time to find the right dose for you.    Keep a list of the medicines you take. Show it to your healthcare provider and pharmacist before you buy over-the-counter medicines.   Date Last Reviewed: 1/1/2017 2000-2019 [employer]. [address] [place] [id number]. All rights reserved. This information is not intended as a substitute for professional medical care. Always follow your healthcare professional's instructions.

## 2020-06-03 NOTE — PROGRESS NOTES
Subjective     Jacquie Harris is a 65 year old female who presents to clinic today for the following health issues:      Patient is a 65-year-old female who comes in today for multiple complaints.  Her past medical history is significant for morbid obesity ,hypertension,multiple joint pain. She has had several surgeries on her knees and shoulders.     She has a lot of musculoskeletal issues including bilateral shoulder pain, bilateral knee pain, she has had a left knee replacement done which was complicated with hardware infection and chronic pain in her knee.  She is scheduled to get her right knee replaced but she is very reluctant because of all the complications that happened to the left  knee.      She is also experiencing lower extremity edema she is on hydrochlorothiazide and this does not appear to be helping.  She has tried compression stockings which has helped a little bit.    Patient also complains of bilateral shoulder pain she is also had shoulder surgery in both in both shoulders the right is worse than the left.  She had surgery in the right shoulder about 6 months ago and still having pain and decreased range of motion.  She is open to getting an injection in her right shoulder today to help with the pain.  She denies any recent trauma or falls.    HPI   Concern -  Onset: swelling in feet legs and fingers     Description:   Patient has swelling in legs feet and hands she has tried many thing but nothing is working. Patient is getting back pain from the standing and with the swelling it is very hard to stand     Intensity: moderate, severe    Progression of Symptoms:  worsening    Accompanying Signs & Symptoms:  Patient did have a boil on her back but does not think that could be causing the swelling     Previous history of similar problem:   no    Precipitating factors:   Worsened by: standing     Alleviating factors:  Improved by: elevated helps alittle     Therapies Tried and outcome: Patient has  cut back on salt soaking her feet in cold water elevating of legs and tylenol   Joint Pain/both knee     Onset: ongoing pain     Description: Patient is having knee pain is ongoing but has increased with the swelling in the legs  Location: both knees   Character: Stabbing    Intensity: severe    Progression of Symptoms: worse    Accompanying Signs & Symptoms:  Other symptoms: radiation of pain to down calf , weakness of both legs  and swelling    History:   Previous similar pain: YES      Precipitating factors:   Trauma or overuse: no     Alleviating factors:  Improved by: nothing    Therapies Tried and outcome: patient has tried many things for her knee pain but nothing helps         Patient Active Problem List   Diagnosis     s/p gastric bypass x 2     Allergic rhinitis     Carpal tunnel syndrome     Obstructive sleep apnea     Rosacea     CARDIOVASCULAR SCREENING; LDL GOAL LESS THAN 160     Tick bite, infected, positive Lyme test 1996 not treated     Vitamin D deficiency     Osteoporosis     Obesity, Class III, BMI 40-49.9 (morbid obesity) (H)     S/P TKR (total knee replacement)     HTN, goal below 140/80     Back pain, left below scapula, strained muscles     Encounter for routine gynecological examination      Colles' fracture     Bilateral cold feet     Elevated levels of transaminase & lactic acid dehydrogenase     Fatty liver     Shoulder injury, right, subsequent encounter     GI bleed     Upper GI bleed     Bronchospasm     Alpha-1-antitrypsin deficiency (H)     Multiple joint pain, possible fibromyalgia     Rotator cuff syndrome, right     Past Surgical History:   Procedure Laterality Date     ARTHROPLASTY KNEE  4/2/2012    Procedure:ARTHROPLASTY KNEE; Left Total Knee Arthroplasty--Anesth.Choice; Surgeon:VICENTE MORALES; Location:[facility]     ARTHROTOMY SHOULDER, ROTATOR CUFF REPAIR, COMBINED Right 8/25/2017    Procedure: COMBINED ARTHROTOMY SHOULDER, ROTATOR CUFF REPAIR;  Right shoulder distal  clavicle excision, subacromial decompression, rotator cuff repair ;  Surgeon: Hernando Thompson MD;  Location: [facility]     ARTHROTOMY SHOULDER, ROTATOR CUFF REPAIR, COMBINED Right 12/18/2017    Procedure: COMBINED ARTHROTOMY SHOULDER, ROTATOR CUFF REPAIR;  Right Shoulder Rotator Cuff Revision;  Surgeon: Hernando Thompson MD;  Location: [facility]     ARTHROTOMY SHOULDER, ROTATOR CUFF REPAIR, COMBINED Left 4/27/2018    Procedure: COMBINED ARTHROTOMY SHOULDER, ROTATOR CUFF REPAIR;  Left Shoulder Open Subacromial Decompression,Distal Clavicle Excision,Rotator Cuff Repair;  Surgeon: Hernando Thompson MD;  Location: [facility]     C ORAL SURGERY PROCEDURE  age 18    wisdom teeth     C/SECTION, LOW TRANSVERSE  1978, 1984, 1994    x 3     COLONOSCOPY  2001    normal colonoscopy     COLONOSCOPY N/A 8/22/2017    Procedure: COLONOSCOPY;  Colonoscopy  ;  Surgeon: Delfino Yoo MD;  Location: [facility]     ESOPHAGOSCOPY, GASTROSCOPY, DUODENOSCOPY (EGD), COMBINED N/A 3/18/2018    Procedure: COMBINED ESOPHAGOSCOPY, GASTROSCOPY, DUODENOSCOPY (EGD);;  Surgeon: Poncho Galindo MD;  Location: [facility]     GASTRIC BYPASS  1980/2005    Gastric Bypass and revision     HERNIA REPAIR, INCISIONAL  6/16/2008    lap.repair with 10x8 mesh     TUBAL LIGATION  1994       Social History     Tobacco Use     Smoking status: Never Smoker     Smokeless tobacco: Never Used   Substance Use Topics     Alcohol use: Yes     Alcohol/week: 0.0 standard drinks     Comment: mixed very light 1 a month if that     Family History   Problem Relation Age of Onset     C.A.D. Father         MI at age 60     Hypertension Father      Alzheimer Disease Father      Hyperlipidemia Father      Osteoporosis Mother         on Fosamax     Glaucoma Mother      Other Cancer Brother      Stomach Cancer Maternal Aunt      Cervical Cancer Cousin      Diabetes No family hx of      Cerebrovascular Disease No family hx of      Breast Cancer No family hx of      Cancer - colorectal No  family hx of      Prostate Cancer No family hx of      Liver Disease No family hx of          Current Outpatient Medications   Medication Sig Dispense Refill     atenolol (TENORMIN) 50 MG tablet Take 1 tablet (50 mg) by mouth daily 90 tablet 3     furosemide (LASIX) 20 MG tablet Take 1 tablet (20 mg) by mouth daily 60 tablet 0     nystatin (MYCOSTATIN) 411042 UNIT/GM POWD Apply topically 3 times daily as needed Refill at patient's request 30 g 1     oxybutynin ER (DITROPAN-XL) 5 MG 24 hr tablet Take 1 tablet (5 mg) by mouth daily 90 tablet 1     clobetasol (TEMOVATE) 0.05 % ointment Apply sparingly to affected area twice daily for 14 days.  Do not apply to face. (Patient not taking: Reported on 6/3/2020) 15 g 0     diclofenac (VOLTAREN) 1 % topical gel Place 2 g onto the skin 4 times daily (Patient not taking: Reported on 5/13/2020) 100 g 0     Emollient (CERAVE) CREA Externally apply topically 2 times daily as needed (Patient not taking: Reported on 5/13/2020) 1 Bottle 3     nortriptyline (PAMELOR) 25 MG capsule Take 1 capsule (25 mg) by mouth At Bedtime (Patient not taking: Reported on 5/13/2020) 90 capsule 3     nystatin (MYCOSTATIN) cream Apply topically 2 times daily Reported on 3/6/2017 (Patient not taking: Reported on 6/3/2020) 30 g 11     Allergies   Allergen Reactions     Aleve [Naproxen] GI Disturbance     Vioxx Itching and Swelling     VIOXX (Swollen Feet,Hands itching), Okay with ibuprofen and aspirin     BP Readings from Last 3 Encounters:   06/03/20 112/70   01/14/20 100/68   01/07/20 100/68    Wt Readings from Last 3 Encounters:   06/03/20 121.6 kg (268 lb)   01/14/20 112.9 kg (249 lb)   01/07/20 112.9 kg (249 lb)                      Reviewed and updated as needed this visit by Provider  Tobacco  Allergies  Meds  Problems  Med Hx  Surg Hx  Fam Hx         Review of Systems   CONSTITUTIONAL: NEGATIVE for fever, chills, change in weight  INTEGUMENTARY/SKIN: NEGATIVE for worrisome rashes, moles or  "lesions  ENT/MOUTH: NEGATIVE for ear, mouth and throat problems  RESP: NEGATIVE for significant cough or SOB  CV: NEGATIVE for chest pain, palpitations or peripheral edema  GI: NEGATIVE for nausea, abdominal pain, heartburn, or change in bowel habits  : Positive for and incontinence  MUSCULOSKELETAL: POSITIVE  for joint pain multiple joint   NEURO: NEGATIVE for weakness, dizziness or paresthesias  ENDOCRINE: NEGATIVE for temperature intolerance, skin/hair changes  HEME/ALLERGY/IMMUNE: NEGATIVE for bleeding problems  PSYCHIATRIC: NEGATIVE for changes in mood or affect      Objective    /70   Pulse 78   Temp 99  F (37.2  C) (Tympanic)   Ht 1.6 m (5' 3\")   Wt 121.6 kg (268 lb)   LMP 05/29/2006   SpO2 95%   BMI 47.47 kg/m    Body mass index is 47.47 kg/m .  Physical Exam   GENERAL: healthy, alert and no distress  HENT: ear canals and TM's normal, nose and mouth without ulcers or lesions  NECK: no adenopathy, no asymmetry, masses, or scars and thyroid normal to palpation  RESP: lungs clear to auscultation - no rales, rhonchi or wheezes  CV: regular rate and rhythm, normal S1 S2, no S3 or S4, no murmur, click or rub, no peripheral edema and peripheral pulses strong  ABDOMEN: soft, nontender, no hepatosplenomegaly, no masses and bowel sounds normal  MS: decreased range of motion of right shoulder  and arthritis of the right knee    Diagnostic Test Results:  none         Assessment & Plan     1. Localized swelling of both lower legs  Patient has a combination of lymphedema, morbid obesity and swelling in both lower legs. Recommend trying lasixs and also lymphedema treatment   - furosemide (LASIX) 20 MG tablet; Take 1 tablet (20 mg) by mouth daily  Dispense: 60 tablet; Refill: 0    2. Urge incontinence of urine  Patient mentioned that she has been having some urge incontinence and this is getting worse. We will try the oxybutynin .  - oxybutynin ER (DITROPAN-XL) 5 MG 24 hr tablet; Take 1 tablet (5 mg) by mouth " daily  Dispense: 90 tablet; Refill: 1    3. Chronic right shoulder pain  Cortisone injection was injected in right shoulder  - lidocaine 1 % 2 mL  - triamcinolone (KENALOG-40) injection 40 mg    4. Primary osteoarthritis of right knee  Recommend that she consider knee replacement       Procedure note.  A steroid injection was performed using 1% plain Lidocaine and 40 mg of Kenalog. This was well tolerated.            FUTURE APPOINTMENTS:       - Follow-up visit in one month or sooner as needed.  Patient Instructions       Patient Education     Treating Incontinence in Women with Medicine    Urinary incontinence is the leaking of urine from the bladder. In some cases, medicine can reduce or stop the leaking. It is mainly given for urge incontinence. Your healthcare provider will talk with you about your options. Make sure to ask what side effects to expect.  Below are some types of medicines that may help with urge incontinence.  Types of medicine    Anticholinergics. These may increase how much urine the bladder can hold. They may also help relax bladder muscles.    Estrogen. This may help improve muscle tone in the urethra and bladder.    Antibiotics. These are used to treat urinary tract infections.    Botulinum toxin. Injection of botulinum toxin into the bladder muscle is an option when other medicines are not effective.  Tips for taking medicine    Take your medicine on time and as your healthcare provider tell you to.    Tell your healthcare provider if you have any side effects, your dosage may be adjusted if necessary.    Be patient. It may take time to find the right dose for you.    Keep a list of the medicines you take. Show it to your healthcare provider and pharmacist before you buy over-the-counter medicines.   Date Last Reviewed: 1/1/2017 2000-2019 [employer]. [address] [place] [address], [place] [address]. All rights reserved. This information is not intended as a substitute for  professional medical care. Always follow your healthcare professional's instructions.               Return in about 4 weeks (around 7/1/2020) for In-Clinic Visit.    Jennie Suazo MD  [facility]

## 2020-06-08 ENCOUNTER — TRANSFERRED RECORDS (OUTPATIENT)
Dept: HEALTH INFORMATION MANAGEMENT | Facility: CLINIC | Age: 65
End: 2020-06-08

## 2020-06-08 DIAGNOSIS — Z01.818 PREOP EXAMINATION: Primary | ICD-10-CM

## 2020-06-09 DIAGNOSIS — Z01.818 PREOP EXAMINATION: ICD-10-CM

## 2020-06-09 LAB
ABO + RH BLD: NORMAL
ABO + RH BLD: NORMAL
ANION GAP SERPL CALCULATED.3IONS-SCNC: 7 MMOL/L (ref 3–14)
BLD GP AB SCN SERPL QL: NORMAL
BLOOD BANK CMNT PATIENT-IMP: NORMAL
BUN SERPL-MCNC: 16 MG/DL (ref 7–25)
CALCIUM SERPL-MCNC: 9.5 MG/DL (ref 8.6–10.3)
CHLORIDE SERPL-SCNC: 102 MMOL/L (ref 98–107)
CO2 SERPL-SCNC: 30 MMOL/L (ref 21–31)
CREAT SERPL-MCNC: 0.82 MG/DL (ref 0.6–1.2)
ERYTHROCYTE [DISTWIDTH] IN BLOOD BY AUTOMATED COUNT: 13.7 % (ref 10–15)
GFR SERPL CREATININE-BSD FRML MDRD: 70 ML/MIN/{1.73_M2}
GLUCOSE SERPL-MCNC: 103 MG/DL (ref 70–105)
HCT VFR BLD AUTO: 44.3 % (ref 35–47)
HGB BLD-MCNC: 13.5 G/DL (ref 11.7–15.7)
MCH RBC QN AUTO: 26.8 PG (ref 26.5–33)
MCHC RBC AUTO-ENTMCNC: 30.5 G/DL (ref 31.5–36.5)
MCV RBC AUTO: 88 FL (ref 78–100)
PLATELET # BLD AUTO: 271 10E9/L (ref 150–450)
POTASSIUM SERPL-SCNC: 4.2 MMOL/L (ref 3.5–5.1)
RBC # BLD AUTO: 5.04 10E12/L (ref 3.8–5.2)
SODIUM SERPL-SCNC: 139 MMOL/L (ref 134–144)
SPECIMEN EXP DATE BLD: NORMAL
WBC # BLD AUTO: 7.5 10E9/L (ref 4–11)

## 2020-06-09 PROCEDURE — 86901 BLOOD TYPING SEROLOGIC RH(D): CPT | Mod: ZL

## 2020-06-09 PROCEDURE — 85027 COMPLETE CBC AUTOMATED: CPT | Mod: ZL

## 2020-06-09 PROCEDURE — 80048 BASIC METABOLIC PNL TOTAL CA: CPT | Mod: ZL

## 2020-06-09 PROCEDURE — 86850 RBC ANTIBODY SCREEN: CPT | Mod: ZL

## 2020-06-09 PROCEDURE — 86900 BLOOD TYPING SEROLOGIC ABO: CPT | Mod: ZL

## 2020-06-09 PROCEDURE — 36415 COLL VENOUS BLD VENIPUNCTURE: CPT | Mod: ZL

## 2020-06-19 ENCOUNTER — OFFICE VISIT (OUTPATIENT)
Dept: FAMILY MEDICINE | Facility: CLINIC | Age: 65
End: 2020-06-19
Payer: COMMERCIAL

## 2020-06-19 VITALS
OXYGEN SATURATION: 97 % | BODY MASS INDEX: 46.49 KG/M2 | HEIGHT: 63 IN | DIASTOLIC BLOOD PRESSURE: 72 MMHG | SYSTOLIC BLOOD PRESSURE: 108 MMHG | TEMPERATURE: 98.2 F | RESPIRATION RATE: 16 BRPM | HEART RATE: 70 BPM | WEIGHT: 262.4 LBS

## 2020-06-19 DIAGNOSIS — M17.11 PRIMARY OSTEOARTHRITIS OF RIGHT KNEE: ICD-10-CM

## 2020-06-19 DIAGNOSIS — I10 BENIGN ESSENTIAL HYPERTENSION: ICD-10-CM

## 2020-06-19 DIAGNOSIS — G47.33 OBSTRUCTIVE SLEEP APNEA: ICD-10-CM

## 2020-06-19 DIAGNOSIS — Z01.818 PREOP GENERAL PHYSICAL EXAM: Primary | ICD-10-CM

## 2020-06-19 DIAGNOSIS — D50.9 IRON DEFICIENCY ANEMIA, UNSPECIFIED IRON DEFICIENCY ANEMIA TYPE: ICD-10-CM

## 2020-06-19 PROCEDURE — 93000 ELECTROCARDIOGRAM COMPLETE: CPT | Performed by: NURSE PRACTITIONER

## 2020-06-19 PROCEDURE — 99215 OFFICE O/P EST HI 40 MIN: CPT | Performed by: NURSE PRACTITIONER

## 2020-06-19 ASSESSMENT — MIFFLIN-ST. JEOR: SCORE: 1699.37

## 2020-06-19 NOTE — PROGRESS NOTES
[facility]  [address] [address]  [phone]  Dept: [phone]    PRE-OP EVALUATION:  Today's date: 2020    Jacquie Harris (: 1955) presents for pre-operative evaluation assessment as requested by Dr. Fuller.  She requires evaluation and anesthesia risk assessment prior to undergoing surgery/procedure for treatment of osteoarthritis of right knee .    Proposed Surgery/ Procedure: Total knee replacement, right   Date of Surgery/ Procedure: 20  Time of Surgery/ Procedure: 730  Hospital/Surgical Facility: [facility]   Fax number for surgical facility: [phone]  Primary Physician: Jennie Suazo  Type of Anesthesia Anticipated: to be determined    Patient has a Health Care Directive or Living Will:  NO    1. NO - Do you have a history of heart attack, stroke, stent, bypass or surgery on an artery in the head, neck, heart or legs?  2. NO - Do you ever have any pain or discomfort in your chest?  3. NO - Do you have a history of  Heart Failure?  4. NO - Are you troubled by shortness of breath when: walking on the level, up a slight hill or at night?  5. NO - Do you currently have a cold, bronchitis or other respiratory infection?  6. NO - Do you have a cough, shortness of breath or wheezing?  7. NO - Do you sometimes get pains in the calves of your legs when you walk?  8. NO - Do you or anyone in your family have previous history of blood clots?  9. NO - Do you or does anyone in your family have a serious bleeding problem such as prolonged bleeding following surgeries or cuts?  10. YES - HAVE YOU EVER HAD PROBLEMS WITH ANEMIA OR BEEN TOLD TO TAKE IRON PILLS? Anemia- hx of gastric bypass x 2. Had GIB using NSAIDs for knee, resolved, off NSAIDs.  11. YES - HAVE YOU HAD ANY ABNORMAL BLOOD LOSS SUCH AS BLACK, TARRY OR BLOODY STOOLS, OR ABNORMAL VAGINAL BLEEDING? With the GIB, resolved after stopping NSAIDs.  12. YES - HAVE YOU EVER HAD A BLOOD  TRANSFUSION?   13. NO - Have you or any of your relatives ever had problems with anesthesia?  14. YES - DO YOU HAVE SLEEP APNEA, EXCESSIVE SNORING OR DAYTIME DROWSINESS? Hx of DARWIN, not using CPAP.   15. NO - Do you have any prosthetic heart valves?  16. YES - DO YOU HAVE PROSTHETIC JOINTS? Left knee   17. NO - Is there any chance that you may be pregnant?      HPI:     HPI related to upcoming procedure: Primary osteoarthritis in right knee, persistent pain despite conservative treatments.      ANEMIA - Patient has a recent history of moderate-severe anemia, which has not been symptomatic. Work up to date has revealed mostly resolved. Treatment has been intermittent oral iron.     HYPERTENSION - Patient has longstanding history of HTN , currently denies any symptoms referable to elevated blood pressure. Specifically denies chest pain, palpitations, dyspnea, orthopnea, PND or peripheral edema. Blood pressure readings have been in normal range. Current medication regimen is as listed below. Patient denies any side effects of medication.       MEDICAL HISTORY:     Patient Active Problem List    Diagnosis Date Noted     Benign essential hypertension 06/19/2020     Priority: Medium     Rotator cuff syndrome, right 01/14/2020     Priority: Medium     Multiple joint pain, possible fibromyalgia 12/20/2019     Priority: Medium     Alpha-1-antitrypsin deficiency (H) 05/07/2019     Priority: Medium     Bronchospasm 01/07/2019     Priority: Medium     GI bleed 03/17/2018     Priority: Medium     Upper GI bleed 03/17/2018     Priority: Medium     Shoulder injury, right, subsequent encounter 08/21/2017     Priority: Medium     Fatty liver 04/20/2017     Priority: Medium     Elevated levels of transaminase & lactic acid dehydrogenase 04/15/2017     Priority: Medium     Bilateral cold feet 01/03/2017     Priority: Medium     Colles' fracture 07/06/2015     Priority: Medium     Encounter for routine gynecological examination   06/04/2015     Priority: Medium     Back pain, left below scapula, strained muscles 10/24/2013     Priority: Medium     HTN, goal below 140/80 05/05/2013     Priority: Medium     S/P TKR (total knee replacement) 04/03/2012     Priority: Medium     Osteoporosis 02/19/2012     Priority: Medium     Previous T scores -2.2  2/2012 Dexascan:  Lumbar spine L1-L4 T-score: -2.4, Left femoral neck T-score: -2.7, Right femoral neck T-score: -2.2   Percent change in lumbar spine: +6.6% since 4/16/2008   Percent change in femurs: +2.9% since 4/16/2008    IMPRESSION: Osteoporosis in the left femoral neck. Severe osteopenia  in the right femoral neck and lumbar spine.       Obesity, Class III, BMI 40-49.9 (morbid obesity) (H) 02/19/2012     Priority: Medium     ideal weight 120, goal weight 200, lose 58 lbs in 58 weeks.       Vitamin D deficiency 07/18/2011     Priority: Medium     Tick bite, infected, positive Lyme test 1996 not treated 07/08/2011     Priority: Medium     Rosacea 11/04/2009     Priority: Medium     Obstructive sleep apnea 12/01/2006     Priority: Medium     Sleep study 12/06 for EDS- AHI 36, desaturations to 65%. CPAP recommended but not tolerated.  01/16/07  ENT consult recommend CPAP and weight loss. Surgery discussed but success rate less than CPAP  1/15/07 CPAP trial, was not able to tolerate. Retried 2008, tolerated better.   Problem list name updated by automated process. Provider to review       Carpal tunnel syndrome 06/07/2006     Priority: Medium     04/10/09  [facility]. Finding consistant with CTS, but nerve studies and MRI of C-Spine are normal. Corticosteroid injection given in office       CARDIOVASCULAR SCREENING; LDL GOAL LESS THAN 160 10/31/2010     Priority: Low     Allergic rhinitis 03/27/2006     Priority: Low     Problem list name updated by automated process. Provider to review       s/p gastric bypass x 2 07/05/2005     Priority: Low     Gastric bypass 1980        Past  Medical History:   Diagnosis Date     HTN (hypertension)      IRON DEFICIENCY ANEMIA 7/5/2005     Iron infusion/ resolved since injections     DARWIN (obstructive sleep apnea)      Past Surgical History:   Procedure Laterality Date     ARTHROPLASTY KNEE  4/2/2012    Procedure:ARTHROPLASTY KNEE; Left Total Knee Arthroplasty--Anesth.Choice; Surgeon:VICENTE THOMPSON; Location:[facility] [facility]     ARTHROTOMY SHOULDER, ROTATOR CUFF REPAIR, COMBINED Right 8/25/2017    Procedure: COMBINED ARTHROTOMY SHOULDER, ROTATOR CUFF REPAIR;  Right shoulder distal clavicle excision, subacromial decompression, rotator cuff repair ;  Surgeon: Vicente Thompson MD;  Location: [facility] OR     ARTHROTOMY SHOULDER, ROTATOR CUFF REPAIR, COMBINED Right 12/18/2017    Procedure: COMBINED ARTHROTOMY SHOULDER, ROTATOR CUFF REPAIR;  Right Shoulder Rotator Cuff Revision;  Surgeon: Vicente Thompson MD;  Location: [facility] OR     ARTHROTOMY SHOULDER, ROTATOR CUFF REPAIR, COMBINED Left 4/27/2018    Procedure: COMBINED ARTHROTOMY SHOULDER, ROTATOR CUFF REPAIR;  Left Shoulder Open Subacromial Decompression,Distal Clavicle Excision,Rotator Cuff Repair;  Surgeon: Vicente Thompson MD;  Location: [facility] OR     C ORAL SURGERY PROCEDURE  age 18    wisdom teeth     C/SECTION, LOW TRANSVERSE  1978, 1984, 1994    x 3     COLONOSCOPY  2001    normal colonoscopy     COLONOSCOPY N/A 8/22/2017    Procedure: COLONOSCOPY;  Colonoscopy  ;  Surgeon: Delfino Yoo MD;  Location: [facility]     ESOPHAGOSCOPY, GASTROSCOPY, DUODENOSCOPY (EGD), COMBINED N/A 3/18/2018    Procedure: COMBINED ESOPHAGOSCOPY, GASTROSCOPY, DUODENOSCOPY (EGD);;  Surgeon: Poncho Galindo MD;  Location: [facility]     GASTRIC BYPASS  1980/2005    Gastric Bypass and revision     HERNIA REPAIR, INCISIONAL  6/16/2008    lap.repair with 10x8 mesh     TUBAL LIGATION  1994     Current Outpatient Medications   Medication Sig Dispense Refill     atenolol (TENORMIN) 50 MG tablet Take 1 tablet (50 mg) by mouth daily 90 tablet  3     furosemide (LASIX) 20 MG tablet Take 1 tablet (20 mg) by mouth daily 60 tablet 0     oxybutynin ER (DITROPAN-XL) 5 MG 24 hr tablet Take 1 tablet (5 mg) by mouth daily 90 tablet 1     clobetasol (TEMOVATE) 0.05 % ointment Apply sparingly to affected area twice daily for 14 days.  Do not apply to face. (Patient not taking: Reported on 6/3/2020) 15 g 0     diclofenac (VOLTAREN) 1 % topical gel Place 2 g onto the skin 4 times daily (Patient not taking: Reported on 5/13/2020) 100 g 0     Emollient (CERAVE) CREA Externally apply topically 2 times daily as needed (Patient not taking: Reported on 5/13/2020) 1 Bottle 3     nortriptyline (PAMELOR) 25 MG capsule Take 1 capsule (25 mg) by mouth At Bedtime (Patient not taking: Reported on 5/13/2020) 90 capsule 3     nystatin (MYCOSTATIN) 568780 UNIT/GM POWD Apply topically 3 times daily as needed Refill at patient's request (Patient not taking: Reported on 6/19/2020) 30 g 1     nystatin (MYCOSTATIN) cream Apply topically 2 times daily Reported on 3/6/2017 (Patient not taking: Reported on 6/3/2020) 30 g 11     OTC products: no recent use of OTC ASA, NSAIDS or Steroids    Allergies   Allergen Reactions     Aleve [Naproxen] GI Disturbance     Vioxx Itching and Swelling     VIOXX (Swollen Feet,Hands itching), Okay with ibuprofen and aspirin      Latex Allergy: NO    Social History     Tobacco Use     Smoking status: Never Smoker     Smokeless tobacco: Never Used   Substance Use Topics     Alcohol use: Yes     Alcohol/week: 0.0 standard drinks     Comment: mixed very light 1 a month if that     History   Drug Use No       REVIEW OF SYSTEMS:   CONSTITUTIONAL: NEGATIVE for fever, chills, change in weight  INTEGUMENTARY/SKIN: POSITIVE for superficial skin tear to LLE, lateral calf  EYES: NEGATIVE for vision changes or irritation  ENT/MOUTH: NEGATIVE for ear, mouth and throat problems  RESP: NEGATIVE for significant cough or SOB  CV: NEGATIVE for chest pain, palpitations or  "peripheral edema  GI: NEGATIVE for nausea, abdominal pain, heartburn, or change in bowel habits  : NEGATIVE for frequency, dysuria, or hematuria  MUSCULOSKELETAL:POSITIVE  for Hx joint replacement left knee and joint pain right knee  NEURO: NEGATIVE for weakness, dizziness or paresthesias  ENDOCRINE: NEGATIVE for temperature intolerance, skin/hair changes  HEME: NEGATIVE for bleeding problems  PSYCHIATRIC: NEGATIVE for changes in mood or affect    EXAM:   /72 (BP Location: Right arm, Patient Position: Sitting, Cuff Size: Adult Large)   Pulse 70   Temp 98.2  F (36.8  C) (Tympanic)   Resp 16   Ht 1.6 m (5' 3\")   Wt 119 kg (262 lb 6.4 oz)   LMP 05/29/2006   SpO2 97%   BMI 46.48 kg/m      GENERAL APPEARANCE: healthy, alert and no distress     EYES: EOMI, PERRL     HENT: ear canals and TM's normal and nose and mouth without ulcers or lesions     NECK: no adenopathy, no asymmetry, masses, or scars and thyroid normal to palpation     RESP: lungs clear to auscultation - no rales, rhonchi or wheezes     CV: regular rates and rhythm, normal S1 S2, no S3 or S4 and no murmur, click or rub     ABDOMEN:  soft, nontender, no HSM or masses and bowel sounds normal     MS: extremities normal- no gross deformities noted, no evidence of inflammation in joints, FROM in all extremities.     SKIN: superficial skin tear to LLE, 9 mm, no bleeding, drainage, erythema, edema or warmth.     NEURO: Normal strength and tone, sensory exam grossly normal, mentation intact and speech normal     PSYCH: mentation appears normal. and affect normal/bright     LYMPHATICS: No cervical adenopathy    DIAGNOSTICS:   EKG: Normal Sinus Rhythm, normal axis, normal intervals, no acute ST/T changes c/w ischemia, no LVH by voltage criteria, unchanged from previous tracings    Recent Labs   Lab Test 06/09/20  1131 02/25/20  1458  05/07/19  1031  03/17/18  1315  08/16/17  0930   HGB 13.5 13.0   < > 11.9   < > 11.5*   < > 14.8    269   < >  -- "    < > 245   < > 213   INR  --   --   --   --   --  1.00  --  1.05     --   --  137   < > 137   < > 139   POTASSIUM 4.2  --   --  3.8   < > 3.8   < > 4.0   CR 0.82  --   --  0.67   < > 0.71   < > 0.74    < > = values in this interval not displayed.        IMPRESSION:   Reason for surgery/procedure: Elective total right knee replacement.   Diagnosis/reason for consult: The surgeon is requesting consultation regarding anesthesia and surgical risk for this patient with respect to current and past medical conditions.      The proposed surgical procedure is considered INTERMEDIATE risk.    REVISED CARDIAC RISK INDEX  The patient has the following serious cardiovascular risks for perioperative complications such as (MI, PE, VFib and 3  AV Block):  No serious cardiac risks  INTERPRETATION: 0 risks: Class I (very low risk - 0.4% complication rate)    The patient has the following additional risks for perioperative complications:  Morbid obesity      ICD-10-CM    1. Preop general physical exam  Z01.818 EKG 12-lead complete w/read - Clinics   2. Primary osteoarthritis of right knee  M17.11    3. Obstructive sleep apnea  G47.33    4. Benign essential hypertension  I10    5. Iron deficiency anemia, unspecified iron deficiency anemia type  D50.9        RECOMMENDATIONS:       Obstructive Sleep Apnea (or suspected sleep apnea)  Hospital staff are advised to monitor for sleep related oxygen desaturations due to history of DARWIN.    --Patient is to take all scheduled medications on the day of surgery    APPROVAL GIVEN to proceed with proposed procedure, without further diagnostic evaluation       Signed Electronically by: MARGARITA Obrien CNP    Copy of this evaluation report is provided to requesting physician.    [facility] Preop Guidelines    Revised Cardiac Risk Index

## 2020-06-24 NOTE — PROGRESS NOTES
Jacquie Harris   PHYSICAL THERAPY DISCHARGE  03/03/20 1000   Signing Clinician's Name / Credentials   Signing clinician's name / credentials Kris Hoenk, PT   Session Number   Session Number 6 [facility] no limits no cert, seen from 2/6-3/3/20   Ortho Goal 1   Goal Identifier 1   Goal Description pt will be able to reach lowest shelf in cupboad in 6wks   Target Date 03/19/20   Ortho Goal 2   Goal Identifier 2   Goal Description pt will be able to dress, do hair with ease in 8wks   Target Date 04/06/20   Ortho Goal 3   Goal Identifier 3   Goal Description pt will be able to lift up to 10# for groceries, cooking in 12 weeks   Target Date 04/30/20   Ortho Goal 4   Goal Identifier 4   Goal Description pt will be able to do house cleaing chores as usual for her in 12 weeks   Target Date 04/30/20   Subjective Report   Subjective Report saw MD, gave phase 2 orders, AAROM, pulleys, table slides - started table slide already - has had prior surgeries, knows these exercises   Objective Measures   Objective Measures Objective Measure 1   Objective Measure 1   Details passive flexion approx 120*   Therapeutic Procedure/exercise   Therapeutic Procedures: strength, endurance, ROM, flexibillity minutes(50287) 25   Skilled Intervention  AAROM added, cont PROM for full rnage   Patient Response see details   Treatment Detail pulleys, table top flex stretch, wand ABD x10- more painful than pulleys, PROM  R shld to full ROM as brandy   Plan   Plan for next session see every other wk, these orders til 12wks post op  Pt's last appt was cancelled due to covid restrictions. I was in contact with Karen on 2 occasions to update exercises and get final status. Pt reported she was doing well on her own. Will discharge this episode of care   Comments   Comments DOS 1/15/20   Total Session Time   Timed Code Treatment Minutes 25   Total Treatment Time (sum of timed and untimed services) 25   Kris Hoenk, PT #[id number]  [facility]  [place]  [phone]

## 2020-06-29 ENCOUNTER — HOSPITAL ENCOUNTER (OUTPATIENT)
Dept: PHYSICAL THERAPY | Facility: CLINIC | Age: 65
Setting detail: THERAPIES SERIES
End: 2020-06-29
Attending: FAMILY MEDICINE
Payer: COMMERCIAL

## 2020-06-29 PROCEDURE — 97110 THERAPEUTIC EXERCISES: CPT | Mod: GP | Performed by: PHYSICAL THERAPIST

## 2020-06-29 PROCEDURE — 97161 PT EVAL LOW COMPLEX 20 MIN: CPT | Mod: GP | Performed by: PHYSICAL THERAPIST

## 2020-06-29 PROCEDURE — 97535 SELF CARE MNGMENT TRAINING: CPT | Mod: GP | Performed by: PHYSICAL THERAPIST

## 2020-06-29 NOTE — PROGRESS NOTES
06/29/20 0900   General Information   Type of Visit Initial OP Ortho PT Evaluation   Start of Care Date 06/29/20   Referring Physician Nadja Cheng PA-C   Patient/Family Goals Statement be able to walk normal and without pain   Orders Evaluate and Treat   Date of Order 06/17/20   Certification Required? No   Medical Diagnosis s/p total knee arthroplasty, right   General Information Comments Hx left TKA   Body Part(s)   Body Part(s) Knee   Presentation and Etiology   Pertinent history of current problem (include personal factors and/or comorbidities that impact the POC) Patient underwent right TKA 6/22/20.  Pt reports: long history of right knee pain managed by cortisone.  Finally underwent TKA after COVID pandemic delay.  Unable to come in for further PT / will be extremely limited [place].   Impairments A. Pain;B. Decreased WB tolerance;C. Swelling;D. Decreased ROM;E. Decreased flexibility;G. Impaired balance;F. Decreased strength and endurance;H. Impaired gait;K. Numbness   Functional Limitations perform activities of daily living;perform required work activities;perform desired leisure / sports activities   Symptom Location right global knee   How/Where did it occur From Degenerative Joint Disease   Onset date of current episode/exacerbation 06/22/20   Chronicity New   Pain rating (0-10 point scale) Best (/10);Worst (/10)   Best (/10) 4   Worst (/10) 9   Pain quality A. Sharp;B. Dull;C. Aching;D. Burning;E. Shooting;F. Stabbing   Frequency of pain/symptoms A. Constant   Pain/symptoms are: The same all the time   Pain/symptoms exacerbated by A. Sitting;B. Walking;F. Nothing;J. ADL   Pain/symptoms eased by C. Rest;G. Heat;H. Cold   Progression of symptoms since onset: Improved   Prior Level of Function   Prior Level of Function-Mobility Independent   Prior Level of Function-ADLs Independent   Current Level of Function   Current Community Support  Family/friend caregiver   Patient role/employment history F. Retired   Living environment [place]   Fall Risk Screen   Fall screen completed by PT   Have you fallen 2 or more times in the past year? No   Have you fallen and had an injury in the past year? No   Is patient a fall risk? No   Abuse Screen (yes response referral indicated)   Feels Unsafe at Home or Work/School no   Feels Threatened by Someone no   Does Anyone Try to Keep You From Having Contact with Others or Doing Things Outside Your Home? no   Physical Signs of Abuse Present no   Knee Objective Findings   Side (if bilateral, select both right and left) Right   Integumentary  joint line left=58 cm; right=63 cm   Gait/Locomotion front wheeled walker: limited mobility, only able to kevin 5-6 steps heavily relying on UE   Balance/Proprioception (Single Leg Stance) 0   Right Knee Extension AROM lacking 10   Right Knee Flexion AROM 50 deg   Right Knee Flexion Strength 3+/5   Right Knee Extension Strength 3+/5   Right Hip Abduction Strength 2/5   Right Quad Set Strength poor   Planned Therapy Interventions   Planned Therapy Interventions ADL retraining;balance training;gait training;joint mobilization;motor coordination training;manual therapy;neuromuscular re-education;ROM;stretching;strengthening;transfer training   Planned Modality Interventions   Planned Modality Interventions Cryotherapy;Electrical stimulation   Clinical Impression   Criteria for Skilled Therapeutic Interventions Met yes, treatment indicated   PT Diagnosis s/p right TKA   Influenced by the following impairments pain; weakness; impaired ROM; impaired gait and proprioception   Functional limitations due to impairments difficulty walking, unable to drive, difficulty with stairs   Clinical Presentation Stable/Uncomplicated   Clinical Presentation Rationale (+) motivation; hx of left TKA to know exercises (-) BMI; comorbidities; unable to come in for in person PT and no ability for  telehealth   Clinical Decision Making (Complexity) Low complexity   Therapy Frequency other (see comments)   Predicted Duration of Therapy Intervention (days/wks) 12 weeks - chart open   Risk & Benefits of therapy have been explained Yes   Patient, Family & other staff in agreement with plan of care Yes   Clinical Impression Comments Karen arrives today 1 week s/p right TKA; she will benefit from skilled PT to address the above impairments and functional limitations.   Education Assessment   Preferred Learning Style Listening;Demonstration;Pictures/video   Barriers to Learning No barriers   ORTHO GOALS   PT Ortho Eval Goals 1;2;3   Ortho Goal 1   Goal Description Patient will have >=0-120 deg right knee ROM to allow for normalized gait.   Target Date 09/21/20   Ortho Goal 2   Goal Description Patient will be able to walk 1 block with a 4 wheeled walker to allow for improved community mobility.   Target Date 09/07/20   Ortho Goal 3   Goal Description Patient will be independent with her HEP to maximize healing and [place]   Target Date 06/29/20   Date Met 06/29/20   Total Evaluation Time   PT Eval, Low Complexity Minutes (37657) 20     Priyanka Tyson, PT, DTP, SCS  Doctor of Physical Therapy #[id number]  [facility]  [phone]  [email or web address]

## 2020-07-05 ENCOUNTER — HOSPITAL ENCOUNTER (EMERGENCY)
Facility: OTHER | Age: 65
Discharge: HOME | End: 2020-07-05
Attending: FAMILY MEDICINE | Admitting: FAMILY MEDICINE
Payer: COMMERCIAL

## 2020-07-05 ENCOUNTER — APPOINTMENT (OUTPATIENT)
Dept: ULTRASOUND IMAGING | Facility: OTHER | Age: 65
End: 2020-07-05
Attending: FAMILY MEDICINE
Payer: COMMERCIAL

## 2020-07-05 ENCOUNTER — APPOINTMENT (OUTPATIENT)
Dept: GENERAL RADIOLOGY | Facility: OTHER | Age: 65
End: 2020-07-05
Attending: FAMILY MEDICINE
Payer: COMMERCIAL

## 2020-07-05 VITALS
RESPIRATION RATE: 20 BRPM | HEIGHT: 63 IN | WEIGHT: 260 LBS | OXYGEN SATURATION: 95 % | DIASTOLIC BLOOD PRESSURE: 70 MMHG | SYSTOLIC BLOOD PRESSURE: 129 MMHG | TEMPERATURE: 98.9 F | HEART RATE: 70 BPM | BODY MASS INDEX: 46.07 KG/M2

## 2020-07-05 DIAGNOSIS — G89.18 POSTOPERATIVE PAIN OF EXTREMITY: ICD-10-CM

## 2020-07-05 DIAGNOSIS — S80.11XA LEG HEMATOMA, RIGHT, INITIAL ENCOUNTER: ICD-10-CM

## 2020-07-05 DIAGNOSIS — M79.609 POSTOPERATIVE PAIN OF EXTREMITY: ICD-10-CM

## 2020-07-05 LAB
ALBUMIN SERPL-MCNC: 3.9 G/DL (ref 3.5–5.7)
ALBUMIN UR-MCNC: NEGATIVE MG/DL
ALP SERPL-CCNC: 126 U/L (ref 34–104)
ALT SERPL W P-5'-P-CCNC: 44 U/L (ref 7–52)
ANION GAP SERPL CALCULATED.3IONS-SCNC: 8 MMOL/L (ref 3–14)
APPEARANCE UR: CLEAR
AST SERPL W P-5'-P-CCNC: 19 U/L (ref 13–39)
BASOPHILS # BLD AUTO: 0 10E9/L (ref 0–0.2)
BASOPHILS NFR BLD AUTO: 0.3 %
BILIRUB SERPL-MCNC: 0.6 MG/DL (ref 0.3–1)
BILIRUB UR QL STRIP: NEGATIVE
BUN SERPL-MCNC: 14 MG/DL (ref 7–25)
CALCIUM SERPL-MCNC: 9.6 MG/DL (ref 8.6–10.3)
CHLORIDE SERPL-SCNC: 101 MMOL/L (ref 98–107)
CO2 SERPL-SCNC: 31 MMOL/L (ref 21–31)
COLOR UR AUTO: YELLOW
CREAT SERPL-MCNC: 0.83 MG/DL (ref 0.6–1.2)
CRP SERPL-MCNC: 1 MG/L
DIFFERENTIAL METHOD BLD: ABNORMAL
EOSINOPHIL # BLD AUTO: 0.1 10E9/L (ref 0–0.7)
EOSINOPHIL NFR BLD AUTO: 2.2 %
ERYTHROCYTE [DISTWIDTH] IN BLOOD BY AUTOMATED COUNT: 15.2 % (ref 10–15)
ERYTHROCYTE [SEDIMENTATION RATE] IN BLOOD BY WESTERGREN METHOD: 45 MM/H (ref 1–15)
GFR SERPL CREATININE-BSD FRML MDRD: 69 ML/MIN/{1.73_M2}
GLUCOSE SERPL-MCNC: 122 MG/DL (ref 70–105)
GLUCOSE UR STRIP-MCNC: NEGATIVE MG/DL
HCT VFR BLD AUTO: 41 % (ref 35–47)
HGB BLD-MCNC: 12.3 G/DL (ref 11.7–15.7)
HGB UR QL STRIP: NEGATIVE
IMM GRANULOCYTES # BLD: 0 10E9/L (ref 0–0.4)
IMM GRANULOCYTES NFR BLD: 0.3 %
INR PPP: 1.05 (ref 0–1.3)
KETONES UR STRIP-MCNC: NEGATIVE MG/DL
LACTATE BLD-SCNC: 0.9 MMOL/L (ref 0.7–2)
LEUKOCYTE ESTERASE UR QL STRIP: NEGATIVE
LYMPHOCYTES # BLD AUTO: 1.3 10E9/L (ref 0.8–5.3)
LYMPHOCYTES NFR BLD AUTO: 21.2 %
MCH RBC QN AUTO: 26.6 PG (ref 26.5–33)
MCHC RBC AUTO-ENTMCNC: 30 G/DL (ref 31.5–36.5)
MCV RBC AUTO: 89 FL (ref 78–100)
MONOCYTES # BLD AUTO: 0.5 10E9/L (ref 0–1.3)
MONOCYTES NFR BLD AUTO: 8 %
NEUTROPHILS # BLD AUTO: 4.1 10E9/L (ref 1.6–8.3)
NEUTROPHILS NFR BLD AUTO: 68 %
NITRATE UR QL: NEGATIVE
PH UR STRIP: 7 PH (ref 5–7)
PLATELET # BLD AUTO: 351 10E9/L (ref 150–450)
POTASSIUM SERPL-SCNC: 4.1 MMOL/L (ref 3.5–5.1)
PROT SERPL-MCNC: 7.6 G/DL (ref 6.4–8.9)
RBC # BLD AUTO: 4.63 10E12/L (ref 3.8–5.2)
SODIUM SERPL-SCNC: 140 MMOL/L (ref 134–144)
SOURCE: NORMAL
SP GR UR STRIP: 1.01 (ref 1–1.03)
UROBILINOGEN UR STRIP-MCNC: NORMAL MG/DL (ref 0–2)
WBC # BLD AUTO: 6 10E9/L (ref 4–11)

## 2020-07-05 PROCEDURE — 99284 EMERGENCY DEPT VISIT MOD MDM: CPT | Mod: Z6 | Performed by: FAMILY MEDICINE

## 2020-07-05 PROCEDURE — 85652 RBC SED RATE AUTOMATED: CPT | Performed by: FAMILY MEDICINE

## 2020-07-05 PROCEDURE — 85025 COMPLETE CBC W/AUTO DIFF WBC: CPT | Performed by: FAMILY MEDICINE

## 2020-07-05 PROCEDURE — 36415 COLL VENOUS BLD VENIPUNCTURE: CPT | Performed by: FAMILY MEDICINE

## 2020-07-05 PROCEDURE — 99284 EMERGENCY DEPT VISIT MOD MDM: CPT | Mod: 25 | Performed by: FAMILY MEDICINE

## 2020-07-05 PROCEDURE — 80053 COMPREHEN METABOLIC PANEL: CPT | Performed by: FAMILY MEDICINE

## 2020-07-05 PROCEDURE — 73560 X-RAY EXAM OF KNEE 1 OR 2: CPT | Mod: RT

## 2020-07-05 PROCEDURE — 83605 ASSAY OF LACTIC ACID: CPT | Performed by: FAMILY MEDICINE

## 2020-07-05 PROCEDURE — 85610 PROTHROMBIN TIME: CPT | Performed by: FAMILY MEDICINE

## 2020-07-05 PROCEDURE — 86140 C-REACTIVE PROTEIN: CPT | Performed by: FAMILY MEDICINE

## 2020-07-05 PROCEDURE — 71046 X-RAY EXAM CHEST 2 VIEWS: CPT

## 2020-07-05 PROCEDURE — 87040 BLOOD CULTURE FOR BACTERIA: CPT | Performed by: FAMILY MEDICINE

## 2020-07-05 PROCEDURE — 93971 EXTREMITY STUDY: CPT | Mod: RT

## 2020-07-05 PROCEDURE — 81003 URINALYSIS AUTO W/O SCOPE: CPT | Performed by: FAMILY MEDICINE

## 2020-07-05 RX ORDER — SENNOSIDES A AND B 8.6 MG/1
1 TABLET, FILM COATED ORAL
COMMUNITY
Start: 2020-01-15 | End: 2020-09-24

## 2020-07-05 RX ORDER — GABAPENTIN 300 MG/1
300 CAPSULE ORAL
COMMUNITY
Start: 2020-01-15 | End: 2020-07-23

## 2020-07-05 RX ORDER — FUROSEMIDE 20 MG
TABLET ORAL
COMMUNITY
Start: 2020-06-03 | End: 2020-09-24

## 2020-07-05 RX ORDER — ALBUTEROL SULFATE 90 UG/1
1-2 AEROSOL, METERED RESPIRATORY (INHALATION)
COMMUNITY
End: 2020-09-24

## 2020-07-05 RX ORDER — ATENOLOL 50 MG/1
TABLET ORAL
COMMUNITY
Start: 2020-05-06 | End: 2020-09-24

## 2020-07-05 RX ORDER — ACETAMINOPHEN 500 MG
1000 TABLET ORAL EVERY 6 HOURS PRN
COMMUNITY
Start: 2020-06-23

## 2020-07-05 RX ORDER — NORTRIPTYLINE HCL 25 MG
CAPSULE ORAL
COMMUNITY
Start: 2020-04-01 | End: 2020-09-24

## 2020-07-05 RX ORDER — ONDANSETRON 4 MG/1
4 TABLET, ORALLY DISINTEGRATING ORAL
COMMUNITY
Start: 2020-01-15 | End: 2020-09-24

## 2020-07-05 RX ORDER — HYDROXYZINE PAMOATE 25 MG/1
25-50 CAPSULE ORAL
COMMUNITY
Start: 2020-06-23 | End: 2021-07-07

## 2020-07-05 RX ORDER — OXYCODONE HYDROCHLORIDE 5 MG/1
5-10 TABLET ORAL
COMMUNITY
Start: 2020-06-29 | End: 2020-09-24

## 2020-07-05 ASSESSMENT — ENCOUNTER SYMPTOMS
DIARRHEA: 0
ABDOMINAL PAIN: 0
WEAKNESS: 0
SHORTNESS OF BREATH: 0
NAUSEA: 0
CHILLS: 1
EYES NEGATIVE: 1
NUMBNESS: 0
DIAPHORESIS: 1
FEVER: 1
RESPIRATORY NEGATIVE: 1
FREQUENCY: 1
COUGH: 0
DYSURIA: 0

## 2020-07-05 ASSESSMENT — MIFFLIN-ST. JEOR: SCORE: 1688.48

## 2020-07-05 NOTE — ED AVS SNAPSHOT
[facility]  [address]  [place] [address]  Phone:  [phone]  Fax:  [phone]                                    Jacquie Harris   MRN: [id number]    Department:  [facility]   Date of Visit:  7/5/2020           After Visit Summary Signature Page    I have received my discharge instructions, and my questions have been answered. I have discussed any challenges I see with this plan with the nurse or doctor.    ..........................................................................................................................................  Patient/Patient Representative Signature      ..........................................................................................................................................  Patient Representative Print Name and Relationship to Patient    ..................................................               ................................................  Date                                   Time    ..........................................................................................................................................  Reviewed by Signature/Title    ...................................................              ..............................................  Date                                               Time          22EPIC Rev 08/18

## 2020-07-05 NOTE — ED PROVIDER NOTES
History     Chief Complaint   Patient presents with     Post-op Problem     HPI  Jacquie Harris is a 65 year old female who had right TKA 6/22/20 at [facility] and discharge home 6/23.  She came up to stay in [facility] and 3 days ago started having more pain/swelling/decrease ROM in right knee with malaise and then fever to 103 this morning.  She had been having problems with urinary urgency and frequency prior to surgery and for the past week it has been worse with accidents not getting to BR on time.  She has not been doing any incentive spirometry since surgery; and she stopped doing her ROM exercises 3 days ago.    Allergies:  Allergies   Allergen Reactions     Ibuprofen      Other reaction(s): Gastrointestinal  Gastric bleed     Naproxen GI Disturbance     Other reaction(s): Gastrointestinal  Gastric bleed     Nickel Itching     Inflamed with cheap earrings (itchy)     Rofecoxib Itching and Swelling     VIOXX (Swollen Feet,Hands itching), Okay with ibuprofen and aspirin     Vioxx Itching and Swelling     VIOXX (Swollen Feet,Hands itching), Okay with ibuprofen and aspirin       Problem List:    Patient Active Problem List    Diagnosis Date Noted     Benign essential hypertension 06/19/2020     Priority: Medium     Rotator cuff syndrome, right 01/14/2020     Priority: Medium     Multiple joint pain, possible fibromyalgia 12/20/2019     Priority: Medium     Alpha-1-antitrypsin deficiency (H) 05/07/2019     Priority: Medium     Bronchospasm 01/07/2019     Priority: Medium     GI bleed 03/17/2018     Priority: Medium     Upper GI bleed 03/17/2018     Priority: Medium     Shoulder injury, right, subsequent encounter 08/21/2017     Priority: Medium     Fatty liver 04/20/2017     Priority: Medium     Elevated levels of transaminase & lactic acid dehydrogenase 04/15/2017     Priority: Medium     Bilateral cold feet 01/03/2017     Priority: Medium     Colles' fracture 07/06/2015     Priority: Medium     Encounter for  routine gynecological examination  06/04/2015     Priority: Medium     Back pain, left below scapula, strained muscles 10/24/2013     Priority: Medium     HTN, goal below 140/80 05/05/2013     Priority: Medium     S/P TKR (total knee replacement) 04/03/2012     Priority: Medium     Osteoporosis 02/19/2012     Priority: Medium     Previous T scores -2.2  2/2012 Dexascan:  Lumbar spine L1-L4 T-score: -2.4, Left femoral neck T-score: -2.7, Right femoral neck T-score: -2.2   Percent change in lumbar spine: +6.6% since 4/16/2008   Percent change in femurs: +2.9% since 4/16/2008    IMPRESSION: Osteoporosis in the left femoral neck. Severe osteopenia  in the right femoral neck and lumbar spine.       Obesity, Class III, BMI 40-49.9 (morbid obesity) (H) 02/19/2012     Priority: Medium     ideal weight 120, goal weight 200, lose 58 lbs in 58 weeks.       Vitamin D deficiency 07/18/2011     Priority: Medium     Tick bite, infected, positive Lyme test 1996 not treated 07/08/2011     Priority: Medium     Rosacea 11/04/2009     Priority: Medium     Obstructive sleep apnea 12/01/2006     Priority: Medium     Sleep study 12/06 for EDS- AHI 36, desaturations to 65%. CPAP recommended but not tolerated.  01/16/07  ENT consult recommend CPAP and weight loss. Surgery discussed but success rate less than CPAP  1/15/07 CPAP trial, was not able to tolerate. Retried 2008, tolerated better.   Problem list name updated by automated process. Provider to review       Carpal tunnel syndrome 06/07/2006     Priority: Medium     04/10/09 Dr.Meletiou Mercado [facility]. Finding consistant with CTS, but nerve studies and MRI of C-Spine are normal. Corticosteroid injection given in office       CARDIOVASCULAR SCREENING; LDL GOAL LESS THAN 160 10/31/2010     Priority: Low     Allergic rhinitis 03/27/2006     Priority: Low     Problem list name updated by automated process. Provider to review       s/p gastric bypass x 2 07/05/2005     Priority: Low      Gastric bypass 1980          Past Medical History:    Past Medical History:   Diagnosis Date     HTN (hypertension)      IRON DEFICIENCY ANEMIA 7/5/2005     DARWIN (obstructive sleep apnea)        Past Surgical History:    Past Surgical History:   Procedure Laterality Date     ARTHROPLASTY KNEE  4/2/2012    Procedure:ARTHROPLASTY KNEE; Left Total Knee Arthroplasty--Anesth.Choice; Surgeon:VICENTE THOMPSON; Location:[facility]     ARTHROTOMY SHOULDER, ROTATOR CUFF REPAIR, COMBINED Right 8/25/2017    Procedure: COMBINED ARTHROTOMY SHOULDER, ROTATOR CUFF REPAIR;  Right shoulder distal clavicle excision, subacromial decompression, rotator cuff repair ;  Surgeon: Vicente Thompson MD;  Location: [facility] OR     ARTHROTOMY SHOULDER, ROTATOR CUFF REPAIR, COMBINED Right 12/18/2017    Procedure: COMBINED ARTHROTOMY SHOULDER, ROTATOR CUFF REPAIR;  Right Shoulder Rotator Cuff Revision;  Surgeon: Vicente Thompson MD;  Location: [facility] OR     ARTHROTOMY SHOULDER, ROTATOR CUFF REPAIR, COMBINED Left 4/27/2018    Procedure: COMBINED ARTHROTOMY SHOULDER, ROTATOR CUFF REPAIR;  Left Shoulder Open Subacromial Decompression,Distal Clavicle Excision,Rotator Cuff Repair;  Surgeon: Vicente Thompson MD;  Location: [facility] OR     C ORAL SURGERY PROCEDURE  age 18    wisdom teeth     C/SECTION, LOW TRANSVERSE  1978, 1984, 1994    x 3     COLONOSCOPY  2001    normal colonoscopy     COLONOSCOPY N/A 8/22/2017    Procedure: COLONOSCOPY;  Colonoscopy  ;  Surgeon: Delfino Yoo MD;  Location: [facility]     ESOPHAGOSCOPY, GASTROSCOPY, DUODENOSCOPY (EGD), COMBINED N/A 3/18/2018    Procedure: COMBINED ESOPHAGOSCOPY, GASTROSCOPY, DUODENOSCOPY (EGD);;  Surgeon: Poncho Galindo MD;  Location: [facility]     GASTRIC BYPASS  1980/2005    Gastric Bypass and revision     HERNIA REPAIR, INCISIONAL  6/16/2008    lap.repair with 10x8 mesh     TUBAL LIGATION  1994       Family History:    Family History   Problem Relation Age of Onset     C.A.D. Father         MI at age 60      Hypertension Father      Alzheimer Disease Father      Hyperlipidemia Father      Osteoporosis Mother         on Fosamax     Glaucoma Mother      Other Cancer Brother      Stomach Cancer Maternal Aunt      Cervical Cancer Cousin      Diabetes No family hx of      Cerebrovascular Disease No family hx of      Breast Cancer No family hx of      Cancer - colorectal No family hx of      Prostate Cancer No family hx of      Liver Disease No family hx of        Social History:  Marital Status:   [2]  Social History     Tobacco Use     Smoking status: Never Smoker     Smokeless tobacco: Never Used   Substance Use Topics     Alcohol use: Yes     Alcohol/week: 0.0 standard drinks     Comment: mixed very light 1 a month if that     Drug use: No        Medications:    acetaminophen (TYLENOL) 500 MG tablet  gabapentin (NEURONTIN) 300 MG capsule  hydrOXYzine (VISTARIL) 25 MG capsule  ondansetron (ZOFRAN-ODT) 4 MG ODT tab  oxyCODONE (ROXICODONE) 5 MG tablet  senna (SENOKOT) 8.6 MG tablet  albuterol (PROAIR HFA/PROVENTIL HFA/VENTOLIN HFA) 108 (90 Base) MCG/ACT inhaler  atenolol (TENORMIN) 50 MG tablet  atenolol (TENORMIN) 50 MG tablet  clobetasol (TEMOVATE) 0.05 % ointment  diclofenac (VOLTAREN) 1 % topical gel  Emollient (CERAVE) CREA  furosemide (LASIX) 20 MG tablet  furosemide (LASIX) 20 MG tablet  nortriptyline (PAMELOR) 25 MG capsule  nortriptyline (PAMELOR) 25 MG capsule  nystatin (MYCOSTATIN) 842169 UNIT/GM POWD  nystatin (MYCOSTATIN) cream  oxybutynin ER (DITROPAN-XL) 5 MG 24 hr tablet          Review of Systems   Constitutional: Positive for chills, diaphoresis and fever.   HENT: Negative.    Eyes: Negative.    Respiratory: Negative.  Negative for cough and shortness of breath.    Cardiovascular: Positive for leg swelling (swelling had been going down but increasing again in right leg for 3 days.). Negative for chest pain.   Gastrointestinal: Negative for abdominal pain, diarrhea and nausea.        LBP was  "normal/soft this morning.   Genitourinary: Positive for frequency and urgency. Negative for dysuria.   Musculoskeletal: Positive for gait problem.        Increasing lateral and posterior Right knee pain.   Neurological: Negative for weakness and numbness.       Physical Exam   BP: 135/65  Pulse: 67  Temp: 99.3  F (37.4  C)  Resp: 22  Height: 160 cm (5' 3\")  Weight: 117.9 kg (260 lb)  SpO2: 97 %      Physical Exam  Vitals signs and nursing note reviewed.   Constitutional:       General: She is not in acute distress.     Appearance: She is obese.   Neurological:      Mental Status: She is alert.         ED Course        Procedures               Critical Care time:  none               Results for orders placed or performed during the hospital encounter of 07/05/20 (from the past 24 hour(s))   CBC with platelets differential   Result Value Ref Range    WBC 6.0 4.0 - 11.0 10e9/L    RBC Count 4.63 3.8 - 5.2 10e12/L    Hemoglobin 12.3 11.7 - 15.7 g/dL    Hematocrit 41.0 35.0 - 47.0 %    MCV 89 78 - 100 fl    MCH 26.6 26.5 - 33.0 pg    MCHC 30.0 (L) 31.5 - 36.5 g/dL    RDW 15.2 (H) 10.0 - 15.0 %    Platelet Count 351 150 - 450 10e9/L    Diff Method Automated Method     % Neutrophils 68.0 %    % Lymphocytes 21.2 %    % Monocytes 8.0 %    % Eosinophils 2.2 %    % Basophils 0.3 %    % Immature Granulocytes 0.3 %    Absolute Neutrophil 4.1 1.6 - 8.3 10e9/L    Absolute Lymphocytes 1.3 0.8 - 5.3 10e9/L    Absolute Monocytes 0.5 0.0 - 1.3 10e9/L    Absolute Eosinophils 0.1 0.0 - 0.7 10e9/L    Absolute Basophils 0.0 0.0 - 0.2 10e9/L    Abs Immature Granulocytes 0.0 0 - 0.4 10e9/L   INR   Result Value Ref Range    INR 1.05 0 - 1.3   Comprehensive metabolic panel   Result Value Ref Range    Sodium 140 134 - 144 mmol/L    Potassium 4.1 3.5 - 5.1 mmol/L    Chloride 101 98 - 107 mmol/L    Carbon Dioxide 31 21 - 31 mmol/L    Anion Gap 8 3 - 14 mmol/L    Glucose 122 (H) 70 - 105 mg/dL    Urea Nitrogen 14 7 - 25 mg/dL    Creatinine 0.83 " 0.60 - 1.20 mg/dL    GFR Estimate 69 >60 mL/min/[1.73_m2]    GFR Estimate If Black 83 >60 mL/min/[1.73_m2]    Calcium 9.6 8.6 - 10.3 mg/dL    Bilirubin Total 0.6 0.3 - 1.0 mg/dL    Albumin 3.9 3.5 - 5.7 g/dL    Protein Total 7.6 6.4 - 8.9 g/dL    Alkaline Phosphatase 126 (H) 34 - 104 U/L    ALT 44 7 - 52 U/L    AST 19 13 - 39 U/L   Lactic acid whole blood   Result Value Ref Range    Lactic Acid 0.9 0.7 - 2.0 mmol/L   CRP inflammation   Result Value Ref Range    CRP Inflammation 1.0 (H) <0.5 mg/L   Erythrocyte sedimentation rate auto   Result Value Ref Range    Sed Rate 45 (H) 1 - 15 mm/h   UA reflex to Microscopic and Culture    Specimen: Urine clean catch; Midstream Urine   Result Value Ref Range    Color Urine Yellow     Appearance Urine Clear     Glucose Urine Negative NEG^Negative mg/dL    Bilirubin Urine Negative NEG^Negative    Ketones Urine Negative NEG^Negative mg/dL    Specific Gravity Urine 1.008 1.003 - 1.035    Blood Urine Negative NEG^Negative    pH Urine 7.0 5.0 - 7.0 pH    Protein Albumin Urine Negative NEG^Negative mg/dL    Urobilinogen mg/dL Normal 0.0 - 2.0 mg/dL    Nitrite Urine Negative NEG^Negative    Leukocyte Esterase Urine Negative NEG^Negative    Source Midstream Urine    XR Chest 2 Views    Narrative    PROCEDURE:  XR CHEST 2 VW    HISTORY:  POD #13 from Right TKA and fever and malaise x 3 days with  bibasilar rales/atelectasis on exam..     COMPARISON:  January 7, 2020    FINDINGS:   The cardiac silhouette is normal in size. The pulmonary vasculature is  normal.  The lungs are clear. No pleural effusion or pneumothorax.      Impression    IMPRESSION:  No acute cardiopulmonary disease.      ZURI GEORGE MD   US Lower Extremity Venous Duplex Right    Narrative    Exam:US LOWER EXTREMITY VENOUS DUPLEX RIGHT    History: Right leg pain and swelling    Comparisons: None    Technique: Venous duplex ultrasonography of the right lower extremity  was performed.     Findings: The common femoral  vein, superficial femoral vein and  popliteal vein are fully compressible with spontaneous and augmentable  venous flow. There is a 3.4 x 1.2 x 2.7 cm diameter mixed echogenicity  collection lateral to the right knee.           Impression    Impression: No evidence of deep venous thrombosis within the right  lower extremity.    ZURI GEORGE MD   XR Knee Right 1/2 Views    Narrative    PROCEDURE:  XR KNEE RT 1 /2 VW    HISTORY: s/p right TKA 6/22/20    COMPARISON:  None.    TECHNIQUE:  2 views of the right knee were obtained.    FINDINGS:  There is a right knee prosthesis in place. The prosthetic  elements appear well seated. Distal femur proximal tibia and fibular  are intact.       Impression    IMPRESSION: Knee prosthesis in place      ZURI GEORGE MD       Medications - No data to display    9:12 AM US tech reports no DVT but newer looking hematoma on the lateral side.    9:33 AM I updated patient with negative infection w/u and will get plain films of her knee and then I will talk with her surgeon.    10:54 AM attempting to contact Ortho on-call.    11:09 AM Still attempting to contact on-call ortho for Dr. Cedric Fuller.    11:25 AM I have been unable to get through the [facility] nurse line or the [facility] MD-MD line and reviewed with patient and will discharge and have her call the clinic tomorrow and I will sent my note to Dr. Fuller.    Assessments & Plan (with Medical Decision Making)   65 year old female POD #13 s/p right TKA with lateral hematoma in last 3 days and increased pain and decrease ROM but no DVT and TKA appears well seated.  Report of fever with normal temp here, and clear CXR, normal WBC/Lactic acid/CRP and clear urine; with elevated ESR but hx of this in the past.  Some basilar rales on initial exam and poor mobility and suspect fever could be due to atelectasis.  Increase activity as able and deep breathing and discharge home with f/u by phone tomorrow.      I have reviewed  the nursing notes.    I have reviewed the findings, diagnosis, plan and need for follow up with the patient.       New Prescriptions    No medications on file       Final diagnoses:   Postoperative pain of extremity   Leg hematoma, right, initial encounter - lateral right knee/distal thigh.       7/5/2020   [facility]     Nader Gutierrez MD  07/05/20 1125

## 2020-07-05 NOTE — ED TRIAGE NOTES
Pt had R TKA 6/22.  Increased pain, redness, swelling for past 3 days.  Chills yesterday, took her own temp when woke this AM, 103.  Temp on arrival 99.3.

## 2020-07-05 NOTE — DISCHARGE INSTRUCTIONS
Dear Ms. Steven,  It was nice to meet you.  As we talked, your knee x-ray shows your replacement in good position.  You don't have a blood clot in the leg and we don't see evidence of infection.  You do have a collection of fluid outside of your right knee and this could be a bruise or fluid collection.      Your fever can be because of fluid in your lungs that collects when we aren't active or taking deep breaths.  Try to increase your activity and take 10 deep breaths per hour while awake.    Drink 6-8 glasses of water per day.    Call Dr. Fuller's office tomorrow to discuss follow up.    Dr. Ehsan Gutierrez

## 2020-07-09 NOTE — PATIENT INSTRUCTIONS
Before Your Surgery      Call your surgeon if there is any change in your health. This includes signs of a cold or flu (such as a sore throat, runny nose, cough, rash or fever).    Do not smoke, drink alcohol or take over the counter medicine (unless your surgeon or primary care doctor tells you to) for the 24 hours before and after surgery.    If you take prescribed drugs: Follow your doctor s orders about which medicines to take and which to stop until after surgery.    Eating and drinking prior to surgery: follow the instructions from your surgeon  Take a shower or bath the night before surgery. Use the soap your surgeon gave you to gently clean your skin. If you do not have soap from your surgeon, use your regular soap. Do not shave or scrub the surgery site.  Wear clean pajamas and have clean sheets on your bed.       Thank you for choosing [facility].  You may be receiving a survey in the mail from [facility] regarding your visit today.  Please take a few minutes to complete and return the survey to let us know how we are doing.      If you have questions or concerns, please contact us via [employer] or you can contact your care team at [phone].    Our Clinic hours are:  Monday 6:40 am  to 7:00 pm  Tuesday -Friday 6:40 am to 5:00 pm    The [place] outpatient lab hours are:  Monday - Friday 6:10 am to 4:45 pm  Saturdays 7:00 am to 11:00 am  Appointments are required, call [phone]      If you have clinical questions after hours or would like to schedule an appointment,  call the clinic at [phone].       - continue anastrozole   - followed by Dr. Yeimi jo

## 2020-07-11 LAB
BACTERIA SPEC CULT: NORMAL
BACTERIA SPEC CULT: NORMAL
SPECIMEN SOURCE: NORMAL
SPECIMEN SOURCE: NORMAL

## 2020-09-23 NOTE — PROGRESS NOTES
Subjective     Jacquie Harris is a 65 year old female who presents to clinic today for the following health issues:    HPI   Patient is a 65 yr old female here for blood pressure medication question, she was on atenolol/hydrochlorothiazide - her hydrochlorothiazide was discontinued when she started taking furosemide. She is wondering if she needs to go back on the hydrochlorothiazide since she no longer takes furosemide. Her blood pressures have been okay. We mutually agreed not to restart the hydrochlorothiazide unless her blood pressures start to rise again.  She was also wondering if she could be low in iron. She has been having headaches  and also eye symptoms . She says a long time ago when she had low iron she had kind of the same symptoms. Her last cbc was completely normal including the indices.    Lastly she reports that her incontinence has gotten worse in the last few months, she was on oxybutynin but she ran out of prescription.says it helped some.      Hypertension Follow-up  Wondering if she should start taking hydrochlorothiazide with the atenolol again, was discontinued due to taking Lasix. Has not been taking Lasix since June/July 2020.  Wants to have her Iron level checked due to headaches and eye issues.    Do you check your blood pressure regularly outside of the clinic? No     Are you following a low salt diet? No    Are your blood pressures ever more than 140 on the top number (systolic) OR more   than 90 on the bottom number (diastolic), for example 140/90? Yes      How many servings of fruits and vegetables do you eat daily?  2-3    On average, how many sweetened beverages do you drink each day (Examples: soda, juice, sweet tea, etc.  Do NOT count diet or artificially sweetened beverages)?   0-1    How many days per week do you exercise enough to make your heart beat faster? 3 or less    How many minutes a day do you exercise enough to make your heart beat faster? 9 or less    How many days per  "week do you miss taking your medication? 0        Review of Systems   Constitutional, HEENT, cardiovascular, pulmonary, gi and gu systems are negative, except as otherwise noted.      Objective    /86   Pulse 61   Temp 98.6  F (37  C) (Tympanic)   Resp 12   Ht 1.607 m (5' 3.25\")   Wt 117.3 kg (258 lb 9.6 oz)   LMP 05/29/2006   SpO2 98%   BMI 45.45 kg/m    Body mass index is 45.45 kg/m .  Physical Exam   GENERAL: healthy, alert and no distress  EYES: Eyes grossly normal to inspection, PERRL and conjunctivae and sclerae normal  HENT: ear canals and TM's normal, nose and mouth without ulcers or lesions  NECK: no adenopathy, no asymmetry, masses, or scars and thyroid normal to palpation  RESP: lungs clear to auscultation - no rales, rhonchi or wheezes  CV: regular rate and rhythm, normal S1 S2, no S3 or S4, no murmur, click or rub, no peripheral edema and peripheral pulses strong  ABDOMEN: soft, nontender, no hepatosplenomegaly, no masses and bowel sounds normal  MS: no gross musculoskeletal defects noted, no edema    No results found for this or any previous visit (from the past 24 hour(s)).        Assessment & Plan     Urge incontinence of urine  Faxed   - oxybutynin ER (DITROPAN-XL) 5 MG 24 hr tablet; Take 1 tablet (5 mg) by mouth daily    Encounter for examination for insurance purposes  Wants information about insurance  - CARE COORDINATION REFERRAL    Benign essential hypertension  Presently we will not be restarting the hydrochlorothiazide . If blood pressure starts to rise again we could consider putting patient back on.    Reassured patient about iron def. No indication that she has iron def. For now she says she will take Tylenol for the headaches.               FUTURE APPOINTMENTS:       - Follow-up visit in one month or sooner as needed.    Return in about 3 months (around 12/24/2020) for in person.    Jennie Suazo MD  [facility]      "

## 2020-09-24 ENCOUNTER — HOSPITAL ENCOUNTER (OUTPATIENT)
Dept: MAMMOGRAPHY | Facility: CLINIC | Age: 65
Discharge: HOME | End: 2020-09-24
Attending: FAMILY MEDICINE | Admitting: FAMILY MEDICINE
Payer: COMMERCIAL

## 2020-09-24 ENCOUNTER — PATIENT OUTREACH (OUTPATIENT)
Dept: NURSING | Facility: CLINIC | Age: 65
End: 2020-09-24
Payer: COMMERCIAL

## 2020-09-24 ENCOUNTER — OFFICE VISIT (OUTPATIENT)
Dept: FAMILY MEDICINE | Facility: CLINIC | Age: 65
End: 2020-09-24
Payer: COMMERCIAL

## 2020-09-24 VITALS
RESPIRATION RATE: 12 BRPM | HEART RATE: 61 BPM | DIASTOLIC BLOOD PRESSURE: 86 MMHG | SYSTOLIC BLOOD PRESSURE: 132 MMHG | OXYGEN SATURATION: 98 % | WEIGHT: 258.6 LBS | HEIGHT: 63 IN | TEMPERATURE: 98.6 F | BODY MASS INDEX: 45.82 KG/M2

## 2020-09-24 DIAGNOSIS — Z02.6 ENCOUNTER FOR EXAMINATION FOR INSURANCE PURPOSES: ICD-10-CM

## 2020-09-24 DIAGNOSIS — I10 BENIGN ESSENTIAL HYPERTENSION: ICD-10-CM

## 2020-09-24 DIAGNOSIS — N39.41 URGE INCONTINENCE OF URINE: Primary | ICD-10-CM

## 2020-09-24 DIAGNOSIS — Z12.31 VISIT FOR SCREENING MAMMOGRAM: ICD-10-CM

## 2020-09-24 PROCEDURE — 77067 SCR MAMMO BI INCL CAD: CPT

## 2020-09-24 PROCEDURE — 90662 IIV NO PRSV INCREASED AG IM: CPT | Performed by: FAMILY MEDICINE

## 2020-09-24 PROCEDURE — 99214 OFFICE O/P EST MOD 30 MIN: CPT | Mod: 25 | Performed by: FAMILY MEDICINE

## 2020-09-24 PROCEDURE — G0008 ADMIN INFLUENZA VIRUS VAC: HCPCS | Performed by: FAMILY MEDICINE

## 2020-09-24 RX ORDER — OXYBUTYNIN CHLORIDE 5 MG/1
5 TABLET, EXTENDED RELEASE ORAL DAILY
Qty: 90 TABLET | Refills: 1 | Status: SHIPPED | OUTPATIENT
Start: 2020-09-24 | End: 2020-09-24

## 2020-09-24 RX ORDER — OXYBUTYNIN CHLORIDE 5 MG/1
5 TABLET, EXTENDED RELEASE ORAL DAILY
Qty: 90 TABLET | Refills: 1 | Status: SHIPPED | OUTPATIENT
Start: 2020-09-24 | End: 2021-07-05

## 2020-09-24 ASSESSMENT — MIFFLIN-ST. JEOR: SCORE: 1686.09

## 2020-09-24 NOTE — NURSING NOTE
"Initial BP (!) 142/86   Pulse 61   Temp 98.6  F (37  C) (Tympanic)   Resp 12   Ht 1.607 m (5' 3.25\")   Wt 117.3 kg (258 lb 9.6 oz)   LMP 05/29/2006   SpO2 98%   BMI 45.45 kg/m   Estimated body mass index is 45.45 kg/m  as calculated from the following:    Height as of this encounter: 1.607 m (5' 3.25\").    Weight as of this encounter: 117.3 kg (258 lb 9.6 oz). .      "

## 2020-09-24 NOTE — PROGRESS NOTES
Clinic Care Coordination Contact  Community Health Worker Initial Outreach    Patient accepts CC: No, patient is strictly looking for insurance information and has questions regarding Medicare. Patient will be sent Care Coordination introduction letter for future reference.     The Clinic Community Health Worker spoke with the patient today to discuss possible Clinic Care Coordination enrollment. The service was described to the patient and immediate needs were discussed. The patient declined enrollment at this time and stated that she only has concerns with insurance coverage in regards to Medicare.  CHW will route the encounter to appropriate RN CC. The PCP is encouraged to refer in the future if the patient's needs change.    Isabela CASANOVA Community Health Worker  [facility] Care Coordination  [facility]  Phone: [phone]

## 2020-10-19 ENCOUNTER — PATIENT OUTREACH (OUTPATIENT)
Dept: CARE COORDINATION | Facility: CLINIC | Age: 65
End: 2020-10-19

## 2020-10-19 NOTE — PROGRESS NOTES
Clinic Care Coordination Contact  Care Team Conversations    Background: Patient had PCP office visit on 9/24/20. She expressed to provider having questions regarding her Medicare insurance.  Care Coordination referral was placed.  CHW made outreach on 9/24.  Patient declined need to speak with RN CC or SW CC.  CHW routed as FRW only referral.  It was brought to RN CC's attention 10/19 that FRW referral not appropriate as patient had Medicare questions.  CHW seeking advice on next steps to handle patient referral.    Intervention: RN CC made outreach to patient this morning.  Clarified reason for CCC referral. Patient would like to discuss Medicare community resources for her questions but she is driving presently.  We set up RN CC phone assessment for 10/21 at 9:30 AM.    REJI Garza, RN   [facility]  - Clinic Care Coordinator  Phone: [phone]

## 2020-10-21 ENCOUNTER — PATIENT OUTREACH (OUTPATIENT)
Dept: NURSING | Facility: CLINIC | Age: 65
End: 2020-10-21
Payer: COMMERCIAL

## 2020-10-21 ASSESSMENT — ACTIVITIES OF DAILY LIVING (ADL): DEPENDENT_IADLS:: INDEPENDENT

## 2020-10-21 NOTE — LETTER
[facility]  Complex Care Plan  About Me:    Patient Name:  Jacquie Harris    YOB: 1955  Age:         65 year old   [place] MRN:    [id number] Telephone Information:  Home Phone [phone]   Mobile [phone]       Address:  [address] [facility] [place] [address] Email address:  No e-mail address on record      Emergency Contact(s)    Name Relationship Lgl Grd Work Phone Home Phone Mobile Phone   1. TERRY HARRIS Spouse  none [phone]   2. EMMA HARRIS Son  none none [phone]           My Access Plan  Medical Emergency 911   Primary Clinic Line [facility] - [phone]   24 Hour Appointment Line [phone] or  [phone]) (toll-free)   24 Hour Nurse Line [phone] (toll-free)   Preferred Urgent Care [facility], [phone]   Preferred Hospital [place], [place]  [phone]   Preferred Pharmacy [facility] [address]     Behavioral Health Crisis Line The National Suicide Prevention Lifeline at 1-844.228.1908 or 911       My Care Team Members  Patient Care Team       Relationship Specialty Notifications Start End    Jennie Suazo MD PCP - General Family Practice  1/29/18     Phone: [phone] Fax: [phone] [address] [facility] [address]    Malachi Rome MD MD Gastroenterology  4/21/17     Phone: [phone] Fax: [phone]         [address] [place] [address]    Jazmín Watts, RN Registered Nurse   4/21/17     Jennie Suazo MD Assigned PCP   1/7/18     Phone: [phone] Fax: [phone] [address] [facility] [address]    Krysta Vivar, RN Lead Care Coordinator Primary Care - CC Admissions 10/21/20     Phone: [phone]         Isabela Bello Community Health Worker Primary Care - CC  10/21/20     Phone: [phone]                 My Care  Plans  Self Management and Treatment Plan  Goals and (Comments)  Goals        General    #1 Financial Wellbeing (pt-stated)     Notes - Note edited  10/21/2020 10:19 AM by Krysta Vivar RN    Goal Statement: I will have stable financial support for health insurance, housing and basic necessities.  Date Goal set: 10/21/20  Barriers: recent change in income  Strengths: engaged in plan of care  Date to Achieve By: 2/1/21  Patient expressed understanding of goal: Yes  Action steps to achieve this goal:  1. I will engage in communication with [facility] to explore affordable health insurance options.  2. I will engage in communication with [facility] Assistance program to explore any prescription programs that we may be eligible for.  3. I will alert my PCP or Care Coordination team if I am unable to afford my prescriptions so we can review resources or alternative options.

## 2020-10-21 NOTE — LETTER
[facility]  [address] [place] [address]      October 21, 2020      Jacquie Harris  [address] [address]      Dear Karen,    I am a clinic care coordinator who works with Jennie Suazo MD at [facility]. I wanted to thank you for spending the time to talk with me.  Below is a description of clinic care coordination and how we can further assist our patients.      The clinic care coordination team is made up of a registered nurse,  and community health worker who understand the health care system. The goal of clinic care coordination is to help you manage your health and improve access to the health care system in the most efficient manner. The team can assist you in meeting your health care goals by providing education, strengthening the communication among your providers and supporting you with any resource needs.    Please feel free to contact me and the Community Health WorkerIsabela at [phone] (this is Isabela's number) or my number below with any questions or concerns. We are focused on providing you with the highest-quality healthcare experience possible and that all starts with you.     Sincerely,     REJI Garza, RN   [facility]  - Clinic Care Coordinator  Phone: [phone]

## 2020-10-21 NOTE — PROGRESS NOTES
Clinic Care Coordination Contact    Clinic Care Coordination Contact  OUTREACH    Referral Information:  Referral Source: PCP         Chief Complaint   Patient presents with     Clinic Care Coordination - Initial     RN CC        [place] Utilization:   Clinic Utilization  Difficulty keeping appointments:: No  Compliance Concerns: No  No-Show Concerns: No  No PCP office visit in Past Year: No  Utilization    Last refreshed: 10/21/2020 10:13 AM: Hospital Admissions 0           Last refreshed: 10/21/2020 10:13 AM: ED Visits 1           Last refreshed: 10/21/2020 10:13 AM: No Show Count (past year) 1              Current as of: 10/21/2020 10:13 AM              Clinical Concerns:  Current Medical Concerns:  Patient expressed having insurance questions to PCP during recent office visit.  During our phone assessment today, she declines any specific medical concerns or questions.   She is taking atenolol once daily as prescribed for management of hypertension. PCP advised to follow up in 3 months to review BP management.  Patient is taking oxybutynin as prescribed for urinary urgency and reports it helps.     We discussed her insurance questions within RN CC scope of practice. Encouraged patient to contact [facility] to review available Medicare plans for 2021 that would fit in her and spouses' budget as well as align with their preferred network of providers.     Current Behavioral Concerns: none      Education Provided to patient: reviewed community resources    Pain  Pain (GOAL):: No  Health Maintenance Reviewed: Due/Overdue  Health Maintenance Due   Topic Date Due     ZOSTER IMMUNIZATION (1 of 2) 04/03/2004     ADVANCE CARE PLANNING  04/16/2018     MEDICARE ANNUAL WELLNESS VISIT  04/03/2019     Pneumococcal Vaccine: 65+ Years (1 of 1 - PPSV23) 04/03/2019     FALL RISK ASSESSMENT  04/23/2020       Clinical Pathway: None    Medication Management:  Medication reconciliation status: Medications reviewed and  reconciled.  Continue medications without change.   Patient denies any financial strain with her prescription costs but reports her 's prescriptions are expensive.  They both use a mail order pharmacy at present time and RN CC provided basic information about possible support from [facility] Assistance program if they would like to transfer prescriptions to [place].  Patient appreciated information and took down their contact information.    Functional Status:  Dependent ADLs:: Independent  Dependent IADLs:: Independent  Bed or wheelchair confined:: No  Mobility Status: Independent  Fallen 2 or more times in the past year?: No  Any fall with injury in the past year?: No    Living Situation:  Current living arrangement:: I live in a private home with spouse  Type of residence:: Private home - no stairs    Lifestyle & Psychosocial Needs:  Diet:: Regular  Inadequate nutrition (GOAL):: No  Tube Feeding: No  Inadequate activity/exercise (GOAL):: No  Significant changes in sleep pattern (GOAL): No  Transportation means:: Family     Hinduism or spiritual beliefs that impact treatment:: No  Mental health DX:: No  Mental health management concern (GOAL):: No  Informal Support system:: Family   Socioeconomic History     Marital status:      Spouse name: Not on file     Number of children: Not on file     Years of education: Not on file     Highest education level: Not on file     Tobacco Use     Smoking status: Never Smoker     Smokeless tobacco: Never Used   Substance and Sexual Activity     Alcohol use: Yes     Alcohol/week: 0.0 standard drinks     Comment: mixed very light 1 a month if that     Drug use: No     Sexual activity: Yes     Partners: Male     Birth control/protection: Surgical          Resources and Interventions:  Current Resources:      Community Resources: None  Supplies used at home:: None  Equipment Currently Used at Home: none  Employment Status: retired)   )    Advance Care  Plan/Directive  Advanced Care Plans/Directives on file:: No  Advanced Care Plan/Directive Status: Declined Further Information    Referrals Placed: [facility]     Goals:   Goals        General    #1 Financial Wellbeing (pt-stated)     Notes - Note edited  10/21/2020 10:19 AM by Krysta Vivar, RN    Goal Statement: I will have stable financial support for health insurance, housing and basic necessities.  Date Goal set: 10/21/20  Barriers: recent change in income  Strengths: engaged in plan of care  Date to Achieve By: 2/1/21  Patient expressed understanding of goal: Yes  Action steps to achieve this goal:  1. I will engage in communication with [facility] to explore affordable health insurance options.  2. I will engage in communication with [facility] to explore any prescription programs that we may be eligible for.  3. I will alert my PCP or Care Coordination team if I am unable to afford my prescriptions so we can review resources or alternative options.               Patient/Caregiver understanding: Yes       Plan: 1) Patient will contact [facility] to have additional support in navigating Medicare open enrollment period and plan options.  2) Patient and spouse will consider contacting [facility] program to review if there are any programs they might be eligible for with her spouse's high cost pain medication.  3) RN Care Coordinator will mail complex care plan.  Community Health Worker (CHW) will follow up with patient in 4 weeks. CHW will involve RN Care Coordinator as needed or if patient is ready to close to care coordination.    Care Coordinator will continue to monitor progression of goals every 6 weeks.    REJI Garza, RN   [facility]  - Clinic Care Coordinator  Phone: [phone]

## 2020-11-10 ENCOUNTER — TELEPHONE (OUTPATIENT)
Dept: FAMILY MEDICINE | Facility: CLINIC | Age: 65
End: 2020-11-10

## 2020-11-10 NOTE — TELEPHONE ENCOUNTER
Patient is told of the negative Mammo results.  I have apologized for no one getting back to her about the results.  Patient states she had a terrible experience this time with the actual mammo.  Patient states she doesn't know if she will get another mammo.  I have given her the number to patient relations. Winter BRAVO RN

## 2020-11-10 NOTE — TELEPHONE ENCOUNTER
Reason for Call:  Request for results:    Name of test or procedure: Mammogram    Date of test of procedure: 9/24/2020    Location of the test or procedure: [place]    OK to leave the result message on voice mail or with a family member? YES    Phone number Patient can be reached at:  Home number on file [phone] (home)    Call taken on 11/10/2020 at 9:28 AM by Coreen Cortes

## 2020-11-11 ENCOUNTER — VIRTUAL VISIT (OUTPATIENT)
Dept: FAMILY MEDICINE | Facility: CLINIC | Age: 65
End: 2020-11-11
Payer: COMMERCIAL

## 2020-11-11 DIAGNOSIS — G44.201 INTRACTABLE TENSION-TYPE HEADACHE, UNSPECIFIED CHRONICITY PATTERN: ICD-10-CM

## 2020-11-11 DIAGNOSIS — J06.9 VIRAL URI WITH COUGH: Primary | ICD-10-CM

## 2020-11-11 DIAGNOSIS — J06.9 VIRAL URI WITH COUGH: ICD-10-CM

## 2020-11-11 PROCEDURE — 99442 PR PHYSICIAN TELEPHONE EVALUATION 11-20 MIN: CPT | Performed by: FAMILY MEDICINE

## 2020-11-11 PROCEDURE — U0003 INFECTIOUS AGENT DETECTION BY NUCLEIC ACID (DNA OR RNA); SEVERE ACUTE RESPIRATORY SYNDROME CORONAVIRUS 2 (SARS-COV-2) (CORONAVIRUS DISEASE [COVID-19]), AMPLIFIED PROBE TECHNIQUE, MAKING USE OF HIGH THROUGHPUT TECHNOLOGIES AS DESCRIBED BY CMS-2020-01-R: HCPCS | Performed by: FAMILY MEDICINE

## 2020-11-11 NOTE — PROGRESS NOTES
"Jacquie Harris is a 65 year old female who is being evaluated via a billable telephone visit.      The patient has been notified of following:     \"This telephone visit will be conducted via a call between you and your physician/provider. We have found that certain health care needs can be provided without the need for a physical exam.  This service lets us provide the care you need with a short phone conversation.  If a prescription is necessary we can send it directly to your pharmacy.  If lab work is needed we can place an order for that and you can then stop by our lab to have the test done at a later time.    Telephone visits are billed at different rates depending on your insurance coverage. During this emergency period, for some insurers they may be billed the same as an in-person visit.  Please reach out to your insurance provider with any questions.    If during the course of the call the physician/provider feels a telephone visit is not appropriate, you will not be charged for this service.\"    Patient has given verbal consent for Telephone visit?  Yes    What phone number would you like to be contacted at? [phone]    How would you like to obtain your AVS? Mail a copy    Subjective     Jacquie Harris is a 65 year old female who presents via phone visit today for the following health issues:  Chief Complaint   Patient presents with     Headache     x 4 weeks, Hot/cold, achy,      HPI     Acute Illness  Acute illness concerns: headache, achy,  Onset/Duration: x 4 weeks  Symptoms:  Fever: unknown, has not checked   Chills/Sweats: YES- cannot regulate temperature, clammy   Headache (location?): YES- eyes and above, cheek bones also hurt  Sinus Pressure: no  Conjunctivitis:  no  Ear Pain: YES: bilateral -   Rhinorrhea: YES- just in early morning   Congestion: no  Sore Throat: YES- will subside and come back again  Cough: YES-non-productive, with shortness of breath - started yesterday  Wheeze: " no  Decreased Appetite: no  Nausea: YES  Vomiting: YES- but nothing this week.   Diarrhea: YES- some   Dysuria/Freq.: YES- started having some incontinence trouble last night   Dysuria or Hematuria: no  Fatigue/Achiness: YES- very fatigued and achy   Sick/Strep Exposure: family members have been exposed to people that have tested positive for COVID-19  Therapies tried and outcome: ice pack on head       Current Outpatient Medications   Medication Instructions     acetaminophen (TYLENOL) 1,000 mg, Oral     atenolol (TENORMIN) 50 mg, Oral, DAILY     hydrOXYzine (VISTARIL) 25-50 mg, Oral     nystatin (MYCOSTATIN) 696268 UNIT/GM POWD Topical, 3 TIMES DAILY PRN, Refill at patient's request     nystatin (MYCOSTATIN) cream Topical, 2 TIMES DAILY, Reported on 3/6/2017     oxybutynin ER (DITROPAN-XL) 5 mg, Oral, DAILY       Patient Active Problem List   Diagnosis     s/p gastric bypass x 2     Allergic rhinitis     Carpal tunnel syndrome     Obstructive sleep apnea     Rosacea     CARDIOVASCULAR SCREENING; LDL GOAL LESS THAN 160     Tick bite, infected, positive Lyme test 1996 not treated     Vitamin D deficiency     Osteoporosis     Obesity, Class III, BMI 40-49.9 (morbid obesity) (H)     S/P TKR (total knee replacement)     HTN, goal below 140/80     Back pain, left below scapula, strained muscles     Encounter for routine gynecological examination      Colles' fracture     Bilateral cold feet     Elevated levels of transaminase & lactic acid dehydrogenase     Fatty liver     Shoulder injury, right, subsequent encounter     GI bleed     Upper GI bleed     Bronchospasm     Alpha-1-antitrypsin deficiency (H)     Multiple joint pain, possible fibromyalgia     Rotator cuff syndrome, right     Benign essential hypertension     (J06.9) Viral URI with cough  (primary encounter diagnosis)  Comment:   Plan: Symptomatic COVID-19 Virus (Coronavirus) by PCR        Use the symptomatic therapies as discussed. Avoid contagious exposures.    The covid test arrangements will be called. If this is not Covid then follow up in clinic   For a face to face visit as discussed.     (G44.201) Intractable tension-type headache, unspecified chronicity pattern  Comment:   Plan: see above. Use ice on the forehead and heat if there are tight neck muscles.       Delfino King MD      Phone call duration:  13 minutes

## 2020-11-12 ENCOUNTER — TELEPHONE (OUTPATIENT)
Dept: NURSING | Facility: CLINIC | Age: 65
End: 2020-11-12

## 2020-11-12 ENCOUNTER — TELEPHONE (OUTPATIENT)
Dept: FAMILY MEDICINE | Facility: CLINIC | Age: 65
End: 2020-11-12

## 2020-11-12 LAB
SARS-COV-2 RNA SPEC QL NAA+PROBE: ABNORMAL
SPECIMEN SOURCE: ABNORMAL

## 2020-11-12 NOTE — TELEPHONE ENCOUNTER
"Coronavirus (COVID-19) Notification    Caller Name (Patient, parent, daughter/son, grandparent, etc)  The patient, Karen.    Care advice and protocol reviewed with the patient, and she verbalized understanding.    Reason for call  Notify of Positive Coronavirus (COVID-19) lab results, assess symptoms,  review [facility] recommendations    Lab Result    Lab test:  2019-nCoV rRt-PCR or SARS-CoV-2 PCR    Oropharyngeal AND/OR nasopharyngeal swabs is POSITIVE for 2019-nCoV RNA/SARS-COV-2 PCR (COVID-19 virus)    RN Recommendations/Instructions per [facility] Coronavirus COVID-19 recommendations    Brief introduction script  Introduce self then review script:  \"I am calling on behalf of [employer].  We were notified that your Coronavirus test (COVID-19) for was POSITIVE for the virus.  I have some information to relay to you but first I wanted to mention that the [place] Dept of Health will be contacting you shortly [it's possible MD already called Patient] to talk to you more about how you are feeling and other people you have had contact with who might now also have the virus.  Also, [facility] is Partnering with the [facility] for Covid-19 research, you may be contacted directly by research staff.\"    Assessment (Inquire about Patient's current symptoms)   Assessment   Current Symptoms at time of phone call: (if no symptoms, document No symptoms] HA, sore throat, body aches, diarrhea.   Symptoms onset (if applicable) 11/08/2020     If at time of call, Patients symptoms hare worsened, the Patient should contact 911 or have someone drive them to Emergency Dept promptly:      If Patient calling 911, inform 911 personal that you have tested positive for the Coronavirus (COVID-19).  Place mask on and await 911 to arrive.    If Emergency Dept, If possible, please have another adult drive you to the Emergency Dept but you need to wear mask when in contact with other people.      Review information " with Patient    Your result was positive. This means you have COVID-19 (coronavirus).  We have sent you a letter that reviews the information that I'll be reviewing with you now.    How can I protect others?    If you have symptoms: stay home and away from others (self-isolate) until:    You've had no fever--and no medicine that reduces fever--for 1 full day (24 hours). And       Your other symptoms have gotten better. For example, your cough or breathing has improved. And     At least 10 days have passed since your symptoms started. (If you've been told by a doctor that you have a weak immune system, wait 20 days.)     If you don't have symptoms: Stay home and away from others (self-isolate) until at least 10 days have passed since your first positive COVID-19 test. (Date test collected)    During this time:    Stay in your own room, including for meals. Use your own bathroom if you can.    Stay away from others in your home. No hugging, kissing or shaking hands. No visitors.     Don't go to work, school or anywhere else.     Clean  high touch  surfaces often (doorknobs, counters, handles, etc.). Use a household cleaning spray or wipes. You'll find a full list on the EPA website at www.epa.gov/pesticide-registration/list-n-disinfectants-use-against-sars-cov-2.     Cover your mouth and nose with a mask, tissue or other face covering to avoid spreading germs.    Wash your hands and face often with soap and water.    Caregivers in these groups are at risk for severe illness due to COVID-19:  o People 65 years and older  o People who live in a nursing home or long-term care facility  o People with chronic disease (lung, heart, cancer, diabetes, kidney, liver, immunologic)  o People who have a weakened immune system, including those who:  - Are in cancer treatment  - Take medicine that weakens the immune system, such as corticosteroids  - Had a bone marrow or organ transplant  - Have an immune deficiency  - Have poorly  controlled HIV or AIDS  - Are obese (body mass index of 40 or higher)  - Smoke regularly    Caregivers should wear gloves while washing dishes, handling laundry and cleaning bedrooms and bathrooms.    Wash and dry laundry with special caution. Don't shake dirty laundry, and use the warmest water setting you can.    If you have a weakened immune system, ask your doctor about other actions you should take.    For more tips, go to www.cdc.gov/coronavirus/2019-ncov/downloads/10Things.pdf.    You should not go back to work until you meet the guidelines above for ending your home isolation. You don't need to be retested for COVID-19 before going back to work--studies show that you won't spread the virus if it's been at least 10 days since your symptoms started (or 20 days, if you have a weak immune system).    Employers: This document serves as formal notice of your employee's medical guidelines for going back to work. They must meet the above guidelines before going back to work in person.    How can I take care of myself?    1. Get lots of rest. Drink extra fluids (unless a doctor has told you not to).    2. Take Tylenol (acetaminophen) for fever or pain. If you have liver or kidney problems, ask your family doctor if it's okay to take Tylenol.     Take either:     650 mg (two 325 mg pills) every 4 to 6 hours, or     1,000 mg (two 500 mg pills) every 8 hours as needed.     Note: Don't take more than 3,000 mg in one day. Acetaminophen is found in many medicines (both prescribed and over-the-counter medicines). Read all labels to be sure you don't take too much.    For children, check the Tylenol bottle for the right dose (based on their age or weight).    3. If you have other health problems (like cancer, heart failure, an organ transplant or severe kidney disease): Call your specialty clinic if you don't feel better in the next 2 days.    4. Know when to call 911: Emergency warning signs include:    Trouble breathing or  shortness of breath    Pain or pressure in the chest that doesn't go away    Feeling confused like you haven't felt before, or not being able to wake up    Bluish-colored lips or face    5. Sign up for [employer]. We know it's scary to hear that you have COVID-19. We want to track your symptoms to make sure you're okay over the next 2 weeks. Please look for an email from [employer]--this is a free, online program that we'll use to keep in touch. To sign up, follow the link in the email. Learn more at [email or web address].    Where can I get more information?    [facility]: [email or web address]    Coronavirus Basics: www.health.[facility].[place]./diseases/coronavirus/basics.html    What to Do If You're Sick: www.cdc.gov/coronavirus/2019-ncov/about/steps-when-sick.html    Ending Home Isolation: www.cdc.gov/coronavirus/2019-ncov/hcp/disposition-in-home-patients.html     Caring for Someone with COVID-19: www.cdc.gov/coronavirus/2019-ncov/if-you-are-sick/care-for-someone.html     [facility] clinical trials (COVID-19 research studies): clinicalaffairs.[facility]/n-clinical-trials     A Positive COVID-19 letter will be sent via [employer] or the mail. (Exception, no letters sent to Presurgerical/Preprocedure Patients)    [Name]  Freda Gama RN  [facility] Nurse Advisor  11/12/2020 at 4:55 PM

## 2020-11-23 ENCOUNTER — PATIENT OUTREACH (OUTPATIENT)
Dept: NURSING | Facility: CLINIC | Age: 65
End: 2020-11-23
Payer: COMMERCIAL

## 2020-11-23 DIAGNOSIS — U07.1 2019 NOVEL CORONAVIRUS DISEASE (COVID-19): Primary | ICD-10-CM

## 2020-11-23 NOTE — PROGRESS NOTES
Clinic Care Coordination Contact    Follow Up Progress Note      Assessment: Patient due for proactive follow up outreach. Within chart review, she had recent COVID-19 diagnosis which prompted care coordination outreach per current workflow protocol.     Patient reports she is feeling better from acute illness perspective.  She denied having any questions or concerns at this time.  RN CC inquiried if patient has any questions related to insurance goal we set during 10/21 phone assessment.  She reports they have all the information needed.  Patient declines any ongoing phone calls at this time. She prefers to call main clinic number if any future questions arise.     Plan:   Patient will continue supportive home recovery instructions as she recovers from COVID-19.  Care Coordinator will do no additional outreaches at this time per patient request.    MARRY GarzaN, RN   [facility]  - Clinic Care Coordinator

## 2020-12-03 ENCOUNTER — OFFICE VISIT (OUTPATIENT)
Dept: DERMATOLOGY | Facility: CLINIC | Age: 65
End: 2020-12-03
Payer: COMMERCIAL

## 2020-12-03 VITALS — HEART RATE: 88 BPM | SYSTOLIC BLOOD PRESSURE: 139 MMHG | DIASTOLIC BLOOD PRESSURE: 86 MMHG | OXYGEN SATURATION: 95 %

## 2020-12-03 DIAGNOSIS — L21.9 DERMATITIS, SEBORRHEIC: ICD-10-CM

## 2020-12-03 DIAGNOSIS — D22.9 MULTIPLE BENIGN NEVI: ICD-10-CM

## 2020-12-03 DIAGNOSIS — D18.01 CHERRY ANGIOMA: ICD-10-CM

## 2020-12-03 DIAGNOSIS — L82.1 SEBORRHEIC KERATOSIS: Primary | ICD-10-CM

## 2020-12-03 DIAGNOSIS — L82.0 INFLAMED SEBORRHEIC KERATOSIS: ICD-10-CM

## 2020-12-03 DIAGNOSIS — L81.4 LENTIGO: ICD-10-CM

## 2020-12-03 PROCEDURE — 17110 DESTRUCTION B9 LES UP TO 14: CPT | Performed by: PHYSICIAN ASSISTANT

## 2020-12-03 PROCEDURE — 99213 OFFICE O/P EST LOW 20 MIN: CPT | Mod: 25 | Performed by: PHYSICIAN ASSISTANT

## 2020-12-03 ASSESSMENT — PAIN SCALES - GENERAL: PAINLEVEL: NO PAIN (0)

## 2020-12-03 NOTE — LETTER
12/3/2020         RE: Jacquie Harris  [address] [facility] [place] [address]        Dear Colleague,    Thank you for referring your patient, Jacquie Harris, to the [facility]. Please see a copy of my visit note below.    Jacquie Harris is an extremely pleasant 65 year old year old female patient here today for evaluation spots on body. She notes areas are itchy and will rub on clothing. No pain or bleeding. She will pick at spots. Patient has no other skin complaints today.  Remainder of the HPI, Meds, PMH, Allergies, FH, and SH was reviewed in chart.    Pertinent Hx:   No personal history of skin cancer.   Past Medical History:   Diagnosis Date     HTN (hypertension)      IRON DEFICIENCY ANEMIA 7/5/2005     Iron infusion/ resolved since injections     DARWIN (obstructive sleep apnea)        Past Surgical History:   Procedure Laterality Date     ARTHROPLASTY KNEE  4/2/2012    Procedure:ARTHROPLASTY KNEE; Left Total Knee Arthroplasty--Anesth.Choice; Surgeon:VICENTE THOMPSON; Location:[facility]     ARTHROTOMY SHOULDER, ROTATOR CUFF REPAIR, COMBINED Right 8/25/2017    Procedure: COMBINED ARTHROTOMY SHOULDER, ROTATOR CUFF REPAIR;  Right shoulder distal clavicle excision, subacromial decompression, rotator cuff repair ;  Surgeon: Vicente Thompson MD;  Location: [facility]     ARTHROTOMY SHOULDER, ROTATOR CUFF REPAIR, COMBINED Right 12/18/2017    Procedure: COMBINED ARTHROTOMY SHOULDER, ROTATOR CUFF REPAIR;  Right Shoulder Rotator Cuff Revision;  Surgeon: Vicente Thompson MD;  Location: [facility]     ARTHROTOMY SHOULDER, ROTATOR CUFF REPAIR, COMBINED Left 4/27/2018    Procedure: COMBINED ARTHROTOMY SHOULDER, ROTATOR CUFF REPAIR;  Left Shoulder Open Subacromial Decompression,Distal Clavicle Excision,Rotator Cuff Repair;  Surgeon: Vicente Thompson MD;  Location: [facility]     C ORAL SURGERY PROCEDURE  age 18    wisdom teeth     C/SECTION, LOW TRANSVERSE  1978, 1984, 1994    x 3     COLONOSCOPY   2001    normal colonoscopy     COLONOSCOPY N/A 8/22/2017    Procedure: COLONOSCOPY;  Colonoscopy  ;  Surgeon: Delfino Yoo MD;  Location: [facility]     ESOPHAGOSCOPY, GASTROSCOPY, DUODENOSCOPY (EGD), COMBINED N/A 3/18/2018    Procedure: COMBINED ESOPHAGOSCOPY, GASTROSCOPY, DUODENOSCOPY (EGD);;  Surgeon: Poncho Galindo MD;  Location: [facility]     GASTRIC BYPASS  1980/2005    Gastric Bypass and revision     HERNIA REPAIR, INCISIONAL  6/16/2008    lap.repair with 10x8 mesh     TUBAL LIGATION  1994        Family History   Problem Relation Age of Onset     C.A.D. Father         MI at age 60     Hypertension Father      Alzheimer Disease Father      Hyperlipidemia Father      Osteoporosis Mother         on Fosamax     Glaucoma Mother      Other Cancer Brother      Stomach Cancer Maternal Aunt      Cervical Cancer Cousin      Diabetes No family hx of      Cerebrovascular Disease No family hx of      Breast Cancer No family hx of      Cancer - colorectal No family hx of      Prostate Cancer No family hx of      Liver Disease No family hx of        Social History     Socioeconomic History     Marital status:      Spouse name: Not on file     Number of children: Not on file     Years of education: Not on file     Highest education level: Not on file   Occupational History     Not on file   Social Needs     Financial resource strain: Not on file     Food insecurity     Worry: Not on file     Inability: Not on file     Transportation needs     Medical: Not on file     Non-medical: Not on file   Tobacco Use     Smoking status: Never Smoker     Smokeless tobacco: Never Used   Substance and Sexual Activity     Alcohol use: Yes     Alcohol/week: 0.0 standard drinks     Comment: mixed very light 1 a month if that     Drug use: No     Sexual activity: Yes     Partners: Male     Birth control/protection: Surgical   Lifestyle     Physical activity     Days per week: Not on file     Minutes per session: Not on file     Stress: Not on  file   Relationships     Social connections     Talks on phone: Not on file     Gets together: Not on file     Attends Gnosticism service: Not on file     Active member of club or organization: Not on file     Attends meetings of clubs or organizations: Not on file     Relationship status: Not on file     Intimate partner violence     Fear of current or ex partner: Not on file     Emotionally abused: Not on file     Physically abused: Not on file     Forced sexual activity: Not on file   Other Topics Concern     Parent/sibling w/ CABG, MI or angioplasty before 65F 55M? No      Service No     Blood Transfusions Yes     Comment: 1976, 1978, 1978, 1984     Caffeine Concern No     Occupational Exposure No     Hobby Hazards No     Sleep Concern No     Stress Concern No     Weight Concern Yes     Special Diet No     Back Care No     Exercise No     Bike Helmet No     Seat Belt Yes     Self-Exams Not Asked   Social History Narrative     Not on file       Outpatient Encounter Medications as of 12/3/2020   Medication Sig Dispense Refill     acetaminophen (TYLENOL) 500 MG tablet Take 1,000 mg by mouth       atenolol (TENORMIN) 50 MG tablet Take 1 tablet (50 mg) by mouth daily 90 tablet 3     nystatin (MYCOSTATIN) 426852 UNIT/GM POWD Apply topically 3 times daily as needed Refill at patient's request 30 g 1     nystatin (MYCOSTATIN) cream Apply topically 2 times daily Reported on 3/6/2017 30 g 11     oxybutynin ER (DITROPAN-XL) 5 MG 24 hr tablet Take 1 tablet (5 mg) by mouth daily 90 tablet 1     hydrOXYzine (VISTARIL) 25 MG capsule Take 25-50 mg by mouth       No facility-administered encounter medications on file as of 12/3/2020.              Review Of Systems  Skin: As above  Eyes: negative  Ears/Nose/Throat: negative  Respiratory: No shortness of breath, dyspnea on exertion, cough      O:   NAD, WDWN, Alert & Oriented, Mood & Affect wnl, Vitals stable   Here today alone   /86   Pulse 88   LMP 05/29/2006    SpO2 95%    General appearance normal   Vitals stable   Alert, oriented and in no acute distress    Stuck on papules and brown macules on trunk and ext   Red papules on trunk  Brown papules and macules with regular pigment network and borders on torso and extremities  Scaly scalp  The remainder of skin exam is normal     Eyes: Conjunctivae/lids:Normal     ENT: Lips: normal    MSK:Normal    Cardiovascular: peripheral edema none    Pulm: Breathing Normal    Neuro/Psych: Orientation:Alert and Orientedx3 ; Mood/Affect:normal   A/P:  1. Inflamed seborrheic keratosis on left posterior hip, left posterior shoulder x 2, left superior shoulder x 1, left posterior thigh x 1   LN2:  Treated with LN2 for 5s for 1-2 cycles. Warned risks of blistering, pain, pigment change, scarring, and incomplete resolution.  Advised patient to return if lesions do not completely resolve.  Wound care sheet given.  2. Seborrheic Dermatitis  Can try selsun blue or head and shoulders.   3.  Seborrheic keratosis, lentigo, angioma, benign nevi   BENIGN LESIONS DISCUSSED WITH PATIENT:  I discussed the specifics of tumor, prognosis, and genetics of benign lesions.  I explained that treatment of these lesions would be purely cosmetic and not medically neccessary.  I discussed with patient different removal options including excision, cautery and /or laser.      Nature and genetics of benign skin lesions dicussed with patient.  Signs and Symptoms of skin cancer discussed with patient.  ABCDEs of melanoma reviewed with patient.  Patient encouraged to perform monthly skin exams.  UV precautions reviewed with patient.  Risks of non-melanoma skin cancer discussed with patient   Return to clinic pending biopsy results.           Again, thank you for allowing me to participate in the care of your patient.        Sincerely,        Belia Onofre PA-C

## 2020-12-03 NOTE — PROGRESS NOTES
Jacquie Harris is an extremely pleasant 65 year old year old female patient here today for evaluation spots on body. She notes areas are itchy and will rub on clothing. No pain or bleeding. She will pick at spots. Patient has no other skin complaints today.  Remainder of the HPI, Meds, PMH, Allergies, FH, and SH was reviewed in chart.    Pertinent Hx:   No personal history of skin cancer.   Past Medical History:   Diagnosis Date     HTN (hypertension)      IRON DEFICIENCY ANEMIA 7/5/2005     Iron infusion/ resolved since injections     DARWIN (obstructive sleep apnea)        Past Surgical History:   Procedure Laterality Date     ARTHROPLASTY KNEE  4/2/2012    Procedure:ARTHROPLASTY KNEE; Left Total Knee Arthroplasty--Anesth.Choice; Surgeon:VICENTE THOMPSON; Location:[facility]     ARTHROTOMY SHOULDER, ROTATOR CUFF REPAIR, COMBINED Right 8/25/2017    Procedure: COMBINED ARTHROTOMY SHOULDER, ROTATOR CUFF REPAIR;  Right shoulder distal clavicle excision, subacromial decompression, rotator cuff repair ;  Surgeon: Vicente Thompson MD;  Location: [facility]     ARTHROTOMY SHOULDER, ROTATOR CUFF REPAIR, COMBINED Right 12/18/2017    Procedure: COMBINED ARTHROTOMY SHOULDER, ROTATOR CUFF REPAIR;  Right Shoulder Rotator Cuff Revision;  Surgeon: Vicente Thompson MD;  Location: [facility]     ARTHROTOMY SHOULDER, ROTATOR CUFF REPAIR, COMBINED Left 4/27/2018    Procedure: COMBINED ARTHROTOMY SHOULDER, ROTATOR CUFF REPAIR;  Left Shoulder Open Subacromial Decompression,Distal Clavicle Excision,Rotator Cuff Repair;  Surgeon: Vicente Thompson MD;  Location: [facility]     C ORAL SURGERY PROCEDURE  age 18    wisdom teeth     C/SECTION, LOW TRANSVERSE  1978, 1984, 1994    x 3     COLONOSCOPY  2001    normal colonoscopy     COLONOSCOPY N/A 8/22/2017    Procedure: COLONOSCOPY;  Colonoscopy  ;  Surgeon: Delfino Yoo MD;  Location: [facility]     ESOPHAGOSCOPY, GASTROSCOPY, DUODENOSCOPY (EGD), COMBINED N/A 3/18/2018    Procedure: COMBINED  ESOPHAGOSCOPY, GASTROSCOPY, DUODENOSCOPY (EGD);;  Surgeon: Poncho Galindo MD;  Location: [facility]     GASTRIC BYPASS  1980/2005    Gastric Bypass and revision     HERNIA REPAIR, INCISIONAL  6/16/2008    lap.repair with 10x8 mesh     TUBAL LIGATION  1994        Family History   Problem Relation Age of Onset     C.A.D. Father         MI at age 60     Hypertension Father      Alzheimer Disease Father      Hyperlipidemia Father      Osteoporosis Mother         on Fosamax     Glaucoma Mother      Other Cancer Brother      Stomach Cancer Maternal Aunt      Cervical Cancer Cousin      Diabetes No family hx of      Cerebrovascular Disease No family hx of      Breast Cancer No family hx of      Cancer - colorectal No family hx of      Prostate Cancer No family hx of      Liver Disease No family hx of        Social History     Socioeconomic History     Marital status:      Spouse name: Not on file     Number of children: Not on file     Years of education: Not on file     Highest education level: Not on file   Occupational History     Not on file   Social Needs     Financial resource strain: Not on file     Food insecurity     Worry: Not on file     Inability: Not on file     Transportation needs     Medical: Not on file     Non-medical: Not on file   Tobacco Use     Smoking status: Never Smoker     Smokeless tobacco: Never Used   Substance and Sexual Activity     Alcohol use: Yes     Alcohol/week: 0.0 standard drinks     Comment: mixed very light 1 a month if that     Drug use: No     Sexual activity: Yes     Partners: Male     Birth control/protection: Surgical   Lifestyle     Physical activity     Days per week: Not on file     Minutes per session: Not on file     Stress: Not on file   Relationships     Social connections     Talks on phone: Not on file     Gets together: Not on file     Attends Anabaptism service: Not on file     Active member of club or organization: Not on file     Attends meetings of clubs or  organizations: Not on file     Relationship status: Not on file     Intimate partner violence     Fear of current or ex partner: Not on file     Emotionally abused: Not on file     Physically abused: Not on file     Forced sexual activity: Not on file   Other Topics Concern     Parent/sibling w/ CABG, MI or angioplasty before 65F 55M? No      Service No     Blood Transfusions Yes     Comment: 1976, 1978, 1978, 1984     Caffeine Concern No     Occupational Exposure No     Hobby Hazards No     Sleep Concern No     Stress Concern No     Weight Concern Yes     Special Diet No     Back Care No     Exercise No     Bike Helmet No     Seat Belt Yes     Self-Exams Not Asked   Social History Narrative     Not on file       Outpatient Encounter Medications as of 12/3/2020   Medication Sig Dispense Refill     acetaminophen (TYLENOL) 500 MG tablet Take 1,000 mg by mouth       atenolol (TENORMIN) 50 MG tablet Take 1 tablet (50 mg) by mouth daily 90 tablet 3     nystatin (MYCOSTATIN) 999112 UNIT/GM POWD Apply topically 3 times daily as needed Refill at patient's request 30 g 1     nystatin (MYCOSTATIN) cream Apply topically 2 times daily Reported on 3/6/2017 30 g 11     oxybutynin ER (DITROPAN-XL) 5 MG 24 hr tablet Take 1 tablet (5 mg) by mouth daily 90 tablet 1     hydrOXYzine (VISTARIL) 25 MG capsule Take 25-50 mg by mouth       No facility-administered encounter medications on file as of 12/3/2020.              Review Of Systems  Skin: As above  Eyes: negative  Ears/Nose/Throat: negative  Respiratory: No shortness of breath, dyspnea on exertion, cough      O:   NAD, WDWN, Alert & Oriented, Mood & Affect wnl, Vitals stable   Here today alone   /86   Pulse 88   LMP 05/29/2006   SpO2 95%    General appearance normal   Vitals stable   Alert, oriented and in no acute distress    Stuck on papules and brown macules on trunk and ext   Red papules on trunk  Brown papules and macules with regular pigment network and  borders on torso and extremities  Scaly scalp  The remainder of skin exam is normal     Eyes: Conjunctivae/lids:Normal     ENT: Lips: normal    MSK:Normal    Cardiovascular: peripheral edema none    Pulm: Breathing Normal    Neuro/Psych: Orientation:Alert and Orientedx3 ; Mood/Affect:normal   A/P:  1. Inflamed seborrheic keratosis on left posterior hip, left posterior shoulder x 2, left superior shoulder x 1, left posterior thigh x 1   LN2:  Treated with LN2 for 5s for 1-2 cycles. Warned risks of blistering, pain, pigment change, scarring, and incomplete resolution.  Advised patient to return if lesions do not completely resolve.  Wound care sheet given.  2. Seborrheic Dermatitis  Can try selsun blue or head and shoulders.   3.  Seborrheic keratosis, lentigo, angioma, benign nevi   BENIGN LESIONS DISCUSSED WITH PATIENT:  I discussed the specifics of tumor, prognosis, and genetics of benign lesions.  I explained that treatment of these lesions would be purely cosmetic and not medically neccessary.  I discussed with patient different removal options including excision, cautery and /or laser.      Nature and genetics of benign skin lesions dicussed with patient.  Signs and Symptoms of skin cancer discussed with patient.  ABCDEs of melanoma reviewed with patient.  Patient encouraged to perform monthly skin exams.  UV precautions reviewed with patient.  Risks of non-melanoma skin cancer discussed with patient   Return to clinic pending biopsy results.

## 2020-12-12 ENCOUNTER — HOSPITAL ENCOUNTER (EMERGENCY)
Facility: CLINIC | Age: 65
Discharge: HOME | End: 2020-12-12
Attending: FAMILY MEDICINE | Admitting: FAMILY MEDICINE
Payer: COMMERCIAL

## 2020-12-12 ENCOUNTER — APPOINTMENT (OUTPATIENT)
Dept: CT IMAGING | Facility: CLINIC | Age: 65
End: 2020-12-12
Attending: FAMILY MEDICINE
Payer: COMMERCIAL

## 2020-12-12 ENCOUNTER — APPOINTMENT (OUTPATIENT)
Dept: GENERAL RADIOLOGY | Facility: CLINIC | Age: 65
End: 2020-12-12
Attending: FAMILY MEDICINE
Payer: COMMERCIAL

## 2020-12-12 VITALS
WEIGHT: 252 LBS | TEMPERATURE: 98.3 F | BODY MASS INDEX: 44.29 KG/M2 | HEART RATE: 68 BPM | OXYGEN SATURATION: 94 % | RESPIRATION RATE: 18 BRPM | SYSTOLIC BLOOD PRESSURE: 145 MMHG | DIASTOLIC BLOOD PRESSURE: 90 MMHG

## 2020-12-12 DIAGNOSIS — R05.9 COUGH: ICD-10-CM

## 2020-12-12 DIAGNOSIS — R91.8 RIGHT LOWER LOBE PULMONARY INFILTRATE: ICD-10-CM

## 2020-12-12 DIAGNOSIS — Z86.16 HISTORY OF 2019 NOVEL CORONAVIRUS DISEASE (COVID-19): ICD-10-CM

## 2020-12-12 DIAGNOSIS — M54.9 UPPER BACK PAIN: ICD-10-CM

## 2020-12-12 LAB
ANION GAP SERPL CALCULATED.3IONS-SCNC: 2 MMOL/L (ref 3–14)
BASOPHILS # BLD AUTO: 0 10E9/L (ref 0–0.2)
BASOPHILS NFR BLD AUTO: 0.3 %
BUN SERPL-MCNC: 13 MG/DL (ref 7–30)
CALCIUM SERPL-MCNC: 8.9 MG/DL (ref 8.5–10.1)
CHLORIDE SERPL-SCNC: 106 MMOL/L (ref 94–109)
CO2 SERPL-SCNC: 31 MMOL/L (ref 20–32)
CREAT SERPL-MCNC: 0.7 MG/DL (ref 0.52–1.04)
D DIMER PPP FEU-MCNC: 0.7 UG/ML FEU (ref 0–0.5)
DIFFERENTIAL METHOD BLD: ABNORMAL
EOSINOPHIL # BLD AUTO: 0.2 10E9/L (ref 0–0.7)
EOSINOPHIL NFR BLD AUTO: 2.8 %
ERYTHROCYTE [DISTWIDTH] IN BLOOD BY AUTOMATED COUNT: 14.9 % (ref 10–15)
GFR SERPL CREATININE-BSD FRML MDRD: 90 ML/MIN/{1.73_M2}
GLUCOSE SERPL-MCNC: 106 MG/DL (ref 70–99)
HCT VFR BLD AUTO: 44.7 % (ref 35–47)
HGB BLD-MCNC: 13.4 G/DL (ref 11.7–15.7)
IMM GRANULOCYTES # BLD: 0 10E9/L (ref 0–0.4)
IMM GRANULOCYTES NFR BLD: 0.2 %
LYMPHOCYTES # BLD AUTO: 2.2 10E9/L (ref 0.8–5.3)
LYMPHOCYTES NFR BLD AUTO: 36.7 %
MCH RBC QN AUTO: 25.9 PG (ref 26.5–33)
MCHC RBC AUTO-ENTMCNC: 30 G/DL (ref 31.5–36.5)
MCV RBC AUTO: 86 FL (ref 78–100)
MONOCYTES # BLD AUTO: 0.6 10E9/L (ref 0–1.3)
MONOCYTES NFR BLD AUTO: 9.9 %
NEUTROPHILS # BLD AUTO: 3 10E9/L (ref 1.6–8.3)
NEUTROPHILS NFR BLD AUTO: 50.1 %
NRBC # BLD AUTO: 0 10*3/UL
NRBC BLD AUTO-RTO: 0 /100
PLATELET # BLD AUTO: 257 10E9/L (ref 150–450)
POTASSIUM SERPL-SCNC: 4.4 MMOL/L (ref 3.4–5.3)
RBC # BLD AUTO: 5.18 10E12/L (ref 3.8–5.2)
SODIUM SERPL-SCNC: 139 MMOL/L (ref 133–144)
WBC # BLD AUTO: 6.1 10E9/L (ref 4–11)

## 2020-12-12 PROCEDURE — 250N000011 HC RX IP 250 OP 636: Performed by: FAMILY MEDICINE

## 2020-12-12 PROCEDURE — 80048 BASIC METABOLIC PNL TOTAL CA: CPT | Performed by: FAMILY MEDICINE

## 2020-12-12 PROCEDURE — 85379 FIBRIN DEGRADATION QUANT: CPT | Performed by: FAMILY MEDICINE

## 2020-12-12 PROCEDURE — 71275 CT ANGIOGRAPHY CHEST: CPT

## 2020-12-12 PROCEDURE — 99285 EMERGENCY DEPT VISIT HI MDM: CPT | Mod: 25 | Performed by: FAMILY MEDICINE

## 2020-12-12 PROCEDURE — 71046 X-RAY EXAM CHEST 2 VIEWS: CPT

## 2020-12-12 PROCEDURE — 85025 COMPLETE CBC W/AUTO DIFF WBC: CPT | Performed by: FAMILY MEDICINE

## 2020-12-12 PROCEDURE — 99284 EMERGENCY DEPT VISIT MOD MDM: CPT | Performed by: FAMILY MEDICINE

## 2020-12-12 PROCEDURE — 250N000009 HC RX 250: Performed by: FAMILY MEDICINE

## 2020-12-12 RX ORDER — IOPAMIDOL 755 MG/ML
86 INJECTION, SOLUTION INTRAVASCULAR ONCE
Status: COMPLETED | OUTPATIENT
Start: 2020-12-12 | End: 2020-12-12

## 2020-12-12 RX ORDER — AZITHROMYCIN 500 MG/1
500 TABLET, FILM COATED ORAL DAILY
Qty: 5 TABLET | Refills: 0 | Status: SHIPPED | OUTPATIENT
Start: 2020-12-12 | End: 2020-12-17

## 2020-12-12 RX ORDER — ALBUTEROL SULFATE 90 UG/1
2 AEROSOL, METERED RESPIRATORY (INHALATION) EVERY 4 HOURS PRN
Qty: 1 INHALER | Refills: 0 | Status: SHIPPED | OUTPATIENT
Start: 2020-12-12 | End: 2021-07-13

## 2020-12-12 RX ADMIN — IOPAMIDOL 86 ML: 755 INJECTION, SOLUTION INTRAVENOUS at 12:49

## 2020-12-12 RX ADMIN — SODIUM CHLORIDE 100 ML: 9 INJECTION, SOLUTION INTRAVENOUS at 12:49

## 2020-12-12 ASSESSMENT — ENCOUNTER SYMPTOMS
BACK PAIN: 1
HEMATOLOGIC/LYMPHATIC NEGATIVE: 1
SHORTNESS OF BREATH: 1
SORE THROAT: 0
DIZZINESS: 0
PSYCHIATRIC NEGATIVE: 1
EYES NEGATIVE: 1
CHILLS: 0
FEVER: 0
NAUSEA: 0
WEAKNESS: 0
VOMITING: 0

## 2020-12-12 NOTE — ED AVS SNAPSHOT
[facility] Emergency Dept  [address] [address]  Phone: [phone]  Fax: [phone]                                    Jacquie Harris   MRN: [id number]    Department: [facility] Emergency Dept   Date of Visit: 12/12/2020           After Visit Summary Signature Page    I have received my discharge instructions, and my questions have been answered. I have discussed any challenges I see with this plan with the nurse or doctor.    ..........................................................................................................................................  Patient/Patient Representative Signature      ..........................................................................................................................................  Patient Representative Print Name and Relationship to Patient    ..................................................               ................................................  Date                                   Time    ..........................................................................................................................................  Reviewed by Signature/Title    ...................................................              ..............................................  Date                                               Time          22EPIC Rev 08/18

## 2020-12-12 NOTE — ED NOTES
Covid + 11/11. Stayed home until 12/1.   Pt still having a dry cough, headache, and fatigue. Started to feel SOB again. States she now has left sided rib pain and mid back pain.   Pt states she's here d/t her upper back hurting since Thursday.   Upper back, both sides, tender to mid theocrasic region.   Alleviates the pain when leaning forwards.   Normal work of breathing, no respiratory distress. Spo2 96% RA.   Normal heart tones. Clear lung sounds. Mild edema to bilateral ankles.

## 2020-12-12 NOTE — DISCHARGE INSTRUCTIONS
Take prescribed antibiotic daily as directed.    Albuterol inhaler 4 times daily.    Tylenol extra strength, 2 tablets 3-4 times daily.    Have a follow-up check with your doctor within the next week.    For worsening symptoms such as fever, shortness of breath, worsening cough or other concerning symptoms, return for reevaluation.

## 2020-12-12 NOTE — ED PROVIDER NOTES
History     Chief Complaint   Patient presents with     Back Pain     upper back pain     Shortness of Breath     positive COVID 11/12      Cough     HPI  Jacquie Harris is a 65 year old female who presents with mid to upper back pain and mild shortness of breath.    Jacquie developed Covid-19 back on November 11.  Her symptoms at that time were primarily headache and body aches.  The symptoms have improved.  She has had a dry cough.    In the last 2 days she has noticed onset of some aching in the mid to upper back, not necessarily worse with cough.  It varies somewhat with movement and is relieved a bit by leaning forward.  She has had a persistent primarily dry cough although there is some sputum production in the morning.  She is not having fevers or chills.  She has not noticed edema.  Her headache and body aches have resolved over 2 to 3 weeks from onset.        Allergies:  Allergies   Allergen Reactions     Ibuprofen      Other reaction(s): Gastrointestinal  Gastric bleed     Naproxen GI Disturbance     Other reaction(s): Gastrointestinal  Gastric bleed     Nickel Itching     Inflamed with cheap earrings (itchy)     Rofecoxib Itching and Swelling     VIOXX (Swollen Feet,Hands itching), Okay with ibuprofen and aspirin     Vioxx Itching and Swelling     VIOXX (Swollen Feet,Hands itching), Okay with ibuprofen and aspirin       Problem List:    Patient Active Problem List    Diagnosis Date Noted     Benign essential hypertension 06/19/2020     Priority: Medium     Rotator cuff syndrome, right 01/14/2020     Priority: Medium     Multiple joint pain, possible fibromyalgia 12/20/2019     Priority: Medium     Alpha-1-antitrypsin deficiency (H) 05/07/2019     Priority: Medium     Bronchospasm 01/07/2019     Priority: Medium     GI bleed 03/17/2018     Priority: Medium     Upper GI bleed 03/17/2018     Priority: Medium     Shoulder injury, right, subsequent encounter 08/21/2017     Priority: Medium     Fatty liver  04/20/2017     Priority: Medium     Elevated levels of transaminase & lactic acid dehydrogenase 04/15/2017     Priority: Medium     Bilateral cold feet 01/03/2017     Priority: Medium     Colles' fracture 07/06/2015     Priority: Medium     Encounter for routine gynecological examination  06/04/2015     Priority: Medium     Back pain, left below scapula, strained muscles 10/24/2013     Priority: Medium     HTN, goal below 140/80 05/05/2013     Priority: Medium     S/P TKR (total knee replacement) 04/03/2012     Priority: Medium     Osteoporosis 02/19/2012     Priority: Medium     Previous T scores -2.2  2/2012 Dexascan:  Lumbar spine L1-L4 T-score: -2.4, Left femoral neck T-score: -2.7, Right femoral neck T-score: -2.2   Percent change in lumbar spine: +6.6% since 4/16/2008   Percent change in femurs: +2.9% since 4/16/2008    IMPRESSION: Osteoporosis in the left femoral neck. Severe osteopenia  in the right femoral neck and lumbar spine.       Obesity, Class III, BMI 40-49.9 (morbid obesity) (H) 02/19/2012     Priority: Medium     ideal weight 120, goal weight 200, lose 58 lbs in 58 weeks.       Vitamin D deficiency 07/18/2011     Priority: Medium     Tick bite, infected, positive Lyme test 1996 not treated 07/08/2011     Priority: Medium     Rosacea 11/04/2009     Priority: Medium     Obstructive sleep apnea 12/01/2006     Priority: Medium     Sleep study 12/06 for EDS- AHI 36, desaturations to 65%. CPAP recommended but not tolerated.  01/16/07  ENT consult recommend CPAP and weight loss. Surgery discussed but success rate less than CPAP  1/15/07 CPAP trial, was not able to tolerate. Retried 2008, tolerated better.   Problem list name updated by automated process. Provider to review       Carpal tunnel syndrome 06/07/2006     Priority: Medium     04/10/09 Dr.Meletiou R[facility]on [facility]. Finding consistant with CTS, but nerve studies and MRI of C-Spine are normal. Corticosteroid injection given in office        CARDIOVASCULAR SCREENING; LDL GOAL LESS THAN 160 10/31/2010     Priority: Low     Allergic rhinitis 03/27/2006     Priority: Low     Problem list name updated by automated process. Provider to review       s/p gastric bypass x 2 07/05/2005     Priority: Low     Gastric bypass 1980          Past Medical History:    Past Medical History:   Diagnosis Date     HTN (hypertension)      IRON DEFICIENCY ANEMIA 7/5/2005     DARWIN (obstructive sleep apnea)        Past Surgical History:    Past Surgical History:   Procedure Laterality Date     ARTHROPLASTY KNEE  4/2/2012    Procedure:ARTHROPLASTY KNEE; Left Total Knee Arthroplasty--Anesth.Choice; Surgeon:VICENTE THOMPSON; Location:[facility]     ARTHROTOMY SHOULDER, ROTATOR CUFF REPAIR, COMBINED Right 8/25/2017    Procedure: COMBINED ARTHROTOMY SHOULDER, ROTATOR CUFF REPAIR;  Right shoulder distal clavicle excision, subacromial decompression, rotator cuff repair ;  Surgeon: Vicente Thompson MD;  Location: [facility]     ARTHROTOMY SHOULDER, ROTATOR CUFF REPAIR, COMBINED Right 12/18/2017    Procedure: COMBINED ARTHROTOMY SHOULDER, ROTATOR CUFF REPAIR;  Right Shoulder Rotator Cuff Revision;  Surgeon: Vicente Thompson MD;  Location: [facility]     ARTHROTOMY SHOULDER, ROTATOR CUFF REPAIR, COMBINED Left 4/27/2018    Procedure: COMBINED ARTHROTOMY SHOULDER, ROTATOR CUFF REPAIR;  Left Shoulder Open Subacromial Decompression,Distal Clavicle Excision,Rotator Cuff Repair;  Surgeon: Vicente Thompson MD;  Location: [facility]     C ORAL SURGERY PROCEDURE  age 18    wisdom teeth     C/SECTION, LOW TRANSVERSE  1978, 1984, 1994    x 3     COLONOSCOPY  2001    normal colonoscopy     COLONOSCOPY N/A 8/22/2017    Procedure: COLONOSCOPY;  Colonoscopy  ;  Surgeon: Delfino Yoo MD;  Location: [facility]     ESOPHAGOSCOPY, GASTROSCOPY, DUODENOSCOPY (EGD), COMBINED N/A 3/18/2018    Procedure: COMBINED ESOPHAGOSCOPY, GASTROSCOPY, DUODENOSCOPY (EGD);;  Surgeon: Poncho Galindo MD;  Location: [facility]      GASTRIC BYPASS  1980/2005    Gastric Bypass and revision     HERNIA REPAIR, INCISIONAL  6/16/2008    lap.repair with 10x8 mesh     TUBAL LIGATION  1994       Family History:    Family History   Problem Relation Age of Onset     C.A.D. Father         MI at age 60     Hypertension Father      Alzheimer Disease Father      Hyperlipidemia Father      Osteoporosis Mother         on Fosamax     Glaucoma Mother      Other Cancer Brother      Stomach Cancer Maternal Aunt      Cervical Cancer Cousin      Diabetes No family hx of      Cerebrovascular Disease No family hx of      Breast Cancer No family hx of      Cancer - colorectal No family hx of      Prostate Cancer No family hx of      Liver Disease No family hx of        Social History:  Marital Status:   [2]  Social History     Tobacco Use     Smoking status: Never Smoker     Smokeless tobacco: Never Used   Substance Use Topics     Alcohol use: Yes     Alcohol/week: 0.0 standard drinks     Comment: mixed very light 1 a month if that     Drug use: No        Medications:         albuterol (PROAIR HFA/PROVENTIL HFA/VENTOLIN HFA) 108 (90 Base) MCG/ACT inhaler       azithromycin (ZITHROMAX) 500 MG tablet       acetaminophen (TYLENOL) 500 MG tablet       atenolol (TENORMIN) 50 MG tablet       hydrOXYzine (VISTARIL) 25 MG capsule       nystatin (MYCOSTATIN) 378048 UNIT/GM POWD       nystatin (MYCOSTATIN) cream       oxybutynin ER (DITROPAN-XL) 5 MG 24 hr tablet          Review of Systems   Constitutional: Negative for chills and fever.   HENT: Negative for congestion and sore throat.    Eyes: Negative.    Respiratory: Positive for shortness of breath.    Cardiovascular: Negative for chest pain.   Gastrointestinal: Negative for nausea and vomiting.   Genitourinary: Negative.    Musculoskeletal: Positive for back pain.   Skin: Negative for rash.   Neurological: Negative for dizziness and weakness.   Hematological: Negative.    Psychiatric/Behavioral: Negative.         Physical Exam   BP: (!) 171/83  Pulse: 71  Temp: 98.3  F (36.8  C)  Resp: 16  Weight: 114.3 kg (252 lb)  SpO2: 96 %      Physical Exam  Constitutional:       General: She is not in acute distress.     Appearance: She is well-developed.   HENT:      Head: Normocephalic.      Mouth/Throat:      Pharynx: No pharyngeal swelling.   Eyes:      Pupils: Pupils are equal, round, and reactive to light.   Neck:      Thyroid: No thyromegaly.   Cardiovascular:      Rate and Rhythm: Normal rate and regular rhythm.      Heart sounds: No murmur.   Pulmonary:      Effort: Pulmonary effort is normal. No respiratory distress.      Breath sounds: Normal breath sounds.   Abdominal:      Palpations: Abdomen is soft.      Tenderness: There is no abdominal tenderness.   Musculoskeletal:      Right lower leg: No edema.      Comments: Tenderness over mid to upper back, primarily in midline.   Skin:     General: Skin is warm and dry.   Neurological:      General: No focal deficit present.      Mental Status: She is alert.   Psychiatric:         Mood and Affect: Mood normal.         ED Course        Procedures               Critical Care time:  none               Results for orders placed or performed during the hospital encounter of 12/12/20 (from the past 24 hour(s))   CBC with platelets differential   Result Value Ref Range    WBC 6.1 4.0 - 11.0 10e9/L    RBC Count 5.18 3.8 - 5.2 10e12/L    Hemoglobin 13.4 11.7 - 15.7 g/dL    Hematocrit 44.7 35.0 - 47.0 %    MCV 86 78 - 100 fl    MCH 25.9 (L) 26.5 - 33.0 pg    MCHC 30.0 (L) 31.5 - 36.5 g/dL    RDW 14.9 10.0 - 15.0 %    Platelet Count 257 150 - 450 10e9/L    Diff Method Automated Method     % Neutrophils 50.1 %    % Lymphocytes 36.7 %    % Monocytes 9.9 %    % Eosinophils 2.8 %    % Basophils 0.3 %    % Immature Granulocytes 0.2 %    Nucleated RBCs 0 0 /100    Absolute Neutrophil 3.0 1.6 - 8.3 10e9/L    Absolute Lymphocytes 2.2 0.8 - 5.3 10e9/L    Absolute Monocytes 0.6 0.0 - 1.3  10e9/L    Absolute Eosinophils 0.2 0.0 - 0.7 10e9/L    Absolute Basophils 0.0 0.0 - 0.2 10e9/L    Abs Immature Granulocytes 0.0 0 - 0.4 10e9/L    Absolute Nucleated RBC 0.0    D dimer quantitative   Result Value Ref Range    D Dimer 0.7 (H) 0.0 - 0.50 ug/ml FEU   Basic metabolic panel   Result Value Ref Range    Sodium 139 133 - 144 mmol/L    Potassium 4.4 3.4 - 5.3 mmol/L    Chloride 106 94 - 109 mmol/L    Carbon Dioxide 31 20 - 32 mmol/L    Anion Gap 2 (L) 3 - 14 mmol/L    Glucose 106 (H) 70 - 99 mg/dL    Urea Nitrogen 13 7 - 30 mg/dL    Creatinine 0.70 0.52 - 1.04 mg/dL    GFR Estimate 90 >60 mL/min/[1.73_m2]    GFR Estimate If Black >90 >60 mL/min/[1.73_m2]    Calcium 8.9 8.5 - 10.1 mg/dL   XR Chest 2 Views    Narrative    XR CHEST TWO VIEWS   12/12/2020 10:57 AM     HISTORY: Shortness of breath, back pain, rib pain.    COMPARISON: 7/5/2020.      Impression    IMPRESSION: New patchy opacification at the left lung base and  minimally at the right costophrenic angle could be multifocal airspace  disease. Correlate with pneumonia. Normal cardiac silhouette. Mildly  hypoinflated lungs. No effusions.    PATY SANDOVAL MD   CT Chest Pulmonary Embolism w Contrast    Narrative    CT CHEST PULMONARY EMBOLISM WITH CONTRAST 12/12/2020 1:35 PM    CLINICAL HISTORY: Pulmonary embolism suspected, intermediate prob,  positive D-dimer.    TECHNIQUE: CT angiogram chest during arterial phase injection IV  contrast. 2D and 3D MIP reconstructions were performed by the CT  technologist. Dose reduction techniques were used.     CONTRAST: 86 mL Isovue 370    COMPARISON: CT chest 9/26/2013.    FINDINGS:  ANGIOGRAM CHEST: Pulmonary arteries are normal caliber and negative  for pulmonary emboli. Thoracic aorta is negative for dissection.  Coronary artery calcifications.    LUNGS AND PLEURA: Patchy mild nodular irregular opacities at the right  lung base, for example series 7 image 129 and on adjacent images.  Another region is 1.8 cm  posterior right lower lobe series 7 image  171. Mild left base atelectasis. No effusion.    MEDIASTINUM/AXILLAE: Stable lymph nodes in the mediastinum. No acute  abnormality otherwise seen.    UPPER ABDOMEN: No acute upper abdominal abnormality. Status post  cholecystectomy.    MUSCULOSKELETAL: No acute abnormality.      Impression    IMPRESSION:  1.  Patchy irregular nodular opacities at the right lung base may be  from an atypical infectious etiology such as possible COVID-19  pneumonia. These opacities are nonspecific. Recommend short interval  follow-up CT chest in 3 months to ensure resolution.  2.  No evidence for pulmonary embolism.  3.  Coronary artery calcifications.    PATY SANDOVAL MD       Medications   iopamidol (ISOVUE-370) solution 86 mL (86 mLs Intravenous Given 12/12/20 1249)   sodium chloride 0.9 % bag 500mL for CT scan flush use (100 mLs Intravenous Given 12/12/20 1249)       Assessments & Plan (with Medical Decision Making)       This patient was worked up for cough and S OB and mid to upper back pain.    Initial lab work showed an elevated D-dimer and chest x-ray showed some faint infiltrate.  A CT study was therefore done.  There was no sign of PE but she does have a right basilar infiltrate.  It was felt that this could possibly represent some pneumonitis and be related to her back pain.  Even persistent coughing could create some of this pain.    Antibiotics were therefore begun.  I encouraged her to try an inhaler as she has used one in the past.  She avoids NSAID medication so Tylenol was also advised.  She is agreeable with these plans.    Indications for return to emergency room were also discussed.      I have reviewed the nursing notes.    I have reviewed the findings, diagnosis, plan and need for follow up with the patient.       Discharge Medication List as of 12/12/2020  2:17 PM      START taking these medications    Details   albuterol (PROAIR HFA/PROVENTIL HFA/VENTOLIN HFA) 108 (90  Base) MCG/ACT inhaler Inhale 2 puffs into the lungs every 4 hours as needed for shortness of breath / dyspnea or wheezing, Disp-1 Inhaler, R-0, E-PrescribePharmacy may dispense brand covered by insurance (Proair, or proventil or ventolin or generic albuterol inhaler)      azithromycin (ZITHROMAX) 500 MG tablet Take 1 tablet (500 mg) by mouth daily for 5 days, Disp-5 tablet, R-0, E-Prescribe             Final diagnoses:   Right lower lobe pulmonary infiltrate   Cough   Upper back pain   History of 2019 novel coronavirus disease (COVID-19)       12/12/2020   [facility] EMERGENCY DEPT     Reggie Hernandez MD  12/12/20 1937

## 2020-12-12 NOTE — ED TRIAGE NOTES
Pt has DX of COVID 11/11 still has cough and SOB, however now her upper back is painful for the past 2 days that seems to be getting worse

## 2020-12-17 ENCOUNTER — NURSE TRIAGE (OUTPATIENT)
Dept: NURSING | Facility: CLINIC | Age: 65
End: 2020-12-17

## 2020-12-17 NOTE — TELEPHONE ENCOUNTER
" [facility] is calling and states that Karen tested positive for covid 19 on November 11th and went to ER on 12/12/2020 and was given antibiotics and an inhaler and was starting to get better.  Now for the last two days pain is worse in the middle of her back and breathing is worse also, having shortness of breath and is still coughing.  Walking is minimal due to knee surgery.  Today the major symptom is pain in back across bra strap area and then up.  Pain is worse on left side.  Temp last night temporal 101.  Pain is \"6 or 7\".  Karen is thinking that she may need an xray and is requesting a virtual visit.    COVID 19 Nurse Triage Plan/Patient Instructions    Please be aware that novel coronavirus (COVID-19) may be circulating in the community. If you develop symptoms such as fever, cough, or SOB or if you have concerns about the presence of another infection including coronavirus (COVID-19), please contact your health care provider or visit [email or web address].     Disposition/Instructions    Virtual Visit with provider recommended. Reference Visit Selection Guide.    Thank you for taking steps to prevent the spread of this virus.  o Limit your contact with others.  o Wear a simple mask to cover your cough.  o Wash your hands well and often.    Resources    [facility]: About COVID-19: [email or web address]    CDC: What to Do If You're Sick: www.cdc.gov/coronavirus/2019-ncov/about/steps-when-sick.html    CDC: Ending Home Isolation: www.cdc.gov/coronavirus/2019-ncov/hcp/disposition-in-home-patients.html     CDC: Caring for Someone: www.cdc.gov/coronavirus/2019-ncov/if-you-are-sick/care-for-someone.html     [facility]: Interim Guidance for Hospital Discharge to Home: www.health.[facility].mn.us/diseases/coronavirus/hcp/hospdischarge.pdf    [facility] clinical trials (COVID-19 research studies): [email or web address]/umn-clinical-trials     Below are the COVID-19 hotlines at the [facility] of " [facility] ([facility]). Interpreters are available.   o For health questions: Call [phone] or 1-543.324.4002 (7 a.m. to 7 p.m.)  o For questions about schools and childcare: Call 336-101-2353 or 1-156.629.6740 (7 a.m. to 7 p.m.)                       Additional Information    Negative: Passed out (i.e., fainted, collapsed and was not responding)    Negative: Shock suspected (e.g., cold/pale/clammy skin, too weak to stand, low BP, rapid pulse)    Negative: Sounds like a life-threatening emergency to the triager    Negative: SEVERE back pain of sudden onset and age > 60    Negative: SEVERE abdominal pain (e.g., excruciating)    Negative: Abdominal pain and age > 60    Negative: Unable to urinate (or only a few drops) and bladder feels very full    Negative: Loss of bladder or bowel control (urine or bowel incontinence; wetting self, leaking stool) of new onset    Negative: Numbness (loss of sensation) in groin or rectal area    Negative: Pain radiates into groin, scrotum    Negative: Blood in urine (red, pink, or tea-colored)    Negative: Vomiting and pain over lower ribs of back (i.e., flank - kidney area)    Negative: Weakness of a leg or foot (e.g., unable to bear weight, dragging foot)    Negative: Patient sounds very sick or weak to the triager    Negative: Fever > 100.5 F (38.1 C) and flank pain    Negative: Pain or burning with passing urine (urination)    SEVERE back pain (e.g., excruciating, unable to do any normal activities) and not improved after pain medicine and CARE ADVICE    Protocols used: BACK PAIN-A-OH

## 2020-12-18 ENCOUNTER — VIRTUAL VISIT (OUTPATIENT)
Dept: FAMILY MEDICINE | Facility: CLINIC | Age: 65
End: 2020-12-18
Payer: COMMERCIAL

## 2020-12-18 DIAGNOSIS — M54.9 UPPER BACK PAIN: Primary | ICD-10-CM

## 2020-12-18 DIAGNOSIS — J18.9 PNEUMONIA OF RIGHT UPPER LOBE DUE TO INFECTIOUS ORGANISM: ICD-10-CM

## 2020-12-18 PROCEDURE — 99213 OFFICE O/P EST LOW 20 MIN: CPT | Mod: 95 | Performed by: FAMILY MEDICINE

## 2020-12-18 NOTE — PROGRESS NOTES
"Jacquie Harris is a 65 year old female who is being evaluated via a billable telephone visit.      The patient has been notified of following:     \"This telephone visit will be conducted via a call between you and your physician/provider. We have found that certain health care needs can be provided without the need for a physical exam.  This service lets us provide the care you need with a short phone conversation.  If a prescription is necessary we can send it directly to your pharmacy.  If lab work is needed we can place an order for that and you can then stop by our lab to have the test done at a later time.    Telephone visits are billed at different rates depending on your insurance coverage. During this emergency period, for some insurers they may be billed the same as an in-person visit.  Please reach out to your insurance provider with any questions.    If during the course of the call the physician/provider feels a telephone visit is not appropriate, you will not be charged for this service.\"    Patient has given verbal consent for Telephone visit?  Yes    What phone number would you like to be contacted at? [phone]    How would you like to obtain your AVS? Mail a copy    Subjective     Jacquie Harris is a 65 year old female who presents via phone visit today for the following health issues:    HPI        Patient comes in for an ER f/u. She was seen for upper mid back pain - was found to have a pnuemonia on CT and started on antibiotics. Of note she was diagnosed with COVID 19 about three weeks ago, still quite fatigued and still coughing. Shortness of breath has improved. She stated that back pain is improving. She is not experiencing tightness like she was and she has completed her antibiotics.   ED/UC Followup:    Facility:  [facility]   Date of visit: 12/12/2020  Reason for visit: Back pain, SOB- COVID + 11/11/2020.   Current Status: Patient states will get mid upper back pain in the " middle of the day and last all night.  Is a constant, tight band around chest.  Still coughing, some SOB.  Though states is starting to feel a little better. Has been trying to cough a little more to get things moving.  Has more tightness on left compared to right.  Feels pain in bra band area and has removed bra 7 times without getting relief. Cough isn't as sticky, though has color to it.  Still very tired and will sleep anytime sits down.  Also waking up at night.                Review of Systems   Constitutional, HEENT, cardiovascular, pulmonary, gi and gu systems are negative, except as otherwise noted.       Objective          Vitals:  No vitals were obtained today due to virtual visit.    healthy, alert and no distress  PSYCH: Alert and oriented times 3; coherent speech, normal   rate and volume, able to articulate logical thoughts, able   to abstract reason, no tangential thoughts, no hallucinations   or delusions  Her affect is normal  RESP: No cough, no audible wheezing, able to talk in full sentences  Remainder of exam unable to be completed due to telephone visits    No results found for this or any previous visit (from the past 24 hour(s)).        Assessment/Plan:    Assessment & Plan     Upper back pain  Patient reports that she is doing better.     Pneumonia of right upper lobe due to infectious organism  She has completed her antibiotics - recommend rest , increase in fluids.                FUTURE APPOINTMENTS:       - Follow-up visit in one month or sooner as needed.    Return in about 4 weeks (around 1/15/2021) for Follow up.    Jennie Suazo MD  [facility]    Phone call duration:  5 minutes

## 2020-12-28 DIAGNOSIS — I10 BENIGN ESSENTIAL HYPERTENSION: ICD-10-CM

## 2020-12-28 NOTE — TELEPHONE ENCOUNTER
"Requested Prescriptions   Pending Prescriptions Disp Refills     atenolol (TENORMIN) 50 MG tablet [Pharmacy Med Name: ATENOLOL  50MG  TAB] 90 tablet 3     Sig: TAKE 1 TABLET BY MOUTH  DAILY       Beta-Blockers Protocol Failed - 12/28/2020  5:43 AM        Failed - Blood pressure under 140/90 in past 12 months     BP Readings from Last 3 Encounters:   12/12/20 (!) 145/90   12/03/20 139/86   09/24/20 132/86                 Passed - Patient is age 6 or older        Passed - Recent (12 mo) or future (30 days) visit within the authorizing provider's specialty     Patient has had an office visit with the authorizing provider or a provider within the authorizing providers department within the previous 12 mos or has a future within next 30 days. See \"Patient Info\" tab in inbasket, or \"Choose Columns\" in Meds & Orders section of the refill encounter.              Passed - Medication is active on med list             "

## 2020-12-30 RX ORDER — ATENOLOL 50 MG/1
TABLET ORAL
Qty: 90 TABLET | Refills: 3 | Status: SHIPPED | OUTPATIENT
Start: 2020-12-30 | End: 2022-01-07

## 2021-01-01 NOTE — NURSING NOTE
oxyCODONE-acetaminophen (PERCOCET) 5-325 MG tablet       Last Written Prescription Date:  2/04/20  Last Fill Quantity: 90,   # refills: 0  Last Office Visit: 1/08/20  Future Office visit:       Routing refill request to provider for review/approval because:  Drug not on the [facility], [facility] or [facility] refill protocol or controlled substance     "Chief Complaint   Patient presents with     lipoma     patient would like a ref for removal of lipoma       Initial /76 (BP Location: Right arm, Cuff Size: Adult Regular)  Pulse 61  Temp 98.6  F (37  C) (Tympanic)  Ht 5' 3\" (1.6 m)  Wt 237 lb (107.5 kg)  LMP 05/29/2006  BMI 41.98 kg/m2 Estimated body mass index is 41.98 kg/(m^2) as calculated from the following:    Height as of this encounter: 5' 3\" (1.6 m).    Weight as of this encounter: 237 lb (107.5 kg).  Medication Reconciliation: complete  " 0

## 2021-02-22 ENCOUNTER — OFFICE VISIT (OUTPATIENT)
Dept: FAMILY MEDICINE | Facility: CLINIC | Age: 66
End: 2021-02-22
Payer: COMMERCIAL

## 2021-02-22 ENCOUNTER — ANCILLARY PROCEDURE (OUTPATIENT)
Dept: GENERAL RADIOLOGY | Facility: CLINIC | Age: 66
End: 2021-02-22
Attending: FAMILY MEDICINE
Payer: COMMERCIAL

## 2021-02-22 VITALS
HEART RATE: 63 BPM | SYSTOLIC BLOOD PRESSURE: 130 MMHG | DIASTOLIC BLOOD PRESSURE: 80 MMHG | TEMPERATURE: 98.2 F | OXYGEN SATURATION: 96 % | RESPIRATION RATE: 14 BRPM

## 2021-02-22 DIAGNOSIS — R07.81 RIB PAIN: ICD-10-CM

## 2021-02-22 DIAGNOSIS — R07.81 RIB PAIN: Primary | ICD-10-CM

## 2021-02-22 PROCEDURE — 99213 OFFICE O/P EST LOW 20 MIN: CPT | Performed by: FAMILY MEDICINE

## 2021-02-22 PROCEDURE — 71110 X-RAY EXAM RIBS BIL 3 VIEWS: CPT | Mod: 52 | Performed by: RADIOLOGY

## 2021-02-22 RX ORDER — TIZANIDINE 2 MG/1
2 TABLET ORAL 3 TIMES DAILY
Qty: 90 TABLET | Refills: 0 | Status: SHIPPED | OUTPATIENT
Start: 2021-02-22 | End: 2021-05-23

## 2021-02-22 ASSESSMENT — ENCOUNTER SYMPTOMS
MYALGIAS: 0
PSYCHIATRIC NEGATIVE: 1
BACK PAIN: 1
GASTROINTESTINAL NEGATIVE: 1
RESPIRATORY NEGATIVE: 1
HEMATOLOGIC/LYMPHATIC NEGATIVE: 1
CARDIOVASCULAR NEGATIVE: 1
CONSTITUTIONAL NEGATIVE: 1
ARTHRALGIAS: 1

## 2021-02-22 NOTE — PATIENT INSTRUCTIONS
Patient Education     Rib Bruise   A rib bruise (contusion) can affect one or more rib bones. It may cause pain, tenderness, swelling, and a purplish discoloration. There may be a sharp pain while breathing.   You will be assessed for other injuries. You will likely be given pain medicine. Bruised ribs heal on their own, without further treatment. But the pain may take weeks to months to go away.    Note that a small crack (fracture) in the rib may cause the same symptoms as a bruised rib. The small crack may not be seen on a chest X-ray. But the conditions are managed in the same way.   Home care    Rest. Don't do heavy lifting, strenuous exertion, or any activity that causes pain.    Ice the area to reduce pain and swelling. Put ice cubes in a plastic bag or use a cold pack. Wrap the cold source in a thin towel. Don't place it directly on your skin. Ice the injured area for 20 minutes every 1 to 2 hours the first day. Continue with ice packs 3 to 4 times a day for the next 2 days, then as needed for the relief of pain and swelling.    Take any prescribed pain medicine as directed by your healthcare provider. If none was prescribed, take acetaminophen, ibuprofen, or naproxen to control pain.    If you have a significant injury, you may be given a device called an incentive spirometer to keep your lungs healthy. Use as directed.    Follow-up care  Follow up with your healthcare provider, or as advised.   When to seek medical advice  Call your healthcare provider for any of the following:     Increasing chest pain with breathing    Coughing    New or worsening pain    Fever of 100.4 F (38 C) or higher, or as directed by your healthcare provider  Call 911  Call 911, or get medical care right away if any of the following occur:     Shortness of breath or trouble breathing    Dizziness, weakness, or fainting  [employer] last reviewed this educational content on 8/1/2019 2000-2020 [employer] [address]  [place] [address]. All rights reserved. This information is not intended as a substitute for professional medical care. Always follow your healthcare professional's instructions.

## 2021-02-22 NOTE — PROGRESS NOTES
Assessment & Plan     Rib pain  X-rays appear normal. Muscle relaxant was prescribed.  - XR Ribs Bilateral 2 Views; Future  - tiZANidine (ZANAFLEX) 2 MG tablet; Take 1 tablet (2 mg) by mouth 3 times daily      FUTURE APPOINTMENTS:       - Follow-up visit in one month or sooner as needed.    Return in about 4 weeks (around 3/22/2021) for Follow up.    Jennie Suazo MD  [facility] 65 year [facility] who presents for the following health issues     HPI   Patient is a 65-year-old female presenting to the clinic today for back pain.  She was seen at her chiropractor on Friday and had some adjustment on her back for her mid back to low back pain.  She said since then she has had pain in her mid back to low back and also in both ribs.      It hurts to breathe.  She feels like the manipulation may have caused the symptoms.  She took some of her 's muscle relaxants which helped.  No radiation to the lower extremities.  Severity is about 8 out of 10 and worsens with movement.Has a history of back pain.     Back Pain  Onset/Duration: 4 days  Description:   Location of pain: middle of back mid to upper back   Character of pain: sharp  Pain radiation: none  New numbness or weakness in legs, not attributed to pain: no   Intensity: Currently 8/10, up to 10 if mobile  Progression of Symptoms: improved yesterday after taking 's dose of tizanadine  History:   Specific cause: Chiropractic adjustment  Pain interferes with job: not applicable  History of back problems: no prior back problems  Any previous MRI or X-rays: Yes--at Chiropractic office.  Date 2/196/21 at [facility]  Sees a specialist for back pain: No  Alleviating factors:   Improved by: tizanadine and rest    Precipitating factors:  Worsened by: moving  Therapies tried and outcome: tizanadine, ice, heat, tylenol, lidocaine patch    Accompanying Signs & Symptoms:  Risk of Fracture: None  Risk of Cauda Equina:  None  Risk of Infection: None  Risk of Cancer: None  Risk of Ankylosing Spondylitis: Onset at age <35, male, AND morning back stiffness  no           Review of Systems   Constitutional: Negative.    HENT: Negative.    Respiratory: Negative.    Cardiovascular: Negative.    Gastrointestinal: Negative.    Genitourinary: Negative.    Musculoskeletal: Positive for arthralgias and back pain. Negative for myalgias.   Hematological: Negative.    Psychiatric/Behavioral: Negative.             Objective    /80   Pulse 63   Temp 98.2  F (36.8  C) (Tympanic)   Resp 14   LMP 05/29/2006   SpO2 96%   There is no height or weight on file to calculate BMI.  Physical Exam  Constitutional:       Appearance: Normal appearance. She is obese.   HENT:      Head: Normocephalic and atraumatic.   Chest:      Chest wall: Tenderness present.          Comments: Pain beneath her breasts wraps around ribs   Musculoskeletal:         General: Tenderness present.   Skin:     General: Skin is warm and dry.   Neurological:      Mental Status: She is alert and oriented to person, place, and time.   Psychiatric:         Mood and Affect: Mood normal.         Behavior: Behavior normal.         No results found for this or any previous visit (from the past 24 hour(s)).        x-rays  IMPRESSION: No rib fracture is identified.

## 2021-03-21 ENCOUNTER — HEALTH MAINTENANCE LETTER (OUTPATIENT)
Age: 66
End: 2021-03-21

## 2021-05-28 ENCOUNTER — RECORDS - HEALTHEAST (OUTPATIENT)
Dept: ADMINISTRATIVE | Facility: CLINIC | Age: 66
End: 2021-05-28

## 2021-05-29 ENCOUNTER — RECORDS - HEALTHEAST (OUTPATIENT)
Dept: ADMINISTRATIVE | Facility: CLINIC | Age: 66
End: 2021-05-29

## 2021-07-07 ENCOUNTER — OFFICE VISIT (OUTPATIENT)
Dept: FAMILY MEDICINE | Facility: OTHER | Age: 66
End: 2021-07-07
Attending: NURSE PRACTITIONER
Payer: COMMERCIAL

## 2021-07-07 VITALS
BODY MASS INDEX: 47.15 KG/M2 | WEIGHT: 268.3 LBS | RESPIRATION RATE: 18 BRPM | TEMPERATURE: 98.4 F | OXYGEN SATURATION: 98 % | DIASTOLIC BLOOD PRESSURE: 88 MMHG | HEART RATE: 73 BPM | SYSTOLIC BLOOD PRESSURE: 138 MMHG

## 2021-07-07 DIAGNOSIS — N89.8 ITCHING IN THE VAGINAL AREA: ICD-10-CM

## 2021-07-07 DIAGNOSIS — M79.642 PAIN IN BOTH HANDS: ICD-10-CM

## 2021-07-07 DIAGNOSIS — M79.641 PAIN IN BOTH HANDS: ICD-10-CM

## 2021-07-07 DIAGNOSIS — N39.9 URINARY PROBLEM IN FEMALE: Primary | ICD-10-CM

## 2021-07-07 LAB
ALBUMIN UR-MCNC: NEGATIVE MG/DL
APPEARANCE UR: CLEAR
BILIRUB UR QL STRIP: NEGATIVE
COLOR UR AUTO: ABNORMAL
GLUCOSE UR STRIP-MCNC: NEGATIVE MG/DL
HGB UR QL STRIP: NEGATIVE
KETONES UR STRIP-MCNC: NEGATIVE MG/DL
LEUKOCYTE ESTERASE UR QL STRIP: ABNORMAL
MUCOUS THREADS #/AREA URNS LPF: PRESENT /LPF
NITRATE UR QL: NEGATIVE
PH UR STRIP: 6 PH (ref 5–7)
RBC #/AREA URNS AUTO: 1 /HPF (ref 0–2)
SOURCE: ABNORMAL
SP GR UR STRIP: 1.02 (ref 1–1.03)
UROBILINOGEN UR STRIP-MCNC: NORMAL MG/DL (ref 0–2)
WBC #/AREA URNS AUTO: 4 /HPF (ref 0–5)

## 2021-07-07 PROCEDURE — G0463 HOSPITAL OUTPT CLINIC VISIT: HCPCS

## 2021-07-07 PROCEDURE — 81001 URINALYSIS AUTO W/SCOPE: CPT | Mod: ZL | Performed by: NURSE PRACTITIONER

## 2021-07-07 PROCEDURE — 99203 OFFICE O/P NEW LOW 30 MIN: CPT | Performed by: NURSE PRACTITIONER

## 2021-07-07 RX ORDER — CLOBETASOL PROPIONATE 0.5 MG/G
CREAM TOPICAL 2 TIMES DAILY
Qty: 15 G | Refills: 0 | Status: SHIPPED | OUTPATIENT
Start: 2021-07-07 | End: 2021-07-21

## 2021-07-07 ASSESSMENT — PAIN SCALES - GENERAL: PAINLEVEL: MILD PAIN (3)

## 2021-07-07 NOTE — PATIENT INSTRUCTIONS
No concern of vaginal infection of yeast.     External vaginal area is red and irritated. Apply topical Clobetasol to the area twice a day for 2 weeks, follow up if no improvement or worsening symptoms.   UA negative for urinary tract infection today. No oral antibiotics indicated.

## 2021-07-07 NOTE — NURSING NOTE
"Chief Complaint   Patient presents with     Finger     Urinary Problem     Vaginal itchiness has been going on for about 2-3 weeks. She has been having pain in the joints in of her fingers the last 1-2 weeks. She has had pain in the left pinky finger for about a year.     Initial LMP 05/29/2006  Estimated body mass index is 44.29 kg/m  as calculated from the following:    Height as of 9/24/20: 1.607 m (5' 3.25\").    Weight as of 12/12/20: 114.3 kg (252 lb).     FOOD SECURITY SCREENING QUESTIONS  Hunger Vital Signs:  Within the past 12 months we worried whether our food would run out before we got money to buy more. Never  Within the past 12 months the food we bought just didn't last and we didn't have money to get more. Never      Medication Reconciliation: Complete      Torres Prakash LPN   "

## 2021-07-07 NOTE — PROGRESS NOTES
ASSESSMENT/PLAN:    I have reviewed the nursing notes.  I have reviewed the findings, diagnosis, plan and need for follow up with the patient.    1. Urinary problem in female  - UA reflex to Microscopic and Culture  -clear urine today, no urinary tract infection thus no antibiotics indicated    2. Itching in the vaginal area  Erythema and irritation is present on external genitalia   - clobetasol (TEMOVATE) 0.05 % external cream; Apply topically 2 times daily for 14 days  Dispense: 15 g; Refill: 0  -Do not use for greater than 2 weeks at a time, recommend following up if itch continues. Vaginal exam today did not reveal any further concerns that may be contributing to itch, reassured patient of this.     3. Bilateral small joint pain (fingers)  -Likely arthritic pain. Recommend taking 650 mg tylenol daily or several times a day if necessary. Follow up at home with PCP if further concerns of worsening Arthritis     Discussed warning signs/symptoms indicative of need to f/u    Follow up if symptoms persist or worsen or concerns    I explained my diagnostic considerations and recommendations to the patient, who voiced understanding and agreement with the treatment plan. All questions were answered. We discussed potential side effects of any prescribed or recommended therapies, as well as expectations for response to treatments.    Yaritza Lewis NP  7/7/2021  12:35 PM    HPI:  Jacquie Harris is a 66 year old female who presents to [facility] today for concern of vaginal itch. No discharge. Is camping in camper but able to shower as she normally would. Uses baby wipes. Does report some urinary urgency bu t is ongoing. No burning with urination or frequency of urination.   Has had very seldom issues vaginally.  She has no concerns for sexually transmitted diseases. No history of bacterial vaginosis.  Does not feel painful, mostly a lot of external vaginal itching, ongoing for 3 weeks. Has not tried over the counters.    Finger pain, thinks maybe worsening arthritis. No other joint pain, body aches, fevers/chills, headaches, myalgias. Does not take tylenol regularly.       Past Medical History:   Diagnosis Date     HTN (hypertension)      IRON DEFICIENCY ANEMIA 7/5/2005     Iron infusion/ resolved since injections     DARWIN (obstructive sleep apnea)      Past Surgical History:   Procedure Laterality Date     ARTHROPLASTY KNEE  4/2/2012    Procedure:ARTHROPLASTY KNEE; Left Total Knee Arthroplasty--Anesth.Choice; Surgeon:VICENTE THOMPSON; Location:[facility]     ARTHROTOMY SHOULDER, ROTATOR CUFF REPAIR, COMBINED Right 8/25/2017    Procedure: COMBINED ARTHROTOMY SHOULDER, ROTATOR CUFF REPAIR;  Right shoulder distal clavicle excision, subacromial decompression, rotator cuff repair ;  Surgeon: Vicente Thompson MD;  Location: [facility]     ARTHROTOMY SHOULDER, ROTATOR CUFF REPAIR, COMBINED Right 12/18/2017    Procedure: COMBINED ARTHROTOMY SHOULDER, ROTATOR CUFF REPAIR;  Right Shoulder Rotator Cuff Revision;  Surgeon: Vicente Thompson MD;  Location: [facility]     ARTHROTOMY SHOULDER, ROTATOR CUFF REPAIR, COMBINED Left 4/27/2018    Procedure: COMBINED ARTHROTOMY SHOULDER, ROTATOR CUFF REPAIR;  Left Shoulder Open Subacromial Decompression,Distal Clavicle Excision,Rotator Cuff Repair;  Surgeon: Vicente Thompson MD;  Location: [facility]     C ORAL SURGERY PROCEDURE  age 18    wisdom teeth     C/SECTION, LOW TRANSVERSE  1978, 1984, 1994    x 3     COLONOSCOPY  2001    normal colonoscopy     COLONOSCOPY N/A 8/22/2017    Procedure: COLONOSCOPY;  Colonoscopy  ;  Surgeon: Delfino Yoo MD;  Location: [facility]     ESOPHAGOSCOPY, GASTROSCOPY, DUODENOSCOPY (EGD), COMBINED N/A 3/18/2018    Procedure: COMBINED ESOPHAGOSCOPY, GASTROSCOPY, DUODENOSCOPY (EGD);;  Surgeon: Poncho Galindo MD;  Location: [facility]     GASTRIC BYPASS  1980/2005    Gastric Bypass and revision     HERNIA REPAIR, INCISIONAL  6/16/2008    lap.repair with 10x8 mesh     TUBAL  LIGATION  1994     Social History     Tobacco Use     Smoking status: Never Smoker     Smokeless tobacco: Never Used   Substance Use Topics     Alcohol use: Yes     Alcohol/week: 0.0 standard drinks     Comment: mixed very light 1 a month if that     Current Outpatient Medications   Medication Sig Dispense Refill     acetaminophen (TYLENOL) 500 MG tablet Take 1,000 mg by mouth       albuterol (PROAIR HFA/PROVENTIL HFA/VENTOLIN HFA) 108 (90 Base) MCG/ACT inhaler Inhale 2 puffs into the lungs every 4 hours as needed for shortness of breath / dyspnea or wheezing 1 Inhaler 0     atenolol (TENORMIN) 50 MG tablet TAKE 1 TABLET BY MOUTH  DAILY 90 tablet 3     clobetasol (TEMOVATE) 0.05 % external cream Apply topically 2 times daily for 14 days 15 g 0     nystatin (MYCOSTATIN) 860657 UNIT/GM POWD Apply topically 3 times daily as needed Refill at patient's request 30 g 1     nystatin (MYCOSTATIN) cream Apply topically 2 times daily Reported on 3/6/2017 30 g 11     oxybutynin ER (DITROPAN-XL) 5 MG 24 hr tablet TAKE 1 TABLET BY MOUTH  DAILY 90 tablet 1     Allergies   Allergen Reactions     Ibuprofen      Other reaction(s): Gastrointestinal  Gastric bleed     Naproxen GI Disturbance     Other reaction(s): Gastrointestinal  Gastric bleed     Nickel Itching     Inflamed with cheap earrings (itchy)     Rofecoxib Itching and Swelling     VIOXX (Swollen Feet,Hands itching), Okay with ibuprofen and aspirin     Vioxx Itching and Swelling     VIOXX (Swollen Feet,Hands itching), Okay with ibuprofen and aspirin     Past medical history, past surgical history, current medications and allergies reviewed and accurate to the best of my knowledge.      ROS:  Refer to HPI    /88   Pulse 73   Temp 98.4  F (36.9  C) (Tympanic)   Resp 18   Wt 121.7 kg (268 lb 4.8 oz)   LMP 05/29/2006   SpO2 98%   BMI 47.15 kg/m      EXAM:  General Appearance: Well appearing 66 year old female, appropriate appearance for age. No acute  distress  Respiratory: normal chest wall and respirations.  Normal effort.  Clear to auscultation bilaterally, no wheezing, crackles or rhonchi.  No increased work of breathing.  No cough appreciated.  Cardiac: RRR with no murmurs  Abdomen: soft, nontender, no rigidity, no rebound tenderness or guarding, normal bowel sounds present  :  No suprapubic tenderness to palpation.  No CVA tenderness to palpation.    Vaginal canal tissue is pink and moist without discharge, no yeast. Labia majora bilaterally and surrounding external genitalia is erythematous and dry.   Musculoskeletal:  Equal movement of bilateral upper extremities.  Equal movement of bilateral lower extremities.  Normal gait.  Bilateral hands; fifth fingers bilaterally have some bony joint changes consistent with arthritis at the distal interphalangeal joints. No erythema or swelling of the joints is observed.   Dermatological: no rashes noted of exposed skin  Psychological: normal affect, alert, oriented, and pleasant.

## 2021-07-13 ENCOUNTER — OFFICE VISIT (OUTPATIENT)
Dept: FAMILY MEDICINE | Facility: CLINIC | Age: 66
End: 2021-07-13
Payer: COMMERCIAL

## 2021-07-13 VITALS
HEART RATE: 75 BPM | BODY MASS INDEX: 46.78 KG/M2 | SYSTOLIC BLOOD PRESSURE: 134 MMHG | TEMPERATURE: 98.5 F | OXYGEN SATURATION: 98 % | WEIGHT: 264 LBS | DIASTOLIC BLOOD PRESSURE: 86 MMHG | RESPIRATION RATE: 18 BRPM | HEIGHT: 63 IN

## 2021-07-13 DIAGNOSIS — N39.9 URINARY PROBLEM IN FEMALE: ICD-10-CM

## 2021-07-13 DIAGNOSIS — M79.642 BILATERAL HAND PAIN: Primary | ICD-10-CM

## 2021-07-13 DIAGNOSIS — M79.641 BILATERAL HAND PAIN: Primary | ICD-10-CM

## 2021-07-13 LAB
CRP SERPL-MCNC: <2.9 MG/L (ref 0–8)
ERYTHROCYTE [SEDIMENTATION RATE] IN BLOOD BY WESTERGREN METHOD: 17 MM/HR (ref 0–30)

## 2021-07-13 PROCEDURE — 86140 C-REACTIVE PROTEIN: CPT | Performed by: FAMILY MEDICINE

## 2021-07-13 PROCEDURE — 85652 RBC SED RATE AUTOMATED: CPT | Performed by: FAMILY MEDICINE

## 2021-07-13 PROCEDURE — 86200 CCP ANTIBODY: CPT | Performed by: FAMILY MEDICINE

## 2021-07-13 PROCEDURE — 36415 COLL VENOUS BLD VENIPUNCTURE: CPT | Performed by: FAMILY MEDICINE

## 2021-07-13 PROCEDURE — 86431 RHEUMATOID FACTOR QUANT: CPT | Performed by: FAMILY MEDICINE

## 2021-07-13 PROCEDURE — 99214 OFFICE O/P EST MOD 30 MIN: CPT | Performed by: FAMILY MEDICINE

## 2021-07-13 ASSESSMENT — MIFFLIN-ST. JEOR: SCORE: 1705.59

## 2021-07-13 NOTE — LETTER
July 14, 2021      Jacquie Harris  [address] [place] [id number]        Dear ,    We are writing to inform you of your test results.    Your labs came back normal.     Resulted Orders   Rheumatoid factor   Result Value Ref Range    Rheumatoid Factor <7 <20 IU/mL   ESR: Erythrocyte sedimentation rate   Result Value Ref Range    Erythrocyte Sedimentation Rate 17 0 - 30 mm/hr   CRP, inflammation   Result Value Ref Range    CRP Inflammation <2.9 0.0 - 8.0 mg/L   Cyclic Citrullinated Peptide Antibody IgG   Result Value Ref Range    Cyclic Citrullinated Peptide Antibody IgG 1.2 U/mL       If you have any questions or concerns, please call the clinic at the number listed above.       Sincerely,      Jennie Suazo MD

## 2021-07-13 NOTE — PATIENT INSTRUCTIONS
Thank you for choosing [facility].  You may be receiving an email and/or telephone survey request from [facility] [employer] regarding your visit today.  Please take a few minutes to respond to the survey to let us know how we are doing.      If you have questions or concerns, please contact us via [employer] or you can contact your care team at [phone] option [phone].    Our Clinic hours are:  Monday - Thursday 7am-6pm  Friday 7am-5pm    The [place] outpatient lab hours are:  Monday - Friday 7am-4:30pm    Appointments are required, call [phone]    If you have clinical questions after hours or would like to schedule an appointment,  call the clinic at [phone].

## 2021-07-14 LAB
CCP AB SER IA-ACNC: 1.2 U/ML
RHEUMATOID FACT SER NEPH-ACNC: <7 IU/ML

## 2021-09-05 ENCOUNTER — HEALTH MAINTENANCE LETTER (OUTPATIENT)
Age: 66
End: 2021-09-05

## 2021-09-14 ENCOUNTER — IMMUNIZATION (OUTPATIENT)
Dept: FAMILY MEDICINE | Facility: CLINIC | Age: 66
End: 2021-09-14
Payer: COMMERCIAL

## 2021-09-14 PROCEDURE — G0008 ADMIN INFLUENZA VIRUS VAC: HCPCS

## 2021-09-14 PROCEDURE — 90662 IIV NO PRSV INCREASED AG IM: CPT

## 2021-10-18 ENCOUNTER — HOSPITAL ENCOUNTER (OUTPATIENT)
Dept: MAMMOGRAPHY | Facility: CLINIC | Age: 66
Discharge: HOME | End: 2021-10-18
Attending: FAMILY MEDICINE | Admitting: FAMILY MEDICINE
Payer: COMMERCIAL

## 2021-10-18 DIAGNOSIS — Z12.31 VISIT FOR SCREENING MAMMOGRAM: ICD-10-CM

## 2021-10-18 PROCEDURE — 77067 SCR MAMMO BI INCL CAD: CPT

## 2021-10-21 ENCOUNTER — TELEPHONE (OUTPATIENT)
Dept: FAMILY MEDICINE | Facility: CLINIC | Age: 66
End: 2021-10-21

## 2021-10-21 DIAGNOSIS — M79.641 BILATERAL HAND PAIN: Primary | ICD-10-CM

## 2021-10-21 DIAGNOSIS — M79.642 BILATERAL HAND PAIN: Primary | ICD-10-CM

## 2021-10-21 NOTE — TELEPHONE ENCOUNTER
Reason for call:    Symptom or request:     Patient called requesting a referral to a rheumatologist, she reports her finger continue to lock.      Best Time:  any    Can we leave a detailed message on this number?  YES     Varsha ABDI  Station

## 2021-10-22 NOTE — TELEPHONE ENCOUNTER
Dr Suazo,    Please see pt phone call msg.   Pt had visit with you 7/13 for bilat hand pain/stiffness. Pt has family hx RA.   Requesting referral to rheumatology  Do you want her to be seen in clinic first to discuss this?  Please advise.    Chandni Corona RN

## 2021-10-28 ENCOUNTER — ANCILLARY PROCEDURE (OUTPATIENT)
Dept: GENERAL RADIOLOGY | Facility: CLINIC | Age: 66
End: 2021-10-28
Attending: NURSE PRACTITIONER
Payer: COMMERCIAL

## 2021-10-28 ENCOUNTER — TELEPHONE (OUTPATIENT)
Dept: FAMILY MEDICINE | Facility: CLINIC | Age: 66
End: 2021-10-28

## 2021-10-28 ENCOUNTER — OFFICE VISIT (OUTPATIENT)
Dept: FAMILY MEDICINE | Facility: CLINIC | Age: 66
End: 2021-10-28
Payer: COMMERCIAL

## 2021-10-28 VITALS
DIASTOLIC BLOOD PRESSURE: 92 MMHG | HEART RATE: 70 BPM | OXYGEN SATURATION: 95 % | WEIGHT: 260 LBS | SYSTOLIC BLOOD PRESSURE: 144 MMHG | BODY MASS INDEX: 45.69 KG/M2 | TEMPERATURE: 98.7 F | RESPIRATION RATE: 12 BRPM

## 2021-10-28 DIAGNOSIS — J18.9 PNEUMONIA OF LOWER LOBE DUE TO INFECTIOUS ORGANISM, UNSPECIFIED LATERALITY: ICD-10-CM

## 2021-10-28 DIAGNOSIS — R05.9 COUGH: Primary | ICD-10-CM

## 2021-10-28 DIAGNOSIS — R05.9 COUGH: ICD-10-CM

## 2021-10-28 PROCEDURE — U0005 INFEC AGEN DETEC AMPLI PROBE: HCPCS | Performed by: NURSE PRACTITIONER

## 2021-10-28 PROCEDURE — 99214 OFFICE O/P EST MOD 30 MIN: CPT | Performed by: NURSE PRACTITIONER

## 2021-10-28 PROCEDURE — U0003 INFECTIOUS AGENT DETECTION BY NUCLEIC ACID (DNA OR RNA); SEVERE ACUTE RESPIRATORY SYNDROME CORONAVIRUS 2 (SARS-COV-2) (CORONAVIRUS DISEASE [COVID-19]), AMPLIFIED PROBE TECHNIQUE, MAKING USE OF HIGH THROUGHPUT TECHNOLOGIES AS DESCRIBED BY CMS-2020-01-R: HCPCS | Performed by: NURSE PRACTITIONER

## 2021-10-28 PROCEDURE — 71046 X-RAY EXAM CHEST 2 VIEWS: CPT | Performed by: RADIOLOGY

## 2021-10-28 RX ORDER — AZITHROMYCIN 250 MG/1
TABLET, FILM COATED ORAL
Qty: 6 TABLET | Refills: 0 | Status: SHIPPED | OUTPATIENT
Start: 2021-10-28 | End: 2021-11-02

## 2021-10-28 RX ORDER — ALBUTEROL SULFATE 90 UG/1
AEROSOL, METERED RESPIRATORY (INHALATION)
Qty: 25.5 G | Refills: 1 | Status: SHIPPED | OUTPATIENT
Start: 2021-10-28

## 2021-10-28 RX ORDER — ALBUTEROL SULFATE 90 UG/1
2 AEROSOL, METERED RESPIRATORY (INHALATION) EVERY 6 HOURS PRN
Qty: 18 G | Refills: 0 | Status: SHIPPED | OUTPATIENT
Start: 2021-10-28 | End: 2021-10-28

## 2021-10-28 NOTE — NURSING NOTE
"Initial BP (!) 144/92   Pulse 70   Temp 98.7  F (37.1  C) (Tympanic)   Resp 12   Wt 117.9 kg (260 lb)   LMP 05/29/2006   SpO2 95%   BMI 45.69 kg/m   Estimated body mass index is 45.69 kg/m  as calculated from the following:    Height as of 7/13/21: 1.607 m (5' 3.25\").    Weight as of this encounter: 117.9 kg (260 lb). .      "

## 2021-10-28 NOTE — PATIENT INSTRUCTIONS
Patient Education     When You Have Pneumonia  You have been diagnosed with pneumonia. This is a serious lung infection. Most cases of pneumonia are caused by bacteria. But viruses, mycoplasmas, and fungi can also cause pneumonia. So can inhaling certain chemicals. Pneumonia most often occurs in older adults, young children, and people with chronic health problems.   Home care    Take your medicine exactly as directed. Don t skip doses. Continue taking your antibiotics as directed until they are all gone, even if you start to feel better. This will prevent the pneumonia from coming back.    Drink at least 8 glasses of water daily, unless directed otherwise. This helps to loosen and thin lung secretions so that you can cough them up.    Use a cool-mist humidifier in your bedroom. Be sure to clean the humidifier daily.    Don t use medicines to suppress your cough unless your cough is dry, painful, or interferes with your sleep. Coughing up mucus is normal and helps you recover. You may use an expectorant if your healthcare provider says it s OK.    You can use warm compresses or a heating pad on the lowest setting to relieve chest discomfort. Use several times a day for  15 to 20 minutes at a time. To prevent injury to your skin, set the temperature to warm, not hot. Don t put the compress or pad directly on your skin. Make certain it has a cover or wrap it in a towel. This is to prevent skin burns.    Get plenty of rest until your fever, shortness of breath, and chest pain go away.    Plan to get a flu shot every year. The flu is a common cause of pneumonia. Getting a flu shot every year can help prevent both the flu and pneumonia.    Getting the pneumococcal vaccine  Talk with your healthcare provider about getting the pneumococcal vaccine. There are 2 different pneumonia vaccines. You may need to get both vaccines. Pneumococcal pneumonia is caused by bacteria that spread from person to person. It can cause minor  problems, such as ear infections. But it can also turn into life-threatening illnesses of the lungs (pneumonia), the covering of the brain and spinal cord (meningitis), and the blood (bacteremia).   Children under 2 years of age, adults over age 65, people with certain health conditions, and smokers are at the highest risk of pneumococcal disease. This vaccine can help prevent pneumococcal disease in both adults and children. Some people should not have the vaccine. Make sure to ask your healthcare provider if you should have the vaccine.    Follow-up care  Make a follow-up appointment, or as directed.   When to call your healthcare provider  Call your healthcare provider right away if you have any of the following:    Fever of 100.4 F ( 38 C) or higher, or as directed by your healthcare provider    Mucus from the lungs (sputum) that s yellow, green, bloody, or smells bad    A large amount of sputum    Vomiting    Symptoms that get worse  Call 911  Call 911 right away if you have any of the following:     Chest pain    Trouble breathing    Blue lips or fingernails  [employer] last reviewed this educational content on 6/1/2019 2000-2021 [employer] All rights reserved. This information is not intended as a substitute for professional medical care. Always follow your healthcare professional's instructions.

## 2021-10-28 NOTE — TELEPHONE ENCOUNTER
S-(situation): The patient has been ill for 7 days.    B-(background): the patient has been with grandchildren.    A-(assessment): The patient has had cough for about 7 days. The cold symptoms are not getting any better. The patient is congested and has coughing spells. The patient has productive cough at times. The patient states she can hear her self wheeze at times. The patient is SOA with exertion. The patient does state at the start of the illness she did have fevers. The patient is afebrile now.     R-(recommendations): Advised patient to be seen. The patient was scheduled.    Thank you    Mayda CHOPRA RN

## 2021-10-29 LAB — SARS-COV-2 RNA RESP QL NAA+PROBE: NEGATIVE

## 2021-11-10 ENCOUNTER — OFFICE VISIT (OUTPATIENT)
Dept: RHEUMATOLOGY | Facility: CLINIC | Age: 66
End: 2021-11-10
Attending: FAMILY MEDICINE
Payer: COMMERCIAL

## 2021-11-10 VITALS
BODY MASS INDEX: 48.04 KG/M2 | DIASTOLIC BLOOD PRESSURE: 83 MMHG | HEIGHT: 63 IN | HEART RATE: 71 BPM | WEIGHT: 271.1 LBS | SYSTOLIC BLOOD PRESSURE: 141 MMHG

## 2021-11-10 DIAGNOSIS — M79.641 BILATERAL HAND PAIN: ICD-10-CM

## 2021-11-10 DIAGNOSIS — M65.332 TRIGGER MIDDLE FINGER OF LEFT HAND: ICD-10-CM

## 2021-11-10 DIAGNOSIS — M25.561 CHRONIC PAIN OF BOTH KNEES: ICD-10-CM

## 2021-11-10 DIAGNOSIS — M19.90 INFLAMMATORY ARTHRITIS: Primary | ICD-10-CM

## 2021-11-10 DIAGNOSIS — M79.642 BILATERAL HAND PAIN: ICD-10-CM

## 2021-11-10 DIAGNOSIS — M25.562 CHRONIC PAIN OF BOTH KNEES: ICD-10-CM

## 2021-11-10 DIAGNOSIS — M25.531 RIGHT WRIST PAIN: ICD-10-CM

## 2021-11-10 DIAGNOSIS — G89.29 CHRONIC PAIN OF BOTH KNEES: ICD-10-CM

## 2021-11-10 LAB — ERYTHROCYTE [SEDIMENTATION RATE] IN BLOOD BY WESTERGREN METHOD: 15 MM/HR (ref 0–20)

## 2021-11-10 PROCEDURE — 86256 FLUORESCENT ANTIBODY TITER: CPT | Performed by: INTERNAL MEDICINE

## 2021-11-10 PROCEDURE — 86141 C-REACTIVE PROTEIN HS: CPT | Performed by: INTERNAL MEDICINE

## 2021-11-10 PROCEDURE — 86255 FLUORESCENT ANTIBODY SCREEN: CPT | Performed by: INTERNAL MEDICINE

## 2021-11-10 PROCEDURE — 86039 ANTINUCLEAR ANTIBODIES (ANA): CPT | Performed by: INTERNAL MEDICINE

## 2021-11-10 PROCEDURE — 86038 ANTINUCLEAR ANTIBODIES: CPT | Performed by: INTERNAL MEDICINE

## 2021-11-10 PROCEDURE — 84550 ASSAY OF BLOOD/URIC ACID: CPT | Performed by: INTERNAL MEDICINE

## 2021-11-10 PROCEDURE — 85652 RBC SED RATE AUTOMATED: CPT | Performed by: INTERNAL MEDICINE

## 2021-11-10 PROCEDURE — 36415 COLL VENOUS BLD VENIPUNCTURE: CPT | Performed by: INTERNAL MEDICINE

## 2021-11-10 PROCEDURE — 99205 OFFICE O/P NEW HI 60 MIN: CPT | Performed by: INTERNAL MEDICINE

## 2021-11-10 RX ORDER — PREDNISONE 5 MG/1
TABLET ORAL
Qty: 46 TABLET | Refills: 0 | Status: SHIPPED | OUTPATIENT
Start: 2021-11-10 | End: 2021-12-21

## 2021-11-10 ASSESSMENT — MIFFLIN-ST. JEOR: SCORE: 1737.79

## 2021-11-10 NOTE — PROGRESS NOTES
Jacquie Harris who presents today with a chief complaint of  Consult (joint pain and stiffness )      Joint Pains: Yes  Location: both hands  Onset: Chronic spring/summer  Intensity: 4-5 /10  AM Stiffness: yes, 2-3 hours    Alleviating/Aggravating Factors: yes Medications helpful  Tolerating Meds: Yes tolerate medication   Other:  Finger knuckles are hot    ROS:  Patient denies having: persistent dry eyes, dry mouth, recurrent oral ulcers, patchy alopecia, active rashes, +photosensitivity, history of psoriasis, active chest pain, +active shortness of breath ,+ active cough for 3 weeks, active dysuria, history of kidney stones, active abdominal pain, +active diarrhea cople days, history of hematochezia, active dysphagia, history of peptic ulcer disease, history of HIV, tuberculosis, hepatitis B or C, Lyme disease, seizure history, raynaud's, active documented fevers, recent infections, difficulty sleeping or chronic unrefreshing sleep, involuntary weight loss, loss of appetite, +excessive fatigue going on for a while, depression, anxiety,  recurrent sinus infections, history of inflammatory eye diseases (such as uveitis, scleritis, iritis, etc).     Information gathered by medical assistant incorporated into this note, was reviewed and discussed with the patient.    Problem List:  Patient Active Problem List   Diagnosis     s/p gastric bypass x 2     Allergic rhinitis     Carpal tunnel syndrome     Obstructive sleep apnea     Rosacea     CARDIOVASCULAR SCREENING; LDL GOAL LESS THAN 160     Tick bite, infected, positive Lyme test 1996 not treated     Vitamin D deficiency     Osteoporosis     Obesity, Class III, BMI 40-49.9 (morbid obesity) (H)     S/P TKR (total knee replacement)     HTN, goal below 140/80     Back pain, left below scapula, strained muscles     Encounter for routine gynecological examination      Colles' fracture     Bilateral cold feet     Elevated levels of transaminase & lactic acid dehydrogenase      Fatty liver     Shoulder injury, right, subsequent encounter     GI bleed     Upper GI bleed     Bronchospasm     Alpha-1-antitrypsin deficiency (H)     Multiple joint pain, possible fibromyalgia     Rotator cuff syndrome, right     Benign essential hypertension        PMH:   Past Medical History:   Diagnosis Date     HTN (hypertension)      IRON DEFICIENCY ANEMIA 7/5/2005     Iron infusion/ resolved since injections     DARWIN (obstructive sleep apnea)        Surgical History:  Past Surgical History:   Procedure Laterality Date     ARTHROPLASTY KNEE  4/2/2012    Procedure:ARTHROPLASTY KNEE; Left Total Knee Arthroplasty--Anesth.Choice; Surgeon:VICENTE THOMPSON; Location:[facility]     ARTHROTOMY SHOULDER, ROTATOR CUFF REPAIR, COMBINED Right 8/25/2017    Procedure: COMBINED ARTHROTOMY SHOULDER, ROTATOR CUFF REPAIR;  Right shoulder distal clavicle excision, subacromial decompression, rotator cuff repair ;  Surgeon: Vicente Thompson MD;  Location: [facility]     ARTHROTOMY SHOULDER, ROTATOR CUFF REPAIR, COMBINED Right 12/18/2017    Procedure: COMBINED ARTHROTOMY SHOULDER, ROTATOR CUFF REPAIR;  Right Shoulder Rotator Cuff Revision;  Surgeon: Vicente Thompson MD;  Location: [facility]     ARTHROTOMY SHOULDER, ROTATOR CUFF REPAIR, COMBINED Left 4/27/2018    Procedure: COMBINED ARTHROTOMY SHOULDER, ROTATOR CUFF REPAIR;  Left Shoulder Open Subacromial Decompression,Distal Clavicle Excision,Rotator Cuff Repair;  Surgeon: Vicente Thompson MD;  Location: [facility]     C ORAL SURGERY PROCEDURE  age 18    wisdom teeth     C/SECTION, LOW TRANSVERSE  1978, 1984, 1994    x 3     COLONOSCOPY  2001    normal colonoscopy     COLONOSCOPY N/A 8/22/2017    Procedure: COLONOSCOPY;  Colonoscopy  ;  Surgeon: Delfino Yoo MD;  Location: [facility]     ESOPHAGOSCOPY, GASTROSCOPY, DUODENOSCOPY (EGD), COMBINED N/A 3/18/2018    Procedure: COMBINED ESOPHAGOSCOPY, GASTROSCOPY, DUODENOSCOPY (EGD);;  Surgeon: Poncho Galindo MD;  Location: [facility]      GASTRIC BYPASS  1980/2005    Gastric Bypass and revision     HERNIA REPAIR, INCISIONAL  6/16/2008    lap.repair with 10x8 mesh     TUBAL LIGATION  1994       Family History:  Family History   Problem Relation Age of Onset     C.A.D. Father         MI at age 60     Hypertension Father      Alzheimer Disease Father      Hyperlipidemia Father      Osteoporosis Mother         on Fosamax     Glaucoma Mother      Other Cancer Brother      Stomach Cancer Maternal Aunt      Cervical Cancer Cousin      Diabetes No family hx of      Cerebrovascular Disease No family hx of      Breast Cancer No family hx of      Cancer - colorectal No family hx of      Prostate Cancer No family hx of      Liver Disease No family hx of        Social History:   reports that she has never smoked. She has never used smokeless tobacco. She reports current alcohol use. She reports that she does not use drugs.    Allergies:  Allergies   Allergen Reactions     Ibuprofen      Other reaction(s): Gastrointestinal  Gastric bleed     Naproxen GI Disturbance     Other reaction(s): Gastrointestinal  Gastric bleed     Nickel Itching     Inflamed with cheap earrings (itchy)     Rofecoxib Itching and Swelling     VIOXX (Swollen Feet,Hands itching), Okay with ibuprofen and aspirin     Vioxx Itching and Swelling     VIOXX (Swollen Feet,Hands itching), Okay with ibuprofen and aspirin        Current Medications:  Current Outpatient Medications   Medication Sig Dispense Refill     acetaminophen (TYLENOL) 500 MG tablet Take 1,000 mg by mouth        albuterol (PROAIR HFA/PROVENTIL HFA/VENTOLIN HFA) 108 (90 Base) MCG/ACT inhaler INHALE 2 PUFFS INTO THE LUNGS EVERY 6 HOURS AS NEEDED FOR SHORTNESS OF BREATH OR DIFFICULT BREATHING OR WHEEZING 25.5 g 1     atenolol (TENORMIN) 50 MG tablet TAKE 1 TABLET BY MOUTH  DAILY 90 tablet 3     nystatin (MYCOSTATIN) 925070 UNIT/GM POWD Apply topically 3 times daily as needed Refill at patient's request 30 g 1     nystatin  "(MYCOSTATIN) cream Apply topically 2 times daily Reported on 3/6/2017 30 g 11     oxybutynin ER (DITROPAN-XL) 5 MG 24 hr tablet TAKE 1 TABLET BY MOUTH  DAILY 90 tablet 1           Physical Exam:  BP (!) 141/83 (BP Location: Right arm, Patient Position: Sitting, Cuff Size: Adult Large)   Pulse 71   Ht 1.607 m (5' 3.25\")   Wt 123 kg (271 lb 1.6 oz)   LMP 05/29/2006   BMI 47.64 kg/m    General: A & O x 3 in NAD  HEENT: EOMI, Non injected/non icteric sclera, no oral lesions noted  Neck: Supple, no cervical LAD or thyromegaly noted  Derm: No malar rash, psoriatic lesions or nail pitting appreciated  CV: s1s2 with RRR, no rubs appreciated   Lungs: CTA B/L, no wheezing , rales or rhonci appreciated  GI: Soft, NT/ND, no rebound, no guarding noted, no hepatomegally appreciated  MS: Positive subtle fullness involving left third MCP and right fifth MCP joints with some tenderness to palpation.  Mild tenderness involving right fifth PIP joint and left first CMC joint on palpation.  Nodule appreciated involving left third digit A1 pulley area with some tenderness on palpation, subtle catching sensation appreciated on active flexion of left third digit.  Passive range of motion testing of wrists did not reproduce any pains.  Passive flexion/extension of knees reproduce some mild discomfort, no synovitis noted.  Otherwise, patient demonstrated good passive/active ROM over other joints with no warmth, erythema, tenderness or synovitis noted over these joints.  Back: negative straight leg raising  Neuro: 5/5 strength in upper and lower extremities b/l, good sensation b/l,        Summary/Assessment:    Pleasant 66-year-old female presents with multiple joint pains.    Patient explains that she began experiencing pains involving her hands relatively suddenly in the spring/summer 2021.    Also occasionally has some right wrist pain over ulnar side, has history of right wrist fracture about 6 to 7 years ago states pains at the time " were on radial side.    Also complains of having catching sensation involving left third digit, more so in the morning.    Has occasional knee pains, history of left knee replacement.    On exam, noted to have some subtle fullness involving left third and right fifth MCP joints with some tenderness palpation hence, may have an evolving inflammatory arthritis.    States cannot take ibuprofen due to history of peptic ulcer disease, apparently patient also has a history of gastric bypass.  Has not tried diclofenac gel.    Seldomly takes Tylenol, as states has a hard time with pills.  Has not tried taking liquid form.    Noted to have negative/unremarkable: Rheumatoid factor, CCP antibody, ESR and CRP levels performed in July 2021.    Denies family history for connective tissue disease.    Denies personal or family history for psoriasis.    Difficult to tell at this time with certainty as to what the primary source is contributing to her symptoms described.  May have evolving inflammatory arthritis.  May also have some degenerative joint disease presently involving left CMC joint and knees.  Appears to have mild left third digit trigger finger.  Will obtain some lab/x-rays and correlate clinically to screen for possible underlying etiologies.    Please see below for management plan.        Pertinent rheumatology/past medical history (please refer to above for more detailed history):      Inflammatory arthritis (left third MCP and right fifth MCP joints)    Hand pains (right fifth PIP, left CMC)    Right wrist pain    Chronic knee pains    Left knee replacement    Left third digit trigger.,  Mild    Overweight    History right rotator cuff tendinopathy, status post repair    Sleep apnea    History of gastric bypass    History of peptic ulcer disease      Rheumatology medications provided/suggested:    Diclofenac gel  Tylenol  Prednisone taper      Pertinent medication from other providers or from otc (please refer to  above for more detailed med list):    Albuterol inhaler      Pertinent medications already tried:           Pertinent lab history:    7/2021, negative/unremarkable: Rheumatoid factor, CCP antibody, ESR, CRP      Pertinent imaging/test history:      CXR: Two views of the chest were obtained. Cardiomediastinal  silhouette is within normal limits. Minimal basilar pulmonary  opacities, likely atelectasis. No significant pleural effusion or  pneumothorax.    Other:    , 4 children.     Not currently working. 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     Drinking very seldom, only social and vary weekend mixed drink. No smoking. No drug use.     Tubal ligation since 1994.      Plan:      Patient has only tried Tylenol seldomly as has a difficult time with the pill form, suggested she try liquid Tylenol instead/1000 g twice daily..    Also suggested she can try diclofenac gel for hand pains and over left third digit trigger finger A1 pulley area.    If insufficient benefit with the above she can try prednisone taper prescribed.    Patient made aware that if prednisone taper very beneficial for hand pains, she can contact us to try Plaquenil.  Patient has upcoming visit with eye doctor suggested she obtain clearance while there for Plaquenil use.  Denies prior history of glaucoma or retinal disease.    Will obtain x-rays of: Bilateral hands, right wrist and knees.    Will obtain some labs to correlate clinically.    Follow-up in 3 months.      Procedure note:     Total time spent 62 minutes involved with patient care, includes placing orders, reviewing records and formulating management plan.      Major side effect profile of medications provided/suggested were discussed with the patient.    This note was transcribed using Dragon voice recognition software as a result unintentional grammatical errors or word substitutions may have occurred.  Please contact our Rheumatology department if you need any clarification or if you have any related inquiries.    Thank you for referring this patient to our clinic.      Samy Uribe DO     ....................  11/10/2021   3:17 PM

## 2021-11-10 NOTE — PATIENT INSTRUCTIONS
Summary of Your Rheumatology Visit    Next Appointment:  3 Months, in-clinic      Medications:    Can also try over-the-counter diclofenac gel for hand pains, can apply up to 1-3 times a day, on a when necessary basis.  Please also follow directives on Diclofenac tube on how to utilize.     Recommend trying Tylenol 500-1000 mg twice a day if necessary for pain relief.    Try the above first, if insufficient relief can try prednisone taper provided. If prednisone very helpful for hand pains, can contact us for Plaquenil (discuss clearance with you eye doctor at upcoming visit).       Referrals:      Tests:     Please have labs and x-rays that were ordered performed.       Injections:        Other:

## 2021-11-11 LAB
C REACTIVE PROTEIN LHE: 0.1 MG/DL (ref 0–0.8)
URATE SERPL-MCNC: 4.1 MG/DL (ref 2–7.5)

## 2021-11-12 LAB
ANA PAT SER IF-IMP: ABNORMAL
ANA SER QL IF: POSITIVE
ANA TITR SER IF: ABNORMAL {TITER}
ANCA AB PATTERN SER IF-IMP: NORMAL
C-ANCA TITR SER IF: NORMAL {TITER}

## 2021-11-16 DIAGNOSIS — R10.10 UPPER ABDOMINAL PAIN, UNSPECIFIED: ICD-10-CM

## 2021-11-16 DIAGNOSIS — M19.90 INFLAMMATORY ARTHRITIS: ICD-10-CM

## 2021-11-16 DIAGNOSIS — R76.8 POSITIVE ANA (ANTINUCLEAR ANTIBODY): Primary | ICD-10-CM

## 2021-11-17 ENCOUNTER — LAB (OUTPATIENT)
Dept: LAB | Facility: CLINIC | Age: 66
End: 2021-11-17
Payer: COMMERCIAL

## 2021-11-17 DIAGNOSIS — R10.10 UPPER ABDOMINAL PAIN, UNSPECIFIED: ICD-10-CM

## 2021-11-17 DIAGNOSIS — R76.8 POSITIVE ANA (ANTINUCLEAR ANTIBODY): ICD-10-CM

## 2021-11-17 DIAGNOSIS — M19.90 INFLAMMATORY ARTHRITIS: ICD-10-CM

## 2021-11-17 LAB
ALBUMIN UR-MCNC: ABNORMAL MG/DL
APPEARANCE UR: ABNORMAL
BACTERIA #/AREA URNS HPF: ABNORMAL /HPF
BILIRUB UR QL STRIP: NEGATIVE
COLOR UR AUTO: YELLOW
CREAT UR-MCNC: 162 MG/DL
GLUCOSE UR STRIP-MCNC: NEGATIVE MG/DL
HGB UR QL STRIP: ABNORMAL
KETONES UR STRIP-MCNC: NEGATIVE MG/DL
LEUKOCYTE ESTERASE UR QL STRIP: ABNORMAL
MICROALBUMIN UR-MCNC: 76 MG/L
MICROALBUMIN/CREAT UR: 46.91 MG/G CR (ref 0–25)
NITRATE UR QL: POSITIVE
PH UR STRIP: 6.5 [PH] (ref 5–7)
RBC #/AREA URNS AUTO: ABNORMAL /HPF
SP GR UR STRIP: 1.02 (ref 1–1.03)
SQUAMOUS #/AREA URNS AUTO: ABNORMAL /LPF
UROBILINOGEN UR STRIP-ACNC: 0.2 E.U./DL
WBC #/AREA URNS AUTO: ABNORMAL /HPF

## 2021-11-17 PROCEDURE — 36415 COLL VENOUS BLD VENIPUNCTURE: CPT

## 2021-11-17 PROCEDURE — 85730 THROMBOPLASTIN TIME PARTIAL: CPT

## 2021-11-17 PROCEDURE — 85613 RUSSELL VIPER VENOM DILUTED: CPT

## 2021-11-17 PROCEDURE — 86160 COMPLEMENT ANTIGEN: CPT | Mod: 59

## 2021-11-17 PROCEDURE — 85390 FIBRINOLYSINS SCREEN I&R: CPT | Performed by: PATHOLOGY

## 2021-11-17 PROCEDURE — 82043 UR ALBUMIN QUANTITATIVE: CPT

## 2021-11-17 PROCEDURE — 81001 URINALYSIS AUTO W/SCOPE: CPT

## 2021-11-17 PROCEDURE — 86160 COMPLEMENT ANTIGEN: CPT

## 2021-11-17 PROCEDURE — 86147 CARDIOLIPIN ANTIBODY EA IG: CPT | Mod: 59

## 2021-11-17 PROCEDURE — 86235 NUCLEAR ANTIGEN ANTIBODY: CPT | Mod: 59

## 2021-11-17 PROCEDURE — 86225 DNA ANTIBODY NATIVE: CPT

## 2021-11-18 LAB
C3 SERPL-MCNC: 141 MG/DL (ref 81–157)
C4 SERPL-MCNC: 23 MG/DL (ref 13–39)
DRVVT SCREEN RATIO: 1.02
DSDNA AB SER-ACNC: 1.7 IU/ML
ENA SM IGG SER IA-ACNC: 2.3 U/ML
ENA SM IGG SER IA-ACNC: NEGATIVE
ENA SS-A AB SER IA-ACNC: 1.3 U/ML
ENA SS-A AB SER IA-ACNC: NEGATIVE
ENA SS-B IGG SER IA-ACNC: 2.4 U/ML
ENA SS-B IGG SER IA-ACNC: NEGATIVE
INR PPP: 0.99 (ref 0.85–1.15)
LA PPP-IMP: NEGATIVE
LUPUS INTERPRETATION: NORMAL
PTT RATIO: 0.95
THROMBIN TIME: 18.3 SECONDS (ref 13–19)
U1 SNRNP IGG SER IA-ACNC: 2.9 U/ML
U1 SNRNP IGG SER IA-ACNC: NEGATIVE

## 2021-11-19 LAB
CARDIOLIPIN IGA SER IA-ACNC: 2.9 APL-U/ML
CARDIOLIPIN IGA SER IA-ACNC: NEGATIVE
CARDIOLIPIN IGG SER IA-ACNC: 2.9 GPL-U/ML
CARDIOLIPIN IGG SER IA-ACNC: NEGATIVE
CARDIOLIPIN IGM SER IA-ACNC: 3.9 MPL-U/ML
CARDIOLIPIN IGM SER IA-ACNC: NEGATIVE

## 2021-11-20 ENCOUNTER — E-VISIT (OUTPATIENT)
Dept: FAMILY MEDICINE | Facility: CLINIC | Age: 66
End: 2021-11-20
Payer: COMMERCIAL

## 2021-11-20 DIAGNOSIS — R05.9 COUGH: Primary | ICD-10-CM

## 2021-11-20 PROCEDURE — 99207 PR NON-BILLABLE SERV PER CHARTING: CPT | Performed by: INTERNAL MEDICINE

## 2021-11-22 ENCOUNTER — OFFICE VISIT (OUTPATIENT)
Dept: FAMILY MEDICINE | Facility: CLINIC | Age: 66
End: 2021-11-22
Payer: COMMERCIAL

## 2021-11-22 ENCOUNTER — TELEPHONE (OUTPATIENT)
Dept: FAMILY MEDICINE | Facility: CLINIC | Age: 66
End: 2021-11-22
Payer: COMMERCIAL

## 2021-11-22 VITALS
HEIGHT: 63 IN | TEMPERATURE: 99.2 F | DIASTOLIC BLOOD PRESSURE: 88 MMHG | BODY MASS INDEX: 46.78 KG/M2 | WEIGHT: 264 LBS | HEART RATE: 75 BPM | OXYGEN SATURATION: 97 % | SYSTOLIC BLOOD PRESSURE: 132 MMHG

## 2021-11-22 DIAGNOSIS — N30.01 ACUTE CYSTITIS WITH HEMATURIA: Primary | ICD-10-CM

## 2021-11-22 LAB
ALBUMIN UR-MCNC: 100 MG/DL
APPEARANCE UR: ABNORMAL
BACTERIA #/AREA URNS HPF: ABNORMAL /HPF
BILIRUB UR QL STRIP: NEGATIVE
COLOR UR AUTO: YELLOW
GLUCOSE UR STRIP-MCNC: NEGATIVE MG/DL
HGB UR QL STRIP: ABNORMAL
KETONES UR STRIP-MCNC: NEGATIVE MG/DL
LEUKOCYTE ESTERASE UR QL STRIP: ABNORMAL
NITRATE UR QL: POSITIVE
PH UR STRIP: 5.5 [PH] (ref 5–7)
RBC #/AREA URNS AUTO: ABNORMAL /HPF
SP GR UR STRIP: 1.02 (ref 1–1.03)
SQUAMOUS #/AREA URNS AUTO: ABNORMAL /LPF
UROBILINOGEN UR STRIP-ACNC: 0.2 E.U./DL
WBC #/AREA URNS AUTO: ABNORMAL /HPF

## 2021-11-22 PROCEDURE — 87086 URINE CULTURE/COLONY COUNT: CPT | Performed by: NURSE PRACTITIONER

## 2021-11-22 PROCEDURE — 87088 URINE BACTERIA CULTURE: CPT | Performed by: NURSE PRACTITIONER

## 2021-11-22 PROCEDURE — 99213 OFFICE O/P EST LOW 20 MIN: CPT | Performed by: NURSE PRACTITIONER

## 2021-11-22 PROCEDURE — 87186 SC STD MICRODIL/AGAR DIL: CPT | Performed by: NURSE PRACTITIONER

## 2021-11-22 PROCEDURE — 81001 URINALYSIS AUTO W/SCOPE: CPT | Performed by: NURSE PRACTITIONER

## 2021-11-22 RX ORDER — CEPHALEXIN 500 MG/1
500 CAPSULE ORAL 2 TIMES DAILY
Qty: 14 CAPSULE | Refills: 0 | Status: SHIPPED | OUTPATIENT
Start: 2021-11-22 | End: 2021-11-29

## 2021-11-22 ASSESSMENT — MIFFLIN-ST. JEOR: SCORE: 1705.59

## 2021-11-22 ASSESSMENT — ENCOUNTER SYMPTOMS
DIARRHEA: 1
DYSURIA: 1
HEMATURIA: 1
CARDIOVASCULAR NEGATIVE: 1
FREQUENCY: 1
RESPIRATORY NEGATIVE: 1
CONSTITUTIONAL NEGATIVE: 1

## 2021-11-22 NOTE — PROGRESS NOTES
Assessment & Plan   I, Muna Gaitan, was present with the NP student who participated in the service and in the documentationof the note.  I have verified the history and personally performed the physical exam and medical decision making.  I agree with the assessment and plan of care as documented in the note.       Muna Gaitan, CNP    Acute cystitis with hematuria  UA does show UTI.  Started on antibiotics and will make changes if needed depending on results of culture.  - UA Macro with Reflex to Micro and Culture - lab collect  - Urine Microscopic Exam  - Urine Culture  - cephALEXin (KEFLEX) 500 MG capsule; Take 1 capsule (500 mg) by mouth 2 times daily for 7 days         Patient Instructions   Your urine test shows evidence of a urinary tract infection.    We will treat you with an antibiotic, Keflex.  I sent this to your pharmacy. Please take as directed for 7 days. Please take a probiotic or eat a daily Greek yogurt while you are on the antibiotic.    A urine culture is still in process, it usually takes about 2 days and identifies the bacteria causing the infection.  It also tests antibiotics to make sure what we use will be effective for your infection.  We will only call you if the results of this test show a need to change your treatment plan.    If developing high fevers, vomiting, abdominal pain, or any other new, concerning symptoms, come back immediately. If no improvement in symptoms by the end of your antibiotic treatment, follow up with your primary care doctor.    Otherwise, simply follow up as needed.        Urinary Tract Infections in Women  Urinary tract infections (UTIs) are most often caused by bacteria (germs). These bacteria enter the urinary tract. The bacteria may come from outside the body. Or they may travel from the skin outside the rectum or vagina into the urethra. Female anatomy makes it easier for bacteria from the bowel to enter a woman's urinary tract, which is the most  common source of UTI. This means women develop UTIs more often than men. Pain in or around the urinary tract is a common UTI symptom. But the only way to know for sure if you have a UTI for the health care provider to test your urine. The two tests that may be done are the urinalysis and urine culture.  Types of UTIs    Cystitis: A bladder infection (cystitis) is the most common UTI in women. You may have urgent or frequent urination. You may also have pain, burning when you urinate, and bloody urine.    Urethritis: This is an inflamed urethra, which is the tube that carries urine from the bladder to outside the body. You may have lower stomach or back pain. You may also have urgent or frequent urination.    Pyelonephritis: This is a kidney infection. If not treated, it can be serious and damage your kidneys. In severe cases, you may be hospitalized. You may have a fever and lower back pain.  Medications to treat a UTI  Most UTIs are treated with antibiotics. These kill the bacteria. The length of time you need to take them depends on the type of infection. It may be as short as 3 days. If you have repeated UTIs, a low-dose antibiotic may be needed for several months. Take antibiotics exactly as directed. Don't stop taking them until all of the medication is gone. If you stop taking the antibiotic too soon, the infection may not go away, and you may develop a resistance to the antibiotic. This can make it much harder to treat.  Lifestyle changes to treat and prevent UTIs  The lifestyle changes below will help get rid of your UTI. They may also help prevent future UTIs.    Drink plenty of fluids. This includes water, juice, or other caffeine-free drinks. Fluids help flush bacteria out of your body.    Empty your bladder. Always empty your bladder when you feel the urge to urinate. And always urinate before going to sleep. Urine that stays in your bladder can lead to infection. Try to urinate before and after sex as  "well.    Practice good personal hygiene. Wipe yourself from front to back after using the toilet. This helps keep bacteria from getting into the urethra.    Use condoms during sex. These help prevent UTIs caused by sexually transmitted bacteria. Also, avoid using spermicides during sex. These can increase the risk of UTIs. Choose other forms of birth control instead. For women who tend to get UTIs after sex, a low-dose of a preventive antibiotic may be used. Be sure to discuss this option with your health care provider.    Follow up with your health care provider as directed. He or she may test to make sure the infection has cleared. If necessary, additional treatment may be started.    6408-1633 [employer] [address] [place] [address], [place] [address]. All rights reserved. This information is not intended as a substitute for professional medical care. Always follow your healthcare professional's instructions.          Return in about 1 week (around 11/29/2021) for worsening or continued symptoms.    Estef[facility] Rhodes [facility] is a 66 year old who presents for complaint of urinary frequency, bladder fullness, incomplete emptying of bladder for a couple of day.  Hematuria noted this morning.    History of Present Illness       She eats 2-3 servings of fruits and vegetables daily.She consumes 0 sweetened beverage(s) daily.She exercises with enough effort to increase her heart rate 9 or less minutes per day.    She is taking medications regularly.       Genitourinary - Female  Onset/Duration: 5-6 days, incontinence 8-10 per day. Coughing is making it worse.   Description:   Painful urination (Dysuria): YES - \"strange feeling\"            Frequency: YES  Blood in urine (Hematuria): YES  Delay in urine (Hesitency): YES  Intensity: moderate  Progression of Symptoms:  worsening  Accompanying Signs & Symptoms:  Fever/chills: YES - hot and cold chills here and " "there  Back pain: YES - but assumed it was related to Pneumonia  Nausea and vomiting: YES - but related to gastric bypass  Vaginal symptoms: itching - on going  Abdominal/Pelvic Pain: no  History:   History of frequent UTI s: no  History of kidney stones: no  Sexually Active: YES  Possibility of pregnancy: No  Precipitating or alleviating factors: None  Therapies tried and outcome: OTC advil or tylenol             Review of Systems   Constitutional: Negative.    Respiratory: Negative.    Cardiovascular: Negative.    Gastrointestinal: Positive for diarrhea.   Genitourinary: Positive for dysuria, frequency and hematuria.   Skin: Negative.             Objective    /88   Pulse 75   Temp 99.2  F (37.3  C) (Tympanic)   Ht 1.607 m (5' 3.25\")   Wt 119.7 kg (264 lb)   LMP 05/29/2006   SpO2 97%   BMI 46.40 kg/m    Body mass index is 46.4 kg/m .  Physical Exam  Constitutional:       Appearance: Normal appearance.   Cardiovascular:      Rate and Rhythm: Normal rate and regular rhythm.      Pulses: Normal pulses.      Heart sounds: Normal heart sounds.   Pulmonary:      Effort: Pulmonary effort is normal.      Breath sounds: Normal breath sounds.   Abdominal:      General: Bowel sounds are normal.      Tenderness: There is abdominal tenderness.      Comments: Right and left upper quadrant    Skin:     General: Skin is warm and dry.   Neurological:      Mental Status: She is alert.   Psychiatric:         Mood and Affect: Mood normal.         Behavior: Behavior normal.         Thought Content: Thought content normal.         Judgment: Judgment normal.                        "

## 2021-11-22 NOTE — PATIENT INSTRUCTIONS
Daktoa Jones,    E-visits aren't checked over the weekend, which I imagine you have figured out, and I see that you came to clinic for the urinary symptoms today and were started on antibiotics.  If those antibiotics don't also help out with the cough, please scheduled a follow-up visit if not improving.     You will not be charged for this e-visit.    Valdo Amezquita MD

## 2021-11-22 NOTE — PATIENT INSTRUCTIONS
Your urine test shows evidence of a urinary tract infection.    We will treat you with an antibiotic, Keflex.  I sent this to your pharmacy. Please take as directed for 7 days. Please take a probiotic or eat a daily Greek yogurt while you are on the antibiotic.    A urine culture is still in process, it usually takes about 2 days and identifies the bacteria causing the infection.  It also tests antibiotics to make sure what we use will be effective for your infection.  We will only call you if the results of this test show a need to change your treatment plan.    If developing high fevers, vomiting, abdominal pain, or any other new, concerning symptoms, come back immediately. If no improvement in symptoms by the end of your antibiotic treatment, follow up with your primary care doctor.    Otherwise, simply follow up as needed.        Urinary Tract Infections in Women  Urinary tract infections (UTIs) are most often caused by bacteria (germs). These bacteria enter the urinary tract. The bacteria may come from outside the body. Or they may travel from the skin outside the rectum or vagina into the urethra. Female anatomy makes it easier for bacteria from the bowel to enter a woman's urinary tract, which is the most common source of UTI. This means women develop UTIs more often than men. Pain in or around the urinary tract is a common UTI symptom. But the only way to know for sure if you have a UTI for the health care provider to test your urine. The two tests that may be done are the urinalysis and urine culture.  Types of UTIs    Cystitis: A bladder infection (cystitis) is the most common UTI in women. You may have urgent or frequent urination. You may also have pain, burning when you urinate, and bloody urine.    Urethritis: This is an inflamed urethra, which is the tube that carries urine from the bladder to outside the body. You may have lower stomach or back pain. You may also have urgent or frequent  urination.    Pyelonephritis: This is a kidney infection. If not treated, it can be serious and damage your kidneys. In severe cases, you may be hospitalized. You may have a fever and lower back pain.  Medications to treat a UTI  Most UTIs are treated with antibiotics. These kill the bacteria. The length of time you need to take them depends on the type of infection. It may be as short as 3 days. If you have repeated UTIs, a low-dose antibiotic may be needed for several months. Take antibiotics exactly as directed. Don't stop taking them until all of the medication is gone. If you stop taking the antibiotic too soon, the infection may not go away, and you may develop a resistance to the antibiotic. This can make it much harder to treat.  Lifestyle changes to treat and prevent UTIs  The lifestyle changes below will help get rid of your UTI. They may also help prevent future UTIs.    Drink plenty of fluids. This includes water, juice, or other caffeine-free drinks. Fluids help flush bacteria out of your body.    Empty your bladder. Always empty your bladder when you feel the urge to urinate. And always urinate before going to sleep. Urine that stays in your bladder can lead to infection. Try to urinate before and after sex as well.    Practice good personal hygiene. Wipe yourself from front to back after using the toilet. This helps keep bacteria from getting into the urethra.    Use condoms during sex. These help prevent UTIs caused by sexually transmitted bacteria. Also, avoid using spermicides during sex. These can increase the risk of UTIs. Choose other forms of birth control instead. For women who tend to get UTIs after sex, a low-dose of a preventive antibiotic may be used. Be sure to discuss this option with your health care provider.    Follow up with your health care provider as directed. He or she may test to make sure the infection has cleared. If necessary, additional treatment may be started.    9351-1625  [employer]. [address] [place] [address], [place] [address]. All rights reserved. This information is not intended as a substitute for professional medical care. Always follow your healthcare professional's instructions.

## 2021-11-22 NOTE — TELEPHONE ENCOUNTER
Patient calls and now she is passing blood when urinating.  Has an appt for tomorrow.  Scheduled for today with SYEDA Gaitan. Winter BRAVO RN

## 2021-11-23 LAB — BACTERIA UR CULT: ABNORMAL

## 2021-11-24 ENCOUNTER — TELEPHONE (OUTPATIENT)
Dept: RHEUMATOLOGY | Facility: CLINIC | Age: 66
End: 2021-11-24
Payer: COMMERCIAL

## 2021-11-24 NOTE — TELEPHONE ENCOUNTER
Pt wanted to give  an update, she states the prednisone has been helpful for the pain, however her fingers are still locking and get painful at night; at which time she uses the cream. Patient's eye exam is in December.

## 2021-11-25 NOTE — TELEPHONE ENCOUNTER
Please mention to the patient that when she sees her eye doctor, she should inquire if okay for us to prescribe Plaquenil/hydroxychloroquine 200 mg twice a day.

## 2021-12-02 ENCOUNTER — TELEPHONE (OUTPATIENT)
Dept: RHEUMATOLOGY | Facility: CLINIC | Age: 66
End: 2021-12-02
Payer: COMMERCIAL

## 2021-12-02 DIAGNOSIS — R76.8 POSITIVE ANA (ANTINUCLEAR ANTIBODY): Primary | ICD-10-CM

## 2021-12-02 DIAGNOSIS — M19.90 INFLAMMATORY ARTHRITIS: ICD-10-CM

## 2021-12-02 RX ORDER — HYDROXYCHLOROQUINE SULFATE 200 MG/1
200 TABLET, FILM COATED ORAL 2 TIMES DAILY
Qty: 180 TABLET | Refills: 0 | Status: SHIPPED | OUTPATIENT
Start: 2021-12-02 | End: 2022-03-11

## 2021-12-02 NOTE — TELEPHONE ENCOUNTER
Per patient report, eye doctor said it's ok for patient to take hydroxychlorquine. Eye doctor is going to perform baseline exam on 12/31.    Please fax prescription to [facility].

## 2021-12-10 ENCOUNTER — OFFICE VISIT (OUTPATIENT)
Dept: FAMILY MEDICINE | Facility: CLINIC | Age: 66
End: 2021-12-10
Payer: COMMERCIAL

## 2021-12-10 VITALS
TEMPERATURE: 98.9 F | WEIGHT: 268 LBS | HEART RATE: 67 BPM | SYSTOLIC BLOOD PRESSURE: 138 MMHG | DIASTOLIC BLOOD PRESSURE: 80 MMHG | OXYGEN SATURATION: 97 % | HEIGHT: 63 IN | BODY MASS INDEX: 47.48 KG/M2

## 2021-12-10 DIAGNOSIS — R39.9 SYMPTOMS INVOLVING URINARY SYSTEM: ICD-10-CM

## 2021-12-10 DIAGNOSIS — N30.01 ACUTE CYSTITIS WITH HEMATURIA: Primary | ICD-10-CM

## 2021-12-10 LAB
ALBUMIN UR-MCNC: 100 MG/DL
APPEARANCE UR: ABNORMAL
BACTERIA #/AREA URNS HPF: ABNORMAL /HPF
BILIRUB UR QL STRIP: NEGATIVE
COLOR UR AUTO: YELLOW
GLUCOSE UR STRIP-MCNC: NEGATIVE MG/DL
HGB UR QL STRIP: ABNORMAL
KETONES UR STRIP-MCNC: NEGATIVE MG/DL
LEUKOCYTE ESTERASE UR QL STRIP: ABNORMAL
NITRATE UR QL: NEGATIVE
PH UR STRIP: 5.5 [PH] (ref 5–7)
RBC #/AREA URNS AUTO: >100 /HPF
SP GR UR STRIP: >=1.03 (ref 1–1.03)
SQUAMOUS #/AREA URNS AUTO: ABNORMAL /LPF
UROBILINOGEN UR STRIP-ACNC: 0.2 E.U./DL
WBC #/AREA URNS AUTO: ABNORMAL /HPF

## 2021-12-10 PROCEDURE — 87088 URINE BACTERIA CULTURE: CPT | Performed by: NURSE PRACTITIONER

## 2021-12-10 PROCEDURE — 81001 URINALYSIS AUTO W/SCOPE: CPT | Performed by: NURSE PRACTITIONER

## 2021-12-10 PROCEDURE — 87186 SC STD MICRODIL/AGAR DIL: CPT | Performed by: NURSE PRACTITIONER

## 2021-12-10 PROCEDURE — 99213 OFFICE O/P EST LOW 20 MIN: CPT | Performed by: NURSE PRACTITIONER

## 2021-12-10 PROCEDURE — 87086 URINE CULTURE/COLONY COUNT: CPT | Performed by: NURSE PRACTITIONER

## 2021-12-10 RX ORDER — SULFAMETHOXAZOLE/TRIMETHOPRIM 800-160 MG
1 TABLET ORAL 2 TIMES DAILY
Qty: 10 TABLET | Refills: 0 | Status: SHIPPED | OUTPATIENT
Start: 2021-12-10 | End: 2021-12-15

## 2021-12-10 ASSESSMENT — MIFFLIN-ST. JEOR: SCORE: 1719.77

## 2021-12-10 NOTE — PROGRESS NOTES
"  Assessment & Plan     Acute cystitis with hematuria  - sulfamethoxazole-trimethoprim (BACTRIM DS) 800-160 MG tablet; Take 1 tablet by mouth 2 times daily for 5 days    Body mass index (BMI) of 45.0 to 49.9 in adult (H)  Encourage weight loss.    Symptoms involving urinary system  - UA macro with reflex to Microscopic and Culture - Clinc Collect  - Urine Microscopic Exam  - Urine Culture      The risks, benefits and treatment options of prescribed medications or other treatments have been discussed with the patient. The patient verbalized their understanding and should call or follow up if no improvement or if they develop further problems.    MARGARITA Alegre CNP  [facility] ELI Jones is a 66 year old who presents for the following health issues     HPI     Genitourinary - Female  Onset/Duration: worsening again about one week ago  Description:   Painful urination (Dysuria): a little bit of burning;  More of a tickle  Can't seem to empty her bladder           Frequency: YES  Blood in urine (Hematuria): YES  Delay in urine (Hesitency): no  Intensity: moderate;  Severe incontinence  Progression of Symptoms:  worsening  Accompanying Signs & Symptoms:  Fever/chills: no  Flank pain: she noticed a back ache yesterday  Nausea and vomiting: YES, but she blames this on her gastric bypass- vomits after eating  Vaginal symptoms: burning and a little bit of \"  Tickling\"  Abdominal/Pelvic Pain: no  History:   History of frequent UTI s: YES- per patient  History of kidney stones: no  Sexually Active: yes  Possibility of pregnancy: No  Precipitating or alleviating factors: unknown  Therapies tried and outcome:  treated with cephalexin 11/22/2021   Uses oxybutynin        Review of Systems   Constitutional, HEENT, cardiovascular, pulmonary, gi and gu systems are negative, except as otherwise noted.      Objective    /80 (BP Location: Right arm, Cuff Size: Adult Large)   Pulse 67   " "Temp 98.9  F (37.2  C) (Tympanic)   Ht 1.6 m (5' 3\")   Wt 121.6 kg (268 lb)   LMP 05/29/2006   SpO2 97%   BMI 47.47 kg/m    Body mass index is 47.47 kg/m .  Physical Exam   GENERAL: healthy, alert and no distress  RESP: lungs clear to auscultation - no rales, rhonchi or wheezes  CV: regular rate and rhythm, normal S1 S2, no S3 or S4, no murmur, click or rub, no peripheral edema and peripheral pulses strong  ABDOMEN: soft, nontender, no hepatosplenomegaly, no masses and bowel sounds normal    Results for orders placed or performed in visit on 12/10/21 (from the past 24 hour(s))   UA macro with reflex to Microscopic and Culture - Clinc Collect    Specimen: Urine, NOS   Result Value Ref Range    Color Urine Yellow Colorless, Straw, Light Yellow, Yellow    Appearance Urine Cloudy (A) Clear    Glucose Urine Negative Negative mg/dL    Bilirubin Urine Negative Negative    Ketones Urine Negative Negative mg/dL    Specific Gravity Urine >=1.030 1.003 - 1.035    Blood Urine Large (A) Negative    pH Urine 5.5 5.0 - 7.0    Protein Albumin Urine 100  (A) Negative mg/dL    Urobilinogen Urine 0.2 0.2, 1.0 E.U./dL    Nitrite Urine Negative Negative    Leukocyte Esterase Urine Moderate (A) Negative   Urine Microscopic Exam   Result Value Ref Range    Bacteria Urine Moderate (A) None Seen /HPF    RBC Urine >100 (A) 0-2 /HPF /HPF    WBC Urine  (A) 0-5 /HPF /HPF    Squamous Epithelials Urine Few (A) None Seen /LPF               "

## 2021-12-12 LAB — BACTERIA UR CULT: ABNORMAL

## 2021-12-21 ENCOUNTER — ALLIED HEALTH/NURSE VISIT (OUTPATIENT)
Dept: FAMILY MEDICINE | Facility: CLINIC | Age: 66
End: 2021-12-21
Payer: COMMERCIAL

## 2021-12-21 ENCOUNTER — VIRTUAL VISIT (OUTPATIENT)
Dept: FAMILY MEDICINE | Facility: CLINIC | Age: 66
End: 2021-12-21
Payer: COMMERCIAL

## 2021-12-21 DIAGNOSIS — J02.9 SORE THROAT: Primary | ICD-10-CM

## 2021-12-21 DIAGNOSIS — J02.9 SORE THROAT: ICD-10-CM

## 2021-12-21 LAB
DEPRECATED S PYO AG THROAT QL EIA: NEGATIVE
GROUP A STREP BY PCR: NOT DETECTED

## 2021-12-21 PROCEDURE — 99441 PR PHYSICIAN TELEPHONE EVALUATION 5-10 MIN: CPT | Performed by: FAMILY MEDICINE

## 2021-12-21 PROCEDURE — 87651 STREP A DNA AMP PROBE: CPT

## 2021-12-21 PROCEDURE — 99207 PR NO CHARGE NURSE ONLY: CPT

## 2021-12-21 ASSESSMENT — ENCOUNTER SYMPTOMS
HEMATOLOGIC/LYMPHATIC NEGATIVE: 1
GASTROINTESTINAL NEGATIVE: 1
MUSCULOSKELETAL NEGATIVE: 1
ENDOCRINE NEGATIVE: 1
CONSTITUTIONAL NEGATIVE: 1
PSYCHIATRIC NEGATIVE: 1
COUGH: 1
TROUBLE SWALLOWING: 1
CARDIOVASCULAR NEGATIVE: 1
SORE THROAT: 1
NEUROLOGICAL NEGATIVE: 1
ALLERGIC/IMMUNOLOGIC NEGATIVE: 1

## 2021-12-21 NOTE — PATIENT INSTRUCTIONS
Thank you for choosing [facility].  You may be receiving an email and/or telephone survey request from [facility] [employer] Experience regarding your visit today.  Please take a few minutes to respond to the survey to let us know how we are doing.      If you have questions or concerns, please contact us via [employer] or you can contact your care team at [phone] option [phone].    Our Clinic hours are:  Monday - Thursday 7am-6pm  Friday 7am-5pm    The [place] outpatient lab hours are:  Monday - Friday 7am-4:30pm    Appointments are required, call [phone]    If you have clinical questions after hours or would like to schedule an appointment,  call the clinic at [phone].

## 2021-12-21 NOTE — PROGRESS NOTES
Karen is a 66 year old who is being evaluated via a billable telephone visit.      What phone number would you like to be contacted at? [phone]  How would you like to obtain your AVS? MyChart    Assessment & Plan     Sore throat  Patient will come in for a strep test. We will contact her with results  - Streptococcus A Rapid Screen w/Reflex to PCR - Clinic Collect; Future      FUTURE APPOINTMENTS:       - Follow-up visit in one week or sooner as needed       - Follow-up office or E-visit in one week    Return in about 1 week (around 12/28/2021), or if symptoms worsen or fail to improve, for Follow up.    Jennie Suazo MD  [facility]    Subjective   Karen is a 66 year old who presents for the following health issues  accompanied by her self.    HPI 66 yr old female here for URI symptoms. Started a few days ago but woke up this morning with a really bad sore throat. She reports that she also feels like her glands are swollen. She denies fevers but had the chills. Denies generalized body aches.    Acute Illness  Acute illness concerns: Sore throat.  Onset/Duration: Sudden start-this am.  Symptoms:  Fever: no  Chills/Sweats: YES- Yesterday she woke up from a nap sweaty, in the chest and neck area.  She had to wipe herself off.  Was going from the couch to the recliner.   Headache (location?): Has been having more headaches than usual over the weekend.  None the past two days.  Used a cream to help get rid of the headache.   Sinus Pressure: no  Conjunctivitis:  no  Ear Pain: YES- Pressure in the ears, not sure if the beginning of pain, bilateral.  No drainage.  Rhinorrhea: no  Congestion: no  Sore Throat: YES- Throat feels swollen, glands bilateral.  Sore throat as well.  Cough: YES-just enough to clear her throat.  Phlegm in the am every day for one year.  Wheeze: no  Decreased Appetite: YES- Slight.  Nausea: Not anymore than usual.  Vomiting: With certain foods since her gastric  bypass.  Diarrhea: No diarrhea, soft stools.  Dysuria/Freq.: Always has urgency with incontinence.   Dysuria or Hematuria: no  Fatigue/Achiness: no  Sick/Strep Exposure: no  Therapies tried and outcome: None        Review of Systems   Constitutional: Negative.    HENT: Positive for congestion, ear pain, sore throat and trouble swallowing.    Respiratory: Positive for cough.    Cardiovascular: Negative.    Gastrointestinal: Negative.    Endocrine: Negative.    Genitourinary: Negative.    Musculoskeletal: Negative.    Allergic/Immunologic: Negative.    Neurological: Negative.    Hematological: Negative.    Psychiatric/Behavioral: Negative.             Objective           Vitals:  No vitals were obtained today due to virtual visit.    Physical Exam   healthy, alert and no distress  PSYCH: Alert and oriented times 3; coherent speech, normal   rate and volume, able to articulate logical thoughts, able   to abstract reason, no tangential thoughts, no hallucinations   or delusions  Her affect is normal  RESP: No cough, no audible wheezing, able to talk in full sentences  Remainder of exam unable to be completed due to telephone visits            Phone call duration: 5 minutes

## 2022-01-05 DIAGNOSIS — N39.41 URGE INCONTINENCE OF URINE: ICD-10-CM

## 2022-01-05 DIAGNOSIS — I10 BENIGN ESSENTIAL HYPERTENSION: ICD-10-CM

## 2022-01-07 RX ORDER — ATENOLOL 50 MG/1
TABLET ORAL
Qty: 90 TABLET | Refills: 1 | Status: SHIPPED | OUTPATIENT
Start: 2022-01-07 | End: 2022-07-12

## 2022-01-07 RX ORDER — OXYBUTYNIN CHLORIDE 5 MG/1
TABLET, EXTENDED RELEASE ORAL
Qty: 90 TABLET | Refills: 1 | Status: SHIPPED | OUTPATIENT
Start: 2022-01-07 | End: 2022-07-12

## 2022-01-07 NOTE — TELEPHONE ENCOUNTER
Signed Prescriptions:                        Disp   Refills    oxybutynin ER (DITROPAN-XL) 5 MG 24 hr tab*90 tab*1        Sig: TAKE 1 TABLET BY MOUTH  DAILY  Authorizing Provider: MARILY SON  Ordering User: CHIKIS PITTMAN    atenolol (TENORMIN) 50 MG tablet           90 tab*1        Sig: TAKE 1 TABLET BY MOUTH  DAILY  Authorizing Provider: MARILY SON User: CHIKIS PITTMAN    Prescription approved per [facility] Refill Protocol.  Chikis Pittman RN

## 2022-02-04 ENCOUNTER — TRANSFERRED RECORDS (OUTPATIENT)
Dept: HEALTH INFORMATION MANAGEMENT | Facility: CLINIC | Age: 67
End: 2022-02-04
Payer: COMMERCIAL

## 2022-02-04 LAB — RETINOPATHY: NORMAL

## 2022-02-16 ENCOUNTER — OFFICE VISIT (OUTPATIENT)
Dept: RHEUMATOLOGY | Facility: CLINIC | Age: 67
End: 2022-02-16
Payer: COMMERCIAL

## 2022-02-16 VITALS
BODY MASS INDEX: 48.25 KG/M2 | SYSTOLIC BLOOD PRESSURE: 138 MMHG | WEIGHT: 272.3 LBS | HEIGHT: 63 IN | DIASTOLIC BLOOD PRESSURE: 84 MMHG | HEART RATE: 72 BPM

## 2022-02-16 DIAGNOSIS — M65.332 TRIGGER MIDDLE FINGER OF LEFT HAND: ICD-10-CM

## 2022-02-16 DIAGNOSIS — Z79.899 HIGH RISK MEDICATION USE: ICD-10-CM

## 2022-02-16 DIAGNOSIS — M25.511 CHRONIC PAIN OF BOTH SHOULDERS: ICD-10-CM

## 2022-02-16 DIAGNOSIS — M79.642 BILATERAL HAND PAIN: ICD-10-CM

## 2022-02-16 DIAGNOSIS — R80.9 MICROALBUMINURIA: ICD-10-CM

## 2022-02-16 DIAGNOSIS — G89.29 CHRONIC PAIN OF BOTH SHOULDERS: ICD-10-CM

## 2022-02-16 DIAGNOSIS — M25.512 CHRONIC PAIN OF BOTH SHOULDERS: ICD-10-CM

## 2022-02-16 DIAGNOSIS — M79.641 BILATERAL HAND PAIN: ICD-10-CM

## 2022-02-16 DIAGNOSIS — M19.90 INFLAMMATORY ARTHRITIS: Primary | ICD-10-CM

## 2022-02-16 DIAGNOSIS — R76.8 POSITIVE ANA (ANTINUCLEAR ANTIBODY): ICD-10-CM

## 2022-02-16 LAB
ALBUMIN SERPL-MCNC: 3.7 G/DL (ref 3.5–5)
ALT SERPL W P-5'-P-CCNC: <9 U/L (ref 0–45)
AST SERPL W P-5'-P-CCNC: 14 U/L (ref 0–40)
BASOPHILS # BLD AUTO: 0 10E3/UL (ref 0–0.2)
BASOPHILS NFR BLD AUTO: 1 %
CK SERPL-CCNC: 58 U/L (ref 30–190)
CREAT SERPL-MCNC: 0.72 MG/DL (ref 0.6–1.1)
EOSINOPHIL # BLD AUTO: 0.1 10E3/UL (ref 0–0.7)
EOSINOPHIL NFR BLD AUTO: 2 %
ERYTHROCYTE [DISTWIDTH] IN BLOOD BY AUTOMATED COUNT: 14.5 % (ref 10–15)
GFR SERPL CREATININE-BSD FRML MDRD: >90 ML/MIN/1.73M2
HCT VFR BLD AUTO: 39.4 % (ref 35–47)
HGB BLD-MCNC: 11.9 G/DL (ref 11.7–15.7)
IMM GRANULOCYTES # BLD: 0 10E3/UL
IMM GRANULOCYTES NFR BLD: 0 %
LYMPHOCYTES # BLD AUTO: 2.1 10E3/UL (ref 0.8–5.3)
LYMPHOCYTES NFR BLD AUTO: 40 %
MCH RBC QN AUTO: 24.6 PG (ref 26.5–33)
MCHC RBC AUTO-ENTMCNC: 30.2 G/DL (ref 31.5–36.5)
MCV RBC AUTO: 81 FL (ref 78–100)
MONOCYTES # BLD AUTO: 0.4 10E3/UL (ref 0–1.3)
MONOCYTES NFR BLD AUTO: 7 %
NEUTROPHILS # BLD AUTO: 2.7 10E3/UL (ref 1.6–8.3)
NEUTROPHILS NFR BLD AUTO: 51 %
PLATELET # BLD AUTO: 212 10E3/UL (ref 150–450)
RBC # BLD AUTO: 4.84 10E6/UL (ref 3.8–5.2)
WBC # BLD AUTO: 5.3 10E3/UL (ref 4–11)

## 2022-02-16 PROCEDURE — 82550 ASSAY OF CK (CPK): CPT | Performed by: INTERNAL MEDICINE

## 2022-02-16 PROCEDURE — 99215 OFFICE O/P EST HI 40 MIN: CPT | Mod: 25 | Performed by: INTERNAL MEDICINE

## 2022-02-16 PROCEDURE — 82565 ASSAY OF CREATININE: CPT | Performed by: INTERNAL MEDICINE

## 2022-02-16 PROCEDURE — 82040 ASSAY OF SERUM ALBUMIN: CPT | Performed by: INTERNAL MEDICINE

## 2022-02-16 PROCEDURE — 84460 ALANINE AMINO (ALT) (SGPT): CPT | Performed by: INTERNAL MEDICINE

## 2022-02-16 PROCEDURE — 36415 COLL VENOUS BLD VENIPUNCTURE: CPT | Performed by: INTERNAL MEDICINE

## 2022-02-16 PROCEDURE — 84450 TRANSFERASE (AST) (SGOT): CPT | Performed by: INTERNAL MEDICINE

## 2022-02-16 PROCEDURE — 85025 COMPLETE CBC W/AUTO DIFF WBC: CPT | Performed by: INTERNAL MEDICINE

## 2022-02-16 PROCEDURE — 20550 NJX 1 TENDON SHEATH/LIGAMENT: CPT | Mod: LT | Performed by: INTERNAL MEDICINE

## 2022-02-16 RX ORDER — METHYLPREDNISOLONE ACETATE 40 MG/ML
10 INJECTION, SUSPENSION INTRA-ARTICULAR; INTRALESIONAL; INTRAMUSCULAR; SOFT TISSUE ONCE
Status: COMPLETED | OUTPATIENT
Start: 2022-02-16 | End: 2022-02-16

## 2022-02-16 RX ADMIN — METHYLPREDNISOLONE ACETATE 10 MG: 40 INJECTION, SUSPENSION INTRA-ARTICULAR; INTRALESIONAL; INTRAMUSCULAR; SOFT TISSUE at 14:13

## 2022-02-16 NOTE — PROGRESS NOTES
Jacquie Harris who presents today with a chief complaint of  RECHECK      Joint Pains: Yes  Location: both hands, LT shoulder   Onset: 1 months  Intensity: 3 /10  AM Stiffness: yes, 1-2 hours   Alleviating/Aggravating Factors: yes Medications helpful  Tolerating Meds: Yes tolerate medication   Other:       ROS:  Patient denies having any active: chest pain, shortness of breath, cough, abdominal pain, nausea, vomiting, rashes, documented fevers, oral ulcers and recent infections.  +Patient admits to having a good appetite.   Information gathered by medical assistant incorporated into this note, was reviewed and discussed with the patient.    Problem List:  Patient Active Problem List   Diagnosis     s/p gastric bypass x 2     Allergic rhinitis     Carpal tunnel syndrome     Obstructive sleep apnea     Rosacea     CARDIOVASCULAR SCREENING; LDL GOAL LESS THAN 160     Tick bite, infected, positive Lyme test 1996 not treated     Vitamin D deficiency     Osteoporosis     Obesity, Class III, BMI 40-49.9 (morbid obesity) (H)     S/P TKR (total knee replacement)     HTN, goal below 140/80     Back pain, left below scapula, strained muscles     Encounter for routine gynecological examination      Colles' fracture     Bilateral cold feet     Elevated levels of transaminase & lactic acid dehydrogenase     Fatty liver     Shoulder injury, right, subsequent encounter     GI bleed     Upper GI bleed     Bronchospasm     Multiple joint pain, possible fibromyalgia     Rotator cuff syndrome, right     Benign essential hypertension        PMH:   Past Medical History:   Diagnosis Date     HTN (hypertension)      IRON DEFICIENCY ANEMIA 7/5/2005     Iron infusion/ resolved since injections     Obstructive sleep apnea      DARWIN (obstructive sleep apnea)        Surgical History:  Past Surgical History:   Procedure Laterality Date     ARTHROPLASTY KNEE  04/02/2012    Procedure:ARTHROPLASTY KNEE; Left Total Knee Arthroplasty--Anesth.Choice;  Surgeon:HERNANDO THOMPSON; Location:[facility]     ARTHROTOMY SHOULDER, ROTATOR CUFF REPAIR, COMBINED Right 08/25/2017    Procedure: COMBINED ARTHROTOMY SHOULDER, ROTATOR CUFF REPAIR;  Right shoulder distal clavicle excision, subacromial decompression, rotator cuff repair ;  Surgeon: Hernando Thompson MD;  Location: [facility]     ARTHROTOMY SHOULDER, ROTATOR CUFF REPAIR, COMBINED Right 12/18/2017    Procedure: COMBINED ARTHROTOMY SHOULDER, ROTATOR CUFF REPAIR;  Right Shoulder Rotator Cuff Revision;  Surgeon: Hernando Thopmson MD;  Location: [facility]     ARTHROTOMY SHOULDER, ROTATOR CUFF REPAIR, COMBINED Left 04/27/2018    Procedure: COMBINED ARTHROTOMY SHOULDER, ROTATOR CUFF REPAIR;  Left Shoulder Open Subacromial Decompression,Distal Clavicle Excision,Rotator Cuff Repair;  Surgeon: Hernando Thompson MD;  Location: [facility]     C/SECTION, LOW TRANSVERSE  1978, 1984, 1994    x 3     COLONOSCOPY  01/01/2001    normal colonoscopy     COLONOSCOPY N/A 08/22/2017    Procedure: COLONOSCOPY;  Colonoscopy  ;  Surgeon: Delfino Yoo MD;  Location: [facility]     COLONOSCOPY  08/22/2017    EXAMCOL     ESOPHAGOSCOPY, GASTROSCOPY, DUODENOSCOPY (EGD), COMBINED N/A 03/18/2018    Procedure: COMBINED ESOPHAGOSCOPY, GASTROSCOPY, DUODENOSCOPY (EGD);;  Surgeon: Poncho Galindo MD;  Location: [facility]     GASTRIC BYPASS  1980/2005    Gastric Bypass and revision     HERNIA REPAIR, INCISIONAL  06/16/2008    lap.repair with 10x8 mesh     TUBAL LIGATION  01/01/1994     [facility] ORAL SURGERY PROCEDURE  age 18    wisdom teeth       Family History:  Family History   Problem Relation Age of Onset     C.A.D. Father         MI at age 60     Hypertension Father      Alzheimer Disease Father      Hyperlipidemia Father      Osteoporosis Mother         on Fosamax     Glaucoma Mother      Other Cancer Brother      Stomach Cancer Maternal Aunt      Cervical Cancer Cousin      Diabetes No family hx of      Cerebrovascular Disease No family hx of      Breast  "Cancer No family hx of      Cancer - colorectal No family hx of      Prostate Cancer No family hx of      Liver Disease No family hx of        Social History:   reports that she has never smoked. She has never used smokeless tobacco. She reports current alcohol use. She reports that she does not use drugs.    Allergies:  Allergies   Allergen Reactions     Ibuprofen      Other reaction(s): Gastrointestinal  Gastric bleed     Naproxen GI Disturbance     Other reaction(s): Gastrointestinal  Gastric bleed     Nickel Itching     Inflamed with cheap earrings (itchy)     Rofecoxib Itching and Swelling     VIOXX (Swollen Feet,Hands itching), Okay with ibuprofen and aspirin     Vioxx Itching and Swelling     VIOXX (Swollen Feet,Hands itching), Okay with ibuprofen and aspirin        Current Medications:  Current Outpatient Medications   Medication Sig Dispense Refill     acetaminophen (TYLENOL) 500 MG tablet Take 1,000 mg by mouth        albuterol (PROAIR HFA/PROVENTIL HFA/VENTOLIN HFA) 108 (90 Base) MCG/ACT inhaler INHALE 2 PUFFS INTO THE LUNGS EVERY 6 HOURS AS NEEDED FOR SHORTNESS OF BREATH OR DIFFICULT BREATHING OR WHEEZING 25.5 g 1     atenolol (TENORMIN) 50 MG tablet TAKE 1 TABLET BY MOUTH  DAILY 90 tablet 1     hydroxychloroquine (PLAQUENIL) 200 MG tablet Take 1 tablet (200 mg) by mouth 2 times daily 180 tablet 0     nystatin (MYCOSTATIN) 731908 UNIT/GM POWD Apply topically 3 times daily as needed Refill at patient's request 30 g 1     oxybutynin ER (DITROPAN-XL) 5 MG 24 hr tablet TAKE 1 TABLET BY MOUTH  DAILY 90 tablet 1           Physical Exam:  /84 (BP Location: Right arm, Patient Position: Sitting, Cuff Size: Adult Large)   Pulse 72   Ht 1.6 m (5' 3\")   Wt 123.5 kg (272 lb 4.8 oz)   LMP 05/29/2006   BMI 48.24 kg/m    General: A & O x 3 in NAD, overweight  HEENT: EOMI, Non injected/non icteric sclera, no oral lesions noted  CV: s1s2 with RRR, no rubs appreciated   Lungs: CTA B/L, no wheezing , rales or " rhonci appreciated  GI: Soft, NT/ND, no rebound, no guarding noted  MS: Positive left third digit catching sensation over A1 pulley area on active flexion of left third digit.  Palpating hand joints did not reproduce any pains, no clear signs and vitals noted.  Otherwise patient demonstrated good passive/active ROM over other joints with no warmth, erythema, tenderness or synovitis noted over these joints.      Summary/Assessment:    History that includes inflammatory arthritis, positive SYLVIE, left third trigger finger.      Presents for a follow-up visit.    States Plaquenil which was started about 2 months ago has benefited some of her hand joint pains however still has left third digit trigger finger discomfort.  Patient adds that she senses other trigger fingers involving involving left second/fourth digits and possibly right third digit.  Unable to detect catching sensations involving these other digits on exam today.    States has seen the eye doctor, due to Plaquenil use and that visual field testing was fine.    Noted to have positive SYLVIE, subsets were unremarkable.    Treat by PCP for UTI for abnormalities noted on urinalysis.  Also noted to have mild microalbuminuria.    Has been experiencing shoulder pains, his history of shoulder surgeries.    Please see below for management plan.    From prior note: Presented on initial visit with multiple joint pains.    Patient explains that she began experiencing pains involving her hands relatively suddenly in the spring/summer 2021.    Also occasionally has some right wrist pain over ulnar side, has history of right wrist fracture about 6 to 7 years ago states pains at the time were on radial side.    Also complains of having catching sensation involving left third digit, more so in the morning.    Has occasional knee pains, history of left knee replacement.    On exam, noted to have some subtle fullness involving left third and right fifth MCP joints with some  tenderness palpation hence, may have an evolving inflammatory arthritis.    States cannot take ibuprofen due to history of peptic ulcer disease, apparently patient also has a history of gastric bypass.  Has not tried diclofenac gel.    Seldomly takes Tylenol, as states has a hard time with pills.  Has not tried taking liquid form.    Noted to have negative/unremarkable: Rheumatoid factor, CCP antibody, ESR and CRP levels performed in July 2021.    Denies family history for connective tissue disease.    Denies personal or family history for psoriasis.    Difficult to tell at this time with certainty as to what the primary source is contributing to her symptoms described.  May have evolving inflammatory arthritis.  May also have some degenerative joint disease presently involving left CMC joint and knees.  Appears to have mild left third digit trigger finger.  Will obtain some lab/x-rays and correlate clinically to screen for possible underlying etiologies.      Pertinent rheumatology/past medical history (please refer to above for more detailed history):      Inflammatory arthritis (left third MCP and right fifth MCP joints)    Positive SYLVIE (1-160, speckled pattern)    Hand pains (right fifth PIP, left CMC)    Right wrist pain    Chronic knee pains    Left knee replacement    Left third digit trigger.,  Mild    Overweight    History right rotator cuff tendinopathy, status post repair (2 surgeries)    History left rotator cuff surgery    Sleep apnea    History of gastric bypass    History of peptic ulcer disease      Rheumatology medications provided/suggested:    Diclofenac gel  Tylenol  Plaquenil      Pertinent medication from other providers or from otc (please refer to above for more detailed med list):    Albuterol inhaler      Pertinent medications already tried:     Prednisone taper, helpful      Pertinent lab history:    7/2021, negative/unremarkable: Rheumatoid factor, CCP antibody, ESR,  CRP    Positive/elevated: SYLVIE, urine microalbumin    Negative/unremarkable: SYLVIE subsets, ANCA, uric acid, C3, C4, ESR, CRP      Pertinent imaging/test history:      CXR: Two views of the chest were obtained. Cardiomediastinal  silhouette is within normal limits. Minimal basilar pulmonary  opacities, likely atelectasis. No significant pleural effusion or  pneumothorax.    Other:    , 4 children.     Not currently working. 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     Drinking very seldom, only social and vary weekend mixed drink. No smoking. No drug use.     Tubal ligation since 1994.      Plan:      Continue Plaquenil 200 mg twice a day.  Continue follow-up with ophthalmology every 6 months to 1 year.    We will inject left third digit trigger finger today with cortisone.    Suggested she try diclofenac gel at night over other affected hand pains.    Also suggested she can try a paraffin wax machine.    Continue Tylenol as needed, on a prior visit suggested she can try liquid Tylenol instead/1000 g twice daily..    Recheck labs prior to next visit.    Follow-up in 3 months.      Procedure note:     Total time, spent 40 minutes involved with patient care, includes placing orders, reviewing records and formulating management plan.      Consent to treat form signed by the patient.  Patient's left third digit A1 pulley region was prepped in a sterile manner with an alcohol swab and ChloraPrep applicator.  Injected Medrol 10 mg, 1:1/2 with lidocaine into left third digit A1 pulley region.  Patient tolerated the procedure well with no complications noted.  Patient was advised to place ice over injection sites if sore over the next 24-48 hours. Hannah WILDE assisted with procedure    Time spent noted above, does not include additional time spent involving injection.    This note was transcribed using Dragon voice recognition software as a  result unintentional grammatical errors or word substitutions may have occurred. Please contact our Rheumatology department if you need any clarification or if you have any related inquiries.    Major side effect profile of medications provided were discussed with the patient.      DO Sophie Peterson...................  2/16/2022   10:51 AM

## 2022-02-16 NOTE — PATIENT INSTRUCTIONS
Summary of Your Rheumatology Visit    Next Appointment:   6  Months    Medications:      Referrals:    Orthopedics    Tests:     Recommend having labs performed today and again a few days prior to next visit.    Injections:        Other:

## 2022-02-21 ENCOUNTER — ANCILLARY PROCEDURE (OUTPATIENT)
Dept: GENERAL RADIOLOGY | Facility: CLINIC | Age: 67
End: 2022-02-21
Attending: FAMILY MEDICINE
Payer: COMMERCIAL

## 2022-02-21 ENCOUNTER — OFFICE VISIT (OUTPATIENT)
Dept: ORTHOPEDICS | Facility: CLINIC | Age: 67
End: 2022-02-21
Attending: INTERNAL MEDICINE
Payer: COMMERCIAL

## 2022-02-21 VITALS
HEIGHT: 63 IN | SYSTOLIC BLOOD PRESSURE: 130 MMHG | WEIGHT: 272 LBS | DIASTOLIC BLOOD PRESSURE: 78 MMHG | BODY MASS INDEX: 48.2 KG/M2

## 2022-02-21 DIAGNOSIS — G89.29 CHRONIC LEFT SHOULDER PAIN: Primary | ICD-10-CM

## 2022-02-21 DIAGNOSIS — M54.12 LEFT CERVICAL RADICULOPATHY: ICD-10-CM

## 2022-02-21 DIAGNOSIS — M25.512 CHRONIC LEFT SHOULDER PAIN: Primary | ICD-10-CM

## 2022-02-21 DIAGNOSIS — M25.512 CHRONIC PAIN OF BOTH SHOULDERS: ICD-10-CM

## 2022-02-21 DIAGNOSIS — G89.29 CHRONIC PAIN OF BOTH SHOULDERS: ICD-10-CM

## 2022-02-21 DIAGNOSIS — M25.511 CHRONIC PAIN OF BOTH SHOULDERS: ICD-10-CM

## 2022-02-21 DIAGNOSIS — G89.29 CHRONIC LEFT SHOULDER PAIN: ICD-10-CM

## 2022-02-21 DIAGNOSIS — M25.512 CHRONIC LEFT SHOULDER PAIN: ICD-10-CM

## 2022-02-21 PROCEDURE — 99214 OFFICE O/P EST MOD 30 MIN: CPT | Mod: 25 | Performed by: FAMILY MEDICINE

## 2022-02-21 PROCEDURE — 20611 DRAIN/INJ JOINT/BURSA W/US: CPT | Mod: LT | Performed by: FAMILY MEDICINE

## 2022-02-21 PROCEDURE — 73030 X-RAY EXAM OF SHOULDER: CPT | Mod: LT | Performed by: RADIOLOGY

## 2022-02-21 PROCEDURE — 72040 X-RAY EXAM NECK SPINE 2-3 VW: CPT | Performed by: RADIOLOGY

## 2022-02-21 RX ORDER — ROPIVACAINE HYDROCHLORIDE 5 MG/ML
4 INJECTION, SOLUTION EPIDURAL; INFILTRATION; PERINEURAL
Status: DISCONTINUED | OUTPATIENT
Start: 2022-02-21 | End: 2023-11-27

## 2022-02-21 RX ORDER — TRIAMCINOLONE ACETONIDE 40 MG/ML
40 INJECTION, SUSPENSION INTRA-ARTICULAR; INTRAMUSCULAR
Status: DISCONTINUED | OUTPATIENT
Start: 2022-02-21 | End: 2023-11-27

## 2022-02-21 RX ADMIN — TRIAMCINOLONE ACETONIDE 40 MG: 40 INJECTION, SUSPENSION INTRA-ARTICULAR; INTRAMUSCULAR at 07:42

## 2022-02-21 RX ADMIN — ROPIVACAINE HYDROCHLORIDE 4 ML: 5 INJECTION, SOLUTION EPIDURAL; INFILTRATION; PERINEURAL at 07:42

## 2022-02-21 NOTE — PATIENT INSTRUCTIONS
# Chronic Left Shoulder Pain: Longstanding condition for patient for years with worsening over the past several months with pain over the anterior/lateral portion of her shoulder.  She does have some pain going into her neck and down into her elbow as well.  She does have pain over the left side of her neck worse with compression of the cervical nerves on that side.  She also has pain with rotator cuff testing.  Cervical x-ray showing mild degeneration but no significant abnormality.  Shoulder x-ray showing mild shoulder joint arthritis.  Ultrasound showing irritation of the rotator cuff tendons.  I do think the cause of her pain is mainly due to irritated rotator cuff tendon.  Counseled her on treatment options including home exercise, physical therapy, anti-inflammatories, steroid injections.  Given this plan to treat as below and follow-up if not improving.  Image Findings: Cervical x-ray showing milder degeneration, mild shoulder joint arthritis  Treatment: Activities as tolerated, home exercises given today  Medications/Injections: Limited tylenol/ibuprofen for pain for 1-2 weeks, left subacromial steroid injection  Follow-up: In one month if symptoms do not improve, sooner if worsening  Can consider further evaluation of possible neck symptoms.    Please call [phone]   Ask for my team if you have any questions or concerns    If you have not yet received the influenza vaccine but would like to get one, please call  [phone] or you can schedule via [employer]    It was great seeing you again today!    Rex Figueroa MD, [facility] Injection Discharge Instructions    Procedure: Left subacromial steroid injection       You may shower, however avoid swimming, tub baths or hot tubs for 24 hours following your procedure    You may have a mild to moderate increase in pain for several days following the injection.    It may take up to 14 days for the steroid medication to start working although you may  feel the effect as early as a few days after the procedure.    You may use ice packs for 10-15 minutes, 3 to 4 times a day at the injection site for comfort    You may use anti-inflammatory medications (such as Ibuprofen or Aleve or Advil) or Tylenol for pain control if necessary    If you were fasting, you may resume your normal diet and medications after the procedure    If you have diabetes, check your blood sugar more frequently than usual as your blood sugar may be higher than normal for 10-14 days following a steroid injection. Contact your doctor who manages your diabetes if your blood sugar is higher than usual      If you experience any of the following, call [facility] [place], [place] @ [phone] or [phone]  -Fever over 100 degree F  -Swelling, bleeding, redness, drainage, warmth at the injection site  - New or worsening pain

## 2022-02-21 NOTE — PROGRESS NOTES
ASSESSMENT & PLAN    Jacquie was seen today for pain and pain.    Diagnoses and all orders for this visit:    Chronic pain of both shoulders  -     Orthopedic  Referral      This issue is acute on chronic and Worsening.    # Chronic Left Shoulder Pain: Longstanding condition for patient for years with worsening over the past several months with pain over the anterior/lateral portion of her shoulder.  She does have some pain going into her neck and down into her elbow as well.  She does have pain over the left side of her neck worse with compression of the cervical nerves on that side.  She also has pain with rotator cuff testing.  Cervical x-ray showing mild degeneration but no significant abnormality.  Shoulder x-ray showing mild shoulder joint arthritis.  Ultrasound showing irritation of the rotator cuff tendons.  I do think the cause of her pain is mainly due to irritated rotator cuff tendon.  Counseled her on treatment options including home exercise, physical therapy, anti-inflammatories, steroid injections.  Given this plan to treat as below and follow-up if not improving.  Image Findings: Cervical x-ray showing milder degeneration, mild shoulder joint arthritis  Treatment: Activities as tolerated, home exercises given today  Medications/Injections: Limited tylenol/ibuprofen for pain for 1-2 weeks, left subacromial steroid injection  Follow-up: In one month if symptoms do not improve, sooner if worsening  Can consider further evaluation of possible neck symptoms.    Rex Figueroa MD  [facility] [place]    Ultrasound Findings: left subacromial bursa steroid injection    Please do not bill      -----  Chief Complaint   Patient presents with     Right Shoulder - Pain     Left Shoulder - Pain       SUBJECTIVE  Jacquie Harris is a/an 66 year old female who is seen in consultation at the request of  Samy Uribe D.O. for evaluation of bilateral shoulder pain .  "    The patient is seen by themselves.  The patient is Right handed    Onset: Several years(s) ago. Reports insidious onset without acute precipitating event.  Location of Pain: left shoulder worse than right, left axilla radiating to medial elbow, has chronic cervical pain.  Pain with sleeping on sides.  Worsened by: clapping, constant pain, driving, reading    Better with: nothing   Treatments tried: topical creams,  several shoulder steroid injections   Associated symptoms: no distal numbness or tingling; denies swelling or warmth    Orthopedic/Surgical history: YES - Chronic shoulder pain-Left- DCE,SAD, RCR 4/27/18 by Dr. Thompson   Right- DCE, SAD, RCR 8/25/17 by Dr. Thompson, RCR Revision 12/18/17 by Dr. Thompson   Social History/Occupation: Retired, likes to read     No family history pertinent to patient's problem today.      REVIEW OF SYSTEMS:  Review of Systems  Constitutional, HEENT, cardiovascular, pulmonary, gi and gu systems are negative, except as otherwise noted.    OBJECTIVE:  Ht 1.6 m (5' 3\")   Wt 123.4 kg (272 lb)   LMP 05/29/2006   BMI 48.18 kg/m     General: healthy, alert and in no distress  HEENT: no scleral icterus or conjunctival erythema  Skin: no suspicious lesions or rash. No jaundice.  CV: distal perfusion intact    Resp: normal respiratory effort without conversational dyspnea   Psych: normal mood and affect  Gait: normal steady gait with appropriate coordination and balance   Neuro: Normal light sensory exam of bilateral upper extremity     LEFT SHOULDER  Inspection:    no swelling, bruising, discoloration, or obvious deformity or asymmetry  Palpation:    Tender about the anterior capsule and supraspinatus insertion. Remainder of bony and tendinous landmarks are nontender.  Active Range of Motion:     Abduction 1650, 180 passive FF 1650, 180 passive, , IR hip pocket.    Strength:    Scapular plane abduction 5/5, painful,  ER 5/5, IR 5/5, biceps 5/5, triceps 5/5  Special Tests:    " Positive: Neer's and Phillips'    Negative: Sun's, Speed's and Yergason's    CERVICAL SPINE  Inspection:    normal cervical lordosis present, rounded shoulders, forward head posture  Palpation:    Nontender.  Range of Motion:     Flexion full    Extension full    Right side bend full    Left side bend full    Right rotation full    Left rotation full  Strength:    Full strength throughout all neck muscles  Special Tests:    Positive: None    Negative: Spurling's (bilateral)          RADIOLOGY:  I independently ordered, visualized and reviewed these images with the patient  Diffuse cervical disc degeneration      IMPRESSION: Minimal degenerative anterolisthesis of C2 upon C3, C3  upon C4 and C5 on C6. Alignment otherwise normal. Vertebral body  heights are normal. Moderate facet arthropathy at C2-C3. Mild  degenerative endplate spurring at C4-C5, C5-C6 and C6-C7. No  fractures.     ALLISON LEDBETTER MD     IMPRESSION:  1.  Normal left glenohumeral joint spacing and alignment.  2.  Distal clavicle excision, unchanged.  3.  No fracture.  4.  No significant change since the comparison.     SHRAVAN ASCENCIO MD     Review of external notes as documented elsewhere in note  Review of the result(s) of each unique test - left shoulder and cervical x-rays      Large Joint Injection/Arthocentesis: L subacromial bursa    Date/Time: 2/21/2022 7:42 AM  Performed by: Rex Figueroa MD  Authorized by: Rex Figueroa MD     Indications:  Pain  Needle Size:  25 G  Guidance: ultrasound    Approach:  Lateral  Location:  Shoulder      Site:  L subacromial bursa  Medications:  40 mg triamcinolone 40 MG/ML; 4 mL ropivacaine 5 MG/ML  Outcome:  Tolerated well, no immediate complications  Procedure discussed: discussed risks, benefits, and alternatives    Consent Given by:  Patient  Timeout: timeout called immediately prior to procedure    Prep: patient was prepped and draped in usual sterile fashion     Patient reported significant  improvement of pain after the numbing portion of left subacromial bursa steroid injection.  Ultrasound guided images were permanently stored on ultrasound machine.  Aftercare instructions given to patient.  Plan to follow-up as discussed above.     Rex Figueroa MD [facility]

## 2022-02-21 NOTE — LETTER
2/21/2022         RE: Jacquie Harris  [address] [facility] [id number]        Dear Colleague,    Thank you for referring your patient, Jacquie Harris, to the [facility]. Please see a copy of my visit note below.    ASSESSMENT & PLAN    Jacquie was seen today for pain and pain.    Diagnoses and all orders for this visit:    Chronic pain of both shoulders  -     Orthopedic  Referral      This issue is acute on chronic and Worsening.    # Chronic Left Shoulder Pain: Longstanding condition for patient for years with worsening over the past several months with pain over the anterior/lateral portion of her shoulder.  She does have some pain going into her neck and down into her elbow as well.  She does have pain over the left side of her neck worse with compression of the cervical nerves on that side.  She also has pain with rotator cuff testing.  Cervical x-ray showing mild degeneration but no significant abnormality.  Shoulder x-ray showing mild shoulder joint arthritis.  Ultrasound showing irritation of the rotator cuff tendons.  I do think the cause of her pain is mainly due to irritated rotator cuff tendon.  Counseled her on treatment options including home exercise, physical therapy, anti-inflammatories, steroid injections.  Given this plan to treat as below and follow-up if not improving.  Image Findings: Cervical x-ray showing milder degeneration, mild shoulder joint arthritis  Treatment: Activities as tolerated, home exercises given today  Medications/Injections: Limited tylenol/ibuprofen for pain for 1-2 weeks, left subacromial steroid injection  Follow-up: In one month if symptoms do not improve, sooner if worsening  Can consider further evaluation of possible neck symptoms.    Rex Figueroa MD  [facility]    Ultrasound Findings: left subacromial bursa steroid injection    Please do not bill      -----  Chief  "Complaint   Patient presents with     Right Shoulder - Pain     Left Shoulder - Pain       SUBJECTIVE  Jacquie Harris is a/an 66 year old female who is seen in consultation at the request of  Samy Uribe D.O. for evaluation of bilateral shoulder pain .     The patient is seen by themselves.  The patient is Right handed    Onset: Several years(s) ago. Reports insidious onset without acute precipitating event.  Location of Pain: left shoulder worse than right, left axilla radiating to medial elbow, has chronic cervical pain.  Pain with sleeping on sides.  Worsened by: clapping, constant pain, driving, reading    Better with: nothing   Treatments tried: topical creams,  several shoulder steroid injections   Associated symptoms: no distal numbness or tingling; denies swelling or warmth    Orthopedic/Surgical history: YES - Chronic shoulder pain-Left- DCE,SAD, RCR 4/27/18 by Dr. Thompson   Right- DCE, SAD, RCR 8/25/17 by Dr. Thompson, RCR Revision 12/18/17 by Dr. Thompson   Social History/Occupation: Retired, likes to read     No family history pertinent to patient's problem today.      REVIEW OF SYSTEMS:  Review of Systems  Constitutional, HEENT, cardiovascular, pulmonary, gi and gu systems are negative, except as otherwise noted.    OBJECTIVE:  Ht 1.6 m (5' 3\")   Wt 123.4 kg (272 lb)   LMP 05/29/2006   BMI 48.18 kg/m     General: healthy, alert and in no distress  HEENT: no scleral icterus or conjunctival erythema  Skin: no suspicious lesions or rash. No jaundice.  CV: distal perfusion intact    Resp: normal respiratory effort without conversational dyspnea   Psych: normal mood and affect  Gait: normal steady gait with appropriate coordination and balance   Neuro: Normal light sensory exam of bilateral upper extremity     LEFT SHOULDER  Inspection:    no swelling, bruising, discoloration, or obvious deformity or asymmetry  Palpation:    Tender about the anterior capsule and supraspinatus insertion. Remainder of " bony and tendinous landmarks are nontender.  Active Range of Motion:     Abduction 1650, 180 passive FF 1650, 180 passive, , IR hip pocket.    Strength:    Scapular plane abduction 5/5, painful,  ER 5/5, IR 5/5, biceps 5/5, triceps 5/5  Special Tests:    Positive: Neer's and Phillips'    Negative: Jones's, Speed's and Yergason's    CERVICAL SPINE  Inspection:    normal cervical lordosis present, rounded shoulders, forward head posture  Palpation:    Nontender.  Range of Motion:     Flexion full    Extension full    Right side bend full    Left side bend full    Right rotation full    Left rotation full  Strength:    Full strength throughout all neck muscles  Special Tests:    Positive: None    Negative: Spurling's (bilateral)          RADIOLOGY:  I independently ordered, visualized and reviewed these images with the patient  Diffuse cervical disc degeneration      IMPRESSION: Minimal degenerative anterolisthesis of C2 upon C3, C3  upon C4 and C5 on C6. Alignment otherwise normal. Vertebral body  heights are normal. Moderate facet arthropathy at C2-C3. Mild  degenerative endplate spurring at C4-C5, C5-C6 and C6-C7. No  fractures.     ALLISON LEDBETTER MD     IMPRESSION:  1.  Normal left glenohumeral joint spacing and alignment.  2.  Distal clavicle excision, unchanged.  3.  No fracture.  4.  No significant change since the comparison.     SHRAVAN ASCENCIO MD     Review of external notes as documented elsewhere in note  Review of the result(s) of each unique test - left shoulder and cervical x-rays      Large Joint Injection/Arthocentesis: L subacromial bursa    Date/Time: 2/21/2022 7:42 AM  Performed by: Rex Figueroa MD  Authorized by: Rex Figueroa MD     Indications:  Pain  Needle Size:  25 G  Guidance: ultrasound    Approach:  Lateral  Location:  Shoulder      Site:  L subacromial bursa  Medications:  40 mg triamcinolone 40 MG/ML; 4 mL ropivacaine 5 MG/ML  Outcome:  Tolerated well, no immediate  complications  Procedure discussed: discussed risks, benefits, and alternatives    Consent Given by:  Patient  Timeout: timeout called immediately prior to procedure    Prep: patient was prepped and draped in usual sterile fashion     Patient reported significant improvement of pain after the numbing portion of left subacromial bursa steroid injection.  Ultrasound guided images were permanently stored on ultrasound machine.  Aftercare instructions given to patient.  Plan to follow-up as discussed above.     Rex Figueroa MD [facility]              Again, thank you for allowing me to participate in the care of your patient.        Sincerely,        Rex Figueroa MD

## 2022-03-11 ENCOUNTER — TELEPHONE (OUTPATIENT)
Dept: RHEUMATOLOGY | Facility: CLINIC | Age: 67
End: 2022-03-11
Payer: COMMERCIAL

## 2022-03-11 DIAGNOSIS — R76.8 POSITIVE ANA (ANTINUCLEAR ANTIBODY): ICD-10-CM

## 2022-03-11 DIAGNOSIS — M19.90 INFLAMMATORY ARTHRITIS: ICD-10-CM

## 2022-03-11 RX ORDER — HYDROXYCHLOROQUINE SULFATE 200 MG/1
200 TABLET, FILM COATED ORAL 2 TIMES DAILY
Qty: 180 TABLET | Refills: 0 | Status: SHIPPED | OUTPATIENT
Start: 2022-03-11 | End: 2022-05-18

## 2022-03-11 RX ORDER — HYDROXYCHLOROQUINE SULFATE 200 MG/1
200 TABLET, FILM COATED ORAL 2 TIMES DAILY
Qty: 14 TABLET | Refills: 0 | Status: SHIPPED | OUTPATIENT
Start: 2022-03-11 | End: 2022-03-11

## 2022-03-11 NOTE — TELEPHONE ENCOUNTER
Requesting hydroxychloroquine refill. Please send to [facility].     Patient will not have enough medication until the mailorder arrives. Patient is wondering if a specific quantity can be dispensed until she receives medication via [facility]. Please c/b

## 2022-03-11 NOTE — TELEPHONE ENCOUNTER
Pt states she will run out of HCQ tomorrow evening and would [facility] [place] and a 90 day supply to [facility] pharmacy.     Pt states she had eye exam in early February from [facility] in [place]. Called today to get eye report faxed to us.     Refilled per Rheum RN refill protocol

## 2022-03-24 ENCOUNTER — TELEPHONE (OUTPATIENT)
Dept: RHEUMATOLOGY | Facility: CLINIC | Age: 67
End: 2022-03-24
Payer: COMMERCIAL

## 2022-03-24 NOTE — TELEPHONE ENCOUNTER
Pt states she has been having headaches pretty much every day since around the time she started HCQ in November. Headaches are across her eyes and up into forehead. Pt states her eyes get strained when checking her emails or reading a book that she has to stop. Pt wonders if this could be related to the hCQ and what can be done to help the headaches.     Currently taking HCQ twice daily    Pt states HCQ has helped her hands, they do not catch when moving her joints like they used to. If pt misses her night dose of HCQ due to forgetting to take it, she says she can feel more pain in her hands and they catch when moving her joints from just taking HCQ once daily.     Recent Eye exam report 2/4/22- in chart under media tab    Last ov-2/16/22  Last labs-2/16/22    Future appt-8/17/22

## 2022-03-25 NOTE — TELEPHONE ENCOUNTER
Pt notified of recommendations below. Pt will try these and let us know if symptoms persist or wants to be seen sooner to discuss other alternatives

## 2022-03-25 NOTE — TELEPHONE ENCOUNTER
Please discuss with the patient:    Patient can try 1-1/2 tablets of Plaquenil and see if better tolerated.  Other option patient can alternate taking 1 day 200 mg (1 tablet), the next day twice a day, continuing this alternating pattern and see if better tolerated.      If symptoms persist despite above recommendations, recommend decreasing to 1 tablet daily and scheduling a sooner follow-up reassessment to further discuss.

## 2022-04-11 DIAGNOSIS — B37.2 CANDIDIASIS OF SKIN: ICD-10-CM

## 2022-04-11 RX ORDER — NYSTATIN 100000 [USP'U]/G
POWDER TOPICAL 3 TIMES DAILY PRN
Qty: 30 G | Refills: 1 | Status: SHIPPED | OUTPATIENT
Start: 2022-04-11 | End: 2022-04-12

## 2022-04-11 NOTE — TELEPHONE ENCOUNTER
Patient almost out of nystatin powder, requesting to [facility].    Thank you,    Jadyn Stephenson, MUKESH Currie

## 2022-04-12 RX ORDER — NYSTATIN 100000 [USP'U]/G
POWDER TOPICAL 3 TIMES DAILY PRN
Qty: 30 G | Refills: 1 | Status: SHIPPED | OUTPATIENT
Start: 2022-04-12 | End: 2022-08-10

## 2022-04-12 NOTE — TELEPHONE ENCOUNTER
Pt called.  [facility] is out of stock.  Pt does not want to wait.  She asks that rx be sent to [facility] instead.    Resent to [facility].    Reminded pt to let [facility] know that she does not want this filled thru them so she does not get billed for it.  Pt will notify.    Tamela Chand RN

## 2022-04-17 ENCOUNTER — HEALTH MAINTENANCE LETTER (OUTPATIENT)
Age: 67
End: 2022-04-17

## 2022-05-18 DIAGNOSIS — R76.8 POSITIVE ANA (ANTINUCLEAR ANTIBODY): ICD-10-CM

## 2022-05-18 DIAGNOSIS — M19.90 INFLAMMATORY ARTHRITIS: ICD-10-CM

## 2022-05-18 RX ORDER — HYDROXYCHLOROQUINE SULFATE 200 MG/1
200 TABLET, FILM COATED ORAL 2 TIMES DAILY
Qty: 180 TABLET | Refills: 0 | Status: SHIPPED | OUTPATIENT
Start: 2022-05-18 | End: 2022-06-30

## 2022-06-24 ENCOUNTER — TELEPHONE (OUTPATIENT)
Dept: RHEUMATOLOGY | Facility: CLINIC | Age: 67
End: 2022-06-24

## 2022-06-24 NOTE — TELEPHONE ENCOUNTER
Last labs and appt 2/2022, do you want to see the pt prior to your last day to continue to refill?

## 2022-06-24 NOTE — TELEPHONE ENCOUNTER
Health Call Center    Phone Message    May a detailed message be left on voicemail: yes     Reason for Call: Medication Refill Request    Has the patient contacted the pharmacy for the refill? Yes   Name of medication being requested: Hydroxychloroquine  Provider who prescribed the medication: Dr. Uribe  Pharmacy: [facility]  Date medication is needed: Approx Aug 16th, 2022     Pt has an appt to establish care with Danni Martínez PA-C on 10/6/22, but is concerned she will not have enough medication to last until then. She had a 90-day supply of her hydroxychloroquine sent to her pharmacy on 5/18/22, she she expects to run out mid-August. Wondering if Dr. Uribe could send in enough refills to make it until her appt in October?    Action Taken: Message routed to:  Other: RHEUMATOLOGY SUPPORT POOL    Travel Screening: Not Applicable

## 2022-06-28 ENCOUNTER — LAB (OUTPATIENT)
Dept: LAB | Facility: CLINIC | Age: 67
End: 2022-06-28
Payer: COMMERCIAL

## 2022-06-28 DIAGNOSIS — Z79.899 HIGH RISK MEDICATION USE: ICD-10-CM

## 2022-06-28 LAB
ALBUMIN SERPL-MCNC: 3.5 G/DL (ref 3.4–5)
ALT SERPL W P-5'-P-CCNC: 15 U/L (ref 0–50)
AST SERPL W P-5'-P-CCNC: 11 U/L (ref 0–45)
BASOPHILS # BLD AUTO: 0 10E3/UL (ref 0–0.2)
BASOPHILS NFR BLD AUTO: 0 %
CREAT SERPL-MCNC: 0.72 MG/DL (ref 0.52–1.04)
EOSINOPHIL # BLD AUTO: 0.1 10E3/UL (ref 0–0.7)
EOSINOPHIL NFR BLD AUTO: 2 %
ERYTHROCYTE [DISTWIDTH] IN BLOOD BY AUTOMATED COUNT: 14.9 % (ref 10–15)
GFR SERPL CREATININE-BSD FRML MDRD: >90 ML/MIN/1.73M2
HCT VFR BLD AUTO: 40.4 % (ref 35–47)
HGB BLD-MCNC: 11.9 G/DL (ref 11.7–15.7)
LYMPHOCYTES # BLD AUTO: 1.9 10E3/UL (ref 0.8–5.3)
LYMPHOCYTES NFR BLD AUTO: 31 %
MCH RBC QN AUTO: 24.3 PG (ref 26.5–33)
MCHC RBC AUTO-ENTMCNC: 29.5 G/DL (ref 31.5–36.5)
MCV RBC AUTO: 83 FL (ref 78–100)
MONOCYTES # BLD AUTO: 0.5 10E3/UL (ref 0–1.3)
MONOCYTES NFR BLD AUTO: 8 %
NEUTROPHILS # BLD AUTO: 3.7 10E3/UL (ref 1.6–8.3)
NEUTROPHILS NFR BLD AUTO: 60 %
PLATELET # BLD AUTO: 229 10E3/UL (ref 150–450)
RBC # BLD AUTO: 4.89 10E6/UL (ref 3.8–5.2)
WBC # BLD AUTO: 6.1 10E3/UL (ref 4–11)

## 2022-06-28 PROCEDURE — 82040 ASSAY OF SERUM ALBUMIN: CPT

## 2022-06-28 PROCEDURE — 85025 COMPLETE CBC W/AUTO DIFF WBC: CPT

## 2022-06-28 PROCEDURE — 84460 ALANINE AMINO (ALT) (SGPT): CPT

## 2022-06-28 PROCEDURE — 36415 COLL VENOUS BLD VENIPUNCTURE: CPT

## 2022-06-28 PROCEDURE — 82565 ASSAY OF CREATININE: CPT

## 2022-06-28 PROCEDURE — 84450 TRANSFERASE (AST) (SGOT): CPT

## 2022-06-28 NOTE — TELEPHONE ENCOUNTER
Lab appt scheduled for later today (6/28/22), video visit scheduled 6/30/22.     Routed to RHEUMATOLOGY SUPPORT POOL as an FYI.

## 2022-06-28 NOTE — TELEPHONE ENCOUNTER
If there is follow-up appointment availability recommend she schedule a follow-up reassessment.    Recommend rechecking CBC with differential, creatinine, AST/ALT and albumin in July 2022, prior to additional refills.    Please inquire if patient has patient seen the eye doctor within the past 6 months to 1 year while on Plaquenil for retinal and visual field testing?

## 2022-06-28 NOTE — TELEPHONE ENCOUNTER
Please call pt to schedule f/u with Dr Uribe prior to him leaving for med refills.     Please schedule lab appt in July 2022    Recent eye exam is in chart from March 2022.

## 2022-06-30 ENCOUNTER — VIRTUAL VISIT (OUTPATIENT)
Dept: RHEUMATOLOGY | Facility: CLINIC | Age: 67
End: 2022-06-30
Payer: COMMERCIAL

## 2022-06-30 DIAGNOSIS — R76.8 POSITIVE ANA (ANTINUCLEAR ANTIBODY): ICD-10-CM

## 2022-06-30 DIAGNOSIS — M19.90 INFLAMMATORY ARTHRITIS: ICD-10-CM

## 2022-06-30 PROCEDURE — 99214 OFFICE O/P EST MOD 30 MIN: CPT | Mod: 95 | Performed by: INTERNAL MEDICINE

## 2022-06-30 RX ORDER — HYDROXYCHLOROQUINE SULFATE 200 MG/1
200 TABLET, FILM COATED ORAL 2 TIMES DAILY
Qty: 180 TABLET | Refills: 0 | Status: SHIPPED | OUTPATIENT
Start: 2022-06-30 | End: 2022-08-09

## 2022-06-30 NOTE — PATIENT INSTRUCTIONS
Summary of Your Rheumatology Visit    Next Appointment:   10/5/22 with rheumatology    Medications:    Please follow directives on pill bottle on how to take medication(s) provided.    Referrals:      Tests:        Injections:      Other:

## 2022-06-30 NOTE — PROGRESS NOTES
Jacquie Harris who presents today with a chief complaint of  No chief complaint on file.      Joint Pains: yes  Location: multiples joints  Onset: ongoing  Intensity: 5-6 /10  AM Stiffness: 10-15 Minutes  Alleviating/Aggravating Factors: wrist brace help. Being on feet long period increase pain  Tolerating Meds: yes  Other:      ROS:  Patient denies having any chest pain, shortness of breath, cough, abdominal pain, nausea, vomiting, rashes, fevers, oral ulcers and recent infections.  Patient admits to having a good appetite      Problem List:  Patient Active Problem List   Diagnosis     s/p gastric bypass x 2     Allergic rhinitis     Carpal tunnel syndrome     Obstructive sleep apnea     Rosacea     CARDIOVASCULAR SCREENING; LDL GOAL LESS THAN 160     Tick bite, infected, positive Lyme test 1996 not treated     Vitamin D deficiency     Osteoporosis     Obesity, Class III, BMI 40-49.9 (morbid obesity) (H)     S/P TKR (total knee replacement)     HTN, goal below 140/80     Back pain, left below scapula, strained muscles     Encounter for routine gynecological examination      Colles' fracture     Bilateral cold feet     Elevated levels of transaminase & lactic acid dehydrogenase     Fatty liver     Shoulder injury, right, subsequent encounter     GI bleed     Upper GI bleed     Bronchospasm     Multiple joint pain, possible fibromyalgia     Rotator cuff syndrome, right     Benign essential hypertension        PMH:   Past Medical History:   Diagnosis Date     HTN (hypertension)      IRON DEFICIENCY ANEMIA 7/5/2005     Iron infusion/ resolved since injections     Obstructive sleep apnea      DARWIN (obstructive sleep apnea)        Surgical History:  Past Surgical History:   Procedure Laterality Date     ARTHROPLASTY KNEE  04/02/2012    Procedure:ARTHROPLASTY KNEE; Left Total Knee Arthroplasty--Anesth.Choice; Surgeon:VICENTE MORALES; Location:[facility]     ARTHROTOMY SHOULDER, ROTATOR CUFF REPAIR, COMBINED Right  08/25/2017    Procedure: COMBINED ARTHROTOMY SHOULDER, ROTATOR CUFF REPAIR;  Right shoulder distal clavicle excision, subacromial decompression, rotator cuff repair ;  Surgeon: Hernando Thompson MD;  Location: [facility] OR     ARTHROTOMY SHOULDER, ROTATOR CUFF REPAIR, COMBINED Right 12/18/2017    Procedure: COMBINED ARTHROTOMY SHOULDER, ROTATOR CUFF REPAIR;  Right Shoulder Rotator Cuff Revision;  Surgeon: Hernando Thompson MD;  Location: [facility] OR     ARTHROTOMY SHOULDER, ROTATOR CUFF REPAIR, COMBINED Left 04/27/2018    Procedure: COMBINED ARTHROTOMY SHOULDER, ROTATOR CUFF REPAIR;  Left Shoulder Open Subacromial Decompression,Distal Clavicle Excision,Rotator Cuff Repair;  Surgeon: Hernando Thompson MD;  Location: [facility] OR     C/SECTION, LOW TRANSVERSE  1978, 1984, 1994    x 3     COLONOSCOPY  01/01/2001    normal colonoscopy     COLONOSCOPY N/A 08/22/2017    Procedure: COLONOSCOPY;  Colonoscopy  ;  Surgeon: Delfino Yoo MD;  Location: [facility] GI     COLONOSCOPY  08/22/2017    EXAMCOL     ESOPHAGOSCOPY, GASTROSCOPY, DUODENOSCOPY (EGD), COMBINED N/A 03/18/2018    Procedure: COMBINED ESOPHAGOSCOPY, GASTROSCOPY, DUODENOSCOPY (EGD);;  Surgeon: Poncho Galindo MD;  Location: [facility]     GASTRIC BYPASS  1980/2005    Gastric Bypass and revision     HERNIA REPAIR, INCISIONAL  06/16/2008    lap.repair with 10x8 mesh     TUBAL LIGATION  01/01/1994     [facility] ORAL SURGERY PROCEDURE  age 18    wisdom teeth       Family History:  Family History   Problem Relation Age of Onset     C.A.D. Father         MI at age 60     Hypertension Father      Alzheimer Disease Father      Hyperlipidemia Father      Osteoporosis Mother         on Fosamax     Glaucoma Mother      Other Cancer Brother      Stomach Cancer Maternal Aunt      Cervical Cancer Cousin      Diabetes No family hx of      Cerebrovascular Disease No family hx of      Breast Cancer No family hx of      Cancer - colorectal No family hx of      Prostate Cancer No family hx of       Liver Disease No family hx of        Social History:   reports that she has never smoked. She has never used smokeless tobacco. She reports current alcohol use. She reports that she does not use drugs.    Allergies:  Allergies   Allergen Reactions     Ibuprofen      Other reaction(s): Gastrointestinal  Gastric bleed     Naproxen GI Disturbance     Other reaction(s): Gastrointestinal  Gastric bleed     Nickel Itching     Inflamed with cheap earrings (itchy)     Rofecoxib Itching and Swelling     VIOXX (Swollen Feet,Hands itching), Okay with ibuprofen and aspirin     Vioxx Itching and Swelling     VIOXX (Swollen Feet,Hands itching), Okay with ibuprofen and aspirin        Current Medications:  Current Outpatient Medications   Medication Sig Dispense Refill     acetaminophen (TYLENOL) 500 MG tablet Take 1,000 mg by mouth        albuterol (PROAIR HFA/PROVENTIL HFA/VENTOLIN HFA) 108 (90 Base) MCG/ACT inhaler INHALE 2 PUFFS INTO THE LUNGS EVERY 6 HOURS AS NEEDED FOR SHORTNESS OF BREATH OR DIFFICULT BREATHING OR WHEEZING 25.5 g 1     atenolol (TENORMIN) 50 MG tablet TAKE 1 TABLET BY MOUTH  DAILY 90 tablet 1     hydroxychloroquine (PLAQUENIL) 200 MG tablet Take 1 tablet (200 mg) by mouth 2 times daily 180 tablet 0     nystatin (MYCOSTATIN) 211448 UNIT/GM external powder Apply topically 3 times daily as needed Refill at patient's request 30 g 1     oxybutynin ER (DITROPAN-XL) 5 MG 24 hr tablet TAKE 1 TABLET BY MOUTH  DAILY 90 tablet 1           Physical Exam:  Following up today via video visit, per Covid-19 pandemic requirements.    Verbal consent has been obtained for this service by care team member.    Video call start time: 10:47 AM    Video call end time: 10:57 AM    Doximity utilized for video call.    Phone number utilized: [phone]    Patient location for video visit: Home     Provider location for video visit:  Home (working remotely)    Summary/Assessment:    History that includes inflammatory arthritis,  positive SYLVIE, left third trigger finger.      Presents for a follow-up visit.    Overall claims to be doing well with Plaquenil 200 mg twice a day.  Has been tolerating Plaquenil well lately.    Has occasional hand pains towards the end of the day.  Admits to being active with her hands.  On video exam points to PIPs/DIPs where she was been experiencing, no clear signs of synovitis noted on video exam today.  Patient also admits has not noticed any fullness over these joints at this time.    Has tried Tylenol as needed, with little to no benefit.    Has not tried utilizing diclofenac gel for occasional hand arthralgias.    Has some signs of DJD involving IP joints on x-rays.    Left third digit trigger finger injection performed on prior visit was beneficial.    Latest drug monitoring labs noted to be stable.    States has seen the eye doctor, due to Plaquenil use and that visual field testing was fine.    Please see below for management plan.    From prior note: Presented on initial visit with multiple joint pains.    Patient explains that she began experiencing pains involving her hands relatively suddenly in the spring/summer 2021.    Also occasionally has some right wrist pain over ulnar side, has history of right wrist fracture about 6 to 7 years ago states pains at the time were on radial side.    Also complains of having catching sensation involving left third digit, more so in the morning.    Has occasional knee pains, history of left knee replacement.    On exam, noted to have some subtle fullness involving left third and right fifth MCP joints with some tenderness palpation hence, may have an evolving inflammatory arthritis.    States cannot take ibuprofen due to history of peptic ulcer disease, apparently patient also has a history of gastric bypass.  Has not tried diclofenac gel.    Seldomly takes Tylenol, as states has a hard time with pills.  Has not tried taking liquid form.    Noted to have  negative/unremarkable: Rheumatoid factor, CCP antibody, ESR and CRP levels performed in July 2021.    Denies family history for connective tissue disease.    Denies personal or family history for psoriasis.    Difficult to tell at this time with certainty as to what the primary source is contributing to her symptoms described.  May have evolving inflammatory arthritis.  May also have some degenerative joint disease presently involving left CMC joint and knees.  Appears to have mild left third digit trigger finger.  Will obtain some lab/x-rays and correlate clinically to screen for possible underlying etiologies.      Pertinent rheumatology/past medical history (please refer to above for more detailed history):      Inflammatory arthritis (left third MCP and right fifth MCP joints)    Positive SYLVIE (1-160, speckled pattern)    Hand pains (right fifth PIP, left CMC.  Some DJD on x-rays)    Right wrist pain    Chronic knee pains    Left knee replacement    Left third digit trigger (s/p cortisone injection, helpful)    Overweight    History right rotator cuff tendinopathy, status post repair (2 surgeries)    History left rotator cuff surgery    Sleep apnea    History of gastric bypass    History of peptic ulcer disease      Rheumatology medications provided/suggested:    Diclofenac gel  Tylenol  Plaquenil      Pertinent medication from other providers or from otc (please refer to above for more detailed med list):    Albuterol inhaler      Pertinent medications already tried:     Prednisone taper, helpful      Pertinent lab history:    7/2021, negative/unremarkable: Rheumatoid factor, CCP antibody, ESR, CRP    Positive/elevated: SYLVIE, urine microalbumin    Negative/unremarkable: SYLVIE subsets, ANCA, uric acid, C3, C4, ESR, CRP      Pertinent imaging/test history:      CXR:   Two views of the chest were obtained. Cardiomediastinal  silhouette is within normal limits. Minimal basilar pulmonary  opacities, likely atelectasis.  No significant pleural effusion or  Pneumothorax.    Hand x-rays:  Generalized demineralization. Mild degenerative arthritis involving the interphalangeal joints of the fingers, and the basilar joints of both thumbs, with mild joint space narrowing and osteophytic spurring. Generalized bone demineralization. No evidence of erosive arthropathy. Soft tissues are normal.    Other:    , 4 children.     Not currently working. 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     Drinking very seldom, only social and vary weekend mixed drink. No smoking. No drug use.     Tubal ligation since 1994.      Plan:      Continue Plaquenil 200 mg twice a day.  Continue follow-up with ophthalmology every 6 months to 1 year..    Again, suggested she try diclofenac gel at night over other affected hand pains.    Again, suggested she can try a paraffin wax machine for hand pains.    If beneficial, continue Tylenol as needed.  Made aware can take Tylenol extra strength or Tylenol arthritis 1 to 2 tablets twice a day, as needed.  On a prior visit also made aware that she can try taking liquid form of Tylenol (has some difficulty with pills).    Has follow-up scheduled with rheumatology for October 5, 2022, recommend she maintain this visit.      Procedure note:       This note was transcribed using Dragon voice recognition software as a result unintentional grammatical errors or word substitutions may have occurred. Please contact our Rheumatology department if you need any clarification or if you have any related inquiries.    Major side effect profile of medications provided were discussed with the patient.      Samy Uribe DO ....................  6/30/2022   9:13 AM

## 2022-07-11 DIAGNOSIS — N39.41 URGE INCONTINENCE OF URINE: ICD-10-CM

## 2022-07-11 DIAGNOSIS — I10 BENIGN ESSENTIAL HYPERTENSION: ICD-10-CM

## 2022-07-12 RX ORDER — OXYBUTYNIN CHLORIDE 5 MG/1
TABLET, EXTENDED RELEASE ORAL
Qty: 90 TABLET | Refills: 1 | Status: SHIPPED | OUTPATIENT
Start: 2022-07-12 | End: 2022-08-16

## 2022-07-12 RX ORDER — ATENOLOL 50 MG/1
50 TABLET ORAL DAILY
Qty: 90 TABLET | Refills: 1 | Status: SHIPPED | OUTPATIENT
Start: 2022-07-12 | End: 2022-08-16

## 2022-07-13 DIAGNOSIS — N39.41 URGE INCONTINENCE OF URINE: ICD-10-CM

## 2022-07-13 DIAGNOSIS — I10 BENIGN ESSENTIAL HYPERTENSION: ICD-10-CM

## 2022-07-13 RX ORDER — OXYBUTYNIN CHLORIDE 5 MG/1
TABLET, EXTENDED RELEASE ORAL
Qty: 90 TABLET | Refills: 3 | OUTPATIENT
Start: 2022-07-13

## 2022-07-13 RX ORDER — ATENOLOL 50 MG/1
TABLET ORAL
Qty: 90 TABLET | Refills: 3 | OUTPATIENT
Start: 2022-07-13

## 2022-08-01 NOTE — ADDENDUM NOTE
Encounter addended by: Dasia Wilson PT on: 12/17/2018 2:23 PM   Actions taken: Sign clinical note no diplopia/no photophobia/no blurred vision L/no blurred vision R

## 2022-08-09 DIAGNOSIS — R76.8 POSITIVE ANA (ANTINUCLEAR ANTIBODY): ICD-10-CM

## 2022-08-09 DIAGNOSIS — B37.2 CANDIDIASIS OF SKIN: ICD-10-CM

## 2022-08-09 DIAGNOSIS — N39.41 URGE INCONTINENCE OF URINE: ICD-10-CM

## 2022-08-09 DIAGNOSIS — I10 BENIGN ESSENTIAL HYPERTENSION: ICD-10-CM

## 2022-08-09 DIAGNOSIS — M19.90 INFLAMMATORY ARTHRITIS: ICD-10-CM

## 2022-08-09 RX ORDER — HYDROXYCHLOROQUINE SULFATE 200 MG/1
200 TABLET, FILM COATED ORAL 2 TIMES DAILY
Qty: 180 TABLET | Refills: 0 | Status: SHIPPED | OUTPATIENT
Start: 2022-08-09 | End: 2022-11-02

## 2022-08-09 NOTE — TELEPHONE ENCOUNTER
Pharmacy never received scrips from 07/12/22; please resend. --failed transmission.     Pending Prescriptions:                       Disp   Refills    atenolol (TENORMIN) 50 MG tablet [Pharmac*90 tab*3            Sig: TAKE 1 TABLET BY MOUTH  DAILY    NYAMYC 049364 UNIT/GM external powder [Ph*60 g                Sig: APPLY TO AFFECTED AREA(S)  TOPICALLY 3 TIMES           DAILY AS  NEEDED    oxybutynin ER (DITROPAN XL) 5 MG 24 hr ta*90 tab*3            Sig: TAKE 1 TABLET BY MOUTH  DAILY      Next 5 appointments (look out 90 days)    Sep 15, 2022  8:40 AM  (Arrive by 8:20 AM)  Provider Visit with Jennie Suazo MD  [facility] ([facility] [place] )  Arrive at: Clinic A [address] [place] [id number]  [phone]         [facility]ha ABDI  Station

## 2022-08-10 RX ORDER — NYSTATIN 100000 [USP'U]/G
POWDER TOPICAL
Qty: 60 G | Refills: 1 | Status: SHIPPED | OUTPATIENT
Start: 2022-08-10

## 2022-08-10 RX ORDER — OXYBUTYNIN CHLORIDE 5 MG/1
TABLET, EXTENDED RELEASE ORAL
Qty: 90 TABLET | Refills: 3 | OUTPATIENT
Start: 2022-08-10

## 2022-08-10 RX ORDER — ATENOLOL 50 MG/1
TABLET ORAL
Qty: 90 TABLET | Refills: 3 | OUTPATIENT
Start: 2022-08-10

## 2022-08-16 ENCOUNTER — TELEPHONE (OUTPATIENT)
Dept: FAMILY MEDICINE | Facility: CLINIC | Age: 67
End: 2022-08-16

## 2022-08-16 DIAGNOSIS — I10 BENIGN ESSENTIAL HYPERTENSION: ICD-10-CM

## 2022-08-16 DIAGNOSIS — N39.41 URGE INCONTINENCE OF URINE: ICD-10-CM

## 2022-08-16 RX ORDER — OXYBUTYNIN CHLORIDE 5 MG/1
5 TABLET, EXTENDED RELEASE ORAL DAILY
Qty: 90 TABLET | Refills: 1 | Status: SHIPPED | OUTPATIENT
Start: 2022-08-16 | End: 2022-09-15

## 2022-08-16 RX ORDER — ATENOLOL 50 MG/1
50 TABLET ORAL DAILY
Qty: 90 TABLET | Refills: 1 | Status: SHIPPED | OUTPATIENT
Start: 2022-08-16 | End: 2022-09-15

## 2022-08-16 NOTE — PROGRESS NOTES
[facility]    Outpatient Physical Therapy Discharge Note  Patient: Jacquie Harris  : 1955    Beginning/End Dates of Reporting Period:  20 to 22    Referring Provider: Nadja Cheng PA-C    Therapy Diagnosis: s/p right TKA     Client Self Report: Patient underwent right TKA 20.  Pt reports: long history of right knee pain managed by cortisone.  Finally underwent TKA after COVID pandemic delay.  Unable to come in for further PT / will be [place]    Objective Measurements:  Objective Measure: Right knee ROM  Details: 0-5-60  Objective Measure: Gait  Details: front wheeled walker: limited mobility, only able to kevin 5-6 steps heavily relying on UE             Goals:  Goal Identifier     Goal Description Patient will have >=0-120 deg right knee ROM to allow for normalized gait.   Target Date 20   Date Met      Progress (detail required for progress note):       Goal Identifier     Goal Description Patient will be able to walk 1 block with a 4 wheeled walker to allow for improved community mobility.   Target Date 20   Date Met      Progress (detail required for progress note):       Goal Identifier     Goal Description Patient will be independent with her HEP to maximize healing and outcomes while up [place] in [place].   Target Date 20   Date Met  20   Progress (detail required for progress note):             Plan:  Discharge from therapy.    Discharge:    Reason for Discharge: Patient has failed to schedule further appointments.    Equipment Issued: theraband    Discharge Plan: Patient to continue home program.      Priyanka Tyson, PT, DTP, SCS  Doctor of Physical Therapy #[id number]  [facility]  [phone]  [email or web address]

## 2022-08-16 NOTE — TELEPHONE ENCOUNTER
Reordered 2 Rx,s as the transmission failed last time and she is now left with a few pills. Winter BRAVO RN

## 2022-09-15 ENCOUNTER — OFFICE VISIT (OUTPATIENT)
Dept: FAMILY MEDICINE | Facility: CLINIC | Age: 67
End: 2022-09-15
Payer: COMMERCIAL

## 2022-09-15 DIAGNOSIS — Z12.11 SCREEN FOR COLON CANCER: ICD-10-CM

## 2022-09-15 DIAGNOSIS — Z13.220 SCREENING FOR HYPERLIPIDEMIA: ICD-10-CM

## 2022-09-15 DIAGNOSIS — I10 BENIGN ESSENTIAL HYPERTENSION: ICD-10-CM

## 2022-09-15 DIAGNOSIS — L57.0 ACTINIC KERATOSIS: Primary | ICD-10-CM

## 2022-09-15 DIAGNOSIS — N39.41 URGE INCONTINENCE OF URINE: ICD-10-CM

## 2022-09-15 PROCEDURE — 17003 DESTRUCT PREMALG LES 2-14: CPT | Performed by: FAMILY MEDICINE

## 2022-09-15 PROCEDURE — 90662 IIV NO PRSV INCREASED AG IM: CPT | Performed by: FAMILY MEDICINE

## 2022-09-15 PROCEDURE — 99214 OFFICE O/P EST MOD 30 MIN: CPT | Mod: 25 | Performed by: FAMILY MEDICINE

## 2022-09-15 PROCEDURE — G0008 ADMIN INFLUENZA VIRUS VAC: HCPCS | Performed by: FAMILY MEDICINE

## 2022-09-15 PROCEDURE — 17000 DESTRUCT PREMALG LESION: CPT | Performed by: FAMILY MEDICINE

## 2022-09-15 RX ORDER — OXYBUTYNIN CHLORIDE 5 MG/1
5 TABLET, EXTENDED RELEASE ORAL 2 TIMES DAILY
Qty: 180 TABLET | Refills: 3 | Status: SHIPPED | OUTPATIENT
Start: 2022-09-15 | End: 2023-07-25

## 2022-09-15 RX ORDER — ATENOLOL 50 MG/1
50 TABLET ORAL DAILY
Qty: 90 TABLET | Refills: 3 | Status: SHIPPED | OUTPATIENT
Start: 2022-09-15 | End: 2023-01-20

## 2022-09-15 RX ORDER — OXYBUTYNIN CHLORIDE 5 MG/1
5 TABLET, EXTENDED RELEASE ORAL DAILY
Qty: 90 TABLET | Refills: 3 | Status: SHIPPED | OUTPATIENT
Start: 2022-09-15 | End: 2022-09-15

## 2022-09-15 ASSESSMENT — ENCOUNTER SYMPTOMS
ALLERGIC/IMMUNOLOGIC NEGATIVE: 1
RESPIRATORY NEGATIVE: 1
MUSCULOSKELETAL NEGATIVE: 1
HEMATOLOGIC/LYMPHATIC NEGATIVE: 1
GASTROINTESTINAL NEGATIVE: 1
EYES NEGATIVE: 1
PSYCHIATRIC NEGATIVE: 1
CARDIOVASCULAR NEGATIVE: 1
ENDOCRINE NEGATIVE: 1
CONSTITUTIONAL NEGATIVE: 1
NEUROLOGICAL NEGATIVE: 1

## 2022-09-15 ASSESSMENT — PAIN SCALES - GENERAL: PAINLEVEL: MODERATE PAIN (4)

## 2022-09-15 NOTE — PROGRESS NOTES
Assessment & Plan     Benign essential hypertension  Medication faxed, blood pressure a bit elevated, recommend recheck in a couple of weeks  - atenolol (TENORMIN) 50 MG tablet; Take 1 tablet (50 mg) by mouth daily    Urge incontinence of urine  Medication faxed. Increased in dose due to symptoms not being controlled.  - oxybutynin ER (DITROPAN XL) 5 MG 24 hr tablet; Take 1 tablet (5 mg) by mouth 2 times daily      Screen for colon cancer  FIT test     Body mass index (BMI) of 45.0 to 49.9 in adult (H)  Ongoing    Actinic keratosis  Treated three actinic keratosis.   - DESTRUCT BENIGN LESION, UP TO 14        FUTURE APPOINTMENTS:       - Follow-up visit in one week or sooner as needed    Return in about 1 week (around 9/22/2022), or if symptoms worsen or fail to improve, for Follow up.    Jennie Suazo MD  [facility] ELI Jones is a 67 year old accompanied by her self, presenting for the following health issues:      Derm Problem (Here to have moles and sun spots on her back and left leg checked.  Sun spots on her face. Some of the areas on the back itch and are painful.  She has had some treatment done on the areas with Dermatology.  Has tried Differen OTC and no changes for her face.), Urge Incontinence (Recheck on medication.), Hypertension (Recheck on blood pressure.), and Imm/Inj (Flu shot today.  Declined Covid injection-was ill with her previous two.)      HPI   Patient is a 67 yr old female here for medication refills. She has a history of hypertension and has been on atenolol for years.   She reports no side effects to the medication.she also has urge incontinence that has been ongoing for years. She says despite the fact that she is on oxybutynin she is still having symptoms . She is open to increasing the medication dose.  Lastly she has some skin spots that she will like to have checked out.    Chief Complaint   Patient presents with     Derm Problem     Here  to have moles and sun spots on her back and left leg checked.  Sun spots on her face. Some of the areas on the back itch and are painful.  She has had some treatment done on the areas with Dermatology.  Has tried Differen OTC and no changes for her face.     Urge Incontinence     Recheck on medication.     Hypertension     Recheck on blood pressure.     Imm/Inj     Flu shot today.  Declined Covid injection-was ill with her previous two.       Hypertension Follow-up      Do you check your blood pressure regularly outside of the clinic? No     Are you following a low salt diet? Yes    Are your blood pressures ever more than 140 on the top number (systolic) OR more   than 90 on the bottom number (diastolic), for example 140/90? Not checking.      How many servings of fruits and vegetables do you eat daily?  3-4 per week.    On average, how many sweetened beverages do you drink each day (Examples: soda, juice, sweet tea, etc.  Do NOT count diet or artificially sweetened beverages)?   Varies-mostly water.    How many days per week do you exercise enough to make your heart beat faster? None, just daily activities.    How many minutes a day do you exercise enough to make your heart beat faster?     How many days per week do you miss taking your medication? 0    Medication Followup of Urge Incontinence     Taking Medication as prescribed: yes    Side Effects:  None    Medication Helping Symptoms:  It seemed to help better at first.  She states she will have to change her underwear twice at night.  Always is damp.        Review of Systems   Constitutional: Negative.    HENT: Negative.    Eyes: Negative.    Respiratory: Negative.    Cardiovascular: Negative.    Gastrointestinal: Negative.    Endocrine: Negative.    Breasts:  negative.    Genitourinary: Negative.    Musculoskeletal: Negative.    Skin: Positive for rash.   Allergic/Immunologic: Negative.    Neurological: Negative.    Hematological: Negative.   "  Psychiatric/Behavioral: Negative.             Objective    /86   Pulse 65   Temp 98.5  F (36.9  C) (Tympanic)   Resp 16   Ht 1.6 m (5' 3\")   Wt 119.3 kg (263 lb)   LMP 05/29/2006   SpO2 98%   BMI 46.59 kg/m    Body mass index is 46.59 kg/m .  Physical Exam  Constitutional:       Appearance: Normal appearance.   HENT:      Head: Normocephalic and atraumatic.      Right Ear: Tympanic membrane normal.      Left Ear: Tympanic membrane normal.   Eyes:      Pupils: Pupils are equal, round, and reactive to light.   Cardiovascular:      Rate and Rhythm: Normal rate and regular rhythm.      Pulses: Normal pulses.      Heart sounds: Normal heart sounds.   Pulmonary:      Effort: Pulmonary effort is normal.      Breath sounds: Normal breath sounds.   Musculoskeletal:         General: Normal range of motion.      Cervical back: Normal range of motion.   Skin:     General: Skin is dry.      Findings: Lesion and rash present.   Neurological:      Mental Status: She is alert and oriented to person, place, and time.   Psychiatric:         Mood and Affect: Mood normal.         Behavior: Behavior normal.                        "

## 2022-09-16 ENCOUNTER — TELEPHONE (OUTPATIENT)
Dept: FAMILY MEDICINE | Facility: CLINIC | Age: 67
End: 2022-09-16

## 2022-09-16 VITALS
BODY MASS INDEX: 46.6 KG/M2 | WEIGHT: 263 LBS | HEIGHT: 63 IN | RESPIRATION RATE: 16 BRPM | HEART RATE: 65 BPM | OXYGEN SATURATION: 98 % | SYSTOLIC BLOOD PRESSURE: 134 MMHG | DIASTOLIC BLOOD PRESSURE: 86 MMHG | TEMPERATURE: 98.5 F

## 2022-09-21 ENCOUNTER — TELEPHONE (OUTPATIENT)
Dept: FAMILY MEDICINE | Facility: CLINIC | Age: 67
End: 2022-09-21

## 2022-09-21 NOTE — TELEPHONE ENCOUNTER
oxybutynin ER (DITROPAN XL) 5 MG 24 hr tablet                    [facility] is requesting clarification on Oxybutynin prescription. Their records show that there is 2 inconsistent prescriptions.     1 tablet once daily #90 3RF  1 tablet twice daily #180 3RF      How is patient taking this medication?    Pharmacy # [phone]

## 2022-09-21 NOTE — TELEPHONE ENCOUNTER
"Writer called [facility].    Per Dr Suazo's 9/15/22 note   \" Medication faxed. Increased in dose due to symptoms not being controlled.  - oxybutynin ER (DITROPAN XL) 5 MG 24 hr tablet; Take 1 tablet (5 mg) by mouth 2 times daily\"    Patient should be taking 1 5mg tab BID.    Writer clarified for pharmacy.    Daniel MAYORGA [facility]      "

## 2022-09-22 NOTE — TELEPHONE ENCOUNTER
Central Prior Authorization Team   Phone: [phone]      PA Initiation    Medication: oxybutynin ER (DITROPAN XL) 5 MG 24 hr tablet - INITIATED  Insurance Company: [employer] ([facility]) - Phone [phone] Fax [phone]  Pharmacy Filling the Rx: [facility]  ([facility]) - [place] [address] [facility]  Filling Pharmacy Phone: [phone]  Filling Pharmacy Fax:    Start Date: 9/22/2022

## 2022-09-22 NOTE — TELEPHONE ENCOUNTER
Prior Authorization Retail Medication Request    Medication/Dose: Oxybutynin chloride er  ICD code (if different than what is on RX):    Previously Tried and Failed: oxybutynin once daily   Rationale:  Patient has used since 6/2020    Insurance Name:  Not provided  Insurance ID:  Not provided  CMM Key: [id number]      Pharmacy Information (if different than what is on RX)  Name:    Phone:

## 2022-09-22 NOTE — TELEPHONE ENCOUNTER
Prior Authorization Approval    Authorization Effective Date: 9/22/2022  Authorization Expiration Date: 12/31/2022  Medication: oxybutynin ER (DITROPAN XL) 5 MG 24 hr tablet - APPROVED  Insurance Company: [employer] ([place]) - Phone [phone] Fax [phone]  Which Pharmacy is filling the prescription (Not needed for infusion/clinic administered): [facility]  ([facility]) - [place], [place] - [address] [place] [address]  Pharmacy Notified: Yes  Patient Notified: Yes - spoke with patient and informed to contact pharmacy to have filled

## 2022-10-03 ENCOUNTER — TELEPHONE (OUTPATIENT)
Dept: FAMILY MEDICINE | Facility: CLINIC | Age: 67
End: 2022-10-03

## 2022-10-03 NOTE — TELEPHONE ENCOUNTER
S-(situation): rt side into upper back pain    B-(background): started a week ago.  Went away then came back.  Thought she slept wrong.  Tried to make a mammo but the tech wouldn't schedule her.  Told her to be seen.  Not in breast area. Pain back today.  Denies cp, SOA or diaphoresis.    A-(assessment): upper back pain    R-(recommendations): appt scheduled for tomorrow.  To go to ER if anything changes. Winter BRAVO RN

## 2022-10-04 ENCOUNTER — OFFICE VISIT (OUTPATIENT)
Dept: FAMILY MEDICINE | Facility: CLINIC | Age: 67
End: 2022-10-04
Payer: COMMERCIAL

## 2022-10-04 VITALS
HEART RATE: 68 BPM | HEIGHT: 63 IN | OXYGEN SATURATION: 96 % | DIASTOLIC BLOOD PRESSURE: 86 MMHG | RESPIRATION RATE: 18 BRPM | SYSTOLIC BLOOD PRESSURE: 130 MMHG | BODY MASS INDEX: 46.78 KG/M2 | TEMPERATURE: 98.3 F | WEIGHT: 264 LBS

## 2022-10-04 DIAGNOSIS — L57.0 ACTINIC KERATOSIS: ICD-10-CM

## 2022-10-04 DIAGNOSIS — Z12.31 VISIT FOR SCREENING MAMMOGRAM: ICD-10-CM

## 2022-10-04 DIAGNOSIS — R22.33: Primary | ICD-10-CM

## 2022-10-04 DIAGNOSIS — Z79.899 HIGH RISK MEDICATION USE: ICD-10-CM

## 2022-10-04 LAB
ALBUMIN SERPL BCG-MCNC: 4.1 G/DL (ref 3.5–5.2)
ALT SERPL W P-5'-P-CCNC: 9 U/L (ref 10–35)
AST SERPL W P-5'-P-CCNC: 17 U/L (ref 10–35)
BASOPHILS # BLD AUTO: 0 10E3/UL (ref 0–0.2)
BASOPHILS NFR BLD AUTO: 0 %
CREAT SERPL-MCNC: 0.79 MG/DL (ref 0.51–0.95)
EOSINOPHIL # BLD AUTO: 0.1 10E3/UL (ref 0–0.7)
EOSINOPHIL NFR BLD AUTO: 2 %
ERYTHROCYTE [DISTWIDTH] IN BLOOD BY AUTOMATED COUNT: 14.9 % (ref 10–15)
GFR SERPL CREATININE-BSD FRML MDRD: 81 ML/MIN/1.73M2
HCT VFR BLD AUTO: 40.6 % (ref 35–47)
HGB BLD-MCNC: 11.9 G/DL (ref 11.7–15.7)
LYMPHOCYTES # BLD AUTO: 2.1 10E3/UL (ref 0.8–5.3)
LYMPHOCYTES NFR BLD AUTO: 38 %
MCH RBC QN AUTO: 24.1 PG (ref 26.5–33)
MCHC RBC AUTO-ENTMCNC: 29.3 G/DL (ref 31.5–36.5)
MCV RBC AUTO: 82 FL (ref 78–100)
MONOCYTES # BLD AUTO: 0.5 10E3/UL (ref 0–1.3)
MONOCYTES NFR BLD AUTO: 8 %
NEUTROPHILS # BLD AUTO: 2.9 10E3/UL (ref 1.6–8.3)
NEUTROPHILS NFR BLD AUTO: 51 %
PLATELET # BLD AUTO: 215 10E3/UL (ref 150–450)
RBC # BLD AUTO: 4.94 10E6/UL (ref 3.8–5.2)
WBC # BLD AUTO: 5.7 10E3/UL (ref 4–11)

## 2022-10-04 PROCEDURE — 82040 ASSAY OF SERUM ALBUMIN: CPT | Performed by: FAMILY MEDICINE

## 2022-10-04 PROCEDURE — 84460 ALANINE AMINO (ALT) (SGPT): CPT | Performed by: FAMILY MEDICINE

## 2022-10-04 PROCEDURE — 82565 ASSAY OF CREATININE: CPT | Performed by: FAMILY MEDICINE

## 2022-10-04 PROCEDURE — 84450 TRANSFERASE (AST) (SGOT): CPT | Performed by: FAMILY MEDICINE

## 2022-10-04 PROCEDURE — 99213 OFFICE O/P EST LOW 20 MIN: CPT | Performed by: FAMILY MEDICINE

## 2022-10-04 PROCEDURE — 85025 COMPLETE CBC W/AUTO DIFF WBC: CPT | Performed by: FAMILY MEDICINE

## 2022-10-04 PROCEDURE — 36415 COLL VENOUS BLD VENIPUNCTURE: CPT | Performed by: FAMILY MEDICINE

## 2022-10-04 ASSESSMENT — PAIN SCALES - GENERAL: PAINLEVEL: MODERATE PAIN (4)

## 2022-10-04 ASSESSMENT — ENCOUNTER SYMPTOMS
GASTROINTESTINAL NEGATIVE: 1
NEUROLOGICAL NEGATIVE: 1
RESPIRATORY NEGATIVE: 1
ALLERGIC/IMMUNOLOGIC NEGATIVE: 1
CARDIOVASCULAR NEGATIVE: 1
EYES NEGATIVE: 1
CONSTITUTIONAL NEGATIVE: 1
HEMATOLOGIC/LYMPHATIC NEGATIVE: 1
ENDOCRINE NEGATIVE: 1
PSYCHIATRIC NEGATIVE: 1
MUSCULOSKELETAL NEGATIVE: 1

## 2022-10-04 NOTE — PROGRESS NOTES
Assessment & Plan     Lump of skin of upper extremity, bilateral  Ultrasound ordered. Patient will be notified of results.   - US Upper Extremity Non Vascular Right; Future  - US Upper Extremity Non Vascular Left; Future    Visit for screening mammogram  - MA SCREENING DIGITAL BILAT - Future  (s+30); Future    High risk medication use  - Albumin level  - ALT  - AST  - CBC with Platelets & Differential  - Creatinine       Actinic keratosis  Wound looks clean and dry- scab tissue formed.        FUTURE APPOINTMENTS:       - Follow-up visit in one week or sooner as needed    Return in about 1 week (around 10/11/2022) for Follow up.    Jennie Suazo MD  [facility] ELI Jones is a 67 year old accompanied by her self, presenting for the following health issues:    67 yr old female here for pain in her lateral right chest wall. Patient reports that this pain stated a couple of weeks ago and certain movements cause her pain. The pain radiates into her shoulder blade. She has no breast pain and she has not had any trauma to her chest wall. She reports no systemic symptoms like fevers or chills.  Other concerns today- she had a skin rash about two weeks ago treated with liquid nitrogen. It was at first weepy. She says it hurts when she sits down. No redness or warmth.  Pain (Pain for the upper right side and into her back.), Medication Question (Question about a letter she received stating that the Oxybutynin will be OTC starting in January.  She states a letter was sent to the clinic as well.), and Derm Problem (Skin area-left buttock area.  Last treated on 9-15-22.)    Chief Complaint   Patient presents with     Pain     Pain for the upper right side and into her back.     Medication Question     Question about a letter she received stating that the Oxybutynin will be OTC starting in January.  She states a letter was sent to the clinic as well.     Derm Problem     Skin area-left  "buttock area.  Last treated on 9-15-22.         HPI     Concern - Upper right side pain-near the right breast.  Onset: 2-3 weeks.  Description: Pain in the upper right side, near the right breast.  There is one certain area that she can touch for the pain.   Was trying to make her mammogram appointment and they wanted her to be seen first.  Intensity: 8/10 at its worst.  Usually at a 3-4/10.  Progression of Symptoms:  same  Accompanying Signs & Symptoms: It can radiate across the upper back into the shoulder blade area.  Previous history of similar problem: None  Precipitating factors:        Worsened by: When the pain is worse it is harder to move her arm a certain way.  Alleviating factors:        Improved by: None  Therapies tried and outcome: None      WART(S)-Derm      Onset: Years.      Description (location/number): Left buttock area, near her underwear line.    Accompanying signs and symptoms: Painful: YES- it is was treated on 9-15-22.  Can be painful when sitting in bed.    History: prior warts: YES- states history of growths.    Therapies tried and outcome: Has had treated OTC, Dermatology treatment and with PCP.             Review of Systems   Constitutional: Negative.    HENT: Negative.    Eyes: Negative.    Respiratory: Negative.    Cardiovascular: Negative.    Gastrointestinal: Negative.    Endocrine: Negative.    Breasts:  negative.    Genitourinary: Negative.    Musculoskeletal: Negative.    Skin: Negative.    Allergic/Immunologic: Negative.    Neurological: Negative.    Hematological: Negative.    Psychiatric/Behavioral: Negative.             Objective    /86   Pulse 68   Temp 98.3  F (36.8  C) (Tympanic)   Resp 18   Ht 1.6 m (5' 3\")   Wt 119.7 kg (264 lb)   LMP 05/29/2006   SpO2 96%   BMI 46.77 kg/m    Body mass index is 46.77 kg/m .  Physical Exam  Constitutional:       Appearance: Normal appearance.   HENT:      Head: Normocephalic and atraumatic.   Cardiovascular:      Rate and " Rhythm: Normal rate and regular rhythm.   Pulmonary:      Effort: Pulmonary effort is normal. No respiratory distress.      Breath sounds: Normal breath sounds. No stridor. No wheezing, rhonchi or rales.   Chest:      Chest wall: No tenderness.   Breasts:      Right: Absent. No swelling, bleeding, inverted nipple, mass, nipple discharge, skin change or tenderness.      Left: Normal. No swelling, bleeding, inverted nipple, mass, nipple discharge, skin change or tenderness.            Comments: Pain in the bilateral lateral chest wall. Patient exhibited tenderness in both sides , area was puffy on observation. Felt like matted lymph nodes.   Abdominal:      General: Abdomen is flat. Bowel sounds are normal.      Palpations: Abdomen is soft.   Musculoskeletal:         General: Normal range of motion.   Neurological:      Mental Status: She is alert and oriented to person, place, and time.            Labs pending

## 2022-10-04 NOTE — PROGRESS NOTES
Rheumatology Clinic Visit  [facility]  MARISELA Ulloa MRN# [id number]   YOB: 1955 Age: 67 year old   Date of Visit: 10/04/2022  Primary care provider: Jennie Suazo          Assessment and Plan:     1. Inflammatory arthritis  2. Positive SYLVIE  3. High Risk medication    Patient presents today to establish care for her inflammatory arthritis and positive SYLVIE.  She was started on hydroxychloroquine with Dr. Uribe.  Patient is not sure that she has noted any significant improvement with the medication.  She still feels that her fingers are swollen.  She still has prolonged morning stiffness noting that she runs her hands under hot water to get them moving.  She states that she has decreased a lot of the crafts and painting that she has done in the past and due to pain in her hands.  She also states that she has not been able to wear her rings for the last couple of years like she has previously due to swelling.  Physical exam did show a fullness to the left third MCP.  No specific synovitis was noted.  She does have tenderness to multiple MCPs and PIPs bilaterally.  She does have full joint range of motion with the exception of her bilateral shoulders.  Previous laboratory evaluations and imaging studies were reviewed.  Results below.    I did discuss with the patient today that a positive SYLVIE is of unknown significance.  We did discuss that we do get false positives with this test.  Also discussed that 10% of the healthy population can have a positive SYLVIE without any associated disease.  She did have sub serologies checked which were normal/negative.  She was also noted to have protein in her urine, last check in February 2022 this was normal.  Discussed with the patient risk versus benefit of medication management of an inflammatory arthritis.  She certainly does have some symptoms suggestive of an inflammatory arthritis.  Unfortunately she has not had a  significant benefit with the hydroxychloroquine.  I would like to try a course of low-dose prednisone for 12 days.  She will then follow-up with me in a month and we will discuss the benefit or lack of benefit from the prednisone and then further discuss further treatment options after that time.    Plan:     1. Schedule follow-up with Danni Martínez PA-C in 1 months.   2. Labs: CBC, Creatinine, Albumin, AST, ALT (next due 01/2023) will also check a UA and albumin urine with your January labs  3. Medication recommendations:   a. Prednisone 5mg: Take 10 mg (2 tablets) daily x 4 days, then take 7.5 mg (1.5 tablets) daily x4 days, then take 5 mg (1 tablet) daily x4 days then stop. Take with food  b. Hydroxychloroquine: continue 200mg 2 times daily  c. -While on hydroxychloroquine it is important that you get an eye exam annually. Make sure to let your eye provider know that you are taking this medication. Next due 02/2023  d. -Labs (AST/ALT, creatinine, CBC with platelets) should be monitored every 3 months, next due in January    MARISELA Ulloa  Rheumatology         History of Present Illness:   Jacquie Harris presents for evaluation of positive SYLVIE, inflammatory arthritis.    Patient was previously treated with Dr. Uribe.  Last office visit was on 6/30/2022.  Initial evaluation was on November 10, 2021.  At that time she had reported pain in her bilateral hands with morning stiffness lasting 2 to 3 hours.  Physical exam showed subtle fullness involving the left third MCP and right fifth MCP joints with tenderness to palpation.  Mild tenderness involving the right fifth PIP and left first CMC.  Nodule involving the left third digit A1 pulley area with some tenderness on palpation and a subtle catching sensation with active flexion of the left third digit.    She has been unable to do the Paraffin wax due to cost. She uses hot water. She uses this in the morning to get things going. She states that she  "hurts today. She states that she has some good days, but \"not very many\". She is unsure that the Plaquenil is working. She states that she was expecting it to be a \"miracle\". She states that she was under the impression that you need something to stop and slow the arthritis with rheumatoid arthritis. She states that she does not do much with her fingers any more. She cannot paint or do crafts. She states that she has so much pain that she cannot even think about painting. She states that the pain is bad in the mornings and evening. If she is able to keep them warm, that seems to help. Many of her symptoms started summer 2020. She was noticing her fingers getting \"crooked\". She has not been getting the sticking of the fingers anymore.     No known family history of rheumatoid arthritis or lupus.  No known family history or personal history of psoriasis, ulcerative colitis or Crohn's disease.    Rheumatological history:  Provider: Dr. Uribe   Current medication: Plaquenil 200 mg twice daily, last eye exam 2/4/2022  Pertinent labs: Negative rheumatoid factor, CCP antibody, sed rate and CRP.  Positive SYLVIE with a titer of 1: 160, speckled pattern.  Normal/negative SYLVIE subsets, ANCA, uric acid, C3, C4           Review of Systems:     Constitutional: negative  Skin: negative  Eyes: negative  Ears/Nose/Throat: negative  Respiratory: No shortness of breath, dyspnea on exertion, cough, or hemoptysis  Cardiovascular: negative  Gastrointestinal: negative  Genitourinary: negative  Musculoskeletal: as above  Neurologic: negative  Psychiatric: negative  Hematologic/Lymphatic/Immunologic: negative  Endocrine: negative         Active Problem List:     Patient Active Problem List    Diagnosis Date Noted     Benign essential hypertension 06/19/2020     Priority: Medium     Rotator cuff syndrome, right 01/14/2020     Priority: Medium     Multiple joint pain, possible fibromyalgia 12/20/2019     Priority: Medium     Bronchospasm " 01/07/2019     Priority: Medium     GI bleed 03/17/2018     Priority: Medium     Upper GI bleed 03/17/2018     Priority: Medium     Shoulder injury, right, subsequent encounter 08/21/2017     Priority: Medium     Fatty liver 04/20/2017     Priority: Medium     Elevated levels of transaminase & lactic acid dehydrogenase 04/15/2017     Priority: Medium     Bilateral cold feet 01/03/2017     Priority: Medium     Colles' fracture 07/06/2015     Priority: Medium     Encounter for routine gynecological examination  06/04/2015     Priority: Medium     Back pain, left below scapula, strained muscles 10/24/2013     Priority: Medium     HTN, goal below 140/80 05/05/2013     Priority: Medium     S/P TKR (total knee replacement) 04/03/2012     Priority: Medium     Osteoporosis 02/19/2012     Priority: Medium     Previous T scores -2.2  2/2012 Dexascan:  Lumbar spine L1-L4 T-score: -2.4, Left femoral neck T-score: -2.7, Right femoral neck T-score: -2.2   Percent change in lumbar spine: +6.6% since 4/16/2008   Percent change in femurs: +2.9% since 4/16/2008    IMPRESSION: Osteoporosis in the left femoral neck. Severe osteopenia  in the right femoral neck and lumbar spine.       Obesity, Class III, BMI 40-49.9 (morbid obesity) (H) 02/19/2012     Priority: Medium     ideal weight 120, goal weight 200, lose 58 lbs in 58 weeks.       Vitamin D deficiency 07/18/2011     Priority: Medium     Tick bite, infected, positive Lyme test 1996 not treated 07/08/2011     Priority: Medium     Rosacea 11/04/2009     Priority: Medium     Obstructive sleep apnea 12/01/2006     Priority: Medium     Sleep study 12/06 for EDS- AHI 36, desaturations to 65%. CPAP recommended but not tolerated.  01/16/07  ENT consult recommend CPAP and weight loss. Surgery discussed but success rate less than CPAP  1/15/07 CPAP trial, was not able to tolerate. Retried 2008, tolerated better.   Problem list name updated by automated process. Provider to review        Carpal tunnel syndrome 06/07/2006     Priority: Medium     04/10/09 Dr.Meletiou [facility]. Finding consistant with CTS, but nerve studies and MRI of C-Spine are normal. Corticosteroid injection given in office       CARDIOVASCULAR SCREENING; LDL GOAL LESS THAN 160 10/31/2010     Priority: Low     Allergic rhinitis 03/27/2006     Priority: Low     Problem list name updated by automated process. Provider to review       s/p gastric bypass x 2 07/05/2005     Priority: Low     Gastric bypass 1980              Past Medical History:     Past Medical History:   Diagnosis Date     HTN (hypertension)      IRON DEFICIENCY ANEMIA 7/5/2005     Iron infusion/ resolved since injections     Obstructive sleep apnea      DARWIN (obstructive sleep apnea)      Past Surgical History:   Procedure Laterality Date     ARTHROPLASTY KNEE  04/02/2012    Procedure:ARTHROPLASTY KNEE; Left Total Knee Arthroplasty--An[facility]; Surgeon:HERNANDO THOMPSON; Location:[facility]     ARTHROTOMY SHOULDER, ROTATOR CUFF REPAIR, COMBINED Right 08/25/2017    Procedure: COMBINED ARTHROTOMY SHOULDER, ROTATOR CUFF REPAIR;  Right shoulder distal clavicle excision, subacromial decompression, rotator cuff repair ;  Surgeon: Hernando Thompson MD;  Location: [facility]     ARTHROTOMY SHOULDER, ROTATOR CUFF REPAIR, COMBINED Right 12/18/2017    Procedure: COMBINED ARTHROTOMY SHOULDER, ROTATOR CUFF REPAIR;  Right Shoulder Rotator Cuff Revision;  Surgeon: Hernando Thompson MD;  Location: [facility]     ARTHROTOMY SHOULDER, ROTATOR CUFF REPAIR, COMBINED Left 04/27/2018    Procedure: COMBINED ARTHROTOMY SHOULDER, ROTATOR CUFF REPAIR;  Left Shoulder Open Subacromial Decompression,Distal Clavicle Excision,Rotator Cuff Repair;  Surgeon: Hernando Thompson MD;  Location: [facility] OR     C/SECTION, LOW TRANSVERSE  1978, 1984, 1994    x 3     COLONOSCOPY  01/01/2001    normal colonoscopy     COLONOSCOPY N/A 08/22/2017    Procedure: COLONOSCOPY;  Colonoscopy  ;  Surgeon:  Delfino Yoo MD;  Location: [facility]     COLONOSCOPY  08/22/2017    EXAMCOL     ESOPHAGOSCOPY, GASTROSCOPY, DUODENOSCOPY (EGD), COMBINED N/A 03/18/2018    Procedure: COMBINED ESOPHAGOSCOPY, GASTROSCOPY, DUODENOSCOPY (EGD);;  Surgeon: Poncho Galindo MD;  Location: [facility]     GASTRIC BYPASS  1980/2005    Gastric Bypass and revision     HERNIA REPAIR, INCISIONAL  06/16/2008    lap.repair with 10x8 mesh     TUBAL LIGATION  01/01/1994     [facility] ORAL SURGERY PROCEDURE  age 18    wisdom teeth            Social History:     Social History     Socioeconomic History     Marital status:      Spouse name: Not on file     Number of children: Not on file     Years of education: Not on file     Highest education level: Not on file   Occupational History     Not on file   Tobacco Use     Smoking status: Never Smoker     Smokeless tobacco: Never Used   Vaping Use     Vaping Use: Never used   Substance and Sexual Activity     Alcohol use: Yes     Alcohol/week: 0.0 standard drinks     Comment: mixed very light 1 a month if that     Drug use: No     Sexual activity: Yes     Partners: Male     Birth control/protection: Surgical   Other Topics Concern     Parent/sibling w/ CABG, MI or angioplasty before 65F 55M? No      Service No     Blood Transfusions Yes     Comment: 1976, 1978, 1978, 1984     Caffeine Concern No     Occupational Exposure No     Hobby Hazards No     Sleep Concern No     Stress Concern No     Weight Concern Yes     Special Diet No     Back Care No     Exercise No     Bike Helmet No     Seat Belt Yes     Self-Exams Not Asked   Social History Narrative     Not on file     Social Determinants of Health     Financial Resource Strain: Not on file   Food Insecurity: Not on file   Transportation Needs: Not on file   Physical Activity: Not on file   Stress: Not on file   Social Connections: Not on file   Intimate Partner Violence: Not on file   Housing Stability: Not on file          Family History:     Family  History   Problem Relation Age of Onset     C.A.D. Father         MI at age 60     Hypertension Father      Alzheimer Disease Father      Hyperlipidemia Father      Osteoporosis Mother         on Fosamax     Glaucoma Mother      Other Cancer Brother      Stomach Cancer Maternal Aunt      Cervical Cancer Cousin      Diabetes No family hx of      Cerebrovascular Disease No family hx of      Breast Cancer No family hx of      Cancer - colorectal No family hx of      Prostate Cancer No family hx of      Liver Disease No family hx of             Allergies:     Allergies   Allergen Reactions     Ibuprofen      Other reaction(s): Gastrointestinal  Gastric bleed     Naproxen GI Disturbance     Other reaction(s): Gastrointestinal  Gastric bleed     Nickel Itching     Inflamed with cheap earrings (itchy)     Rofecoxib Itching and Swelling     VIOXX (Swollen Feet,Hands itching), Okay with ibuprofen and aspirin     Vioxx Itching and Swelling     VIOXX (Swollen Feet,Hands itching), Okay with ibuprofen and aspirin            Medications:     Current Outpatient Medications   Medication Sig Dispense Refill     acetaminophen (TYLENOL) 500 MG tablet Take 1,000 mg by mouth        albuterol (PROAIR HFA/PROVENTIL HFA/VENTOLIN HFA) 108 (90 Base) MCG/ACT inhaler INHALE 2 PUFFS INTO THE LUNGS EVERY 6 HOURS AS NEEDED FOR SHORTNESS OF BREATH OR DIFFICULT BREATHING OR WHEEZING 25.5 g 1     atenolol (TENORMIN) 50 MG tablet Take 1 tablet (50 mg) by mouth daily 90 tablet 3     hydroxychloroquine (PLAQUENIL) 200 MG tablet Take 1 tablet (200 mg) by mouth 2 times daily 180 tablet 0     NYAMYC 730427 UNIT/GM external powder APPLY TO AFFECTED AREA(S)  TOPICALLY 3 TIMES DAILY AS  NEEDED 60 g 1     oxybutynin ER (DITROPAN XL) 5 MG 24 hr tablet Take 1 tablet (5 mg) by mouth 2 times daily 180 tablet 3            Physical Exam:   Last menstrual period 05/29/2006, not currently breastfeeding.  Wt Readings from Last 6 Encounters:   10/04/22 119.7 kg (264  lb)   09/15/22 119.3 kg (263 lb)   22 123.4 kg (272 lb)   22 123.5 kg (272 lb 4.8 oz)   12/10/21 121.6 kg (268 lb)   21 119.7 kg (264 lb)     Constitutional: well-developed, appearing stated age; cooperative  Eyes: nl  PERRLA, conjunctiva, sclera  ENT: nl external ears, nose, hearing, lips, teeth, gums, throat. No mucositis.   No mucous membrane lesions, normal saliva pool  Neck: no mass or thyroid enlargement  Resp: lungs clear to auscultation  CV: RRR, no murmurs, rubs or gallops, no edema  Lymph: no cervical, supraclavicular or epitrochlear nodes  MS: There is a fullness to the left 3rd MCP with associated tenderness. She has tenderness to palpation of the left second third and fourth PIPs.  No specific active synovitis noted.  She has full fist formation.  Normal  strength.  Decreased range of motion of the bilateral shoulders.  No MTP squeeze tenderness. No dactylitis,  tenosynovitis, enthesopathy.   Skin: no nail pitting, alopecia, rash, nodules or lesions.   Neuro: nl cranial nerves.   Psych: nl judgement, orientation, memory, affect.           Data:   Imagin/10/2021 x-ray bilateral hand  IMPRESSION: Generalized demineralization. Mild degenerative arthritis involving the interphalangeal joints of the fingers, and the basilar joints of both thumbs, with mild joint space narrowing and osteophytic spurring. Generalized bone demineralization.  No evidence of erosive arthropathy. Soft tissues are normal.    11/10/2021 x-ray bilateral knee  IMPRESSION:   RIGHT KNEE: Uncomplicated cemented total knee arthroplasty. Normal joint alignment. No fracture or erosion. No joint effusion.     LEFT KNEE: Cemented longstem hinged total knee arthroplasty. No evidence of loosening or hardware complication. Mild patella baja. No fracture or erosion. No joint effusion.    11/10/2021 x-ray right wrist  IMPRESSION: No acute fracture. Chronic healed fracture of the distal radius. Chronic ununited fracture  of the ulnar styloid process. Moderate ulnar positive variance. Mild degenerative changes at the STT and first CMC joints.    2/21/2022 x-rays left shoulder  IMPRESSION:  1.  Normal left glenohumeral joint spacing and alignment.  2.  Distal clavicle excision, unchanged.  3.  No fracture.  4.  No significant change since the comparison.    2/21/2022 x-ray cervical spine  IMPRESSION: Minimal degenerative anterolisthesis of C2 upon C3, C3  upon C4 and C5 on C6. Alignment otherwise normal. Vertebral body  heights are normal. Moderate facet arthropathy at C2-C3. Mild  degenerative endplate spurring at C4-C5, C5-C6 and C6-C7. No  fractures.    Laboratory:  10/4/2022  Creatinine 0.79, GFR 81  Albumin 4.1  ALT 9, AST 17  White blood cell count 5.7, hemoglobin 11.9, platelet count 215

## 2022-10-05 ENCOUNTER — OFFICE VISIT (OUTPATIENT)
Dept: RHEUMATOLOGY | Facility: CLINIC | Age: 67
End: 2022-10-05
Payer: COMMERCIAL

## 2022-10-05 VITALS
HEIGHT: 63 IN | WEIGHT: 264 LBS | BODY MASS INDEX: 46.78 KG/M2 | SYSTOLIC BLOOD PRESSURE: 132 MMHG | DIASTOLIC BLOOD PRESSURE: 70 MMHG

## 2022-10-05 DIAGNOSIS — R76.8 POSITIVE ANA (ANTINUCLEAR ANTIBODY): ICD-10-CM

## 2022-10-05 DIAGNOSIS — M19.90 INFLAMMATORY ARTHRITIS: Primary | ICD-10-CM

## 2022-10-05 DIAGNOSIS — Z79.899 HIGH RISK MEDICATION USE: ICD-10-CM

## 2022-10-05 PROCEDURE — 99214 OFFICE O/P EST MOD 30 MIN: CPT | Performed by: PHYSICIAN ASSISTANT

## 2022-10-05 RX ORDER — PREDNISONE 5 MG/1
TABLET ORAL
Qty: 18 TABLET | Refills: 0 | Status: SHIPPED | OUTPATIENT
Start: 2022-10-05 | End: 2022-11-09

## 2022-10-05 NOTE — PATIENT INSTRUCTIONS
After Visit Instructions:     Thank you for coming to [facility] for your care. It is my goal to partner with you to help you reach your optimal state of health.       Plan:     Schedule follow-up with Danni Martínez PA-C in 1 months.   Labs: CBC, Creatinine, Albumin, AST, ALT (next due 01/2023) will also check a UA and albumin urine with your January labs  Medication recommendations:   Prednisone 5mg: Take 10 mg (2 tablets) daily x 4 days, then take 7.5 mg (1.5 tablets) daily x4 days, then take 5 mg (1 tablet) daily x4 days then stop. Take with food  Hydroxychloroquine: continue 200mg 2 times daily  -While on hydroxychloroquine it is important that you get an eye exam annually. Make sure to let your eye provider know that you are taking this medication. Next due 02/2023  -Labs (AST/ALT, creatinine, CBC with platelets) should be monitored every 3 months, next due in January        Danni Martínez PA-C  [facility]  [facility]    Contact information: [facility]  Clinic Number:  [phone]  Please call or send a [employer] message with any questions about your care

## 2022-10-12 NOTE — PROCEDURES
After a complete discussion of the multiple treatment options for actinic keratosis including the risks and benefits of each, the patient has decided on treatment with Liquid nitrogen.  Paring was not performed on each actinic keratosis.  Each wart was frozen easily three times with liquid nitrogen. A total of 3 AKs are treated today.  The etiology of common Actinic keratosis were discussed.  Warm soapy water soaks and sanding also recommended.  The patient is to return every three weeks until all AKs have resolved.

## 2022-10-26 ENCOUNTER — HOSPITAL ENCOUNTER (OUTPATIENT)
Dept: MAMMOGRAPHY | Facility: CLINIC | Age: 67
Discharge: HOME | End: 2022-10-26
Attending: FAMILY MEDICINE
Payer: COMMERCIAL

## 2022-10-26 ENCOUNTER — HOSPITAL ENCOUNTER (OUTPATIENT)
Dept: ULTRASOUND IMAGING | Facility: CLINIC | Age: 67
Discharge: HOME | End: 2022-10-26
Attending: FAMILY MEDICINE
Payer: COMMERCIAL

## 2022-10-26 DIAGNOSIS — R22.33: ICD-10-CM

## 2022-10-26 DIAGNOSIS — N64.4 BREAST PAIN, RIGHT: ICD-10-CM

## 2022-10-26 PROCEDURE — 76882 US LMTD JT/FCL EVL NVASC XTR: CPT

## 2022-10-26 PROCEDURE — 77062 BREAST TOMOSYNTHESIS BI: CPT

## 2022-10-26 NOTE — LETTER
October 28, 2022      Karen Harris  [address] [place] [address]        Dear ,    We are writing to inform you of your test results.    Your axilla ultrasound and mammogram test result was within normal parameters.     Resulted Orders   MA Diagnostic Bilateral w/ Cristian    Narrative    DIAGNOSTIC MAMMOGRAM, BILATERAL, WITH CRISTIAN;   ULTRASOUND AXILLARY BILATERAL 10/26/2022 9:56 AM    CLINICAL HISTORY: Bilateral breast pain and bilateral axillary  fullness.    COMPARISON:  10/18/2021, 9/24/2020    BREAST DENSITY: Scattered areas of fibroglandular density.    BILATERAL DIGITAL DIAGNOSTIC MAMMOGRAM  Findings: A diagnostic bilateral mammogram was performed utilizing  tomosynthesis. There is no evidence for spiculated masses,  architectural distortion, asymmetry or suspicious calcifications.    When performed, computer-aided detection was used in the  interpretation of this study.    BILATERAL AXILLA ULTRASOUND  Findings: Ultrasound evaluation of the bilateral axilla was performed.  The axillary tissue appears unremarkable without evidence for mass,  abnormal shadowing or parenchymal distortion. No enlarged or  morphologically abnormal appearing lymph nodes.      Impression    IMPRESSION: No evidence of malignancy.      RECOMMENDATION:  Resume routine screening mammography.  The patient  was provided with the results and recommendations at the completion of  the examination.     ACR 1: Negative     The patient will receive a lay language report of this examination.    TIEN BROCK MD         SYSTEM ID:  [id number]         If you have any questions or concerns, please call the clinic at the number listed above.       Sincerely,      Jennie Suazo MD

## 2022-11-02 DIAGNOSIS — M19.90 INFLAMMATORY ARTHRITIS: ICD-10-CM

## 2022-11-02 DIAGNOSIS — R76.8 POSITIVE ANA (ANTINUCLEAR ANTIBODY): ICD-10-CM

## 2022-11-02 RX ORDER — HYDROXYCHLOROQUINE SULFATE 200 MG/1
200 TABLET, FILM COATED ORAL 2 TIMES DAILY
Qty: 180 TABLET | Refills: 0 | Status: SHIPPED | OUTPATIENT
Start: 2022-11-02 | End: 2023-01-11

## 2022-11-02 NOTE — TELEPHONE ENCOUNTER
Refill request from [facility]    Medication: Plaquenil 200mg  Last Refill: 9/1/22  Last OV: 10/5/22  Last lab: 10/4/22  Future OV: 11/9/22

## 2022-11-09 ENCOUNTER — OFFICE VISIT (OUTPATIENT)
Dept: RHEUMATOLOGY | Facility: CLINIC | Age: 67
End: 2022-11-09
Payer: COMMERCIAL

## 2022-11-09 VITALS
SYSTOLIC BLOOD PRESSURE: 132 MMHG | WEIGHT: 264 LBS | BODY MASS INDEX: 46.78 KG/M2 | DIASTOLIC BLOOD PRESSURE: 80 MMHG | HEIGHT: 63 IN

## 2022-11-09 DIAGNOSIS — M19.90 INFLAMMATORY ARTHRITIS: Primary | ICD-10-CM

## 2022-11-09 PROCEDURE — 99214 OFFICE O/P EST MOD 30 MIN: CPT | Performed by: PHYSICIAN ASSISTANT

## 2022-11-09 RX ORDER — SULFASALAZINE 500 MG/1
TABLET, DELAYED RELEASE ORAL
Qty: 180 TABLET | Refills: 0 | Status: SHIPPED | OUTPATIENT
Start: 2022-11-09 | End: 2023-01-11

## 2022-11-09 RX ORDER — SULFASALAZINE 500 MG/1
TABLET, DELAYED RELEASE ORAL
Qty: 7 TABLET | Refills: 0 | Status: SHIPPED | OUTPATIENT
Start: 2022-11-09 | End: 2023-01-11 | Stop reason: DRUGHIGH

## 2022-11-09 NOTE — PATIENT INSTRUCTIONS
After Visit Instructions:     Thank you for coming to [facility] for your care. It is my goal to partner with you to help you reach your optimal state of health.       Plan:     Schedule follow-up with Danni Martínez PA-C in 2 months.   Labs: CBC, Creatinine, AST, ALT and albumin in 1 month  Medication recommendations:   Continue Hydroxychloroquine 200mg twice daily.  Sulfasalazine 500mg: take 1 tablet daily for 1 week, then take 1 tablet twice daily.   # Sulfasalazine Risks and Benefits: The risks and benefits of sulfasalazine were discussed in detail and the patient verbalized understanding.  The risks discussed include, but are not limited to, the risk for hypersensitivity, anaphylaxis, anaphylactoid reactions, infections, bone marrow suppression,  hepatotoxicity, nausea, vomiting, and GI upset.  Oligospermia may occur in males.  I encouraged reviewing the package insert and asking any questions about the medication.          Danni Martínez PA-C  [facility]  [facility]    Contact information: [facility]  Clinic Number:  [phone]  Please call or send a [employer] message with any questions about your care

## 2022-11-09 NOTE — PROGRESS NOTES
Rheumatology Clinic Visit  [facility]  DanniMARISELA Gil     Jacquie Harris MRN# [id number]   YOB: 1955 Age: 67 year old   Date of Visit: 11/09/2022  Primary care provider: Jennie Suazo          Assessment and Plan:     1. Inflammatory arthritis  2. Positive SYLVIE  3. High Risk medication    Patient presents today for follow-up regarding her inflammatory arthritis.  She states that she is no longer having any significant benefit from the hydroxychloroquine that she was initially having when she first started it.  She continues to feel that her fingers are swollen and gets stuck.  She continues to have the prolonged morning stiffness.  She did not get any significant benefit from the prednisone.  Physical examination does continue to show fullness to her fingers. Previous laboratory evaluations and imaging studies were reviewed.  Results below.    Given the initial benefit that she had with the initiation of hydroxychloroquine, we will continue to treat an inflammatory arthritis.  She will continue on the hydroxychloroquine and we will add in sulfasalazine.  We discussed her allergies in depth today given the Vioxx allergy that is reported.  Patient states that she does not remember being on this medication.  She does remember getting GI upset with naproxen and ibuprofen therefore she avoids it.  She does not remember having any allergic reaction to NSAIDs.  She does not have a sulfa allergy.  Therefore we will try adding sulfasalazine to the hydroxychloroquine.  She will monitor close for any potential side effects or reactions.  We did discuss the concern of a potential allergic reaction and she will watch closely for these signs or symptoms.  We will keep her at a low dose for 2 months.  She will follow-up with me at that time and we can discuss either increasing the dose or making other changes.  We will monitor labs closely.    Plan:     1. Schedule follow-up with Danni  Tanya TAPIA in 2 months.   2. Labs: CBC, Creatinine, AST, ALT and albumin in 1 month  3. Medication recommendations:   a. Continue Hydroxychloroquine 200mg twice daily.  b. Sulfasalazine 500mg: take 1 tablet daily for 1 week, then take 1 tablet twice daily.   i. # Sulfasalazine Risks and Benefits: The risks and benefits of sulfasalazine were discussed in detail and the patient verbalized understanding.  The risks discussed include, but are not limited to, the risk for hypersensitivity, anaphylaxis, anaphylactoid reactions, infections, bone marrow suppression,  hepatotoxicity, nausea, vomiting, and GI upset.  Oligospermia may occur in males.  I encouraged reviewing the package insert and asking any questions about the medication.      Danni Martínez, [facility]  Rheumatology         History of Present Illness:   Jacquie Harris presents for evaluation of positive SYLVIE, inflammatory arthritis.    Interval history November 9, 2022:  She reports that the prednisone did anything. She states that her fingers continue to feel as if they are getting stuck. She continues to feel the swelling as well. She did have initial improvement in symptoms with the Hydroxychloroquine. She feels this was helpful until just before Dr. Uribe left. Initially she thought it was secondary to the humidity as it started in the summer.     History of gastric bleed on NSAIDs.    HPI from consult 10/5/2022:  Patient was previously treated with Dr. Uribe.  Last office visit was on 6/30/2022.  Initial evaluation was on November 10, 2021.  At that time she had reported pain in her bilateral hands with morning stiffness lasting 2 to 3 hours.  Physical exam showed subtle fullness involving the left third MCP and right fifth MCP joints with tenderness to palpation.  Mild tenderness involving the right fifth PIP and left first CMC.  Nodule involving the left third digit A1 pulley area with some tenderness on palpation and a subtle catching  "sensation with active flexion of the left third digit.    She has been unable to do the Paraffin wax due to cost. She uses hot water. She uses this in the morning to get things going. She states that she hurts today. She states that she has some good days, but \"not very many\". She is unsure that the Plaquenil is working. She states that she was expecting it to be a \"miracle\". She states that she was under the impression that you need something to stop and slow the arthritis with rheumatoid arthritis. She states that she does not do much with her fingers any more. She cannot paint or do crafts. She states that she has so much pain that she cannot even think about painting. She states that the pain is bad in the mornings and evening. If she is able to keep them warm, that seems to help. Many of her symptoms started summer 2020. She was noticing her fingers getting \"crooked\". She has not been getting the sticking of the fingers anymore.     No known family history of rheumatoid arthritis or lupus.  No known family history or personal history of psoriasis, ulcerative colitis or Crohn's disease.    Rheumatological history:  Provider: Dr. Uribe   Current medication: Plaquenil 200 mg twice daily, last eye exam 2/4/2022  Pertinent labs: Negative rheumatoid factor, CCP antibody, sed rate and CRP.  Positive SYLVIE with a titer of 1: 160, speckled pattern.  Normal/negative SYLVIE subsets, ANCA, uric acid, C3, C4           Review of Systems:     Constitutional: negative  Skin: negative  Eyes: negative  Ears/Nose/Throat: negative  Respiratory: No shortness of breath, dyspnea on exertion, cough, or hemoptysis  Cardiovascular: negative  Gastrointestinal: negative  Genitourinary: negative  Musculoskeletal: as above  Neurologic: negative  Psychiatric: negative  Hematologic/Lymphatic/Immunologic: negative  Endocrine: negative         Active Problem List:     Patient Active Problem List    Diagnosis Date Noted     Benign essential " hypertension 06/19/2020     Priority: Medium     Rotator cuff syndrome, right 01/14/2020     Priority: Medium     Multiple joint pain, possible fibromyalgia 12/20/2019     Priority: Medium     Bronchospasm 01/07/2019     Priority: Medium     GI bleed 03/17/2018     Priority: Medium     Upper GI bleed 03/17/2018     Priority: Medium     Shoulder injury, right, subsequent encounter 08/21/2017     Priority: Medium     Fatty liver 04/20/2017     Priority: Medium     Elevated levels of transaminase & lactic acid dehydrogenase 04/15/2017     Priority: Medium     Bilateral cold feet 01/03/2017     Priority: Medium     Colles' fracture 07/06/2015     Priority: Medium     Encounter for routine gynecological examination  06/04/2015     Priority: Medium     Back pain, left below scapula, strained muscles 10/24/2013     Priority: Medium     HTN, goal below 140/80 05/05/2013     Priority: Medium     S/P TKR (total knee replacement) 04/03/2012     Priority: Medium     Osteoporosis 02/19/2012     Priority: Medium     Previous T scores -2.2  2/2012 Dexascan:  Lumbar spine L1-L4 T-score: -2.4, Left femoral neck T-score: -2.7, Right femoral neck T-score: -2.2   Percent change in lumbar spine: +6.6% since 4/16/2008   Percent change in femurs: +2.9% since 4/16/2008    IMPRESSION: Osteoporosis in the left femoral neck. Severe osteopenia  in the right femoral neck and lumbar spine.       Obesity, Class III, BMI 40-49.9 (morbid obesity) (H) 02/19/2012     Priority: Medium     ideal weight 120, goal weight 200, lose 58 lbs in 58 weeks.       Vitamin D deficiency 07/18/2011     Priority: Medium     Tick bite, infected, positive Lyme test 1996 not treated 07/08/2011     Priority: Medium     Rosacea 11/04/2009     Priority: Medium     Obstructive sleep apnea 12/01/2006     Priority: Medium     Sleep study 12/06 for EDS- AHI 36, desaturations to 65%. CPAP recommended but not tolerated.  01/16/07  ENT consult recommend CPAP and weight loss.  Surgery discussed but success rate less than CPAP  1/15/07 CPAP trial, was not able to tolerate. Retried 2008, tolerated better.   Problem list name updated by automated process. Provider to review       Carpal tunnel syndrome 06/07/2006     Priority: Medium     04/10/09 Dr.Meletiou [facility]. Finding consistant with CTS, but nerve studies and MRI of C-Spine are normal. Corticosteroid injection given in office       CARDIOVASCULAR SCREENING; LDL GOAL LESS THAN 160 10/31/2010     Priority: Low     Allergic rhinitis 03/27/2006     Priority: Low     Problem list name updated by automated process. Provider to review       s/p gastric bypass x 2 07/05/2005     Priority: Low     Gastric bypass 1980              Past Medical History:     Past Medical History:   Diagnosis Date     HTN (hypertension)      IRON DEFICIENCY ANEMIA 7/5/2005     Iron infusion/ resolved since injections     Obstructive sleep apnea      DARWIN (obstructive sleep apnea)      Past Surgical History:   Procedure Laterality Date     ARTHROPLASTY KNEE  04/02/2012    Procedure:ARTHROPLASTY KNEE; Left Total Knee Arthroplasty--Anesth.Choice; Surgeon:HERNANDO THOMPSON; Location:[facility]     ARTHROTOMY SHOULDER, ROTATOR CUFF REPAIR, COMBINED Right 08/25/2017    Procedure: COMBINED ARTHROTOMY SHOULDER, ROTATOR CUFF REPAIR;  Right shoulder distal clavicle excision, subacromial decompression, rotator cuff repair ;  Surgeon: Hernando Thompson MD;  Location: [facility]     ARTHROTOMY SHOULDER, ROTATOR CUFF REPAIR, COMBINED Right 12/18/2017    Procedure: COMBINED ARTHROTOMY SHOULDER, ROTATOR CUFF REPAIR;  Right Shoulder Rotator Cuff Revision;  Surgeon: Hernando Thompson MD;  Location: [facility]     ARTHROTOMY SHOULDER, ROTATOR CUFF REPAIR, COMBINED Left 04/27/2018    Procedure: COMBINED ARTHROTOMY SHOULDER, ROTATOR CUFF REPAIR;  Left Shoulder Open Subacromial Decompression,Distal Clavicle Excision,Rotator Cuff Repair;  Surgeon: Hernando Thompson MD;   Location: [facility]     C/SECTION, LOW TRANSVERSE  1978, 1984, 1994    x 3     COLONOSCOPY  01/01/2001    normal colonoscopy     COLONOSCOPY N/A 08/22/2017    Procedure: COLONOSCOPY;  Colonoscopy  ;  Surgeon: Delfino Yoo MD;  Location: [facility]     COLONOSCOPY  08/22/2017    EXAMCOL     ESOPHAGOSCOPY, GASTROSCOPY, DUODENOSCOPY (EGD), COMBINED N/A 03/18/2018    Procedure: COMBINED ESOPHAGOSCOPY, GASTROSCOPY, DUODENOSCOPY (EGD);;  Surgeon: Poncho Galindo MD;  Location: [facility]     GASTRIC BYPASS  1980/2005    Gastric Bypass and revision     HERNIA REPAIR, INCISIONAL  06/16/2008    lap.repair with 10x8 mesh     TUBAL LIGATION  01/01/1994     ZZC ORAL SURGERY PROCEDURE  age 18    wisdom teeth            Social History:     Social History     Socioeconomic History     Marital status:      Spouse name: Not on file     Number of children: Not on file     Years of education: Not on file     Highest education level: Not on file   Occupational History     Not on file   Tobacco Use     Smoking status: Never     Smokeless tobacco: Never   Vaping Use     Vaping Use: Never used   Substance and Sexual Activity     Alcohol use: Yes     Alcohol/week: 0.0 standard drinks     Comment: mixed very light 1 a month if that     Drug use: No     Sexual activity: Yes     Partners: Male     Birth control/protection: Surgical   Other Topics Concern     Parent/sibling w/ CABG, MI or angioplasty before 65F 55M? No      Service No     Blood Transfusions Yes     Comment: 1976, 1978, 1978, 1984     Caffeine Concern No     Occupational Exposure No     Hobby Hazards No     Sleep Concern No     Stress Concern No     Weight Concern Yes     Special Diet No     Back Care No     Exercise No     Bike Helmet No     Seat Belt Yes     Self-Exams Not Asked   Social History Narrative     Not on file     Social Determinants of Health     Financial Resource Strain: Not on file   Food Insecurity: Not on file   Transportation Needs: Not on file   Physical  Activity: Not on file   Stress: Not on file   Social Connections: Not on file   Intimate Partner Violence: Not on file   Housing Stability: Not on file          Family History:     Family History   Problem Relation Age of Onset     C.A.D. Father         MI at age 60     Hypertension Father      Alzheimer Disease Father      Hyperlipidemia Father      Osteoporosis Mother         on Fosamax     Glaucoma Mother      Other Cancer Brother      Stomach Cancer Maternal Aunt      Cervical Cancer Cousin      Diabetes No family hx of      Cerebrovascular Disease No family hx of      Breast Cancer No family hx of      Cancer - colorectal No family hx of      Prostate Cancer No family hx of      Liver Disease No family hx of             Allergies:     Allergies   Allergen Reactions     Ibuprofen      Other reaction(s): Gastrointestinal  Gastric bleed     Naproxen GI Disturbance     Other reaction(s): Gastrointestinal  Gastric bleed     Nickel Itching     Inflamed with cheap earrings (itchy)     Rofecoxib Itching and Swelling     VIOXX (Swollen Feet,Hands itching), Okay with ibuprofen and aspirin     Vioxx Itching and Swelling     VIOXX (Swollen Feet,Hands itching), Okay with ibuprofen and aspirin            Medications:     Current Outpatient Medications   Medication Sig Dispense Refill     acetaminophen (TYLENOL) 500 MG tablet Take 1,000 mg by mouth        albuterol (PROAIR HFA/PROVENTIL HFA/VENTOLIN HFA) 108 (90 Base) MCG/ACT inhaler INHALE 2 PUFFS INTO THE LUNGS EVERY 6 HOURS AS NEEDED FOR SHORTNESS OF BREATH OR DIFFICULT BREATHING OR WHEEZING 25.5 g 1     atenolol (TENORMIN) 50 MG tablet Take 1 tablet (50 mg) by mouth daily 90 tablet 3     hydroxychloroquine (PLAQUENIL) 200 MG tablet Take 1 tablet (200 mg) by mouth 2 times daily 180 tablet 0     NYAMYC 711479 UNIT/GM external powder APPLY TO AFFECTED AREA(S)  TOPICALLY 3 TIMES DAILY AS  NEEDED 60 g 1     oxybutynin ER (DITROPAN XL) 5 MG 24 hr tablet Take 1 tablet (5 mg) by  mouth 2 times daily 180 tablet 3     predniSONE (DELTASONE) 5 MG tablet Take 10 mg (2 tablets) daily x 4 days, then take 7.5 mg (1.5 tablets) daily x4 days, then take 5 mg (1 tablet) daily x4 days then stop.  Take with food. 18 tablet 0            Physical Exam:   Last menstrual period 2006, not currently breastfeeding.  Wt Readings from Last 6 Encounters:   10/05/22 119.7 kg (264 lb)   10/04/22 119.7 kg (264 lb)   09/15/22 119.3 kg (263 lb)   22 123.4 kg (272 lb)   22 123.5 kg (272 lb 4.8 oz)   12/10/21 121.6 kg (268 lb)     Constitutional: well-developed, appearing stated age; cooperative  Eyes: nl  PERRLA, conjunctiva, sclera  ENT: nl external ears, hearing,  Resp: No shortness of breath with normal conversation  Psych: nl judgement, orientation, memory, affect.           Data:   Imagin/10/2021 x-ray bilateral hand  IMPRESSION: Generalized demineralization. Mild degenerative arthritis involving the interphalangeal joints of the fingers, and the basilar joints of both thumbs, with mild joint space narrowing and osteophytic spurring. Generalized bone demineralization.  No evidence of erosive arthropathy. Soft tissues are normal.    11/10/2021 x-ray bilateral knee  IMPRESSION:   RIGHT KNEE: Uncomplicated cemented total knee arthroplasty. Normal joint alignment. No fracture or erosion. No joint effusion.     LEFT KNEE: Cemented longstem hinged total knee arthroplasty. No evidence of loosening or hardware complication. Mild patella baja. No fracture or erosion. No joint effusion.    11/10/2021 x-ray right wrist  IMPRESSION: No acute fracture. Chronic healed fracture of the distal radius. Chronic ununited fracture of the ulnar styloid process. Moderate ulnar positive variance. Mild degenerative changes at the STT and first CMC joints.    2022 x-rays left shoulder  IMPRESSION:  1.  Normal left glenohumeral joint spacing and alignment.  2.  Distal clavicle excision, unchanged.  3.  No  fracture.  4.  No significant change since the comparison.    2/21/2022 x-ray cervical spine  IMPRESSION: Minimal degenerative anterolisthesis of C2 upon C3, C3  upon C4 and C5 on C6. Alignment otherwise normal. Vertebral body  heights are normal. Moderate facet arthropathy at C2-C3. Mild  degenerative endplate spurring at C4-C5, C5-C6 and C6-C7. No  fractures.    Laboratory:  10/4/2022  Creatinine 0.79, GFR 81  Albumin 4.1  ALT 9, AST 17  White blood cell count 5.7, hemoglobin 11.9, platelet count 215

## 2022-11-10 ENCOUNTER — TELEPHONE (OUTPATIENT)
Dept: FAMILY MEDICINE | Facility: CLINIC | Age: 67
End: 2022-11-10

## 2022-11-10 NOTE — TELEPHONE ENCOUNTER
Called patient and helped schedule Medicare Annual Wellness visit it is scheduled for 12/19/2022.    Coreen Ibarra PSC on 11/10/2022 at 3:57 PM

## 2022-12-19 ENCOUNTER — OFFICE VISIT (OUTPATIENT)
Dept: FAMILY MEDICINE | Facility: CLINIC | Age: 67
End: 2022-12-19
Payer: COMMERCIAL

## 2022-12-19 VITALS
WEIGHT: 265 LBS | HEART RATE: 75 BPM | DIASTOLIC BLOOD PRESSURE: 86 MMHG | TEMPERATURE: 98.7 F | SYSTOLIC BLOOD PRESSURE: 122 MMHG | HEIGHT: 63 IN | BODY MASS INDEX: 46.95 KG/M2 | OXYGEN SATURATION: 95 % | RESPIRATION RATE: 18 BRPM

## 2022-12-19 DIAGNOSIS — M19.90 INFLAMMATORY ARTHRITIS: ICD-10-CM

## 2022-12-19 DIAGNOSIS — R76.8 POSITIVE ANA (ANTINUCLEAR ANTIBODY): ICD-10-CM

## 2022-12-19 DIAGNOSIS — M81.6 LOCALIZED OSTEOPOROSIS, UNSPECIFIED PATHOLOGICAL FRACTURE PRESENCE: ICD-10-CM

## 2022-12-19 DIAGNOSIS — Z00.00 ROUTINE GENERAL MEDICAL EXAMINATION AT A HEALTH CARE FACILITY: Primary | ICD-10-CM

## 2022-12-19 DIAGNOSIS — Z79.899 HIGH RISK MEDICATION USE: ICD-10-CM

## 2022-12-19 DIAGNOSIS — Z13.220 SCREENING FOR HYPERLIPIDEMIA: ICD-10-CM

## 2022-12-19 LAB
ALBUMIN SERPL BCG-MCNC: 4 G/DL (ref 3.5–5.2)
ALT SERPL W P-5'-P-CCNC: 7 U/L (ref 10–35)
AST SERPL W P-5'-P-CCNC: 20 U/L (ref 10–35)
CREAT SERPL-MCNC: 0.72 MG/DL (ref 0.51–0.95)
ERYTHROCYTE [DISTWIDTH] IN BLOOD BY AUTOMATED COUNT: 16.1 % (ref 10–15)
GFR SERPL CREATININE-BSD FRML MDRD: >90 ML/MIN/1.73M2
HCT VFR BLD AUTO: 40.4 % (ref 35–47)
HGB BLD-MCNC: 11.8 G/DL (ref 11.7–15.7)
MCH RBC QN AUTO: 24.1 PG (ref 26.5–33)
MCHC RBC AUTO-ENTMCNC: 29.2 G/DL (ref 31.5–36.5)
MCV RBC AUTO: 82 FL (ref 78–100)
PLATELET # BLD AUTO: 204 10E3/UL (ref 150–450)
RBC # BLD AUTO: 4.9 10E6/UL (ref 3.8–5.2)
WBC # BLD AUTO: 5.7 10E3/UL (ref 4–11)

## 2022-12-19 PROCEDURE — 85027 COMPLETE CBC AUTOMATED: CPT | Performed by: FAMILY MEDICINE

## 2022-12-19 PROCEDURE — 90677 PCV20 VACCINE IM: CPT | Performed by: FAMILY MEDICINE

## 2022-12-19 PROCEDURE — G0009 ADMIN PNEUMOCOCCAL VACCINE: HCPCS | Performed by: FAMILY MEDICINE

## 2022-12-19 PROCEDURE — 99213 OFFICE O/P EST LOW 20 MIN: CPT | Mod: 25 | Performed by: FAMILY MEDICINE

## 2022-12-19 PROCEDURE — 36415 COLL VENOUS BLD VENIPUNCTURE: CPT | Performed by: FAMILY MEDICINE

## 2022-12-19 PROCEDURE — 82040 ASSAY OF SERUM ALBUMIN: CPT | Performed by: FAMILY MEDICINE

## 2022-12-19 PROCEDURE — G0438 PPPS, INITIAL VISIT: HCPCS | Performed by: FAMILY MEDICINE

## 2022-12-19 PROCEDURE — 84460 ALANINE AMINO (ALT) (SGPT): CPT | Performed by: FAMILY MEDICINE

## 2022-12-19 PROCEDURE — 82565 ASSAY OF CREATININE: CPT | Performed by: FAMILY MEDICINE

## 2022-12-19 PROCEDURE — 84450 TRANSFERASE (AST) (SGOT): CPT | Performed by: FAMILY MEDICINE

## 2022-12-19 ASSESSMENT — ENCOUNTER SYMPTOMS
HEMATOCHEZIA: 0
DYSURIA: 0
SORE THROAT: 0
WEAKNESS: 1
CONSTIPATION: 0
SHORTNESS OF BREATH: 1
FEVER: 0
BREAST MASS: 0
COUGH: 0
CHILLS: 1
ARTHRALGIAS: 1
PALPITATIONS: 0
JOINT SWELLING: 1
EYE PAIN: 0
MYALGIAS: 0
DIZZINESS: 0
HEARTBURN: 0
FREQUENCY: 1
DIARRHEA: 0
PARESTHESIAS: 0
ABDOMINAL PAIN: 0
HEMATURIA: 0
NERVOUS/ANXIOUS: 0
NAUSEA: 0
HEADACHES: 1

## 2022-12-19 ASSESSMENT — PAIN SCALES - GENERAL: PAINLEVEL: NO PAIN (0)

## 2022-12-19 ASSESSMENT — ACTIVITIES OF DAILY LIVING (ADL): CURRENT_FUNCTION: NO ASSISTANCE NEEDED

## 2022-12-19 NOTE — PROGRESS NOTES
"SUBJECTIVE:   Karen is a 67 year old who presents for Preventive Visit.    67 yr old female here for her annual physical . She has a history of rheumatoid arthritis. She sees a rheumatologist for this. She reports no acute symptoms. Medications are up to date. She has some labs ordered by her rheumatologist. Preventive measures discussed. Mammogram up to date and she has also had a colonoscopy so she is not due for a few years. Immunizations up to date.       {Split Bill scripting  The purpose of this visit is to discuss your medical history and prevent health problems before you are sick. You may be responsible for a co-pay, coinsurance, or deductible if your visit today includes services such as checking on a sore throat, having an x-ray or lab test, or treating and evaluating a new or existing condition   Patient has been advised of split billing requirements and indicates understanding: Yes  Are you in the first 12 months of your Medicare coverage?  No    Healthy Habits:     In general, how would you rate your overall health?  Excellent    Frequency of exercise:  None    Do you usually eat at least 4 servings of fruit and vegetables a day, include whole grains    & fiber and avoid regularly eating high fat or \"junk\" foods?  No    Taking medications regularly:  No    Barriers to taking medications:  Other    Medication side effects:  None    Ability to successfully perform activities of daily living:  No assistance needed    Home Safety:  Throw rugs in the hallway    Hearing Impairment:  No hearing concerns    In the past 6 months, have you been bothered by leaking of urine? Yes    In general, how would you rate your overall mental or emotional health?  Excellent      PHQ-2 Total Score: 0    Additional concerns today:  Yes      Have you ever done Advance Care Planning? (For example, a Health Directive, POLST, or a discussion with a medical provider or your loved ones about your wishes): No, advance care planning " information given to patient to review.  Patient declined advance care planning discussion at this time.       Fall risk  Fallen 2 or more times in the past year?: Yes  Any fall with injury in the past year?: Yes    Cognitive Screening   1) Repeat 3 items (Leader, Season, Table)    2) Clock draw: NORMAL  3) 3 item recall: Recalls 3 objects  Results: NORMAL clock, 1-2 items recalled: COGNITIVE IMPAIRMENT LESS LIKELY    Mini-CogTM Copyright S Jerry. Licensed by the author for use in [facility]; reprinted with permission (tomas@[facility]). All rights reserved.      Do you have sleep apnea, excessive snoring or daytime drowsiness?: yes    Reviewed and updated as needed this visit by clinical staff   Tobacco  Allergies  Meds  Problems  Med Hx  Surg Hx  Fam Hx          Reviewed and updated as needed this visit by Provider     Meds             Social History     Tobacco Use     Smoking status: Never     Smokeless tobacco: Never   Substance Use Topics     Alcohol use: Yes     Alcohol/week: 0.0 standard drinks     Comment: mixed very light 1 a month if that     If you drink alcohol do you typically have >3 drinks per day or >7 drinks per week? No    Alcohol Use 12/19/2022   Prescreen: >3 drinks/day or >7 drinks/week? No   Prescreen: >3 drinks/day or >7 drinks/week? -       Current providers sharing in care for this patient include:   Patient Care Team:  Jennie Suazo MD as PCP - General (Family Practice)  Malachi Rome MD as MD (Gastroenterology)  Jennie Suazo MD as Assigned PCP  Samy Uribe DO as Assigned Rheumatology Provider  Rex Figueroa MD as Assigned Musculoskeletal Provider    The following health maintenance items are reviewed in Epic and correct as of today:  Health Maintenance   Topic Date Due     ZOSTER IMMUNIZATION (1 of 2) Never done     LIPID  01/07/2021     COVID-19 Vaccine (3 - Moderna risk series) 04/28/2021     COLORECTAL CANCER  SCREENING  08/22/2022     ANNUAL REVIEW OF HM ORDERS  09/15/2023     MAMMO SCREENING  10/26/2023     MEDICARE ANNUAL WELLNESS VISIT  12/19/2023     FALL RISK ASSESSMENT  12/19/2023     DTAP/TDAP/TD IMMUNIZATION (3 - Td or Tdap) 05/26/2025     DEXA  01/24/2027     ADVANCE CARE PLANNING  12/19/2027     HEPATITIS C SCREENING  Completed     PHQ-2 (once per calendar year)  Completed     INFLUENZA VACCINE  Completed     Pneumococcal Vaccine: 65+ Years  Completed     IPV IMMUNIZATION  Aged Out     MENINGITIS IMMUNIZATION  Aged Out     Labs reviewed in EPIC  BP Readings from Last 3 Encounters:   12/19/22 122/86   11/09/22 132/80   10/05/22 132/70    Wt Readings from Last 3 Encounters:   12/19/22 120.2 kg (265 lb)   11/09/22 119.7 kg (264 lb)   10/05/22 119.7 kg (264 lb)                  Patient Active Problem List   Diagnosis     s/p gastric bypass x 2     Allergic rhinitis     Carpal tunnel syndrome     Obstructive sleep apnea     Rosacea     CARDIOVASCULAR SCREENING; LDL GOAL LESS THAN 160     Tick bite, infected, positive Lyme test 1996 not treated     Vitamin D deficiency     Osteoporosis     Obesity, Class III, BMI 40-49.9 (morbid obesity) (H)     S/P TKR (total knee replacement)     HTN, goal below 140/80     Back pain, left below scapula, strained muscles     Encounter for routine gynecological examination      Colles' fracture     Bilateral cold feet     Elevated levels of transaminase & lactic acid dehydrogenase     Fatty liver     Shoulder injury, right, subsequent encounter     GI bleed     Upper GI bleed     Bronchospasm     Multiple joint pain, possible fibromyalgia     Rotator cuff syndrome, right     Benign essential hypertension     Past Surgical History:   Procedure Laterality Date     ARTHROPLASTY KNEE  04/02/2012    Procedure:ARTHROPLASTY KNEE; Left Total Knee Arthroplasty--Anesth.Choice; Surgeon:VICENTE MORALES; Location:[facility]     ARTHROTOMY SHOULDER, ROTATOR CUFF REPAIR, COMBINED Right 08/25/2017     Procedure: COMBINED ARTHROTOMY SHOULDER, ROTATOR CUFF REPAIR;  Right shoulder distal clavicle excision, subacromial decompression, rotator cuff repair ;  Surgeon: Hernando Thompson MD;  Location: [facility]     ARTHROTOMY SHOULDER, ROTATOR CUFF REPAIR, COMBINED Right 12/18/2017    Procedure: COMBINED ARTHROTOMY SHOULDER, ROTATOR CUFF REPAIR;  Right Shoulder Rotator Cuff Revision;  Surgeon: Hernando Thompson MD;  Location: [facility]     ARTHROTOMY SHOULDER, ROTATOR CUFF REPAIR, COMBINED Left 04/27/2018    Procedure: COMBINED ARTHROTOMY SHOULDER, ROTATOR CUFF REPAIR;  Left Shoulder Open Subacromial Decompression,Distal Clavicle Excision,Rotator Cuff Repair;  Surgeon: Hernando Thompson MD;  Location: [facility]     C/SECTION, LOW TRANSVERSE  1978, 1984, 1994    x 3     COLONOSCOPY  01/01/2001    normal colonoscopy     COLONOSCOPY N/A 08/22/2017    Procedure: COLONOSCOPY;  Colonoscopy  ;  Surgeon: Delfino Yoo MD;  Location: [facility]     COLONOSCOPY  08/22/2017    EXAMCOL     ESOPHAGOSCOPY, GASTROSCOPY, DUODENOSCOPY (EGD), COMBINED N/A 03/18/2018    Procedure: COMBINED ESOPHAGOSCOPY, GASTROSCOPY, DUODENOSCOPY (EGD);;  Surgeon: Poncho Galindo MD;  Location: [facility]     GASTRIC BYPASS  1980/2005    Gastric Bypass and revision     HERNIA REPAIR, INCISIONAL  06/16/2008    lap.repair with 10x8 mesh     TUBAL LIGATION  01/01/1994     [facility] ORAL SURGERY PROCEDURE  age 18    wisdom teeth       Social History     Tobacco Use     Smoking status: Never     Smokeless tobacco: Never   Substance Use Topics     Alcohol use: Yes     Alcohol/week: 0.0 standard drinks     Comment: mixed very light 1 a month if that     Family History   Problem Relation Age of Onset     C.A.D. Father         MI at age 60     Hypertension Father      Alzheimer Disease Father      Hyperlipidemia Father      Osteoporosis Mother         on Fosamax     Glaucoma Mother      Other Cancer Brother      Stomach Cancer Maternal Aunt      Cervical Cancer Cousin       Diabetes No family hx of      Cerebrovascular Disease No family hx of      Breast Cancer No family hx of      Cancer - colorectal No family hx of      Prostate Cancer No family hx of      Liver Disease No family hx of          Current Outpatient Medications   Medication Sig Dispense Refill     acetaminophen (TYLENOL) 500 MG tablet Take 1,000 mg by mouth        albuterol (PROAIR HFA/PROVENTIL HFA/VENTOLIN HFA) 108 (90 Base) MCG/ACT inhaler INHALE 2 PUFFS INTO THE LUNGS EVERY 6 HOURS AS NEEDED FOR SHORTNESS OF BREATH OR DIFFICULT BREATHING OR WHEEZING 25.5 g 1     atenolol (TENORMIN) 50 MG tablet Take 1 tablet (50 mg) by mouth daily 90 tablet 3     hydroxychloroquine (PLAQUENIL) 200 MG tablet Take 1 tablet (200 mg) by mouth 2 times daily 180 tablet 0     NYAMYC 108158 UNIT/GM external powder APPLY TO AFFECTED AREA(S)  TOPICALLY 3 TIMES DAILY AS  NEEDED 60 g 1     oxybutynin ER (DITROPAN XL) 5 MG 24 hr tablet Take 1 tablet (5 mg) by mouth 2 times daily 180 tablet 3     sulfaSALAzine ER (AZULFIDINE EN) 500 MG EC tablet Take 1 tablet daily for 1 week, then take 1 tablet twice daily. Labs every 8-12 weeks. 180 tablet 0     sulfaSALAzine ER (AZULFIDINE EN) 500 MG EC tablet Take 1 tablet daily. 7 tablet 0     Allergies   Allergen Reactions     Ibuprofen      Other reaction(s): Gastrointestinal  Gastric bleed     Naproxen GI Disturbance     Other reaction(s): Gastrointestinal  Gastric bleed     Nickel Itching     Inflamed with cheap earrings (itchy)     Rofecoxib Itching and Swelling     VIOXX (Swollen Feet,Hands itching), Okay with ibuprofen and aspirin     Vioxx Itching and Swelling     VIOXX (Swollen Feet,Hands itching), Okay with ibuprofen and aspirin     Pneumonia Vaccine:For adults 65 years or older who do not have an immunocompromising condition, cerebrospinal fluid leak, or cochlear implant and want to receive PPSV23 ONLY: Administer 1 dose of PPSV23. Anyone who received any doses of PPSV23 before age 65 should  receive 1 final dose of the vaccine at age 65 or older. Administer this last dose at least 5 years after the prior PPSV23 dose.  Mammogram Screening: Mammogram Screening: Recommended mammography every 1-2 years with patient discussion and risk factor consideration  Any new diagnosis of family breast, ovarian, or bowel cancer? No    FHS-7:   Breast CA Risk Assessment (FHS-7) 10/18/2021   Did any of your first-degree relatives have breast or ovarian cancer? No   Did any of your relatives have bilateral breast cancer? No   Did any man in your family have breast cancer? No   Did any woman in your family have breast and ovarian cancer? No   Did any woman in your family have breast cancer before age 50 y? No   Do you have 2 or more relatives with breast and/or ovarian cancer? No   Do you have 2 or more relatives with breast and/or bowel cancer? No       Mammogram Screening: Recommended mammography every 1-2 years with patient discussion and risk factor consideration  Pertinent mammograms are reviewed under the imaging tab.    Review of Systems   Constitutional: Positive for chills. Negative for fever.   HENT: Negative for congestion, ear pain, hearing loss and sore throat.    Eyes: Negative for pain and visual disturbance.   Respiratory: Positive for shortness of breath. Negative for cough.    Cardiovascular: Negative for chest pain, palpitations and peripheral edema.   Gastrointestinal: Negative for abdominal pain, constipation, diarrhea, heartburn, hematochezia and nausea.   Breasts:  Negative for tenderness, breast mass and discharge.   Genitourinary: Positive for frequency and urgency. Negative for dysuria, genital sores, hematuria, pelvic pain, vaginal bleeding and vaginal discharge.   Musculoskeletal: Positive for arthralgias and joint swelling. Negative for myalgias.   Skin: Negative for rash.   Neurological: Positive for weakness and headaches. Negative for dizziness and paresthesias.   Psychiatric/Behavioral:  "Negative for mood changes. The patient is not nervous/anxious.          OBJECTIVE:   /86 (BP Location: Left arm, Patient Position: Chair, Cuff Size: Adult Large)   Pulse 75   Temp 98.7  F (37.1  C) (Tympanic)   Resp 18   Ht 1.594 m (5' 2.75\")   Wt 120.2 kg (265 lb)   LMP 05/29/2006   SpO2 95%   BMI 47.32 kg/m   Estimated body mass index is 47.32 kg/m  as calculated from the following:    Height as of this encounter: 1.594 m (5' 2.75\").    Weight as of this encounter: 120.2 kg (265 lb).  Physical Exam  GENERAL: healthy, alert and no distress  EYES: Eyes grossly normal to inspection, PERRL and conjunctivae and sclerae normal  HENT: ear canals and TM's normal, nose and mouth without ulcers or lesions  NECK: no adenopathy, no asymmetry, masses, or scars and thyroid normal to palpation  RESP: lungs clear to auscultation - no rales, rhonchi or wheezes  CV: regular rate and rhythm, normal S1 S2, no S3 or S4, no murmur, click or rub, no peripheral edema and peripheral pulses strong  ABDOMEN: soft, nontender, no hepatosplenomegaly, no masses and bowel sounds normal  MS: no gross musculoskeletal defects noted, no edema  SKIN: no suspicious lesions or rashes  PSYCH: mentation appears normal, affect normal/bright    Diagnostic Test Results:  Pending    ASSESSMENT / PLAN:       ICD-10-CM    1. Routine general medical examination at a health care facility  Z00.00 67 yr old female here for a preventive visit. Doing well overall. Mammogram up to date, has had colonoscopy in the last few year, immunizations up to date.       2. Screening for hyperlipidemia  Z13.220 Lipid labs reviewed , she will have labs done next year.      3. Localized osteoporosis, unspecified pathological fracture presence  M81.6 DX Hip/Pelvis/Spine  Was diagnosed years ago, has not had any bone density scan in 10 yrs      4. Inflammatory arthritis  M19.90 CBC with platelets     AST     ALT     Creatinine     Albumin level- Patient will be " "notified of results.       5. Positive SYLVIE (antinuclear antibody)  R76.8 CBC with platelets     AST     ALT     Creatinine     Albumin level      6. High risk medication use  Z79.899 CBC with platelets     AST     ALT     Creatinine     Albumin level          Patient has been advised of split billing requirements and indicates understanding: Yes      COUNSELING:  Reviewed preventive health counseling, as reflected in patient instructions       Regular exercise       Healthy diet/nutrition      BMI:   Estimated body mass index is 47.32 kg/m  as calculated from the following:    Height as of this encounter: 1.594 m (5' 2.75\").    Weight as of this encounter: 120.2 kg (265 lb).   Weight management plan: Discussed healthy diet and exercise guidelines      She reports that she has never smoked. She has never used smokeless tobacco.      Appropriate preventive services were discussed with this patient, including applicable screening as appropriate for cardiovascular disease, diabetes, osteopenia/osteoporosis, and glaucoma.  As appropriate for age/gender, discussed screening for colorectal cancer, prostate cancer, breast cancer, and cervical cancer. Checklist reviewing preventive services available has been given to the patient.    Reviewed patients plan of care and provided an AVS. The Basic Care Plan (routine screening as documented in Health Maintenance) for Jacquie meets the Care Plan requirement. This Care Plan has been established and reviewed with the Patient.          Jennie Suazo MD  [facility]    Identified Health Risks:  "

## 2022-12-19 NOTE — PATIENT INSTRUCTIONS
Preventive Health Recommendations    See your health care provider every year to    Review health changes.     Discuss preventive care.      Review your medicines if your doctor has prescribed any.      You no longer need a yearly Pap test unless you've had an abnormal Pap test in the past 10 years. If you have vaginal symptoms, such as bleeding or discharge, be sure to talk with your provider about a Pap test.      Every 1 to 2 years, have a mammogram.  If you are over 69, talk with your health care provider about whether or not you want to continue having screening mammograms.      Every 10 years, have a colonoscopy. Or, have a yearly FIT test (stool test). These exams will check for colon cancer.       Have a cholesterol test every 5 years, or more often if your doctor advises it.       Have a diabetes test (fasting glucose) every three years. If you are at risk for diabetes, you should have this test more often.       At age 65, have a bone density scan (DEXA) to check for osteoporosis (brittle bone disease).    Shots:    Get a flu shot each year.    Get a tetanus shot every 10 years.    Talk to your doctor about your pneumonia vaccines. There are now two you should receive - Pneumovax (PPSV 23) and Prevnar (PCV 13).    Talk to your pharmacist about the shingles vaccine.    Talk to your doctor about the hepatitis B vaccine.    Nutrition:     Eat at least 5 servings of fruits and vegetables each day.      Eat whole-grain bread, whole-wheat pasta and brown rice instead of white grains and rice.      Get adequate about Calcium and Vitamin D.     Lifestyle    Exercise at least 150 minutes a week (30 minutes a day, 5 days a week). This will help you control your weight and prevent disease.      Limit alcohol to one drink per day.      No smoking.       Wear sunscreen to prevent skin cancer.       See your dentist twice a year for an exam and cleaning.      See your eye doctor every 1 to 2 years to screen for  conditions such as glaucoma, macular degeneration, cataracts, etc.    Personalized Prevention Plan  You are due for the preventive services outlined below.  Your care team is available to assist you in scheduling these services.  If you have already completed any of these items, please share that information with your care team to update in your medical record.    Health Maintenance Due   Topic Date Due     Pneumococcal Vaccine (1 - PCV) Never done     Zoster (Shingles) Vaccine (1 of 2) Never done     Discuss Advance Care Planning  04/16/2018     Annual Wellness Visit  04/03/2019     Cholesterol Lab  01/07/2021     COVID-19 Vaccine (3 - Moderna risk series) 04/28/2021     FALL RISK ASSESSMENT  07/07/2022     Colorectal Cancer Screening  08/22/2022     Preventive Health Recommendations    See your health care provider every year to    Review health changes.     Discuss preventive care.      Review your medicines if your doctor has prescribed any.      You no longer need a yearly Pap test unless you've had an abnormal Pap test in the past 10 years. If you have vaginal symptoms, such as bleeding or discharge, be sure to talk with your provider about a Pap test.      Every 1 to 2 years, have a mammogram.  If you are over 69, talk with your health care provider about whether or not you want to continue having screening mammograms.      Every 10 years, have a colonoscopy. Or, have a yearly FIT test (stool test). These exams will check for colon cancer.       Have a cholesterol test every 5 years, or more often if your doctor advises it.       Have a diabetes test (fasting glucose) every three years. If you are at risk for diabetes, you should have this test more often.       At age 65, have a bone density scan (DEXA) to check for osteoporosis (brittle bone disease).    Shots:    Get a flu shot each year.    Get a tetanus shot every 10 years.    Talk to your doctor about your pneumonia vaccines. There are now two you  should receive - Pneumovax (PPSV 23) and Prevnar (PCV 13).    Talk to your pharmacist about the shingles vaccine.    Talk to your doctor about the hepatitis B vaccine.    Nutrition:     Eat at least 5 servings of fruits and vegetables each day.      Eat whole-grain bread, whole-wheat pasta and brown rice instead of white grains and rice.      Get adequate about Calcium and Vitamin D.     Lifestyle    Exercise at least 150 minutes a week (30 minutes a day, 5 days a week). This will help you control your weight and prevent disease.      Limit alcohol to one drink per day.      No smoking.       Wear sunscreen to prevent skin cancer.       See your dentist twice a year for an exam and cleaning.      See your eye doctor every 1 to 2 years to screen for conditions such as glaucoma, macular degeneration, cataracts, etc.    Personalized Prevention Plan  You are due for the preventive services outlined below.  Your care team is available to assist you in scheduling these services.  If you have already completed any of these items, please share that information with your care team to update in your medical record.    Health Maintenance Due   Topic Date Due     Pneumococcal Vaccine (1 - PCV) Never done     Zoster (Shingles) Vaccine (1 of 2) Never done     Discuss Advance Care Planning  04/16/2018     Annual Wellness Visit  04/03/2019     Cholesterol Lab  01/07/2021     COVID-19 Vaccine (3 - Moderna risk series) 04/28/2021     FALL RISK ASSESSMENT  07/07/2022     Colorectal Cancer Screening  08/22/2022     At your visit today, we discussed your risk for falls and preventive options.    Fall Prevention  Falls often occur due to slipping, tripping or losing your balance. Millions of people fall every year and injure themselves. Here are ways to reduce your risk of falling again.     Think about your fall, was there anything that caused your fall that can be fixed, removed, or replaced?    Make your home safe by keeping walkways  clear of objects you may trip over, such as electric cords.    Use non-slip pads under rugs. Don't use area rugs or small throw rugs.    Use non-slip mats in bathtubs and showers.    Install handrails and lights on staircases. The handrails should be on both sides of the stairs.    Don't walk in poorly lit areas.    Don't stand on chairs or wobbly ladders.    Use caution when reaching overhead or looking upward. This position can cause a loss of balance.    Be sure your shoes fit properly, have non-slip bottoms and are in good condition.     Wear shoes both inside and out. Don't go barefoot or wear slippers.    Be cautious when going up and down stairs, curbs, and when walking on uneven sidewalks.    If your balance is poor, consider using a cane or walker.    If your fall was related to alcohol use, stop or limit alcohol intake.     If your fall was related to use of sleeping medicines, talk to your healthcare provider about this. You may need to reduce your dosage at bedtime if you awaken during the night to go to the bathroom.      To reduce the need for nighttime bathroom trips:  ? Don't drink fluids for several hours before going to bed  ? Empty your bladder before going to bed  ? Men can keep a urinal at the bedside    Stay as active as you can. Balance, flexibility, strength, and endurance all come from exercise. They all play a role in preventing falls. Ask your healthcare provider which types of activity are right for you.    Get your vision checked on a regular basis.    If you have pets, know where they are before you stand up or walk so you don't trip over them.    Use night lights.    Go over all your medicines with a pharmacist or other healthcare provider to see if any of them could make you more likely to fall.  [employer] last reviewed this educational content on 4/1/2018 2000-2021 [employer]. All rights reserved. This information is not intended as a substitute for professional  medical care. Always follow your healthcare professional's instructions.        Patient Education   Personalized Prevention Plan  You are due for the preventive services outlined below.  Your care team is available to assist you in scheduling these services.  If you have already completed any of these items, please share that information with your care team to update in your medical record.  Health Maintenance Due   Topic Date Due     Pneumococcal Vaccine (1 - PCV) Never done     Zoster (Shingles) Vaccine (1 of 2) Never done     Annual Wellness Visit  04/03/2019     Cholesterol Lab  01/07/2021     COVID-19 Vaccine (3 - Moderna risk series) 04/28/2021     Colorectal Cancer Screening  08/22/2022       Exercise for a Healthier Heart  You may wonder how you can improve the health of your heart. If you re thinking about exercise, you re on the right track. You don t need to become an athlete. But you do need a certain amount of brisk exercise to help strengthen your heart. If you have been diagnosed with a heart condition, your healthcare provider may advise exercise to help stabilize your condition. To help make exercise a habit, choose safe, fun activities.      Exercise with a friend. When activity is fun, you're more likely to stick with it.   Before you start  Check with your healthcare provider before starting an exercise program. This is especially important if you have not been active for a while. It's also important if you have a long-term (chronic) health problem such as heart disease, diabetes, or obesity. Or if you are at high risk for having these problems.   Why exercise?  Exercising regularly offers many healthy rewards. It can help you do all of the following:     Improve your blood cholesterol level to help prevent further heart trouble    Lower your blood pressure to help prevent a stroke or heart attack    Control diabetes, or reduce your risk of getting this disease    Improve your heart and lung  function    Reach and stay at a healthy weight    Make your muscles stronger so you can stay active    Prevent falls and fractures by slowing the loss of bone mass (osteoporosis)    Manage stress better    Reduce your blood pressure    Improve your sense of self and your body image  Exercise tips      Ease into your routine. Set small goals. Then build on them. If you are not sure what your activity level should be, talk with your healthcare provider first before starting an exercise routine.    Exercise on most days. Aim for a total of 150 minutes (2 hours and 30 minutes) or more of moderate-intensity aerobic activity each week. Or 75 minutes (1 hour and 15 minutes) or more of vigorous-intensity aerobic activity each week. Or try for a combination of both. Moderate activity means that you breathe heavier and your heart rate increases but you can still talk. Think about doing 40 minutes of moderate exercise, 3 to 4 times a week. For best results, activity should last for about 40 minutes to lower blood pressure and cholesterol. It's OK to work up to the 40-minute period over time. Examples of moderate-intensity activity are walking 1 mile in 15 minutes. Or doing 30 to 45 minutes of yard work.    Step up your daily activity level.  Along with your exercise program, try being more active the whole day. Walk instead of drive. Or park further away so that you take more steps each day. Do more household tasks or yard work. You may not be able to meet the advised mount of physical activity. But doing some moderate- or vigorous-intensity aerobic activity can help reduce your risk for heart disease. Your healthcare provider can help you figure out what is best for you.    Choose 1 or more activities you enjoy.  Walking is one of the easiest things you can do. You can also try swimming, riding a bike, dancing, or taking an exercise class.    When to call your healthcare provider  Call your healthcare provider if you have any  of these:     Chest pain or feel dizzy or lightheaded    Burning, tightness, pressure, or heaviness in your chest, neck, shoulders, back, or arms    Abnormal shortness of breath    More joint or muscle pain    A very fast or irregular heartbeat (palpitations)  [employer] last reviewed this educational content on 7/1/2019 2000-2021 [employer]. All rights reserved. This information is not intended as a substitute for professional medical care. Always follow your healthcare professional's instructions.          Understanding USDA MyPlate  The USDA has guidelines to help you make healthy food choices. These are called MyPlate. MyPlate shows the food groups that make up healthy meals using the image of a place setting. Before you eat, think about the healthiest choices for what to put on your plate or in your cup or bowl. To learn more about building a healthy plate, visit www.choosemyplate.gov.    The food groups    Fruits. Any fruit or 100% fruit juice counts as part of the Fruit Group. Fruits may be fresh, canned, frozen, or dried, and may be whole, cut-up, or pureed. Make 1/2 of your plate fruits and vegetables.    Vegetables. Any vegetable or 100% vegetable juice counts as a member of the Vegetable Group. Vegetables may be fresh, frozen, canned, or dried. They can be served raw or cooked and may be whole, cut-up, or mashed. Make 1/2 of your plate fruits and vegetables.    Grains. All foods made from grains are part of the Grains Group. These include wheat, rice, oats, cornmeal, and barley. Grains are often used to make foods such as bread, pasta, oatmeal, cereal, tortillas, and grits. Grains should be no more than 1/4 of your plate. At least half of your grains should be whole grains.    Protein. This group includes meat, poultry, seafood, beans and peas, eggs, processed soy products (such as tofu), nuts (including nut butters), and seeds. Make protein choices no more than 1/4 of your plate. Meat and  poultry choices should be lean or low fat.    Dairy. The Dairy Group includes all fluid milk products and foods made from milk that contain calcium, such as yogurt and cheese. (Foods that have little calcium, such as cream, butter, and cream cheese, are not part of this group.) Most dairy choices should be low-fat or fat-free.    Oils. Oils aren't a food group, but they do contain essential nutrients. However it's important to watch your intake of oils. These are fats that are liquid at room temperature. They include canola, corn, olive, soybean, vegetable, and sunflower oil. Foods that are mainly oil include mayonnaise, certain salad dressings, and soft margarines. You likely already get your daily oil allowance from the foods you eat.  Things to limit  Eating healthy also means limiting these things in your diet:       Salt (sodium). Many processed foods have a lot of sodium. To keep sodium intake down, eat fresh vegetables, meats, poultry, and seafood when possible. Purchase low-sodium, reduced-sodium, or no-salt-added food products at the store. And don't add salt to your meals at home. Instead, season them with herbs and spices such as dill, oregano, cumin, and paprika. Or try adding flavor with lemon or lime zest and juice.    Saturated fat. Saturated fats are most often found in animal products such as beef, pork, and chicken. They are often solid at room temperature, such as butter. To reduce your saturated fat intake, choose leaner cuts of meat and poultry. And try healthier cooking methods such as grilling, broiling, roasting, or baking. For a simple lower-fat swap, use plain nonfat yogurt instead of mayonnaise when making potato salad or macaroni salad.    Added sugars. These are sugars added to foods. They are in foods such as ice cream, candy, soda, fruit drinks, sports drinks, energy drinks, cookies, pastries, jams, and syrups. Cut down on added sugars by sharing sweet treats with a family member or  friend. You can also choose fruit for dessert, and drink water or other unsweetened beverages.     [employer] last reviewed this educational content on 6/1/2020 2000-2021 [employer]. All rights reserved. This information is not intended as a substitute for professional medical care. Always follow your healthcare professional's instructions.          Urinary Incontinence, Female (Adult)   Urinary incontinence means loss of bladder control. This problem affects many women, especially as they get older. If you have incontinence, you may be embarrassed to ask for help. But know that this problem can be treated.   Types of Incontinence  There are different types of incontinence. Two of the main types are described here. You can have more than one type.     Stress incontinence. With this type, urine leaks when pressure (stress) is put on the bladder. This may happen when you cough, sneeze, or laugh. Stress incontinence most often occurs because the pelvic floor muscles that support the bladder and urethra are weak. This can happen after pregnancy and vaginal childbirth or a hysterectomy. It can also be due to excess body weight or hormone changes.    Urge incontinence (also called overactive bladder). With this type, a sudden urge to urinate is felt often. This may happen even though there may not be much urine in the bladder. The need to urinate often during the night is common. Urge incontinence most often occurs because of bladder spasms. This may be due to bladder irritation or infection. Damage to bladder nerves or pelvic muscles, constipation, and certain medicines can also lead to urge incontinence.  Treatment depends on the cause. Further evaluation is needed to find the type you have. This will likely include an exam and certain tests. Based on the results, you and your healthcare provider can then plan treatment. Until a diagnosis is made, the home care tips below can help ease symptoms.   Home  care    Do pelvic floor muscle exercises, if they are prescribed. The pelvic floor muscles help support the bladder and urethra. Many women find that their symptoms improve when doing special exercises that strengthen these muscles. To do the exercises, contract the muscles you would use to stop your stream of urine. But do this when you re not urinating. Hold for 10 seconds, then relax. Repeat 10 to 20 times in a row, at least 3 times a day. Your healthcare provider may give you other instructions for how to do the exercises and how often.    Keep a bladder diary. This helps track how often and how much you urinate over a set period of time. Bring this diary with you to your next visit with the provider. The information can help your provider learn more about your bladder problem.    Lose weight, if advised to by your provider. Extra weight puts pressure on the bladder. Your provider can help you create a weight-loss plan that s right for you. This may include exercising more and making certain diet changes.    Don't have foods and drinks that may irritate the bladder. These can include alcohol and caffeinated drinks.    Quit smoking. Smoking and other tobacco use can lead to a long-term (chronic) cough that strains the pelvic floor muscles. Smoking may also damage the bladder and urethra. Talk with your provider about treatments or methods you can use to quit smoking.    If drinking large amounts of fluid makes you have symptoms, you may be advised to limit your fluid intake. You may also be advised to drink most of your fluids during the day and to limit fluids at night.    If you re worried about urine leakage or accidents, you may wear absorbent pads to catch urine. Change the pads often. This helps reduce discomfort. It may also reduce the risk of skin or bladder infections.    Follow-up care  Follow up with your healthcare provider, or as directed. It may take some to find the right treatment for your  problem. But healthy lifestyle changes can be made right away. These include such things as exercising on a regular basis, eating a healthy diet, losing weight (if needed), and quitting smoking. Your treatment plan may include special therapies or medicines. Certain procedures or surgery may also be options. Talk about any questions you have with your provider.   When to seek medical advice  Call the healthcare provider right away if any of these occur:    Fever of 100.4 F (38 C) or higher, or as directed by your provider    Bladder pain or fullness    Belly swelling    Nausea or vomiting    Back pain    Weakness, dizziness, or fainting  [employer] last reviewed this educational content on 1/1/2020 2000-2021 [employer]. All rights reserved. This information is not intended as a substitute for professional medical care. Always follow your healthcare professional's instructions.          Preventing Falls at Home  A person can fall for many reasons. Older adults may fall because reaction time slows down as we age. Your muscles and joints may get stiff, weak, or less flexible because of illness, medicines, or a physical condition.   Other health problems that make falls more likely include:     Arthritis    Dizziness or lightheadedness when you stand up (orthostatic hypotension)    History of a stroke    Dizziness    Anemia    Certain medicines taken for mental illness or to control blood pressure.    Problems with balance or gait    Bladder or urinary problems    History of falling    Changes in vision (vision impairment)    Changes in thinking skills and memory (cognitive impairment)  Injuries from a fall can include serious injuries such as broken bones, dislocated joints, internal bleeding and cuts. Injuries like these can limit your independence.   Prevention tips  To help prevent falls and fall-related injuries, follow the tips below.    Floors  To make floors safer:     Put nonskid pads under area  rugs.    Remove small rugs.    Replace worn floor coverings.    Tack carpets firmly to each step on carpeted stairs. Put nonskid strips on the edges of uncarpeted stairs.    Keep floors and stairs free of clutter and cords.    Arrange furniture so there are clear pathways.    Clean up any spills right away.  Bathrooms    To make bathrooms safer:     Install grab bars in the tub or shower.    Apply nonskid strips or put a nonskid rubber mat in the tub or shower.    Sit on a bath chair to bathe.    Use bathmats with nonskid backing.  Lighting  To improve visibility in your home:      Keep a flashlight in each room. Or put a lamp next to the bed within easy reach.    Put nightlights in the bedrooms, hallways, kitchen, and bathrooms.    Make sure all stairways have good lighting.    Take your time when going up and down stairs.    Put handrails on both sides of stairs and in walkways for more support. To prevent injury to your wrist or arm, don t use handrails to pull yourself up.    Install grab bars to pull yourself up.    Move or rearrange items that you use often. This will make them easier to find or reach.    Look at your home to find any safety hazards. Especially look at doorways, walkways, and the driveway. Remove or repair any safety problems that you find.  Other changes to make    Look around to find any safety hazards. Look closely at doorways, walkways, and the driveway. Remove or repair any safety problems that you find.    Wear shoes that fit well.    Take your time when going up and down stairs.    Put handrails on both sides of stairs and in walkways for more support. To prevent injury to your wrist or arm, don t use handrails to pull yourself up.    Install grab bars wherever needed to pull yourself up.    Arrange items that you use often. This will make them easier to find or reach.    [employer] last reviewed this educational content on 3/1/2020    2945-5116 [employer]. All rights  reserved. This information is not intended as a substitute for professional medical care. Always follow your healthcare professional's instructions.

## 2022-12-26 ENCOUNTER — ANCILLARY PROCEDURE (OUTPATIENT)
Dept: GENERAL RADIOLOGY | Facility: CLINIC | Age: 67
End: 2022-12-26
Payer: COMMERCIAL

## 2022-12-26 ENCOUNTER — OFFICE VISIT (OUTPATIENT)
Dept: URGENT CARE | Facility: URGENT CARE | Age: 67
End: 2022-12-26
Payer: COMMERCIAL

## 2022-12-26 VITALS
TEMPERATURE: 98.3 F | HEART RATE: 74 BPM | BODY MASS INDEX: 46.42 KG/M2 | DIASTOLIC BLOOD PRESSURE: 103 MMHG | SYSTOLIC BLOOD PRESSURE: 159 MMHG | OXYGEN SATURATION: 97 % | RESPIRATION RATE: 20 BRPM | WEIGHT: 260 LBS

## 2022-12-26 DIAGNOSIS — G89.29 CHRONIC PAIN OF BOTH KNEES: ICD-10-CM

## 2022-12-26 DIAGNOSIS — R03.0 ELEVATED BLOOD PRESSURE READING: ICD-10-CM

## 2022-12-26 DIAGNOSIS — M25.561 CHRONIC PAIN OF BOTH KNEES: ICD-10-CM

## 2022-12-26 DIAGNOSIS — G89.29 CHRONIC PAIN OF BOTH KNEES: Primary | ICD-10-CM

## 2022-12-26 DIAGNOSIS — M25.562 CHRONIC PAIN OF BOTH KNEES: Primary | ICD-10-CM

## 2022-12-26 DIAGNOSIS — M25.562 CHRONIC PAIN OF BOTH KNEES: ICD-10-CM

## 2022-12-26 DIAGNOSIS — M25.561 CHRONIC PAIN OF BOTH KNEES: Primary | ICD-10-CM

## 2022-12-26 PROCEDURE — 73560 X-RAY EXAM OF KNEE 1 OR 2: CPT | Mod: TC | Performed by: RADIOLOGY

## 2022-12-26 PROCEDURE — 99214 OFFICE O/P EST MOD 30 MIN: CPT

## 2022-12-26 RX ORDER — CELECOXIB 100 MG/1
100 CAPSULE ORAL 2 TIMES DAILY
Qty: 14 CAPSULE | Refills: 0 | Status: SHIPPED | OUTPATIENT
Start: 2022-12-26 | End: 2023-01-10

## 2022-12-26 NOTE — PROGRESS NOTES
URGENT CARE  Assessment & Plan   Assessment:   Jacquie Harris is a 67 year old female who's clinical presentation today is consistent with:   1. Chronic pain of both knees  - celecoxib (CELEBREX) 100 MG capsule; Take 1 capsule (100 mg) by mouth 2 times daily for 7 days  Dispense: 14 capsule; Refill: 0  - Physical Therapy Referral; Future  - Orthopedic  Referral; Future  - diclofenac (VOLTAREN) 1 % topical gel; Apply 4 g topically 4 times daily  Dispense: 100 g; Refill: 0    No alarm signs or symptoms present   Differential Diagnoses for this patient's CC include    fracture, dislocation  Ligamentous vs tendon pathology,   musculoskeletal injury, soft tissue injury    Plan:  Radiologic films today were negative for fractures or dislocation today, will treat patient at this time symptomatically and supportively, this will include encouraging: using NSAIDs/Tylenol to help decrease pain and inflammation, resting, applying ice/heat as needed, compression and elevation, given patient has tried conservative therapy with Tylenol already and does not tolerate ibuprofen due to gastrointestinal bleeding we will start her on Celebrex as a Wills 2 inhibitor  Educated patient to follow-up with their PCP/ ortho in the next 1-2 weeks for further evaluation and reassessment, and due to the possibility of an occult fracture Educated patient to follow-up with physical therapy for further evaluation and treatment, also discussed to return immediatly  if symptoms worsen after today's visit.     Provided reassurance to patient  Patient had a noted elevated blood pressure reading today in urgent care, patient is known to have a hypertension. Given patient is acutely ill, and this was an isolated finding,  patient states their blood pressure is usually well controlled} will refer her to his PCP for further evaluation and treatment of her elevated blood pressure, educated patient a second reading at a second time is reasonable to  "consider discussing medication adjustment and management    Also discussed with patient today the signs and symptoms of a hypertensive emergency /urgency, a cardiac event and/or a stroke; she states an understanding and will return to the ED should she note any of these symptoms    Patient is agreeable to treatment plan and state they will follow-up if symptoms do not improve and/or if symptoms worsen (see patient's AVS 'monitor for' section for specific patient instructions given and discussed regarding what to watch for and when to follow up)    Medications ordered are listed above, please see AVS for patient's specific and personalized discharge instructions given     MARGARITA Dasilva [facility] C[facility] [place]      ______________________________________________________________________        Subjective  Subjective     HPI: Jacquie Harris \"Karen\"} is a 67 year old  female who presents today for evaluation the following concerns:   Patient presents endorsing bilateral knee pain which started 7 on 12/19/2022  Patient states this pain is related to a bilateral knee replacements she has had in the past and is acute on chronic pain.  Patient endorses she feels like her \"knees are buckling \", and unable to support her weight.  Patient is concerned that her prosthesis are dislocated.  Patient localizes the pain to the right side, anterior aspect, and states there is no radiation of pain to the thigh or lower leg  patient denies any associated symptoms such as swelling, redness or bruising   Patient states their: Skin is intact. ROM is intact, and their strength is weaker than normal but due to pain, patient denies any infectious properties such as warmth at the site.  Patient denies any open areas or abscess to the site  Patient reports sensation is without numbness or tingling.   Self care to this point includes: Tylenol and heat.   Patient is predisposed to orthopedic degeneration r/t risk factors " of obesity      Allergies   Allergen Reactions     Ibuprofen      Other reaction(s): Gastrointestinal  Gastric bleed     Naproxen GI Disturbance     Other reaction(s): Gastrointestinal  Gastric bleed     Nickel Itching     Inflamed with cheap earrings (itchy)     Rofecoxib Itching and Swelling     VIOXX (Swollen Feet,Hands itching), Okay with ibuprofen and aspirin     Vioxx Itching and Swelling     VIOXX (Swollen Feet,Hands itching), Okay with ibuprofen and aspirin     Patient Active Problem List   Diagnosis     s/p gastric bypass x 2     Allergic rhinitis     Carpal tunnel syndrome     Obstructive sleep apnea     Rosacea     CARDIOVASCULAR SCREENING; LDL GOAL LESS THAN 160     Tick bite, infected, positive Lyme test 1996 not treated     Vitamin D deficiency     Osteoporosis     Obesity, Class III, BMI 40-49.9 (morbid obesity) (H)     S/P TKR (total knee replacement)     HTN, goal below 140/80     Back pain, left below scapula, strained muscles     Encounter for routine gynecological examination      Colles' fracture     Bilateral cold feet     Elevated levels of transaminase & lactic acid dehydrogenase     Fatty liver     Shoulder injury, right, subsequent encounter     GI bleed     Upper GI bleed     Bronchospasm     Multiple joint pain, possible fibromyalgia     Rotator cuff syndrome, right     Benign essential hypertension       Review of Systems:  Pertinent review of systems as reflected in HPI, otherwise negative.     Objective  Objective    Physical Exam:  Vitals:    12/26/22 1005 12/26/22 1007   BP: (!) 152/97 (!) 159/103   BP Location: Right arm Right arm   Patient Position: Sitting Sitting   Cuff Size: Adult Regular Adult Regular   Pulse: 74    Resp: 20    Temp: 98.3  F (36.8  C)    TempSrc: Tympanic    SpO2: 97%    Weight: 117.9 kg (260 lb)       General:   alert and oriented, no acute distress, nonill-appearing   Vital signs reviewed: afebrile and hypertensive  Psy/mental status: Cooperative  SKIN:  Intact  MSK:    Bilateral knee: No erythema, ecchymosis, bruising, or inflammation present on the medial, lateral, anterior, posterior aspect(s).   Increased tenderness/pain with palpation on the anterior aspect of the right knee  No decreased range of motion with flexion, extension, internal rotation, and external rotation}  Temperature equal} to body temperature.  No crepitus, no gross deformity, joint laxity, skin intact, and no laceration(s) present.   Can bear weight but with increased pain.       Imaging:   All images were personally read by this provider (myself).   Per my independent interpretation the bilateral knee xrays show:    bilateral total knee arthroplasties. No hardware complication. No acute fracture or malalignment. No   Bilateral knee joint effusion. Osteopenia. Left sided low lying patella     I explained my diagnostic considerations and recommendations to the patient, who voiced understanding and agreement with the treatment plan.   All questions were answered.   We discussed potential side effects, risks and benefits of any prescribed or recommended therapies, as well as expectations for response to treatments.  Please see AVS for any patient instructions & handouts given.   Patient was advised to contact the Nurse Care Line, their Primary Care provider, Urgent Care, or the Emergency Department if there are new or worsening symptoms, or call 911 for emergencies.        ______________________________________________________________________          Patient Instructions     Diagnosis: Muscular strain/sprain injury vs soft tissue swelling   Today we did:  Xray:  negative for fracture or dislocation, no malalignment of prothesis    Plan:   1. Avoid or restrict any activities that may aggravate your symptoms. Cease exacerbating activity for 2 to 6 weeks, then gradually return to exercise/sport as tolerated.    Work/school/activity note ?     Recommending light stretching (when able to tolerate) to  "reduce your risks of developing a frozen joint or stiffness in joint     This will also help to increase strength and flexibility over time related to injury     Recovery expected within 2-6 weeks depending on severity, but some may take up to 6-12 months.    If symptoms fail to resolve or worsen recommending follow-up with orthopedics/physical therapy after allowing adequate time for healing. - will place referral today   P.R.I.C.E.  For musculoskeletal injuries including: sprains, strains, bruises - use the acronym P.R.I.C.E. for symptomatic treatment:   1. Prevent & Protect further injury.  2. Rest affected area   3. Ice applied (or heat, or can alternate)   4. Compression of injured area (ACE bandage/splint).  5. Elevation of injured area.  Pain  Ibuprofen / tylenol for pain   - Ibuprofen 600mg Q6hr as needed  (can use celebrex if GI upset or GI bleed risk)    - tylenol 500mg Q8hr   - Can use aleve / naproxen / naprosyn also    Muscle relaxant}     No narcotics - no evidence to support this use and people tend to over use \"injured\" extremity when not having pain    (Pain is body's way of making you take it easy for awhile)   - orthopedic speciality will discuss stronger medications if needed indicated at that time  Monitor for:     Worsening pain     Worsening numbness and tingling     Loss of range of motion or strength     Redness, warmth or infection at site     Fevers, chills         "

## 2022-12-26 NOTE — PATIENT INSTRUCTIONS
"  Diagnosis: Muscular strain/sprain injury vs soft tissue swelling   Today we did:  Xray:  negative for fracture or dislocation, no malalignment of prothesis    Plan:   Avoid or restrict any activities that may aggravate your symptoms. Cease exacerbating activity for 2 to 6 weeks, then gradually return to exercise/sport as tolerated.  Work/school/activity note ?   Recommending light stretching (when able to tolerate) to reduce your risks of developing a frozen joint or stiffness in joint   This will also help to increase strength and flexibility over time related to injury   Recovery expected within 2-6 weeks depending on severity, but some may take up to 6-12 months.  If symptoms fail to resolve or worsen recommending follow-up with orthopedics/physical therapy after allowing adequate time for healing. - will place referral today   P.R.I.C.E.  For musculoskeletal injuries including: sprains, strains, bruises - use the acronym P.R.I.C.E. for symptomatic treatment:   Prevent & Protect further injury.  Rest affected area   Ice applied (or heat, or can alternate)   Compression of injured area (ACE bandage/splint).  Elevation of injured area.  Pain  Ibuprofen / tylenol for pain   - Ibuprofen 600mg Q6hr as needed  (can use celebrex if GI upset or GI bleed risk)    - tylenol 500mg Q8hr   - Can use aleve / naproxen / naprosyn also    Muscle relaxant}     No narcotics - no evidence to support this use and people tend to over use \"injured\" extremity when not having pain    (Pain is body's way of making you take it easy for awhile)   - orthopedic speciality will discuss stronger medications if needed indicated at that time  Monitor for:   Worsening pain   Worsening numbness and tingling   Loss of range of motion or strength   Redness, warmth or infection at site   Fevers, chills    "

## 2023-01-09 ENCOUNTER — OFFICE VISIT (OUTPATIENT)
Dept: ORTHOPEDICS | Facility: CLINIC | Age: 68
End: 2023-01-09
Payer: COMMERCIAL

## 2023-01-09 VITALS
BODY MASS INDEX: 48.76 KG/M2 | RESPIRATION RATE: 20 BRPM | WEIGHT: 265 LBS | HEART RATE: 75 BPM | HEIGHT: 62 IN | DIASTOLIC BLOOD PRESSURE: 82 MMHG | SYSTOLIC BLOOD PRESSURE: 149 MMHG

## 2023-01-09 DIAGNOSIS — G89.29 CHRONIC PAIN OF BOTH KNEES: ICD-10-CM

## 2023-01-09 DIAGNOSIS — Z96.653 HISTORY OF TOTAL BILATERAL KNEE REPLACEMENT: ICD-10-CM

## 2023-01-09 DIAGNOSIS — M25.561 CHRONIC PAIN OF BOTH KNEES: ICD-10-CM

## 2023-01-09 DIAGNOSIS — M25.562 CHRONIC PAIN OF BOTH KNEES: ICD-10-CM

## 2023-01-09 PROCEDURE — 99203 OFFICE O/P NEW LOW 30 MIN: CPT | Performed by: ORTHOPAEDIC SURGERY

## 2023-01-09 NOTE — LETTER
1/9/2023         RE: Jacquie Harris  [address] [place] [facility] [place] [address]        Dear Colleague,    Thank you for referring your patient, Jacquie Harris, to the [facility]. Please see a copy of my visit note below.    Jacquie Harris is a 67 year old female who is seen as self referral for bilateral knee pain.  She has a long history of problems with arthritis of the knees.  She eventually underwent left knee replacement, which was still painful.  She then had revision of this on 5/29/2019 by Dr. Cedric Fuller.  She went on to have right total knee arthroplasty on 6/22/2020 by Dr. Fuller.  She reports having buckling of both knees thereafter.  She describes constant pain with occasional sharp shooting pains.  She rates her pain up to 8-9 out of 10.  She feels the knees buckle forward or sometimes hyperextend.  She uses an electric lift chair to get up.  She reports insurance has stopped her from following up with Dr. Fuller.  She did have 2 recent falls in early December.  She had x-rays on 12/26/2022 of both knees.  The x-rays show no fractures or loosening of the prosthesis.  There is a stemmed tibial prosthesis on the right with a posterior sacrificing prosthesis.   On the left there is long stems with an LCCK type prosthesis      Past Medical History:   Diagnosis Date     HTN (hypertension)      IRON DEFICIENCY ANEMIA 7/5/2005     Iron infusion/ resolved since injections     Obstructive sleep apnea      DARWIN (obstructive sleep apnea)        Past Surgical History:   Procedure Laterality Date     ARTHROPLASTY KNEE  04/02/2012    Procedure:ARTHROPLASTY KNEE; Left Total Knee Arthroplasty--Anesth.Choice; Surgeon:VICENTE MORALES; Location:[facility]     ARTHROTOMY SHOULDER, ROTATOR CUFF REPAIR, COMBINED Right 08/25/2017    Procedure: COMBINED ARTHROTOMY SHOULDER, ROTATOR CUFF REPAIR;  Right shoulder distal clavicle excision, subacromial decompression, rotator cuff repair ;  Surgeon: Jay  Hernando Oviedo MD;  Location: [facility]     ARTHROTOMY SHOULDER, ROTATOR CUFF REPAIR, COMBINED Right 12/18/2017    Procedure: COMBINED ARTHROTOMY SHOULDER, ROTATOR CUFF REPAIR;  Right Shoulder Rotator Cuff Revision;  Surgeon: Hernando Thompson MD;  Location: [facility]     ARTHROTOMY SHOULDER, ROTATOR CUFF REPAIR, COMBINED Left 04/27/2018    Procedure: COMBINED ARTHROTOMY SHOULDER, ROTATOR CUFF REPAIR;  Left Shoulder Open Subacromial Decompression,Distal Clavicle Excision,Rotator Cuff Repair;  Surgeon: Hernando Thompson MD;  Location: [facility]     C/SECTION, LOW TRANSVERSE  1978, 1984, 1994    x 3     COLONOSCOPY  01/01/2001    normal colonoscopy     COLONOSCOPY N/A 08/22/2017    Procedure: COLONOSCOPY;  Colonoscopy  ;  Surgeon: Delfino Yoo MD;  Location: [facility]     COLONOSCOPY  08/22/2017    EXAMCOL     ESOPHAGOSCOPY, GASTROSCOPY, DUODENOSCOPY (EGD), COMBINED N/A 03/18/2018    Procedure: COMBINED ESOPHAGOSCOPY, GASTROSCOPY, DUODENOSCOPY (EGD);;  Surgeon: Poncho Galindo MD;  Location: [facility]     GASTRIC BYPASS  1980/2005    Gastric Bypass and revision     HERNIA REPAIR, INCISIONAL  06/16/2008    lap.repair with 10x8 mesh     TUBAL LIGATION  01/01/1994     [facility] ORAL SURGERY PROCEDURE  age 18    wisdom teeth       Family History   Problem Relation Age of Onset     C.A.D. Father         MI at age 60     Hypertension Father      Alzheimer Disease Father      Hyperlipidemia Father      Osteoporosis Mother         on Fosamax     Glaucoma Mother      Other Cancer Brother      Stomach Cancer Maternal Aunt      Cervical Cancer Cousin      Diabetes No family hx of      Cerebrovascular Disease No family hx of      Breast Cancer No family hx of      Cancer - colorectal No family hx of      Prostate Cancer No family hx of      Liver Disease No family hx of        Social History     Socioeconomic History     Marital status:      Spouse name: Not on file     Number of children: Not on file     Years of education: Not on file      Highest education level: Not on file   Occupational History     Not on file   Tobacco Use     Smoking status: Never     Smokeless tobacco: Never   Vaping Use     Vaping Use: Never used   Substance and Sexual Activity     Alcohol use: Yes     Alcohol/week: 0.0 standard drinks     Comment: mixed very light 1 a month if that     Drug use: No     Sexual activity: Yes     Partners: Male     Birth control/protection: Surgical   Other Topics Concern     Parent/sibling w/ CABG, MI or angioplasty before 65F 55M? No      Service No     Blood Transfusions Yes     Comment: 1976, 1978, 1978, 1984     Caffeine Concern No     Occupational Exposure No     Hobby Hazards No     Sleep Concern No     Stress Concern No     Weight Concern Yes     Special Diet No     Back Care No     Exercise No     Bike Helmet No     Seat Belt Yes     Self-Exams Not Asked   Social History Narrative     Not on file     Social Determinants of Health     Financial Resource Strain: Not on file   Food Insecurity: Not on file   Transportation Needs: Not on file   Physical Activity: Not on file   Stress: Not on file   Social Connections: Not on file   Intimate Partner Violence: Not on file   Housing Stability: Not on file       Current Outpatient Medications   Medication Sig Dispense Refill     acetaminophen (TYLENOL) 500 MG tablet Take 1,000 mg by mouth        albuterol (PROAIR HFA/PROVENTIL HFA/VENTOLIN HFA) 108 (90 Base) MCG/ACT inhaler INHALE 2 PUFFS INTO THE LUNGS EVERY 6 HOURS AS NEEDED FOR SHORTNESS OF BREATH OR DIFFICULT BREATHING OR WHEEZING 25.5 g 1     atenolol (TENORMIN) 50 MG tablet Take 1 tablet (50 mg) by mouth daily 90 tablet 3     celecoxib (CELEBREX) 100 MG capsule Take 1 capsule (100 mg) by mouth 2 times daily for 7 days 14 capsule 0     diclofenac (VOLTAREN) 1 % topical gel Apply 4 g topically 4 times daily 100 g 0     hydroxychloroquine (PLAQUENIL) 200 MG tablet Take 1 tablet (200 mg) by mouth 2 times daily 180 tablet 0      "NYAMY 083929 UNIT/GM external powder APPLY TO AFFECTED AREA(S)  TOPICALLY 3 TIMES DAILY AS  NEEDED 60 g 1     oxybutynin ER (DITROPAN XL) 5 MG 24 hr tablet Take 1 tablet (5 mg) by mouth 2 times daily 180 tablet 3     sulfaSALAzine ER (AZULFIDINE EN) 500 MG EC tablet Take 1 tablet daily for 1 week, then take 1 tablet twice daily. Labs every 8-12 weeks. 180 tablet 0     sulfaSALAzine ER (AZULFIDINE EN) 500 MG EC tablet Take 1 tablet daily. 7 tablet 0       Allergies   Allergen Reactions     Ibuprofen      Other reaction(s): Gastrointestinal  Gastric bleed     Naproxen GI Disturbance     Other reaction(s): Gastrointestinal  Gastric bleed     Nickel Itching     Inflamed with cheap earrings (itchy)     Rofecoxib Itching and Swelling     VIOXX (Swollen Feet,Hands itching), Okay with ibuprofen and aspirin     Vioxx Itching and Swelling     VIOXX (Swollen Feet,Hands itching), Okay with ibuprofen and aspirin       REVIEW OF SYSTEMS:  CONSTITUTIONAL:  NEGATIVE for fever, chills, change in weight, not feeling tired  SKIN:  NEGATIVE for worrisome rashes, no skin lumps, no skin ulcers and no non-healing wounds  EYES:  NEGATIVE for vision changes or irritation.  ENT/MOUTH:  NEGATIVE.  No hearing loss, no hoarseness, no difficulty swallowing.  RESP:  NEGATIVE. No cough or shortness of breath.  CV:  NEGATIVE for chest pain, palpitations or peripheral edema  GI:  NEGATIVE for nausea, abdominal pain, heartburn, or change in bowel habits  :  Negative. No dysuria, no hematuria  MUSCULOSKELETAL:  See HPI above  NEURO:  NEGATIVE . No headaches, no dizziness,  no numbness  ENDOCRINE:  NEGATIVE for temperature intolerance, skin/hair changes  HEME/ALLERGY/IMMUNE:  NEGATIVE for bleeding problems  PSYCHIATRIC:  NEGATIVE. no anxiety, no depression.     Exam:  Vitals: BP (!) 149/82   Pulse 75   Resp 20   Ht 1.581 m (5' 2.25\")   Wt 120.2 kg (265 lb)   LMP 05/29/2006   BMI 48.08 kg/m    BMI= Body mass index is 48.08 " kg/m .  Constitutional:  healthy, alert and no distress  Neuro: Alert and Oriented x 3, no focal defects.  Psych: Affect normal   Respiratory: Breathing not labored.  Cardiovascular: normal peripheral pulses  Lymph: no adenopathy  Skin: No rashes,worrisome lesions or skin problems  She has motion of the right knee from 0 to 115 degrees.  On the left from 0 to 120 degrees.  She has tenderness of the inferior pole of the patella and just above the patella on the right.  No definite tenderness on the left.  She has no pain with resisted quad extension on either knee.  There is no ligamentous laxity of MCL or LCL on either knee.  She has fullness at the popliteal space on both sides.  I cannot feel effusion in the knees.  Sensation, motor and circulation are intact.    Assessment:  Bilateral total knee arthroplasty without problems from the prosthesis or bone.  Any instability must be more muscular.  I would want her to work on strengthening and have encouraged her to get up and down from chairs without using the electric lift.  She could also work on step up and down from steps.  Return to clinic 2 to 4 years with repeat x-ray of both knees.      Again, thank you for allowing me to participate in the care of your patient.        Sincerely,        Delfino Billingsley MD

## 2023-01-09 NOTE — PATIENT INSTRUCTIONS
Karen to follow up with Primary Care provider regarding elevated blood pressure.    Return to clinic 2-4 years for knee x-ray.  Work on knee strengthening getting up from chairs, doing stairs, bicycle, etc.

## 2023-01-09 NOTE — PROGRESS NOTES
Jacquie Harris is a 67 year old female who is seen as self referral for bilateral knee pain.  She has a long history of problems with arthritis of the knees.  She eventually underwent left knee replacement, which was still painful.  She then had revision of this on 5/29/2019 by Dr. Cedric Fuller.  She went on to have right total knee arthroplasty on 6/22/2020 by Dr. Fuller.  She reports having buckling of both knees thereafter.  She describes constant pain with occasional sharp shooting pains.  She rates her pain up to 8-9 out of 10.  She feels the knees buckle forward or sometimes hyperextend.  She uses an electric lift chair to get up.  She reports insurance has stopped her from following up with Dr. Fuller.  She did have 2 recent falls in early December.  She had x-rays on 12/26/2022 of both knees.  The x-rays show no fractures or loosening of the prosthesis.  There is a stemmed tibial prosthesis on the right with a posterior sacrificing prosthesis.   On the left there is long stems with an LCCK type prosthesis      Past Medical History:   Diagnosis Date     HTN (hypertension)      IRON DEFICIENCY ANEMIA 7/5/2005     Iron infusion/ resolved since injections     Obstructive sleep apnea      DARWIN (obstructive sleep apnea)        Past Surgical History:   Procedure Laterality Date     ARTHROPLASTY KNEE  04/02/2012    Procedure:ARTHROPLASTY KNEE; Left Total Knee Arthroplasty--Anesth.Choice; Surgeon:VICENTE THOMPSON; Location:[facility]     ARTHROTOMY SHOULDER, ROTATOR CUFF REPAIR, COMBINED Right 08/25/2017    Procedure: COMBINED ARTHROTOMY SHOULDER, ROTATOR CUFF REPAIR;  Right shoulder distal clavicle excision, subacromial decompression, rotator cuff repair ;  Surgeon: Vicente Thompson MD;  Location: [facility]     ARTHROTOMY SHOULDER, ROTATOR CUFF REPAIR, COMBINED Right 12/18/2017    Procedure: COMBINED ARTHROTOMY SHOULDER, ROTATOR CUFF REPAIR;  Right Shoulder Rotator Cuff Revision;  Surgeon: Vicente Thompson,  MD;  Location: [facility]     ARTHROTOMY SHOULDER, ROTATOR CUFF REPAIR, COMBINED Left 04/27/2018    Procedure: COMBINED ARTHROTOMY SHOULDER, ROTATOR CUFF REPAIR;  Left Shoulder Open Subacromial Decompression,Distal Clavicle Excision,Rotator Cuff Repair;  Surgeon: Hernando hTompson MD;  Location: [facility]     C/SECTION, LOW TRANSVERSE  1978, 1984, 1994    x 3     COLONOSCOPY  01/01/2001    normal colonoscopy     COLONOSCOPY N/A 08/22/2017    Procedure: COLONOSCOPY;  Colonoscopy  ;  Surgeon: Delfino Yoo MD;  Location: [facility]     COLONOSCOPY  08/22/2017    EXAMCOL     ESOPHAGOSCOPY, GASTROSCOPY, DUODENOSCOPY (EGD), COMBINED N/A 03/18/2018    Procedure: COMBINED ESOPHAGOSCOPY, GASTROSCOPY, DUODENOSCOPY (EGD);;  Surgeon: Poncho Galindo MD;  Location: [facility]     GASTRIC BYPASS  1980/2005    Gastric Bypass and revision     HERNIA REPAIR, INCISIONAL  06/16/2008    lap.repair with 10x8 mesh     TUBAL LIGATION  01/01/1994     ZZC ORAL SURGERY PROCEDURE  age 18    wisdom teeth       Family History   Problem Relation Age of Onset     C.A.D. Father         MI at age 60     Hypertension Father      Alzheimer Disease Father      Hyperlipidemia Father      Osteoporosis Mother         on Fosamax     Glaucoma Mother      Other Cancer Brother      Stomach Cancer Maternal Aunt      Cervical Cancer Cousin      Diabetes No family hx of      Cerebrovascular Disease No family hx of      Breast Cancer No family hx of      Cancer - colorectal No family hx of      Prostate Cancer No family hx of      Liver Disease No family hx of        Social History     Socioeconomic History     Marital status:      Spouse name: Not on file     Number of children: Not on file     Years of education: Not on file     Highest education level: Not on file   Occupational History     Not on file   Tobacco Use     Smoking status: Never     Smokeless tobacco: Never   Vaping Use     Vaping Use: Never used   Substance and Sexual Activity     Alcohol use: Yes      Alcohol/week: 0.0 standard drinks     Comment: mixed very light 1 a month if that     Drug use: No     Sexual activity: Yes     Partners: Male     Birth control/protection: Surgical   Other Topics Concern     Parent/sibling w/ CABG, MI or angioplasty before 65F 55M? No      Service No     Blood Transfusions Yes     Comment: 1976, 1978, 1978, 1984     Caffeine Concern No     Occupational Exposure No     Hobby Hazards No     Sleep Concern No     Stress Concern No     Weight Concern Yes     Special Diet No     Back Care No     Exercise No     Bike Helmet No     Seat Belt Yes     Self-Exams Not Asked   Social History Narrative     Not on file     Social Determinants of Health     Financial Resource Strain: Not on file   Food Insecurity: Not on file   Transportation Needs: Not on file   Physical Activity: Not on file   Stress: Not on file   Social Connections: Not on file   Intimate Partner Violence: Not on file   Housing Stability: Not on file       Current Outpatient Medications   Medication Sig Dispense Refill     acetaminophen (TYLENOL) 500 MG tablet Take 1,000 mg by mouth        albuterol (PROAIR HFA/PROVENTIL HFA/VENTOLIN HFA) 108 (90 Base) MCG/ACT inhaler INHALE 2 PUFFS INTO THE LUNGS EVERY 6 HOURS AS NEEDED FOR SHORTNESS OF BREATH OR DIFFICULT BREATHING OR WHEEZING 25.5 g 1     atenolol (TENORMIN) 50 MG tablet Take 1 tablet (50 mg) by mouth daily 90 tablet 3     celecoxib (CELEBREX) 100 MG capsule Take 1 capsule (100 mg) by mouth 2 times daily for 7 days 14 capsule 0     diclofenac (VOLTAREN) 1 % topical gel Apply 4 g topically 4 times daily 100 g 0     hydroxychloroquine (PLAQUENIL) 200 MG tablet Take 1 tablet (200 mg) by mouth 2 times daily 180 tablet 0     NYAMYC 265587 UNIT/GM external powder APPLY TO AFFECTED AREA(S)  TOPICALLY 3 TIMES DAILY AS  NEEDED 60 g 1     oxybutynin ER (DITROPAN XL) 5 MG 24 hr tablet Take 1 tablet (5 mg) by mouth 2 times daily 180 tablet 3     sulfaSALAzine ER (AZULFIDINE  "EN) 500 MG EC tablet Take 1 tablet daily for 1 week, then take 1 tablet twice daily. Labs every 8-12 weeks. 180 tablet 0     sulfaSALAzine ER (AZULFIDINE EN) 500 MG EC tablet Take 1 tablet daily. 7 tablet 0       Allergies   Allergen Reactions     Ibuprofen      Other reaction(s): Gastrointestinal  Gastric bleed     Naproxen GI Disturbance     Other reaction(s): Gastrointestinal  Gastric bleed     Nickel Itching     Inflamed with cheap earrings (itchy)     Rofecoxib Itching and Swelling     VIOXX (Swollen Feet,Hands itching), Okay with ibuprofen and aspirin     Vioxx Itching and Swelling     VIOXX (Swollen Feet,Hands itching), Okay with ibuprofen and aspirin       REVIEW OF SYSTEMS:  CONSTITUTIONAL:  NEGATIVE for fever, chills, change in weight, not feeling tired  SKIN:  NEGATIVE for worrisome rashes, no skin lumps, no skin ulcers and no non-healing wounds  EYES:  NEGATIVE for vision changes or irritation.  ENT/MOUTH:  NEGATIVE.  No hearing loss, no hoarseness, no difficulty swallowing.  RESP:  NEGATIVE. No cough or shortness of breath.  CV:  NEGATIVE for chest pain, palpitations or peripheral edema  GI:  NEGATIVE for nausea, abdominal pain, heartburn, or change in bowel habits  :  Negative. No dysuria, no hematuria  MUSCULOSKELETAL:  See HPI above  NEURO:  NEGATIVE . No headaches, no dizziness,  no numbness  ENDOCRINE:  NEGATIVE for temperature intolerance, skin/hair changes  HEME/ALLERGY/IMMUNE:  NEGATIVE for bleeding problems  PSYCHIATRIC:  NEGATIVE. no anxiety, no depression.     Exam:  Vitals: BP (!) 149/82   Pulse 75   Resp 20   Ht 1.581 m (5' 2.25\")   Wt 120.2 kg (265 lb)   LMP 05/29/2006   BMI 48.08 kg/m    BMI= Body mass index is 48.08 kg/m .  Constitutional:  healthy, alert and no distress  Neuro: Alert and Oriented x 3, no focal defects.  Psych: Affect normal   Respiratory: Breathing not labored.  Cardiovascular: normal peripheral pulses  Lymph: no adenopathy  Skin: No rashes,worrisome lesions or " skin problems  She has motion of the right knee from 0 to 115 degrees.  On the left from 0 to 120 degrees.  She has tenderness of the inferior pole of the patella and just above the patella on the right.  No definite tenderness on the left.  She has no pain with resisted quad extension on either knee.  There is no ligamentous laxity of MCL or LCL on either knee.  She has fullness at the popliteal space on both sides.  I cannot feel effusion in the knees.  Sensation, motor and circulation are intact.    Assessment:  Bilateral total knee arthroplasty without problems from the prosthesis or bone.  Any instability must be more muscular.  I would want her to work on strengthening and have encouraged her to get up and down from chairs without using the electric lift.  She could also work on step up and down from steps.  Return to clinic 2 to 4 years with repeat x-ray of both knees.

## 2023-01-10 ENCOUNTER — OFFICE VISIT (OUTPATIENT)
Dept: FAMILY MEDICINE | Facility: CLINIC | Age: 68
End: 2023-01-10
Payer: COMMERCIAL

## 2023-01-10 VITALS
HEIGHT: 62 IN | SYSTOLIC BLOOD PRESSURE: 142 MMHG | DIASTOLIC BLOOD PRESSURE: 80 MMHG | WEIGHT: 265 LBS | OXYGEN SATURATION: 97 % | BODY MASS INDEX: 48.76 KG/M2 | RESPIRATION RATE: 20 BRPM | HEART RATE: 68 BPM | TEMPERATURE: 98.4 F

## 2023-01-10 DIAGNOSIS — M19.90 INFLAMMATORY ARTHRITIS: ICD-10-CM

## 2023-01-10 DIAGNOSIS — I10 BENIGN ESSENTIAL HYPERTENSION: ICD-10-CM

## 2023-01-10 DIAGNOSIS — R76.8 POSITIVE ANA (ANTINUCLEAR ANTIBODY): ICD-10-CM

## 2023-01-10 DIAGNOSIS — G89.29 CHRONIC PAIN OF BOTH KNEES: ICD-10-CM

## 2023-01-10 DIAGNOSIS — Z79.899 HIGH RISK MEDICATION USE: ICD-10-CM

## 2023-01-10 DIAGNOSIS — Z13.220 SCREENING FOR HYPERLIPIDEMIA: ICD-10-CM

## 2023-01-10 DIAGNOSIS — M25.562 CHRONIC PAIN OF BOTH KNEES: ICD-10-CM

## 2023-01-10 DIAGNOSIS — M25.561 CHRONIC PAIN OF BOTH KNEES: ICD-10-CM

## 2023-01-10 LAB
ALBUMIN UR-MCNC: ABNORMAL MG/DL
APPEARANCE UR: CLEAR
BACTERIA #/AREA URNS HPF: ABNORMAL /HPF
BILIRUB UR QL STRIP: NEGATIVE
CHOLEST SERPL-MCNC: 158 MG/DL
COLOR UR AUTO: YELLOW
CREAT UR-MCNC: 199.2 MG/DL
GLUCOSE UR STRIP-MCNC: NEGATIVE MG/DL
HDLC SERPL-MCNC: 62 MG/DL
HGB UR QL STRIP: ABNORMAL
KETONES UR STRIP-MCNC: NEGATIVE MG/DL
LDLC SERPL CALC-MCNC: 77 MG/DL
LEUKOCYTE ESTERASE UR QL STRIP: NEGATIVE
MICROALBUMIN UR-MCNC: 85.4 MG/L
MICROALBUMIN/CREAT UR: 42.87 MG/G CR (ref 0–25)
MUCOUS THREADS #/AREA URNS LPF: PRESENT /LPF
NITRATE UR QL: NEGATIVE
NONHDLC SERPL-MCNC: 96 MG/DL
PH UR STRIP: 5.5 [PH] (ref 5–7)
RBC #/AREA URNS AUTO: ABNORMAL /HPF
SP GR UR STRIP: >=1.03 (ref 1–1.03)
SQUAMOUS #/AREA URNS AUTO: ABNORMAL /LPF
TRIGL SERPL-MCNC: 96 MG/DL
UROBILINOGEN UR STRIP-ACNC: 0.2 E.U./DL
WBC #/AREA URNS AUTO: ABNORMAL /HPF

## 2023-01-10 PROCEDURE — 80061 LIPID PANEL: CPT | Performed by: FAMILY MEDICINE

## 2023-01-10 PROCEDURE — 81001 URINALYSIS AUTO W/SCOPE: CPT | Performed by: FAMILY MEDICINE

## 2023-01-10 PROCEDURE — 82570 ASSAY OF URINE CREATININE: CPT | Performed by: FAMILY MEDICINE

## 2023-01-10 PROCEDURE — 99214 OFFICE O/P EST MOD 30 MIN: CPT | Performed by: FAMILY MEDICINE

## 2023-01-10 PROCEDURE — 36415 COLL VENOUS BLD VENIPUNCTURE: CPT | Performed by: FAMILY MEDICINE

## 2023-01-10 PROCEDURE — 82043 UR ALBUMIN QUANTITATIVE: CPT | Performed by: FAMILY MEDICINE

## 2023-01-10 RX ORDER — CELECOXIB 100 MG/1
100 CAPSULE ORAL 2 TIMES DAILY
Qty: 60 CAPSULE | Refills: 0 | Status: SHIPPED | OUTPATIENT
Start: 2023-01-10 | End: 2023-05-17

## 2023-01-10 ASSESSMENT — ENCOUNTER SYMPTOMS
GASTROINTESTINAL NEGATIVE: 1
RESPIRATORY NEGATIVE: 1
ALLERGIC/IMMUNOLOGIC NEGATIVE: 1
NEUROLOGICAL NEGATIVE: 1
CONSTITUTIONAL NEGATIVE: 1
ARTHRALGIAS: 1
HEMATOLOGIC/LYMPHATIC NEGATIVE: 1
CARDIOVASCULAR NEGATIVE: 1
EYES NEGATIVE: 1
PSYCHIATRIC NEGATIVE: 1

## 2023-01-10 ASSESSMENT — PAIN SCALES - GENERAL: PAINLEVEL: EXTREME PAIN (8)

## 2023-01-10 NOTE — PROGRESS NOTES
Patient is a 67-year-old female with past medical history significant for obesity, hypertension, rheumatoid arthritis coming in for chronic knee pain which she has had for a long time.  She had knee replacements in both knees done in 2019 and then she had a revision of the left knee as well.  She continues to have pain in both knees and was seen in the emergency room and also had a follow-up appointment with orthopedic doctor.  It was recommended that she starts physical therapy to strengthen the muscles around her knee.  In the ER she was given Celebrex which she said helped a lot and she is wondering if she gets refills on this.  We discussed this at length and I did discuss with her some of the potential side effects of this medication I highly recommended that she does not take it on a chronic basis especially in because she has had a past medical history of a GI bleed.  She is in agreement with the plan and medications was refilled.  She will start physical therapy next week.  Her blood pressure was elevated today.  She return for nurse only visit to recheck this in a couple of weeks.  Assessment & Plan     Benign essential hypertension  Blood pressure was elevated, recommend recheck of this in a few weeks.     Chronic pain of both knees  Patient was told about potential side effects to the medication.   - celecoxib (CELEBREX) 100 MG capsule; Take 1 capsule (100 mg) by mouth 2 times daily for 30 days    Screening for hyperlipidemia  Patient will be notified of results  - Lipid panel reflex to direct LDL Non-fasting; Future  - Lipid panel reflex to direct LDL Non-fasting    Inflammatory arthritis  - UA with Microscopic  - Albumin Random Urine Quantitative with Creat Ratio  - Urine Microscopic Exam    Positive SYLVIE (antinuclear antibody)  - UA with Microscopic  - Albumin Random Urine Quantitative with Creat Ratio  - Urine Microscopic Exam    High risk medication use  - UA with Microscopic  - Albumin Random Urine  "Quantitative with Creat Ratio  - Urine Microscopic Exam    Body mass index (BMI) of 45.0 to 49.9 in adult (H)  Ongoing and likely contributing to her co morbidities.            MED REC REQUIRED  Post Medication Reconciliation Status:       FUTURE APPOINTMENTS:       - Follow-up visit in one month or sooner as needed.    Return in about 4 weeks (around 2/7/2023) for Follow up.    Jennie Suazo MD  [facility] [place] is a 67 year old accompanied by her self, presenting for the following health issues:    Urgent Care (Follow up from  on 12-26-22.  Bilateral knee pain.) and Blood Draw (Patient is fasting this morning.  She is wanting to know if she is due for lipids or any other labs today.)    Chief Complaint   Patient presents with     Urgent Care     Follow up from  on 12-26-22.  Bilateral knee pain.     Blood Draw     Patient is fasting this morning.  She is wanting to know if she is due for lipids or any other labs today.       HPI     ED/ Followup:    Facility:  [facility].  Date of visit: 12-26-22  Reason for visit: Chronic pain of both knees.  Urgent Care note is copied below:  HPI: Jacquie Harris \"Karen\"} is a 67 year old  female who presents today for evaluation the following concerns:   Patient presents endorsing bilateral knee pain which started 7 on 12/19/2022  Patient states this pain is related to a bilateral knee replacements she has had in the past and is acute on chronic pain.  Patient endorses she feels like her \"knees are buckling \", and unable to support her weight.  Patient is concerned that her prosthesis are dislocated.  Patient localizes the pain to the right side, anterior aspect, and states there is no radiation of pain to the thigh or lower leg  patient denies any associated symptoms such as swelling, redness or bruising   Patient states their: Skin is intact. ROM is intact, and their strength is weaker than " "normal but due to pain, patient denies any infectious properties such as warmth at the site.  Patient denies any open areas or abscess to the site  Patient reports sensation is without numbness or tingling.   Self care to this point includes: Tylenol and heat.   Patient is predisposed to orthopedic degeneration r/t risk factors of obesity       Current Status: Celebrex 100 mg BID for 7 days was prescribed.  That seemed to help with her symptoms.  She would like to discuss about getting a refill for the medication.  She states she was seen in Orthopedics yesterday.  She was told to start getting up out of the chair without using her arms.   She will start Physical Therapy next week for her knee pain.        Review of Systems   Constitutional: Negative.    HENT: Negative.    Eyes: Negative.    Respiratory: Negative.    Cardiovascular: Negative.    Gastrointestinal: Negative.    Breasts:  negative.    Genitourinary: Negative.    Musculoskeletal: Positive for arthralgias.   Allergic/Immunologic: Negative.    Neurological: Negative.    Hematological: Negative.    Psychiatric/Behavioral: Negative.             Objective    BP (!) 142/80   Pulse 68   Temp 98.4  F (36.9  C) (Tympanic)   Resp 20   Ht 1.581 m (5' 2.25\")   Wt 120.2 kg (265 lb)   LMP 05/29/2006   SpO2 97%   BMI 48.08 kg/m    Body mass index is 48.08 kg/m .  Physical Exam  Constitutional:       Appearance: Normal appearance.   HENT:      Head: Normocephalic and atraumatic.      Right Ear: Tympanic membrane normal.      Left Ear: Tympanic membrane normal.      Mouth/Throat:      Mouth: Mucous membranes are moist.   Eyes:      Pupils: Pupils are equal, round, and reactive to light.   Cardiovascular:      Rate and Rhythm: Normal rate and regular rhythm.      Pulses: Normal pulses.      Heart sounds: Normal heart sounds.   Pulmonary:      Effort: Pulmonary effort is normal.      Breath sounds: Normal breath sounds.   Musculoskeletal:         General: " Tenderness present.   Skin:     General: Skin is warm and dry.   Neurological:      Mental Status: She is alert and oriented to person, place, and time.   Psychiatric:         Mood and Affect: Mood normal.         Behavior: Behavior normal.

## 2023-01-11 ENCOUNTER — VIRTUAL VISIT (OUTPATIENT)
Dept: RHEUMATOLOGY | Facility: CLINIC | Age: 68
End: 2023-01-11
Payer: COMMERCIAL

## 2023-01-11 ENCOUNTER — TELEPHONE (OUTPATIENT)
Dept: FAMILY MEDICINE | Facility: CLINIC | Age: 68
End: 2023-01-11

## 2023-01-11 VITALS — HEIGHT: 62 IN | WEIGHT: 265 LBS | BODY MASS INDEX: 48.76 KG/M2

## 2023-01-11 DIAGNOSIS — R76.8 POSITIVE ANA (ANTINUCLEAR ANTIBODY): ICD-10-CM

## 2023-01-11 DIAGNOSIS — R80.9 MICROALBUMINURIA: ICD-10-CM

## 2023-01-11 DIAGNOSIS — Z79.899 HIGH RISK MEDICATION USE: ICD-10-CM

## 2023-01-11 DIAGNOSIS — M19.90 INFLAMMATORY ARTHRITIS: Primary | ICD-10-CM

## 2023-01-11 PROCEDURE — 99214 OFFICE O/P EST MOD 30 MIN: CPT | Mod: 95 | Performed by: PHYSICIAN ASSISTANT

## 2023-01-11 RX ORDER — SULFASALAZINE 500 MG/1
TABLET, DELAYED RELEASE ORAL
Qty: 360 TABLET | Refills: 0 | Status: SHIPPED | OUTPATIENT
Start: 2023-01-11 | End: 2023-05-17

## 2023-01-11 RX ORDER — HYDROXYCHLOROQUINE SULFATE 200 MG/1
200 TABLET, FILM COATED ORAL 2 TIMES DAILY
Qty: 180 TABLET | Refills: 0 | Status: SHIPPED | OUTPATIENT
Start: 2023-01-11 | End: 2023-05-17

## 2023-01-11 NOTE — PATIENT INSTRUCTIONS
After Visit Instructions:     Thank you for coming to [facility] for your care. It is my goal to partner with you to help you reach your optimal state of health.       Plan:     Schedule follow-up with Danni Martínez PA-C in 2 months.   Labs: CBC, creatinine, Albumin, AST, ALT and urine albumin due in Mid March  Medication recommendations:   Continue Hydroxychloroquine 200mg twice daily. You next retina exam is due 2/4/23  Sulfasalazine 500mg: Increase to take 1 tablet in the AM and 2 in evening for 1 week, then take 2 tablets twice daily.         Danni Martínez PA-C  [facility]  [facility]    Contact information: [facility]  Clinic Number:  [phone]  Please call or send a [employer] message with any questions about your care

## 2023-01-11 NOTE — PROGRESS NOTES
Video-Visit Details    Type of service:  Video Visit  Is Pt currently in [place]? Yes    NOTE:  If Pt is not in [place], Appointment needs to be canceled and rescheduled.      Video Start Time (time video started): 9:21am    Video End Time (time video stopped): 9:35am    Originating Location (pt. Location): Home        Distant Location (provider location):  On-site    Mode of Communication:  Video Conference via [employer]    Alexia Velazquez MA      Rheumatology Clinic Visit  [facility]  MARISELA Ulloa     Jacquie Harris MRN# [id number]   YOB: 1955 Age: 67 year old   Date of Visit: 01/11/2023  Primary care provider: Jennie Suazo          Assessment and Plan:     1. Inflammatory arthritis  2. Positive SYLVIE  3. High Risk medication  4.  Microalbuminuria    Patient presents today for follow-up regarding her inflammatory arthritis.  She states that she has not noticed any increased benefit with her joints with regards to adding the sulfasalazine.  She has tolerated the sulfasalazine well.  She continues to tolerate the hydroxychloroquine as well.  She still notices a lot of stiffness in her hands as well as her fingers getting stuck.  Previous laboratory evaluations and imaging studies were reviewed.  Results below.    We will increase the patient sulfasalazine to the maintenance dose to see if this will provide added benefit for her inflammatory arthritis.  She will continue on the hydroxychloroquine as well.  We will continue to monitor the microalbuminuria.  We will confirm with another random albumin urine/creatinine with her next labs.  If it continues to remain elevated she may need to see a nephrologist and/or discuss her blood pressure control with her primary care provider.  Continue to monitor for any medication toxicities.      Plan:     1. Schedule follow-up with Danni Martínez PA-C in 2 months.   2. Labs: CBC, creatinine, Albumin, AST, ALT and urine albumin due in Mid  "March  3. Medication recommendations:   a. Continue Hydroxychloroquine 200mg twice daily. You next retina exam is due 2/4/23  b. Sulfasalazine 500mg: Increase to take 1 tablet in the AM and 2 in evening for 1 week, then take 2 tablets twice daily.     MARISELA Ulloa  Rheumatology         History of Present Illness:   Jacquie Harris presents for evaluation of positive SYLVIE, inflammatory arthritis.    Interval history January 11, 2023:  She states that she is \"okay\". She has been having increased doctoring as her knees are \"really bad\". She has been working with some providers on that. She is thinking this is where the arthritis has started. She states that her hands continue to be the same. She has not noticed a change with the Sulfasalazine. Her hands continue to get stuck and she feels it is affecting more fingers. She is tolerating the medications well. She has not had any infections or fevers.     Interval history November 9, 2022:  She reports that the prednisone did anything. She states that her fingers continue to feel as if they are getting stuck. She continues to feel the swelling as well. She did have initial improvement in symptoms with the Hydroxychloroquine. She feels this was helpful until just before Dr. Uribe left. Initially she thought it was secondary to the humidity as it started in the summer.     History of gastric bleed on NSAIDs.    HPI from consult 10/5/2022:  Patient was previously treated with Dr. Uribe.  Last office visit was on 6/30/2022.  Initial evaluation was on November 10, 2021.  At that time she had reported pain in her bilateral hands with morning stiffness lasting 2 to 3 hours.  Physical exam showed subtle fullness involving the left third MCP and right fifth MCP joints with tenderness to palpation.  Mild tenderness involving the right fifth PIP and left first CMC.  Nodule involving the left third digit A1 pulley area with some tenderness on palpation and a " "subtle catching sensation with active flexion of the left third digit.    She has been unable to do the Paraffin wax due to cost. She uses hot water. She uses this in the morning to get things going. She states that she hurts today. She states that she has some good days, but \"not very many\". She is unsure that the Plaquenil is working. She states that she was expecting it to be a \"miracle\". She states that she was under the impression that you need something to stop and slow the arthritis with rheumatoid arthritis. She states that she does not do much with her fingers any more. She cannot paint or do crafts. She states that she has so much pain that she cannot even think about painting. She states that the pain is bad in the mornings and evening. If she is able to keep them warm, that seems to help. Many of her symptoms started summer 2020. She was noticing her fingers getting \"crooked\". She has not been getting the sticking of the fingers anymore.     No known family history of rheumatoid arthritis or lupus.  No known family history or personal history of psoriasis, ulcerative colitis or Crohn's disease.    Rheumatological history:  Provider: Dr. Uribe   Current medication: Plaquenil 200 mg twice daily, last eye exam 2/4/2022  Pertinent labs: Negative rheumatoid factor, CCP antibody, sed rate and CRP.  Positive SYLVIE with a titer of 1: 160, speckled pattern.  Normal/negative SYLVIE subsets, ANCA, uric acid, C3, C4           Review of Systems:     Constitutional: negative  Skin: negative  Eyes: negative  Ears/Nose/Throat: negative  Respiratory: No shortness of breath, dyspnea on exertion, cough, or hemoptysis  Cardiovascular: negative  Gastrointestinal: negative  Genitourinary: negative  Musculoskeletal: as above  Neurologic: negative  Psychiatric: negative  Hematologic/Lymphatic/Immunologic: negative  Endocrine: negative         Active Problem List:     Patient Active Problem List    Diagnosis Date Noted     " History of total bilateral knee replacement 01/09/2023     Priority: Medium     Benign essential hypertension 06/19/2020     Priority: Medium     Rotator cuff syndrome, right 01/14/2020     Priority: Medium     Multiple joint pain, possible fibromyalgia 12/20/2019     Priority: Medium     Bronchospasm 01/07/2019     Priority: Medium     GI bleed 03/17/2018     Priority: Medium     Upper GI bleed 03/17/2018     Priority: Medium     Shoulder injury, right, subsequent encounter 08/21/2017     Priority: Medium     Fatty liver 04/20/2017     Priority: Medium     Elevated levels of transaminase & lactic acid dehydrogenase 04/15/2017     Priority: Medium     Bilateral cold feet 01/03/2017     Priority: Medium     Colles' fracture 07/06/2015     Priority: Medium     Encounter for routine gynecological examination  06/04/2015     Priority: Medium     Back pain, left below scapula, strained muscles 10/24/2013     Priority: Medium     HTN, goal below 140/80 05/05/2013     Priority: Medium     S/P TKR (total knee replacement) 04/03/2012     Priority: Medium     Osteoporosis 02/19/2012     Priority: Medium     Previous T scores -2.2  2/2012 Dexascan:  Lumbar spine L1-L4 T-score: -2.4, Left femoral neck T-score: -2.7, Right femoral neck T-score: -2.2   Percent change in lumbar spine: +6.6% since 4/16/2008   Percent change in femurs: +2.9% since 4/16/2008    IMPRESSION: Osteoporosis in the left femoral neck. Severe osteopenia  in the right femoral neck and lumbar spine.       Obesity, Class III, BMI 40-49.9 (morbid obesity) (H) 02/19/2012     Priority: Medium     ideal weight 120, goal weight 200, lose 58 lbs in 58 weeks.       Vitamin D deficiency 07/18/2011     Priority: Medium     Tick bite, infected, positive Lyme test 1996 not treated 07/08/2011     Priority: Medium     Rosacea 11/04/2009     Priority: Medium     Obstructive sleep apnea 12/01/2006     Priority: Medium     Sleep study 12/06 for EDS- AHI 36, desaturations to  65%. CPAP recommended but not tolerated.  01/16/07  ENT consult recommend CPAP and weight loss. Surgery discussed but success rate less than CPAP  1/15/07 CPAP trial, was not able to tolerate. Retried 2008, tolerated better.   Problem list name updated by automated process. Provider to review       Carpal tunnel syndrome 06/07/2006     Priority: Medium     04/10/09 Dr.Meletiou [facility]ado [facility]. Finding consistant with CTS, but nerve studies and MRI of C-Spine are normal. Corticosteroid injection given in office       CARDIOVASCULAR SCREENING; LDL GOAL LESS THAN 160 10/31/2010     Priority: Low     Allergic rhinitis 03/27/2006     Priority: Low     Problem list name updated by automated process. Provider to review       s/p gastric bypass x 2 07/05/2005     Priority: Low     Gastric bypass 1980              Past Medical History:     Past Medical History:   Diagnosis Date     HTN (hypertension)      IRON DEFICIENCY ANEMIA 7/5/2005     Iron infusion/ resolved since injections     Obstructive sleep apnea      DARWIN (obstructive sleep apnea)      Past Surgical History:   Procedure Laterality Date     ARTHROPLASTY KNEE  04/02/2012    Procedure:ARTHROPLASTY KNEE; Left Total Knee Arthroplasty--Anesth.Choice; Surgeon:HERNANDO THOMPSON; Location:[facility]     ARTHROTOMY SHOULDER, ROTATOR CUFF REPAIR, COMBINED Right 08/25/2017    Procedure: COMBINED ARTHROTOMY SHOULDER, ROTATOR CUFF REPAIR;  Right shoulder distal clavicle excision, subacromial decompression, rotator cuff repair ;  Surgeon: Hernando Tohmpson MD;  Location: [facility]     ARTHROTOMY SHOULDER, ROTATOR CUFF REPAIR, COMBINED Right 12/18/2017    Procedure: COMBINED ARTHROTOMY SHOULDER, ROTATOR CUFF REPAIR;  Right Shoulder Rotator Cuff Revision;  Surgeon: Hernando Thompson MD;  Location: [facility]     ARTHROTOMY SHOULDER, ROTATOR CUFF REPAIR, COMBINED Left 04/27/2018    Procedure: COMBINED ARTHROTOMY SHOULDER, ROTATOR CUFF REPAIR;  Left Shoulder Open Subacromial  Decompression,Distal Clavicle Excision,Rotator Cuff Repair;  Surgeon: Hernando Thompson MD;  Location: [facility]     C/SECTION, LOW TRANSVERSE  1978, 1984, 1994    x 3     COLONOSCOPY  01/01/2001    normal colonoscopy     COLONOSCOPY N/A 08/22/2017    Procedure: COLONOSCOPY;  Colonoscopy  ;  Surgeon: Delfino Yoo MD;  Location: [facility]     COLONOSCOPY  08/22/2017    EXAMCOL     ESOPHAGOSCOPY, GASTROSCOPY, DUODENOSCOPY (EGD), COMBINED N/A 03/18/2018    Procedure: COMBINED ESOPHAGOSCOPY, GASTROSCOPY, DUODENOSCOPY (EGD);;  Surgeon: Poncho Galindo MD;  Location: [facility]     GASTRIC BYPASS  1980/2005    Gastric Bypass and revision     HERNIA REPAIR, INCISIONAL  06/16/2008    lap.repair with 10x8 mesh     NH REVISE KNEE JOINT REPLACE,ALL PARTS Left 05/29/2019    Dr. Cedric Fuller     NH TOTAL KNEE ARTHROPLASTY Right 06/22/2020    Dr. Cedric Fuller     TUBAL LIGATION  01/01/1994     ZZC ORAL SURGERY PROCEDURE  age 18    wisdom teeth            Social History:     Social History     Socioeconomic History     Marital status:      Spouse name: Not on file     Number of children: Not on file     Years of education: Not on file     Highest education level: Not on file   Occupational History     Not on file   Tobacco Use     Smoking status: Never     Smokeless tobacco: Never   Vaping Use     Vaping Use: Never used   Substance and Sexual Activity     Alcohol use: Yes     Alcohol/week: 0.0 standard drinks     Comment: mixed very light 1 a month if that     Drug use: No     Sexual activity: Yes     Partners: Male     Birth control/protection: Surgical   Other Topics Concern     Parent/sibling w/ CABG, MI or angioplasty before 65F 55M? No      Service No     Blood Transfusions Yes     Comment: 1976, 1978, 1978, 1984     Caffeine Concern No     Occupational Exposure No     Hobby Hazards No     Sleep Concern No     Stress Concern No     Weight Concern Yes     Special Diet No     Back Care No     Exercise No     Bike  Helmet No     Seat Belt Yes     Self-Exams Not Asked   Social History Narrative     Not on file     Social Determinants of Health     Financial Resource Strain: Not on file   Food Insecurity: Not on file   Transportation Needs: Not on file   Physical Activity: Not on file   Stress: Not on file   Social Connections: Not on file   Intimate Partner Violence: Not on file   Housing Stability: Not on file          Family History:     Family History   Problem Relation Age of Onset     C.A.D. Father         MI at age 60     Hypertension Father      Alzheimer Disease Father      Hyperlipidemia Father      Osteoporosis Mother         on Fosamax     Glaucoma Mother      Other Cancer Brother      Stomach Cancer Maternal Aunt      Cervical Cancer Cousin      Diabetes No family hx of      Cerebrovascular Disease No family hx of      Breast Cancer No family hx of      Cancer - colorectal No family hx of      Prostate Cancer No family hx of      Liver Disease No family hx of             Allergies:     Allergies   Allergen Reactions     Ibuprofen      Other reaction(s): Gastrointestinal  Gastric bleed     Naproxen GI Disturbance     Other reaction(s): Gastrointestinal  Gastric bleed     Nickel Itching     Inflamed with cheap earrings (itchy)     Rofecoxib Itching and Swelling     VIOXX (Swollen Feet,Hands itching), Okay with ibuprofen and aspirin     Vioxx Itching and Swelling     VIOXX (Swollen Feet,Hands itching), Okay with ibuprofen and aspirin            Medications:     Current Outpatient Medications   Medication Sig Dispense Refill     acetaminophen (TYLENOL) 500 MG tablet Take 1,000 mg by mouth        albuterol (PROAIR HFA/PROVENTIL HFA/VENTOLIN HFA) 108 (90 Base) MCG/ACT inhaler INHALE 2 PUFFS INTO THE LUNGS EVERY 6 HOURS AS NEEDED FOR SHORTNESS OF BREATH OR DIFFICULT BREATHING OR WHEEZING 25.5 g 1     atenolol (TENORMIN) 50 MG tablet Take 1 tablet (50 mg) by mouth daily 90 tablet 3     celecoxib (CELEBREX) 100 MG capsule  Take 1 capsule (100 mg) by mouth 2 times daily for 30 days 60 capsule 0     diclofenac (VOLTAREN) 1 % topical gel Apply 4 g topically 4 times daily 100 g 0     hydroxychloroquine (PLAQUENIL) 200 MG tablet Take 1 tablet (200 mg) by mouth 2 times daily 180 tablet 0     NYAMYC 334434 UNIT/GM external powder APPLY TO AFFECTED AREA(S)  TOPICALLY 3 TIMES DAILY AS  NEEDED 60 g 1     oxybutynin ER (DITROPAN XL) 5 MG 24 hr tablet Take 1 tablet (5 mg) by mouth 2 times daily 180 tablet 3     sulfaSALAzine ER (AZULFIDINE EN) 500 MG EC tablet Take 1 tablet daily for 1 week, then take 1 tablet twice daily. Labs every 8-12 weeks. 180 tablet 0     sulfaSALAzine ER (AZULFIDINE EN) 500 MG EC tablet Take 1 tablet daily. 7 tablet 0            Physical Exam:   Last menstrual period 2006, not currently breastfeeding.  Wt Readings from Last 6 Encounters:   01/10/23 120.2 kg (265 lb)   23 120.2 kg (265 lb)   22 117.9 kg (260 lb)   22 120.2 kg (265 lb)   22 119.7 kg (264 lb)   10/05/22 119.7 kg (264 lb)   Exam limited as this was a video visit  Constitutional: well-developed, appearing stated age; cooperative  Eyes: nl  PERRLA, conjunctiva, sclera  ENT: nl external ears, hearing,  Resp: No shortness of breath with normal conversation  Psych: nl judgement, orientation, memory, affect.           Data:   Imagin/10/2021 x-ray bilateral hand  IMPRESSION: Generalized demineralization. Mild degenerative arthritis involving the interphalangeal joints of the fingers, and the basilar joints of both thumbs, with mild joint space narrowing and osteophytic spurring. Generalized bone demineralization.  No evidence of erosive arthropathy. Soft tissues are normal.    11/10/2021 x-ray bilateral knee  IMPRESSION:   RIGHT KNEE: Uncomplicated cemented total knee arthroplasty. Normal joint alignment. No fracture or erosion. No joint effusion.     LEFT KNEE: Cemented longstem hinged total knee arthroplasty. No evidence of  loosening or hardware complication. Mild patella baja. No fracture or erosion. No joint effusion.    11/10/2021 x-ray right wrist  IMPRESSION: No acute fracture. Chronic healed fracture of the distal radius. Chronic ununited fracture of the ulnar styloid process. Moderate ulnar positive variance. Mild degenerative changes at the STT and first CMC joints.    2/21/2022 x-rays left shoulder  IMPRESSION:  1.  Normal left glenohumeral joint spacing and alignment.  2.  Distal clavicle excision, unchanged.  3.  No fracture.  4.  No significant change since the comparison.    2/21/2022 x-ray cervical spine  IMPRESSION: Minimal degenerative anterolisthesis of C2 upon C3, C3  upon C4 and C5 on C6. Alignment otherwise normal. Vertebral body  heights are normal. Moderate facet arthropathy at C2-C3. Mild  degenerative endplate spurring at C4-C5, C5-C6 and C6-C7. No  fractures.    Laboratory:  10/4/2022  Creatinine 0.79, GFR 81  Albumin 4.1  ALT 9, AST 17  White blood cell count 5.7, hemoglobin 11.9, platelet count 215    12/19/2022  Creatinine 0.72, GFR greater than 90  Albumin 4.0  ALT 7, AST 20  White blood cell count, hemoglobin, platelet count within normal limits    1/10/2023  Albumin creatinine 42.87  Albumin urine 85.4

## 2023-01-17 ENCOUNTER — TELEPHONE (OUTPATIENT)
Dept: FAMILY MEDICINE | Facility: CLINIC | Age: 68
End: 2023-01-17

## 2023-01-17 ENCOUNTER — HOSPITAL ENCOUNTER (OUTPATIENT)
Dept: PHYSICAL THERAPY | Facility: CLINIC | Age: 68
Setting detail: THERAPIES SERIES
Discharge: HOME | End: 2023-01-17
Payer: COMMERCIAL

## 2023-01-17 ENCOUNTER — ALLIED HEALTH/NURSE VISIT (OUTPATIENT)
Dept: FAMILY MEDICINE | Facility: CLINIC | Age: 68
End: 2023-01-17
Payer: COMMERCIAL

## 2023-01-17 VITALS — OXYGEN SATURATION: 95 % | DIASTOLIC BLOOD PRESSURE: 84 MMHG | SYSTOLIC BLOOD PRESSURE: 150 MMHG | HEART RATE: 70 BPM

## 2023-01-17 DIAGNOSIS — M25.561 CHRONIC PAIN OF BOTH KNEES: ICD-10-CM

## 2023-01-17 DIAGNOSIS — M25.562 CHRONIC PAIN OF BOTH KNEES: ICD-10-CM

## 2023-01-17 DIAGNOSIS — I10 BENIGN ESSENTIAL HYPERTENSION: Primary | ICD-10-CM

## 2023-01-17 DIAGNOSIS — G89.29 CHRONIC PAIN OF BOTH KNEES: ICD-10-CM

## 2023-01-17 PROCEDURE — 97110 THERAPEUTIC EXERCISES: CPT | Mod: GP | Performed by: PHYSICAL THERAPIST

## 2023-01-17 PROCEDURE — 97161 PT EVAL LOW COMPLEX 20 MIN: CPT | Mod: GP | Performed by: PHYSICAL THERAPIST

## 2023-01-17 PROCEDURE — 99207 PR NO CHARGE NURSE ONLY: CPT

## 2023-01-17 NOTE — PROGRESS NOTES
Jacquie Harris is a 67 year old year old patient who comes in today for a Blood Pressure check because of ongoing blood pressure monitoring.    Vital Signs as repeated by /84 p 70, 150/84 p 70    Patient is taking medication as prescribed    Patient is tolerating medications well.    Patient is not monitoring Blood Pressure at home.      Current complaints: none    Disposition:  patient to continue with the same medication and await provider response      Dario Sparks RN

## 2023-01-17 NOTE — PROGRESS NOTES
[facility]    OUTPATIENT PHYSICAL THERAPY ORTHOPEDIC EVALUATION  PLAN OF TREATMENT FOR OUTPATIENT REHABILITATION  (COMPLETE FOR INITIAL CLAIMS ONLY)  Patient's Last Name, First Name, M.I.  YOB: 1955  Jacquie Harris    Provider s Name:  [facility]   Medical Record No.  [id number]   Start of Care Date:  01/17/23   Onset Date:  12/03/22   Type:     _X__PT   ___OT   ___SLP Medical Diagnosis:  (P) chronic B knee  pain     PT Diagnosis:  (P) B knee pain   Visits from SOC:  1      _________________________________________________________________________________  Plan of Treatment/Functional Goals:  (P) neuromuscular re-education, manual therapy, ROM, strengthening, stretching           Goals  Goal Identifier: (P) 1.  Goal Description: (P) Pt will have increased LE strength able to do 5 sit to  12 seconds  Target Date: (P) 02/16/23    Goal Identifier: (P) 2.  Goal Description: (P) Pt will be able to walk X 15 min w/ pain no > 4/10  Target Date: (P) 03/08/23    Goal Identifier: (P) 3.  Goal Description: (P) Pt will be able to go upstairs reciprocal w/ rail and minimal difficulty  Target Date: (P) 03/08/23    Goal Identifier: (P) 4.  Goal Description: (P) Pt will be independent and consistent w/HEP to manage symptoms  Target Date: (P) 03/08/23                  Therapy Frequency:  (P) 1 time/week  Predicted Duration of Therapy Intervention:  (P) 4 weeks then 1 X in 2 weeks  = 5 visits    Dasia Wilson, PT                 I CERTIFY THE NEED FOR THESE SERVICES FURNISHED UNDER        THIS PLAN OF TREATMENT AND WHILE UNDER MY CARE     (Physician co-signature of this document indicates review and certification of the therapy plan).                     Certification Date From:  (P) 01/17/23   Certification Date To:  (P) 03/08/23    Referring Provider:  Jania Peoples APRN  CNP    Initial Assessment        See Epic Evaluation Start of Care Date: 01/17/23

## 2023-01-17 NOTE — PROGRESS NOTES
01/17/23 0800   General Information   Type of Visit Initial OP Ortho PT Evaluation   Start of Care Date 01/17/23   Referring Physician Jania Peoples APRN CNP   Patient/Family Goals Statement To be able to move,  decrease buckling of the knees   Orders Evaluate and Treat   Date of Order 12/26/22   Certification Required? Yes   Medical Diagnosis chronic B knee  pain   Body Part(s)   Body Part(s) Knee   Presentation and Etiology   Pertinent history of current problem (include personal factors and/or comorbidities that impact the POC) Pt states in early Dec she fell from stepstool approx 2-3 feet.  Prior to that had some irritation w/ knees  but increased after the fall.  Pt states mid Dec she fell again 12/16/22 slipped in snow/ ice.   Currently R > L knee pain.  Pain @ best 4/10,  @ worst 9/10.  Pt has had B TKA and L replaced twice.   Meds: celebrex.   PMHX:  TKA's,  R wrist surgery, anemia, HBP, arthritis.  L > R hand pain.   Impairments A. Pain;C. Swelling;H. Impaired gait  (knee instability)   Onset date of current episode/exacerbation 12/03/22   Pain quality A. Sharp;B. Dull;C. Aching;E. Shooting;F. Stabbing   Pain exacerbation comment Decreased her housekeeping due to knee pain,   winter overall less active.  Walking > 5 min.   Marked time on stairs .   Sit to stand (recently started working on it).   Pain/symptoms eased by A. Sitting;C. Rest;G. Heat;H. Cold   Progression of symptoms since onset: Unchanged  (since Dec flareup)   Prior Level of Function   Functional Level Prior Comment Pt has started doing sit to stand ex.   No other ex or hobbies.   Current Level of Function   Patient role/employment history C. Homemaker;F. Retired;G. Disabled   Home/community accessibility Pt has a lift chair and chair lift for going upstairs.   Fall Risk Screen   Fall screen completed by PT   Have you fallen 2 or more times in the past year? Yes   Have you fallen and had an injury in the past year? Yes   Timed Up and Go score  "(seconds) 12.7   Is patient a fall risk? No   Abuse Screen (yes response referral indicated)   Feels Unsafe at Home or Work/School no   Feels Threatened by Someone no   Does Anyone Try to Keep You From Having Contact with Others or Doing Things Outside Your Home? no   Physical Signs of Abuse Present no   Knee Objective Findings   Gait/Locomotion mild limp, slower pace   Knee/Hip Strength Comments Hip flex B 4-/5, ankle DF 5/5   Palpation Moderate tenderness B distal knees,  mild tenderness proximal knees.   Side (if bilateral, select both right and left) Right   Knee Special Test Comments 5 sit to stand --15\" no UE assist   Right Knee Extension AROM WNL B   Right Knee Flexion AROM R 115*, L 123* w/ heelslide   Right Knee Flexion Strength B 5-/5   Right Knee Extension Strength B 5/5   Right Hip Abduction Strength B 4-/5   Right Gastrocnemius Flexibility R 30*, L 28*   Right Hamstring Flexibility WFL   Planned Therapy Interventions   Planned Therapy Interventions neuromuscular re-education;manual therapy;ROM;strengthening;stretching   Clinical Impression   Criteria for Skilled Therapeutic Interventions Met yes, treatment indicated   PT Diagnosis B knee pain   Influenced by the following impairments pain, decreased strength   Functional limitations due to impairments walking, stairs, sit to stand, housework   Clinical Presentation Stable/Uncomplicated   Clinical Presentation Rationale recent flareup w/ fall   Clinical Decision Making (Complexity) Low complexity   Therapy Frequency 1 time/week   Predicted Duration of Therapy Intervention (days/wks) 4 weeks then 1 X in 2 weeks  = 5 visits   Risk & Benefits of therapy have been explained Yes   Patient, Family & other staff in agreement with plan of care Yes   Education Assessment   Preferred Learning Style Listening;Reading;Demonstration;Pictures/video   Barriers to Learning No barriers   ORTHO GOALS   PT Ortho Eval Goals 1;3;2;4   Ortho Goal 1   Goal Identifier 1.   Goal " Description Pt will have increased LE strength able to do 5 sit to  12 seconds   Target Date 02/16/23   Ortho Goal 2   Goal Identifier 2.   Goal Description Pt will be able to walk X 15 min w/ pain no > 4/10   Target Date 03/08/23   Ortho Goal 3   Goal Identifier 3.   Goal Description Pt will be able to go upstairs reciprocal w/ rail and minimal difficulty   Target Date 03/08/23   Ortho Goal 4   Goal Identifier 4.   Goal Description Pt will be independent and consistent w/HEP to manage symptoms   Target Date 03/08/23   Total Evaluation Time   PT Eval, Low Complexity Minutes (50169) 25   Therapy Certification   Certification date from 01/17/23   Certification date to 03/08/23   Medical Diagnosis chronic B knee  pain     Thank you for this referral,    Dasia Wilson, PT,   [id number]  [facility]  [phone]

## 2023-01-20 DIAGNOSIS — I10 BENIGN ESSENTIAL HYPERTENSION: ICD-10-CM

## 2023-01-20 RX ORDER — ATENOLOL 50 MG/1
100 TABLET ORAL DAILY
Qty: 90 TABLET | Refills: 3
Start: 2023-01-20 | End: 2023-03-23

## 2023-01-20 NOTE — TELEPHONE ENCOUNTER
Dr Suazo gone for the day. No new orders placed.  Spoke with Dr Inman & he reviewed chart.  Ordered to increase atenolol to 100mg daily & have pt schedule appt for RN BP check in 1 week.    Called pt & updated. Pt has enough atenolol to start taking 2 tabs (100mg) daily & will schedule an RN BP appt for end of next week. Did not want to schedule now-has to look at her schedule. She already took 1 tab (50mg) this morning. Will take a 2nd tab to =100mg this juanjose.     Chandni Corona RN

## 2023-01-20 NOTE — TELEPHONE ENCOUNTER
Pt calling to see if provider has reviewed BP check from Tuesday.   Huddled with provider. Will increase atenolol to 100mg & pt to schedule appt for RN BP check in 2 weeks.    Waiting for provider to update prescription, then will call pt.    Chandni Corona RN

## 2023-01-23 ENCOUNTER — HOSPITAL ENCOUNTER (EMERGENCY)
Facility: CLINIC | Age: 68
Discharge: HOME | End: 2023-01-23
Attending: PHYSICIAN ASSISTANT | Admitting: PHYSICIAN ASSISTANT
Payer: COMMERCIAL

## 2023-01-23 ENCOUNTER — E-VISIT (OUTPATIENT)
Dept: FAMILY MEDICINE | Facility: CLINIC | Age: 68
End: 2023-01-23
Payer: COMMERCIAL

## 2023-01-23 VITALS
OXYGEN SATURATION: 96 % | HEART RATE: 79 BPM | SYSTOLIC BLOOD PRESSURE: 177 MMHG | DIASTOLIC BLOOD PRESSURE: 96 MMHG | TEMPERATURE: 99.4 F | RESPIRATION RATE: 30 BRPM

## 2023-01-23 DIAGNOSIS — J02.9 ACUTE PHARYNGITIS, UNSPECIFIED ETIOLOGY: ICD-10-CM

## 2023-01-23 DIAGNOSIS — Z20.818 STREP THROAT EXPOSURE: ICD-10-CM

## 2023-01-23 DIAGNOSIS — J02.9 SORE THROAT: Primary | ICD-10-CM

## 2023-01-23 DIAGNOSIS — H10.33 ACUTE CONJUNCTIVITIS OF BOTH EYES: ICD-10-CM

## 2023-01-23 LAB
DEPRECATED S PYO AG THROAT QL EIA: NEGATIVE
GROUP A STREP BY PCR: NOT DETECTED

## 2023-01-23 PROCEDURE — G0463 HOSPITAL OUTPT CLINIC VISIT: HCPCS | Performed by: PHYSICIAN ASSISTANT

## 2023-01-23 PROCEDURE — 99421 OL DIG E/M SVC 5-10 MIN: CPT | Performed by: NURSE PRACTITIONER

## 2023-01-23 PROCEDURE — 87651 STREP A DNA AMP PROBE: CPT | Performed by: PHYSICIAN ASSISTANT

## 2023-01-23 PROCEDURE — 99213 OFFICE O/P EST LOW 20 MIN: CPT | Performed by: PHYSICIAN ASSISTANT

## 2023-01-23 RX ORDER — POLYMYXIN B SULFATE AND TRIMETHOPRIM 1; 10000 MG/ML; [USP'U]/ML
1-2 SOLUTION OPHTHALMIC 4 TIMES DAILY
Qty: 3 ML | Refills: 0 | Status: SHIPPED | OUTPATIENT
Start: 2023-01-23 | End: 2023-01-30

## 2023-01-23 RX ORDER — PENICILLIN V POTASSIUM 500 MG/1
500 TABLET, FILM COATED ORAL 2 TIMES DAILY
Qty: 20 TABLET | Refills: 0 | Status: SHIPPED | OUTPATIENT
Start: 2023-01-23 | End: 2023-02-02

## 2023-01-23 ASSESSMENT — ENCOUNTER SYMPTOMS
CONSTITUTIONAL NEGATIVE: 1
EYE DISCHARGE: 1
EYE REDNESS: 1
RESPIRATORY NEGATIVE: 1
SORE THROAT: 1
FEVER: 0

## 2023-01-23 ASSESSMENT — ACTIVITIES OF DAILY LIVING (ADL): ADLS_ACUITY_SCORE: 35

## 2023-01-23 NOTE — PATIENT INSTRUCTIONS
Thank you for choosing us for your care. Given your symptoms, I would like you to do a lab-only visit to determine what is causing them.  I have placed the orders.  Please schedule an appointment [place] the [place], or call [phone].  I will let you know when the results are back and next steps to take.

## 2023-01-24 NOTE — ED PROVIDER NOTES
History     Chief Complaint   Patient presents with     Pharyngitis     Conjunctivitis     HPI  Jacquie Harris is a 67 year old female who presents with complaints of sore throat since this morning.  Patient also complains of bilateral eye redness, mattering, and drainage.  Patient states her daughter just tested positive for strep throat this morning.  They spent all weekend together.  Patient is concerned she may also have strep throat given his close exposure.  Denies fevers, chills, congestion, new cough, nausea, vomiting, diarrhea, abdominal pain, chest pain, or shortness of breath.      Allergies:  Allergies   Allergen Reactions     Ibuprofen      Other reaction(s): Gastrointestinal  Gastric bleed     Naproxen GI Disturbance     Other reaction(s): Gastrointestinal  Gastric bleed     Nickel Itching     Inflamed with cheap earrings (itchy)     Rofecoxib Itching and Swelling     VIOXX (Swollen Feet,Hands itching), Okay with ibuprofen and aspirin     Vioxx Itching and Swelling     VIOXX (Swollen Feet,Hands itching), Okay with ibuprofen and aspirin       Problem List:    Patient Active Problem List    Diagnosis Date Noted     History of total bilateral knee replacement 01/09/2023     Priority: Medium     Benign essential hypertension 06/19/2020     Priority: Medium     Rotator cuff syndrome, right 01/14/2020     Priority: Medium     Multiple joint pain, possible fibromyalgia 12/20/2019     Priority: Medium     Bronchospasm 01/07/2019     Priority: Medium     GI bleed 03/17/2018     Priority: Medium     Upper GI bleed 03/17/2018     Priority: Medium     Shoulder injury, right, subsequent encounter 08/21/2017     Priority: Medium     Fatty liver 04/20/2017     Priority: Medium     Elevated levels of transaminase & lactic acid dehydrogenase 04/15/2017     Priority: Medium     Bilateral cold feet 01/03/2017     Priority: Medium     Colles' fracture 07/06/2015     Priority: Medium     Encounter for routine  gynecological examination  06/04/2015     Priority: Medium     Back pain, left below scapula, strained muscles 10/24/2013     Priority: Medium     HTN, goal below 140/80 05/05/2013     Priority: Medium     S/P TKR (total knee replacement) 04/03/2012     Priority: Medium     Osteoporosis 02/19/2012     Priority: Medium     Previous T scores -2.2  2/2012 Dexascan:  Lumbar spine L1-L4 T-score: -2.4, Left femoral neck T-score: -2.7, Right femoral neck T-score: -2.2   Percent change in lumbar spine: +6.6% since 4/16/2008   Percent change in femurs: +2.9% since 4/16/2008    IMPRESSION: Osteoporosis in the left femoral neck. Severe osteopenia  in the right femoral neck and lumbar spine.       Obesity, Class III, BMI 40-49.9 (morbid obesity) (H) 02/19/2012     Priority: Medium     ideal weight 120, goal weight 200, lose 58 lbs in 58 weeks.       Vitamin D deficiency 07/18/2011     Priority: Medium     Tick bite, infected, positive Lyme test 1996 not treated 07/08/2011     Priority: Medium     Rosacea 11/04/2009     Priority: Medium     Obstructive sleep apnea 12/01/2006     Priority: Medium     Sleep study 12/06 for EDS- AHI 36, desaturations to 65%. CPAP recommended but not tolerated.  01/16/07  ENT consult recommend CPAP and weight loss. Surgery discussed but success rate less than CPAP  1/15/07 CPAP trial, was not able to tolerate. Retried 2008, tolerated better.   Problem list name updated by automated process. Provider to review       Carpal tunnel syndrome 06/07/2006     Priority: Medium     04/10/09 Dr.Meletiou Mercado [facility]. Finding consistant with CTS, but nerve studies and MRI of C-Spine are normal. Corticosteroid injection given in office       CARDIOVASCULAR SCREENING; LDL GOAL LESS THAN 160 10/31/2010     Priority: Low     Allergic rhinitis 03/27/2006     Priority: Low     Problem list name updated by automated process. Provider to review       s/p gastric bypass x 2 07/05/2005     Priority: Low     Gastric  bypass 1980          Past Medical History:    Past Medical History:   Diagnosis Date     HTN (hypertension)      IRON DEFICIENCY ANEMIA 7/5/2005     Obstructive sleep apnea      DARWIN (obstructive sleep apnea)        Past Surgical History:    Past Surgical History:   Procedure Laterality Date     ARTHROPLASTY KNEE  04/02/2012    Procedure:ARTHROPLASTY KNEE; Left Total Knee Arthroplasty--Anesth.Choice; Surgeon:VICENTE THOMPSON; Location:[facility]     ARTHROTOMY SHOULDER, ROTATOR CUFF REPAIR, COMBINED Right 08/25/2017    Procedure: COMBINED ARTHROTOMY SHOULDER, ROTATOR CUFF REPAIR;  Right shoulder distal clavicle excision, subacromial decompression, rotator cuff repair ;  Surgeon: Vicente Thompson MD;  Location: [facility]     ARTHROTOMY SHOULDER, ROTATOR CUFF REPAIR, COMBINED Right 12/18/2017    Procedure: COMBINED ARTHROTOMY SHOULDER, ROTATOR CUFF REPAIR;  Right Shoulder Rotator Cuff Revision;  Surgeon: Vicente Thompson MD;  Location: [facility]     ARTHROTOMY SHOULDER, ROTATOR CUFF REPAIR, COMBINED Left 04/27/2018    Procedure: COMBINED ARTHROTOMY SHOULDER, ROTATOR CUFF REPAIR;  Left Shoulder Open Subacromial Decompression,Distal Clavicle Excision,Rotator Cuff Repair;  Surgeon: Vicente Thompson MD;  Location: [facility]     C/SECTION, LOW TRANSVERSE  1978, 1984, 1994    x 3     COLONOSCOPY  01/01/2001    normal colonoscopy     COLONOSCOPY N/A 08/22/2017    Procedure: COLONOSCOPY;  Colonoscopy  ;  Surgeon: Delfino Yoo MD;  Location: [facility]     COLONOSCOPY  08/22/2017    EXAMCOL     ESOPHAGOSCOPY, GASTROSCOPY, DUODENOSCOPY (EGD), COMBINED N/A 03/18/2018    Procedure: COMBINED ESOPHAGOSCOPY, GASTROSCOPY, DUODENOSCOPY (EGD);;  Surgeon: Poncho Galindo MD;  Location: [facility]     GASTRIC BYPASS  1980/2005    Gastric Bypass and revision     HERNIA REPAIR, INCISIONAL  06/16/2008    lap.repair with 10x8 mesh     [place] REVISE KNEE JOINT REPLACE,ALL PARTS Left 05/29/2019    Dr. Cedric Fuller     [place] TOTAL KNEE ARTHROPLASTY  Right 06/22/2020    Dr. Cedric Fuller     TUBAL LIGATION  01/01/1994     [facility] ORAL SURGERY PROCEDURE  age 18    wisdom teeth       Family History:    Family History   Problem Relation Age of Onset     C.A.D. Father         MI at age 60     Hypertension Father      Alzheimer Disease Father      Hyperlipidemia Father      Osteoporosis Mother         on Fosamax     Glaucoma Mother      Other Cancer Brother      Stomach Cancer Maternal Aunt      Cervical Cancer Cousin      Diabetes No family hx of      Cerebrovascular Disease No family hx of      Breast Cancer No family hx of      Cancer - colorectal No family hx of      Prostate Cancer No family hx of      Liver Disease No family hx of        Social History:  Marital Status:   [2]  Social History     Tobacco Use     Smoking status: Never     Smokeless tobacco: Never   Vaping Use     Vaping Use: Never used   Substance Use Topics     Alcohol use: Yes     Alcohol/week: 0.0 standard drinks     Comment: mixed very light 1 a month if that     Drug use: No        Medications:    penicillin V (VEETID) 500 MG tablet  trimethoprim-polymyxin b (POLYTRIM) 09903-4.1 UNIT/ML-% ophthalmic solution  acetaminophen (TYLENOL) 500 MG tablet  albuterol (PROAIR HFA/PROVENTIL HFA/VENTOLIN HFA) 108 (90 Base) MCG/ACT inhaler  atenolol (TENORMIN) 50 MG tablet  celecoxib (CELEBREX) 100 MG capsule  diclofenac (VOLTAREN) 1 % topical gel  hydroxychloroquine (PLAQUENIL) 200 MG tablet  NYAMYC 331606 UNIT/GM external powder  oxybutynin ER (DITROPAN XL) 5 MG 24 hr tablet  sulfaSALAzine ER (AZULFIDINE EN) 500 MG EC tablet          Review of Systems   Constitutional: Negative.  Negative for fever.   HENT: Positive for sore throat.    Eyes: Positive for discharge and redness.   Respiratory: Negative.    All other systems reviewed and are negative.      Physical Exam   BP: (!) 177/96  Pulse: 79  Temp: 99.4  F (37.4  C)  Resp: 30  SpO2: 96 %      Physical Exam  Constitutional:       General: She is  not in acute distress.     Appearance: Normal appearance. She is well-developed. She is not ill-appearing, toxic-appearing or diaphoretic.   HENT:      Head: Normocephalic and atraumatic.      Right Ear: Tympanic membrane, ear canal and external ear normal.      Left Ear: Tympanic membrane, ear canal and external ear normal.      Nose: Nose normal. No mucosal edema, congestion or rhinorrhea.      Mouth/Throat:      Lips: Pink.      Mouth: Mucous membranes are moist.      Pharynx: Uvula midline. Posterior oropharyngeal erythema present. No pharyngeal swelling, oropharyngeal exudate or uvula swelling.      Tonsils: No tonsillar exudate or tonsillar abscesses.   Eyes:      General:         Right eye: Discharge present.         Left eye: Discharge present.     Extraocular Movements: Extraocular movements intact.      Conjunctiva/sclera:      Right eye: Right conjunctiva is injected.      Left eye: Left conjunctiva is injected.      Pupils: Pupils are equal, round, and reactive to light.   Cardiovascular:      Rate and Rhythm: Normal rate and regular rhythm.      Heart sounds: Normal heart sounds.   Pulmonary:      Effort: Pulmonary effort is normal. No respiratory distress.      Breath sounds: Normal breath sounds. No wheezing or rales.   Musculoskeletal:      Cervical back: Full passive range of motion without pain, normal range of motion and neck supple. No rigidity or tenderness. Normal range of motion.   Lymphadenopathy:      Cervical: No cervical adenopathy.   Skin:     General: Skin is warm and dry.   Neurological:      Mental Status: She is alert and oriented to person, place, and time.   Psychiatric:         Behavior: Behavior is cooperative.         ED Course                 Procedures      Results for orders placed or performed during the hospital encounter of 01/23/23 (from the past 24 hour(s))   Streptococcus A Rapid Scr w Reflx to PCR    Specimen: Throat; Swab   Result Value Ref Range    Group A Strep  antigen Negative Negative       Medications - No data to display    Assessments & Plan (with Medical Decision Making)     Pt is a 67 year old female who presents with complaints of sore throat since this morning.  Patient also complains of bilateral eye redness, mattering, and drainage.  Patient states her daughter just tested positive for strep throat this morning.  They spent all weekend together.  Patient is concerned she may also have strep throat given his close exposure.      Pt is afebrile on arrival.  Exam as above.  Rapid strep was negative.  Culture is pending.  Discussed results with patient.  Given close contact with strep throat that patient had prolonged contact with this weekend, will provide with antibiotics to take if symptoms persist over the next 24 hours.  Encouraged symptomatic treatments at home.  Return precautions were reviewed.  Hand-outs were provided.    Patient was sent with Polytrim eyedrops and was instructed to follow-up with PCP in 3-5 days for continued care and management or sooner if new or worsening symptoms.  She is to return to the ED for persistent and/or worsening symptoms.  Patient expressed understanding of the diagnosis and plan and was discharged home in good condition.    I have reviewed the nursing notes.    I have reviewed the findings, diagnosis, plan and need for follow up with the patient.      Discharge Medication List as of 1/23/2023  7:20 PM      START taking these medications    Details   penicillin V (VEETID) 500 MG tablet Take 1 tablet (500 mg) by mouth 2 times daily for 10 days, Disp-20 tablet, R-0, E-Prescribe      trimethoprim-polymyxin b (POLYTRIM) 10613-2.1 UNIT/ML-% ophthalmic solution Place 1-2 drops into both eyes 4 times daily for 7 days, Disp-3 mL, R-0, E-Prescribe             Final diagnoses:   Acute pharyngitis, unspecified etiology   Strep throat exposure   Acute conjunctivitis of both eyes       1/23/2023   [facility] EMERGENCY  DEPT      Disclaimer:  This note consists of symbols derived from keyboarding, dictation and/or voice recognition software.  As a result, there may be errors in the script that have gone undetected.  Please consider this when interpreting information found in this chart.     Анна Fishman PA-C  01/23/23 [id number]

## 2023-02-01 ENCOUNTER — HOSPITAL ENCOUNTER (OUTPATIENT)
Dept: BONE DENSITY | Facility: CLINIC | Age: 68
Discharge: HOME | End: 2023-02-01
Attending: FAMILY MEDICINE | Admitting: FAMILY MEDICINE
Payer: COMMERCIAL

## 2023-02-01 DIAGNOSIS — M81.6 LOCALIZED OSTEOPOROSIS, UNSPECIFIED PATHOLOGICAL FRACTURE PRESENCE: ICD-10-CM

## 2023-02-01 PROCEDURE — 77080 DXA BONE DENSITY AXIAL: CPT

## 2023-02-05 NOTE — RESULT ENCOUNTER NOTE
Please inform patient that test result showed osteoporosis. Patient to make follow up appointment to discuss treatment options .  Thank you.     Jennie Suazo M.D.

## 2023-02-13 ENCOUNTER — HOSPITAL ENCOUNTER (OUTPATIENT)
Dept: PHYSICAL THERAPY | Facility: CLINIC | Age: 68
Setting detail: THERAPIES SERIES
Discharge: HOME | End: 2023-02-13
Payer: COMMERCIAL

## 2023-02-13 PROCEDURE — 97110 THERAPEUTIC EXERCISES: CPT | Mod: GP | Performed by: PHYSICAL THERAPIST

## 2023-02-14 ENCOUNTER — TELEPHONE (OUTPATIENT)
Dept: FAMILY MEDICINE | Facility: CLINIC | Age: 68
End: 2023-02-14
Payer: COMMERCIAL

## 2023-02-14 NOTE — TELEPHONE ENCOUNTER
Reason for call:  Patient reporting a symptom    Symptom or request: Cough and cold    Duration (how long have symptoms been present): 1 month    Have you been treated for this before? Yes    Additional comments: Patient would like to be seen sooner than next available appointment for cold and cough. Was seen in  1-23-23. Please call    Phone Number patient can be reached at:  Cell number on file:    Telephone Information:   Mobile [phone]       Best Time:  today    Can we leave a detailed message on this number:  YES    Call taken on 2/14/2023 at 9:36 AM by Jacquie Fragoso

## 2023-02-15 ENCOUNTER — OFFICE VISIT (OUTPATIENT)
Dept: FAMILY MEDICINE | Facility: CLINIC | Age: 68
End: 2023-02-15
Payer: COMMERCIAL

## 2023-02-15 VITALS
SYSTOLIC BLOOD PRESSURE: 124 MMHG | WEIGHT: 266 LBS | TEMPERATURE: 99 F | HEIGHT: 62 IN | BODY MASS INDEX: 48.95 KG/M2 | DIASTOLIC BLOOD PRESSURE: 86 MMHG | OXYGEN SATURATION: 95 % | RESPIRATION RATE: 18 BRPM | HEART RATE: 73 BPM

## 2023-02-15 DIAGNOSIS — J01.90 ACUTE SINUSITIS WITH SYMPTOMS > 10 DAYS: Primary | ICD-10-CM

## 2023-02-15 PROCEDURE — 99213 OFFICE O/P EST LOW 20 MIN: CPT | Performed by: NURSE PRACTITIONER

## 2023-02-15 ASSESSMENT — PAIN SCALES - GENERAL: PAINLEVEL: SEVERE PAIN (7)

## 2023-02-15 NOTE — PATIENT INSTRUCTIONS
"Take Augmentin twice daily with food. Take a probiotic such as Culturelle or Florastor (recommend lactobacillus 50 billion CFU twice daily  by at least one hour from the antibiotic) while on the antibiotic or eat a Greek yogurt containing \"live active cultures\" daily.      -flonase  -increase fluid intake  -tylenol around the clock for comfort  -reach out in 1week if symptoms are worsening or not improving  "

## 2023-02-15 NOTE — PROGRESS NOTES
"  Assessment & Plan     Acute sinusitis with symptoms > 10 days  Ongoing URI symptoms not improving over 1mo  - amoxicillin-clavulanate (AUGMENTIN) 875-125 MG tablet  Dispense: 20 tablet; Refill: 0             MED REC REQUIRED  Post Medication Reconciliation Status: discharge medications reconciled, continue medications without change  See Patient Instructions  Patient Instructions   Take Augmentin twice daily with food. Take a probiotic such as Culturelle or Florastor (recommend lactobacillus 50 billion CFU twice daily  by at least one hour from the antibiotic) while on the antibiotic or eat a Greek yogurt containing \"live active cultures\" daily.      -flonase  -increase fluid intake  -tylenol around the clock for comfort  -reach out in 1week if symptoms are worsening or not improving      Return in about 1 week (around 2/22/2023), or if symptoms worsen or fail to improve, for worsening or continued symptoms.    Joanne Wells RN, BSN  DNP-FNP Student, [place]    MARGARITA Brooke CNP  [facility]    Luba Jones is a 67 year old, presenting for the following health issues:  ER F/U (Follow up from the ER on 1-23-23.  Ongoing symptoms. )    Chief Complaint   Patient presents with     ER F/U     Follow up from the ER on 1-23-23.  Ongoing symptoms.        HPI     ED/UC Followup:    Facility:  [facility]   Date of visit: 1-23-23  Reason for visit: Sore throat, conjunctivitis.  ER NOTE IS COPIED BELOW:  Chief Complaint   Patient presents with     Pharyngitis     Conjunctivitis      HPI  Jacquie Harris is a 67 year old female who presents with complaints of sore throat since this morning.  Patient also complains of bilateral eye redness, mattering, and drainage.  Patient states her daughter just tested positive for strep throat this morning.  They spent all weekend together.  Patient is concerned she may also have strep throat given his close exposure.  Denies fevers, " "chills, congestion, new cough, nausea, vomiting, diarrhea, abdominal pain, chest pain, or shortness of breath.    Current Status: Ongoing sx: slight sore throat mostly when laying down at night. Bilateral ear pain, headache. Cough, fairly frequent, mostly non-prod. Occasionally will have scant amt clear phlegm. States has spot in middle of back that hurts afebrile.  Eyes are a little watery today. Strep testing was negative.    Karen is a 67 year old previously healthy female who presents with long standing URI symptoms.; Patient was seen 1/23 in the ED for pharyngitis symptoms at which time she tested negative for strep. Patient has since had persistent HA, runny nose, nasal congestion, watery eyes, non-productive cough, sore throat, bilateral ear pain and fatigue. She denies chest pain, SOB, fever, and GI upset. Patient reports she has tried cold pack to her head as well as cough drops, but denies any OTC medications for symptom management.       Review of Systems   CONSTITUTIONAL: NEGATIVE for fever, chills, change in weight  ENT/MOUTH: POSITIVE for ear pain bilateral, nasal congestion, postnasal drainage, rhinorrhea-clear, sinus pressure and sore throat NEGATIVE for fever  RESP:POSITIVE for cough-non productive and NEGATIVE for SOB/dyspnea and wheezing  CV: NEGATIVE for chest pain, palpitations or peripheral edema  GI: NEGATIVE for nausea, abdominal pain, heartburn, or change in bowel habits      Objective    /86 (BP Location: Right arm, Patient Position: Right side, Cuff Size: Adult Large)   Pulse 73   Temp 99  F (37.2  C) (Tympanic)   Resp 18   Ht 1.581 m (5' 2.25\")   Wt 120.7 kg (266 lb)   LMP 05/29/2006   SpO2 95%   BMI 48.26 kg/m    Body mass index is 48.26 kg/m .  Physical Exam   GENERAL: healthy, alert and no distress  EYES: Eyes grossly normal to inspection, PERRL and conjunctivae and sclerae normal  HENT: ear canals and TM's normal, nose and mouth without ulcers or lesions  RESP: lungs " clear to auscultation - no rales, rhonchi or wheezes  CV: regular rate and rhythm, normal S1 S2, no S3 or S4, no murmur, click or rub, no peripheral edema and peripheral pulses strong

## 2023-02-15 NOTE — TELEPHONE ENCOUNTER
Pt was called  Was in  1/23, strep neg.  Ongoing sx: slight sore throat mostly when laying down at night. Bilateral ear pain, headache. Cough, fairly frequent, mostly non-prod. Occasionally will have scant amt clear phlegm. States has spot in middle of back that hurts afebrile.  In-clinic appt made for today.    Chandni Corona RN

## 2023-02-20 ENCOUNTER — TELEPHONE (OUTPATIENT)
Dept: FAMILY MEDICINE | Facility: CLINIC | Age: 68
End: 2023-02-20
Payer: COMMERCIAL

## 2023-02-20 DIAGNOSIS — J01.90 ACUTE SINUSITIS WITH SYMPTOMS > 10 DAYS: Primary | ICD-10-CM

## 2023-02-20 NOTE — TELEPHONE ENCOUNTER
Reason for Call:  Other prescription    Detailed comments: Pt calling stating Augmentin pills are too large she has trouble swallowing them and sometimes throws them up, she is wondering if there is an alternative medication, she is still not feeling well and wonders if they are working, she still has a sore throat, coughing, ear pain, and watery eyes occasionally, is supposed to complete antibiotic on 2/25.    Phone Number Patient can be reached at: Home number on file [phone] (home)    Best Time: any    Can we leave a detailed message on this number? YES    Call taken on 2/20/2023 at 9:50 AM by Nori Hernández

## 2023-02-21 NOTE — TELEPHONE ENCOUNTER
Forwarding update to provider to advise please.  Patient seen 2/15/23 for acute sinusitis, prescribed Augmentin.   Per initial intake note from yesterday below, patient was having a lot of issues with swallowing the pill, and not feeling much better.    She now reports she's been cutting the pill in half and has gotten it down last night and this morning without issue. However, she's not really feeling much better despite being on 6th day of therapy.   She reports continuing with sinus headache above eye, scratchy (although not painful) throat, intermittent ear pain (although improved from the constant pain she had prior to antibiotics), and still has no energy and not feeling markedly improved. She states she usually improves within a couple days with antibiotics.   RN did discuss possible viral component, but will forward to treating provider for review to see if revisit versus a change in antibiotics is recommended.     Lisa Hernandez RN  [facility]

## 2023-02-22 RX ORDER — DOXYCYCLINE HYCLATE 100 MG
100 TABLET ORAL 2 TIMES DAILY
Qty: 20 TABLET | Refills: 0 | Status: SHIPPED | OUTPATIENT
Start: 2023-02-22 | End: 2023-03-04

## 2023-02-22 NOTE — TELEPHONE ENCOUNTER
Doxycycline sent to pharmacy, should stop Augmentin. If not improved after this course of antibiotics, will need to f/u with PCP.

## 2023-02-27 ENCOUNTER — TRANSFERRED RECORDS (OUTPATIENT)
Dept: HEALTH INFORMATION MANAGEMENT | Facility: CLINIC | Age: 68
End: 2023-02-27
Payer: COMMERCIAL

## 2023-02-27 ENCOUNTER — NURSE TRIAGE (OUTPATIENT)
Dept: NURSING | Facility: CLINIC | Age: 68
End: 2023-02-27
Payer: COMMERCIAL

## 2023-02-28 ENCOUNTER — OFFICE VISIT (OUTPATIENT)
Dept: FAMILY MEDICINE | Facility: CLINIC | Age: 68
End: 2023-02-28
Payer: COMMERCIAL

## 2023-02-28 ENCOUNTER — ANCILLARY PROCEDURE (OUTPATIENT)
Dept: GENERAL RADIOLOGY | Facility: CLINIC | Age: 68
End: 2023-02-28
Attending: NURSE PRACTITIONER
Payer: COMMERCIAL

## 2023-02-28 VITALS
BODY MASS INDEX: 48.95 KG/M2 | WEIGHT: 266 LBS | SYSTOLIC BLOOD PRESSURE: 136 MMHG | OXYGEN SATURATION: 93 % | RESPIRATION RATE: 18 BRPM | DIASTOLIC BLOOD PRESSURE: 86 MMHG | HEART RATE: 70 BPM | HEIGHT: 62 IN | TEMPERATURE: 98.7 F

## 2023-02-28 DIAGNOSIS — J06.9 VIRAL URI: ICD-10-CM

## 2023-02-28 DIAGNOSIS — R05.1 ACUTE COUGH: ICD-10-CM

## 2023-02-28 DIAGNOSIS — R05.1 ACUTE COUGH: Primary | ICD-10-CM

## 2023-02-28 PROCEDURE — 99213 OFFICE O/P EST LOW 20 MIN: CPT | Performed by: NURSE PRACTITIONER

## 2023-02-28 PROCEDURE — 71046 X-RAY EXAM CHEST 2 VIEWS: CPT | Mod: TC | Performed by: RADIOLOGY

## 2023-02-28 RX ORDER — PREDNISONE 20 MG/1
40 TABLET ORAL DAILY
Qty: 10 TABLET | Refills: 0 | Status: SHIPPED | OUTPATIENT
Start: 2023-02-28 | End: 2023-03-05

## 2023-02-28 ASSESSMENT — PAIN SCALES - GENERAL: PAINLEVEL: MODERATE PAIN (4)

## 2023-02-28 NOTE — TELEPHONE ENCOUNTER
"\"Fighting virus\" and on second round of antibiotics for sinus infection.    Reporting back pain with vomiting today, headaches, non-productive cough.    Patient says she has to take shallow breaths because she can't take a deep one.  This is present at rest.    Disposition for difficulty breathing is ER.  Patient feels strongly this is unnecessary and was trying to get a sooner appointment than 3-15-23.    Writer advised she could call back for same day appointment and/or make sure she is on cancellation list.    Shadia Mchugh RN  [facility]      Reason for Disposition    [1] Difficulty breathing AND [2] not from stuffy nose (e.g., not relieved by cleaning out the nose)    Additional Information    Negative: SEVERE difficulty breathing (e.g., struggling for each breath, speaks in single words)    Negative: Sounds like a life-threatening emergency to the triager    Protocols used: SINUS INFECTION ON ANTIBIOTIC FOLLOW-UP CALL-A-AH      "

## 2023-02-28 NOTE — PROGRESS NOTES
Assessment & Plan     Acute cough  - XR Chest 2 Views; Future  - predniSONE (DELTASONE) 20 MG tablet; Take 2 tablets (40 mg) by mouth daily for 5 days    Viral URI  - predniSONE (DELTASONE) 20 MG tablet; Take 2 tablets (40 mg) by mouth daily for 5 days      Symptoms likely are related to a post-viral syndrome.  CXR today negative.  Patient appeared well in clinic - exam was benign. No coughing, wheezing or shortness of breath noted while in clinic today.  Patient may complete full course of doxy.  Add prednisone 40 mg daily for 5 days.  Encouraged patient to use the albuterol inhaler every 4-6 hours as needed for coughing or wheezing.  Follow up if no improvement in 2 weeks.        Body mass index (BMI) of 45.0 to 49.9 in adult (H)  Known issue that I take into account for their medical decisions, no current exacerbations or new concerns        The risks, benefits and treatment options of prescribed medications or other treatments have been discussed with the patient. The patient verbalized their understanding and should call or follow up if no improvement or if they develop further problems.    MARGARITA Alegre [facility] ELI Jones is a 67 year old, presenting for the following health issues:  Throat Problem      HPI     Acute Illness  Acute illness concerns: sore throat, headache, back pain  Onset/Duration: Over a month, pt has been on 2 round of antibiotics for a sinus infection.   Symptoms:  Fever: No  Chills/Sweats: YES  Headache (location?): YES   Sinus Pressure: YES- occasionally  Conjunctivitis:  YES- not lately   Ear Pain: YES- in the past   Rhinorrhea: YES  Congestion: No  Sore Throat: YES- scratchy throat   Cough: YES-non-productive  Wheeze: YES- winded, SOB   Decreased Appetite: YES  Nausea: YES  Vomiting: YES- last night   Diarrhea: YES   Dysuria/Freq.: YES- frequency   Dysuria or Hematuria: No  Fatigue/Achiness: YES  Sick/Strep Exposure: YES- strep  "about a month   Therapies tried and outcome: 2 rounds of antibiotics, still on doxycyline (day 6 or 7 of 10)       Antibiotics have taken care of the ear pain.  The antibiotics have helped \"but not enough\"        Review of Systems   Constitutional, HEENT, cardiovascular, pulmonary, gi and gu systems are negative, except as otherwise noted.      Objective    /86   Pulse 70   Temp 98.7  F (37.1  C) (Tympanic)   Resp 18   Ht 1.581 m (5' 2.25\")   Wt 120.7 kg (266 lb)   LMP 05/29/2006   SpO2 93%   BMI 48.26 kg/m    Body mass index is 48.26 kg/m .  Physical Exam   GENERAL: healthy, alert and no distress  HENT: ear canals and TM's normal, nose and mouth without ulcers or lesions  NECK: no adenopathy, no asymmetry, masses, or scars and thyroid normal to palpation  RESP: lungs clear to auscultation - no rales, rhonchi or wheezes  CV: regular rate and rhythm, normal S1 S2, no S3 or S4, no murmur, click or rub, no peripheral edema and peripheral pulses strong    CXR - Reviewed and interpreted by me Normal- no infiltrates, effusions, pneumothoraces, cardiomegaly or masses                "

## 2023-03-01 ENCOUNTER — VIRTUAL VISIT (OUTPATIENT)
Dept: FAMILY MEDICINE | Facility: CLINIC | Age: 68
End: 2023-03-01
Payer: COMMERCIAL

## 2023-03-01 ENCOUNTER — TELEPHONE (OUTPATIENT)
Dept: FAMILY MEDICINE | Facility: CLINIC | Age: 68
End: 2023-03-01

## 2023-03-01 DIAGNOSIS — M81.0 OSTEOPOROSIS WITHOUT CURRENT PATHOLOGICAL FRACTURE, UNSPECIFIED OSTEOPOROSIS TYPE: ICD-10-CM

## 2023-03-01 DIAGNOSIS — M81.0 OSTEOPOROSIS WITHOUT CURRENT PATHOLOGICAL FRACTURE, UNSPECIFIED OSTEOPOROSIS TYPE: Primary | ICD-10-CM

## 2023-03-01 PROCEDURE — 99441 PR PHYSICIAN TELEPHONE EVALUATION 5-10 MIN: CPT | Mod: 95 | Performed by: FAMILY MEDICINE

## 2023-03-01 NOTE — PROGRESS NOTES
Karen is a 67 year old who is being evaluated via a billable telephone visit.      67 yr old female here for follow up on recent bone density scan. She was first diagnosed with osteoporosis in 2008. She was on Fosamax for a number of years but said she did not tolerate the medication.    Review of her chart shows that she may have been on the alendronate only for about two years. We discussed other forms of treating osteoporosis. One of the medications discussed was Prolia ( denosumab ) patient is open to trying this. It may be have some restrictions from insurance.       What phone number would you like to be contacted at? [phone].  How would you like to obtain your AVS? MyChart  Distant Location (provider location):  Off-site    Assessment & Plan     Osteoporosis without current pathological fracture, unspecified osteoporosis type  - denosumab (PROLIA) 60 MG/ML SOSY injection; Inject 1 mL (60 mg) Subcutaneous once for 1 dose     :[id number]}     FUTURE APPOINTMENTS:       - Follow-up visit in one month    Return in about 4 weeks (around 3/29/2023) for Follow up.    Jennie Suazo MD  [facility]    Luba Jones is a 67 year old, presenting for the following health issues:  Dexa scan (Follow up on Dexa Scan results done on 2-1-2023.) and Musculoskeletal Problem (The pain in her fingers is getting worse.  She had a Virtual Visit with Rheumatology on 1-11-23.  Follow up Rheumatology visit is on 3-15-23.  She is wanting to know if she could get in earlier with Danni.  A message was sent to the Specialty RN pool yesterday with this question about getting an earlier appointment.  Patient is waiting to hear back about this.)      HPI     Chief Complaint   Patient presents with     Dexa scan     Follow up on Dexa Scan results done on 2-1-2023.     Musculoskeletal Problem     The pain in her fingers is getting worse.  She had a Virtual Visit with Rheumatology on 1-11-23.  Follow up  Rheumatology visit is on 3-15-23.  She is wanting to know if she could get in earlier with Danni.  A message was sent to the Specialty RN pool yesterday with this question about getting an earlier appointment.  Patient is waiting to hear back about this.           Review of Systems   Constitutional, HEENT, cardiovascular, pulmonary, gi and gu systems are negative, except as otherwise noted.      Objective    Vitals - Patient Reported  Pain Score: Moderate Pain (4)  Pain Loc: Other - see comment (Finger pain.)      Vitals:  No vitals were obtained today due to virtual visit.    Physical Exam   healthy, alert and no distress  PSYCH: Alert and oriented times 3; coherent speech, normal   rate and volume, able to articulate logical thoughts, able   to abstract reason, no tangential thoughts, no hallucinations   or delusions  Her affect is normal  RESP: No cough, no audible wheezing, able to talk in full sentences  Remainder of exam unable to be completed due to telephone visits            Phone call duration: 8 minutes

## 2023-03-01 NOTE — TELEPHONE ENCOUNTER
Medication Question or Refill    Contacts       Type Contact Phone/Fax    03/01/2023 04:14 PM CST Phone (Incoming) Karen Harris ASHWIN (Self) [phone] (M)          What medication are you calling about (include dose and sig)?: Prolia, not covered under her insurance.    Preferred Pharmacy:   [facility][id number] [address] AT [facility] OF [address] [place] [address]  [address] [facility] [address]  Phone: [phone] Fax: [phone]      Controlled Substance Agreement on file:   CSA -- Patient Level:    CSA: None found at the patient level.       Could we send this information to you in [employer] or would you prefer to receive a phone call?:   Patient would prefer a phone call   Okay to leave a detailed message?: Yes at Home number on file [phone] (home)

## 2023-03-03 NOTE — TELEPHONE ENCOUNTER
Prior Authorization Retail Medication Request    Medication/Dose: Prolia, not covered under her insurance.    ICD code (if different than what is on RX):  Prolia  Previously Tried and Failed:  No  Rationale:  Osteoporosis    Insurance Name:   Insurance ID:        Pharmacy Information (if different than what is on RX)  Name:   Phone:

## 2023-03-07 NOTE — TELEPHONE ENCOUNTER
PA Initiation    Medication: Prolia 60MG/ML syringes  Insurance Company: [employer] Part D - Phone [phone] Fax [phone]  Pharmacy Filling the Rx: [id number] - [address] AT [address]  Filling Pharmacy Phone: [phone]  Filling Pharmacy Fax: [phone]  Start Date: 3/7/2023

## 2023-03-07 NOTE — TELEPHONE ENCOUNTER
Prior Authorization Not Needed per Insurance    Medication: Prolia 60MG/ML syringes  Insurance Company: [employer] Part D - Phone [phone] Fax [phone]  Expected CoPay:      Pharmacy Filling the Rx: [facility] #[id number] - [address] AT [address]  Pharmacy Notified: No  Patient Notified: No    Spoke with rep at insurance- no PA is needed for 1 syringe every 180 days. Medication does have [facility] filled [facility]. Please send new Rx.

## 2023-03-08 NOTE — TELEPHONE ENCOUNTER
Per PA team:  Spoke with rep at insurance- no PA is needed for 1 syringe every 180 days. Medication does have to be filled at [facility]. Please send new Rx.

## 2023-03-09 ENCOUNTER — LAB (OUTPATIENT)
Dept: LAB | Facility: CLINIC | Age: 68
End: 2023-03-09
Payer: COMMERCIAL

## 2023-03-09 DIAGNOSIS — Z79.899 HIGH RISK MEDICATION USE: ICD-10-CM

## 2023-03-09 DIAGNOSIS — R76.8 POSITIVE ANA (ANTINUCLEAR ANTIBODY): ICD-10-CM

## 2023-03-09 DIAGNOSIS — R80.9 MICROALBUMINURIA: ICD-10-CM

## 2023-03-09 DIAGNOSIS — M19.90 INFLAMMATORY ARTHRITIS: ICD-10-CM

## 2023-03-09 LAB
ALBUMIN SERPL BCG-MCNC: 3.9 G/DL (ref 3.5–5.2)
ALT SERPL W P-5'-P-CCNC: 10 U/L (ref 10–35)
AST SERPL W P-5'-P-CCNC: 19 U/L (ref 10–35)
CREAT SERPL-MCNC: 0.77 MG/DL (ref 0.51–0.95)
CREAT UR-MCNC: 79.7 MG/DL
ERYTHROCYTE [DISTWIDTH] IN BLOOD BY AUTOMATED COUNT: 18.1 % (ref 10–15)
GFR SERPL CREATININE-BSD FRML MDRD: 84 ML/MIN/1.73M2
HCT VFR BLD AUTO: 41.1 % (ref 35–47)
HGB BLD-MCNC: 12.1 G/DL (ref 11.7–15.7)
MCH RBC QN AUTO: 25.5 PG (ref 26.5–33)
MCHC RBC AUTO-ENTMCNC: 29.4 G/DL (ref 31.5–36.5)
MCV RBC AUTO: 87 FL (ref 78–100)
MICROALBUMIN UR-MCNC: <12 MG/L
MICROALBUMIN/CREAT UR: NORMAL MG/G{CREAT}
PLATELET # BLD AUTO: 212 10E3/UL (ref 150–450)
RBC # BLD AUTO: 4.74 10E6/UL (ref 3.8–5.2)
WBC # BLD AUTO: 6.5 10E3/UL (ref 4–11)

## 2023-03-09 PROCEDURE — 84450 TRANSFERASE (AST) (SGOT): CPT

## 2023-03-09 PROCEDURE — 82570 ASSAY OF URINE CREATININE: CPT

## 2023-03-09 PROCEDURE — 82043 UR ALBUMIN QUANTITATIVE: CPT

## 2023-03-09 PROCEDURE — 82040 ASSAY OF SERUM ALBUMIN: CPT

## 2023-03-09 PROCEDURE — 84460 ALANINE AMINO (ALT) (SGPT): CPT

## 2023-03-09 PROCEDURE — 85027 COMPLETE CBC AUTOMATED: CPT

## 2023-03-09 PROCEDURE — 36415 COLL VENOUS BLD VENIPUNCTURE: CPT

## 2023-03-09 PROCEDURE — 82565 ASSAY OF CREATININE: CPT

## 2023-03-10 NOTE — TELEPHONE ENCOUNTER
"Forwarding to PCP to advise re: Prolia please. Is there an alternative to Prolia besides Fosamax that she can take, as Prolia seems to be cost prohibitive?    RN reached out to patient to see if she knows which [facility].  She is unsure, but will call them.  In discussing situation with patient, it seems there are a couple other factors that need to be addressed in this process.   First, the general process for Prolia would be for our staff to order Prolia through our wholesale pharmacy and administer on-site.  However, patient states it would cost her $300 to get through our pharmacy, and this is not affordable for her.  RN doesn't think that our staff would even be able to administer this injection on-site unless it's ordered by our pharmacy.  Will need to get clarification.  If patient needs to get through [facility], she states she would likely be able to self-administer, as she's given herself injections in the stomach previously. Is it acceptable for patient to self-administer this medication given potential s/e, etc.?  RN notes that patient also still needs labs, as there is no recent calcium on file that RN sees.     Lisa Hernandez RN  [facility]      Addendum:    Patient calls back after speaking with [employer].  It doesn't seem she was able to get a straight answer re: specialty pharmacy or what it would cost, sounds like she would need to meet deductible first.  Patient doesn't want to \"go into this blind\" not knowing what she'll have to pay, as she's on a fixed income.  She is wondering if there is another medication she can try instead for osteoporosis.  However, doesn't want Fosamax, as she has issues with taking pills and states she took it for 5 years and didn't really seem to help her osteoporosis.    Lisa Hernandez RN  [facility]      "

## 2023-03-10 NOTE — TELEPHONE ENCOUNTER
PA team was not sure which pharmacy. I do not see that we have used [facility] for patient and nothing is indicated on her ID card. Can we attempt a call to patient or a call to [facility] during business hours?    [facility] [phone]

## 2023-03-11 NOTE — TELEPHONE ENCOUNTER
Unfortunately there is no other alternative for osteoporosis  that is not expensive apart from the Fosamax. Most of the injectables are very expensive.

## 2023-03-13 NOTE — TELEPHONE ENCOUNTER
Patient is reached.  Sometimes insurances pay for this thru the infusion clinic.  She will check with them.  Patient also states she has community care. Winter BRAVO RN

## 2023-03-13 NOTE — TELEPHONE ENCOUNTER
Patient stating she looked into coverage for Prolia shot and if we give the shot and bill her, her insurance and [employer] will cover the cost. Génesis Rawls on 3/13/2023 at 12:18 PM

## 2023-03-13 NOTE — TELEPHONE ENCOUNTER
Patient calls stating she looked into a Boniva injection with Oncology/Infusion center, and insurance would cover that with a 20% copay, then her [facility] application approval would cover the remainder at no cost for her.   Patient is wondering if Boniva would be appropriate for her and if PCP would place an order for her to have that with infusion center?    Lisa Hernandez RN  [facility]

## 2023-03-15 ENCOUNTER — OFFICE VISIT (OUTPATIENT)
Dept: RHEUMATOLOGY | Facility: CLINIC | Age: 68
End: 2023-03-15
Payer: COMMERCIAL

## 2023-03-15 VITALS
HEIGHT: 62 IN | DIASTOLIC BLOOD PRESSURE: 86 MMHG | BODY MASS INDEX: 48.95 KG/M2 | SYSTOLIC BLOOD PRESSURE: 136 MMHG | WEIGHT: 266 LBS

## 2023-03-15 DIAGNOSIS — Z79.899 HIGH RISK MEDICATION USE: ICD-10-CM

## 2023-03-15 DIAGNOSIS — R76.8 POSITIVE ANA (ANTINUCLEAR ANTIBODY): ICD-10-CM

## 2023-03-15 DIAGNOSIS — M19.90 INFLAMMATORY ARTHRITIS: Primary | ICD-10-CM

## 2023-03-15 PROCEDURE — 99214 OFFICE O/P EST MOD 30 MIN: CPT | Performed by: PHYSICIAN ASSISTANT

## 2023-03-15 NOTE — PROGRESS NOTES
Rheumatology Clinic Visit  [facility]  MARISELA Ulloa     Jacquie Harris MRN# [id number]   YOB: 1955 Age: 67 year old   Date of Visit: 03/15/2023  Primary care provider: Jennie Suazo          Assessment and Plan:     1. Inflammatory arthritis  2. Positive SYLVIE  3. High Risk medication    Patient presents today for follow-up regarding her inflammatory arthritis.  Unfortunately she continues to notice a lot of stiffness in her joints.  She has had to use a lot of hot water to control her stiffness in the mornings in her hands.  She continues to feel that her fingers are getting stuck.  Upon further discussion of her medications that she has been taking the hydroxychloroquine 2 pills twice a day and the sulfasalazine 1 in the morning and 2 in the evening.  She did recently have an eye exam which was normal.  Physical examination does show some swelling over her MCPs on the right.    Discussed with the patient that we will change up her medications.  She will take the hydroxychloroquine at 1 tablet twice a day.  She will increase the sulfasalazine to 2 tablets twice a day as well.  We did also discuss either adding or changing her medications over to methotrexate, injection as she does not do well with swallowing pills.  She will consider this option.  Side effects of it were reviewed.    She will continue to get labs every 3 months to monitor for any medication toxicity.      Plan:     1. Schedule follow-up with Danni Martínez PA-C in 2 months.   2. Labs: CBC, creatinine, Albumin, AST, ALT and urine albumin due in Mid June  3. Medication recommendations:   a. Continue Hydroxychloroquine 200mg: Take 1 tablet twice daily twice daily. You next retina exam is due 2/24  b. Sulfasalazine 500mg: Take 2 tablets twice daily.   c. We can consider adding Methotrexate injection. You would take this medication weekly. You would need to take Folic Acid daily while on this medication.  i. #  Methotrexate Risks and Benefits: The risks and benefits of methotrexate were discussed in detail and the patient verbalized understanding.  The risks discussed include, but are not limited to, the risk for hypersensitivity, anaphylaxis, anaphylactoid reactions, infections, bone marrow suppression, renal toxicity, hepatotoxicity, pulmonary toxicity, malignancy, impaired fertility, GI upset, alopecia, and oral and nasal sores.  Folic acid supplementation is recommended during methotrexate therapy to help prevent some of the side effects. Pregnancy prevention and planning was discussed; it is recommended that women of childbearing potential use reliable contraception during therapy.  The risks of taking both methotrexate and alcohol were reviewed; complete alcohol avoidance was discussed.  Routine laboratory monitoring is required during methotrexate therapy. Taking MTX once weekly, all within a 24 hour period was stressed and the patient verbalized this instruction back to me.  I encouraged reviewing the package insert and asking any questions about the medication.    Danni Martínez, MARISELA  Rheumatology         History of Present Illness:   Jacquie Harris presents for evaluation of positive SYLVIE, inflammatory arthritis.    Rheumatological history:  Provider: Dr. Uribe   Current medication: Plaquenil 200 mg twice daily, last eye exam 02/27/2023, Sulfasalazine  Pertinent labs: Negative rheumatoid factor, CCP antibody, sed rate and CRP.  Positive SYLVIE with a titer of 1: 160, speckled pattern.  Normal/negative SYLVIE subsets, ANCA, uric acid, C3, C4      Interval history March 15, 2023:  She increased the Sulfasalazine to 1 in the AM and 2 in the evening. She has continued with the Hydroxychloroquine as well. She states that her hands continue to get stuck and she has a lot of pain with them. She reports spending a lot of time rinsing them in hot water.     Interval history January 11, 2023:  She states that she is  "\"okay\". She has been having increased doctoring as her knees are \"really bad\". She has been working with some providers on that. She is thinking this is where the arthritis has started. She states that her hands continue to be the same. She has not noticed a change with the Sulfasalazine. Her hands continue to get stuck and she feels it is affecting more fingers. She is tolerating the medications well. She has not had any infections or fevers.     Interval history November 9, 2022:  She reports that the prednisone did anything. She states that her fingers continue to feel as if they are getting stuck. She continues to feel the swelling as well. She did have initial improvement in symptoms with the Hydroxychloroquine. She feels this was helpful until just before Dr. Uribe left. Initially she thought it was secondary to the humidity as it started in the summer.     History of gastric bleed on NSAIDs.    HPI from consult 10/5/2022:  Patient was previously treated with Dr. Uribe.  Last office visit was on 6/30/2022.  Initial evaluation was on November 10, 2021.  At that time she had reported pain in her bilateral hands with morning stiffness lasting 2 to 3 hours.  Physical exam showed subtle fullness involving the left third MCP and right fifth MCP joints with tenderness to palpation.  Mild tenderness involving the right fifth PIP and left first CMC.  Nodule involving the left third digit A1 pulley area with some tenderness on palpation and a subtle catching sensation with active flexion of the left third digit.    She has been unable to do the Paraffin wax due to cost. She uses hot water. She uses this in the morning to get things going. She states that she hurts today. She states that she has some good days, but \"not very many\". She is unsure that the Plaquenil is working. She states that she was expecting it to be a \"miracle\". She states that she was under the impression that you need something to stop " "and slow the arthritis with rheumatoid arthritis. She states that she does not do much with her fingers any more. She cannot paint or do crafts. She states that she has so much pain that she cannot even think about painting. She states that the pain is bad in the mornings and evening. If she is able to keep them warm, that seems to help. Many of her symptoms started summer 2020. She was noticing her fingers getting \"crooked\". She has not been getting the sticking of the fingers anymore.     No known family history of rheumatoid arthritis or lupus.  No known family history or personal history of psoriasis, ulcerative colitis or Crohn's disease.             Review of Systems:     Constitutional: negative  Skin: negative  Eyes: negative  Ears/Nose/Throat: negative  Respiratory: No shortness of breath, dyspnea on exertion, cough, or hemoptysis  Cardiovascular: negative  Gastrointestinal: negative  Genitourinary: negative  Musculoskeletal: as above  Neurologic: negative  Psychiatric: negative  Hematologic/Lymphatic/Immunologic: negative  Endocrine: negative         Active Problem List:     Patient Active Problem List    Diagnosis Date Noted     History of total bilateral knee replacement 01/09/2023     Priority: Medium     Benign essential hypertension 06/19/2020     Priority: Medium     Rotator cuff syndrome, right 01/14/2020     Priority: Medium     Multiple joint pain, possible fibromyalgia 12/20/2019     Priority: Medium     Bronchospasm 01/07/2019     Priority: Medium     GI bleed 03/17/2018     Priority: Medium     Upper GI bleed 03/17/2018     Priority: Medium     Shoulder injury, right, subsequent encounter 08/21/2017     Priority: Medium     Fatty liver 04/20/2017     Priority: Medium     Elevated levels of transaminase & lactic acid dehydrogenase 04/15/2017     Priority: Medium     Bilateral cold feet 01/03/2017     Priority: Medium     Colles' fracture 07/06/2015     Priority: Medium     Encounter for routine " gynecological examination  06/04/2015     Priority: Medium     Back pain, left below scapula, strained muscles 10/24/2013     Priority: Medium     HTN, goal below 140/80 05/05/2013     Priority: Medium     S/P TKR (total knee replacement) 04/03/2012     Priority: Medium     Osteoporosis 02/19/2012     Priority: Medium     Previous T scores -2.2  2/2012 Dexascan:  Lumbar spine L1-L4 T-score: -2.4, Left femoral neck T-score: -2.7, Right femoral neck T-score: -2.2   Percent change in lumbar spine: +6.6% since 4/16/2008   Percent change in femurs: +2.9% since 4/16/2008    IMPRESSION: Osteoporosis in the left femoral neck. Severe osteopenia  in the right femoral neck and lumbar spine.       Obesity, Class III, BMI 40-49.9 (morbid obesity) (H) 02/19/2012     Priority: Medium     ideal weight 120, goal weight 200, lose 58 lbs in 58 weeks.       Vitamin D deficiency 07/18/2011     Priority: Medium     Tick bite, infected, positive Lyme test 1996 not treated 07/08/2011     Priority: Medium     Rosacea 11/04/2009     Priority: Medium     Obstructive sleep apnea 12/01/2006     Priority: Medium     Sleep study 12/06 for EDS- AHI 36, desaturations to 65%. CPAP recommended but not tolerated.  01/16/07  ENT consult recommend CPAP and weight loss. Surgery discussed but success rate less than CPAP  1/15/07 CPAP trial, was not able to tolerate. Retried 2008, tolerated better.   Problem list name updated by automated process. Provider to review       Carpal tunnel syndrome 06/07/2006     Priority: Medium     04/10/09 Dr.Meletiou Mercado [facility]. Finding consistant with CTS, but nerve studies and MRI of C-Spine are normal. Corticosteroid injection given in office       CARDIOVASCULAR SCREENING; LDL GOAL LESS THAN 160 10/31/2010     Priority: Low     Allergic rhinitis 03/27/2006     Priority: Low     Problem list name updated by automated process. Provider to review       s/p gastric bypass x 2 07/05/2005     Priority: Low     Gastric  bypass 1980              Past Medical History:     Past Medical History:   Diagnosis Date     HTN (hypertension)      IRON DEFICIENCY ANEMIA 7/5/2005     Iron infusion/ resolved since injections     Obstructive sleep apnea      DARWIN (obstructive sleep apnea)      Past Surgical History:   Procedure Laterality Date     ARTHROPLASTY KNEE  04/02/2012    Procedure:ARTHROPLASTY KNEE; Left Total Knee Arthroplasty--Anesth.Choice; Surgeon:VICENTE THOMPSON; Location:[facility]     ARTHROTOMY SHOULDER, ROTATOR CUFF REPAIR, COMBINED Right 08/25/2017    Procedure: COMBINED ARTHROTOMY SHOULDER, ROTATOR CUFF REPAIR;  Right shoulder distal clavicle excision, subacromial decompression, rotator cuff repair ;  Surgeon: Vicente Thompson MD;  Location: [facility] OR     ARTHROTOMY SHOULDER, ROTATOR CUFF REPAIR, COMBINED Right 12/18/2017    Procedure: COMBINED ARTHROTOMY SHOULDER, ROTATOR CUFF REPAIR;  Right Shoulder Rotator Cuff Revision;  Surgeon: Vicente Thompson MD;  Location: [facility]     ARTHROTOMY SHOULDER, ROTATOR CUFF REPAIR, COMBINED Left 04/27/2018    Procedure: COMBINED ARTHROTOMY SHOULDER, ROTATOR CUFF REPAIR;  Left Shoulder Open Subacromial Decompression,Distal Clavicle Excision,Rotator Cuff Repair;  Surgeon: Vicente Thompson MD;  Location: [facility] OR     C/SECTION, LOW TRANSVERSE  1978, 1984, 1994    x 3     COLONOSCOPY  01/01/2001    normal colonoscopy     COLONOSCOPY N/A 08/22/2017    Procedure: COLONOSCOPY;  Colonoscopy  ;  Surgeon: Delfino Yoo MD;  Location: [facility]     COLONOSCOPY  08/22/2017    EXAMCOL     ESOPHAGOSCOPY, GASTROSCOPY, DUODENOSCOPY (EGD), COMBINED N/A 03/18/2018    Procedure: COMBINED ESOPHAGOSCOPY, GASTROSCOPY, DUODENOSCOPY (EGD);;  Surgeon: Poncho Galindo MD;  Location: [facility]     GASTRIC BYPASS  1980/2005    Gastric Bypass and revision     HERNIA REPAIR, INCISIONAL  06/16/2008    lap.repair with 10x8 mesh     GA REVISE KNEE JOINT REPLACE,ALL PARTS Left 05/29/2019    Dr. Cedric Fuller     GA TOTAL  KNEE ARTHROPLASTY Right 06/22/2020    Dr. Cedric Fuller     TUBAL LIGATION  01/01/1994     [facility] ORAL SURGERY PROCEDURE  age 18    wisdom teeth            Social History:     Social History     Socioeconomic History     Marital status:      Spouse name: Not on file     Number of children: Not on file     Years of education: Not on file     Highest education level: Not on file   Occupational History     Not on file   Tobacco Use     Smoking status: Never     Smokeless tobacco: Never   Vaping Use     Vaping Use: Never used   Substance and Sexual Activity     Alcohol use: Yes     Alcohol/week: 0.0 standard drinks     Comment: mixed very light 1 a month if that     Drug use: No     Sexual activity: Yes     Partners: Male     Birth control/protection: Surgical   Other Topics Concern     Parent/sibling w/ CABG, MI or angioplasty before 65F 55M? No      Service No     Blood Transfusions Yes     Comment: 1976, 1978, 1978, 1984     Caffeine Concern No     Occupational Exposure No     Hobby Hazards No     Sleep Concern No     Stress Concern No     Weight Concern Yes     Special Diet No     Back Care No     Exercise No     Bike Helmet No     Seat Belt Yes     Self-Exams Not Asked   Social History Narrative     Not on file     Social Determinants of Health     Financial Resource Strain: Not on file   Food Insecurity: Not on file   Transportation Needs: Not on file   Physical Activity: Not on file   Stress: Not on file   Social Connections: Not on file   Intimate Partner Violence: Not on file   Housing Stability: Not on file          Family History:     Family History   Problem Relation Age of Onset     C.A.D. Father         MI at age 60     Hypertension Father      Alzheimer Disease Father      Hyperlipidemia Father      Osteoporosis Mother         on Fosamax     Glaucoma Mother      Other Cancer Brother      Stomach Cancer Maternal Aunt      Cervical Cancer Cousin      Diabetes No family hx of      Cerebrovascular  "Disease No family hx of      Breast Cancer No family hx of      Cancer - colorectal No family hx of      Prostate Cancer No family hx of      Liver Disease No family hx of             Allergies:     Allergies   Allergen Reactions     Ibuprofen      Other reaction(s): Gastrointestinal  Gastric bleed     Naproxen GI Disturbance     Other reaction(s): Gastrointestinal  Gastric bleed     Nickel Itching     Inflamed with cheap earrings (itchy)     Rofecoxib Itching and Swelling     VIOXX (Swollen Feet,Hands itching), Okay with ibuprofen and aspirin     Vioxx Itching and Swelling     VIOXX (Swollen Feet,Hands itching), Okay with ibuprofen and aspirin            Medications:     Current Outpatient Medications   Medication Sig Dispense Refill     acetaminophen (TYLENOL) 500 MG tablet Take 1,000 mg by mouth       albuterol (PROAIR HFA/PROVENTIL HFA/VENTOLIN HFA) 108 (90 Base) MCG/ACT inhaler INHALE 2 PUFFS INTO THE LUNGS EVERY 6 HOURS AS NEEDED FOR SHORTNESS OF BREATH OR DIFFICULT BREATHING OR WHEEZING 25.5 g 1     atenolol (TENORMIN) 50 MG tablet Take 2 tablets (100 mg) by mouth daily 90 tablet 3     hydroxychloroquine (PLAQUENIL) 200 MG tablet Take 1 tablet (200 mg) by mouth 2 times daily 180 tablet 0     NYAMYC 281908 UNIT/GM external powder APPLY TO AFFECTED AREA(S)  TOPICALLY 3 TIMES DAILY AS  NEEDED 60 g 1     oxybutynin ER (DITROPAN XL) 5 MG 24 hr tablet Take 1 tablet (5 mg) by mouth 2 times daily 180 tablet 3     sulfaSALAzine ER (AZULFIDINE EN) 500 MG EC tablet Take 1 tablet in AM and 2 tablets in PM for 1 week, then take 2 tablets twice daily. Labs every 8-12 weeks. 360 tablet 0     celecoxib (CELEBREX) 100 MG capsule Take 1 capsule (100 mg) by mouth 2 times daily for 30 days 60 capsule 0     denosumab (PROLIA) 60 MG/ML SOSY injection Inject 1 mL (60 mg) Subcutaneous once for 1 dose 1 mL 0            Physical Exam:   Blood pressure 136/86, height 1.581 m (5' 2.25\"), weight 120.7 kg (266 lb), last menstrual period " 2006, not currently breastfeeding.  Wt Readings from Last 6 Encounters:   03/15/23 120.7 kg (266 lb)   23 120.7 kg (266 lb)   02/15/23 120.7 kg (266 lb)   23 120.2 kg (265 lb)   01/10/23 120.2 kg (265 lb)   23 120.2 kg (265 lb)     Constitutional: well-developed, appearing stated age; cooperative  Eyes: nl  PERRLA, conjunctiva, sclera  ENT: nl external ears, hearing,  Resp: No shortness of breath with normal conversation  MSK: Some synovial fullness over the right MCPs.  Psych: nl judgement, orientation, memory, affect.           Data:   Imagin/10/2021 x-ray bilateral hand  IMPRESSION: Generalized demineralization. Mild degenerative arthritis involving the interphalangeal joints of the fingers, and the basilar joints of both thumbs, with mild joint space narrowing and osteophytic spurring. Generalized bone demineralization.  No evidence of erosive arthropathy. Soft tissues are normal.    11/10/2021 x-ray bilateral knee  IMPRESSION:   RIGHT KNEE: Uncomplicated cemented total knee arthroplasty. Normal joint alignment. No fracture or erosion. No joint effusion.     LEFT KNEE: Cemented longstem hinged total knee arthroplasty. No evidence of loosening or hardware complication. Mild patella baja. No fracture or erosion. No joint effusion.    11/10/2021 x-ray right wrist  IMPRESSION: No acute fracture. Chronic healed fracture of the distal radius. Chronic ununited fracture of the ulnar styloid process. Moderate ulnar positive variance. Mild degenerative changes at the STT and first CMC joints.    2022 x-rays left shoulder  IMPRESSION:  1.  Normal left glenohumeral joint spacing and alignment.  2.  Distal clavicle excision, unchanged.  3.  No fracture.  4.  No significant change since the comparison.    2022 x-ray cervical spine  IMPRESSION: Minimal degenerative anterolisthesis of C2 upon C3, C3  upon C4 and C5 on C6. Alignment otherwise normal. Vertebral body  heights are normal.  Moderate facet arthropathy at C2-C3. Mild  degenerative endplate spurring at C4-C5, C5-C6 and C6-C7. No  fractures.    Laboratory:  10/4/2022  Creatinine 0.79, GFR 81  Albumin 4.1  ALT 9, AST 17  White blood cell count 5.7, hemoglobin 11.9, platelet count 215    12/19/2022  Creatinine 0.72, GFR greater than 90  Albumin 4.0  ALT 7, AST 20  White blood cell count, hemoglobin, platelet count within normal limits    1/10/2023  Albumin creatinine 42.87  Albumin urine 85.4    3/9/2023  Creatinine 0.77, GFR 84  Albumin 3.9  ALT 10, AST 19  Albumin in the urine less than 12  CBC within normal limits

## 2023-03-15 NOTE — PATIENT INSTRUCTIONS
After Visit Instructions:     Thank you for coming to [facility] Rheumat[facility] for your care. It is my goal to partner with you to help you reach your optimal state of health.       Plan:     Schedule follow-up with Danni Martínez PA-C in 2 months.   Labs: CBC, creatinine, Albumin, AST, ALT and urine albumin due in Mid June  Medication recommendations:   Continue Hydroxychloroquine 200mg: Take 1 tablet twice daily twice daily. You next retina exam is due 2/24  Sulfasalazine 500mg: Take 2 tablets twice daily.   We can consider adding Methotrexate injection. You would take this medication weekly. You would need to take Folic Acid daily while on this medication.  # Methotrexate Risks and Benefits: The risks and benefits of methotrexate were discussed in detail and the patient verbalized understanding.  The risks discussed include, but are not limited to, the risk for hypersensitivity, anaphylaxis, anaphylactoid reactions, infections, bone marrow suppression, renal toxicity, hepatotoxicity, pulmonary toxicity, malignancy, impaired fertility, GI upset, alopecia, and oral and nasal sores.  Folic acid supplementation is recommended during methotrexate therapy to help prevent some of the side effects. Pregnancy prevention and planning was discussed; it is recommended that women of childbearing potential use reliable contraception during therapy.  The risks of taking both methotrexate and alcohol were reviewed; complete alcohol avoidance was discussed.  Routine laboratory monitoring is required during methotrexate therapy. Taking MTX once weekly, all within a 24 hour period was stressed and the patient verbalized this instruction back to me.  I encouraged reviewing the package insert and asking any questions about the medication.          Danni Martínez PA-C  [facility]  [facility]    Contact information: [facility]  Clinic Number:  [phone]  Please call or  send a MyChart message with any questions about your care

## 2023-03-17 ENCOUNTER — TELEPHONE (OUTPATIENT)
Dept: FAMILY MEDICINE | Facility: CLINIC | Age: 68
End: 2023-03-17

## 2023-03-17 DIAGNOSIS — M81.0 OSTEOPOROSIS, UNSPECIFIED OSTEOPOROSIS TYPE, UNSPECIFIED PATHOLOGICAL FRACTURE PRESENCE: Primary | ICD-10-CM

## 2023-03-17 NOTE — TELEPHONE ENCOUNTER
RN steps prior to Prolia injection:     1.  Obtain CAM order from a provider at the site the pt intends to receive the injection. CAM order placed by provider 3/17/23     2.  Start a telephone encounter and route to the PA pool. Routed to PA 3/17/23  Waiting for PA approval as of 3/23/23.     3.  Once the PA is approved, contact pt and arrange for lab work.  Calcium, magnesium, and phosphorus need to be completed within 2 weeks of injection per  recommendations.  Lab work is ordered by the ordering provider.  Pending lab appt 3/27/23     4.  Verify that pt has not had major dental work in the past 6 months and does not plan to have major dental work within the upcoming 6 months.     5.  If lab work is acceptable, contact pt and arrange for injection with the injection RN allowing time for the Prolia to arrive from wholesale pharmacy.  Order Prolia from the wholesale pharmacy.

## 2023-03-17 NOTE — TELEPHONE ENCOUNTER
"Pt is asking for Prolia to be ordered.  She said she checked with her insurance company and they will pay for most of it and then \"[employer]\" will pay for the rest.  The previous order for Prolia has .  (she did not want Prolia in the past as it was too expensive)  "

## 2023-03-21 ENCOUNTER — NURSE TRIAGE (OUTPATIENT)
Dept: NURSING | Facility: CLINIC | Age: 68
End: 2023-03-21
Payer: COMMERCIAL

## 2023-03-21 NOTE — TELEPHONE ENCOUNTER
Patient would like to get a message to Danni Martínez.     She was seen in clinic on 03/15/2023 and they had discussed a Methotrexate injection. Patient would like to start this instead of her other pills because she is having trouble swallowing them.     Would like a call back to discuss further.   Callback # [phone]    Stan Bonilla RN on 3/21/2023 at 6:00 PM    Reason for Disposition    [1] Caller requesting NON-URGENT health information AND [2] PCP's office is the best resource    Protocols used: INFORMATION ONLY CALL - NO TRIAGE-A-

## 2023-03-22 ENCOUNTER — MYC MEDICAL ADVICE (OUTPATIENT)
Dept: SURGERY | Facility: CLINIC | Age: 68
End: 2023-03-22
Payer: COMMERCIAL

## 2023-03-22 DIAGNOSIS — M19.90 INFLAMMATORY ARTHRITIS: Primary | ICD-10-CM

## 2023-03-22 DIAGNOSIS — I10 BENIGN ESSENTIAL HYPERTENSION: ICD-10-CM

## 2023-03-22 RX ORDER — FOLIC ACID 1 MG/1
1 TABLET ORAL DAILY
Qty: 90 TABLET | Refills: 3 | Status: SHIPPED | OUTPATIENT
Start: 2023-03-22 | End: 2023-10-03

## 2023-03-22 RX ORDER — METHOTREXATE 25 MG/ML
INJECTION INTRA-ARTERIAL; INTRAMUSCULAR; INTRATHECAL; INTRAVENOUS
Qty: 8 ML | Refills: 0 | Status: SHIPPED | OUTPATIENT
Start: 2023-03-22 | End: 2023-04-21

## 2023-03-22 NOTE — TELEPHONE ENCOUNTER
Is she wanting to add methotrexate to her other medications or replace her other medications with methotrexate? If she wants to add it, I would recommend that we wait a month and recheck labs, and then add it on.    Danni Martínez PA-C on 3/22/2023 at 8:26 AM

## 2023-03-22 NOTE — TELEPHONE ENCOUNTER
See note below. Per OV note:    a. We can consider adding Methotrexate injection. You would take this medication weekly. You would need to take Folic Acid daily while on this medication.    Please advise for Rx's.  Diane Dsouza RN on 3/22/2023 at 7:54 AM

## 2023-03-22 NOTE — TELEPHONE ENCOUNTER
"[facility] a [facility] due to this is not a \"Traige call\".  Fariha VILLATORO   Specialty Clinic RN  "

## 2023-03-22 NOTE — TELEPHONE ENCOUNTER
Pending Prescriptions:                       Disp   Refills    atenolol (TENORMIN) 50 MG tablet          90 tab*3            Sig: Take 2 tablets (100 mg) by mouth daily

## 2023-03-23 ENCOUNTER — TELEPHONE (OUTPATIENT)
Dept: FAMILY MEDICINE | Facility: CLINIC | Age: 68
End: 2023-03-23
Payer: COMMERCIAL

## 2023-03-23 RX ORDER — ATENOLOL 50 MG/1
100 TABLET ORAL DAILY
Qty: 90 TABLET | Refills: 1 | Status: SHIPPED | OUTPATIENT
Start: 2023-03-23 | End: 2023-05-24

## 2023-03-23 NOTE — TELEPHONE ENCOUNTER
Spoke to patient and clarified the medication she wants faxed . She says that her insurance will cover 20 % of the cost of Prolia and the [facility] fund will cover the rest. Faxed the medication to [facility] here in [place] .

## 2023-03-23 NOTE — TELEPHONE ENCOUNTER
Reason for Call:  Appointment Request    Patient requesting this type of appt:  Nurse Only, Prolia    Requested provider: MA/VF/LPN Visit    Reason patient unable to be scheduled: Needs to be scheduled by clinic    When does patient want to be seen/preferred time: No Prefrence, just not on Tuesday the 28th    Comments: Needs Prolia shot.     Could we send this information to you in [employer] or would you prefer to receive a phone call?:   Patient would like to be contacted via [employer]    Call taken on 3/23/2023 at 10:42 AM by Danni Beltrán

## 2023-03-27 ENCOUNTER — TELEPHONE (OUTPATIENT)
Dept: FAMILY MEDICINE | Facility: CLINIC | Age: 68
End: 2023-03-27

## 2023-03-27 ENCOUNTER — LAB (OUTPATIENT)
Dept: LAB | Facility: CLINIC | Age: 68
End: 2023-03-27
Payer: COMMERCIAL

## 2023-03-27 DIAGNOSIS — M19.90 INFLAMMATORY ARTHRITIS: ICD-10-CM

## 2023-03-27 DIAGNOSIS — R76.8 POSITIVE ANA (ANTINUCLEAR ANTIBODY): ICD-10-CM

## 2023-03-27 DIAGNOSIS — M81.0 OSTEOPOROSIS WITHOUT CURRENT PATHOLOGICAL FRACTURE, UNSPECIFIED OSTEOPOROSIS TYPE: ICD-10-CM

## 2023-03-27 DIAGNOSIS — Z79.899 HIGH RISK MEDICATION USE: Primary | ICD-10-CM

## 2023-03-27 LAB
CALCIUM SERPL-MCNC: 9.6 MG/DL (ref 8.8–10.2)
MAGNESIUM SERPL-MCNC: 1.8 MG/DL (ref 1.7–2.3)
PHOSPHATE SERPL-MCNC: 4.3 MG/DL (ref 2.5–4.5)

## 2023-03-27 PROCEDURE — 84100 ASSAY OF PHOSPHORUS: CPT

## 2023-03-27 PROCEDURE — 83735 ASSAY OF MAGNESIUM: CPT

## 2023-03-27 PROCEDURE — 36415 COLL VENOUS BLD VENIPUNCTURE: CPT

## 2023-03-27 PROCEDURE — 82310 ASSAY OF CALCIUM: CPT

## 2023-03-27 NOTE — LETTER
March 27, 2023      Karen ASHWIN Steven  [address] [place] [id number]        Dear ,    We are writing to inform you of your test results.    Your labs look great.     Resulted Orders   Calcium   Result Value Ref Range    Calcium 9.6 8.8 - 10.2 mg/dL   Magnesium   Result Value Ref Range    Magnesium 1.8 1.7 - 2.3 mg/dL   Phosphorus   Result Value Ref Range    Phosphorus 4.3 2.5 - 4.5 mg/dL       If you have any questions or concerns, please call the clinic at the number listed above.       Sincerely,      Jennie Suazo MD

## 2023-03-27 NOTE — PROGRESS NOTES
"   OUTPATIENT PHYSICAL THERAPY DISCHARGE SUMMARY   Jania Peoples APRN CNP 02/13/23 0900   Signing Clinician's Name / Credentials   Signing clinician's name / credentials Dasia Wilson, KRISTA   Session Number   Session Number 2   Adult Goals   PT Ortho Eval Goals 1;3;2;4   Ortho Goal 1   Goal Identifier 1.   Goal Description Pt will have increased LE strength able to do 5 sit to  12 seconds   Target Date 02/16/23   Ortho Goal 2   Goal Identifier 2.   Goal Description Pt will be able to walk X 15 min w/ pain no > 4/10   Target Date 03/08/23   Ortho Goal 3   Goal Identifier 3.   Goal Description Pt will be able to go upstairs reciprocal w/ rail and minimal difficulty   Target Date 03/08/23   Ortho Goal 4   Goal Identifier 4.   Goal Description Pt will be independent and consistent w/HEP to manage symptoms   Target Date 03/08/23   Subjective Report   Subjective Report Pt states she has had a rough month--ongoing cough, SOB, fatigue and lightheadedness therefore not moving around a lot.   Pt states she has tried to do her exercises but that would set off her coughing.  Knees are about the same   Objective Measure    Mild + trendelenburg w/o device   Treatment Interventions   Therapeutic Procedure/exercise   Treatment Detail Standing w/ 2 hand support:  hip ABD and ext X 10 each B.  Standing w/ 1 hand support march X 15 B alternating.     Seated KE 1# X 15 B. GTB seated HS curl X 15 B.   GTB TKE X 20 B.  2\" lateral step up w/ 1 hand support X 10 B w/ mirror for feedback.  sit to stand from chair X 10.   Plan   Home program ex as above   Plan  Pt did not schedule further visits.  Discharge from physical therapy   Comments   Comments Unable to report progress towards goals as pt did not return.   Medicare Claim Information   Medical Diagnosis chronic B knee  pain   PT Diagnosis B knee pain   Start of Care Date 01/17/23   Onset date of current episode/exacerbation 12/03/22   Certification date from 01/17/23       "

## 2023-03-27 NOTE — TELEPHONE ENCOUNTER
New Medication Request        What medication are you requesting?: NEEDLES FOR INJECTION OF methotrexate sodium 50 mg/2ml son injection.    Reason for medication request: she received the medication but no needles for administration..    Have you taken this medication before?: No    Controlled Substance Agreement on file:   CSA -- Patient Level:    CSA: None found at the patient level.         Patient offered an appointment? No pt coming in on 3/29 for nurse visit to help her to learn how to do the injection. She will need the needles for this visit.  She is having Prolia shot at the same time.    Preferred Pharmacy:   [facility] [address] [facility]  [address] [facility] [address]  Phone: [phone] Fax: [phone] Alternate Fax: [phone], [phone]    Could we send this information to you in [employer] or would you prefer to receive a phone call?:   Patient would prefer a phone call   Okay to leave a detailed message?: Yes at Home number on file [phone] (home)

## 2023-03-27 NOTE — TELEPHONE ENCOUNTER
I reached out to pt regarding appt in 2 days for Prolia injection.    Pt has lab work today.  If approved by provider, pt can proceed with Prolia injection as arranged.    In the meantime, pt just had methotrexate ordered by Danni Martínez, rheumatology.  Pt asks if she can be taught to give this injection to herself and if she can come in to FP RN for teaching and self-administration.    Advised pt that she needs to get teaching and instruction with prescribing provider's office.    Also, routing to Dr Suazo.  Are there precautions that we need to be aware of regarding methotrexate and Prolia such as timing of these injections?      Routed to Dr Suazo for further instruction and to RN's in specialty clinic.    Tamela Chand RN

## 2023-03-27 NOTE — TELEPHONE ENCOUNTER
I spoke with pt and this order for needles is for methotrexate that was ordered by rheumatology.  Pt has reached out to rheumatology for this request.    Tamela Chand RN

## 2023-03-27 NOTE — TELEPHONE ENCOUNTER
Patient has questions about the methotrexate prescription.  She didn't receive any needles with the vials and she only received 4 vial and the quantity is listed as 8.

## 2023-03-28 ENCOUNTER — MYC MEDICAL ADVICE (OUTPATIENT)
Dept: SURGERY | Facility: CLINIC | Age: 68
End: 2023-03-28
Payer: COMMERCIAL

## 2023-03-28 RX ORDER — NEEDLES, SAFETY 22GX1 1/2"
NEEDLE, DISPOSABLE MISCELLANEOUS
Qty: 12 EACH | Refills: 1 | Status: SHIPPED | OUTPATIENT
Start: 2023-03-28 | End: 2023-11-27

## 2023-03-28 NOTE — TELEPHONE ENCOUNTER
Marcia sent to pt to let her know when and how to arrange inj teaching in Specialty clinic.  Fariha VILLATORO   [facility]

## 2023-03-28 NOTE — TELEPHONE ENCOUNTER
methotrexate sodium, pres-free, 50 MG/2ML SOLN injection 8 mL 0 3/22/2023  --   Sig: Take 0.4 mils (10 mg) subcu weekly x1 week, followed by 0.6 mils (15 mg) weekly.  Labs monitoring required every 8-12 weeks for refills     Patient received 4 vials because the strength is 50 mg/2 ml vial and quantity is 8 ml.    Patient requesting an Rx for syringes for subcutaneous injections.  Please advise if the Rx that is pending is appropriate.    Diane Dsouza RN on 3/28/2023 at 10:29 AM

## 2023-03-29 ENCOUNTER — ALLIED HEALTH/NURSE VISIT (OUTPATIENT)
Dept: FAMILY MEDICINE | Facility: CLINIC | Age: 68
End: 2023-03-29
Payer: COMMERCIAL

## 2023-03-29 DIAGNOSIS — M81.0 OSTEOPOROSIS, UNSPECIFIED OSTEOPOROSIS TYPE, UNSPECIFIED PATHOLOGICAL FRACTURE PRESENCE: Primary | ICD-10-CM

## 2023-03-29 PROCEDURE — 96372 THER/PROPH/DIAG INJ SC/IM: CPT | Performed by: FAMILY MEDICINE

## 2023-03-29 PROCEDURE — 99207 PR NO CHARGE NURSE ONLY: CPT

## 2023-03-29 NOTE — TELEPHONE ENCOUNTER
Checked drug-drug interaction between methotrexate and Prolia. Prolia can enhance the immunosuppression of the methotrexate so should not be taken together. Patient to call speciality to know when she can have her methotrexate injections

## 2023-03-29 NOTE — TELEPHONE ENCOUNTER
Pt notified.  Instructed pt to contact Danni Martínez regarding spacing of methotrexate after Prolia injection.  Pt agrees.  Tamela Chand RN

## 2023-03-29 NOTE — TELEPHONE ENCOUNTER
Spoke with Dr Suazo this morning.  Ok to give prolia injection today.  Make sure pt does not get methotrexate injection on same day as prolia.   Provider to put in note re: timing of medication injections     Chandni Corona RN

## 2023-03-29 NOTE — PROGRESS NOTES
Clinic Administered Medication Documentation      Prolia Documentation    Indication: Prolia  (denosumab) is a prescription medicine used to treat osteoporosis in patients who:     Are at high risk for fracture, meaning patients who have had a fracture related to osteoporosis, or who have multiple risk factors for fracture.    Cannot use another osteoporosis medicine or other osteoporosis medicines did not work well.    The timeline for early/late injections would be 4 weeks early and any time after the 6 month anat. If a patient receives their injection late, then the subsequent injection would be 6 months from the date that they actually received the injection.    When was the last injection?  This was first injection  Was the last injection at least 6 months ago? This was pt's first injection.    Reviewed information sheet with pt.   Pt states last week had tooth that needed to be refilled & tooth was built up & new filling placed. No tooth extration, no mouth sores, no mouth/tooth pain.  Huddled with Dr Suazo to review. Ok for injection today.  Called pharmacy & spoke with ifrah Welch to review med/allergies. Ok for injection   Injection given in left abdomen.  Pt told to wait around in lobby for 10 minutes.  Pt was updated with Dr Suazo's msg from  about not taking prolia & methotrexate at same time. Pt will mychart specialty clinic to see when she can receive her first methotrexate injection.    Chandni Corona RN

## 2023-04-04 ENCOUNTER — TELEPHONE (OUTPATIENT)
Dept: RHEUMATOLOGY | Facility: CLINIC | Age: 68
End: 2023-04-04
Payer: COMMERCIAL

## 2023-04-04 NOTE — TELEPHONE ENCOUNTER
Okay for patient to start methotrexate. Once she starts the methotrexate, she should monitor for any signs of infection and hold methotrexate until infection has cleared.     Danni Martínez PA-C on 4/4/2023

## 2023-04-04 NOTE — TELEPHONE ENCOUNTER
[facility] Call Center    Phone Message    May a detailed message be left on voicemail: yes     Reason for Call: Other: Pt is calling to set up a nurse visit for injection teaching. Please call pt @ [phone].     Action Taken: Message routed to:  Other: [facility]     Travel Screening: Not Applicable

## 2023-04-06 DIAGNOSIS — N18.30 STAGE 3 CHRONIC KIDNEY DISEASE, UNSPECIFIED WHETHER STAGE 3A OR 3B CKD (H): ICD-10-CM

## 2023-04-06 DIAGNOSIS — Z92.29 PERSONAL HISTORY OF HIGH RISK MEDICATION TREATMENT: Primary | ICD-10-CM

## 2023-04-06 DIAGNOSIS — M81.0 OSTEOPOROSIS, UNSPECIFIED OSTEOPOROSIS TYPE, UNSPECIFIED PATHOLOGICAL FRACTURE PRESENCE: ICD-10-CM

## 2023-04-18 ENCOUNTER — OFFICE VISIT (OUTPATIENT)
Dept: FAMILY MEDICINE | Facility: CLINIC | Age: 68
End: 2023-04-18
Payer: COMMERCIAL

## 2023-04-18 VITALS
HEART RATE: 58 BPM | BODY MASS INDEX: 48.08 KG/M2 | RESPIRATION RATE: 18 BRPM | WEIGHT: 265 LBS | OXYGEN SATURATION: 98 % | DIASTOLIC BLOOD PRESSURE: 82 MMHG | TEMPERATURE: 98.2 F | SYSTOLIC BLOOD PRESSURE: 136 MMHG

## 2023-04-18 DIAGNOSIS — J01.00 ACUTE NON-RECURRENT MAXILLARY SINUSITIS: Primary | ICD-10-CM

## 2023-04-18 PROCEDURE — 99213 OFFICE O/P EST LOW 20 MIN: CPT | Performed by: NURSE PRACTITIONER

## 2023-04-18 RX ORDER — DOXYCYCLINE HYCLATE 100 MG
100 TABLET ORAL 2 TIMES DAILY
Qty: 20 TABLET | Refills: 0 | Status: SHIPPED | OUTPATIENT
Start: 2023-04-18 | End: 2023-04-28

## 2023-04-18 ASSESSMENT — PAIN SCALES - GENERAL: PAINLEVEL: MILD PAIN (2)

## 2023-04-18 NOTE — PROGRESS NOTES
Assessment & Plan     Acute non-recurrent maxillary sinusitis  - doxycycline hyclate (VIBRA-TABS) 100 MG tablet; Take 1 tablet (100 mg) by mouth 2 times daily for 10 days    Follow up if no improvement with the antibiotics.      The risks, benefits and treatment options of prescribed medications or other treatments have been discussed with the patient. The patient verbalized their understanding and should call or follow up if no improvement or if they develop further problems.    MARGARITA Alegre CNP  [facility] ELI Jones is a 68 year old, presenting for the following health issues:  Cough        4/18/2023     8:37 AM   Additional Questions   Roomed by Nori CHAUDHRY     Acute Illness  Acute illness concerns: Started with allergies, turned into cold symptoms   Onset/Duration: about a week- week and a half   Symptoms:  Fever: No  Chills/Sweats: YES   Headache (location?): YES  Sinus Pressure: YES  Conjunctivitis:  YES  Ear Pain: YES: bilateral  Rhinorrhea: YES  Congestion: YES  Sore Throat: YES  Cough: YES-non-productive, with shortness of breath  Wheeze: YES  Decreased Appetite: YES  Nausea: YES- coughing fits occasionally   Vomiting: YES- in the beginning   Diarrhea: YES- loose stools,bright yellow   Dysuria/Freq.: YES, urgency   Dysuria or Hematuria: No  Fatigue/Achiness: YES  Sick/Strep Exposure: No  Therapies tried and outcome: allergy meds, using hot packs for sinuses, essential oils           Review of Systems   Constitutional, HEENT, cardiovascular, pulmonary, GI, , musculoskeletal, neuro, skin, endocrine and psych systems are negative, except as otherwise noted.      Objective    /82   Pulse 58   Temp 98.2  F (36.8  C) (Tympanic)   Resp 18   Wt 120.2 kg (265 lb)   LMP 05/29/2006   SpO2 98%   BMI 48.08 kg/m    Body mass index is 48.08 kg/m .  Physical Exam   GENERAL: healthy, alert and no distress  HENT: ear canals and TM's normal, nose and  mouth without ulcers or lesions  NECK: no adenopathy, no asymmetry, masses, or scars and thyroid normal to palpation  RESP: lungs clear to auscultation - no rales, rhonchi or wheezes  CV: regular rate and rhythm, normal S1 S2, no S3 or S4, no murmur, click or rub, no peripheral edema and peripheral pulses strong

## 2023-04-21 DIAGNOSIS — M19.90 INFLAMMATORY ARTHRITIS: ICD-10-CM

## 2023-04-21 RX ORDER — METHOTREXATE 25 MG/ML
INJECTION INTRA-ARTERIAL; INTRAMUSCULAR; INTRATHECAL; INTRAVENOUS
Qty: 8 ML | Refills: 0 | Status: CANCELLED | OUTPATIENT
Start: 2023-04-21

## 2023-04-21 NOTE — TELEPHONE ENCOUNTER
Patient calling stating she talked with [facility] and would like to request a 3 month supply in order for her to get a zero copay. Please send new Rx for 3 month supply.     Lisa Mcdaniel  [facility]

## 2023-04-21 NOTE — TELEPHONE ENCOUNTER
Requested Prescriptions   Pending Prescriptions Disp Refills     methotrexate sodium (pres-free) 50 MG/2ML SOLN injection 8 mL 0     Sig: Take 0.4 mils (10 mg) subcu weekly x1 week, followed by 0.6 mils (15 mg) weekly.  Labs monitoring required every 8-12 weeks for refills       There is no refill protocol information for this order          Patient would like to know when to expect a difference in her hands when taking this medication.    Patient states she dropped the pills just before her first shot.      Last office visit: Visit date not found ; last virtual visit: Visit date not found with prescribing provider:  DANNI URBANO    Future Office Visit:   Next 5 appointments (look out 90 days)    May 17, 2023 11:00 AM  (Arrive by 10:45 AM)  Return Visit with Danni Urbano PA-C  [facility] ([facility] ) [address] [facility] [address] [facility] [address]  [phone]               [facility]  [facility]

## 2023-04-21 NOTE — TELEPHONE ENCOUNTER
Per 3/22/23 mychart note:      Danni Martínez PA-C  to Karen Harris    MS      3/22/23 12:36 PM  Sounds good Karen. You can stop the Sulfasalazine and Hydroxychloroquine. We'll start the SubQ methotrexate at 10mg weekly. If after the 1st week, you tolerate the methotrexate, we'll increase to 15mg with the second week. We'll check labs 1 month after starting the Methotrexate. Orders are in for those.  I also sent in for folic acid.  You will take 1 mg daily of the folic acid.  I sent both prescriptions to [facility].     Danni Martínez, PAC       Last read by Karen Harris at  9:14 PM on 4/2/2023.      Rx for methotrexate injection sent on 3/22/23 for 8 mL, 0 refills.  Patient should not need refills at this point. But per note below, patient wants to know when she should expect a difference. Sent patient a [email or web address] message to clarify request and question.  Diane Dsouza RN on 4/21/2023 at 8:29 AM

## 2023-04-25 DIAGNOSIS — M19.90 INFLAMMATORY ARTHRITIS: ICD-10-CM

## 2023-04-25 RX ORDER — METHOTREXATE 25 MG/ML
INJECTION INTRA-ARTERIAL; INTRAMUSCULAR; INTRATHECAL; INTRAVENOUS
Qty: 8 ML | Refills: 0 | Status: CANCELLED | OUTPATIENT
Start: 2023-04-25

## 2023-04-25 NOTE — TELEPHONE ENCOUNTER
LF: not provided by pharmacy  Qty: not provided by pharmacy  Last Rheumatology Visit: 3/15/23   Next Scheduled Rheumatology Visit: 5/17/23

## 2023-04-25 NOTE — TELEPHONE ENCOUNTER
Duplicate encounter. Sent this request to provider already for 3 month supply.    Diane Dsouza RN on 4/25/2023 at 9:05 AM

## 2023-04-25 NOTE — TELEPHONE ENCOUNTER
Spoke with [facility]. Verified that they did receive the Rx on 4/21/23 and that 8 mL should be enough for a 3 month supply. Rep states she sent the refill request for 90 day supply to the injection team.  They will reach out to us if they need anything else.    Notified patient via [employer].  Diane Dsouza RN on 4/25/2023 at 9:47 AM

## 2023-04-25 NOTE — TELEPHONE ENCOUNTER
Danni Martínez PA-C  You 5 minutes ago (9:24 AM)     MS  8mL should be over 3 months. She is taking 0.4mL for 1 week, then 0.6mL weekly after that. This should be over 3 month prescription.     Danni Anne, PAC

## 2023-04-28 ENCOUNTER — TELEPHONE (OUTPATIENT)
Dept: FAMILY MEDICINE | Facility: CLINIC | Age: 68
End: 2023-04-28

## 2023-04-28 NOTE — TELEPHONE ENCOUNTER
Call received from pt , she received a letter from [facility] saying she needs an appt with rheumatologist.Pt already has appt schedulded for 5/17/2023. She also needs clarification of single use vial vs multi-dose of the methotrexate vials. This RN called [facility], they said methotrexate vials are on long term out of stock, they do not know when they will be back in stock. [facility]'s in [place] was called, they have the 50mg/2ml vials in stock. Pt will need medication by Wednesday when she is due for her next dose. Pt was called about availability at [facility]. Pt was called back, she will call the pharmacies on her network to check for cost of medication. Message forwarded to Danni MITCHELL.   Winter Geiger RN   [facility]

## 2023-05-17 ENCOUNTER — OFFICE VISIT (OUTPATIENT)
Dept: RHEUMATOLOGY | Facility: CLINIC | Age: 68
End: 2023-05-17
Payer: COMMERCIAL

## 2023-05-17 VITALS
RESPIRATION RATE: 16 BRPM | HEIGHT: 62 IN | HEART RATE: 74 BPM | TEMPERATURE: 99 F | OXYGEN SATURATION: 95 % | WEIGHT: 266 LBS | SYSTOLIC BLOOD PRESSURE: 128 MMHG | DIASTOLIC BLOOD PRESSURE: 78 MMHG | BODY MASS INDEX: 48.95 KG/M2

## 2023-05-17 DIAGNOSIS — M19.90 INFLAMMATORY ARTHRITIS: Primary | ICD-10-CM

## 2023-05-17 DIAGNOSIS — Z79.899 HIGH RISK MEDICATION USE: ICD-10-CM

## 2023-05-17 DIAGNOSIS — R76.8 POSITIVE ANA (ANTINUCLEAR ANTIBODY): ICD-10-CM

## 2023-05-17 LAB
ALBUMIN SERPL BCG-MCNC: 4.2 G/DL (ref 3.5–5.2)
ALT SERPL W P-5'-P-CCNC: 10 U/L (ref 10–35)
AST SERPL W P-5'-P-CCNC: 17 U/L (ref 10–35)
CREAT SERPL-MCNC: 0.63 MG/DL (ref 0.51–0.95)
ERYTHROCYTE [DISTWIDTH] IN BLOOD BY AUTOMATED COUNT: 15.8 % (ref 10–15)
GFR SERPL CREATININE-BSD FRML MDRD: >90 ML/MIN/1.73M2
HCT VFR BLD AUTO: 38.4 % (ref 35–47)
HGB BLD-MCNC: 11.6 G/DL (ref 11.7–15.7)
MCH RBC QN AUTO: 25.5 PG (ref 26.5–33)
MCHC RBC AUTO-ENTMCNC: 30.2 G/DL (ref 31.5–36.5)
MCV RBC AUTO: 84 FL (ref 78–100)
PLATELET # BLD AUTO: 209 10E3/UL (ref 150–450)
RBC # BLD AUTO: 4.55 10E6/UL (ref 3.8–5.2)
WBC # BLD AUTO: 5 10E3/UL (ref 4–11)

## 2023-05-17 PROCEDURE — 36415 COLL VENOUS BLD VENIPUNCTURE: CPT | Performed by: PHYSICIAN ASSISTANT

## 2023-05-17 PROCEDURE — 85027 COMPLETE CBC AUTOMATED: CPT | Performed by: PHYSICIAN ASSISTANT

## 2023-05-17 PROCEDURE — 82565 ASSAY OF CREATININE: CPT | Performed by: PHYSICIAN ASSISTANT

## 2023-05-17 PROCEDURE — 82040 ASSAY OF SERUM ALBUMIN: CPT | Performed by: PHYSICIAN ASSISTANT

## 2023-05-17 PROCEDURE — 84450 TRANSFERASE (AST) (SGOT): CPT | Performed by: PHYSICIAN ASSISTANT

## 2023-05-17 PROCEDURE — 84460 ALANINE AMINO (ALT) (SGPT): CPT | Performed by: PHYSICIAN ASSISTANT

## 2023-05-17 PROCEDURE — 99214 OFFICE O/P EST MOD 30 MIN: CPT | Mod: 25 | Performed by: PHYSICIAN ASSISTANT

## 2023-05-17 RX ORDER — NEEDLES, SAFETY 22GX1 1/2"
NEEDLE, DISPOSABLE MISCELLANEOUS
Qty: 12 EACH | Refills: 1 | Status: CANCELLED | OUTPATIENT
Start: 2023-05-17

## 2023-05-17 RX ORDER — METHOTREXATE 25 MG/ML
20 INJECTION, SOLUTION INTRA-ARTERIAL; INTRAMUSCULAR; INTRAVENOUS
Qty: 10 ML | Refills: 0 | Status: SHIPPED | OUTPATIENT
Start: 2023-05-17 | End: 2023-10-03

## 2023-05-17 RX ORDER — SULFASALAZINE 500 MG/1
TABLET, DELAYED RELEASE ORAL
Qty: 360 TABLET | Refills: 0 | Status: CANCELLED | OUTPATIENT
Start: 2023-05-17

## 2023-05-17 RX ORDER — METHOTREXATE 25 MG/ML
INJECTION INTRA-ARTERIAL; INTRAMUSCULAR; INTRATHECAL; INTRAVENOUS
Qty: 1 ML | Refills: 4 | Status: CANCELLED | OUTPATIENT
Start: 2023-05-17

## 2023-05-17 RX ORDER — HYDROXYCHLOROQUINE SULFATE 200 MG/1
200 TABLET, FILM COATED ORAL 2 TIMES DAILY
Qty: 180 TABLET | Refills: 0 | Status: CANCELLED | OUTPATIENT
Start: 2023-05-17

## 2023-05-17 ASSESSMENT — PAIN SCALES - GENERAL: PAINLEVEL: SEVERE PAIN (7)

## 2023-05-17 NOTE — NURSING NOTE
"/78 (BP Location: Right arm, Patient Position: Sitting, Cuff Size: Adult Large)   Pulse 74   Temp 99  F (37.2  C) (Tympanic)   Resp 16   Ht 1.581 m (5' 2.25\")   Wt 120.7 kg (266 lb)   LMP 05/29/2006   SpO2 95%   BMI 48.26 kg/m   Estimated body mass index is 48.26 kg/m  as calculated from the following:    Height as of this encounter: 1.581 m (5' 2.25\").    Weight as of this encounter: 120.7 kg (266 lb).  Cordelia GONGA        "

## 2023-05-17 NOTE — PATIENT INSTRUCTIONS
After Visit Instructions:     Thank you for coming to [facility] for your care. It is my goal to partner with you to help you reach your optimal state of health.       Plan:     Schedule follow-up with Danni Martínez PA-C in 3 months.   Labs: CBC, creatinine, Albumin, AST, ALT and urine albumin today and then again in 3 months (mid August)  Medication recommendations:   Methotrexate 50mg/2mL: 0.8mL SubQ weekly.   While on Methotrexate:  -check labs (ALT/AST, albumin, CBC with platelets and creatinine) every 3 months  -Limit alcohol to 2 drinks weekly  -Tylenol 500-1000mg can be used as needed up to three times daily for nausea/headache associated with dosing    Continue Folic acid 1mg daily.   # Methotrexate Risks and Benefits: The risks and benefits of methotrexate were discussed in detail and the patient verbalized understanding.  The risks discussed include, but are not limited to, the risk for hypersensitivity, anaphylaxis, anaphylactoid reactions, infections, bone marrow suppression, renal toxicity, hepatotoxicity, pulmonary toxicity, malignancy, impaired fertility, GI upset, alopecia, and oral and nasal sores.  Folic acid supplementation is recommended during methotrexate therapy to help prevent some of the side effects. Pregnancy prevention and planning was discussed; it is recommended that women of childbearing potential use reliable contraception during therapy.  The risks of taking both methotrexate and alcohol were reviewed; complete alcohol avoidance was discussed.  Routine laboratory monitoring is required during methotrexate therapy. Taking MTX once weekly, all within a 24 hour period was stressed and the patient verbalized this instruction back to me.  I encouraged reviewing the package insert and asking any questions about the medication.          Danni Martínez PA-C  [facility]  [facility]    Contact information: [facility]  Rheumatology  Clinic Number:  [phone]  Please call or send a [employer] message with any questions about your care

## 2023-05-24 DIAGNOSIS — I10 BENIGN ESSENTIAL HYPERTENSION: ICD-10-CM

## 2023-05-24 RX ORDER — ATENOLOL 50 MG/1
TABLET ORAL
Qty: 180 TABLET | Refills: 1 | Status: SHIPPED | OUTPATIENT
Start: 2023-05-24 | End: 2023-08-10

## 2023-05-29 ENCOUNTER — HOSPITAL ENCOUNTER (EMERGENCY)
Facility: CLINIC | Age: 68
Discharge: LEFT BEFORE DISCHARGE | End: 2023-05-29
Attending: NURSE PRACTITIONER | Admitting: NURSE PRACTITIONER
Payer: COMMERCIAL

## 2023-05-29 VITALS
TEMPERATURE: 98 F | HEIGHT: 63 IN | BODY MASS INDEX: 46.07 KG/M2 | WEIGHT: 260 LBS | OXYGEN SATURATION: 95 % | RESPIRATION RATE: 18 BRPM | HEART RATE: 87 BPM | DIASTOLIC BLOOD PRESSURE: 94 MMHG | SYSTOLIC BLOOD PRESSURE: 162 MMHG

## 2023-05-29 PROCEDURE — 99281 EMR DPT VST MAYX REQ PHY/QHP: CPT

## 2023-05-29 NOTE — Clinical Note
"Patient left as she was being walked back to her room by writer. States, \"this better not be a long wait. I have been driving all day and I was here first before someone else who got brought to a room before me.\" Patient educated on the triage proces s and typical lengths of treatment time in the department. Writer reassured patient that wait times are currently shorter than average, however things can change quickly. Patient states, \"this is ridiculous. I'm leaving.\"    Patient encouraged to sta y and be evaluated by a provider. Patient declined and refused to sign AMA paperwork. "

## 2023-05-30 ENCOUNTER — TELEPHONE (OUTPATIENT)
Dept: DERMATOLOGY | Facility: CLINIC | Age: 68
End: 2023-05-30

## 2023-05-30 ENCOUNTER — OFFICE VISIT (OUTPATIENT)
Dept: FAMILY MEDICINE | Facility: CLINIC | Age: 68
End: 2023-05-30
Payer: COMMERCIAL

## 2023-05-30 VITALS
RESPIRATION RATE: 18 BRPM | DIASTOLIC BLOOD PRESSURE: 84 MMHG | HEART RATE: 74 BPM | OXYGEN SATURATION: 97 % | TEMPERATURE: 98.2 F | WEIGHT: 260 LBS | SYSTOLIC BLOOD PRESSURE: 132 MMHG | HEIGHT: 63 IN | BODY MASS INDEX: 46.07 KG/M2

## 2023-05-30 DIAGNOSIS — L29.9 ITCHY SCALP: Primary | ICD-10-CM

## 2023-05-30 DIAGNOSIS — L98.9 SKIN LESION: ICD-10-CM

## 2023-05-30 PROCEDURE — 99213 OFFICE O/P EST LOW 20 MIN: CPT | Performed by: STUDENT IN AN ORGANIZED HEALTH CARE EDUCATION/TRAINING PROGRAM

## 2023-05-30 RX ORDER — PERMETHRIN 50 MG/G
CREAM TOPICAL
Qty: 60 G | Refills: 1 | Status: SHIPPED | OUTPATIENT
Start: 2023-05-30 | End: 2023-10-03

## 2023-05-30 RX ORDER — HYDROCORTISONE 2.5 %
CREAM (GRAM) TOPICAL 2 TIMES DAILY
Qty: 30 G | Refills: 0 | Status: SHIPPED | OUTPATIENT
Start: 2023-05-30 | End: 2023-10-03

## 2023-05-30 ASSESSMENT — PAIN SCALES - GENERAL: PAINLEVEL: SEVERE PAIN (7)

## 2023-05-30 NOTE — ED NOTES
"Patient left as she was being walked back to her room by writer. States, \"This better not be a long wait. I have been driving all day and I was here first before someone else who got brought to a room before me. I was second in line, how much longer will it be?\" Patient educated on the triage process and typical lengths of treatment time in the department. Writer reassured patient that wait times are currently shorter than average, however things can change quickly and an exact time cannot be provided. Patient states, \"this is ridiculous. I'm leaving.\"    Patient encouraged to stay and be evaluated by a provider. Patient declined and refused to sign AMA paperwork.   "

## 2023-05-30 NOTE — TELEPHONE ENCOUNTER
This encounter is being sent to inform the clinic that this patient has a referral from Alexia Briggs MD in [facility] for the diagnoses of Itchy scalp [L29.9] Skin lesion [L98.9]   and has requested that this patient be seen within Urgent: 3-5 Days and/or with [facility].  Based on the availability of our provider(s), we are unable to accommodate this request.      Were all sites offered this patient?  Yes      Does scheduling algorithm request to schedule next available?  Patient appointment has not been scheduled.  Please review the referral request for accommodation and contact the patient.  If unable to accommodate, please resubmit a referral and indicate a preferred partner or affiliate location using Provider Finder or Scheduling Instructions field.

## 2023-05-30 NOTE — PROGRESS NOTES
"  1. Itchy scalp  2. Skin lesion  > etiology unknown, suspect contact dermatitis vs lice or scabies, vs possible chigers or bug bite (although patient did not see any bugs herself on her person and her  doesn't have similar symptoms)    - given patient is complaining of itchy scalp, can trial permethrin (ELIMITE) 5 % external cream; Apply to clean, dry hair and leave on overnight or for 8-14 hours before washing off with water.  Dispense: 60 g; Refill: 1  - for lesions and itching can try hydrocortisone 2.5 % cream; Apply topically 2 times daily  Dispense: 30 g; Refill: 0  - ok to continue use of antihistamines given patient has allergies to multiple insect bites   - Adult Dermatology Referral; Future will appreciate second opinion as etiology of patient's lesions are unclear       Subjective   Karen is a 68 year old, presenting for the following health issues:  Insect Bites        5/30/2023     1:16 PM   Additional Questions   Roomed by Diane GUERRIER LPN   Accompanied by self         5/30/2023     1:16 PM   Patient Reported Additional Medications   Patient reports taking the following new medications no new meds     History of Present Illness       Reason for visit:  Bug bites  Symptom onset:  1-3 days ago  Symptoms include:  Weeping and spreading bug bites, in her hairline. Itching, painful.  (she has allergies to a lot of bugs she reports, deer flies make her swell up.)  Symptom intensity:  Severe  Symptom progression:  Worsening  Had these symptoms before:  Yes (from spider bites, deer flies or other biting insects)  Has tried/received treatment for these symptoms:  Yes  Previous treatment was successful:  No (was given some shots and meds for it before)  What makes it worse:  None  What makes it better:  None      Last week around Wednesday the patient was out camping with her , shortly afterwards she began experiencing significant pruritis with some new lesions     Very itchy \"like my whole scalp is " "on fire\"   Has tried poison ivy cream   Has tried antihistamines    was exposed to poison ivy   No concerns for airway compromise, no throat swelling, tongue enlargement     Review of Systems   As above       Objective    /84 (BP Location: Right arm, Patient Position: Sitting, Cuff Size: Adult Large)   Pulse 74   Temp 98.2  F (36.8  C) (Tympanic)   Resp 18   Ht 1.6 m (5' 3\")   Wt 117.9 kg (260 lb)   LMP 05/29/2006   SpO2 97%   BMI 46.06 kg/m    Body mass index is 46.06 kg/m .  Physical Exam  Constitutional:       General: She is not in acute distress.     Appearance: Normal appearance.   HENT:      Head: Normocephalic and atraumatic.      Comments: No noted lice on exam, patient with thick hair      Right Ear: External ear normal.      Left Ear: External ear normal.   Eyes:      General: No scleral icterus.        Right eye: No discharge.         Left eye: No discharge.      Extraocular Movements: Extraocular movements intact.      Conjunctiva/sclera: Conjunctivae normal.   Pulmonary:      Effort: Pulmonary effort is normal. No respiratory distress.   Musculoskeletal:         General: No deformity. Normal range of motion.      Cervical back: Normal range of motion and neck supple.   Skin:     General: Skin is warm.      Comments: See image below for skin lesion, patient also had some of these lesions on the hairline near her neck    Neurological:      General: No focal deficit present.      Mental Status: She is alert and oriented to person, place, and time.   Psychiatric:         Mood and Affect: Mood normal.         Behavior: Behavior normal.                      "

## 2023-05-30 NOTE — ED TRIAGE NOTES
Pt has incests bits to neck and back. Pt reports bites are increasing in size. Pt took Benadryl 25 mg.      Triage Assessment     Row Name 05/29/23 2124       Triage Assessment (Adult)    Airway WDL WDL       Respiratory WDL    Respiratory WDL WDL       Skin Circulation/Temperature WDL    Skin Circulation/Temperature WDL WDL       Cardiac WDL    Cardiac WDL WDL       Peripheral/Neurovascular WDL    Peripheral Neurovascular WDL WDL       Cognitive/Neuro/Behavioral WDL    Cognitive/Neuro/Behavioral WDL WDL

## 2023-06-07 DIAGNOSIS — M19.90 INFLAMMATORY ARTHRITIS: ICD-10-CM

## 2023-06-07 NOTE — TELEPHONE ENCOUNTER
Refill request from [facility]    Medication: Methotrexate SDV  Last Refill: 5/31/23  Last OV: 5/17/23  Last lab: 5/17/23  Future OV: 9/13/23

## 2023-06-07 NOTE — TELEPHONE ENCOUNTER
Refilled per Rheum RN refill protocol  Pharmacy requesting methotrexate PF vials- as methotrexate is on backorder

## 2023-07-14 NOTE — PROGRESS NOTES
Patient presents with suicidal thoughts for the past three months but were worse today.    Tylenol 650 mg given po for HA. Pt drinking H20 until MN, then she is NPO.

## 2023-07-25 ENCOUNTER — TELEPHONE (OUTPATIENT)
Dept: FAMILY MEDICINE | Facility: CLINIC | Age: 68
End: 2023-07-25
Payer: COMMERCIAL

## 2023-07-25 DIAGNOSIS — N39.41 URGE INCONTINENCE OF URINE: ICD-10-CM

## 2023-07-25 RX ORDER — OXYBUTYNIN CHLORIDE 5 MG/1
TABLET, EXTENDED RELEASE ORAL
Qty: 200 TABLET | Refills: 1 | Status: SHIPPED | OUTPATIENT
Start: 2023-07-25 | End: 2024-02-27

## 2023-07-25 NOTE — TELEPHONE ENCOUNTER
Reason for Call:  Appointment Request    Patient requesting this type of appt:  nagging pain in middle section     Requested provider: Jennie Suazo    Reason patient unable to be scheduled: Not with their preferred provider    When does patient want to be seen/preferred time: Same day    Comments: would like to get an appt asap     Could we send this information to you in [facility] or would you prefer to receive a phone call?:   Patient would prefer a phone call   Okay to leave a detailed message?: Yes at Home number on file [phone] (home)    Call taken on 7/25/2023 at 7:35 AM by Mandy Almaguer

## 2023-07-25 NOTE — TELEPHONE ENCOUNTER
"Pt was called back, she wants a same day appt with PCP, pt was informed there are no same day appt's available today and there weren't any this morning when pt called. She was offered next same day appt with PCP on Thursday, she declined that as she is leaving town tonight to \"escape the heat.\"   Pt has pain in R lower abdomen radiating to the center, it has been present for \"a couple of weeks.\" She has not taken any medication for the pain as \"I have swallowing problems.\" Pain is stabbing at times, \"nagging\" pain. , worse with deep breath and movement. No fever, no nausea, no change in symptoms with eating or not eating. BM's have ranged from watery to soft. Pt was encouraged to to to ER when she is out of town if symptoms worsen or change. Pt replied, \"Thanks but no thanks,\" and hung up.  Winter Geiger RN         "

## 2023-08-10 DIAGNOSIS — I10 BENIGN ESSENTIAL HYPERTENSION: ICD-10-CM

## 2023-08-10 RX ORDER — ATENOLOL 50 MG/1
TABLET ORAL
Qty: 200 TABLET | Refills: 1 | Status: SHIPPED | OUTPATIENT
Start: 2023-08-10 | End: 2024-02-27

## 2023-08-24 NOTE — PROGRESS NOTES
Rheumatology Clinic Visit  [facility]  Danni Pengsofiaspencer MARISELA     Jacquie Harris MRN# [id number]   YOB: 1955 Age: 68 year old   Date of Visit: 08/25/2023  Primary care provider: Jennie Suazo          Assessment and Plan:     1.  Seronegative rheumatoid arthritis  2.  High-risk medication use    Patient presents today for follow-up.  Unfortunately things have been going very well for her as she has had significant joint pain and swelling.  She has been unable to get her methotrexate as no pharmacy has the subcutaneous methotrexate.  She did not feel that it was significantly helping either.  Physical examination today does show active synovitis over multiple MCPs bilaterally.  She does report tenderness associated with it as well.  She has a significant amount of stiffness and has had her  getting her water ready in order for her to soak her hands.  Previous laboratory evaluations were reviewed, results below.    Patient has failed sulfasalazine and hydroxychloroquine as she has had a difficult time swallowing those medications.  She is unable to get the subcutaneous methotrexate and did not have adequate control of her arthritis even when she was on it consistently.  At this time I would recommend biologic therapy.  We discussed Humira.  Side effects of this medication were reviewed.  She did have a positive SYLVIE but negative subsets.  We did discuss that with a positive SYLVIE this may make the medication ineffective quicker over time.  She verbalized understanding.  We will also monitor for any signs or symptoms of drug-induced lupus should she develop any.  We will check her hepatitis panel and a tuberculosis panel today.  Monitoring labs every 6 months while on Humira.  In the meantime to help her with the synovitis that she currently has we will give her a course of prednisone.  Side effects reviewed.  Encouraged her to take this medication with food in the morning.    Plan:    Schedule follow-up with Danni Martínez PA-C in 3 months (around mid November).   Labs: Hep B and C, TB today  Medication recommendations:   Prednisone 5mg: Take 10 mg (2 tablets) daily x1 week, then take 7.5 mg (1.5 tablets) daily x1 week, then take 5 mg (1 tablet) daily x1 week then stop.  Take with food.  # Prednisone Risks and Benefits: The risks and benefits of prednisone were discussed in detail and the patient verbalized understanding.  The risks discussed include, but are not limited to, weight gain, fluid retention, impaired wound healing, hyperglycemia, adrenal suppression, GI upset, peptic ulcer, hepatotoxicity, aseptic necrosis of the femoral and humeral heads, osteoporosis, myopathy, tendon rupture (particularly Achilles tendon), ocular changes including an increased intraocular pressure.  I encouraged reviewing the package insert and asking any questions about the medication.      Humira 40mg/0.4mL  # Adalimumab (Humira) Risks and Benefits: The risks and benefits of adalimumab were discussed in detail and the patient verbalized understanding.  The risks discussed include, but are not limited to, the risk for hypersensitivity, anaphylaxis, anaphylactoid reactions, an increased risk for serious infections leading to hospitalization or death, a possible increased risk for lymphoma and other malignancies, a possible worsening of demyelinating diseases, a possible worsening of heart failure, risk for cytopenias, risk for drug induced lupus, possible reactivation of hepatitis B, and possible reactivation of latent tuberculosis.  Subcutaneous injections may result in injection site reactions and/or pain at the site of injection.  The most common adverse reactions are infections, injection site reactions, headache, and rash.  It was discussed that the medication would need to be discontinued if a serious infection develops.  It was discussed that live vaccinations should not be received while using  "adalimumab or within 30 days prior to starting adalimumab.  I encouraged reviewing the package insert and asking any questions about the medication.      Danni Martínez, MARISELA  Rheumatology         History of Present Illness:   Jacquie Harris presents for evaluation of positive SYLVIE, inflammatory arthritis.    Rheumatological history:  Provider: Dr. Uribe   Current medication: none  Previous Medications: Plaquenil 200 mg twice daily, last eye exam 02/27/2023, Sulfasalazine, Methotrexate (non effective)  Pertinent labs: Negative rheumatoid factor, CCP antibody, sed rate and CRP.  Positive SYLVIE with a titer of 1:160, speckled pattern.  Normal/negative SYLVIE subsets, ANCA, uric acid, C3, C4    Interval history August 25, 2025:  She has not been doing well. She has not been able to get Methotrexate.No history of infections. No cardiovascular history. No history of cancer.        Interval history May 17, 2023:  She states that her hands are close today for making a fist. She still gets some trigger finger. She does still have a hard time opening the bottles. Overall she has not seen a lot of difference with the methotrexate.     Interval history March 15, 2023:  She increased the Sulfasalazine to 1 in the AM and 2 in the evening. She has continued with the Hydroxychloroquine as well. She states that her hands continue to get stuck and she has a lot of pain with them. She reports spending a lot of time rinsing them in hot water.     Interval history January 11, 2023:  She states that she is \"okay\". She has been having increased doctoring as her knees are \"really bad\". She has been working with some providers on that. She is thinking this is where the arthritis has started. She states that her hands continue to be the same. She has not noticed a change with the Sulfasalazine. Her hands continue to get stuck and she feels it is affecting more fingers. She is tolerating the medications well. She has not had any infections " "or fevers.     Interval history November 9, 2022:  She reports that the prednisone did anything. She states that her fingers continue to feel as if they are getting stuck. She continues to feel the swelling as well. She did have initial improvement in symptoms with the Hydroxychloroquine. She feels this was helpful until just before Dr. Uribe left. Initially she thought it was secondary to the humidity as it started in the summer.     History of gastric bleed on NSAIDs.    HPI from consult 10/5/2022:  Patient was previously treated with Dr. Uribe.  Last office visit was on 6/30/2022.  Initial evaluation was on November 10, 2021.  At that time she had reported pain in her bilateral hands with morning stiffness lasting 2 to 3 hours.  Physical exam showed subtle fullness involving the left third MCP and right fifth MCP joints with tenderness to palpation.  Mild tenderness involving the right fifth PIP and left first CMC.  Nodule involving the left third digit A1 pulley area with some tenderness on palpation and a subtle catching sensation with active flexion of the left third digit.    She has been unable to do the Paraffin wax due to cost. She uses hot water. She uses this in the morning to get things going. She states that she hurts today. She states that she has some good days, but \"not very many\". She is unsure that the Plaquenil is working. She states that she was expecting it to be a \"miracle\". She states that she was under the impression that you need something to stop and slow the arthritis with rheumatoid arthritis. She states that she does not do much with her fingers any more. She cannot paint or do crafts. She states that she has so much pain that she cannot even think about painting. She states that the pain is bad in the mornings and evening. If she is able to keep them warm, that seems to help. Many of her symptoms started summer 2020. She was noticing her fingers getting \"crooked\". She has " not been getting the sticking of the fingers anymore.     No known family history of rheumatoid arthritis or lupus.  No known family history or personal history of psoriasis, ulcerative colitis or Crohn's disease.             Review of Systems:     Constitutional: negative  Skin: negative  Eyes: negative  Ears/Nose/Throat: negative  Respiratory: No shortness of breath, dyspnea on exertion, cough, or hemoptysis  Cardiovascular: negative  Gastrointestinal: negative  Genitourinary: negative  Musculoskeletal: as above  Neurologic: negative  Psychiatric: negative  Hematologic/Lymphatic/Immunologic: negative  Endocrine: negative         Active Problem List:     Patient Active Problem List    Diagnosis Date Noted    History of total bilateral knee replacement 01/09/2023     Priority: Medium    Benign essential hypertension 06/19/2020     Priority: Medium    Rotator cuff syndrome, right 01/14/2020     Priority: Medium    Multiple joint pain, possible fibromyalgia 12/20/2019     Priority: Medium    Bronchospasm 01/07/2019     Priority: Medium    GI bleed 03/17/2018     Priority: Medium    Upper GI bleed 03/17/2018     Priority: Medium    Shoulder injury, right, subsequent encounter 08/21/2017     Priority: Medium    Fatty liver 04/20/2017     Priority: Medium    Elevated levels of transaminase & lactic acid dehydrogenase 04/15/2017     Priority: Medium    Bilateral cold feet 01/03/2017     Priority: Medium    Colles' fracture 07/06/2015     Priority: Medium    Encounter for routine gynecological examination  06/04/2015     Priority: Medium    Back pain, left below scapula, strained muscles 10/24/2013     Priority: Medium    HTN, goal below 140/80 05/05/2013     Priority: Medium    S/P TKR (total knee replacement) 04/03/2012     Priority: Medium    Osteoporosis 02/19/2012     Priority: Medium     Previous T scores -2.2  2/2012 Dexascan:  Lumbar spine L1-L4 T-score: -2.4, Left femoral neck T-score: -2.7, Right femoral neck  T-score: -2.2   Percent change in lumbar spine: +6.6% since 4/16/2008   Percent change in femurs: +2.9% since 4/16/2008    IMPRESSION: Osteoporosis in the left femoral neck. Severe osteopenia  in the right femoral neck and lumbar spine.      Obesity, Class III, BMI 40-49.9 (morbid obesity) (H) 02/19/2012     Priority: Medium     ideal weight 120, goal weight 200, lose 58 lbs in 58 weeks.      Vitamin D deficiency 07/18/2011     Priority: Medium    Tick bite, infected, positive Lyme test 1996 not treated 07/08/2011     Priority: Medium    Rosacea 11/04/2009     Priority: Medium    Obstructive sleep apnea 12/01/2006     Priority: Medium     Sleep study 12/06 for EDS- AHI 36, desaturations to 65%. CPAP recommended but not tolerated.  01/16/07  ENT consult recommend CPAP and weight loss. Surgery discussed but success rate less than CPAP  1/15/07 CPAP trial, was not able to tolerate. Retried 2008, tolerated better.   Problem list name updated by automated process. Provider to review      Carpal tunnel syndrome 06/07/2006     Priority: Medium     04/10/09 Dr.Meletiou Mercado [facility]. Finding consistant with CTS, but nerve studies and MRI of C-Spine are normal. Corticosteroid injection given in office      CARDIOVASCULAR SCREENING; LDL GOAL LESS THAN 160 10/31/2010     Priority: Low    Allergic rhinitis 03/27/2006     Priority: Low     Problem list name updated by automated process. Provider to review      s/p gastric bypass x 2 07/05/2005     Priority: Low     Gastric bypass 1980              Past Medical History:     Past Medical History:   Diagnosis Date    HTN (hypertension)     IRON DEFICIENCY ANEMIA 7/5/2005     Iron infusion/ resolved since injections    Obstructive sleep apnea     DARWIN (obstructive sleep apnea)      Past Surgical History:   Procedure Laterality Date    ARTHROPLASTY KNEE  04/02/2012    Procedure:ARTHROPLASTY KNEE; Left Total Knee Arthroplasty--Anesth.Choice; Surgeon:VICENTE MORALES; Location:[facility]  OR    ARTHROTOMY SHOULDER, ROTATOR CUFF REPAIR, COMBINED Right 08/25/2017    Procedure: COMBINED ARTHROTOMY SHOULDER, ROTATOR CUFF REPAIR;  Right shoulder distal clavicle excision, subacromial decompression, rotator cuff repair ;  Surgeon: Hernando Thompson MD;  Location: [facility] OR    ARTHROTOMY SHOULDER, ROTATOR CUFF REPAIR, COMBINED Right 12/18/2017    Procedure: COMBINED ARTHROTOMY SHOULDER, ROTATOR CUFF REPAIR;  Right Shoulder Rotator Cuff Revision;  Surgeon: Hernando Thompson MD;  Location: [facility] OR    ARTHROTOMY SHOULDER, ROTATOR CUFF REPAIR, COMBINED Left 04/27/2018    Procedure: COMBINED ARTHROTOMY SHOULDER, ROTATOR CUFF REPAIR;  Left Shoulder Open Subacromial Decompression,Distal Clavicle Excision,Rotator Cuff Repair;  Surgeon: Hernando Thompson MD;  Location: [facility] OR    C/SECTION, LOW TRANSVERSE  1978, 1984, 1994    x 3    COLONOSCOPY  01/01/2001    normal colonoscopy    COLONOSCOPY N/A 08/22/2017    Procedure: COLONOSCOPY;  Colonoscopy  ;  Surgeon: Delfino Yoo MD;  Location: [facility] GI    COLONOSCOPY  08/22/2017    EXAMCOL    ESOPHAGOSCOPY, GASTROSCOPY, DUODENOSCOPY (EGD), COMBINED N/A 03/18/2018    Procedure: COMBINED ESOPHAGOSCOPY, GASTROSCOPY, DUODENOSCOPY (EGD);;  Surgeon: Poncho Galindo MD;  Location: [facility] GI    GASTRIC BYPASS  1980/2005    Gastric Bypass and revision    HERNIA REPAIR, INCISIONAL  06/16/2008    lap.repair with 10x8 mesh    IN REVISE KNEE JOINT REPLACE,ALL PARTS Left 05/29/2019    Dr. Cedric Fuller    IN TOTAL KNEE ARTHROPLASTY Right 06/22/2020    Dr. Cedric Fuller    TUBAL LIGATION  01/01/1994    [facility] ORAL SURGERY PROCEDURE  age 18    wisdom teeth            Social History:     Social History     Socioeconomic History    Marital status:      Spouse name: Not on file    Number of children: Not on file    Years of education: Not on file    Highest education level: Not on file   Occupational History    Not on file   Tobacco Use    Smoking status: Never    Smokeless tobacco:  Never   Vaping Use    Vaping Use: Never used   Substance and Sexual Activity    Alcohol use: Yes     Alcohol/week: 0.0 standard drinks of alcohol     Comment: mixed very light 1 a month if that    Drug use: No    Sexual activity: Yes     Partners: Male     Birth control/protection: Surgical   Other Topics Concern    Parent/sibling w/ CABG, MI or angioplasty before 65F 55M? No     Service No    Blood Transfusions Yes     Comment: 1976, 1978, 1978, 1984    Caffeine Concern No    Occupational Exposure No    Hobby Hazards No    Sleep Concern No    Stress Concern No    Weight Concern Yes    Special Diet No    Back Care No    Exercise No    Bike Helmet No    Seat Belt Yes    Self-Exams Not Asked   Social History Narrative    Not on file     Social Determinants of Health     Financial Resource Strain: Not on file   Food Insecurity: Not on file   Transportation Needs: Not on file   Physical Activity: Not on file   Stress: Not on file   Social Connections: Not on file   Intimate Partner Violence: Not on file   Housing Stability: Not on file          Family History:     Family History   Problem Relation Age of Onset    C.A.D. Father         MI at age 60    Hypertension Father     Alzheimer Disease Father     Hyperlipidemia Father     Osteoporosis Mother         on Fosamax    Glaucoma Mother     Other Cancer Brother     Stomach Cancer Maternal Aunt     Cervical Cancer Cousin     Diabetes No family hx of     Cerebrovascular Disease No family hx of     Breast Cancer No family hx of     Cancer - colorectal No family hx of     Prostate Cancer No family hx of     Liver Disease No family hx of             Allergies:     Allergies   Allergen Reactions    Ibuprofen      Other reaction(s): Gastrointestinal  Gastric bleed    Naproxen GI Disturbance     Other reaction(s): Gastrointestinal  Gastric bleed    Nickel Itching     Inflamed with cheap earrings (itchy)    Rofecoxib Itching and Swelling     VIOXX (Swollen Feet,Hands  "itching), Okay with ibuprofen and aspirin    Vioxx Itching and Swelling     VIOXX (Swollen Feet,Hands itching), Okay with ibuprofen and aspirin            Medications:     Current Outpatient Medications   Medication Sig Dispense Refill    acetaminophen (TYLENOL) 500 MG tablet Take 1,000 mg by mouth      albuterol (PROAIR HFA/PROVENTIL HFA/VENTOLIN HFA) 108 (90 Base) MCG/ACT inhaler INHALE 2 PUFFS INTO THE LUNGS EVERY 6 HOURS AS NEEDED FOR SHORTNESS OF BREATH OR DIFFICULT BREATHING OR WHEEZING 25.5 g 1    atenolol (TENORMIN) 50 MG tablet TAKE 2 TABLETS BY MOUTH DAILY 200 tablet 1    denosumab (PROLIA) 60 MG/ML SOSY injection Inject 1 mL (60 mg) Subcutaneous once for 1 dose 1 mL 0    denosumab (PROLIA) 60 MG/ML SOSY injection Inject 1 mL (60 mg) Subcutaneous once for 1 dose 1 mL 0    denosumab (PROLIA) 60 MG/ML SOSY injection Inject 1 mL (60 mg) Subcutaneous once for 1 dose 1 mL 0    folic acid (FOLVITE) 1 MG tablet Take 1 tablet (1 mg) by mouth daily 90 tablet 3    hydrocortisone 2.5 % cream Apply topically 2 times daily 30 g 0    methotrexate 50 MG/2ML injection Inject 0.8 mLs (20 mg) Subcutaneous every 7 days Labs monitoring required every 8-12 weeks for refills 10 mL 0    methotrexate sodium 50 MG/2ML SOLN PF single use vials- Inject 0.8ml subcutaneous (20mg) Once Weekly 8 mL 1    NYAMYC 971045 UNIT/GM external powder APPLY TO AFFECTED AREA(S)  TOPICALLY 3 TIMES DAILY AS  NEEDED 60 g 1    oxybutynin ER (DITROPAN XL) 5 MG 24 hr tablet TAKE 1 TABLET BY MOUTH  TWICE DAILY 200 tablet 1    permethrin (ELIMITE) 5 % external cream Apply to clean, dry hair and leave on overnight or for 8-14 hours before washing off with water. 60 g 1    Syringe/Needle, Disp, (BD SAFETYGLIDE SYRINGE/NEEDLE) 27G X 5/8\" 1 ML MISC Use 1 syringe weekly (with a separate needle for drawing up) for Methotrexate administration-OK to substitute any ins covered appropriate needles and syringe for Methotrexate self draw up and injection. 12 each 1 "            Physical Exam:   Last menstrual period 2006, not currently breastfeeding.  Wt Readings from Last 6 Encounters:   23 117.9 kg (260 lb)   23 117.9 kg (260 lb)   23 120.7 kg (266 lb)   23 120.2 kg (265 lb)   03/15/23 120.7 kg (266 lb)   23 120.7 kg (266 lb)     Constitutional: well-developed, appearing stated age; cooperative  Eyes: nl  conjunctiva, sclera  ENT: nl external ears, hearing,  Resp: No shortness of breath with normal conversation  MSK: Active synovitis over multiple MCPs bilaterally.  Associated tenderness.  Decreased fist formation.  Psych: nl judgement, orientation, memory, affect.           Data:   Imagin/10/2021 x-ray bilateral hand  IMPRESSION: Generalized demineralization. Mild degenerative arthritis involving the interphalangeal joints of the fingers, and the basilar joints of both thumbs, with mild joint space narrowing and osteophytic spurring. Generalized bone demineralization.  No evidence of erosive arthropathy. Soft tissues are normal.    11/10/2021 x-ray bilateral knee  IMPRESSION:   RIGHT KNEE: Uncomplicated cemented total knee arthroplasty. Normal joint alignment. No fracture or erosion. No joint effusion.     LEFT KNEE: Cemented longstem hinged total knee arthroplasty. No evidence of loosening or hardware complication. Mild patella baja. No fracture or erosion. No joint effusion.    11/10/2021 x-ray right wrist  IMPRESSION: No acute fracture. Chronic healed fracture of the distal radius. Chronic ununited fracture of the ulnar styloid process. Moderate ulnar positive variance. Mild degenerative changes at the STT and first CMC joints.    2022 x-rays left shoulder  IMPRESSION:  1.  Normal left glenohumeral joint spacing and alignment.  2.  Distal clavicle excision, unchanged.  3.  No fracture.  4.  No significant change since the comparison.    2022 x-ray cervical spine  IMPRESSION: Minimal degenerative anterolisthesis of C2 upon  C3, C3  upon C4 and C5 on C6. Alignment otherwise normal. Vertebral body  heights are normal. Moderate facet arthropathy at C2-C3. Mild  degenerative endplate spurring at C4-C5, C5-C6 and C6-C7. No  fractures.    Laboratory:  10/4/2022  Creatinine 0.79, GFR 81  Albumin 4.1  ALT 9, AST 17  White blood cell count 5.7, hemoglobin 11.9, platelet count 215    12/19/2022  Creatinine 0.72, GFR greater than 90  Albumin 4.0  ALT 7, AST 20  White blood cell count, hemoglobin, platelet count within normal limits    1/10/2023  Albumin creatinine 42.87  Albumin urine 85.4    3/9/2023  Creatinine 0.77, GFR 84  Albumin 3.9  ALT 10, AST 19  Albumin in the urine less than 12  CBC within normal limits    5/17/2023  Creatinine 0.62, GFR greater than 90  Albumin 4.2  ALT 10, AST 17  White blood cell count 5.0, hemoglobin 11.6, platelet count 209

## 2023-08-25 ENCOUNTER — TELEPHONE (OUTPATIENT)
Dept: RHEUMATOLOGY | Facility: CLINIC | Age: 68
End: 2023-08-25

## 2023-08-25 ENCOUNTER — OFFICE VISIT (OUTPATIENT)
Dept: RHEUMATOLOGY | Facility: CLINIC | Age: 68
End: 2023-08-25
Payer: COMMERCIAL

## 2023-08-25 VITALS
WEIGHT: 261 LBS | HEART RATE: 66 BPM | SYSTOLIC BLOOD PRESSURE: 125 MMHG | RESPIRATION RATE: 18 BRPM | DIASTOLIC BLOOD PRESSURE: 81 MMHG | HEIGHT: 63 IN | BODY MASS INDEX: 46.25 KG/M2 | OXYGEN SATURATION: 97 % | TEMPERATURE: 98.8 F

## 2023-08-25 DIAGNOSIS — R76.8 POSITIVE ANA (ANTINUCLEAR ANTIBODY): ICD-10-CM

## 2023-08-25 DIAGNOSIS — M06.09 SERONEGATIVE RHEUMATOID ARTHRITIS OF MULTIPLE SITES (H): Primary | ICD-10-CM

## 2023-08-25 DIAGNOSIS — M19.90 INFLAMMATORY ARTHRITIS: ICD-10-CM

## 2023-08-25 DIAGNOSIS — Z79.899 HIGH RISK MEDICATION USE: ICD-10-CM

## 2023-08-25 LAB
ALBUMIN SERPL BCG-MCNC: 4.1 G/DL (ref 3.5–5.2)
ALT SERPL W P-5'-P-CCNC: 9 U/L (ref 0–50)
AST SERPL W P-5'-P-CCNC: 18 U/L (ref 0–45)
CREAT SERPL-MCNC: 0.65 MG/DL (ref 0.51–0.95)
ERYTHROCYTE [DISTWIDTH] IN BLOOD BY AUTOMATED COUNT: 15.1 % (ref 10–15)
GFR SERPL CREATININE-BSD FRML MDRD: >90 ML/MIN/1.73M2
HBV CORE AB SERPL QL IA: NONREACTIVE
HBV SURFACE AG SERPL QL IA: NONREACTIVE
HCT VFR BLD AUTO: 40.4 % (ref 35–47)
HCV AB SERPL QL IA: NONREACTIVE
HGB BLD-MCNC: 11.9 G/DL (ref 11.7–15.7)
MCH RBC QN AUTO: 23.3 PG (ref 26.5–33)
MCHC RBC AUTO-ENTMCNC: 29.5 G/DL (ref 31.5–36.5)
MCV RBC AUTO: 79 FL (ref 78–100)
PLATELET # BLD AUTO: 226 10E3/UL (ref 150–450)
RBC # BLD AUTO: 5.1 10E6/UL (ref 3.8–5.2)
WBC # BLD AUTO: 6.4 10E3/UL (ref 4–11)

## 2023-08-25 PROCEDURE — 84450 TRANSFERASE (AST) (SGOT): CPT | Performed by: PHYSICIAN ASSISTANT

## 2023-08-25 PROCEDURE — 99214 OFFICE O/P EST MOD 30 MIN: CPT | Mod: 25 | Performed by: PHYSICIAN ASSISTANT

## 2023-08-25 PROCEDURE — 86481 TB AG RESPONSE T-CELL SUSP: CPT | Performed by: PHYSICIAN ASSISTANT

## 2023-08-25 PROCEDURE — 86803 HEPATITIS C AB TEST: CPT | Performed by: PHYSICIAN ASSISTANT

## 2023-08-25 PROCEDURE — 87340 HEPATITIS B SURFACE AG IA: CPT | Performed by: PHYSICIAN ASSISTANT

## 2023-08-25 PROCEDURE — 86704 HEP B CORE ANTIBODY TOTAL: CPT | Performed by: PHYSICIAN ASSISTANT

## 2023-08-25 PROCEDURE — 82040 ASSAY OF SERUM ALBUMIN: CPT | Performed by: PHYSICIAN ASSISTANT

## 2023-08-25 PROCEDURE — 82565 ASSAY OF CREATININE: CPT | Performed by: PHYSICIAN ASSISTANT

## 2023-08-25 PROCEDURE — 85027 COMPLETE CBC AUTOMATED: CPT | Performed by: PHYSICIAN ASSISTANT

## 2023-08-25 PROCEDURE — 36415 COLL VENOUS BLD VENIPUNCTURE: CPT | Performed by: PHYSICIAN ASSISTANT

## 2023-08-25 PROCEDURE — 84460 ALANINE AMINO (ALT) (SGPT): CPT | Performed by: PHYSICIAN ASSISTANT

## 2023-08-25 RX ORDER — PREDNISONE 5 MG/1
TABLET ORAL
Qty: 32 TABLET | Refills: 0 | Status: SHIPPED | OUTPATIENT
Start: 2023-08-25 | End: 2023-10-03

## 2023-08-25 ASSESSMENT — PAIN SCALES - GENERAL: PAINLEVEL: MODERATE PAIN (5)

## 2023-08-25 NOTE — TELEPHONE ENCOUNTER
"FREE DRUG APPLICATION INITIATED - scanned application into patient media tab    Need to obtain signature from patent + provider  + patient to complete portions needed at Lost My Name[employer]PAS    Medication: HUMIRA *CF* 40 MG/0.4ML SC PSKT  Free Drug Program Name: [facility]    Additional Information: scanned incomplete application into media tab 8/25/2023   - \"humira patient assistance application - forms - incomplete\"    Liaison to follow up with patient and provider to obtain their portions Monday to send to [facility] for application.    Thank You,     Cinthya Koo [facility]  [facility] Specialty  [email or web address]  [email or web address]  Phone: [phone]  Fax: [phone]    "

## 2023-08-25 NOTE — NURSING NOTE
"Initial /81 (BP Location: Right arm, Patient Position: Chair, Cuff Size: Adult Large)   Pulse 66   Temp 98.8  F (37.1  C) (Tympanic)   Resp 18   Ht 1.6 m (5' 3\")   Wt 118.4 kg (261 lb)   LMP 05/29/2006   SpO2 97%   BMI 46.23 kg/m   Estimated body mass index is 46.23 kg/m  as calculated from the following:    Height as of this encounter: 1.6 m (5' 3\").    Weight as of this encounter: 118.4 kg (261 lb). .  Robson López on 8/25/2023 at 9:49 AM    "

## 2023-08-25 NOTE — TELEPHONE ENCOUNTER
Patient called and upset about the cost- inquired if she received a call from the Liason regarding assist: she was not and was not aware one would be calling her.  She did know Danni mentioned something about assistance and hopes if Danni needs to do anything for it, please do so.    Just LAYLA VILLATORO   Specialty [facility]

## 2023-08-25 NOTE — PATIENT INSTRUCTIONS
After Visit Instructions:     Thank you for coming to [facility] for your care. It is my goal to partner with you to help you reach your optimal state of health.       Plan:     Schedule follow-up with Danni Martínez PA-C in 3 months (around mid November).   Labs: Hep B and C, TB today  Medication recommendations:   Prednisone 5mg: Take 10 mg (2 tablets) daily x1 week, then take 7.5 mg (1.5 tablets) daily x1 week, then take 5 mg (1 tablet) daily x1 week then stop.  Take with food.  # Prednisone Risks and Benefits: The risks and benefits of prednisone were discussed in detail and the patient verbalized understanding.  The risks discussed include, but are not limited to, weight gain, fluid retention, impaired wound healing, hyperglycemia, adrenal suppression, GI upset, peptic ulcer, hepatotoxicity, aseptic necrosis of the femoral and humeral heads, osteoporosis, myopathy, tendon rupture (particularly Achilles tendon), ocular changes including an increased intraocular pressure.  I encouraged reviewing the package insert and asking any questions about the medication.      Humira 40mg/0.4mL  # Adalimumab (Humira) Risks and Benefits: The risks and benefits of adalimumab were discussed in detail and the patient verbalized understanding.  The risks discussed include, but are not limited to, the risk for hypersensitivity, anaphylaxis, anaphylactoid reactions, an increased risk for serious infections leading to hospitalization or death, a possible increased risk for lymphoma and other malignancies, a possible worsening of demyelinating diseases, a possible worsening of heart failure, risk for cytopenias, risk for drug induced lupus, possible reactivation of hepatitis B, and possible reactivation of latent tuberculosis.  Subcutaneous injections may result in injection site reactions and/or pain at the site of injection.  The most common adverse reactions are infections, injection site reactions, headache,  and rash.  It was discussed that the medication would need to be discontinued if a serious infection develops.  It was discussed that live vaccinations should not be received while using adalimumab or within 30 days prior to starting adalimumab.  I encouraged reviewing the package insert and asking any questions about the medication.              Danni Martínez PA-C  [facility]  [facility]    Contact information: [facility]  Clinic Number:  [phone]  Please call or send a [employer] message with any questions about your care

## 2023-08-25 NOTE — TELEPHONE ENCOUNTER
PA Initiation    Medication: HUMIRA *CF* 40 MG/0.4ML SC PSKT  Insurance Company: [employer] ([facility]) - Phone [phone] Fax [phone]  Pharmacy Filling the Rx: [facility] - [place], [place] - [address]  Filling Pharmacy Phone:    Filling Pharmacy Fax:    Start Date: 8/25/2023   FINESSE (Key: [id number])        Thank You,     Cinthya Koo, [facility]  Specialty Pharmacy Clinic [facility] Specialty  [email or web address]  [email or web address]  Phone: [phone]  Fax: [phone]

## 2023-08-25 NOTE — TELEPHONE ENCOUNTER
Prior Authorization Approval    Medication: HUMIRA *CF* 40 MG/0.4ML SC PSKT  Authorization Effective Date: 8/25/2023  Authorization Expiration Date: 2/25/2024  Approved Dose/Quantity: 2 pen / 28 day  Reference #: FINESSE (Key: [id number])   Insurance Company: [employer] ([facility]) - Phone [phone] Fax [phone]  Expected CoPay: 2201.17     CoPay Card Available: No    Financial Assistance Needed: offer free drug  Which Pharmacy is filling the prescription:    Pharmacy Notified:    Patient Notified:      High Patient Pay Amount:  The final Patient Pay Amount ($2201.17)        Liaison to contact patient to initiate Free Drug.

## 2023-08-27 LAB
GAMMA INTERFERON BACKGROUND BLD IA-ACNC: 0.04 IU/ML
M TB IFN-G BLD-IMP: NEGATIVE
M TB IFN-G CD4+ BCKGRND COR BLD-ACNC: 9.96 IU/ML
MITOGEN IGNF BCKGRD COR BLD-ACNC: 0.07 IU/ML
MITOGEN IGNF BCKGRD COR BLD-ACNC: 0.08 IU/ML
QUANTIFERON MITOGEN: 10 IU/ML
QUANTIFERON NIL TUBE: 0.04 IU/ML
QUANTIFERON TB1 TUBE: 0.11 IU/ML
QUANTIFERON TB2 TUBE: 0.12

## 2023-08-28 ENCOUNTER — DOCUMENTATION ONLY (OUTPATIENT)
Dept: RHEUMATOLOGY | Facility: CLINIC | Age: 68
End: 2023-08-28
Payer: COMMERCIAL

## 2023-08-28 NOTE — TELEPHONE ENCOUNTER
Application faxed and scanned into patient's chart.    Danni Martínez PA-C on 8/28/2023 at 11:51 AM

## 2023-08-30 NOTE — TELEPHONE ENCOUNTER
I'll call Karen to confirm she can come in to sign forms.     Left message on pt's home phone, advising she can come in any time this morning to sign forms.  Directions to our clinic and my direct phone were also left for patient.    CAMMY Davis  8/30/2023 9:44 AM

## 2023-08-30 NOTE — TELEPHONE ENCOUNTER
Signed form and proof of income (she wasn't sure if that was still needed) faxed to [employer].      Transmission confirmed via Right Fax.  CAMMY Davis

## 2023-08-30 NOTE — TELEPHONE ENCOUNTER
Called and spoke to patient about free drug application. Patient would like to come into clinic this morning to sign application for faxing to  foundation.     Application scanned into media tab as Humira Patient assistance application - forms 8/30 9:25 am    Please call patient to confirm ability for her to come into clinic and sign forms        Thank You,     Cinthya Koo, [facility]  Specialty Pharmacy Clinic [facility] Specialty  [email or web address]  [email or web address]  Phone: [phone]  Fax: [phone]

## 2023-09-06 NOTE — TELEPHONE ENCOUNTER
Application faxed to [employer] with ins approval + pt forms     Called for status [phone]    Rx fax received. Got  from prescriber portion. Re-sent forms. [employer] stated to check back in 48 hours to confirm receipt of patient application.    Thank You,     Cinthya Koo, [facility]  Specialty Pharmacy Clinic [facility] Specialty  [email or web address]  [email or web address]  Phone: [phone]  Fax: [phone]

## 2023-09-11 NOTE — TELEPHONE ENCOUNTER
Called [employer] for status update.    hint received application      - Rep states missing copy of medicare A/B card for application review.    Notified patient - faxed copy of red white and blue card with HIPAA release form to [employer]    - check back 48 hours [facility] [phone]    Faxed via Right Fax     Thank You,     Cinthya Koo, [facility]  Specialty Pharmacy Clinic [facility] Specialty  [email or web address]  [email or web address]  Phone: [phone]  Fax: [phone]

## 2023-09-12 NOTE — TELEPHONE ENCOUNTER
FREE DRUG APPLICATION APPROVED    Medication: HUMIRA *CF* 40 MG/0.4ML SC PSKT  Program Name: [employer]  Effective Date: 9/11/2023  Expiration Date: 12/31/2024  Pharmacy Filling the Rx: [facility] - [address]  Patient Notified: yes - phone call spoke to patient + abbvie approval  Additional Information:         Thank You,     Cinthya Koo, [facility]  Specialty Pharmacy Clinic [facility] Specialty  [email or web address]  [email or web address]  Phone: [phone]  Fax: [phone]

## 2023-09-13 NOTE — TELEPHONE ENCOUNTER
Order placed  
Pt notified.    [facility]     
Reason for Call:  Order    Detailed comments: patient is calling stating she recently saw Dr. Suazo and was given a cortisone injection in her left shoulder. She was instructed to give it time, which she did and if it is not better, to call back, and Dr. Suazo will order her an MRI of her left shoulder. Injection was 1/29    Phone Number Patient can be reached at: Home number on file [phone] (home)    Best Time: any    Can we leave a detailed message on this number? YES   Génesis Campa  Clinic Station  Flex      Call taken on 2/14/2018 at 7:20 AM by Génesis Campa      
No

## 2023-09-26 ENCOUNTER — PATIENT OUTREACH (OUTPATIENT)
Dept: CARE COORDINATION | Facility: CLINIC | Age: 68
End: 2023-09-26
Payer: COMMERCIAL

## 2023-10-03 ENCOUNTER — LAB (OUTPATIENT)
Dept: LAB | Facility: CLINIC | Age: 68
End: 2023-10-03
Payer: COMMERCIAL

## 2023-10-03 ENCOUNTER — VIRTUAL VISIT (OUTPATIENT)
Dept: FAMILY MEDICINE | Facility: CLINIC | Age: 68
End: 2023-10-03
Payer: COMMERCIAL

## 2023-10-03 DIAGNOSIS — Z12.11 SCREEN FOR COLON CANCER: ICD-10-CM

## 2023-10-03 DIAGNOSIS — R10.31 ABDOMINAL PAIN, RIGHT LOWER QUADRANT: ICD-10-CM

## 2023-10-03 DIAGNOSIS — R10.31 ABDOMINAL PAIN, RIGHT LOWER QUADRANT: Primary | ICD-10-CM

## 2023-10-03 DIAGNOSIS — Z12.31 VISIT FOR SCREENING MAMMOGRAM: ICD-10-CM

## 2023-10-03 LAB
ALBUMIN SERPL BCG-MCNC: 4.1 G/DL (ref 3.5–5.2)
ALP SERPL-CCNC: 77 U/L (ref 35–104)
ALT SERPL W P-5'-P-CCNC: 10 U/L (ref 0–50)
AMYLASE SERPL-CCNC: 66 U/L (ref 28–100)
ANION GAP SERPL CALCULATED.3IONS-SCNC: 9 MMOL/L (ref 7–15)
AST SERPL W P-5'-P-CCNC: 17 U/L (ref 0–45)
BASO+EOS+MONOS # BLD AUTO: ABNORMAL 10*3/UL
BASO+EOS+MONOS NFR BLD AUTO: ABNORMAL %
BASOPHILS # BLD AUTO: 0 10E3/UL (ref 0–0.2)
BASOPHILS NFR BLD AUTO: 0 %
BILIRUB SERPL-MCNC: 0.3 MG/DL
BUN SERPL-MCNC: 13.8 MG/DL (ref 8–23)
CALCIUM SERPL-MCNC: 9.7 MG/DL (ref 8.8–10.2)
CHLORIDE SERPL-SCNC: 104 MMOL/L (ref 98–107)
CREAT SERPL-MCNC: 0.85 MG/DL (ref 0.51–0.95)
DEPRECATED HCO3 PLAS-SCNC: 27 MMOL/L (ref 22–29)
EGFRCR SERPLBLD CKD-EPI 2021: 74 ML/MIN/1.73M2
EOSINOPHIL # BLD AUTO: 0.1 10E3/UL (ref 0–0.7)
EOSINOPHIL NFR BLD AUTO: 1 %
ERYTHROCYTE [DISTWIDTH] IN BLOOD BY AUTOMATED COUNT: 15.3 % (ref 10–15)
GLUCOSE SERPL-MCNC: 102 MG/DL (ref 70–99)
HCT VFR BLD AUTO: 40.1 % (ref 35–47)
HGB BLD-MCNC: 11.9 G/DL (ref 11.7–15.7)
IMM GRANULOCYTES # BLD: 0 10E3/UL
IMM GRANULOCYTES NFR BLD: 0 %
LIPASE SERPL-CCNC: 24 U/L (ref 13–60)
LYMPHOCYTES # BLD AUTO: 2.2 10E3/UL (ref 0.8–5.3)
LYMPHOCYTES NFR BLD AUTO: 29 %
MCH RBC QN AUTO: 23.5 PG (ref 26.5–33)
MCHC RBC AUTO-ENTMCNC: 29.7 G/DL (ref 31.5–36.5)
MCV RBC AUTO: 79 FL (ref 78–100)
MONOCYTES # BLD AUTO: 0.5 10E3/UL (ref 0–1.3)
MONOCYTES NFR BLD AUTO: 7 %
NEUTROPHILS # BLD AUTO: 4.9 10E3/UL (ref 1.6–8.3)
NEUTROPHILS NFR BLD AUTO: 63 %
PLATELET # BLD AUTO: 214 10E3/UL (ref 150–450)
POTASSIUM SERPL-SCNC: 4.3 MMOL/L (ref 3.4–5.3)
PROT SERPL-MCNC: 7.3 G/DL (ref 6.4–8.3)
RBC # BLD AUTO: 5.06 10E6/UL (ref 3.8–5.2)
SODIUM SERPL-SCNC: 140 MMOL/L (ref 135–145)
WBC # BLD AUTO: 7.7 10E3/UL (ref 4–11)

## 2023-10-03 PROCEDURE — 83690 ASSAY OF LIPASE: CPT

## 2023-10-03 PROCEDURE — 36415 COLL VENOUS BLD VENIPUNCTURE: CPT

## 2023-10-03 PROCEDURE — 80053 COMPREHEN METABOLIC PANEL: CPT

## 2023-10-03 PROCEDURE — 99214 OFFICE O/P EST MOD 30 MIN: CPT | Mod: VID | Performed by: FAMILY MEDICINE

## 2023-10-03 PROCEDURE — 82150 ASSAY OF AMYLASE: CPT

## 2023-10-03 PROCEDURE — 85025 COMPLETE CBC W/AUTO DIFF WBC: CPT

## 2023-10-03 ASSESSMENT — ENCOUNTER SYMPTOMS
EYES NEGATIVE: 1
PSYCHIATRIC NEGATIVE: 1
CONSTITUTIONAL NEGATIVE: 1
NEUROLOGICAL NEGATIVE: 1
ABDOMINAL PAIN: 1
NAUSEA: 0
RESPIRATORY NEGATIVE: 1
ENDOCRINE NEGATIVE: 1
CARDIOVASCULAR NEGATIVE: 1
VOMITING: 0
HEMATOLOGIC/LYMPHATIC NEGATIVE: 1
ALLERGIC/IMMUNOLOGIC NEGATIVE: 1
MUSCULOSKELETAL NEGATIVE: 1

## 2023-10-03 NOTE — PROGRESS NOTES
"Karen is a 68 year old who is being evaluated via a billable video visit.      How would you like to obtain your AVS? MyChart  If the video visit is dropped, the invitation should be resent by: Text to cell phone: [phone]  Will anyone else be joining your video visit? No        Assessment & Plan     (R10.31) Abdominal pain, right lower quadrant  (primary encounter diagnosis)  Comment: labs ordered, if pain persists  recommend CT abdomen. Etiology unknown.  Plan: CBC with platelets and differential,         Comprehensive metabolic panel (BMP + Alb, Alk         Phos, ALT, AST, Total. Bili, TP), Lipase,         Amylase, CT Abdomen Pelvis w/o & w Contrast        (Z12.11) Screen for colon cancer  Comment: Patient due for colonoscopy  Plan: Patient reminded of colonoscopy    (Z12.31) Visit for screening mammogram  Comment: mammogram  Plan: MA SCREENING DIGITAL BILAT - Future  (s+30)            BMI:   Estimated body mass index is 46.23 kg/m  as calculated from the following:    Height as of 8/25/23: 1.6 m (5' 3\").    Weight as of 8/25/23: 118.4 kg (261 lb).       FUTURE APPOINTMENTS:       - Follow-up visit in one month.    Jennie Suazo MD  [facility]    Subjective   Karen is a 68 year old, presenting for the following health issues:  Abdominal Pain      10/3/2023    11:35 AM   Additional Questions   Roomed by Nori HICKMAN   Accompanied by self         10/3/2023    11:35 AM   Patient Reported Additional Medications   Patient reports taking the following new medications no new meds     [employer] for long term, short term for [facility].  Discuss mammogram & colon cancer. Was told does not have to do mammogram anymore.     History of Present Illness       Reason for visit:  Rt lower stomach and rt side . Sharp pain  Symptom onset:  1-3 days ago  Symptoms include:  Sharp pain on movement  Symptom intensity:  Moderate  Symptom progression:  Staying the same  Had these symptoms before: "  Yes  Has tried/received treatment for these symptoms:  No  What makes it worse:  Moving, twisting  What makes it better:  Staying still    She eats 2-3 servings of fruits and vegetables daily.She consumes 0 sweetened beverage(s) daily.She exercises with enough effort to increase her heart rate 9 or less minutes per day.  She exercises with enough effort to increase her heart rate 3 or less days per week.   She is taking medications regularly.       Pain History:  When did you first notice your pain? About a day   Have you seen anyone else for your pain? No  How has your pain affected your ability to work? Not applicable  Where in your body do you have pain? Abdominal/Flank Pain  Onset/Duration: about a day half   Description:   Character: Sharp worse at night into the morning, waking pt up in the middle of the night   Location: lower belly into right flank   Radiation: Back  Intensity: severe  Progression of Symptoms:  same   Accompanying Signs & Symptoms:  Fever/Chills: No  Gas/Bloating: No  Nausea: No  Vomitting: No  Diarrhea: YES- not sure if related to something she ate   Constipation: No   Dysuria or Hematuria: No  History:   Trauma: No  Previous similar pain: YES- in the back and ended up being a fatty liver   Previous tests done: none  Precipitating factors:   Does the pain change with:     Food: No    Bowel Movement: No    Urination: No, hard time making it to bathroom (been on going) but is getting worse    Other factors:  No  Therapies tried and outcome: None    Patient's last menstrual period was 05/29/2006.          Review of Systems   Constitutional: Negative.    HENT: Negative.     Eyes: Negative.    Respiratory: Negative.     Cardiovascular: Negative.    Gastrointestinal:  Positive for abdominal pain. Negative for nausea and vomiting.   Endocrine: Negative.    Breasts:  negative.    Genitourinary: Negative.    Musculoskeletal: Negative.    Skin: Negative.    Allergic/Immunologic: Negative.     Neurological: Negative.    Hematological: Negative.    Psychiatric/Behavioral: Negative.              Objective           Vitals:  No vitals were obtained today due to virtual visit.    Physical Exam   GENERAL: Healthy, alert and no distress  EYES: Eyes grossly normal to inspection.  No discharge or erythema, or obvious scleral/conjunctival abnormalities.  RESP: No audible wheeze, cough, or visible cyanosis.  No visible retractions or increased work of breathing.    SKIN: Visible skin clear. No significant rash, abnormal pigmentation or lesions.  NEURO: Cranial nerves grossly intact.  Mentation and speech appropriate for age.  PSYCH: Mentation appears normal, affect normal/bright, judgement and insight intact, normal speech and appearance well-groomed.    Results for orders placed or performed in visit on 10/03/23   Amylase     Status: Normal   Result Value Ref Range    Amylase 66 28 - 100 U/L   Lipase     Status: Normal   Result Value Ref Range    Lipase 24 13 - 60 U/L   Comprehensive metabolic panel (BMP + Alb, Alk Phos, ALT, AST, Total. Bili, TP)     Status: Abnormal   Result Value Ref Range    Sodium 140 135 - 145 mmol/L    Potassium 4.3 3.4 - 5.3 mmol/L    Carbon Dioxide (CO2) 27 22 - 29 mmol/L    Anion Gap 9 7 - 15 mmol/L    Urea Nitrogen 13.8 8.0 - 23.0 mg/dL    Creatinine 0.85 0.51 - 0.95 mg/dL    GFR Estimate 74 >60 mL/min/1.73m2    Calcium 9.7 8.8 - 10.2 mg/dL    Chloride 104 98 - 107 mmol/L    Glucose 102 (H) 70 - 99 mg/dL    Alkaline Phosphatase 77 35 - 104 U/L    AST 17 0 - 45 U/L    ALT 10 0 - 50 U/L    Protein Total 7.3 6.4 - 8.3 g/dL    Albumin 4.1 3.5 - 5.2 g/dL    Bilirubin Total 0.3 <=1.2 mg/dL   CBC with platelets and differential     Status: Abnormal   Result Value Ref Range    WBC Count 7.7 4.0 - 11.0 10e3/uL    RBC Count 5.06 3.80 - 5.20 10e6/uL    Hemoglobin 11.9 11.7 - 15.7 g/dL    Hematocrit 40.1 35.0 - 47.0 %    MCV 79 78 - 100 fL    MCH 23.5 (L) 26.5 - 33.0 pg    MCHC 29.7 (L) 31.5 -  36.5 g/dL    RDW 15.3 (H) 10.0 - 15.0 %    Platelet Count 214 150 - 450 10e3/uL    % Neutrophils 63 %    % Lymphocytes 29 %    % Monocytes 7 %    Mids % (Monos, Eos, Basos)      % Eosinophils 1 %    % Basophils 0 %    % Immature Granulocytes 0 %    Absolute Neutrophils 4.9 1.6 - 8.3 10e3/uL    Absolute Lymphocytes 2.2 0.8 - 5.3 10e3/uL    Absolute Monocytes 0.5 0.0 - 1.3 10e3/uL    Mids Abs (Monos, Eos, Basos)      Absolute Eosinophils 0.1 0.0 - 0.7 10e3/uL    Absolute Basophils 0.0 0.0 - 0.2 10e3/uL    Absolute Immature Granulocytes 0.0 <=0.4 10e3/uL   CBC with platelets and differential     Status: Abnormal    Narrative    The following orders were created for panel order CBC with platelets and differential.  Procedure                               Abnormality         Status                     ---------                               -----------         ------                     CBC with platelets and d...[[id number]]  Abnormal            Final result                 Please view results for these tests on the individual orders.               Video-Visit Details    Type of service:  Video Visit   Video Start Time: 12:15 PM  Video End Time:12:25 PM    Originating Location (pt. Location): Home    Distant Location (provider location):  On-site  Platform used for Video Visit: [employer]

## 2023-10-10 ENCOUNTER — HOSPITAL ENCOUNTER (OUTPATIENT)
Dept: CT IMAGING | Facility: CLINIC | Age: 68
Discharge: HOME | End: 2023-10-10
Attending: FAMILY MEDICINE | Admitting: FAMILY MEDICINE
Payer: COMMERCIAL

## 2023-10-10 DIAGNOSIS — R10.31 ABDOMINAL PAIN, RIGHT LOWER QUADRANT: ICD-10-CM

## 2023-10-10 PROCEDURE — 250N000009 HC RX 250: Performed by: RADIOLOGY

## 2023-10-10 PROCEDURE — 250N000011 HC RX IP 250 OP 636: Performed by: RADIOLOGY

## 2023-10-10 PROCEDURE — 74177 CT ABD & PELVIS W/CONTRAST: CPT

## 2023-10-10 RX ORDER — IOPAMIDOL 755 MG/ML
100 INJECTION, SOLUTION INTRAVASCULAR ONCE
Status: COMPLETED | OUTPATIENT
Start: 2023-10-10 | End: 2023-10-10

## 2023-10-10 RX ADMIN — IOPAMIDOL 100 ML: 755 INJECTION, SOLUTION INTRAVENOUS at 16:16

## 2023-10-10 RX ADMIN — SODIUM CHLORIDE 72 ML: 9 INJECTION, SOLUTION INTRAVENOUS at 16:16

## 2023-10-12 ENCOUNTER — TELEPHONE (OUTPATIENT)
Dept: FAMILY MEDICINE | Facility: CLINIC | Age: 68
End: 2023-10-12
Payer: COMMERCIAL

## 2023-10-12 DIAGNOSIS — M81.0 OSTEOPOROSIS, UNSPECIFIED OSTEOPOROSIS TYPE, UNSPECIFIED PATHOLOGICAL FRACTURE PRESENCE: Primary | ICD-10-CM

## 2023-10-12 DIAGNOSIS — M81.8 OTHER OSTEOPOROSIS WITHOUT CURRENT PATHOLOGICAL FRACTURE: ICD-10-CM

## 2023-10-12 NOTE — RESULT ENCOUNTER NOTE
Please inform patient that test result was within normal parameters. Patient is asked to follow up if her pain persists.  Thank you.     Jennie Suazo M.D.

## 2023-10-12 NOTE — TELEPHONE ENCOUNTER
Patient calls with request for Prolia injection.  RN reviewed process below, per outline. Advised will forward this to PCP for orders, then next steps.   Last Prolia injection was 3/29/23. Patient confirms she has had no recent dental work and doesn't plan to have any major dental work in the next several months.   Would PCP like to order Prolia?    RN notes patient had CMP including calcium with normal result on 10/3/23. Any further labs needed?    Lisa Hernandez RN  [facility]        RN steps prior to Prolia injection:    1.  Obtain CAM order from a provider at the site the patient intends to receive the injection.  Forwarded to PCP for order on 10/12/23.   Dr Suazo sent order today.    2.  Start a telephone encounter and route to the CAM PA pool: [phone]. PA needs to be approved prior to ordering medication and is typically approved for 2 doses for the year.  10/18/23: Routed to PA pool.  PA has been given.    3.  Once the PA is approved, contact patient and arrange for lab work.  Calcium needs be completed within 2 weeks of injection per  recommendations, or per provider authorization.  Lab work is ordered by the ordering provider.    4.  Verify that patient has not had major dental work in the past 6 months and does not plan to have major dental work within the upcoming 6 months.  Confirmed on 10/12/23.   5.  If lab work is acceptable, contact patient and schedule RN visit for the injection, allowing time for the Prolia to arrive from wholesale pharmacy (medications are delivered on Mondays at [place], and if ordered the Thursday or Friday prior may not arrive until the following Monday).  Order Prolia from the wholesale pharmacy: [email or web address]    6. Print a copy of the order confirmation e-mail received.  Print 2 patient labels-attach one to the order and another to the order log sheet on clipboard by fridge and fill out log appropriately.     7.  When the medication arrives, MA will attach the order sheet to the medication, place in the fridge and update the order log sheet.  Med is kept in fridge until administered by RN.   *Prolia is a subcutaneous injection used to treat osteoporosis.  It can be left in fridge until patient arrives.  It needs to used within 15 minutes, otherwise placed back in fridge during this timeframe, or used within 30 days.

## 2023-10-13 ENCOUNTER — TELEPHONE (OUTPATIENT)
Dept: RHEUMATOLOGY | Facility: CLINIC | Age: 68
End: 2023-10-13
Payer: COMMERCIAL

## 2023-10-13 NOTE — TELEPHONE ENCOUNTER
"Called patient to verify the \"ankle implant\".    Pt possibly may be getting Tibial Nerve Stimulation for Urinary control.    Patient has already held her Humira last inj because of an unhealing cut she had.  Now inquires if need to hold Humira for getting this.      Discussed that the unknown extent of the incision and if external leads will be in place until final placement or if it is the repeated \"treatments\" vs actual implant: advised possibly unable to provide definitive answer at this time.  Advised that sometimes a Provider will hold a biologic if high chance of infection or if high possibility of delayed healing.  Patient will get more info and ask further if needed once more info obtained.  Fariha VILLATORO   Specialty Clinic RN  "

## 2023-10-13 NOTE — TELEPHONE ENCOUNTER
[facility] Call Center    Phone Message    May a detailed message be left on voicemail: yes     Reason for Call: Other: Karen called to inform she is doing a study with Urology and there will be an implant in her ankle to assist with Urine urgency. Please contact her asap to discuss opinion. Thank you     Action Taken: Other: Rheum    Travel Screening: Not Applicable

## 2023-10-19 NOTE — TELEPHONE ENCOUNTER
Becky Ohara Lisa A, RN  Caller: Unspecified (1 week ago,  1:23 PM)  Hello,    I am working on the order that was entered for Prolia for this patient.  With their insurance coverage, it follows Medicare's NGS policy.  Currently we have the DX M81.0 but this request will need an additional diagnosis to support why this patient is not appropriate for other therapies.      M81.8 (ICD-10-CM) - Other osteoporosis without current pathological fracture (**covered as a single code)    Z92.29 - Personal history of other drug therapy  T50.995S - Adverse effect of other drugs, medicaments and biological substances, sequela  T88.7XXS - Unspecified adverse effect of drug or medicament, sequela  Z88.8 - Allergy status to other drugs, medicaments and biological substances status    N18.3 - Chronic kidney disease, stage 3 (moderate)  N18.4 - Chronic kidney disease, stage 4 (severe)  N18.5 - Chronic kidney disease, stage 5    Would a second diagnosis code apply to this patient?    If so, can you please have it added to the order or enter a new order with both codes?    Thank you,  Becky

## 2023-10-24 ENCOUNTER — PATIENT OUTREACH (OUTPATIENT)
Dept: CARE COORDINATION | Facility: CLINIC | Age: 68
End: 2023-10-24
Payer: COMMERCIAL

## 2023-10-24 NOTE — TELEPHONE ENCOUNTER
Routed to Dr Suazo.  Please see note for Becky in prior auth regarding request for additional supporting diagnoses if appropriate.    Tamela Chand RN

## 2023-11-01 ENCOUNTER — TELEPHONE (OUTPATIENT)
Dept: FAMILY MEDICINE | Facility: CLINIC | Age: 68
End: 2023-11-01
Payer: COMMERCIAL

## 2023-11-01 NOTE — TELEPHONE ENCOUNTER
Left non-detailed message for patient to return a call to the clinic RN.     Next step:  schedule lab work prior to injection.    TRINIDAD Chand RN

## 2023-11-01 NOTE — TELEPHONE ENCOUNTER
Reason for Call:  Appointment Request    Patient requesting this type of appt:  Nurse visit     Requested provider: MA/JUVE/LPN Visit    Reason patient unable to be scheduled: Needs to be scheduled by clinic    When does patient want to be seen/preferred time: 3-7 days    Comments: Prolia shot    Could we send this information to you in [employer] or would you prefer to receive a phone call?:   Patient would prefer a phone call   Okay to leave a detailed message?: Yes at Cell number on file:    Telephone Information:   Mobile [phone]       Call taken on 11/1/2023 at 4:28 PM by Mary Beth Lima

## 2023-11-01 NOTE — TELEPHONE ENCOUNTER
Patient returned call. Transferred her to scheduling to schedule her labwork and she will schedule for a few days later for the Prolia. If his is incorrect timing I apologize.     Mandi Baker RN

## 2023-11-02 NOTE — TELEPHONE ENCOUNTER
Called pt. She has lab appt scheduled for tomorrow, 11/3.  Pt was told that once lab is resulted & in normal range, we will call pt to schedule prolia injection & order the medication.  Pt verbalized understanding.    Chandni Corona RN

## 2023-11-03 ENCOUNTER — LAB (OUTPATIENT)
Dept: LAB | Facility: CLINIC | Age: 68
End: 2023-11-03
Payer: COMMERCIAL

## 2023-11-03 DIAGNOSIS — M81.0 OSTEOPOROSIS, UNSPECIFIED OSTEOPOROSIS TYPE, UNSPECIFIED PATHOLOGICAL FRACTURE PRESENCE: ICD-10-CM

## 2023-11-03 DIAGNOSIS — M81.8 OTHER OSTEOPOROSIS WITHOUT CURRENT PATHOLOGICAL FRACTURE: ICD-10-CM

## 2023-11-03 LAB
ALBUMIN SERPL BCG-MCNC: 4.3 G/DL (ref 3.5–5.2)
CALCIUM SERPL-MCNC: 9.3 MG/DL (ref 8.8–10.2)
CALCIUM SERPL-MCNC: 9.5 MG/DL (ref 8.8–10.2)

## 2023-11-03 PROCEDURE — 82310 ASSAY OF CALCIUM: CPT | Mod: 59

## 2023-11-03 PROCEDURE — 36415 COLL VENOUS BLD VENIPUNCTURE: CPT

## 2023-11-03 PROCEDURE — 82040 ASSAY OF SERUM ALBUMIN: CPT

## 2023-11-06 NOTE — TELEPHONE ENCOUNTER
Lab work passes protocol.    Prolia ordered from wholesale pharmacy.    Pt notified.  RN appt scheduled for 11/14/23.    Tamela Chand RN

## 2023-11-14 ENCOUNTER — ALLIED HEALTH/NURSE VISIT (OUTPATIENT)
Dept: FAMILY MEDICINE | Facility: CLINIC | Age: 68
End: 2023-11-14
Payer: COMMERCIAL

## 2023-11-14 DIAGNOSIS — M81.0 OSTEOPOROSIS: Primary | ICD-10-CM

## 2023-11-14 PROCEDURE — 96372 THER/PROPH/DIAG INJ SC/IM: CPT | Performed by: FAMILY MEDICINE

## 2023-11-14 PROCEDURE — 99207 PR NO CHARGE NURSE ONLY: CPT

## 2023-11-14 NOTE — PROGRESS NOTES
Clinic Administered Medication Documentation      Prolia Documentation    Indication: Prolia  (denosumab) is a prescription medicine used to treat osteoporosis in patients who:   Are at high risk for fracture, meaning patients who have had a fracture related to osteoporosis, or who have multiple risk factors for fracture.  Cannot use another osteoporosis medicine or other osteoporosis medicines did not work well.  The timeline for early/late injections would be 4 weeks early and any time after the 6 month anat. If a patient receives their injection late, then the subsequent injection would be 6 months from the date that they actually received the injection.    When was the last injection?  3/29/23  Was the last injection at least 6 months ago? Yes  Has the prior authorization been completed?  Yes  Is there an active order (written within the past 365 days, with administrations remaining, not ) in the chart?  Yes  Patient denies any dental work involving the bone (e.g. tooth extraction or dental implants) in the past 4 weeks?  Yes  Patient denies plans for any dental work involving the bone (e.g. tooth extraction or dental implants) in the next 4 weeks? Yes    The following steps were completed to comply with the REMS program for Prolia:  Reviewed information in the Medication Guide and Patient Counseling Chart, including the serious risks of Prolia  and the symptoms of each risk.  Advised patient to seek prompt medical attention if they have signs or symptoms of any of the serious risks.  Provided each patient a copy of the Medication Guide and Patient Brochure.    Prior to injection, verified patient identity using patient's name and date of birth. Medication was administered. Please see MAR and medication order for additional information. Patient instructed to remain in clinic for 15 minutes and report any adverse reaction to staff immediately.    Vial/Syringe: Single dose vial. Was entire vial of  "medication used? Yes  Was this medication supplied by the patient? No  Patient has no refills remaining.  Medication is ordered every 6 months via [facility] after timeframe, insurance and lab criteria/protocol met.   Writing RN created a \"Remind Me\" Pt reminder to be sent to the [facility] pool on 4/15/24 so the process can be started before next due, as patient will be next due for injection 5/11/24 and [place].     Lisa Hernandez RN  [facility]        "

## 2023-11-20 ENCOUNTER — PATIENT OUTREACH (OUTPATIENT)
Dept: CARE COORDINATION | Facility: CLINIC | Age: 68
End: 2023-11-20
Payer: COMMERCIAL

## 2023-11-20 NOTE — PROGRESS NOTES
Rheumatology Clinic Visit  [facility]  MARISELA Ulloa     Jacquie Harris MRN# [id number]   YOB: 1955 Age: 68 year old   Date of Visit: 11/27/2023  Primary care provider: Jennie Suazo          Assessment and Plan:     1.  Seronegative rheumatoid arthritis  2.  High-risk medication use    Patient presents today for follow-up.  She is doing well with the Humira and notes improvement in her joint pain and swelling.  Unfortunately, she misread the directions and was taking the Humira every week versus every other week. She did skip last week and has a dose for today, but will be out of the medication after today's dose. Physical examination today shows resolution of the synovitis over her MCPs and PIPs. Previous laboratory evaluations were reviewed, results below.    Given the significant improvement that she has had with the Humira the plan will be to continue this.  We will have her do the Humira every other week.  I did send in a refill for the dosing to be like this.  Reassured the patient that Humira can be given weekly if needed due to an inadequate response and as she tolerated it it is unlikely that she will now have any adverse effects.  We will however again switch her to the every other week.  She will be due for labs in April 2024.  Follow-up with me in 3 months.      Plan:   Schedule follow-up with Danni Martínez PA-C in 3 months.  Labs: CBC, creatinine, Albumin, AST, ALT CRP and Sed rate next due in April  Medication recommendations:   Continue Humira 40mg/0.4mL every 2 weeks  # Adalimumab (Humira) Risks and Benefits: The risks and benefits of adalimumab were discussed in detail and the patient verbalized understanding.  The risks discussed include, but are not limited to, the risk for hypersensitivity, anaphylaxis, anaphylactoid reactions, an increased risk for serious infections leading to hospitalization or death, a possible increased risk for lymphoma and other  malignancies, a possible worsening of demyelinating diseases, a possible worsening of heart failure, risk for cytopenias, risk for drug induced lupus, possible reactivation of hepatitis B, and possible reactivation of latent tuberculosis.  Subcutaneous injections may result in injection site reactions and/or pain at the site of injection.  The most common adverse reactions are infections, injection site reactions, headache, and rash.  It was discussed that the medication would need to be discontinued if a serious infection develops.  It was discussed that live vaccinations should not be received while using adalimumab or within 30 days prior to starting adalimumab.  I encouraged reviewing the package insert and asking any questions about the medication.      MARISELA Ulloa  Rheumatology         History of Present Illness:   Jacquie Harris presents for evaluation of positive SYLVIE, inflammatory arthritis.    Rheumatological history:  Provider: Dr. Uribe   Current medication: none  Previous Medications: Plaquenil 200 mg twice daily, last eye exam 02/27/2023, Sulfasalazine, Methotrexate (non effective)  Pertinent labs: Negative rheumatoid factor, CCP antibody, sed rate and CRP.  Positive SYLVIE with a titer of 1:160, speckled pattern.  Normal/negative SYLVIE subsets, ANCA, uric acid, C3, C4    Interval history November 27, 2023:  She did make a mistake and was taking her Humira weekly. She did skip last week and has a dose for today. She is finding relief. Last she was getting stuck and has some soreness, but overall feels things are good.     Interval history August 25, 2025:  She has not been doing well. She has not been able to get Methotrexate.No history of infections. No cardiovascular history. No history of cancer.      Interval history May 17, 2023:  She states that her hands are close today for making a fist. She still gets some trigger finger. She does still have a hard time opening the bottles. Overall  "she has not seen a lot of difference with the methotrexate.     Interval history March 15, 2023:  She increased the Sulfasalazine to 1 in the AM and 2 in the evening. She has continued with the Hydroxychloroquine as well. She states that her hands continue to get stuck and she has a lot of pain with them. She reports spending a lot of time rinsing them in hot water.     Interval history January 11, 2023:  She states that she is \"okay\". She has been having increased doctoring as her knees are \"really bad\". She has been working with some providers on that. She is thinking this is where the arthritis has started. She states that her hands continue to be the same. She has not noticed a change with the Sulfasalazine. Her hands continue to get stuck and she feels it is affecting more fingers. She is tolerating the medications well. She has not had any infections or fevers.     Interval history November 9, 2022:  She reports that the prednisone did anything. She states that her fingers continue to feel as if they are getting stuck. She continues to feel the swelling as well. She did have initial improvement in symptoms with the Hydroxychloroquine. She feels this was helpful until just before Dr. Uribe left. Initially she thought it was secondary to the humidity as it started in the summer.     History of gastric bleed on NSAIDs.    HPI from consult 10/5/2022:  Patient was previously treated with Dr. Uribe.  Last office visit was on 6/30/2022.  Initial evaluation was on November 10, 2021.  At that time she had reported pain in her bilateral hands with morning stiffness lasting 2 to 3 hours.  Physical exam showed subtle fullness involving the left third MCP and right fifth MCP joints with tenderness to palpation.  Mild tenderness involving the right fifth PIP and left first CMC.  Nodule involving the left third digit A1 pulley area with some tenderness on palpation and a subtle catching sensation with active " "flexion of the left third digit.    She has been unable to do the Paraffin wax due to cost. She uses hot water. She uses this in the morning to get things going. She states that she hurts today. She states that she has some good days, but \"not very many\". She is unsure that the Plaquenil is working. She states that she was expecting it to be a \"miracle\". She states that she was under the impression that you need something to stop and slow the arthritis with rheumatoid arthritis. She states that she does not do much with her fingers any more. She cannot paint or do crafts. She states that she has so much pain that she cannot even think about painting. She states that the pain is bad in the mornings and evening. If she is able to keep them warm, that seems to help. Many of her symptoms started summer 2020. She was noticing her fingers getting \"crooked\". She has not been getting the sticking of the fingers anymore.     No known family history of rheumatoid arthritis or lupus.  No known family history or personal history of psoriasis, ulcerative colitis or Crohn's disease.             Review of Systems:     Constitutional: negative  Skin: negative  Eyes: negative  Ears/Nose/Throat: negative  Respiratory: No shortness of breath, dyspnea on exertion, cough, or hemoptysis  Cardiovascular: negative  Gastrointestinal: negative  Genitourinary: negative  Musculoskeletal: as above  Neurologic: negative  Psychiatric: negative  Hematologic/Lymphatic/Immunologic: negative  Endocrine: negative         Active Problem List:     Patient Active Problem List    Diagnosis Date Noted    History of total bilateral knee replacement 01/09/2023     Priority: Medium    Benign essential hypertension 06/19/2020     Priority: Medium    Rotator cuff syndrome, right 01/14/2020     Priority: Medium    Multiple joint pain, possible fibromyalgia 12/20/2019     Priority: Medium    Bronchospasm 01/07/2019     Priority: Medium    GI bleed 03/17/2018     " Priority: Medium    Upper GI bleed 03/17/2018     Priority: Medium    Shoulder injury, right, subsequent encounter 08/21/2017     Priority: Medium    Fatty liver 04/20/2017     Priority: Medium    Elevated levels of transaminase & lactic acid dehydrogenase 04/15/2017     Priority: Medium    Bilateral cold feet 01/03/2017     Priority: Medium    Colles' fracture 07/06/2015     Priority: Medium    Encounter for routine gynecological examination  06/04/2015     Priority: Medium    Back pain, left below scapula, strained muscles 10/24/2013     Priority: Medium    HTN, goal below 140/80 05/05/2013     Priority: Medium    S/P TKR (total knee replacement) 04/03/2012     Priority: Medium    Osteoporosis 02/19/2012     Priority: Medium     Previous T scores -2.2  2/2012 Dexascan:  Lumbar spine L1-L4 T-score: -2.4, Left femoral neck T-score: -2.7, Right femoral neck T-score: -2.2   Percent change in lumbar spine: +6.6% since 4/16/2008   Percent change in femurs: +2.9% since 4/16/2008    IMPRESSION: Osteoporosis in the left femoral neck. Severe osteopenia  in the right femoral neck and lumbar spine.      Obesity, Class III, BMI 40-49.9 (morbid obesity) (H) 02/19/2012     Priority: Medium     ideal weight 120, goal weight 200, lose 58 lbs in 58 weeks.      Vitamin D deficiency 07/18/2011     Priority: Medium    Tick bite, infected, positive Lyme test 1996 not treated 07/08/2011     Priority: Medium    Rosacea 11/04/2009     Priority: Medium    Obstructive sleep apnea 12/01/2006     Priority: Medium     Sleep study 12/06 for EDS- AHI 36, desaturations to 65%. CPAP recommended but not tolerated.  01/16/07  ENT consult recommend CPAP and weight loss. Surgery discussed but success rate less than CPAP  1/15/07 CPAP trial, was not able to tolerate. Retried 2008, tolerated better.   Problem list name updated by automated process. Provider to review      Carpal tunnel syndrome 06/07/2006     Priority: Medium     04/10/09 Dr.Meletiou Mercado  [facility]. Finding consistant with CTS, but nerve studies and MRI of C-Spine are normal. Corticosteroid injection given in office      CARDIOVASCULAR SCREENING; LDL GOAL LESS THAN 160 10/31/2010     Priority: Low    Allergic rhinitis 03/27/2006     Priority: Low     Problem list name updated by automated process. Provider to review      s/p gastric bypass x 2 07/05/2005     Priority: Low     Gastric bypass 1980              Past Medical History:     Past Medical History:   Diagnosis Date    HTN (hypertension)     IRON DEFICIENCY ANEMIA 7/5/2005     Iron infusion/ resolved since injections    Obstructive sleep apnea     DARWIN (obstructive sleep apnea)      Past Surgical History:   Procedure Laterality Date    ARTHROPLASTY KNEE  04/02/2012    Procedure:ARTHROPLASTY KNEE; Left Total Knee Arthroplasty--Anesth.Choice; Surgeon:HERNANDO THOMPSON; Location:[facility]    ARTHROTOMY SHOULDER, ROTATOR CUFF REPAIR, COMBINED Right 08/25/2017    Procedure: COMBINED ARTHROTOMY SHOULDER, ROTATOR CUFF REPAIR;  Right shoulder distal clavicle excision, subacromial decompression, rotator cuff repair ;  Surgeon: Hernando Thompson MD;  Location: [facility]    ARTHROTOMY SHOULDER, ROTATOR CUFF REPAIR, COMBINED Right 12/18/2017    Procedure: COMBINED ARTHROTOMY SHOULDER, ROTATOR CUFF REPAIR;  Right Shoulder Rotator Cuff Revision;  Surgeon: Hernando Thompson MD;  Location: [facility]    ARTHROTOMY SHOULDER, ROTATOR CUFF REPAIR, COMBINED Left 04/27/2018    Procedure: COMBINED ARTHROTOMY SHOULDER, ROTATOR CUFF REPAIR;  Left Shoulder Open Subacromial Decompression,Distal Clavicle Excision,Rotator Cuff Repair;  Surgeon: Hernando Thompson MD;  Location: [facility] OR    C/SECTION, LOW TRANSVERSE  1978, 1984, 1994    x 3    COLONOSCOPY  01/01/2001    normal colonoscopy    COLONOSCOPY N/A 08/22/2017    Procedure: COLONOSCOPY;  Colonoscopy  ;  Surgeon: Delfino Yoo MD;  Location: [facility]    COLONOSCOPY  08/22/2017    EXAMCOL    ESOPHAGOSCOPY,  GASTROSCOPY, DUODENOSCOPY (EGD), COMBINED N/A 03/18/2018    Procedure: COMBINED ESOPHAGOSCOPY, GASTROSCOPY, DUODENOSCOPY (EGD);;  Surgeon: Poncho Galindo MD;  Location: [facility]    GASTRIC BYPASS  1980/2005    Gastric Bypass and revision    HERNIA REPAIR, INCISIONAL  06/16/2008    lap.repair with 10x8 mesh    OK REVISE KNEE JOINT REPLACE,ALL PARTS Left 05/29/2019    Dr. Cedric Fuller    OK TOTAL KNEE ARTHROPLASTY Right 06/22/2020    Dr. Cedric Fuller    TUBAL LIGATION  01/01/1994    [facility] ORAL SURGERY PROCEDURE  age 18    wisdom teeth            Social History:     Social History     Socioeconomic History    Marital status:      Spouse name: Not on file    Number of children: Not on file    Years of education: Not on file    Highest education level: Not on file   Occupational History    Not on file   Tobacco Use    Smoking status: Never    Smokeless tobacco: Never   Vaping Use    Vaping Use: Never used   Substance and Sexual Activity    Alcohol use: Yes     Alcohol/week: 0.0 standard drinks of alcohol     Comment: mixed very light 1 a month if that    Drug use: No    Sexual activity: Yes     Partners: Male     Birth control/protection: Surgical   Other Topics Concern    Parent/sibling w/ CABG, MI or angioplasty before 65F 55M? No     Service No    Blood Transfusions Yes     Comment: 1976, 1978, 1978, 1984    Caffeine Concern No    Occupational Exposure No    Hobby Hazards No    Sleep Concern No    Stress Concern No    Weight Concern Yes    Special Diet No    Back Care No    Exercise No    Bike Helmet No    Seat Belt Yes    Self-Exams Not Asked   Social History Narrative    Not on file     Social Determinants of Health     Financial Resource Strain: Low Risk  (10/3/2023)    Financial Resource Strain     Within the past 12 months, have you or your family members you live with been unable to get utilities (heat, electricity) when it was really needed?: No   Food Insecurity: Unknown (10/3/2023)    Food  Insecurity     Within the past 12 months, did you worry that your food would run out before you got money to buy more?: Patient refused     Within the past 12 months, did the food you bought just not last and you didn t have money to get more?: Patient refused   Transportation Needs: Low Risk  (10/3/2023)    Transportation Needs     Within the past 12 months, has lack of transportation kept you from medical appointments, getting your medicines, non-medical meetings or appointments, work, or from getting things that you need?: No   Physical Activity: Not on file   Stress: Not on file   Social Connections: Not on file   Interpersonal Safety: Not on file   Housing Stability: Low Risk  (10/3/2023)    Housing Stability     Do you have housing? : Yes     Are you worried about losing your housing?: No          Family History:     Family History   Problem Relation Age of Onset    C.A.D. Father         MI at age 60    Hypertension Father     Alzheimer Disease Father     Hyperlipidemia Father     Osteoporosis Mother         on Fosamax    Glaucoma Mother     Other Cancer Brother     Stomach Cancer Maternal Aunt     Cervical Cancer Cousin     Diabetes No family hx of     Cerebrovascular Disease No family hx of     Breast Cancer No family hx of     Cancer - colorectal No family hx of     Prostate Cancer No family hx of     Liver Disease No family hx of             Allergies:     Allergies   Allergen Reactions    Ibuprofen      Other reaction(s): Gastrointestinal  Gastric bleed    Naproxen GI Disturbance     Other reaction(s): Gastrointestinal  Gastric bleed    Nickel Itching     Inflamed with cheap earrings (itchy)    Rofecoxib Itching and Swelling     VIOXX (Swollen Feet,Hands itching), Okay with ibuprofen and aspirin    Vioxx Itching and Swelling     VIOXX (Swollen Feet,Hands itching), Okay with ibuprofen and aspirin            Medications:     Current Outpatient Medications   Medication Sig Dispense Refill    acetaminophen  "(TYLENOL) 500 MG tablet Take 1,000 mg by mouth      albuterol (PROAIR HFA/PROVENTIL HFA/VENTOLIN HFA) 108 (90 Base) MCG/ACT inhaler INHALE 2 PUFFS INTO THE LUNGS EVERY 6 HOURS AS NEEDED FOR SHORTNESS OF BREATH OR DIFFICULT BREATHING OR WHEEZING 25.5 g 1    atenolol (TENORMIN) 50 MG tablet TAKE 2 TABLETS BY MOUTH DAILY 200 tablet 1    NYAMYC 865192 UNIT/GM external powder APPLY TO AFFECTED AREA(S)  TOPICALLY 3 TIMES DAILY AS  NEEDED 60 g 1    oxybutynin ER (DITROPAN XL) 5 MG 24 hr tablet TAKE 1 TABLET BY MOUTH  TWICE DAILY 200 tablet 1    denosumab (PROLIA) 60 MG/ML SOSY injection Inject 1 mL (60 mg) Subcutaneous once for 1 dose 1 mL 0    denosumab (PROLIA) 60 MG/ML SOSY injection Inject 1 mL (60 mg) Subcutaneous once for 1 dose 1 mL 0    denosumab (PROLIA) 60 MG/ML SOSY injection Inject 1 mL (60 mg) Subcutaneous once for 1 dose 1 mL 0            Physical Exam:   Blood pressure (!) 146/81, pulse 69, resp. rate 20, height 1.6 m (5' 3\"), weight 118.4 kg (261 lb), last menstrual period 2006, SpO2 98%, not currently breastfeeding.  Wt Readings from Last 6 Encounters:   23 118.4 kg (261 lb)   23 117.9 kg (260 lb)   23 117.9 kg (260 lb)   23 120.7 kg (266 lb)   23 120.2 kg (265 lb)   03/15/23 120.7 kg (266 lb)     Constitutional: well-developed, appearing stated age; cooperative  Eyes: nl  conjunctiva, sclera  ENT: nl external ears, hearing,  Resp: No shortness of breath with normal conversation  MSK: resolution of synovitis over multiple MCPs bilaterally.    Psych: nl judgement, orientation, memory, affect.           Data:   Imagin/10/2021 x-ray bilateral hand  IMPRESSION: Generalized demineralization. Mild degenerative arthritis involving the interphalangeal joints of the fingers, and the basilar joints of both thumbs, with mild joint space narrowing and osteophytic spurring. Generalized bone demineralization.  No evidence of erosive arthropathy. Soft tissues are " normal.    11/10/2021 x-ray bilateral knee  IMPRESSION:   RIGHT KNEE: Uncomplicated cemented total knee arthroplasty. Normal joint alignment. No fracture or erosion. No joint effusion.     LEFT KNEE: Cemented longstem hinged total knee arthroplasty. No evidence of loosening or hardware complication. Mild patella baja. No fracture or erosion. No joint effusion.    11/10/2021 x-ray right wrist  IMPRESSION: No acute fracture. Chronic healed fracture of the distal radius. Chronic ununited fracture of the ulnar styloid process. Moderate ulnar positive variance. Mild degenerative changes at the STT and first CMC joints.    2/21/2022 x-rays left shoulder  IMPRESSION:  1.  Normal left glenohumeral joint spacing and alignment.  2.  Distal clavicle excision, unchanged.  3.  No fracture.  4.  No significant change since the comparison.    2/21/2022 x-ray cervical spine  IMPRESSION: Minimal degenerative anterolisthesis of C2 upon C3, C3  upon C4 and C5 on C6. Alignment otherwise normal. Vertebral body  heights are normal. Moderate facet arthropathy at C2-C3. Mild  degenerative endplate spurring at C4-C5, C5-C6 and C6-C7. No  fractures.    Laboratory:  10/4/2022  Creatinine 0.79, GFR 81  Albumin 4.1  ALT 9, AST 17  White blood cell count 5.7, hemoglobin 11.9, platelet count 215    12/19/2022  Creatinine 0.72, GFR greater than 90  Albumin 4.0  ALT 7, AST 20  White blood cell count, hemoglobin, platelet count within normal limits    1/10/2023  Albumin creatinine 42.87  Albumin urine 85.4    3/9/2023  Creatinine 0.77, GFR 84  Albumin 3.9  ALT 10, AST 19  Albumin in the urine less than 12  CBC within normal limits    5/17/2023  Creatinine 0.62, GFR greater than 90  Albumin 4.2  ALT 10, AST 17  White blood cell count 5.0, hemoglobin 11.6, platelet count 209

## 2023-11-26 ASSESSMENT — PAIN SCALES - GENERAL: PAINLEVEL: NO PAIN (0)

## 2023-11-27 ENCOUNTER — OFFICE VISIT (OUTPATIENT)
Dept: RHEUMATOLOGY | Facility: CLINIC | Age: 68
End: 2023-11-27
Payer: COMMERCIAL

## 2023-11-27 VITALS
DIASTOLIC BLOOD PRESSURE: 81 MMHG | BODY MASS INDEX: 46.25 KG/M2 | WEIGHT: 261 LBS | SYSTOLIC BLOOD PRESSURE: 146 MMHG | HEART RATE: 69 BPM | OXYGEN SATURATION: 98 % | RESPIRATION RATE: 20 BRPM | HEIGHT: 63 IN

## 2023-11-27 DIAGNOSIS — M06.09 SERONEGATIVE RHEUMATOID ARTHRITIS OF MULTIPLE SITES (H): Primary | ICD-10-CM

## 2023-11-27 DIAGNOSIS — Z79.899 HIGH RISK MEDICATION USE: ICD-10-CM

## 2023-11-27 PROCEDURE — 99214 OFFICE O/P EST MOD 30 MIN: CPT | Performed by: PHYSICIAN ASSISTANT

## 2023-11-27 NOTE — PATIENT INSTRUCTIONS
After Visit Instructions:     Thank you for coming to [facility] for your care. It is my goal to partner with you to help you reach your optimal state of health.       Plan:     Schedule follow-up with Danni Martínez PA-C in 3 months.  Labs: CBC, creatinine, Albumin, AST, ALT CRP and Sed rate next due in April  Medication recommendations:   Continue Humira 40mg/0.4mL every 2 weeks  # Adalimumab (Humira) Risks and Benefits: The risks and benefits of adalimumab were discussed in detail and the patient verbalized understanding.  The risks discussed include, but are not limited to, the risk for hypersensitivity, anaphylaxis, anaphylactoid reactions, an increased risk for serious infections leading to hospitalization or death, a possible increased risk for lymphoma and other malignancies, a possible worsening of demyelinating diseases, a possible worsening of heart failure, risk for cytopenias, risk for drug induced lupus, possible reactivation of hepatitis B, and possible reactivation of latent tuberculosis.  Subcutaneous injections may result in injection site reactions and/or pain at the site of injection.  The most common adverse reactions are infections, injection site reactions, headache, and rash.  It was discussed that the medication would need to be discontinued if a serious infection develops.  It was discussed that live vaccinations should not be received while using adalimumab or within 30 days prior to starting adalimumab.  I encouraged reviewing the package insert and asking any questions about the medication.              Danni Martínez PA-C  [facility]  [facility]    Contact information: [facility]  Clinic Number:  [phone]  Please call or send a [employer] message with any questions about your care

## 2023-12-04 ENCOUNTER — PATIENT OUTREACH (OUTPATIENT)
Dept: CARE COORDINATION | Facility: CLINIC | Age: 68
End: 2023-12-04
Payer: COMMERCIAL

## 2023-12-04 NOTE — PROGRESS NOTES
Patient exam was done in their room, rails were up and call light was within reach upon completion.    Handoff procedure information verbally given to doctor.       Floor

## 2024-01-14 ENCOUNTER — HEALTH MAINTENANCE LETTER (OUTPATIENT)
Age: 69
End: 2024-01-14

## 2024-01-31 ENCOUNTER — NURSE TRIAGE (OUTPATIENT)
Dept: NURSING | Facility: CLINIC | Age: 69
End: 2024-01-31
Payer: COMMERCIAL

## 2024-01-31 ENCOUNTER — OFFICE VISIT (OUTPATIENT)
Dept: FAMILY MEDICINE | Facility: CLINIC | Age: 69
End: 2024-01-31
Payer: COMMERCIAL

## 2024-01-31 VITALS
SYSTOLIC BLOOD PRESSURE: 134 MMHG | TEMPERATURE: 98.5 F | BODY MASS INDEX: 46.07 KG/M2 | WEIGHT: 260 LBS | OXYGEN SATURATION: 94 % | HEIGHT: 63 IN | RESPIRATION RATE: 20 BRPM | HEART RATE: 62 BPM | DIASTOLIC BLOOD PRESSURE: 80 MMHG

## 2024-01-31 DIAGNOSIS — K08.89 PAIN, DENTAL: Primary | ICD-10-CM

## 2024-01-31 DIAGNOSIS — E66.01 OBESITY, CLASS III, BMI 40-49.9 (MORBID OBESITY) (H): ICD-10-CM

## 2024-01-31 DIAGNOSIS — M06.09 SERONEGATIVE RHEUMATOID ARTHRITIS OF MULTIPLE SITES (H): ICD-10-CM

## 2024-01-31 PROCEDURE — 99213 OFFICE O/P EST LOW 20 MIN: CPT | Performed by: NURSE PRACTITIONER

## 2024-01-31 ASSESSMENT — PAIN SCALES - GENERAL: PAINLEVEL: MODERATE PAIN (4)

## 2024-01-31 NOTE — TELEPHONE ENCOUNTER
Patient states that one week ago past Monday had 4 teeth removed and it started to heal but is still having pain and tenderness to the outside.  Patient feels that she has an infection going on.  Pockets of liquid is present and also up above where teeth are.  Patient made 3 calls into dentist and the problem is she takes Humira for arthritis.  Patient never got a call back from dentist.        Reason for Disposition   [1] SEVERE mouth pain (e.g., excruciating) AND [2] not improved after 2 hours of pain medicine    Additional Information   Negative: SEVERE difficulty breathing (e.g., struggling for each breath, speaks in single words, stridor)   Negative: Sounds like a life-threatening emergency to the triager   Negative: [1] Drooling or spitting out saliva (because can't swallow) AND [2] new-onset   Negative: Electrical burn of mouth   Negative: Chemical burn of mouth   Negative: Tongue is very swollen and tender   Negative: [1] Difficulty breathing AND [2] not severe   Negative: [1] Face is swollen AND [2] fever   Negative: [1] Drinking very little AND [2] dehydration suspected (e.g., no urine > 12 hours, very dry mouth, very lightheaded)   Negative: Patient sounds very sick or weak to the triager    Protocols used: Mouth Pain-A-AH

## 2024-01-31 NOTE — PROGRESS NOTES
Assessment & Plan     Pain, dental  Had 4 dental extractions on 1/22/2024  Patient feels that the sites are not healing.  Has pain that hasn't improved.  Discussed that evaluation in family practice is limited - she has appointment with her dentist tomorrow.  Given her level of ongoing pain, and given the fact that she is on Humira, will opt to treat with antibiotics pending the dental eval tomorrow.  - amoxicillin-clavulanate (AUGMENTIN) 875-125 MG tablet; Take 1 tablet by mouth 2 times daily for 10 days    Seronegative rheumatoid arthritis of multiple sites (H)  Known issue that I take into account for their medical decisions, no current exacerbations or new concerns    Obesity, Class III, BMI 40-49.9 (morbid obesity) (H)  Known issue that I take into account for their medical decisions, no current exacerbations or new concerns    The risks, benefits and treatment options of prescribed medications or other treatments have been discussed with the patient. The patient verbalized their understanding and should call or follow up if no improvement or if they develop further problems.  Diane Stroud CNP              Luba Jones is a 68 year old, presenting for the following health issues:  Dental Problem and Health Maintenance (Patient declines immunizations.)        1/31/2024     8:30 AM   Additional Questions   Roomed by Josselin REDMOND   Accompanied by self     History of Present Illness       Reason for visit:  Dental infection after  tooth extraction- #4  Symptom onset:  1-2 weeks ago  Symptoms include:  Mouth is sore, outside of face hurts; roof of mouth even hurts  Symptom intensity:  Moderate  Symptom progression:  Staying the same  Had these symptoms before:  No  What makes it better:  Nothing    She eats 4 or more servings of fruits and vegetables daily.She consumes 0 sweetened beverage(s) daily.She exercises with enough effort to increase her heart rate 9 or less minutes per day.  She exercises with  "enough effort to increase her heart rate 3 or less days per week.   She is taking medications regularly.     Procedure was on 1/22/2024  She is on Humira  Called the dentist 3 times yesterday - has an appointment tomorrow              Review of Systems  Constitutional, HEENT, cardiovascular, pulmonary, gi and gu systems are negative, except as otherwise noted.      Objective    /80 (BP Location: Right arm, Patient Position: Sitting, Cuff Size: Adult Large)   Pulse 62   Temp 98.5  F (36.9  C) (Tympanic)   Resp 20   Ht 1.6 m (5' 3\")   Wt 117.9 kg (260 lb)   LMP 05/29/2006 (Approximate)   SpO2 94%   BMI 46.06 kg/m    Body mass index is 46.06 kg/m .  Physical Exam   GENERAL: alert and no distress  HENT: 4 dental extraction sites - no associated abscess that I can see on exam            Signed Electronically by: MARGARITA Alegre CNP    "

## 2024-02-06 ENCOUNTER — TELEPHONE (OUTPATIENT)
Dept: RHEUMATOLOGY | Facility: CLINIC | Age: 69
End: 2024-02-06
Payer: COMMERCIAL

## 2024-02-06 NOTE — TELEPHONE ENCOUNTER
Prior Authorization Approval    Medication: HUMIRA *CF* PEN 40 MG/0.4ML SC PNKT  Authorization Effective Date: 2/6/2024  Authorization Expiration Date: 12/31/2024  Approved Dose/Quantity: 2 / 28  Reference #: FINESSE (Key: [id number])   Insurance Company: [employer] Part D - Phone [phone] Fax [phone]  Expected CoPay: $    CoPay Card Available: No    Financial Assistance Needed: FILLS THROUGH FREE DRUG  Which Pharmacy is filling the prescription: [facility] - [address]  Pharmacy Notified: no renewal  Patient Notified: renewal        Thank You,     Cinthya Perez [facility]  Specialty Pharmacy Clinic [facility]  [email or web address]  [email or web address]  Phone: [phone]  Fax: [phone]

## 2024-02-06 NOTE — TELEPHONE ENCOUNTER
PA Initiation    Medication: HUMIRA *CF* PEN 40 MG/0.4ML SC PNKT  Insurance Company: [employer] Part D - Phone [phone] Fax [phone]  Pharmacy Filling the Rx: [facility] - [address]  Filling Pharmacy Phone: [phone]  Filling Pharmacy Fax: [phone]  Start Date: 2/6/2024  FINESSE (Key: [id number])        Thank You,     Eli Perez [facility]  Specialty Pharmacy Clinic [facility]  [email or web address]  [email or web address]  Phone: [phone]  Fax: [phone]

## 2024-02-08 ENCOUNTER — NURSE TRIAGE (OUTPATIENT)
Dept: FAMILY MEDICINE | Facility: CLINIC | Age: 69
End: 2024-02-08
Payer: COMMERCIAL

## 2024-02-08 NOTE — TELEPHONE ENCOUNTER
"Writer was asked to triage this pt by manager/Fareed Ross, re: pt trying to get appt with Dr. Suazo for foot pain. See triage protocol below, appt scheduled with PCP for tomorrow 2/9/24. Was advised to be seen sooner in ED/UC with any new or worsening symptoms.    Fabiana MAYORGA [facility]    Reason for Disposition   MODERATE pain (e.g., interferes with normal activities, limping) and present > 3 days    Additional Information   Negative: Followed an ankle or foot injury   Negative: Toe pain is main symptom   Negative: Ankle pain is main symptom   Negative: Entire foot is cool or blue in comparison to other foot   Negative: Purple or black skin on foot or toe   Negative: Red area or streak and fever   Negative: Swollen foot and fever   Negative: Patient sounds very sick or weak to the triager   Negative: Swollen foot  (Exceptions: Localized bump from bunions, calluses, insect bite, sting.)   Negative: Looks like a boil, infected sore, deep ulcer, or other infected rash (spreading redness, pus)   Negative: Numbness (i.e., loss of sensation) in foot or toes (Exception: Just tingling; numbness present > 2 weeks.)   Negative: SEVERE pain (e.g., excruciating, unable to do any normal activities)   Negative: Weakness (i.e., loss of strength) of new-onset in foot or toes (Exceptions: Not truly weak, foot feels weak because of pain; weakness present > 2 weeks.)    Answer Assessment - Initial Assessment Questions  1. ONSET: \"When did the pain start?\"       Chronic, history of plantar fascitis. Pt says that she briefly (2-3 weeks) worked a part-time job back in November, and that it caused an increase in these symptoms. Says that she's been having more troubles with it since.   2. LOCATION: \"Where is the pain located?\"       Left foot primarily. Right foot is mild, in the heel area.  3. PAIN: \"How bad is the pain?\"    (Scale 1-10; or mild, moderate, severe)   - MILD (1-3): doesn't interfere with " "normal activities.    - MODERATE (4-7): interferes with normal activities (e.g., work or school) or awakens from sleep, limping.    - SEVERE (8-10): excruciating pain, unable to do any normal activities, unable to walk.       Sitting right now, says that pain is tolerable while resting; currently rates pain in right big toe 4/10.  4. WORK OR EXERCISE: \"Has there been any recent work or exercise that involved this part of the body?\"       Pt says that it's been ongoing since November, after starting a new part-time job; see above.  5. CAUSE: \"What do you think is causing the foot pain?\"      Pt says that she has plantar fascitis and flat feet.   6. OTHER SYMPTOMS: \"Do you have any other symptoms?\" (e.g., leg pain, rash, fever, numbness)      No    Protocols used: Foot Pain-A-OH    "

## 2024-02-09 ENCOUNTER — OFFICE VISIT (OUTPATIENT)
Dept: FAMILY MEDICINE | Facility: CLINIC | Age: 69
End: 2024-02-09
Payer: COMMERCIAL

## 2024-02-09 VITALS
TEMPERATURE: 98.7 F | HEIGHT: 63 IN | HEART RATE: 74 BPM | OXYGEN SATURATION: 98 % | WEIGHT: 264 LBS | RESPIRATION RATE: 18 BRPM | BODY MASS INDEX: 46.78 KG/M2 | DIASTOLIC BLOOD PRESSURE: 78 MMHG | SYSTOLIC BLOOD PRESSURE: 128 MMHG

## 2024-02-09 DIAGNOSIS — M79.672 PAIN IN BOTH FEET: Primary | ICD-10-CM

## 2024-02-09 DIAGNOSIS — M77.9 TENDONITIS: ICD-10-CM

## 2024-02-09 DIAGNOSIS — M79.671 PAIN IN BOTH FEET: Primary | ICD-10-CM

## 2024-02-09 PROCEDURE — 99213 OFFICE O/P EST LOW 20 MIN: CPT | Performed by: FAMILY MEDICINE

## 2024-02-09 RX ORDER — TRAMADOL HYDROCHLORIDE 50 MG/1
50 TABLET ORAL EVERY 6 HOURS PRN
Qty: 10 TABLET | Refills: 0 | Status: SHIPPED | OUTPATIENT
Start: 2024-02-09 | End: 2024-02-12

## 2024-02-09 ASSESSMENT — PAIN SCALES - GENERAL: PAINLEVEL: MODERATE PAIN (4)

## 2024-02-09 NOTE — PROGRESS NOTES
"  Assessment & Plan     (M79.671,  M79.672) Pain in both feet  (primary encounter diagnosis)  Comment: Patient prescribed some thing for pain.   Plan: traMADol (ULTRAM) 50 MG tablet, Orthopedic          Referral    (M77.9) Tendonitis  Comment:Patient likely has some tendonitis, recommend seeing podiatrist.   Plan: traMADol (ULTRAM) 50 MG tablet, Orthopedic          Referral              BMI  Estimated body mass index is 46.77 kg/m  as calculated from the following:    Height as of this encounter: 1.6 m (5' 3\").    Weight as of this encounter: 119.7 kg (264 lb).         FUTURE APPOINTMENTS:       - Follow-up visit in one month    Luba Jones is a 68 year old, presenting for the following health issues:  68-year-old female with morbid obesity with past medical history significant for hypertension, status post gastric bypass, low back pain, here for foot pain.        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  She has had on and off foot pain for years and was diagnosed with plantar fasciitis at some point.      She has tried different insoles without much improvement.  Pain is quite severe especially when she stands.  She has not had any falls or injury to the foot.  Pain is severe in both feet with right >  left.  She denies any swelling or redness or warmth around the joints.  Foot Pain        2/9/2024    10:33 AM   Additional Questions   Roomed by Nori HICKMAN   Accompanied by self         2/9/2024    10:33 AM   Patient Reported Additional Medications   Patient reports taking the following new medications no new meds     HPI     [facility] today, long term meds [facility].  Discuss Humira, does have upcoming rheumatology appt.     Pain History:  When did you first notice your pain? Been on going for years, got worse in Nov (feels like got worse being on feet at  job, was working 5 " "hours shifts)   Have you seen anyone else for your pain? No  How has your pain affected your ability to work? Not applicable  Where in your body do you have pain? Musculoskeletal problem/pain  Onset/Duration: years but worse since November   Description  Location: foot - bilateral, left is worse   Joint Swelling: No  Redness: No  Pain: YES- 9-10/10  Warmth: No  Intensity:  moderate- severe   Progression of Symptoms:  worsening  Accompanying signs and symptoms:   Fevers: No  Numbness/tingling/weakness: YES- weakness   History  Trauma to the area: YES- working as , only did it for 3 weeks. Did have a injury with foot and cart (thinking the left foot)- happened in November   Recent illness:  No  Previous similar problem: YES  Previous evaluation:  YES  Precipitating or alleviating factors:  Aggravating factors include: walking, exercise, and overuse  Therapies tried and outcome: insoles, soaking, deep heat massage, compression sock (slight relief)             Review of Systems  CONSTITUTIONAL: NEGATIVE for fever, chills, change in weight  ENT/MOUTH: NEGATIVE for ear, mouth and throat problems  RESP: NEGATIVE for significant cough or SOB  CV: NEGATIVE for chest pain, palpitations or peripheral edema  MUSCULOSKELETAL: POSITIVE  for joint swelling      Objective    /78   Pulse 74   Temp 98.7  F (37.1  C) (Tympanic)   Resp 18   Ht 1.6 m (5' 3\")   Wt 119.7 kg (264 lb)   LMP 05/29/2006 (Approximate)   SpO2 98%   BMI 46.77 kg/m    Body mass index is 46.77 kg/m .  Physical Exam  Constitutional:       Appearance: Normal appearance. She is obese.   HENT:      Head: Normocephalic and atraumatic.      Mouth/Throat:      Mouth: Mucous membranes are moist.   Musculoskeletal:         General: Tenderness present.      Right foot: Decreased range of motion.      Left foot: Decreased range of motion.        Feet:    Feet:      Right foot:      Skin integrity: Skin integrity normal.      Left foot:      Skin " integrity: Skin integrity normal.      Comments: Pain in the arch of the foot. Pain is worse between the great toe and second toe.   Skin:     General: Skin is warm and dry.      Coloration: Skin is not pale.   Neurological:      Mental Status: She is alert and oriented to person, place, and time.   Psychiatric:         Mood and Affect: Mood normal.         Behavior: Behavior normal.                    Signed Electronically by: Jennie Suazo MD

## 2024-02-15 ENCOUNTER — OFFICE VISIT (OUTPATIENT)
Dept: PODIATRY | Facility: CLINIC | Age: 69
End: 2024-02-15
Attending: FAMILY MEDICINE
Payer: COMMERCIAL

## 2024-02-15 VITALS
HEART RATE: 94 BPM | BODY MASS INDEX: 46.78 KG/M2 | SYSTOLIC BLOOD PRESSURE: 142 MMHG | HEIGHT: 63 IN | DIASTOLIC BLOOD PRESSURE: 85 MMHG | WEIGHT: 264 LBS

## 2024-02-15 DIAGNOSIS — M77.52 CAPSULITIS OF METATARSOPHALANGEAL (MTP) JOINT OF LEFT FOOT: Primary | ICD-10-CM

## 2024-02-15 DIAGNOSIS — M79.672 PAIN IN BOTH FEET: ICD-10-CM

## 2024-02-15 DIAGNOSIS — M79.671 PAIN IN BOTH FEET: ICD-10-CM

## 2024-02-15 DIAGNOSIS — M77.9 TENDONITIS: ICD-10-CM

## 2024-02-15 DIAGNOSIS — M77.51 CAPSULITIS OF METATARSOPHALANGEAL (MTP) JOINT OF RIGHT FOOT: ICD-10-CM

## 2024-02-15 PROCEDURE — 99203 OFFICE O/P NEW LOW 30 MIN: CPT | Performed by: PODIATRIST

## 2024-02-15 RX ORDER — TRAMADOL HYDROCHLORIDE 50 MG/1
50 TABLET ORAL EVERY 6 HOURS PRN
COMMUNITY
End: 2024-04-09

## 2024-02-15 ASSESSMENT — PAIN SCALES - GENERAL: PAINLEVEL: EXTREME PAIN (9)

## 2024-02-15 NOTE — LETTER
2/15/2024         RE: Jacquie Harris  [address] [facility] [place] [address]        Dear Colleague,    Thank you for referring your patient, Jacquie Harris, to the [facility] [place]. Please see a copy of my visit note below.    PATIENT HISTORY:  Jacquie Harris is a 68 year old female who presents to clinic in consultation at the request of  Jennie Suazo M.D. with a chief complaint of bilateral foot pain.  The patient is seen by themselves.  The patient relates the pain is primarily located around the bottom of the feet coming from the great toe.  Reports insidious onset without acute precipitating event.  The patient relates that the symptoms have been going on for several year(s).  The patient has previously tried different shoes, topical creams, foot soaks and exercises with little relief.  The patient is retired.  Any previous notes and studies that pertain to the patient's condition were reviewed.    Pertinent medical, surgical and family history was reviewed in the Epic chart.    Past Medical History:   Past Medical History:   Diagnosis Date     HTN (hypertension)      IRON DEFICIENCY ANEMIA 7/5/2005     Iron infusion/ resolved since injections     Obstructive sleep apnea      DARWIN (obstructive sleep apnea)        Medications:   Current Outpatient Medications:      acetaminophen (TYLENOL) 500 MG tablet, Take 1,000 mg by mouth every 6 hours as needed, Disp: , Rfl:      albuterol (PROAIR HFA/PROVENTIL HFA/VENTOLIN HFA) 108 (90 Base) MCG/ACT inhaler, INHALE 2 PUFFS INTO THE LUNGS EVERY 6 HOURS AS NEEDED FOR SHORTNESS OF BREATH OR DIFFICULT BREATHING OR WHEEZING, Disp: 25.5 g, Rfl: 1     atenolol (TENORMIN) 50 MG tablet, TAKE 2 TABLETS BY MOUTH DAILY, Disp: 200 tablet, Rfl: 1     NYAMYC 056609 UNIT/GM external powder, APPLY TO AFFECTED AREA(S)  TOPICALLY 3 TIMES DAILY AS  NEEDED, Disp: 60 g, Rfl: 1     oxybutynin ER (DITROPAN XL) 5 MG 24 hr tablet, TAKE 1 TABLET BY MOUTH   "TWICE DAILY, Disp: 200 tablet, Rfl: 1     traMADol (ULTRAM) 50 MG tablet, Take 50 mg by mouth every 6 hours as needed for severe pain, Disp: , Rfl:      adalimumab (HUMIRA *CF*) 40 MG/0.4ML pen kit, Inject 0.4 mLs (40 mg) Subcutaneous every 14 days Hold for signs of infection, then seek medical attention. (Patient not taking: Reported on 2/9/2024), Disp: 1 each, Rfl: 5    Current Facility-Administered Medications:      denosumab (PROLIA) injection 60 mg, 60 mg, Subcutaneous, Q6 Months, Jennie Suazo MD, 60 mg at 11/14/23 0940     Allergies:    Allergies   Allergen Reactions     Ibuprofen      Other reaction(s): Gastrointestinal  Gastric bleed     Naproxen GI Disturbance     Other reaction(s): Gastrointestinal  Gastric bleed     Nickel Itching     Inflamed with cheap earrings (itchy)     Rofecoxib Itching and Swelling     VIOXX (Swollen Feet,Hands itching), Okay with ibuprofen and aspirin     Vioxx Itching and Swelling     VIOXX (Swollen Feet,Hands itching), Okay with ibuprofen and aspirin       Vitals: BP (!) 142/85   Pulse 94   Ht 1.6 m (5' 3\")   Wt 119.7 kg (264 lb)   LMP 05/29/2006 (Approximate)   BMI 46.77 kg/m    BMI= Body mass index is 46.77 kg/m .    LOWER EXTREMITY PHYSICAL EXAM    Dermatologic: Skin is intact to right and left lower extremity without significant lesions, rash or abrasion.        Vascular: DP & PT pulses are intact & regular on the right and left.   CFT and skin temperature is normal to the right and left lower extremity.     Neurologic: Lower extremity sensation is intact to light touch.  No evidence of weakness in the right and left lower extremity.        Musculoskeletal: Patient is ambulatory without assistive device or brace.  No gross ankle deformity noted.  No foot or ankle joint effusion is noted.  Noted pain on palpation under the lesser metatarsophalangeal joints bilaterally.  No surrounding erythema or edema.             ASSESSMENT / PLAN:     ICD-10-CM    1. " Capsulitis of metatarsophalangeal (MTP) joint of left foot  M77.52       2. Pain in both feet  M79.671 Orthopedic  Referral    M79.672       3. Tendonitis  M77.9 Orthopedic  Referral      4. Capsulitis of metatarsophalangeal (MTP) joint of right foot  M77.51           I have explained to Jacquie about the conditions.  We discussed the underlying contributing factors to the condition as well as both conservative and surgical treatment options along with expected length of recovery.  At this time, the patient was educated on the importance of offloading supportive shoes and other devices.  I demonstrated to the patient calf stretches to perform every hour daily until symptoms resolve.  After symptoms resolve, the patient was advised to perform the stretches 3 times daily to prevent future recurrence.  The patient was instructed to perform warm soaks with Epson salt after which to also apply over-the-counter Voltaren gel to deeply massage the injured tissue.  The patient was instructed to do this on a daily basis until symptoms resolve.    The patient may return in four weeks for reevaluation to determine if any further treatment will be needed.      Jacquie verbalized agreement with and understanding of the rational for the diagnosis and treatment plan.  All questions were answered to best of my ability and the patient's satisfaction. The patient was advised to contact the clinic with any questions that may arise after the clinic visit.      Disclaimer: This note consists of symbols derived from keyboarding, dictation and/or voice recognition software. As a result, there may be errors in the script that have gone undetected. Please consider this when interpreting information found in this chart.       BERHANE Valdes.P.M., F.A.C.F.A.S.      Again, thank you for allowing me to participate in the care of your patient.        Sincerely,        Que Jean-Baptiste DPM

## 2024-02-15 NOTE — PROGRESS NOTES
PATIENT HISTORY:  Jacquie Harris is a 68 year old female who presents to clinic in consultation at the request of  Jennie Suazo M.D. with a chief complaint of bilateral foot pain.  The patient is seen by themselves.  The patient relates the pain is primarily located around the bottom of the feet coming from the great toe.  Reports insidious onset without acute precipitating event.  The patient relates that the symptoms have been going on for several year(s).  The patient has previously tried different shoes, topical creams, foot soaks and exercises with little relief.  The patient is retired.  Any previous notes and studies that pertain to the patient's condition were reviewed.    Pertinent medical, surgical and family history was reviewed in the Epic chart.    Past Medical History:   Past Medical History:   Diagnosis Date    HTN (hypertension)     IRON DEFICIENCY ANEMIA 7/5/2005     Iron infusion/ resolved since injections    Obstructive sleep apnea     DARWIN (obstructive sleep apnea)        Medications:   Current Outpatient Medications:     acetaminophen (TYLENOL) 500 MG tablet, Take 1,000 mg by mouth every 6 hours as needed, Disp: , Rfl:     albuterol (PROAIR HFA/PROVENTIL HFA/VENTOLIN HFA) 108 (90 Base) MCG/ACT inhaler, INHALE 2 PUFFS INTO THE LUNGS EVERY 6 HOURS AS NEEDED FOR SHORTNESS OF BREATH OR DIFFICULT BREATHING OR WHEEZING, Disp: 25.5 g, Rfl: 1    atenolol (TENORMIN) 50 MG tablet, TAKE 2 TABLETS BY MOUTH DAILY, Disp: 200 tablet, Rfl: 1    NYAMYC 347831 UNIT/GM external powder, APPLY TO AFFECTED AREA(S)  TOPICALLY 3 TIMES DAILY AS  NEEDED, Disp: 60 g, Rfl: 1    oxybutynin ER (DITROPAN XL) 5 MG 24 hr tablet, TAKE 1 TABLET BY MOUTH  TWICE DAILY, Disp: 200 tablet, Rfl: 1    traMADol (ULTRAM) 50 MG tablet, Take 50 mg by mouth every 6 hours as needed for severe pain, Disp: , Rfl:     adalimumab (HUMIRA *CF*) 40 MG/0.4ML pen kit, Inject 0.4 mLs (40 mg) Subcutaneous every 14 days Hold for signs of  "infection, then seek medical attention. (Patient not taking: Reported on 2/9/2024), Disp: 1 each, Rfl: 5    Current Facility-Administered Medications:     denosumab (PROLIA) injection 60 mg, 60 mg, Subcutaneous, Q6 Months, Jennie Suazo MD, 60 mg at 11/14/23 0940     Allergies:    Allergies   Allergen Reactions    Ibuprofen      Other reaction(s): Gastrointestinal  Gastric bleed    Naproxen GI Disturbance     Other reaction(s): Gastrointestinal  Gastric bleed    Nickel Itching     Inflamed with cheap earrings (itchy)    Rofecoxib Itching and Swelling     VIOXX (Swollen Feet,Hands itching), Okay with ibuprofen and aspirin    Vioxx Itching and Swelling     VIOXX (Swollen Feet,Hands itching), Okay with ibuprofen and aspirin       Vitals: BP (!) 142/85   Pulse 94   Ht 1.6 m (5' 3\")   Wt 119.7 kg (264 lb)   LMP 05/29/2006 (Approximate)   BMI 46.77 kg/m    BMI= Body mass index is 46.77 kg/m .    LOWER EXTREMITY PHYSICAL EXAM    Dermatologic: Skin is intact to right and left lower extremity without significant lesions, rash or abrasion.        Vascular: DP & PT pulses are intact & regular on the right and left.   CFT and skin temperature is normal to the right and left lower extremity.     Neurologic: Lower extremity sensation is intact to light touch.  No evidence of weakness in the right and left lower extremity.        Musculoskeletal: Patient is ambulatory without assistive device or brace.  No gross ankle deformity noted.  No foot or ankle joint effusion is noted.  Noted pain on palpation under the lesser metatarsophalangeal joints bilaterally.  No surrounding erythema or edema.             ASSESSMENT / PLAN:     ICD-10-CM    1. Capsulitis of metatarsophalangeal (MTP) joint of left foot  M77.52       2. Pain in both feet  M79.671 Orthopedic  Referral    M79.672       3. Tendonitis  M77.9 Orthopedic  Referral      4. Capsulitis of metatarsophalangeal (MTP) joint of right foot  M77.51 "           I have explained to Jacquie about the conditions.  We discussed the underlying contributing factors to the condition as well as both conservative and surgical treatment options along with expected length of recovery.  At this time, the patient was educated on the importance of offloading supportive shoes and other devices.  I demonstrated to the patient calf stretches to perform every hour daily until symptoms resolve.  After symptoms resolve, the patient was advised to perform the stretches 3 times daily to prevent future recurrence.  The patient was instructed to perform warm soaks with Epson salt after which to also apply over-the-counter Voltaren gel to deeply massage the injured tissue.  The patient was instructed to do this on a daily basis until symptoms resolve.    The patient may return in four weeks for reevaluation to determine if any further treatment will be needed.      Jacquie verbalized agreement with and understanding of the rational for the diagnosis and treatment plan.  All questions were answered to best of my ability and the patient's satisfaction. The patient was advised to contact the clinic with any questions that may arise after the clinic visit.      Disclaimer: This note consists of symbols derived from keyboarding, dictation and/or voice recognition software. As a result, there may be errors in the script that have gone undetected. Please consider this when interpreting information found in this chart.       GAYATRI Jean-Baptiste D.P.M., F.MARYLOU.C.F.A.S.

## 2024-02-15 NOTE — NURSING NOTE
"Chief Complaint   Patient presents with    Consult     Bilateral foot pain       Initial BP (!) 142/85   Pulse 94   Ht 1.6 m (5' 3\")   Wt 119.7 kg (264 lb)   LMP 05/29/2006 (Approximate)   BMI 46.77 kg/m   Estimated body mass index is 46.77 kg/m  as calculated from the following:    Height as of this encounter: 1.6 m (5' 3\").    Weight as of this encounter: 119.7 kg (264 lb).  Medications and allergies reviewed.      Yolanda LAST MA    "

## 2024-02-15 NOTE — PATIENT INSTRUCTIONS
Next Steps:      Support:  Wear supportive shoes, sandals, boots and/or inserts that have a rigid supportive sole.    This will offload the majority of tension forces that travel through your feet every step you take.    Skechers Max Cushioning Elite/Premier   Skechers Relax Fit D'Lux Walker  [employer]ebok Walk Ultra 7 DMX MAX   Hoka Bondi walking shoes  NeuroNascent shoes/slippers ([email or web address])  Paddle8 sandals (https://www.Nutritics/us)  Superfeet inserts (www.superfeet.com)  It is important that you also wear supportive shoe wear in the house to continue providing support to your feet.    You may always use a cushioned liner for your shoes if that makes your feet feel better.  Stretching  Calf stretching is essential to offload the tension forces that travel through your feet every step you take  Preferred calf stretch is the Runner's Stretch  Place one foot behind the other foot, flat against the ground (it is important to keep the heel on the ground).  The back leg is the one that will be stretched.  Start with the knee straight and lean your hips into the wall, counter or whatever you are leaning into - count to ten.  Next, bend the knee.  You should feel the stretch lower in the calf muscle - count to ten.  Repeat this stretch once an hour to start off with.  When symptoms subside, I recommend performing the stretch 3 times daily to prevent any future problems.                Tissue Massage  It is important that you physically loosen the inflammation tissue to help your body heal the injured tissue.  I recommend soaking your foot in warm water to increase the microcirculation to the soft tissues.  You may add Epson salt to the water if you prefer.  You may apply an over-the-counter muscle rub, such as Voltaren gel, and deeply massage the injured tissue.  Reduce Inflammation  You can ice the injured tissue with an ice pack with a light cloth covering or soaking in ice water 20 minutes to reduce  any acute inflammation, typically at the end of the day.      It is important to understand that most problems that develop in the foot and ankle are caused by excessive tension that cause microinjury to the soft tissues and inflammation in the foot and ankle.  By addressing the underlying causes with support and stretching as well as treating the current inflammatory conditions with tissue massage and anti-inflammatory treatments, most foot and ankle musculoskeletal conditions will resolve.  This may take time to heal.  However, if symptoms persist past 4 weeks you should return to the office for reevaluation to determine further treatment options.       Flu vaccines are now available at all [facility] clinics and retail pharmacies across the [facility]. Appointments are required for clinic locations. To schedule an appointment online, please log into [employer] or create an account if you are a new user. You can also call [phone], or simply walk in at one of the [facility] retail pharmacy locations.      [employer] - Login Page      Awake/Alert

## 2024-02-16 NOTE — PROGRESS NOTES
"  Assessment & Plan     Cough     - XR Chest 2 Views  - Symptomatic COVID-19 Virus (Coronavirus) by PCR Nose  - azithromycin (ZITHROMAX) 250 MG tablet  Dispense: 6 tablet; Refill: 0  - albuterol (PROAIR HFA/PROVENTIL HFA/VENTOLIN HFA) 108 (90 Base) MCG/ACT inhaler  Dispense: 18 g; Refill: 0    Pneumonia of lower lobe due to infectious organism, unspecified laterality  The risks, benefits and treatment options of prescribed medications or other treatments have been discussed with the patient. The patient verbalized their understanding and should call or follow up if no improvement or if they develop further problems.    - azithromycin (ZITHROMAX) 250 MG tablet  Dispense: 6 tablet; Refill: 0  - albuterol (PROAIR HFA/PROVENTIL HFA/VENTOLIN HFA) 108 (90 Base) MCG/ACT inhaler  Dispense: 18 g; Refill: 0        BMI:   Estimated body mass index is 45.69 kg/m  as calculated from the following:    Height as of 7/13/21: 1.607 m (5' 3.25\").    Weight as of this encounter: 117.9 kg (260 lb).   Weight management plan: Patient was referred to their PCP to discuss a diet and exercise plan.    See Patient Instructions    Return if symptoms worsen or fail to improve, for Recheck symptoms.    Lakisha Morgan NP  [facility] Jones is a 66 year old who presents for the following health issues     HPI     Acute Illness  Acute illness concerns: Cough.  Onset/Duration: 1 week.  Symptoms:  Fever: YES, 100.4 F 1 week ago.    Chills/Sweats: YES  Headache (location?): YES  Sinus Pressure: YES around eyes  Conjunctivitis:  no  Ear Pain: YES: bilateral  Rhinorrhea: YES  Congestion: YES  Sore Throat: YES  Cough: YES-non-productive. Will occassionally have a small amount of phlegm  Wheeze: YES with walking  Decreased Appetite: YES  Nausea: YES  Vomiting: no  Diarrhea: YES  Dysuria/Freq.: no  Loss of taste/smell: YES  Fatigue/Achiness: YES  Sick/Strep Exposure:  and daughter  Therapies tried and " outcome: Hot pads, rest, ice packs, tylenol, cold medicine.    Reports cough mildly improved today.    Some night sweats.  Decreased appetite and fatigue, and shortness of breath with activity.    Wt Readings from Last 2 Encounters:   10/28/21 117.9 kg (260 lb)   07/13/21 119.7 kg (264 lb)     Not a smoker.  History of pneumonia - nothing recent.    Exposures include granddaughter, daughter.    COVID vaccine March 2021.  Flu vaccine 09/2021.    Review of Systems   Constitutional, HEENT, cardiovascular, pulmonary, GI, , musculoskeletal, neuro, skin, endocrine and psych systems are negative, except as otherwise noted.      Objective    BP (!) 144/92   Pulse 70   Temp 98.7  F (37.1  C) (Tympanic)   Resp 12   Wt 117.9 kg (260 lb)   LMP 05/29/2006   SpO2 95%   BMI 45.69 kg/m    Body mass index is 45.69 kg/m .  Physical Exam   GENERAL: healthy, alert and no distress  NECK: no adenopathy, no asymmetry, masses, or scars and thyroid normal to palpation  RESP: no rales , no wheezes, rhonchi bibasilar and dyspnea on exertion, RR 18-20, oxygen at 95%.    CV: regular rate and rhythm, normal S1 S2, no S3 or S4, no murmur, click or rub, no peripheral edema and peripheral pulses strong  ABDOMEN: soft, nontender, no hepatosplenomegaly, no masses and bowel sounds normal  MS: no gross musculoskeletal defects noted, no edema    Xray - Reviewed and interpreted by me.  Opacity bilateral lower lobes ? pneumonia.    COVID obtained today.         Fair

## 2024-02-21 NOTE — PROGRESS NOTES
Rheumatology Clinic Visit  [facility]  MARISELA Ulloa     Jacquie Harris MRN# [id number]   YOB: 1955 Age: 68 year old   Date of Visit: 2/27/2024  Primary care provider: Jennie Suazo          Assessment and Plan:     1.  Seronegative rheumatoid arthritis  2.  High-risk medication use  3. Pain in toe of left foot    Patient presents today for follow-up.  Unfortunately with changing the Humira from weekly to every other week she has not had the improvement that she had at her last visit.  She is having increased pain in her hands and notices that they are getting stuck again.  Physical examination today does show increased synovitis particularly of the left third MCP with some associated tenderness to that joint as well as the right third MCP.  She is also noticing increased pain in the big toe of her left foot.  She finds that that she does very tender to touch.  She has been on the Humira for over 3 months and is still noting synovitis.  We will therefore discontinue the Humira and put her on Enbrel.  She will start the Enbrel when she is due for her next Humira injection.  We will also check her for gout today given the big toe pain.  Will get x-rays of her left foot as well.  Patient to follow-up with me in 3 months, sooner if needed.      The longitudinal plan of care for the diagnosis(es)/condition(s) as documented were addressed during this visit. Due to the added complexity in care, I will continue to support Karen in the subsequent management and with ongoing continuity of care.      Plan:   Schedule follow-up with Danni Martínez PA-C in 3 months.  Labs: CBC, creatinine, Albumin, AST, ALT CRP and Sed rate next due in April; uric acid today  Xray left foot  Medication recommendations:   Start Enbrel 50mg/mL WEEKLY SubQ. You will start this when you're due for the the Humira  # Etanercept (Enbrel) Risks and Benefits: The risks and benefits of etanercept were discussed in  "detail and the patient verbalized understanding.  The risks discussed include, but are not limited to, the risk for hypersensitivity, anaphylaxis, anaphylactoid reactions, an increased risk for serious infections leading to hospitalization or death, a possible increased risk for lymphoma and other malignancies, a possible worsening of demyelinating diseases, a possible worsening of heart failure, possible reactivation of hepatitis B, and possible reactivation of latent tuberculosis.  Subcutaneous injections may result in injection site reactions and/or pain at the site of injection.  It was discussed that the medication would need to be discontinued if a serious infection develops.  It was discussed that live vaccinations should not be received while using etanercept or within 30 days prior to starting etanercept.  I encouraged reviewing the package insert and asking any questions about the medication.      Stop Humira 40mg/0.4mL     MARISELA Ulloa  Rheumatology         History of Present Illness:   Jacquie Harris presents for evaluation of positive SYLVIE, inflammatory arthritis.    Rheumatological history:  Provider: Dr. Uribe   Current medication: none  Previous Medications: Plaquenil 200 mg twice daily, last eye exam 02/27/2023, Sulfasalazine, Methotrexate (non effective)  Pertinent labs: Negative rheumatoid factor, CCP antibody, sed rate and CRP.  Positive SYLVIE with a titer of 1:160, speckled pattern.  Normal/negative SYLVIE subsets, ANCA, uric acid, C3, C4    Interval history February 27, 2024:  She is not sure that her joints are much different. She wore compression gloves last week. She will still have her hands get stuck at times. When doing projects, she will switch hands for the project. She does not find the Humira to be effective taking it every other week versus when she was taking it weekly. Her hands feel \"clumsy\". She cannot do fine motor activities such as embroideries. She has been having " "some big toe pain. She states that last night that it was swollen and it went to the top of her foot.     Interval history November 27, 2023:  She did make a mistake and was taking her Humira weekly. She did skip last week and has a dose for today. She is finding relief. Last she was getting stuck and has some soreness, but overall feels things are good.     Interval history August 25, 2025:  She has not been doing well. She has not been able to get Methotrexate.No history of infections. No cardiovascular history. No history of cancer.      Interval history May 17, 2023:  She states that her hands are close today for making a fist. She still gets some trigger finger. She does still have a hard time opening the bottles. Overall she has not seen a lot of difference with the methotrexate.     Interval history March 15, 2023:  She increased the Sulfasalazine to 1 in the AM and 2 in the evening. She has continued with the Hydroxychloroquine as well. She states that her hands continue to get stuck and she has a lot of pain with them. She reports spending a lot of time rinsing them in hot water.     Interval history January 11, 2023:  She states that she is \"okay\". She has been having increased doctoring as her knees are \"really bad\". She has been working with some providers on that. She is thinking this is where the arthritis has started. She states that her hands continue to be the same. She has not noticed a change with the Sulfasalazine. Her hands continue to get stuck and she feels it is affecting more fingers. She is tolerating the medications well. She has not had any infections or fevers.     Interval history November 9, 2022:  She reports that the prednisone did anything. She states that her fingers continue to feel as if they are getting stuck. She continues to feel the swelling as well. She did have initial improvement in symptoms with the Hydroxychloroquine. She feels this was helpful until just before  " "Jojo left. Initially she thought it was secondary to the humidity as it started in the summer.     History of gastric bleed on NSAIDs.    HPI from consult 10/5/2022:  Patient was previously treated with Dr. Uribe.  Last office visit was on 6/30/2022.  Initial evaluation was on November 10, 2021.  At that time she had reported pain in her bilateral hands with morning stiffness lasting 2 to 3 hours.  Physical exam showed subtle fullness involving the left third MCP and right fifth MCP joints with tenderness to palpation.  Mild tenderness involving the right fifth PIP and left first CMC.  Nodule involving the left third digit A1 pulley area with some tenderness on palpation and a subtle catching sensation with active flexion of the left third digit.    She has been unable to do the Paraffin wax due to cost. She uses hot water. She uses this in the morning to get things going. She states that she hurts today. She states that she has some good days, but \"not very many\". She is unsure that the Plaquenil is working. She states that she was expecting it to be a \"miracle\". She states that she was under the impression that you need something to stop and slow the arthritis with rheumatoid arthritis. She states that she does not do much with her fingers any more. She cannot paint or do crafts. She states that she has so much pain that she cannot even think about painting. She states that the pain is bad in the mornings and evening. If she is able to keep them warm, that seems to help. Many of her symptoms started summer 2020. She was noticing her fingers getting \"crooked\". She has not been getting the sticking of the fingers anymore.     No known family history of rheumatoid arthritis or lupus.  No known family history or personal history of psoriasis, ulcerative colitis or Crohn's disease.             Review of Systems:     Constitutional: negative  Skin: negative  Eyes: negative  Ears/Nose/Throat: " negative  Respiratory: No shortness of breath, dyspnea on exertion, cough, or hemoptysis  Cardiovascular: negative  Gastrointestinal: negative  Genitourinary: negative  Musculoskeletal: as above  Neurologic: negative  Psychiatric: negative  Hematologic/Lymphatic/Immunologic: negative  Endocrine: negative         Active Problem List:     Patient Active Problem List    Diagnosis Date Noted    Seronegative rheumatoid arthritis of multiple sites (H) 01/31/2024     Priority: Medium    History of total bilateral knee replacement 01/09/2023     Priority: Medium    Benign essential hypertension 06/19/2020     Priority: Medium    Rotator cuff syndrome, right 01/14/2020     Priority: Medium    Multiple joint pain, possible fibromyalgia 12/20/2019     Priority: Medium    Bronchospasm 01/07/2019     Priority: Medium    GI bleed 03/17/2018     Priority: Medium    Upper GI bleed 03/17/2018     Priority: Medium    Shoulder injury, right, subsequent encounter 08/21/2017     Priority: Medium    Fatty liver 04/20/2017     Priority: Medium    Elevated levels of transaminase & lactic acid dehydrogenase 04/15/2017     Priority: Medium    Bilateral cold feet 01/03/2017     Priority: Medium    Colles' fracture 07/06/2015     Priority: Medium    Encounter for routine gynecological examination  06/04/2015     Priority: Medium    Back pain, left below scapula, strained muscles 10/24/2013     Priority: Medium    HTN, goal below 140/80 05/05/2013     Priority: Medium    S/P TKR (total knee replacement) 04/03/2012     Priority: Medium    Osteoporosis 02/19/2012     Priority: Medium     Previous T scores -2.2  2/2012 Dexascan:  Lumbar spine L1-L4 T-score: -2.4, Left femoral neck T-score: -2.7, Right femoral neck T-score: -2.2   Percent change in lumbar spine: +6.6% since 4/16/2008   Percent change in femurs: +2.9% since 4/16/2008    IMPRESSION: Osteoporosis in the left femoral neck. Severe osteopenia  in the right femoral neck and lumbar  spine.      Obesity, Class III, BMI 40-49.9 (morbid obesity) (H) 02/19/2012     Priority: Medium     ideal weight 120, goal weight 200, lose 58 lbs in 58 weeks.      Vitamin D deficiency 07/18/2011     Priority: Medium    Tick bite, infected, positive Lyme test 1996 not treated 07/08/2011     Priority: Medium    Rosacea 11/04/2009     Priority: Medium    Obstructive sleep apnea 12/01/2006     Priority: Medium     Sleep study 12/06 for EDS- AHI 36, desaturations to 65%. CPAP recommended but not tolerated.  01/16/07  ENT consult recommend CPAP and weight loss. Surgery discussed but success rate less than CPAP  1/15/07 CPAP trial, was not able to tolerate. Retried 2008, tolerated better.   Problem list name updated by automated process. Provider to review      Carpal tunnel syndrome 06/07/2006     Priority: Medium     04/10/09 Dr.Meletiou Cronin. Finding consistant with CTS, but nerve studies and MRI of C-Spine are normal. Corticosteroid injection given in office      CARDIOVASCULAR SCREENING; LDL GOAL LESS THAN 160 10/31/2010     Priority: Low    Allergic rhinitis 03/27/2006     Priority: Low     Problem list name updated by automated process. Provider to review      s/p gastric bypass x 2 07/05/2005     Priority: Low     Gastric bypass 1980              Past Medical History:     Past Medical History:   Diagnosis Date    HTN (hypertension)     IRON DEFICIENCY ANEMIA 7/5/2005     Iron infusion/ resolved since injections    Obstructive sleep apnea     DARWIN (obstructive sleep apnea)      Past Surgical History:   Procedure Laterality Date    ARTHROPLASTY KNEE  04/02/2012    Procedure:ARTHROPLASTY KNEE; Left Total Knee Arthroplasty--Anesth.Choice; Surgeon:VICENTE MORALES; Location:[facility]    ARTHROTOMY SHOULDER, ROTATOR CUFF REPAIR, COMBINED Right 08/25/2017    Procedure: COMBINED ARTHROTOMY SHOULDER, ROTATOR CUFF REPAIR;  Right shoulder distal clavicle excision, subacromial decompression, rotator cuff repair ;   Surgeon: Hernando Thompson MD;  Location: [facility]    ARTHROTOMY SHOULDER, ROTATOR CUFF REPAIR, COMBINED Right 12/18/2017    Procedure: COMBINED ARTHROTOMY SHOULDER, ROTATOR CUFF REPAIR;  Right Shoulder Rotator Cuff Revision;  Surgeon: Hernando Thompson MD;  Location: [facility]    ARTHROTOMY SHOULDER, ROTATOR CUFF REPAIR, COMBINED Left 04/27/2018    Procedure: COMBINED ARTHROTOMY SHOULDER, ROTATOR CUFF REPAIR;  Left Shoulder Open Subacromial Decompression,Distal Clavicle Excision,Rotator Cuff Repair;  Surgeon: Hernando Thompson MD;  Location: [facility]    C/SECTION, LOW TRANSVERSE  1978, 1984, 1994    x 3    COLONOSCOPY  01/01/2001    normal colonoscopy    COLONOSCOPY N/A 08/22/2017    Procedure: COLONOSCOPY;  Colonoscopy  ;  Surgeon: Delfino Yoo MD;  Location: [facility]    COLONOSCOPY  08/22/2017    EXAMCOL    ESOPHAGOSCOPY, GASTROSCOPY, DUODENOSCOPY (EGD), COMBINED N/A 03/18/2018    Procedure: COMBINED ESOPHAGOSCOPY, GASTROSCOPY, DUODENOSCOPY (EGD);;  Surgeon: Poncho Galindo MD;  Location: [facility]    GASTRIC BYPASS  1980/2005    Gastric Bypass and revision    HERNIA REPAIR, INCISIONAL  06/16/2008    lap.repair with 10x8 mesh    KS REVISE KNEE JOINT REPLACE,ALL PARTS Left 05/29/2019    Dr. Cedric Fuller    KS TOTAL KNEE ARTHROPLASTY Right 06/22/2020    Dr. Cedric Fuller    TUBAL LIGATION  01/01/1994    [facility] ORAL SURGERY PROCEDURE  age 18    wisdom teeth            Social History:     Social History     Socioeconomic History    Marital status:      Spouse name: Not on file    Number of children: Not on file    Years of education: Not on file    Highest education level: Not on file   Occupational History    Not on file   Tobacco Use    Smoking status: Never    Smokeless tobacco: Never   Vaping Use    Vaping Use: Never used   Substance and Sexual Activity    Alcohol use: Yes     Alcohol/week: 0.0 standard drinks of alcohol     Comment: mixed very light 1 a month if that    Drug use: No    Sexual activity: Yes      Partners: Male     Birth control/protection: Surgical   Other Topics Concern    Parent/sibling w/ CABG, MI or angioplasty before 65F 55M? No     Service No    Blood Transfusions Yes     Comment: 1976, 1978, 1978, 1984    Caffeine Concern No    Occupational Exposure No    Hobby Hazards No    Sleep Concern No    Stress Concern No    Weight Concern Yes    Special Diet No    Back Care No    Exercise No    Bike Helmet No    Seat Belt Yes    Self-Exams Not Asked   Social History Narrative    Not on file     Social Determinants of Health     Financial Resource Strain: Low Risk  (1/31/2024)    Financial Resource Strain     Within the past 12 months, have you or your family members you live with been unable to get utilities (heat, electricity) when it was really needed?: No   Food Insecurity: Low Risk  (1/31/2024)    Food Insecurity     Within the past 12 months, did you worry that your food would run out before you got money to buy more?: No     Within the past 12 months, did the food you bought just not last and you didn t have money to get more?: No   Transportation Needs: Low Risk  (1/31/2024)    Transportation Needs     Within the past 12 months, has lack of transportation kept you from medical appointments, getting your medicines, non-medical meetings or appointments, work, or from getting things that you need?: No   Physical Activity: Not on file   Stress: Not on file   Social Connections: Not on file   Interpersonal Safety: Low Risk  (1/31/2024)    Interpersonal Safety     Do you feel physically and emotionally safe where you currently live?: Yes     Within the past 12 months, have you been hit, slapped, kicked or otherwise physically hurt by someone?: No     Within the past 12 months, have you been humiliated or emotionally abused in other ways by your partner or ex-partner?: No   Housing Stability: Low Risk  (1/31/2024)    Housing Stability     Do you have housing? : Yes     Are you worried about losing  your housing?: No          Family History:     Family History   Problem Relation Age of Onset    C.A.D. Father         MI at age 60    Hypertension Father     Alzheimer Disease Father     Hyperlipidemia Father     Osteoporosis Mother         on Fosamax    Glaucoma Mother     Other Cancer Brother     Stomach Cancer Maternal Aunt     Cervical Cancer Cousin     Diabetes No family hx of     Cerebrovascular Disease No family hx of     Breast Cancer No family hx of     Cancer - colorectal No family hx of     Prostate Cancer No family hx of     Liver Disease No family hx of             Allergies:     Allergies   Allergen Reactions    Ibuprofen      Other reaction(s): Gastrointestinal  Gastric bleed    Naproxen GI Disturbance     Other reaction(s): Gastrointestinal  Gastric bleed    Nickel Itching     Inflamed with cheap earrings (itchy)    Rofecoxib Itching and Swelling     VIOXX (Swollen Feet,Hands itching), Okay with ibuprofen and aspirin    Vioxx Itching and Swelling     VIOXX (Swollen Feet,Hands itching), Okay with ibuprofen and aspirin            Medications:     Current Outpatient Medications   Medication Sig Dispense Refill    acetaminophen (TYLENOL) 500 MG tablet Take 1,000 mg by mouth every 6 hours as needed      adalimumab (HUMIRA *CF*) 40 MG/0.4ML pen kit Inject 0.4 mLs (40 mg) Subcutaneous every 14 days Hold for signs of infection, then seek medical attention. 1 each 5    albuterol (PROAIR HFA/PROVENTIL HFA/VENTOLIN HFA) 108 (90 Base) MCG/ACT inhaler INHALE 2 PUFFS INTO THE LUNGS EVERY 6 HOURS AS NEEDED FOR SHORTNESS OF BREATH OR DIFFICULT BREATHING OR WHEEZING 25.5 g 1    atenolol (TENORMIN) 50 MG tablet TAKE 2 TABLETS BY MOUTH DAILY 200 tablet 1    NYAMYC 065452 UNIT/GM external powder APPLY TO AFFECTED AREA(S)  TOPICALLY 3 TIMES DAILY AS  NEEDED 60 g 1    oxybutynin ER (DITROPAN XL) 5 MG 24 hr tablet TAKE 1 TABLET BY MOUTH  TWICE DAILY 200 tablet 1    traMADol (ULTRAM) 50 MG tablet Take 50 mg by mouth every  6 hours as needed for severe pain              Physical Exam:   Last menstrual period 2006, not currently breastfeeding.  Wt Readings from Last 6 Encounters:   24 120.1 kg (264 lb 12.8 oz)   02/15/24 119.7 kg (264 lb)   24 119.7 kg (264 lb)   24 117.9 kg (260 lb)   23 118.4 kg (261 lb)   23 118.4 kg (261 lb)     Constitutional: well-developed, appearing stated age; cooperative  Eyes: nl  conjunctiva, sclera  ENT: nl external ears, hearing,  Resp: No shortness of breath with normal conversation  MSK: Synovitis over the left 3rd MCP with associated tenderness.  Psych: nl judgement, orientation, memory, affect.           Data:   Imagin/10/2021 x-ray bilateral hand  IMPRESSION: Generalized demineralization. Mild degenerative arthritis involving the interphalangeal joints of the fingers, and the basilar joints of both thumbs, with mild joint space narrowing and osteophytic spurring. Generalized bone demineralization.  No evidence of erosive arthropathy. Soft tissues are normal.    11/10/2021 x-ray bilateral knee  IMPRESSION:   RIGHT KNEE: Uncomplicated cemented total knee arthroplasty. Normal joint alignment. No fracture or erosion. No joint effusion.     LEFT KNEE: Cemented longstem hinged total knee arthroplasty. No evidence of loosening or hardware complication. Mild patella baja. No fracture or erosion. No joint effusion.    11/10/2021 x-ray right wrist  IMPRESSION: No acute fracture. Chronic healed fracture of the distal radius. Chronic ununited fracture of the ulnar styloid process. Moderate ulnar positive variance. Mild degenerative changes at the STT and first CMC joints.    2022 x-rays left shoulder  IMPRESSION:  1.  Normal left glenohumeral joint spacing and alignment.  2.  Distal clavicle excision, unchanged.  3.  No fracture.  4.  No significant change since the comparison.    2022 x-ray cervical spine  IMPRESSION: Minimal degenerative anterolisthesis of C2  upon C3, C3  upon C4 and C5 on C6. Alignment otherwise normal. Vertebral body  heights are normal. Moderate facet arthropathy at C2-C3. Mild  degenerative endplate spurring at C4-C5, C5-C6 and C6-C7. No  fractures.    Laboratory:  10/4/2022  Creatinine 0.79, GFR 81  Albumin 4.1  ALT 9, AST 17  White blood cell count 5.7, hemoglobin 11.9, platelet count 215    12/19/2022  Creatinine 0.72, GFR greater than 90  Albumin 4.0  ALT 7, AST 20  White blood cell count, hemoglobin, platelet count within normal limits    1/10/2023  Albumin creatinine 42.87  Albumin urine 85.4    3/9/2023  Creatinine 0.77, GFR 84  Albumin 3.9  ALT 10, AST 19  Albumin in the urine less than 12  CBC within normal limits    5/17/2023  Creatinine 0.62, GFR greater than 90  Albumin 4.2  ALT 10, AST 17  White blood cell count 5.0, hemoglobin 11.6, platelet count 209    8/25/2023  TB negative  Hepatitis B nonreactive  Hepatitis C nonreactive    10/03/2023  Creatinine 0.85, GFR 74  Albumin 4.1  ALT 10, AST 17  White blood cell count 7.7, hemoglobin 11.9, platelet count 214

## 2024-02-26 DIAGNOSIS — N39.41 URGE INCONTINENCE OF URINE: ICD-10-CM

## 2024-02-26 DIAGNOSIS — I10 BENIGN ESSENTIAL HYPERTENSION: ICD-10-CM

## 2024-02-26 NOTE — TELEPHONE ENCOUNTER
Requested Prescriptions   Pending Prescriptions Disp Refills    atenolol (TENORMIN) 50 MG tablet [Pharmacy Med Name: Atenolol 50 MG Oral Tablet] 200 tablet 2     Sig: TAKE 2 TABLETS BY MOUTH DAILY       Beta-Blockers Protocol Failed - 2/26/2024  6:10 AM        Failed - Blood pressure under 140/90 in past 12 months     BP Readings from Last 3 Encounters:   02/15/24 (!) 142/85   02/09/24 128/78   01/31/24 134/80                 Passed - Patient is age 6 or older        Passed - Medication is active on med list        Passed - Medication indicated for associated diagnosis     Medication is associated with one or more of the following diagnoses:     Hypertension (HTN)   Atrial fibrillation/flutter   Angina   ASCVD   Migraine   Heart Failure   Tremor   Anxiety   Ocular hypertension   Glaucoma          Passed - Recent (12 mo) or future (90 days) visit within the authorizing provider's specialty     The patient must have completed an in-person or virtual visit within the past 12 months or has a future visit scheduled within the next 90 days with the authorizing provider s specialty.  Urgent care and e-visits do not quality as an office visit for this protocol.            oxyBUTYnin ER (DITROPAN XL) 5 MG 24 hr tablet [Pharmacy Med Name: Oxybutynin Chloride ER 5 MG Oral Tablet Extended Release 24 Hour] 200 tablet 2     Sig: TAKE 1 TABLET BY MOUTH TWICE  DAILY       Muscarinic Antagonists (Urinary Incontinence Agents) Passed - 2/26/2024  6:10 AM        Passed - Recent (12 mo) or future (30 days) visit within the authorizing provider's specialty     The patient must have completed an in-person or virtual visit within the past 12 months or has a future visit scheduled within the next 90 days with the authorizing provider s specialty.  Urgent care and e-visits do not quality as an office visit for this protocol.          Passed - Medication is Oxybutynin and patient is 5 years of age or older        Passed - Patient does not  have a diagnosis of glaucoma on the problem list     If glaucoma diagnosis is new, refer refill to physician.          Passed - Medication is active on med list        Passed - Patient is 18 years of age or older

## 2024-02-27 ENCOUNTER — TELEPHONE (OUTPATIENT)
Dept: RHEUMATOLOGY | Facility: CLINIC | Age: 69
End: 2024-02-27

## 2024-02-27 ENCOUNTER — ANCILLARY PROCEDURE (OUTPATIENT)
Dept: GENERAL RADIOLOGY | Facility: CLINIC | Age: 69
End: 2024-02-27
Attending: PHYSICIAN ASSISTANT
Payer: COMMERCIAL

## 2024-02-27 ENCOUNTER — OFFICE VISIT (OUTPATIENT)
Dept: RHEUMATOLOGY | Facility: CLINIC | Age: 69
End: 2024-02-27
Payer: COMMERCIAL

## 2024-02-27 VITALS
RESPIRATION RATE: 16 BRPM | HEIGHT: 63 IN | SYSTOLIC BLOOD PRESSURE: 136 MMHG | BODY MASS INDEX: 46.92 KG/M2 | WEIGHT: 264.8 LBS | HEART RATE: 65 BPM | OXYGEN SATURATION: 96 % | DIASTOLIC BLOOD PRESSURE: 83 MMHG

## 2024-02-27 DIAGNOSIS — M79.675 PAIN OF TOE OF LEFT FOOT: ICD-10-CM

## 2024-02-27 DIAGNOSIS — Z79.899 HIGH RISK MEDICATION USE: ICD-10-CM

## 2024-02-27 DIAGNOSIS — M06.09 SERONEGATIVE RHEUMATOID ARTHRITIS OF MULTIPLE SITES (H): Primary | ICD-10-CM

## 2024-02-27 DIAGNOSIS — M19.90 INFLAMMATORY ARTHRITIS: ICD-10-CM

## 2024-02-27 DIAGNOSIS — M06.09 SERONEGATIVE RHEUMATOID ARTHRITIS OF MULTIPLE SITES (H): ICD-10-CM

## 2024-02-27 LAB — URATE SERPL-MCNC: 4.4 MG/DL (ref 2.4–5.7)

## 2024-02-27 PROCEDURE — 73630 X-RAY EXAM OF FOOT: CPT | Mod: TC | Performed by: RADIOLOGY

## 2024-02-27 PROCEDURE — 84550 ASSAY OF BLOOD/URIC ACID: CPT | Performed by: PHYSICIAN ASSISTANT

## 2024-02-27 PROCEDURE — 36415 COLL VENOUS BLD VENIPUNCTURE: CPT | Performed by: PHYSICIAN ASSISTANT

## 2024-02-27 PROCEDURE — G2211 COMPLEX E/M VISIT ADD ON: HCPCS | Performed by: PHYSICIAN ASSISTANT

## 2024-02-27 PROCEDURE — 99214 OFFICE O/P EST MOD 30 MIN: CPT | Performed by: PHYSICIAN ASSISTANT

## 2024-02-27 RX ORDER — ATENOLOL 50 MG/1
100 TABLET ORAL DAILY
Qty: 180 TABLET | Refills: 3 | Status: SHIPPED | OUTPATIENT
Start: 2024-02-27 | End: 2024-08-15

## 2024-02-27 RX ORDER — OXYBUTYNIN CHLORIDE 5 MG/1
5 TABLET, EXTENDED RELEASE ORAL 2 TIMES DAILY
Qty: 180 TABLET | Refills: 3 | Status: SHIPPED | OUTPATIENT
Start: 2024-02-27 | End: 2024-04-10

## 2024-02-27 ASSESSMENT — PAIN SCALES - GENERAL: PAINLEVEL: SEVERE PAIN (6)

## 2024-02-27 NOTE — PATIENT INSTRUCTIONS
After Visit Instructions:     Thank you for coming to [facility] for your care. It is my goal to partner with you to help you reach your optimal state of health.       Plan:     Schedule follow-up with Danni Martínez PA-C in 3 months.  Labs: CBC, creatinine, Albumin, AST, ALT CRP and Sed rate next due in April; uric acid today  Xray left foot  Medication recommendations:   Start Enbrel 50mg/mL WEEKLY SubQ. You will start this when you're due for the the Humira  # Etanercept (Enbrel) Risks and Benefits: The risks and benefits of etanercept were discussed in detail and the patient verbalized understanding.  The risks discussed include, but are not limited to, the risk for hypersensitivity, anaphylaxis, anaphylactoid reactions, an increased risk for serious infections leading to hospitalization or death, a possible increased risk for lymphoma and other malignancies, a possible worsening of demyelinating diseases, a possible worsening of heart failure, possible reactivation of hepatitis B, and possible reactivation of latent tuberculosis.  Subcutaneous injections may result in injection site reactions and/or pain at the site of injection.  It was discussed that the medication would need to be discontinued if a serious infection develops.  It was discussed that live vaccinations should not be received while using etanercept or within 30 days prior to starting etanercept.  I encouraged reviewing the package insert and asking any questions about the medication.      Stop Humira 40mg/0.4mL           Danni Martínez PA-C  [facility]  [facility]    Contact information: [facility]  Clinic Number:  [phone]  Please call or send a [employer] message with any questions about your care

## 2024-02-27 NOTE — TELEPHONE ENCOUNTER
Occupational Therapy  OCCUPATIONAL THERAPY INITIAL EVALUATION      Date:2021  Patient Name: Seymour Vera  MRN: [id number]  : 1952  Room: [address] [place] [address]    Referring Provider: Maxi Poole MD    Evaluating OT: Sarah Garcia OTR/L #[id number]    AM-PAC Daily Activity Raw Score: 15/24    Recommended Placement: Subacute rehab  Recommended Adaptive Equipment: TBD     Diagnosis:   1. COPD exacerbation ([facility]    2. [facility]    3. Elevated troponin    4. Calculus of gallbladder without cholecystitis without obstruction        Surgery: N/A      Pertinent Medical History:    Past Medical History:   Diagnosis Date    COPD (chronic obstructive pulmonary disease) ([facility])       Precautions:  Falls, alarm, O2, B knees buckle     Home Living: Pt lives with wife in a 5th floor 1 story apartment with elevator with 0 SIMA with 0 rail(s). Bathroom setup: tub/shower   Equipment owned: [employer], cane, shower chair, scooter, w/c    Prior Level of Function: Assist prn with ADLs/IADLs, needing more assist recently; ambulated with cane  Driving: No  Occupation: Not reported    Pain Level: -/10  Cognition: A&O: 4/4; Follows 2 step directions   Memory:  good   Sequencing:  Fair+   Problem solving:  Fair+   Judgement/safety:  Fair+     Functional Assessment:   Initial Eval Status  Date: 21 Treatment Status  Date: STGs = LTGs  Time frame: 5-7 days   Feeding Minimal Assist   Due to B hand contractures, digits 2-5 flexed into palm  Set-up   Grooming Moderate Assist    Minimal Assist    UB Dressing Minimal Assist   Don gown around back, assist with fasteners, threading arms. Supervision    LB Dressing Maximal Assist   Don socks at bed level, pt able to lift heels off of bed, however, poor control during descent.    Minimal Assist    Bathing Maximal Assist    Minimal Assist    Toileting Maximal Assist     Minimal Assist    Bed Mobility  Supine to sit: Minimal Assist   Sit to supine: NT  Pt sitting EOB with PT PA Initiation    Medication: ETANERCEPT 50 MG/ML SC SOAJ AUTOINJECTOR  Insurance Company: [employer] Part D - Phone [phone] Fax [phone]  Pharmacy Filling the Rx: [facility] - [place], [place] - [facility] [address]  Filling Pharmacy Phone: [phone]  Filling Pharmacy Fax: [phone]  Start Date: 2/27/2024  FINESSE (Key: [id number])        Thank You,     Eli Perez CPhT  Specialty Pharmacy Clinic [facility] Specialty  [email or web address]  [email or web address]  Phone: [phone]  Fax: [phone]     Supine to sit: Independent   Sit to supine: Independent    Functional Transfers Minimal Assist of 2  Sit<>stand  HHA   Cuing on body mechanics, blocking knees and safety  Supervision    Functional Mobility Moderate Assist of 2  Using HHA, several side steps to HOB, knees buckling and hips swaying in direction of steps. Minimal Assist    Balance Sitting:     Static:  Fair+    Dynamic:fair+  Standing: fair  Sitting:     Static:  good    Dynamic:good  Standing: fair+   Activity Tolerance Fair  SpO2 93 at rest, 96-98% with activity  O2 2L  Fair+   Visual/  Perceptual Glasses: Yes   WFL       Hand Dominance Right     Strength ROM Additional Info:    RUE  4-/5  WFL  Digits 2-5 flexed into palm Limited  and FMC/dexterity noted during ADL tasks       LUE 4-/5  WFL  Digits 2-5 flexed into palm Limited  and FMC/dexterity noted during ADL tasks     Hearing: WFL   Sensation:  No c/o numbness or tingling   Tone: WFL   Edema: None noted    Comments:   Nursing approved therapy session. Upon arrival, patient supine in bed and agreeable to OT session. Therapist educated pt on role of OT. At end of session, patient EOB with PT with call light and phone within reach, all lines and tubes intact; nursing aware. Pt demonstrated fair+ understanding of education/techniques and decreased independence and safety during completion of ADL/functional transfer/mobility tasks. Pt would benefit from continued skilled OT to increase safety and independence with completion of ADL/IADL tasks for functional independence and quality of life. Treatment:   Skilled occupational therapy services provided include instruction/training on safety and adapted techniques for completion of therapeutic activities, ADLs/IADLs. Skilled monitoring of O2 sats, HR, and pt response throughout treatment. Prior to and at the end of session, environmental modifications  completed for patients safety and efficiency of treatment session. ? Therapist facilitated therapeutic activities: bed mobility, functional transfers, graded functional activities (static/dynamic sitting, static/dynamic standing, functional reaching), and functional ambulation - providing min cuing (verbal, visual, tactile) on AD management, hand placement, body mechanics, posture, breathing techniques, energy conservation, compensatory strategies, and safety. ? Therapist facilitated self-care retraining: UB dressing, LB dressing tasks - providing min cuing (verbal, visual, tactile) on body mechanics, posture, breathing techniques, energy conservation, compensatory strategies, and safety. Therapist educated pt on compensatory strategies/energy conservation techniques to safely complete ADLs/IADLs.      Eval Complexity: Low    Assessment of current deficits   Functional mobility [x]  ADLs [x] Strength [x]  Cognition []  Functional transfers  [x] IADLs [x] Safety Awareness [x]  Endurance [x]  Fine Motor Coordination [] Balance [x] Vision/perception [] Sensation []   Gross Motor Coordination [] ROM [] Delirium []                  Motor Control []    Plan of Care: 1-3 days/week for 1-2 weeks PRN   Instruction/training on adapted ADL techniques and AE recommendations to increase functional independence within precautions  Training on energy conservation strategies/techniques to improve independence/tolerance for self-care routine  Functional transfer/mobility training/DME recommendations for increased independence, safety, and fall prevention  Patient/Family education to increase follow through with safety techniques and functional independence  Recommendation of environmental modifications for increased safety with functional transfers/mobility and ADLs  Therapeutic exercise to improve motor endurance, ROM, and functional strength for ADLs/functional transfers

## 2024-02-27 NOTE — TELEPHONE ENCOUNTER
Called patient to discuss PAP:    Last login to [employer] 11/2023    Household: 2  Annual income est: 25,000  Savings less than: 10k    Patient is eligible for Medicare Extra Help - spoke to patient and gave instructions for how to request extra help application be mailed to home.        Follow  up with patient Friday to confirm she was able to request extra help application be mailed to her home.    Thank You,     Cinthya Perez [facility]  Specialty Pharmacy Clinic [facility] Specialty  [email or web address]  [email or web address]  Phone: [phone]  Fax: [phone]

## 2024-02-29 NOTE — TELEPHONE ENCOUNTER
Prior Authorization Approval    Medication: ETANERCEPT 50 MG/ML SC SOAJ AUTOINJECTOR  Authorization Effective Date: 2/27/2024  Authorization Expiration Date: 8/27/2024  Approved Dose/Quantity: 4 / 28  Reference #: FINESSE (Key: [id number])   Insurance Company: "[employer] Part D - Phone [phone] Fax [phone]  Expected CoPay: $ 2,294.4  CoPay Card Available: No    Financial Assistance Needed: MEDICARE EXTRA HELP  Which Pharmacy is filling the prescription: [facility] - [place], [place] - [address] [place] [address]  Pharmacy Notified: no - awaiting medicare extra help determination  Patient Notified: yes - called and spoke to patient - she called social security on 2/27 and requested Extra help application be mailed  to her home. Patient is aware of time line.  Shaka will take 3-7 business days by mail to get to her - once she completes application 2-3 weeks for determination. No alternative sources of funding available until determination from Medicare Extra Help.        Thank You,     Eli Perez CPhT  Specialty Pharmacy Clinic [facility] Specialty  [email or web address]  [email or web address]  Phone: [phone]  Fax: [phone]

## 2024-03-08 NOTE — TELEPHONE ENCOUNTER
Called patient for Medicare Extra Help Status:    Patient has not received paper application for medicare extra help    Patient discussed possibly applying online with her daughter this weekend.     Sent her [employer] message with the 3 ways to sign up for Medicare Extra Help.    Thank You,     Cinthya Perez [facility]  Specialty Pharmacy Clinic Liaison [facility] Specialty  [email or web address]  [email or web address]  Phone: [phone]  Fax: [phone]

## 2024-03-11 NOTE — TELEPHONE ENCOUNTER
Patient called and LVM - after waiting 2 weeks for application to come via mail she got letter from Medicare with instructions on 3 ways to apply. In other words repeat what she did the first time and call to request an application.    Patient displeased with social security considering she sat on hold for over an hour to request an application and received a letter two weeks later with instructions on how to apply.    Called and spoke to Karen, she sat on hold for another hour this morning to request paper application be mailed to her home. Est. 7-10 days via mail.    Patient stated she applied for Medicare Extra Help about 4 years ago and was denied.     Thank You,     Eli Perez [facility]  Specialty Pharmacy Clinic [facility] Specialty  [email or web address]  [email or web address]  Phone: [phone]  Fax: [phone]

## 2024-03-13 NOTE — TELEPHONE ENCOUNTER
Patient called - she was able to apply for Medicare Extra Help - applied online Tuesday 3/12 5:55pm    She is $300 above Extra Help cut off - expect LIS denial    Once Extra Help denial is received by patient she will call me and plans to fax tax return from 2022 and extra help denial to apply for FPAP.    Thank You,     Cinthya Perez [facility]  Specialty Pharmacy Clinic [facility] Specialty  [email or web address]  [email or web address]  Phone: [phone]  Fax: [phone]

## 2024-03-24 ENCOUNTER — HEALTH MAINTENANCE LETTER (OUTPATIENT)
Age: 69
End: 2024-03-24

## 2024-03-26 NOTE — TELEPHONE ENCOUNTER
"Patient called - she received letter from social security today she \"may not be eligible for Extra Help\"    FAX to [phone]    Warned patient that I have experienced a few fax failures with patients previously.    Communicated with patient that clinic is also able to fax to email: [email or web address]    Will be in clinic Monday 4/1 and have medicare extra help denial faxed along with 2022 Federal tax return for her and spouse.    Thank You,     Cinthya Perez [facility]  Specialty Pharmacy Clinic [facility] Specialty  [email or web address]  [email or web address]  Phone: [phone]  Fax: [phone]        "

## 2024-04-01 NOTE — TELEPHONE ENCOUNTER
RUSSELL INITIATED    Medication: ETANERCEPT 50 MG/ML SC SOAJ AUTOINJECTOR  Foundation Name: FPAP  Date submitted: 4/1/2024  9:17 AM   [employer] Phone:    [employer] Fax: [phone]    Karen emailed W2 for her and spouse + medicare LIS denial.    Applied for FPAP as urgent request.    Notified patient to likely expect contact from [facility] to review her medication once FPAP is approved.    Thank You,     Eli Perez [facility]  Specialty Pharmacy Clinic [facility] Specialty  [email or web address]  [email or web address]  Phone: [phone]  Fax: [phone]

## 2024-04-02 NOTE — TELEPHONE ENCOUNTER
RUSSELL APPROVED    Medication: ETANERCEPT 50 MG/ML SC SOAJ AUTOINJECTOR  Amount: $    Foundation Name: FPAP  Foundation Phone:    [place] Fax: [phone]  Member ID: [id number]  BIN:   PCN:   Group:   Foundation Effective Date: 4/2/2024  Foundation Expiration Date: 12/31/2024  Additional Information:       Patient Notified: yes - patient is expecting call from [facility] to schedule visit to go over medications.    Thank You,     Eli Perez [place]  Specialty Pharmacy Clinic [facility] Specialty  [email or web address]  [email or web address]  Phone: [phone]  Fax: [phone]

## 2024-04-09 ENCOUNTER — VIRTUAL VISIT (OUTPATIENT)
Dept: PALLIATIVE MEDICINE | Facility: OTHER | Age: 69
End: 2024-04-09
Attending: PHYSICIAN ASSISTANT
Payer: COMMERCIAL

## 2024-04-09 DIAGNOSIS — I10 BENIGN ESSENTIAL HYPERTENSION: ICD-10-CM

## 2024-04-09 DIAGNOSIS — R32 URINARY INCONTINENCE, UNSPECIFIED TYPE: ICD-10-CM

## 2024-04-09 DIAGNOSIS — Z71.85 VACCINE COUNSELING: ICD-10-CM

## 2024-04-09 DIAGNOSIS — M06.09 SERONEGATIVE RHEUMATOID ARTHRITIS OF MULTIPLE SITES (H): Primary | ICD-10-CM

## 2024-04-09 DIAGNOSIS — M81.0 OSTEOPOROSIS, UNSPECIFIED OSTEOPOROSIS TYPE, UNSPECIFIED PATHOLOGICAL FRACTURE PRESENCE: ICD-10-CM

## 2024-04-09 NOTE — PROGRESS NOTES
Medication Therapy Management (MTM) Encounter    ASSESSMENT:                            Medication Adherence/Access: No issues identified    Seronegative rheumatoid arthritis: Provided education on Enbrel today including dosing, general administration, side effects (both common/serious), precautions, monitoring and time to efficacy. Discussed data on malignancy and risk of serious infection in depth. Discussed potential need to hold therapy in the setting of signs/symptoms of active infection. Encouraged her to contact the rheumatology clinic in the event she has questions on this. Would benefit from starting Enbrel once it arrives and using 50 mg once weekly as directed.     Vaccines: Encouraged indicated non-live vaccines. Per ACIP guidelines, patient is eligible for Covid-19, Influenza, RSV, and Zoster. Patient declines COVID.    Hypertension: Stable. Patient is meeting blood pressure goal of < 140/90mmHg.    Urinary incontinence: Patient may benefit from increasing oxybutynin to 10 mg twice daily (max dose is 30 mg/day). Will see if her primary care provider is ok with dose increase.    Osteoporosis: Continue plan with primary care provider    PLAN:                            Start Enbrel 50 mg once weekly injection once you receive it  Administration instructions: [email or web address]/-/[email or web address]/[email or web address]-[email or web address]  Consider receiving the following vaccinations:  Shingrix (shingles) - 2 dose series  RSV  Flu  Que will discuss potential oxybutynin dose increase with Dr. Suazo   Have labs checked approximately 5/15/24    Follow-up: Return in 3 months (on 6/26/2024) for Follow up, with me, using a phone visit.    SUBJECTIVE/OBJECTIVE:                          Karen Harris is a 69 year old female called for an initial visit. She was referred to me from Danni Martínez PA-C.      Reason for visit: Enbrel new start for rheumatoid arthritis.    Allergies/ADRs:  Reviewed in chart  Past Medical History: Reviewed in chart  Tobacco: She reports that she has never smoked. She has never used smokeless tobacco.  Alcohol: Less than 1 beverage / month    Medication Adherence/Access: Patient approved for [facility]    Seronegative rheumatoid arthritis:   Enbrel 50 mg subcutaneously once weekly- being delivered today (she plans to start it today as well)    Reports she can barely walk due to pain in her feet. Her last Humira dose was 2 weeks ago. States the Humira took a day to take effect and never helped the pain in her knees and feet (only worked on hands). It also started to wear off within about a week. Has recently been using foot massage with heat to help with feet, but isn't sure how well it is working yet.    Previous therapies: Humira every 7 days and every 14 days (ineffective), methotrexate injections, sulfasalazine, hydroxychloroquine    Reviewed baseline pre-biologic screening.   Hep C antibody non-reactive 8/25/2023  Hep B surface antigen non-reactive 8/25/2023  Hep B core antibody non-reactive 8/25/2023  Quantiferon TB Negative 8/25/2023    Vaccines:  Immunization History   Administered Date(s) Administered    COVID-19 Monovalent 18+ (Moderna) 03/03/2021, 03/31/2021    Influenza (High Dose) 3 valent vaccine 10/09/2019    Influenza (IIV3) PF 10/31/2005, 11/27/2007, 10/23/2008, 11/11/2009, 11/19/2010, 10/26/2012, 10/24/2013    Influenza Vaccine 65+ (Fluzone HD) 09/24/2020, 09/14/2021, 09/15/2022    Influenza Vaccine >6 months,quad, PF 10/23/2014, 10/20/2016, 12/04/2017    Pneumococcal 20 valent Conjugate (Prevnar 20) 12/19/2022    TD,PF 7+ (Tenivac) 06/13/2005    TDAP Vaccine (Adacel) 05/26/2015     Got real sick with COVID shots so she doesn't want to get more.    Hypertension   Atenolol 100 mg once daily    Patient reports no current medication side effects  Patient does not self-monitor blood pressure.       BP Readings from Last 3 Encounters:    02/27/24 136/83   02/15/24 (!) 142/85   02/09/24 128/78     Pulse Readings from Last 3 Encounters:   02/27/24 65   02/15/24 94   02/09/24 74      Urinary incontinence:  Oxybutynin ER 5 mg twice daily    Reports no side effects or concerns. States it doesn't seem to be working well.    Osteoporosis:  Prolia 60 mg every 6 months - last received 11/14/23, next due 5/14/24.    Reports no side effects or concerns.    Today's Vitals: LMP 05/29/2006 (Approximate)   ----------------    I spent 40 minutes with this patient today. All changes were made via collaborative practice agreement with Danni Martínez. A copy of the visit note was provided to the patient's provider(s).    A summary of these recommendations was sent via [employer].    Que Jeffrey, PharmD  Medication Therapy Management Pharmacist  [facility]  Phone: [phone]    Telemedicine Visit Details  Type of service:  Telephone visit  Start Time:  11:01 AM  End Time:  11:41 AM     Medication Therapy Recommendations  Seronegative rheumatoid arthritis of multiple sites (H)    Current Medication: etanercept (ENBREL SURECLICK) 50 MG/ML autoinjector   Rationale: Does not understand instructions - Adherence - Adherence   Recommendation: Provide Education   Status: Patient Agreed - Adherence/Education   Note: Enbrel new start education provided         Urinary incontinence, unspecified type    Current Medication: oxyBUTYnin ER (DITROPAN XL) 5 MG 24 hr tablet   Rationale: Dose too low - Dosage too low - Effectiveness   Recommendation: Increase Dose - oxyBUTYnin ER 10 MG 24 hr tablet   Status: Contact Provider - Awaiting Response         Vaccine counseling    Rationale: Preventive therapy - Needs additional medication therapy - Indication   Recommendation: Provide Education   Status: Patient Agreed - Adherence/Education   Note: Recommend Flu, RSV, and Shingrix.

## 2024-04-09 NOTE — Clinical Note
Dr. Suazo,  I am an MTM pharmacist in rheumatology and met with Karen for a medication review. She states the oxybutynin is not working very well and she is having times where she doesn't make it to the bathroom in time. Could we see if increasing the dose to 10 mg twice daily might be beneficial?  Que Jeffrey, PharmD Medication Therapy Management Pharmacist [facility] Phone: [phone]

## 2024-04-09 NOTE — PATIENT INSTRUCTIONS
"Recommendations from today's MTM visit:                                                    MTM (medication therapy management) is a service provided by a clinical pharmacist designed to help you get the most of out of your medicines.   Today we reviewed what your medicines are for, how to know if they are working, that your medicines are safe and how to make your medicine regimen as easy as possible.      Start Enbrel 50 mg once weekly injection once you receive it  Administration instructions: [email or web address]/-/media/Project/[employer]/Repository/pi-Ziios-com/Enbrel/[email or web address]_[email or web address]  Consider receiving the following vaccinations:  Shingrix (shingles) - 2 dose series  RSV  Flu  Que will discuss potential oxybutynin dose increase with Dr. Suazo   Have labs checked approximately 5/15/24    Follow-up: Return in 3 months (on 6/26/2024) for Follow up, with me, using a phone visit.    It was great speaking with you today.  I value your experience and would be very thankful for your time in providing feedback in our clinic survey. In the next few days, you may receive an email or text message from [facility] [employer] with a link to a survey related to your  clinical pharmacist.\"     To schedule another MTM appointment, please call the clinic directly or you may call the MTM scheduling line at [phone] or toll-free at [phone].     My Clinical Pharmacist's contact information:                                                      Please feel free to contact me with any questions or concerns you have.      Que Jeffrey, PharmD  Medication Therapy Management Pharmacist  [facility]  Phone: [phone]   "

## 2024-04-09 NOTE — Clinical Note
Danni,  I went over Enbrel with Karen and she is getting her first shipment today. She plans to start therapy today as well. She is scheduled with you on 5/22, so I asked her to do labs about a week prior to seeing you so they are resulted before the visit. I will check in with her in about 3 months to see how things are going (I pushed it out since she is seeing you in May).  Que

## 2024-04-10 ENCOUNTER — TELEPHONE (OUTPATIENT)
Dept: FAMILY MEDICINE | Facility: CLINIC | Age: 69
End: 2024-04-10
Payer: COMMERCIAL

## 2024-04-10 DIAGNOSIS — N39.41 URGE INCONTINENCE OF URINE: ICD-10-CM

## 2024-04-10 RX ORDER — OXYBUTYNIN CHLORIDE 5 MG/1
5 TABLET, EXTENDED RELEASE ORAL 2 TIMES DAILY
Qty: 60 TABLET | Refills: 0 | Status: SHIPPED | OUTPATIENT
Start: 2024-04-10 | End: 2024-04-11

## 2024-04-10 NOTE — TELEPHONE ENCOUNTER
Patient calling. [employer] shipment is running late and patient is out of medication.     Asking for a 30 day supply to be sent to local pharmacy while waiting for [employer]um shipment    Preferred Pharmacy:   [id number] - [address] AT [facility] [address]  [address] [facility] [address]  Phone: [phone] Fax: [phone]    Could we send this information to you in [employer] or would you prefer to receive a phone call?:   Patient would prefer a phone call   Okay to leave a detailed message?: Yes at Home number on file [phone] (home)

## 2024-04-10 NOTE — TELEPHONE ENCOUNTER
Please view message below.    Ok to send in small amount to local pharmacy?    Thank you    Mayda CHOPRA RN

## 2024-04-11 DIAGNOSIS — N39.41 URGE INCONTINENCE OF URINE: ICD-10-CM

## 2024-04-11 RX ORDER — OXYBUTYNIN CHLORIDE 5 MG/1
5 TABLET, EXTENDED RELEASE ORAL 2 TIMES DAILY
Qty: 60 TABLET | Refills: 5 | Status: SHIPPED | OUTPATIENT
Start: 2024-04-11 | End: 2024-04-30

## 2024-04-11 NOTE — TELEPHONE ENCOUNTER
Pending Prescriptions:                       Disp   Refills    oxyBUTYnin ER (DITROPAN XL) 5 MG 24 hr ta*60 tab*0            Sig: Take 1 tablet (5 mg) by mouth 2 times daily

## 2024-04-15 ENCOUNTER — TELEPHONE (OUTPATIENT)
Dept: FAMILY MEDICINE | Facility: CLINIC | Age: 69
End: 2024-04-15
Payer: COMMERCIAL

## 2024-04-15 DIAGNOSIS — M81.8 OTHER OSTEOPOROSIS WITHOUT CURRENT PATHOLOGICAL FRACTURE: Primary | ICD-10-CM

## 2024-04-15 NOTE — TELEPHONE ENCOUNTER
"Forwarding to PCP for lab orders related to Prolia please.    Lisa Hernandez RN  [facility]    Addendum:  Spoke with patient on 4/16/24, and instead of going [place] north on 5[place]11[place], they would like to leave late next week.   Dr. Suazo-okay for patient to receive injection about 2 weeks sooner than due?    Lisa Hernandez RN  [facility]    Received reminder notice below for patient's Prolia.  \"Prolia  Received: Today  Lisa Hernandez RN  [facility]  Due for Prolia 5/11/24.  Patient leaves for up [place] for the summer mid-May.\"    RN initiated steps for Prolia, per below.         RN steps prior to Prolia injection:    1.  Obtain CAM order from a provider at the site the patient intends to receive the injection. There is a current CAM order in place, good through 10/31/24.     2.  Start a telephone encounter and route to the CAM PA pool: [id number]. PA needs to be approved prior to ordering medication and is typically approved for 2 doses for the year. Separate TE started and routed to CAM PA pool on 4/15/24.      3.  Once the PA is approved, contact patient and arrange for lab work.  Calcium needs be completed within 2 weeks of injection per  recommendations, or per provider authorization.  Lab work is ordered by the ordering provider. PA is approved as of 4/16/24-see PA TE dated 4/15/24.     4.  Verify that patient has not had major dental work in the past 6 months and does not plan to have major dental work within the upcoming 6 months.  Spoke with patient on 4/16/24 to update her of process, and she denies past or future dental work at this time.  She does state that instead of going up north 5/11/24, they're moving this up to late next week.  Advised will check with PCP to see if she can receive Prolia injection 2 weeks early, then will also need to order Prolia expedited so we can get it on time.  See notes above to PCP.     5.  If lab " "work is acceptable, contact patient and schedule RN visit for the injection, allowing time for the Prolia to arrive from wholesale pharmacy (medications are delivered on Mondays at [facility], and if ordered the Thursday or Friday prior may not arrive until the following Monday).  Order Prolia from the wholesale pharmacy: [email or web address]  Lab work completed 6/12/24 and is acceptable per Dr Suazo.  Ordering Prolia today from wholesale pharmacy.  It should be delivered on Mon 6/24.  6/19/24  Tamela Chand RN     6. Print a copy of the order confirmation e-mail received.  Print 2 patient labels-attach one to the order and another to the order log sheet on clipboard by fridge and fill out log appropriately.   Ordered.  6/19/24, LR/RN    7. When the medication arrives, MA will attach the order sheet to the medication, place in the fridge and update the order log sheet.  Med is kept in fridge until administered by RN.   *Prolia is a subcutaneous injection used to treat osteoporosis.  It can be left in fridge until patient arrives.  It needs to used within 15 minutes, otherwise placed back in fridge during this timeframe, or used within 30 days.       Lisa Hernandez RN  [facility]    \"Prolia  Received: Today  Lisa Hernandez RN  [facility]  Due for Prolia 5/11/24.  Patient leaves for up [place] for the summer mid-May.\"    "

## 2024-04-16 NOTE — TELEPHONE ENCOUNTER
CAM staff sends writing RN the staff message below, indicating Prolia PA is approved.    Lisa Hernandez RN  [facility]    Becky Ohara  You5 hours ago (7:38 AM)     BETSY  approved

## 2024-04-17 NOTE — TELEPHONE ENCOUNTER
I called pt.    Pt is leaving the area for cabin next week.  Advised that  Prolia not to be given early.    Pt will be back in town 6/26/24 and asks to get it at that time.  Pt will get labs done at [facility] 2 weeks prior. (Around 6/12)    Pt needs lab order placed.  Routed to provider for orders.    Tamela Chand RN

## 2024-04-17 NOTE — TELEPHONE ENCOUNTER
I am not sure if it is safe to get the prolia two weeks earlier so I will advise that she gets it at the recommended time.

## 2024-04-23 NOTE — TELEPHONE ENCOUNTER
Called patient to notify her that lab orders are in and to get labs done around 6/12/24.      Lexy Petersen  [facility], Primary [facility]

## 2024-04-29 ENCOUNTER — TELEPHONE (OUTPATIENT)
Dept: PODIATRY | Facility: OTHER | Age: 69
End: 2024-04-29
Payer: COMMERCIAL

## 2024-04-29 NOTE — TELEPHONE ENCOUNTER
[facility] Call Center    Phone Message    May a detailed message be left on voicemail: yes     Reason for Call: Other: Patient calling to report that one of their -etanercept (ENBREL SURECLICK) 50 MG/ML autoinjector pens malfunction and that the company will be reaching out to clinic to confirm usage for patient. To send a replacement pen.      Action Taken: Other: Rheum    Travel Screening: Not Applicable

## 2024-04-30 DIAGNOSIS — N39.41 URGE INCONTINENCE OF URINE: ICD-10-CM

## 2024-04-30 RX ORDER — OXYBUTYNIN CHLORIDE 5 MG/1
5 TABLET, EXTENDED RELEASE ORAL 2 TIMES DAILY
Qty: 180 TABLET | Refills: 2 | Status: SHIPPED | OUTPATIENT
Start: 2024-04-30

## 2024-04-30 NOTE — TELEPHONE ENCOUNTER
Received form for provider required signature.    Placed on provider desk for signature.      Fariha VILLATORO   [facility]

## 2024-04-30 NOTE — TELEPHONE ENCOUNTER
Medication Question or Refill    Contacts         Type Contact Phone/Fax    04/30/2024 10:13 AM CDT Phone (Incoming) Karen Harris (Self) [phone] (M)            What medication are you calling about (include dose and sig)?: oxyBUTYnin ER (DITROPAN XL) 5 MG 24 hr tablet     [facility] Pharm[facility]:  [facility] - [address]  [address]  [facility] [address]  Phone: [phone] Fax: [phone]      Controlled Substance Agreement on file:   CSA -- Patient Level:    CSA: None found at the patient level.       Who prescribed the medication?: PCP    Do you need a refill? Yes, immediately so it can come on time.      Do you have any questions or concerns?  Yes: [facility]. Pt needing this done today so the pills can get to her home on time through [facility]       Could we send this information to you [facility] or would you prefer to receive a phone call?:   Patient would prefer a phone call   Okay to leave a detailed message?: Yes at Cell number on file:    Telephone Information:   Mobile [phone]     .María Arguelles PSC

## 2024-05-01 ENCOUNTER — TELEPHONE (OUTPATIENT)
Dept: RHEUMATOLOGY | Facility: CLINIC | Age: 69
End: 2024-05-01
Payer: COMMERCIAL

## 2024-05-01 NOTE — TELEPHONE ENCOUNTER
Health Call Center    Phone Message    May a detailed message be left on voicemail: yes     Reason for Call: Medication Question or concern regarding medication   Prescription Clarification    Name of Medication:   etanercept (ENBREL SURECLICK) 50 MG/ML autoinjector     Prescribing Provider: Tanya     Pharmacy:   [facility] - [place], [place] - [address]        What on the order needs clarification? Patient states she had a bad prescription and was not able to take the medication on Monday 4/29/2024. Patient states she had made arrangements to get a replacement and was told the clinic needed to contact [employer] Case number [id number] at phone number [phone]. Patient states they are going to ask for a copy of the prescription as she is on this program. Patient states she is running very low on the medication and needing this done ASAP. Patient is wanting to get a call back when this has been done to be aware. Please advise.       Action Taken: Message routed to:  [facility] Rheum    Travel Screening: Not Applicable

## 2024-05-01 NOTE — TELEPHONE ENCOUNTER
Patient advised we have form and will await provider signature.  Fariha VILLATORO   [facility]

## 2024-05-03 NOTE — TELEPHONE ENCOUNTER
Form has been faxed to [phone]    Anabella [place]   Clinic Station    [facility]  [phone]

## 2024-05-20 NOTE — PROGRESS NOTES
Karen is a 69 year old who is being evaluated via a billable video visit.    How would you like to obtain your AVS? Mail a copy  How would you like to enter today's visit?   Text to cell phone: [phone]  Will anyone else be joining your video visit? No  Will you be in [place] for the visit?  Yes    Video-Visit Details    Type of service:  Video Visit-unable to connect as she is up [place]   Telephone Start Time: 3:57pm  Telephone End Time:4:03 PM  Originating Location (pt. Location): Home    Distant Location (provider location):  On-site  Platform used for Video Visit: [facility]      Rheumatology Clinic Visit  [facility]  MARISELA Ulloa     Jacquie Harris MRN# [id number]   YOB: 1955 Age: 69 year old   Date of Visit: 5/22/2024  Primary care provider: Jennie Suazo          Assessment and Plan:     1.  Seronegative rheumatoid arthritis  2.  High-risk medication use      Patient presents today for follow-up via telephone.  Her hands are doing good with the Enbrel.  She is having some knee pain but feels that this is more related to the weather.  She will be going in a couple weeks to have labs done and we will make sure to have her follow-up labs for the Enbrel completed at that same time.  She has noticed some injection site reactions with the Enbrel.  Recommend that she try Zyrtec or Allegra 1 day before, the day of and the day after the injection to see if that can help.  She can also take it daily if she prefers.  Plan will have her follow-up with me in 3 months, sooner if needed.      Plan:   Schedule follow-up with Danni Martínez PA-C in 3 months.  Labs: CBC, creatinine, Albumin, AST, ALT CRP and Sed rate as soon as possible  Medication recommendations:   Continue Enbrel 50mg/mL WEEKLY SubQ.   Try Zyrtec or Allegra 1 day before, day of and day after injection    MARISELA Ulloa  Rheumatology         History of Present Illness:   Jacquie Harris presents for evaluation  "of positive SYLVIE, inflammatory arthritis.    Rheumatological history:  Provider: Dr. Uribe   Pertinent labs: Negative rheumatoid factor, CCP antibody, sed rate and CRP.  Positive SYLVIE with a titer of 1:160, speckled pattern.  Normal/negative SYLVIE subsets, ANCA, uric acid, C3, C4  Previous Medications: Plaquenil 200 mg twice daily, last eye exam 02/27/2023, Sulfasalazine, Methotrexate (non effective), Humira (not effective)  Current medication: Enbrel      Interval history May 22, 2024:  She states that her joints are doing okay. She is having some knee pain but feels this is more related to the weather. She states that her hands are good. When she gives herself a shot, but she will get a red spot and itching at the injection site. No infections since starting the Enbrel.     Interval history February 27, 2024:  She is not sure that her joints are much different. She wore compression gloves last week. She will still have her hands get stuck at times. When doing projects, she will switch hands for the project. She does not find the Humira to be effective taking it every other week versus when she was taking it weekly. Her hands feel \"clumsy\". She cannot do fine motor activities such as embroideries. She has been having some big toe pain. She states that last night that it was swollen and it went to the top of her foot.     Interval history November 27, 2023:  She did make a mistake and was taking her Humira weekly. She did skip last week and has a dose for today. She is finding relief. Last she was getting stuck and has some soreness, but overall feels things are good.     Interval history August 25, 2025:  She has not been doing well. She has not been able to get Methotrexate.No history of infections. No cardiovascular history. No history of cancer.      Interval history May 17, 2023:  She states that her hands are close today for making a fist. She still gets some trigger finger. She does still have a hard time " "opening the bottles. Overall she has not seen a lot of difference with the methotrexate.     Interval history March 15, 2023:  She increased the Sulfasalazine to 1 in the AM and 2 in the evening. She has continued with the Hydroxychloroquine as well. She states that her hands continue to get stuck and she has a lot of pain with them. She reports spending a lot of time rinsing them in hot water.     Interval history January 11, 2023:  She states that she is \"okay\". She has been having increased doctoring as her knees are \"really bad\". She has been working with some providers on that. She is thinking this is where the arthritis has started. She states that her hands continue to be the same. She has not noticed a change with the Sulfasalazine. Her hands continue to get stuck and she feels it is affecting more fingers. She is tolerating the medications well. She has not had any infections or fevers.     Interval history November 9, 2022:  She reports that the prednisone did anything. She states that her fingers continue to feel as if they are getting stuck. She continues to feel the swelling as well. She did have initial improvement in symptoms with the Hydroxychloroquine. She feels this was helpful until just before Dr. Uribe left. Initially she thought it was secondary to the humidity as it started in the summer.     History of gastric bleed on NSAIDs.    HPI from consult 10/5/2022:  Patient was previously treated with Dr. Uribe.  Last office visit was on 6/30/2022.  Initial evaluation was on November 10, 2021.  At that time she had reported pain in her bilateral hands with morning stiffness lasting 2 to 3 hours.  Physical exam showed subtle fullness involving the left third MCP and right fifth MCP joints with tenderness to palpation.  Mild tenderness involving the right fifth PIP and left first CMC.  Nodule involving the left third digit A1 pulley area with some tenderness on palpation and a subtle " "catching sensation with active flexion of the left third digit.    She has been unable to do the Paraffin wax due to cost. She uses hot water. She uses this in the morning to get things going. She states that she hurts today. She states that she has some good days, but \"not very many\". She is unsure that the Plaquenil is working. She states that she was expecting it to be a \"miracle\". She states that she was under the impression that you need something to stop and slow the arthritis with rheumatoid arthritis. She states that she does not do much with her fingers any more. She cannot paint or do crafts. She states that she has so much pain that she cannot even think about painting. She states that the pain is bad in the mornings and evening. If she is able to keep them warm, that seems to help. Many of her symptoms started summer 2020. She was noticing her fingers getting \"crooked\". She has not been getting the sticking of the fingers anymore.     No known family history of rheumatoid arthritis or lupus.  No known family history or personal history of psoriasis, ulcerative colitis or Crohn's disease.             Review of Systems:     Constitutional: negative  Skin: negative  Eyes: negative  Ears/Nose/Throat: negative  Respiratory: No shortness of breath, dyspnea on exertion, cough, or hemoptysis  Cardiovascular: negative  Gastrointestinal: negative  Genitourinary: negative  Musculoskeletal: as above  Neurologic: negative  Psychiatric: negative  Hematologic/Lymphatic/Immunologic: negative  Endocrine: negative         Active Problem List:     Patient Active Problem List    Diagnosis Date Noted    Seronegative rheumatoid arthritis of multiple sites (H) 01/31/2024     Priority: Medium    History of total bilateral knee replacement 01/09/2023     Priority: Medium    Benign essential hypertension 06/19/2020     Priority: Medium    Rotator cuff syndrome, right 01/14/2020     Priority: Medium    Multiple joint pain, " possible fibromyalgia 12/20/2019     Priority: Medium    Bronchospasm 01/07/2019     Priority: Medium    GI bleed 03/17/2018     Priority: Medium    Upper GI bleed 03/17/2018     Priority: Medium    Shoulder injury, right, subsequent encounter 08/21/2017     Priority: Medium    Fatty liver 04/20/2017     Priority: Medium    Elevated levels of transaminase & lactic acid dehydrogenase 04/15/2017     Priority: Medium    Bilateral cold feet 01/03/2017     Priority: Medium    Colles' fracture 07/06/2015     Priority: Medium    Encounter for routine gynecological examination  06/04/2015     Priority: Medium    Back pain, left below scapula, strained muscles 10/24/2013     Priority: Medium    HTN, goal below 140/80 05/05/2013     Priority: Medium    S/P TKR (total knee replacement) 04/03/2012     Priority: Medium    Osteoporosis 02/19/2012     Priority: Medium     Previous T scores -2.2  2/2012 Dexascan:  Lumbar spine L1-L4 T-score: -2.4, Left femoral neck T-score: -2.7, Right femoral neck T-score: -2.2   Percent change in lumbar spine: +6.6% since 4/16/2008   Percent change in femurs: +2.9% since 4/16/2008    IMPRESSION: Osteoporosis in the left femoral neck. Severe osteopenia  in the right femoral neck and lumbar spine.      Obesity, Class III, BMI 40-49.9 (morbid obesity) (H) 02/19/2012     Priority: Medium     ideal weight 120, goal weight 200, lose 58 lbs in 58 weeks.      Vitamin D deficiency 07/18/2011     Priority: Medium    Tick bite, infected, positive Lyme test 1996 not treated 07/08/2011     Priority: Medium    Rosacea 11/04/2009     Priority: Medium    Obstructive sleep apnea 12/01/2006     Priority: Medium     Sleep study 12/06 for EDS- AHI 36, desaturations to 65%. CPAP recommended but not tolerated.  01/16/07  ENT consult recommend CPAP and weight loss. Surgery discussed but success rate less than CPAP  1/15/07 CPAP trial, was not able to tolerate. Retried 2008, tolerated better.   Problem list name updated  by automated process. Provider to review      Carpal tunnel syndrome 06/07/2006     Priority: Medium     04/10/09 Dr.Meletiou Cronin. Finding consistant with CTS, but nerve studies and MRI of C-Spine are normal. Corticosteroid injection given in office      CARDIOVASCULAR SCREENING; LDL GOAL LESS THAN 160 10/31/2010     Priority: Low    Allergic rhinitis 03/27/2006     Priority: Low     Problem list name updated by automated process. Provider to review      s/p gastric bypass x 2 07/05/2005     Priority: Low     Gastric bypass 1980              Past Medical History:     Past Medical History:   Diagnosis Date    HTN (hypertension)     IRON DEFICIENCY ANEMIA 7/5/2005     Iron infusion/ resolved since injections    Obstructive sleep apnea     DARWIN (obstructive sleep apnea)      Past Surgical History:   Procedure Laterality Date    ARTHROPLASTY KNEE  04/02/2012    Procedure:ARTHROPLASTY KNEE; Left Total Knee Arthroplasty--Anesth.Choice; Surgeon:HERNANDO THOMPSON; Location:[facility]    ARTHROTOMY SHOULDER, ROTATOR CUFF REPAIR, COMBINED Right 08/25/2017    Procedure: COMBINED ARTHROTOMY SHOULDER, ROTATOR CUFF REPAIR;  Right shoulder distal clavicle excision, subacromial decompression, rotator cuff repair ;  Surgeon: Hernando Thompson MD;  Location: [facility]    ARTHROTOMY SHOULDER, ROTATOR CUFF REPAIR, COMBINED Right 12/18/2017    Procedure: COMBINED ARTHROTOMY SHOULDER, ROTATOR CUFF REPAIR;  Right Shoulder Rotator Cuff Revision;  Surgeon: Hernando Thompson MD;  Location: [facility]    ARTHROTOMY SHOULDER, ROTATOR CUFF REPAIR, COMBINED Left 04/27/2018    Procedure: COMBINED ARTHROTOMY SHOULDER, ROTATOR CUFF REPAIR;  Left Shoulder Open Subacromial Decompression,Distal Clavicle Excision,Rotator Cuff Repair;  Surgeon: Hernando Thompson MD;  Location: [facility]    C/SECTION, LOW TRANSVERSE  1978, 1984, 1994    x 3    COLONOSCOPY  01/01/2001    normal colonoscopy    COLONOSCOPY N/A 08/22/2017    Procedure:  COLONOSCOPY;  Colonoscopy  ;  Surgeon: Delfino Yoo MD;  Location: [facility]    COLONOSCOPY  08/22/2017    EXAMCOL    ESOPHAGOSCOPY, GASTROSCOPY, DUODENOSCOPY (EGD), COMBINED N/A 03/18/2018    Procedure: COMBINED ESOPHAGOSCOPY, GASTROSCOPY, DUODENOSCOPY (EGD);;  Surgeon: Poncho Galindo MD;  Location: [facility]    GASTRIC BYPASS  1980/2005    Gastric Bypass and revision    HERNIA REPAIR, INCISIONAL  06/16/2008    lap.repair with 10x8 mesh    AR REVISE KNEE JOINT REPLACE,ALL PARTS Left 05/29/2019    Dr. Cedric Fuller    AR TOTAL KNEE ARTHROPLASTY Right 06/22/2020    Dr. Cedric Fuller    TUBAL LIGATION  01/01/1994    ZZC ORAL SURGERY PROCEDURE  age 18    wisdom teeth            Social History:     Social History     Socioeconomic History    Marital status:      Spouse name: Not on file    Number of children: Not on file    Years of education: Not on file    Highest education level: Not on file   Occupational History    Not on file   Tobacco Use    Smoking status: Never    Smokeless tobacco: Never   Vaping Use    Vaping status: Never Used   Substance and Sexual Activity    Alcohol use: Yes     Alcohol/week: 0.0 standard drinks of alcohol     Comment: mixed very light 1 a month if that    Drug use: No    Sexual activity: Yes     Partners: Male     Birth control/protection: Surgical   Other Topics Concern    Parent/sibling w/ CABG, MI or angioplasty before 65F 55M? No     Service No    Blood Transfusions Yes     Comment: 1976, 1978, 1978, 1984    Caffeine Concern No    Occupational Exposure No    Hobby Hazards No    Sleep Concern No    Stress Concern No    Weight Concern Yes    Special Diet No    Back Care No    Exercise No    Bike Helmet No    Seat Belt Yes    Self-Exams Not Asked   Social History Narrative    Not on file     Social Determinants of Health     Financial Resource Strain: Low Risk  (1/31/2024)    Financial Resource Strain     Within the past 12 months, have you or your family members you live with  been unable to get utilities (heat, electricity) when it was really needed?: No   Food Insecurity: Low Risk  (1/31/2024)    Food Insecurity     Within the past 12 months, did you worry that your food would run out before you got money to buy more?: No     Within the past 12 months, did the food you bought just not last and you didn t have money to get more?: No   Transportation Needs: Low Risk  (1/31/2024)    Transportation Needs     Within the past 12 months, has lack of transportation kept you from medical appointments, getting your medicines, non-medical meetings or appointments, work, or from getting things that you need?: No   Physical Activity: Not on file   Stress: Not on file   Social Connections: Not on file   Interpersonal Safety: Low Risk  (1/31/2024)    Interpersonal Safety     Do you feel physically and emotionally safe where you currently live?: Yes     Within the past 12 months, have you been hit, slapped, kicked or otherwise physically hurt by someone?: No     Within the past 12 months, have you been humiliated or emotionally abused in other ways by your partner or ex-partner?: No   Housing Stability: Low Risk  (1/31/2024)    Housing Stability     Do you have housing? : Yes     Are you worried about losing your housing?: No          Family History:     Family History   Problem Relation Age of Onset    C.A.D. Father         MI at age 60    Hypertension Father     Alzheimer Disease Father     Hyperlipidemia Father     Osteoporosis Mother         on Fosamax    Glaucoma Mother     Other Cancer Brother     Stomach Cancer Maternal Aunt     Cervical Cancer Cousin     Diabetes No family hx of     Cerebrovascular Disease No family hx of     Breast Cancer No family hx of     Cancer - colorectal No family hx of     Prostate Cancer No family hx of     Liver Disease No family hx of             Allergies:     Allergies   Allergen Reactions    Ibuprofen      Other reaction(s): Gastrointestinal  Gastric bleed     Naproxen GI Disturbance     Other reaction(s): Gastrointestinal  Gastric bleed    Nickel Itching     Inflamed with cheap earrings (itchy)    Rofecoxib Itching and Swelling     VIOXX (Swollen Feet,Hands itching), Okay with ibuprofen and aspirin    Vioxx Itching and Swelling     VIOXX (Swollen Feet,Hands itching), Okay with ibuprofen and aspirin            Medications:     Current Outpatient Medications   Medication Sig Dispense Refill    acetaminophen (TYLENOL) 500 MG tablet Take 1,000 mg by mouth every 6 hours as needed      albuterol (PROAIR HFA/PROVENTIL HFA/VENTOLIN HFA) 108 (90 Base) MCG/ACT inhaler INHALE 2 PUFFS INTO THE LUNGS EVERY 6 HOURS AS NEEDED FOR SHORTNESS OF BREATH OR DIFFICULT BREATHING OR WHEEZING 25.5 g 1    atenolol (TENORMIN) 50 MG tablet Take 2 tablets (100 mg) by mouth daily for 90 days 180 tablet 3    etanercept (ENBREL SURECLICK) 50 MG/ML autoinjector Inject 50 mg Subcutaneous every 7 days 4 mL 5    NYAMYC [id number] UNIT/GM external powder APPLY TO AFFECTED AREA(S)  TOPICALLY 3 TIMES DAILY AS  NEEDED 60 g 1    oxyBUTYnin ER (DITROPAN XL) 5 MG 24 hr tablet Take 1 tablet (5 mg) by mouth 2 times daily 180 tablet 2            Physical Exam:   Last menstrual period 2006, not currently breastfeeding.  Wt Readings from Last 6 Encounters:   24 120.1 kg (264 lb 12.8 oz)   02/15/24 119.7 kg (264 lb)   24 119.7 kg (264 lb)   24 117.9 kg (260 lb)   23 118.4 kg (261 lb)   23 118.4 kg (261 lb)       Resp: No shortness of breath with normal conversation  Psych: nl judgement, orientation, memory, affect.           Data:   Imagin/10/2021 x-ray bilateral hand  IMPRESSION: Generalized demineralization. Mild degenerative arthritis involving the interphalangeal joints of the fingers, and the basilar joints of both thumbs, with mild joint space narrowing and osteophytic spurring. Generalized bone demineralization.  No evidence of erosive arthropathy. Soft tissues are  normal.    11/10/2021 x-ray bilateral knee  IMPRESSION:   RIGHT KNEE: Uncomplicated cemented total knee arthroplasty. Normal joint alignment. No fracture or erosion. No joint effusion.     LEFT KNEE: Cemented longstem hinged total knee arthroplasty. No evidence of loosening or hardware complication. Mild patella baja. No fracture or erosion. No joint effusion.    11/10/2021 x-ray right wrist  IMPRESSION: No acute fracture. Chronic healed fracture of the distal radius. Chronic ununited fracture of the ulnar styloid process. Moderate ulnar positive variance. Mild degenerative changes at the STT and first CMC joints.    2/21/2022 x-rays left shoulder  IMPRESSION:  1.  Normal left glenohumeral joint spacing and alignment.  2.  Distal clavicle excision, unchanged.  3.  No fracture.  4.  No significant change since the comparison.    2/21/2022 x-ray cervical spine  IMPRESSION: Minimal degenerative anterolisthesis of C2 upon C3, C3  upon C4 and C5 on C6. Alignment otherwise normal. Vertebral body  heights are normal. Moderate facet arthropathy at C2-C3. Mild  degenerative endplate spurring at C4-C5, C5-C6 and C6-C7. No  fractures.    Laboratory:  10/4/2022  Creatinine 0.79, GFR 81  Albumin 4.1  ALT 9, AST 17  White blood cell count 5.7, hemoglobin 11.9, platelet count 215    12/19/2022  Creatinine 0.72, GFR greater than 90  Albumin 4.0  ALT 7, AST 20  White blood cell count, hemoglobin, platelet count within normal limits    1/10/2023  Albumin creatinine 42.87  Albumin urine 85.4    3/9/2023  Creatinine 0.77, GFR 84  Albumin 3.9  ALT 10, AST 19  Albumin in the urine less than 12  CBC within normal limits    5/17/2023  Creatinine 0.62, GFR greater than 90  Albumin 4.2  ALT 10, AST 17  White blood cell count 5.0, hemoglobin 11.6, platelet count 209    8/25/2023  TB negative  Hepatitis B nonreactive  Hepatitis C nonreactive    10/03/2023  Creatinine 0.85, GFR 74  Albumin 4.1  ALT 10, AST 17  White blood cell count 7.7,  hemoglobin 11.9, platelet count 214

## 2024-05-22 ENCOUNTER — VIRTUAL VISIT (OUTPATIENT)
Dept: RHEUMATOLOGY | Facility: CLINIC | Age: 69
End: 2024-05-22
Payer: COMMERCIAL

## 2024-05-22 VITALS — BODY MASS INDEX: 46.91 KG/M2 | HEIGHT: 63 IN

## 2024-05-22 DIAGNOSIS — M06.09 SERONEGATIVE RHEUMATOID ARTHRITIS OF MULTIPLE SITES (H): Primary | ICD-10-CM

## 2024-05-22 DIAGNOSIS — Z79.899 HIGH RISK MEDICATION USE: ICD-10-CM

## 2024-05-22 PROCEDURE — 99441 PR PHYSICIAN TELEPHONE EVALUATION 5-10 MIN: CPT | Mod: 95 | Performed by: PHYSICIAN ASSISTANT

## 2024-05-22 NOTE — PATIENT INSTRUCTIONS
After Visit Instructions:     Thank you for coming to [facility] [place] [facility] for your care. It is my goal to partner with you to help you reach your optimal state of health.       Plan:     Schedule follow-up with Danni Martínez PA-C in 3 months.  Labs: CBC, creatinine, Albumin, AST, ALT CRP and Sed rate as soon as possible  Medication recommendations:   Continue Enbrel 50mg/mL WEEKLY SubQ.   Try Zyrtec or Allegra 1 day before, day of and day after injection      Danni Martínez PA-C  [facility]  [place]    Contact information: [facility] [place] [facility]  Clinic Number:  [phone]  Please call or send a [employer] message with any questions about your care

## 2024-05-28 DIAGNOSIS — B37.2 CANDIDIASIS OF SKIN: ICD-10-CM

## 2024-05-28 NOTE — TELEPHONE ENCOUNTER
Pending Prescriptions:                       Disp   Refills    nystatin (MYCOSTATIN) [id number] UNIT/GM exte*30 g   1            Sig: Apply topically 3 times daily as needed Refill at           patient's request

## 2024-05-29 RX ORDER — NYSTATIN 100000 [USP'U]/G
POWDER TOPICAL 3 TIMES DAILY PRN
Qty: 30 G | Refills: 1 | Status: SHIPPED | OUTPATIENT
Start: 2024-05-29 | End: 2024-08-30

## 2024-05-29 NOTE — TELEPHONE ENCOUNTER
Requested Prescriptions   Pending Prescriptions Disp Refills    nystatin (MYCOSTATIN) [id number] UNIT/GM external powder 30 g 1     Sig: Apply topically 3 times daily as needed Refill at patient's request       Antifungal Agents Failed - 5/28/2024  4:17 PM        Failed - Always Fail Criteria        Passed - Medication is active on med list        Passed - Recent (12 mo) or future (90 days) visit within the authorizing provider's specialty     The patient must have completed an in-person or virtual visit within the past 12 months or has a future visit scheduled within the next 90 days with the authorizing provider s specialty.  Urgent care and e-visits do not quality as an office visit for this protocol.

## 2024-06-12 ENCOUNTER — LAB (OUTPATIENT)
Dept: LAB | Facility: OTHER | Age: 69
End: 2024-06-12
Attending: PHYSICIAN ASSISTANT
Payer: COMMERCIAL

## 2024-06-12 DIAGNOSIS — M81.8 OTHER OSTEOPOROSIS WITHOUT CURRENT PATHOLOGICAL FRACTURE: ICD-10-CM

## 2024-06-12 DIAGNOSIS — M06.09 SERONEGATIVE RHEUMATOID ARTHRITIS OF MULTIPLE SITES (H): ICD-10-CM

## 2024-06-12 DIAGNOSIS — Z79.899 HIGH RISK MEDICATION USE: ICD-10-CM

## 2024-06-12 LAB
ALBUMIN SERPL BCG-MCNC: 4.2 G/DL (ref 3.5–5.2)
ALT SERPL W P-5'-P-CCNC: 12 U/L (ref 0–50)
ANION GAP SERPL CALCULATED.3IONS-SCNC: 7 MMOL/L (ref 7–15)
AST SERPL W P-5'-P-CCNC: 19 U/L (ref 0–45)
BUN SERPL-MCNC: 13.4 MG/DL (ref 8–23)
CALCIUM SERPL-MCNC: 9.4 MG/DL (ref 8.8–10.2)
CHLORIDE SERPL-SCNC: 105 MMOL/L (ref 98–107)
CREAT SERPL-MCNC: 0.84 MG/DL (ref 0.51–0.95)
CRP SERPL-MCNC: <3 MG/L
DEPRECATED HCO3 PLAS-SCNC: 29 MMOL/L (ref 22–29)
EGFRCR SERPLBLD CKD-EPI 2021: 74 ML/MIN/1.73M2
ERYTHROCYTE [DISTWIDTH] IN BLOOD BY AUTOMATED COUNT: 15.9 % (ref 10–15)
ERYTHROCYTE [SEDIMENTATION RATE] IN BLOOD BY WESTERGREN METHOD: 11 MM/HR (ref 0–30)
GLUCOSE SERPL-MCNC: 107 MG/DL (ref 70–99)
HCT VFR BLD AUTO: 39.5 % (ref 35–47)
HGB BLD-MCNC: 11.4 G/DL (ref 11.7–15.7)
MCH RBC QN AUTO: 23.8 PG (ref 26.5–33)
MCHC RBC AUTO-ENTMCNC: 28.9 G/DL (ref 31.5–36.5)
MCV RBC AUTO: 83 FL (ref 78–100)
PLATELET # BLD AUTO: 206 10E3/UL (ref 150–450)
POTASSIUM SERPL-SCNC: 4.4 MMOL/L (ref 3.4–5.3)
RBC # BLD AUTO: 4.78 10E6/UL (ref 3.8–5.2)
SODIUM SERPL-SCNC: 141 MMOL/L (ref 135–145)
WBC # BLD AUTO: 5.2 10E3/UL (ref 4–11)

## 2024-06-12 PROCEDURE — 84460 ALANINE AMINO (ALT) (SGPT): CPT | Mod: ZL

## 2024-06-12 PROCEDURE — 80048 BASIC METABOLIC PNL TOTAL CA: CPT | Mod: ZL

## 2024-06-12 PROCEDURE — 84450 TRANSFERASE (AST) (SGOT): CPT | Mod: ZL

## 2024-06-12 PROCEDURE — 36415 COLL VENOUS BLD VENIPUNCTURE: CPT | Mod: ZL

## 2024-06-12 PROCEDURE — 85027 COMPLETE CBC AUTOMATED: CPT | Mod: ZL

## 2024-06-12 PROCEDURE — 85652 RBC SED RATE AUTOMATED: CPT | Mod: ZL

## 2024-06-12 PROCEDURE — 82040 ASSAY OF SERUM ALBUMIN: CPT | Mod: ZL

## 2024-06-12 PROCEDURE — 86140 C-REACTIVE PROTEIN: CPT | Mod: ZL

## 2024-06-12 NOTE — LETTER
June 14, 2024      Karen Harris  [address] [place] [phone]        Dear ,    We are writing to inform you of your test results.    Your kidney test was okay. Your blood sugars were slightly above normal.     Resulted Orders   Basic metabolic panel  (Ca, Cl, CO2, Creat, Gluc, K, Na, BUN)   Result Value Ref Range    Sodium 141 135 - 145 mmol/L      Comment:      Reference intervals for this test were updated on 09/26/2023 to more accurately reflect our healthy population. There may be differences in the flagging of prior results with similar values performed with this method. Interpretation of those prior results can be made in the context of the updated reference intervals.     Potassium 4.4 3.4 - 5.3 mmol/L    Chloride 105 98 - 107 mmol/L    Carbon Dioxide (CO2) 29 22 - 29 mmol/L    Anion Gap 7 7 - 15 mmol/L    Urea Nitrogen 13.4 8.0 - 23.0 mg/dL    Creatinine 0.84 0.51 - 0.95 mg/dL    GFR Estimate 74 >60 mL/min/1.73m2    Calcium 9.4 8.8 - 10.2 mg/dL    Glucose 107 (H) 70 - 99 mg/dL       If you have any questions or concerns, please call the clinic at the number listed above.       Sincerely,      Jennie Suazo MD

## 2024-06-16 ENCOUNTER — PATIENT OUTREACH (OUTPATIENT)
Dept: CARE COORDINATION | Facility: CLINIC | Age: 69
End: 2024-06-16
Payer: COMMERCIAL

## 2024-06-17 ENCOUNTER — TELEPHONE (OUTPATIENT)
Dept: FAMILY MEDICINE | Facility: CLINIC | Age: 69
End: 2024-06-17
Payer: COMMERCIAL

## 2024-06-17 DIAGNOSIS — M81.0 AGE-RELATED OSTEOPOROSIS WITHOUT CURRENT PATHOLOGICAL FRACTURE: ICD-10-CM

## 2024-06-17 DIAGNOSIS — M81.8 IDIOPATHIC OSTEOPOROSIS: Primary | ICD-10-CM

## 2024-06-17 NOTE — TELEPHONE ENCOUNTER
Patient Returning Call    Reason for call:  Pt wants a call back regarding test results on 6/12/2024 completed at [facility] and test results completed on 6/12/2024 at same location for provider Danni Martínez. Pt wants to know if she can take the prolia shot after these results. PCP needs to review these test results and so she can order the prolia shots for the pt. Please call pt to get this scheduled.     Information relayed to patient:  Someone will call back.    Patient has additional questions:  No    What are your questions/concerns:  Pt wants a call back regarding test results on 6/12/2024 completed at [facility] and test results completed on 6/12/2024 at same location for provider Danni Martínez. Pt wants to know if she can take the prolia shot after these results. PCP needs to review these test results and so she can order the prolia shots for the pt. Please call pt to get this scheduled.     Could we send this information to you in [facility] or would you prefer to receive a phone call?:   Patient would prefer a phone call   Okay to leave a detailed message?: Yes at Cell number on file:    Telephone Information:   Mobile [phone]

## 2024-06-18 NOTE — TELEPHONE ENCOUNTER
The current order from Dr Suazo for Prolia dated 11/7/23 is sufficient.     However, not clear if prior auth for Prolia is active.  The PA dated 10/25/23 covers time frame of 10/25/23 to 10/25/24 for one injection the way I read it.    Pt received Prolia injection 11/14/23 so it appears that this PA has been satisfied and that another PA is still needed.    Routed to PA pool for verification.    Tamela Chand RN

## 2024-06-18 NOTE — TELEPHONE ENCOUNTER
The patient would like PCP to order the medication, she gets it done here in the clinic.    Thank you    Mayda CHOPRA RN

## 2024-06-18 NOTE — TELEPHONE ENCOUNTER
Labs were mostly within normal limits. Does she want me to order the Prolia or is the other provider ordering the Prolia?

## 2024-06-19 NOTE — TELEPHONE ENCOUNTER
I spoke with pt.  Monica salcedo scheduled 6/25/24.  [facility] should occur 6/24/24.    Tamela Chand RN

## 2024-06-24 ENCOUNTER — TELEPHONE (OUTPATIENT)
Dept: FAMILY MEDICINE | Facility: CLINIC | Age: 69
End: 2024-06-24
Payer: COMMERCIAL

## 2024-06-24 DIAGNOSIS — M81.0 AGE-RELATED OSTEOPOROSIS WITHOUT CURRENT PATHOLOGICAL FRACTURE: Primary | ICD-10-CM

## 2024-06-24 NOTE — TELEPHONE ENCOUNTER
Dr Suazo  Pt scheduled for Prolia injection tomorrow.   Can we get a new CAM order as the one we have has  as pt was to have this one , in May.  (Medication is here in our fridge)

## 2024-06-25 ENCOUNTER — ALLIED HEALTH/NURSE VISIT (OUTPATIENT)
Dept: FAMILY MEDICINE | Facility: CLINIC | Age: 69
End: 2024-06-25
Payer: COMMERCIAL

## 2024-06-25 DIAGNOSIS — M81.0 AGE-RELATED OSTEOPOROSIS WITHOUT CURRENT PATHOLOGICAL FRACTURE: Primary | ICD-10-CM

## 2024-06-25 PROCEDURE — 96372 THER/PROPH/DIAG INJ SC/IM: CPT | Performed by: FAMILY MEDICINE

## 2024-06-25 PROCEDURE — 99207 PR NO CHARGE NURSE ONLY: CPT

## 2024-06-25 NOTE — TELEPHONE ENCOUNTER
Patient needs Calcium levels before receiving the injection. I also placed an [facility] pharmacy consult in for the patient to discuss how long she needs to continue to take the medication. Patient to be on the look out for a call from pharmacy.

## 2024-06-25 NOTE — TELEPHONE ENCOUNTER
Pt had BMP on 6/12/24; is this sufficient for pt to receive Prolia injection today?    Last Comprehensive Metabolic Panel:  Lab Results   Component Value Date     06/12/2024    POTASSIUM 4.4 06/12/2024    CHLORIDE 105 06/12/2024    CO2 29 06/12/2024    ANIONGAP 7 06/12/2024     (H) 06/12/2024    BUN 13.4 06/12/2024    CR 0.84 06/12/2024    GFRESTIMATED 74 06/12/2024    SONIYA 9.4 06/12/2024       Fabiana Salinas RN  [facility]

## 2024-06-25 NOTE — NURSING NOTE
Clinic Administered Medication Documentation      Prolia Documentation    Indication: Prolia  (denosumab) is a prescription medicine used to treat osteoporosis in patients who:   Are at high risk for fracture, meaning patients who have had a fracture related to osteoporosis, or who have multiple risk factors for fracture.  Cannot use another osteoporosis medicine or other osteoporosis medicines did not work well.  The timeline for early/late injections would be 4 weeks early and any time after the 6 month anat. If a patient receives their injection late, then the subsequent injection would be 6 months from the date that they actually received the injection.    When was the last injection?  23  Was the last injection at least 6 months ago? Yes  Has the prior authorization been completed?  Yes  Is there an active order (written within the past 365 days, with administrations remaining, not ) in the chart?  Yes   GFR Estimate   Date Value Ref Range Status   2024 74 >60 mL/min/1.73m2 Final   2020 90 >60 mL/min/[1.73_m2] Final     Comment:     Non  GFR Calc  Starting 2018, serum creatinine based estimated GFR (eGFR) will be   calculated using the Chronic Kidney Disease Epidemiology Collaboration   (CKD-EPI) equation.       Has patient had a GFR within the last 12 months? Yes   Is GFR under 30, or patient has a diagnosis of CKD4 or CKD5? No   Patient denies gastric bypass or parathyroid surgery in past 6 months? Yes - patient denies.   Patient denies dental work in the past two months involving drilling into the bone, such as implants/extractions, oral surgery or a tooth extraction that has not healed yet?  Yes  Patient denies plans for an emergency tooth extraction within the next week? Yes    The following steps were completed to comply with the REMS program for Prolia:  Reviewed information in the Medication Guide, including the serious risks of Prolia  and the symptoms of  each risk.  Advised patient to seek prompt medical attention if they have signs or symptoms of any of the serious risks.  Provided each patient a copy of the Medication Guide and Patient Guide.    Prior to injection, verified patient identity using patient's name and date of birth. Medication was administered. Please see MAR and medication order for additional information. Patient instructed to remain in clinic for 15 minutes and report any adverse reaction to staff immediately.    Vial/Syringe: Syringe  Was this medication supplied by the patient? No  Patient has no refills remaining. Med is ordered by clinic, refill encounter not necessary.    Fabiana Salinas RN  [facility]

## 2024-06-25 NOTE — TELEPHONE ENCOUNTER
Writer huddled with PCP/Dr. Suazo re: messages below. Dr Suazo gives verbal order to proceed with Prolia injection today 6/25/24. Pt is also notified of PCP's message re: MTM pharmacist, and verbalized understanding.    Fabiana aSlinas RN  [facility]

## 2024-06-26 ENCOUNTER — VIRTUAL VISIT (OUTPATIENT)
Dept: PALLIATIVE MEDICINE | Facility: OTHER | Age: 69
End: 2024-06-26
Attending: PHYSICIAN ASSISTANT
Payer: COMMERCIAL

## 2024-06-26 DIAGNOSIS — M06.09 SERONEGATIVE RHEUMATOID ARTHRITIS OF MULTIPLE SITES (H): Primary | ICD-10-CM

## 2024-06-26 DIAGNOSIS — R32 URINARY INCONTINENCE, UNSPECIFIED TYPE: ICD-10-CM

## 2024-06-26 DIAGNOSIS — M81.0 OSTEOPOROSIS, UNSPECIFIED OSTEOPOROSIS TYPE, UNSPECIFIED PATHOLOGICAL FRACTURE PRESENCE: ICD-10-CM

## 2024-06-26 DIAGNOSIS — Z71.85 VACCINE COUNSELING: ICD-10-CM

## 2024-06-26 NOTE — PROGRESS NOTES
Medication Therapy Management (MTM) Encounter    ASSESSMENT:                            Medication Adherence/Access: No issues identified    Seronegative rheumatoid arthritis: Patient would benefit from continuing Enbrel at current dose. Discussed that it may take up to 6 months of therapy with Enbrel before seeing effect and that we can consider adding methotrexate back if she is not seeing effect after a few more months of therapy. Recommend she schedule follow up visit with her rheumatologist for end of August/early September for evaluation as that is approximately 5 months of therapy. Patient is up to date on lab monitoring and next due approximately 9/12/2024.    Vaccines: Per ACIP guidelines, patient is eligible for Zoster     Osteoporosis: Stable. Discussed that there is data from the FREEDOM trial showing Prolia can be used safely for duration of 10 years and that once it is discontinued she would need to start therapy with bisphosphonate to preserve bone density gained from Prolia. Recommend zoledronic acid infusions as patient states she has a hard time swallowing pills which lead to hard time with adherence to alendronate in the past. Should repeat DEXA approximately 4/1/2025 (approximately 2 years after last scan). Patient would benefit from having Vitamin D and calcium levels checked to evaluate if additional supplementation is necessary. Will discuss with her primary care provider.    Urinary incontinence: Well controlled on current regimen.    PLAN:                            Continue current medication regimen  Call [phone] to schedule a follow up visit with Danni Martínez for approximately 8/22/2024  Consider receiving the Shingrix (shingles) vaccination series - 2 dose series  Can walk-in to [facility]  Can schedule appointment at [email or web address]    Follow-up: Return in 5 months (on 11/19/2024) for Follow up, with me, using a phone visit.    SUBJECTIVE/OBJECTIVE:                           Karen Harris is a 69 year old female seen for a follow-up visit from 2024. She was referred to me from Danni Martínez PA-C.       Reason for visit: Rheumatoid arthritis check in.    Allergies/ADRs: Reviewed in chart  Past Medical History: Reviewed in chart  Tobacco: She reports that she has never smoked. She has never used smokeless tobacco.  Alcohol: Less than 1 beverage / month    Medication Adherence/Access: Patient approved for [facility] ([facility]).    Seronegative rheumatoid arthritis:   Enbrel 50 mg subcutaneously once weekly on     Patient reports she has an injection site reaction where she has a spot that starts to form after the first half day after administering Enbrel and it grows to the size of a half dollar. She finds that it itches. She tried an antihistamine a few times after her  had her take some. She is less inclined to do that and prefers using a cream. States her knees are still horrible with pain and she can feel some in her hands. She can close her hands into a fist. States the pain in feet has improved and she uses shoes with inserts all the time now, including around the house. 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    Previous therapies: Humira every 7 days and every 14 days (ineffective), methotrexate injections, sulfasalazine, hydroxychloroquine     Last lab monitorin2024    Vaccines:  Immunization History   Administered Date(s) Administered    COVID-19 Monovalent 18+ (Moderna) 2021, 2021    Influenza (High Dose) 3 valent vaccine 10/09/2019    Influenza (IIV3) PF 10/31/2005, 2007, 10/23/2008, 2009, 2010, 10/26/2012, 10/24/2013    Influenza Vaccine 65+ (Fluzone HD) 2020, 2021, 09/15/2022    Influenza Vaccine >6 months,quad, PF 10/23/2014, 10/20/2016, 2017    Pneumococcal 20 valent Conjugate (Prevnar 20) 2022    TD,PF 7+ (Tenivac)  06/13/2005    TDAP Vaccine (Adacel) 05/26/2015     Osteoporosis:   denosumab (Prolia) 60 mg subcutaneous every 6 months (last injection 6/25/2024, started 3/2023)    Patient is experiencing no side effects.  DEXA History: 02/01/2023 - Report of comparison: There has been a 0.1% increase in L1-L4 BMD. There has been a 23.0% decrease in bilateral hip BMD.   Patient is getting  2 serving(s)/day of calcium in their diet.  Risk factors: post-menopausal, chronic corticosteroid use    Urinary incontinence:  Oxybutynin ER 5 mg twice daily     Reports no side effects or concerns. States she was off this for a while due to supply issues and found that she had increased symptoms. Once she restarted it she found good relief and states this is working well.    Today's Vitals: LMP 05/29/2006 (Approximate)   ----------------    I spent 32 minutes with this patient today. All changes were made via collaborative practice agreement with Danni Martínez. A copy of the visit note was provided to the patient's provider(s).    A summary of these recommendations was sent via [employer].    Que Jeffrey, PharmD  Medication Therapy Management Pharmacist  [facility]  Phone: [phone]    Telemedicine Visit Details  Type of service:  Telephone visit  Start Time:  11:30 AM  End Time: 12:02 PM     Medication Therapy Recommendations  Osteoporosis    Current Medication: denosumab (PROLIA) injection 60 mg   Rationale: Medication requires monitoring - Needs additional monitoring   Recommendation: Order Lab   Status: Contact Provider - Awaiting Response   Note: Recommend order Vitamin D level         Urinary incontinence, unspecified type    Current Medication: oxyBUTYnin ER (DITROPAN XL) 5 MG 24 hr tablet (Discontinued)   Rationale: Dose too low - Dosage too low - Effectiveness   Recommendation: Increase Dose - oxyBUTYnin ER 10 MG 24 hr tablet   Status: No Longer Relevant   Note: Patient reports 5 mg twice daily is  effective         Vaccine counseling    Rationale: Preventive therapy - Needs additional medication therapy - Indication   Recommendation: Provide Education   Status: Patient Agreed - Adherence/Education

## 2024-06-26 NOTE — PATIENT INSTRUCTIONS
"Recommendations from today's MTM visit:                                                       Continue current medication regimen  Call [phone] to schedule a follow up visit with Danni Martínez for approximately 8/22/2024  Consider receiving the Shingrix (shingles) vaccination series - 2 dose series  Can walk-in to [facility]  Can schedule appointment at [email or web address]    Follow-up: Return in 5 months (on 11/19/2024) for Follow up, with me, using a phone visit.    It was great speaking with you today.  I value your experience and would be very thankful for your time in providing feedback in our clinic survey. In the next few days, you may receive an email or text message from [employer] with a link to a survey related to your  clinical pharmacist.\"     To schedule another MTM appointment, please call the clinic directly or you may call the MTM scheduling line at [phone] or toll-free at [phone].     My Clinical Pharmacist's contact information:                                                      Please feel free to contact me with any questions or concerns you have.      Que Jeffrey, PharmD  Medication Therapy Management Pharmacist  [facility]  Phone: [phone]   "

## 2024-06-26 NOTE — Clinical Note
Dr. Suazo,  I am an MTM pharmacist in rheumatology and met with Karen to check in about her Enbrel and rheumatoid arthritis. I saw your note about discussing duration of therapy for her Prolia so I went over that with her. Based on data from the FREEDOM trial, Prolia is safe to use for up to 10 years. After it is discontinued she will need to start a bisphosphonate to preserve BMD gained from Prolia. I recommend she continue on Prolia for now and when it is discontinued, recommend Reclast infusions as she has a hard time swallowing pills.  I saw you ordered a calcium level, can we also add a Vitamin D level? She doesn't get much of either in her diet, so if they are low I'll have her start supplementation. If you agree to the lab, let me know and I can place the order and notify patient.  Thanks, Que Jeffrey, PharmD Medication Therapy Management Pharmacist [facility] Phone: [phone]

## 2024-06-28 NOTE — TELEPHONE ENCOUNTER
Routed to provider to address updated order with correct diagnosis as requested below.  I cued up the order for provider to addend the diagnosis as requested.  Tamela Chand RN

## 2024-06-28 NOTE — TELEPHONE ENCOUNTER
Becky Ohara38 minutes ago (9:08 AM)     Hey,  It looks like another prolia order was entered- can you help me with the dx on it? It needs to be updated to m81.8.    Thank you,  Becky

## 2024-08-05 ENCOUNTER — TELEPHONE (OUTPATIENT)
Dept: RHEUMATOLOGY | Facility: CLINIC | Age: 69
End: 2024-08-05
Payer: COMMERCIAL

## 2024-08-05 NOTE — TELEPHONE ENCOUNTER
PA Initiation    Medication: ENBREL SURECLICK 50 MG/ML SC SOAJ  Insurance Company: [employer] Part D - Phone [phone] Fax [phone]  Pharmacy Filling the Rx: [facility] - [place], [place] - [address]  Filling Pharmacy Phone:    Filling Pharmacy Fax:    Start Date: 8/5/2024    [id number]       bed

## 2024-08-05 NOTE — TELEPHONE ENCOUNTER
Prior Authorization Approval    Medication: ENBREL SURECLICK 50 MG/ML SC SOAJ  Authorization Effective Date: 8/5/2024  Authorization Expiration Date: 12/31/2024  Approved Dose/Quantity: 4 pens per 28 days  Reference #: [id number]   Insurance Company: [employer] Part D - Phone [phone] Fax [phone]  Expected CoPay: $ 0  CoPay Card Available:      Financial Assistance Needed: Enrolled in [facility]  Which Pharmacy is filling the prescription: [facility] - [place], [place] - [facility] [address]   Pharmacy Notified: Not needed  Patient Notified: Not needed

## 2024-08-13 ENCOUNTER — NURSE TRIAGE (OUTPATIENT)
Dept: FAMILY MEDICINE | Facility: CLINIC | Age: 69
End: 2024-08-13
Payer: COMMERCIAL

## 2024-08-13 NOTE — TELEPHONE ENCOUNTER
Reason for Call:  Appointment Request    Patient requesting this type of appt:  possible lyme's, patient is experiencing leg pain and difficulty moving the leg    Requested provider: Jennie Suazo    Reason patient unable to be scheduled: Not with their preferred provider    When does patient want to be seen/preferred time: 1-2 days    Comments: Patient would like an in office visit with PCP     Could we send this information to you in [facility] or would you prefer to receive a phone call?:   Patient would prefer a phone call   Okay to leave a detailed message?: Yes at Cell number on file:    Telephone Information:   Mobile [phone] or as a secondary number please call [phone]       Call taken on 8/13/2024 at 8:34 AM by Kirsty Del Toro

## 2024-08-14 NOTE — TELEPHONE ENCOUNTER
See triage assessment below. Pt does report concern for joint pain r/t Lymes disease, as she does report history of this. Denies finding any ticks on her body recently. Says that the pain has gotten somewhat better, but has been constant and bothersome. Pt reports discoloration around this area, says that it looks like a bruise. No change in sensation of extremity, pt is able to walk on it, says that swelling has improved. Per triage protocol recommendation, pt is advised to be seen in clinic today or tomorrow for evaluation; she's agreeable to this plan. Appointment scheduled.    Fabiana Salinas RN  [facility]    Reason for Disposition   Patient wants to be seen    Additional Information   Negative: Looks like a broken bone or dislocated joint (e.g., crooked or deformed)   Negative: Sounds like a life-threatening emergency to the triager   Negative: Followed a hip injury   Negative: Followed a knee injury   Negative: Followed an ankle or foot injury   Negative: Back pain radiating (shooting) into leg(s)   Negative: Foot pain is main symptom   Negative: Ankle pain is main symptom   Negative: Knee pain is main symptom   Negative: Leg swelling is main symptom   Negative: Chest pain   Negative: Difficulty breathing   Negative: Entire foot is cool or blue in comparison to other side   Negative: Unable to walk   Negative: Fever and red area (or area very tender to touch)   Negative: Swollen joint and fever   Negative: Thigh or calf pain in only one leg and present > 1 hour   Negative: Thigh, calf, or ankle swelling in only one leg   Negative: Thigh, calf, or ankle swelling in both legs, but one side is definitely more swollen   Negative: History of prior 'blood clot' in leg or lungs (i.e., deep vein thrombosis, pulmonary embolism)   Negative: History of inherited increased risk of blood clots (e.g., factor 5 Leiden, antithrombin 3, protein C or protein S deficiency, prothrombin mutation)   Negative: Major  "surgery in past month   Negative: Hip or leg fracture (broken bone) in past month (or had cast on leg or ankle in past month)   Negative: Illness requiring prolonged bedrest in past month (e.g., immobilization, long hospital stay)   Negative: Long-distance travel in past month (e.g., car, bus, train, plane; with trip lasting 6 or more hours)   Negative: Cancer treatment in past six months (or has cancer now)   Negative: Patient sounds very sick or weak to the triager   Negative: SEVERE pain (e.g., excruciating, unable to do any normal activities)   Negative: Cast on leg or ankle and now has increasing pain   Negative: Red area or streak > 2 inches (or 5 cm)   Negative: Painful rash with multiple small blisters grouped together (i.e., dermatomal distribution or 'band' or 'stripe')   Negative: Looks like a boil, infected sore, deep ulcer, or other infected rash (spreading redness, pus)   Negative: Localized rash is very painful (no fever)   Negative: Numbness in a leg or foot (i.e., loss of sensation)   Negative: Localized pain, redness or hard lump along vein    Answer Assessment - Initial Assessment Questions  1. ONSET: \"When did the pain start?\"       3 weeks ago  2. LOCATION: \"Where is the pain located?\"       Left leg, inner knee  3. PAIN: \"How bad is the pain?\"    (Scale 1-10; or mild, moderate, severe)    -  MILD (1-3): doesn't interfere with normal activities     -  MODERATE (4-7): interferes with normal activities (e.g., work or school) or awakens from sleep, limping     -  SEVERE (8-10): excruciating pain, unable to do any normal activities, unable to walk      4, achy. Has improved some, pt says that it used to be worse and very  sharp.  4. WORK OR EXERCISE: \"Has there been any recent work or exercise that involved this part of the body?\"       No  5. CAUSE: \"What do you think is causing the leg pain?\"      Unsure  6. OTHER SYMPTOMS: \"Do you have any other symptoms?\" (e.g., chest pain, back pain, breathing " difficulty, swelling, rash, fever, numbness, weakness)      No    Protocols used: Leg Pain-A-OH

## 2024-08-15 ENCOUNTER — OFFICE VISIT (OUTPATIENT)
Dept: FAMILY MEDICINE | Facility: CLINIC | Age: 69
End: 2024-08-15
Payer: COMMERCIAL

## 2024-08-15 VITALS
HEART RATE: 67 BPM | OXYGEN SATURATION: 98 % | RESPIRATION RATE: 18 BRPM | TEMPERATURE: 98.8 F | SYSTOLIC BLOOD PRESSURE: 150 MMHG | HEIGHT: 63 IN | BODY MASS INDEX: 47.66 KG/M2 | WEIGHT: 269 LBS | DIASTOLIC BLOOD PRESSURE: 98 MMHG

## 2024-08-15 DIAGNOSIS — M81.0 AGE-RELATED OSTEOPOROSIS WITHOUT CURRENT PATHOLOGICAL FRACTURE: ICD-10-CM

## 2024-08-15 DIAGNOSIS — S80.862A TICK BITE OF LEFT LOWER LEG, INITIAL ENCOUNTER: Primary | ICD-10-CM
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
## 2024-08-15 LAB — CALCIUM SERPL-MCNC: 9.2 MG/DL (ref 8.8–10.4)

## 2024-08-15 PROCEDURE — 86618 LYME DISEASE ANTIBODY: CPT | Performed by: FAMILY MEDICINE

## 2024-08-15 PROCEDURE — 87798 DETECT AGENT NOS DNA AMP: CPT | Mod: 59 | Performed by: FAMILY MEDICINE

## 2024-08-15 PROCEDURE — 36415 COLL VENOUS BLD VENIPUNCTURE: CPT | Performed by: FAMILY MEDICINE

## 2024-08-15 PROCEDURE — 87468 ANAPLSMA PHGCYTOPHLM AMP PRB: CPT | Performed by: FAMILY MEDICINE

## 2024-08-15 PROCEDURE — 99213 OFFICE O/P EST LOW 20 MIN: CPT | Performed by: FAMILY MEDICINE

## 2024-08-15 PROCEDURE — 82310 ASSAY OF CALCIUM: CPT | Performed by: FAMILY MEDICINE

## 2024-08-15 ASSESSMENT — PAIN SCALES - GENERAL: PAINLEVEL: NO PAIN (0)

## 2024-08-15 NOTE — LETTER
August 19, 2024      Karen Harris  [address] [place] [id number]        Dear ,    We are writing to inform you of your test results.  Your labs came back negative, the tick panel and the Lyme tests were negative.  Resulted Orders   LYME DISEASE TOTAL ANTIBODIES WITH REFLEX TO CONFIRMATION   Result Value Ref Range    Lyme Disease Antibodies Total 0.10 <0.90      Comment:      Non-reactive, Absence of detectable Borrelia burgdorferi antibodies. A non-reactive result does not exclude the possibility of Borrelia burgdorferi infection. If early Lyme disease is suspected, a second sample should be collected and tested 2 to 4 weeks later.   Tick-Borne Disease Panel by PCR   Result Value Ref Range    Anaplasma phagocytophilum by PCR Not Detected Not Detected    Babesia species by PCR Not Detected Not Detected    Ehrlichia species by PCR Not Detected Not Detected    Narrative    This test was developed and its performance characteristics determined by the [facility] at [facility]. It has not been cleared or approved by the US Food and Drug Administration. This test was performed in a CLIA-certified laboratory and is intended for clinical purposes.  This test was developed and its performance characteristics determined by the [facility] Diagnostic Laboratory at [facility]. It has not been cleared or approved by the US Food and Drug Administration. This test was performed in a CLIA-certified laboratory and is intended for clinical purposes.    A negative result does not rule out the presence of PCR inhibitors in the patient specimen or test-specific nucleic acid in concentrations below the level of detection by this test.   Calcium   Result Value Ref Range    Calcium 9.2 8.8 - 10.4 mg/dL      Comment:      Reference intervals for this test were updated on 7/16/2024 to reflect our healthy population more accurately. There may be differences in the flagging of  prior results with similar values performed with this method. Those prior results can be interpreted in the context of the updated reference intervals.   If you have any questions or concerns, please call the clinic at the number listed above.   Sincerely, Jennie Suazo MD

## 2024-08-15 NOTE — NURSING NOTE
Repeat BP was 150/98, MD notified and advised to have patient do RN Bp check next week, patient will call to schedule - she would not schedule with me as she had another apt. To get to --. Richar MEYERS CMA

## 2024-08-15 NOTE — PROGRESS NOTES
Karen was seen today for pain and imm/inj.    Diagnoses and all orders for this visit:    Tick bite of left lower leg, initial encounter  -     LYME DISEASE TOTAL ANTIBODIES WITH REFLEX TO CONFIRMATION  -     Tick-Borne Disease Panel by PCR        -      Patient will be notified of results.       Age-related osteoporosis without current pathological fracture  -     Calcium         Subjective   Karen is a 69 year old, presenting for the following health issues:  Pain (Left inner knee/leg pain.  Nurse triage was done on 8-13-24.  She states you can feel the area where the bite was as it was raised. She thought this was a horsefly bite.  Her daughter feels it may have been a tick bite.  It looked like a bruising type area that seems to be improving.  There were issues with moving her foot back and forth with pain. That is improving.  She has been having more headaches recently and pain in the hands after her injection.  History of lyme disease.) and Imm/Inj (Mentioned about the Shingles and RSV through her pharmacy for insurance coverage.)        8/15/2024    11:36 AM   Additional Questions   Roomed by JAILENE Reynolds   Patient is a 69 yr old female here for an insect bite.   Patient says this happened a few weeks ago. She had a rash on the area. She thinks it may have been a deer horsefly.   She is having more generalized joint pain, she is having more headaches and fatigue. She is worried that she may have Lyme's disease.  She has had Lyme disease in the past.     Chief Complaint   Patient presents with    Pain     Left inner knee/leg pain.  Nurse triage was done on 8-13-24.  She states you can feel the area where the bite was as it was raised. She thought this was a horsefly bite.  Her daughter feels it may have been a tick bite.  It looked like a bruising type area that seems to be improving.  There were issues with moving her foot back and forth with pain. That is improving.  She has been having more  "headaches recently and pain in the hands after her injection.  History of lyme disease.    Imm/Inj     Mentioned about the Shingles and RSV through her pharmacy for insurance coverage.         RN TRIAGE NOTE IS COPIED BELOW FROM 8-13-24:  See triage assessment below. Pt does report concern for joint pain r/t Lymes disease, as she does report history of this. Denies finding any ticks on her body recently. Says that the pain has gotten somewhat better, but has been constant and bothersome. Pt reports discoloration around this area, says that it looks like a bruise. No change in sensation of extremity, pt is able to walk on it, says that swelling has improved. Per triage protocol recommendation, pt is advised to be seen in clinic today or tomorrow for evaluation; she's agreeable to this plan. Appointment scheduled.               Review of Systems  CONSTITUTIONAL: NEGATIVE for fever, chills, change in weight  INTEGUMENTARY/SKIN: NEGATIVE for worrisome rashes, moles or lesions  ENT/MOUTH: NEGATIVE for ear, mouth and throat problems  RESP: NEGATIVE for significant cough or SOB  CV: NEGATIVE for chest pain, palpitations or peripheral edema  MUSCULOSKELETAL: POSITIVE  for arthralgias   NEURO: NEGATIVE for weakness, dizziness or paresthesias  ENDOCRINE: NEGATIVE for temperature intolerance, skin/hair changes  HEME/ALLERGY/IMMUNE: NEGATIVE for bleeding problems  PSYCHIATRIC: NEGATIVE for changes in mood or affect      Objective    BP (!) 150/98 (BP Location: Left arm, Patient Position: Sitting, Cuff Size: Adult Large)   Pulse 67   Temp 98.8  F (37.1  C) (Tympanic)   Resp 18   Ht 1.6 m (5' 3\")   Wt 122 kg (269 lb)   LMP 05/29/2006 (Approximate)   SpO2 98%   BMI 47.65 kg/m    Body mass index is 47.65 kg/m .  Physical Exam   GENERAL: alert and no distress  EYES: Eyes grossly normal to inspection, PERRL and conjunctivae and sclerae normal  HENT: ear canals and TM's normal, nose and mouth without ulcers or lesions  NECK: " no adenopathy, no asymmetry, masses, or scars  RESP: lungs clear to auscultation - no rales, rhonchi or wheezes  CV: regular rate and rhythm, normal S1 S2, no S3 or S4, no murmur, click or rub, no peripheral edema  ABDOMEN: soft, nontender, no hepatosplenomegaly, no masses and bowel sounds normal  MS: no gross musculoskeletal defects noted, no edema    Results for orders placed or performed in visit on 08/15/24   Tick-Borne Disease Panel by PCR     Status: Normal   Result Value Ref Range    Anaplasma phagocytophilum by PCR Not Detected Not Detected    Babesia species by PCR Not Detected Not Detected    Ehrlichia species by PCR Not Detected Not Detected    Narrative    This test was developed and its performance characteristics determined by the [facility] at [facility]. It has not been cleared or approved by the [place] Food and Drug Administration. This test was performed in a CLIA-certified laboratory and is intended for clinical purposes.  This test was developed and its performance characteristics determined by the [facility] at [facility]. It has not been cleared or approved by the [place] Food and Drug Administration. This test was performed in a CLIA-certified laboratory and is intended for clinical purposes.    A negative result does not rule out the presence of PCR inhibitors in the patient specimen or test-specific nucleic acid in concentrations below the level of detection by this test.   Calcium     Status: Normal   Result Value Ref Range    Calcium 9.2 8.8 - 10.4 mg/dL           Signed Electronically by: Jennie Suazo MD

## 2024-08-16 ENCOUNTER — ALLIED HEALTH/NURSE VISIT (OUTPATIENT)
Dept: FAMILY MEDICINE | Facility: CLINIC | Age: 69
End: 2024-08-16
Payer: COMMERCIAL

## 2024-08-16 VITALS — DIASTOLIC BLOOD PRESSURE: 78 MMHG | SYSTOLIC BLOOD PRESSURE: 138 MMHG

## 2024-08-16 DIAGNOSIS — Z01.30 BP CHECK: Primary | ICD-10-CM

## 2024-08-16 LAB
A PHAGOCYTOPH DNA BLD QL NAA+PROBE: NOT DETECTED
B BURGDOR IGG+IGM SER QL: 0.1
BABESIA DNA BLD QL NAA+PROBE: NOT DETECTED
EHRLICHIA DNA SPEC QL NAA+PROBE: NOT DETECTED

## 2024-08-16 PROCEDURE — 99207 PR NO CHARGE NURSE ONLY: CPT

## 2024-08-19 NOTE — PROGRESS NOTES
"Subjective     Jacquie Harirs is a 64 year old female who presents to clinic today for the following health issues:  Chief Complaint   Patient presents with     Generalized Body Aches     steadily getting worse since July 2019       HPI     I saw Karen on 10/25 for multiple joint pain: \"Karen presents with multiple joint pain and some general malaise and is concerned about a systemic cause.  We are checking labs as below, but I suspect her symptoms are most likely due to mechanical issues of the joints.  Recent ESR was just barely elevated at 33 and CRP was normal, so unlikely PMR.  She does have known degenerative changes in the joints, and her elbow exam appears consistent with lateral epicondylitis.  She is already following with sports medicine and orthopedics for her joint issues.\"  CK, TSH, Vit D, SYLVIE, Lyme were checked and were significant for low Vit D.  She also has some iron deficiency anemia.  We started D and iron supplement with plan to recheck in 2 months.      Today, she reports vit D and iron have not helped her symptoms at all.  Had some GI side effects when she first started the iron, but that has improved.      Generalized body aches       Duration: on-going x 4+ months    Description (location/character/radiation): Patient reports having an all over constant body pain, w/ some swelling in joints.  Knees seem to be the worst.  Pain in the forearm also seems to be pretty bad.  Patient is not able to sleep due to the pain.  Pain affects both muscle and joints.  Had a one time episode of shocking pain to her left 1st toe recently.      Intensity:  Severe and worsening    Accompanying signs and symptoms: No joint redness.  Feels that her knees are hot, especially the left, along with swelling.  She is having a lot of headaches recently    History (similar episodes/previous evaluation): None    Precipitating or alleviating factors: certain movements exacerbate symptoms     Therapies tried and outcome: " Tylenol,     Tried topical roll on deep heat did not help.     Used ice in the Summer, not sure if that helped.        Patient Active Problem List   Diagnosis     s/p gastric bypass x 2     Allergic rhinitis     Carpal tunnel syndrome     Obstructive sleep apnea     Rosacea     CARDIOVASCULAR SCREENING; LDL GOAL LESS THAN 160     Tick bite, infected, positive Lyme test 1996 not treated     Vitamin D deficiency     Osteoporosis     Obesity, Class III, BMI 40-49.9 (morbid obesity) (H)     S/P TKR (total knee replacement)     HTN, goal below 140/80     Back pain, left below scapula, strained muscles     Encounter for routine gynecological examination      Colles' fracture     Bilateral cold feet     Elevated levels of transaminase & lactic acid dehydrogenase     Fatty liver     Shoulder injury, right, subsequent encounter     GI bleed     Upper GI bleed     Bronchospasm     Alpha-1-antitrypsin deficiency (H)     Past Surgical History:   Procedure Laterality Date     ARTHROPLASTY KNEE  4/2/2012    Procedure:ARTHROPLASTY KNEE; Left Total Knee Arthroplasty--Anesth.Choice; Surgeon:HERNANDO THOMPSON; Location:[facility]     ARTHROTOMY SHOULDER, ROTATOR CUFF REPAIR, COMBINED Right 8/25/2017    Procedure: COMBINED ARTHROTOMY SHOULDER, ROTATOR CUFF REPAIR;  Right shoulder distal clavicle excision, subacromial decompression, rotator cuff repair ;  Surgeon: Hernando Thompson MD;  Location: [facility]     ARTHROTOMY SHOULDER, ROTATOR CUFF REPAIR, COMBINED Right 12/18/2017    Procedure: COMBINED ARTHROTOMY SHOULDER, ROTATOR CUFF REPAIR;  Right Shoulder Rotator Cuff Revision;  Surgeon: Hernando Thompson MD;  Location: [facility]     ARTHROTOMY SHOULDER, ROTATOR CUFF REPAIR, COMBINED Left 4/27/2018    Procedure: COMBINED ARTHROTOMY SHOULDER, ROTATOR CUFF REPAIR;  Left Shoulder Open Subacromial Decompression,Distal Clavicle Excision,Rotator Cuff Repair;  Surgeon: Hernando Thompson MD;  Location: [facility]     C ORAL SURGERY PROCEDURE   age 18    wisdom teeth     C/SECTION, LOW TRANSVERSE  1978, 1984, 1994    x 3     COLONOSCOPY  2001    normal colonoscopy     COLONOSCOPY N/A 8/22/2017    Procedure: COLONOSCOPY;  Colonoscopy  ;  Surgeon: Delfino Yoo MD;  Location: [facility]     ESOPHAGOSCOPY, GASTROSCOPY, DUODENOSCOPY (EGD), COMBINED N/A 3/18/2018    Procedure: COMBINED ESOPHAGOSCOPY, GASTROSCOPY, DUODENOSCOPY (EGD);;  Surgeon: Poncho Galindo MD;  Location: [facility]     GASTRIC BYPASS  1980/2005    Gastric Bypass and revision     HERNIA REPAIR, INCISIONAL  6/16/2008    lap.repair with 10x8 mesh     TUBAL LIGATION  1994       Social History     Tobacco Use     Smoking status: Never Smoker     Smokeless tobacco: Never Used   Substance Use Topics     Alcohol use: Yes     Alcohol/week: 0.0 standard drinks     Comment: mixed very light 1 a month if that     Family History   Problem Relation Age of Onset     C.A.D. Father         MI at age 60     Hypertension Father      Alzheimer Disease Father      Hyperlipidemia Father      Osteoporosis Mother         on Fosamax     Glaucoma Mother      Other Cancer Brother      Stomach Cancer Maternal Aunt      Cervical Cancer Cousin      Diabetes No family hx of      Cerebrovascular Disease No family hx of      Breast Cancer No family hx of      Cancer - colorectal No family hx of      Prostate Cancer No family hx of      Liver Disease No family hx of          Current Outpatient Medications   Medication Sig Dispense Refill     atenolol (TENORMIN) 50 MG tablet TAKE ONE TABLET BY MOUTH ONCE DAILY 90 tablet 2     ferrous sulfate (SLO-FE) 142 (45 Fe) MG CR tablet Take 1 tablet (142 mg) by mouth daily 90 tablet 3     hydrochlorothiazide (HYDRODIURIL) 12.5 MG tablet TAKE ONE TABLET BY MOUTH ONCE DAILY 90 tablet 2     nortriptyline (PAMELOR) 25 MG capsule Take 1/2 pill at bedtime for 1 week then increase to 1 pill at bedtime. 30 capsule 1     vitamin D3 (CHOLECALCIFEROL) 1.25 MG (24242 UT) capsule Take 1 capsule (50,000  Units) by mouth every 7 days for 8 doses 8 capsule 0     acetaminophen (TYLENOL) 325 MG tablet Take 2 tablets (650 mg) by mouth every 4 hours as needed for mild pain or fever 100 tablet      albuterol (PROAIR HFA/PROVENTIL HFA/VENTOLIN HFA) 108 (90 Base) MCG/ACT inhaler Inhale 2 puffs into the lungs every 4 hours as needed (Patient not taking: Reported on 4/23/2019) 1 Inhaler 3     clobetasol (TEMOVATE) 0.05 % ointment Apply sparingly to affected area twice daily for 14 days.  Do not apply to face. (Patient not taking: Reported on 4/23/2019) 15 g 0     diclofenac (VOLTAREN) 1 % topical gel Place 2 g onto the skin 4 times daily (Patient not taking: Reported on 10/9/2019) 100 g 0     Emollient (CERAVE) CREA Externally apply topically 2 times daily as needed (Patient not taking: Reported on 10/9/2019) 1 Bottle 3     nystatin (MYCOSTATIN) [id number] UNIT/GM POWD Apply topically 3 times daily as needed Refill at patient's request 30 g 1     nystatin (MYCOSTATIN) cream Apply topically 2 times daily Reported on 3/6/2017 (Patient not taking: Reported on 10/9/2019) 30 g 11     Allergies   Allergen Reactions     Aleve [Naproxen] GI Disturbance     Vioxx Itching and Swelling     VIOXX (Swollen Feet,Hands itching), Okay with ibuprofen and aspirin       Reviewed and updated as needed this visit by Provider         Review of Systems   ROS COMP: Constitutional, MSK systems are negative, except as otherwise noted.      Objective    /78 (BP Location: Right arm, Patient Position: Sitting, Cuff Size: Adult Regular)   Pulse 75   Temp 98.2  F (36.8  C) (Tympanic)   Wt 117.4 kg (258 lb 12.8 oz)   LMP 05/29/2006   SpO2 96%   BMI 45.84 kg/m    Body mass index is 45.84 kg/m .  Physical Exam   GENERAL: healthy, alert and no distress  MS: she reports pain to palpation in the posterior neck, upper back, upper chest, lateral elbows L>R, and medial knees bilaterally          Assessment & Plan     1. Multiple joint pain    Karen presents  with ongoing diffuse pain that she describes is both in the joints and muscles.  Pain is interfering with sleep.  Negative prior work-up as noted in HPI.  Due to her diffuse pain as well as fatigue and headaches, I wonder about fibromyalgia.  Discussed lifestyle management and also medication.  Would recommend med trial to see if that is help and also to help support diagnosis if med is helpful.  Discussed a few options and decided to try nortriptyline.  Follow-up in 1 month to reassess.      - nortriptyline (PAMELOR) 10 MG capsule; Take 1 capsule (10 mg) by mouth At Bedtime for 7 days  Dispense: 7 capsule; Refill: 0   - nortriptyline (PAMELOR) 25 MG capsule; Take 1 capsule (25 mg) by mouth At Bedtime  Dispense: 30 capsule; Refill: 1    Return in about 1 month (around 12/22/2019) for Lab Work, Routine Visit.    Valdo Amezquita MD  [facility]     No

## 2024-08-26 DIAGNOSIS — B37.2 CANDIDIASIS OF SKIN: ICD-10-CM

## 2024-08-27 NOTE — TELEPHONE ENCOUNTER
Requested Prescriptions   Pending Prescriptions Disp Refills    NYAMYC [id number] UNIT/GM external powder [Pharmacy Med Name: NYAMYC  POW  GM TOP [id number] U] 60 g      Sig: APPLY TOPICALLY TO AFFECTED  AREA(S) 3 TIMES DAILY AS NEEDED       Antifungal Agents Failed - 8/26/2024  7:27 PM        Failed - Always Fail Criteria        Passed - Medication is active on med list        Passed - Recent (12 mo) or future (90 days) visit within the authorizing provider's specialty     The patient must have completed an in-person or virtual visit within the past 12 months or has a future visit scheduled within the next 90 days with the authorizing provider s specialty.  Urgent care and e-visits do not quality as an office visit for this protocol.

## 2024-08-30 RX ORDER — NYSTATIN 100000 [USP'U]/G
POWDER TOPICAL
Qty: 60 G | Refills: 1 | Status: SHIPPED | OUTPATIENT
Start: 2024-08-30

## 2024-10-08 ENCOUNTER — TELEPHONE (OUTPATIENT)
Dept: PHARMACY | Facility: OTHER | Age: 69
End: 2024-10-08
Payer: COMMERCIAL

## 2024-10-08 DIAGNOSIS — M06.09 SERONEGATIVE RHEUMATOID ARTHRITIS OF MULTIPLE SITES (H): Primary | ICD-10-CM

## 2024-10-08 DIAGNOSIS — M06.09 SERONEGATIVE RHEUMATOID ARTHRITIS OF MULTIPLE SITES (H): ICD-10-CM

## 2024-10-08 NOTE — TELEPHONE ENCOUNTER
Enbrel refilled per CPA with Danni Martínez.    Qeu Jeffrey, PharmD  Medication Therapy Management Pharmacist  [facility]  Phone: [phone]    
5

## 2024-10-09 NOTE — PROGRESS NOTES
Rheumatology Clinic Visit  [facility]  MARISELA Ulloa     Jacquie Harris MRN# [id number]   YOB: 1955 Age: 69 year old   Date of Visit: 10/11/2024  Primary care provider: Jennie Suazo          Assessment and Plan:     1.  Seronegative rheumatoid arthritis  2.  High-risk medication use    Patient presents today for follow-up.  Joints are stable.  She is tolerating Enbrel well.  That she does get some slight injection site reactions with the Enbrel that are stable.  She is elected to not try any allergy medications beforehand but states that the reactions have not changed.  Joints do not show any active synovitis.  Continue the Enbrel.  Follow-up with me in 6 months.  Labs every 6 months to monitor for any medication toxicity, next due in mid December.     The longitudinal plan of care for the diagnosis(es)/condition(s) as documented were addressed during this visit. Due to the added complexity in care, I will continue to support Karen in the subsequent management and with ongoing continuity of care.    Plan:   Schedule follow-up with Danni Martínez PA-C in 6 months.  Labs: CBC, creatinine, Albumin, AST, ALT every 6 months-next due mid December   Medication recommendations:   Continue Enbrel 50mg/mL WEEKLY SubQ.     MARISELA Ulloa  Rheumatology         History of Present Illness:   Jacquie Harris presents for evaluation of positive SYLVIE, inflammatory arthritis.    Rheumatological history:  Provider: Dr. Uribe   Pertinent labs: Negative rheumatoid factor, CCP antibody, sed rate and CRP.  Positive SYLVIE with a titer of 1:160, speckled pattern.  Normal/negative SYLVIE subsets, ANCA, uric acid, C3, C4  Previous Medications: Plaquenil 200 mg twice daily, last eye exam 02/27/2023, Sulfasalazine, Methotrexate (non effective), Humira (not effective)  Current medication: Enbrel    Interval history October 11, 2024:  Doing well. No changes.       Interval history May 22,  "2024:  She states that her joints are doing okay. She is having some knee pain but feels this is more related to the weather. She states that her hands are good. When she gives herself a shot, but she will get a red spot and itching at the injection site. No infections since starting the Enbrel.     Interval history February 27, 2024:  She is not sure that her joints are much different. She wore compression gloves last week. She will still have her hands get stuck at times. When doing projects, she will switch hands for the project. She does not find the Humira to be effective taking it every other week versus when she was taking it weekly. Her hands feel \"clumsy\". She cannot do fine motor activities such as embroideries. She has been having some big toe pain. She states that last night that it was swollen and it went to the top of her foot.     Interval history November 27, 2023:  She did make a mistake and was taking her Humira weekly. She did skip last week and has a dose for today. She is finding relief. Last she was getting stuck and has some soreness, but overall feels things are good.     Interval history August 25, 2025:  She has not been doing well. She has not been able to get Methotrexate.No history of infections. No cardiovascular history. No history of cancer.      Interval history May 17, 2023:  She states that her hands are close today for making a fist. She still gets some trigger finger. She does still have a hard time opening the bottles. Overall she has not seen a lot of difference with the methotrexate.     Interval history March 15, 2023:  She increased the Sulfasalazine to 1 in the AM and 2 in the evening. She has continued with the Hydroxychloroquine as well. She states that her hands continue to get stuck and she has a lot of pain with them. She reports spending a lot of time rinsing them in hot water.     Interval history January 11, 2023:  She states that she is \"okay\". She has been having " "increased doctoring as her knees are \"really bad\". She has been working with some providers on that. She is thinking this is where the arthritis has started. She states that her hands continue to be the same. She has not noticed a change with the Sulfasalazine. Her hands continue to get stuck and she feels it is affecting more fingers. She is tolerating the medications well. She has not had any infections or fevers.     Interval history November 9, 2022:  She reports that the prednisone did anything. She states that her fingers continue to feel as if they are getting stuck. She continues to feel the swelling as well. She did have initial improvement in symptoms with the Hydroxychloroquine. She feels this was helpful until just before Dr. Uribe left. Initially she thought it was secondary to the humidity as it started in the summer.     History of gastric bleed on NSAIDs.    HPI from consult 10/5/2022:  Patient was previously treated with Dr. Uribe.  Last office visit was on 6/30/2022.  Initial evaluation was on November 10, 2021.  At that time she had reported pain in her bilateral hands with morning stiffness lasting 2 to 3 hours.  Physical exam showed subtle fullness involving the left third MCP and right fifth MCP joints with tenderness to palpation.  Mild tenderness involving the right fifth PIP and left first CMC.  Nodule involving the left third digit A1 pulley area with some tenderness on palpation and a subtle catching sensation with active flexion of the left third digit.    She has been unable to do the Paraffin wax due to cost. She uses hot water. She uses this in the morning to get things going. She states that she hurts today. She states that she has some good days, but \"not very many\". She is unsure that the Plaquenil is working. She states that she was expecting it to be a \"miracle\". She states that she was under the impression that you need something to stop and slow the arthritis with " "rheumatoid arthritis. She states that she does not do much with her fingers any more. She cannot paint or do crafts. She states that she has so much pain that she cannot even think about painting. She states that the pain is bad in the mornings and evening. If she is able to keep them warm, that seems to help. Many of her symptoms started summer 2020. She was noticing her fingers getting \"crooked\". She has not been getting the sticking of the fingers anymore.     No known family history of rheumatoid arthritis or lupus.  No known family history or personal history of psoriasis, ulcerative colitis or Crohn's disease.             Review of Systems:     Constitutional: negative  Skin: negative  Eyes: negative  Ears/Nose/Throat: negative  Respiratory: No shortness of breath, dyspnea on exertion, cough, or hemoptysis  Cardiovascular: negative  Gastrointestinal: negative  Genitourinary: negative  Musculoskeletal: as above  Neurologic: negative  Psychiatric: negative  Hematologic/Lymphatic/Immunologic: negative  Endocrine: negative         Active Problem List:     Patient Active Problem List    Diagnosis Date Noted    Seronegative rheumatoid arthritis of multiple sites (H) 01/31/2024     Priority: Medium    History of total bilateral knee replacement 01/09/2023     Priority: Medium    Benign essential hypertension 06/19/2020     Priority: Medium    Rotator cuff syndrome, right 01/14/2020     Priority: Medium    Multiple joint pain, possible fibromyalgia 12/20/2019     Priority: Medium    Bronchospasm 01/07/2019     Priority: Medium    GI bleed 03/17/2018     Priority: Medium    Upper GI bleed 03/17/2018     Priority: Medium    Shoulder injury, right, subsequent encounter 08/21/2017     Priority: Medium    Fatty liver 04/20/2017     Priority: Medium    Elevated levels of transaminase & lactic acid dehydrogenase 04/15/2017     Priority: Medium    Bilateral cold feet 01/03/2017     Priority: Medium    Colles' fracture " 07/06/2015     Priority: Medium    Encounter for routine gynecological examination  06/04/2015     Priority: Medium    Back pain, left below scapula, strained muscles 10/24/2013     Priority: Medium    HTN, goal below 140/80 05/05/2013     Priority: Medium    S/P TKR (total knee replacement) 04/03/2012     Priority: Medium    Osteoporosis 02/19/2012     Priority: Medium     Previous T scores -2.2  2/2012 Dexascan:  Lumbar spine L1-L4 T-score: -2.4, Left femoral neck T-score: -2.7, Right femoral neck T-score: -2.2   Percent change in lumbar spine: +6.6% since 4/16/2008   Percent change in femurs: +2.9% since 4/16/2008    IMPRESSION: Osteoporosis in the left femoral neck. Severe osteopenia  in the right femoral neck and lumbar spine.      Obesity, Class III, BMI 40-49.9 (morbid obesity) (H) 02/19/2012     Priority: Medium     ideal weight 120, goal weight 200, lose 58 lbs in 58 weeks.      Vitamin D deficiency 07/18/2011     Priority: Medium    Tick bite, infected, positive Lyme test 1996 not treated 07/08/2011     Priority: Medium    Rosacea 11/04/2009     Priority: Medium    Obstructive sleep apnea 12/01/2006     Priority: Medium     Sleep study 12/06 for EDS- AHI 36, desaturations to 65%. CPAP recommended but not tolerated.  01/16/07  ENT consult recommend CPAP and weight loss. Surgery discussed but success rate less than CPAP  1/15/07 CPAP trial, was not able to tolerate. Retried 2008, tolerated better.   Problem list name updated by automated process. Provider to review      Carpal tunnel syndrome 06/07/2006     Priority: Medium     04/10/09 Dr.Meletiou Mercado [facility]. Finding consistant with CTS, but nerve studies and MRI of C-Spine are normal. Corticosteroid injection given in office      CARDIOVASCULAR SCREENING; LDL GOAL LESS THAN 160 10/31/2010     Priority: Low    Allergic rhinitis 03/27/2006     Priority: Low     Problem list name updated by automated process. Provider to review      s/p gastric bypass x 2  07/05/2005     Priority: Low     Gastric bypass 1980              Past Medical History:     Past Medical History:   Diagnosis Date    HTN (hypertension)     IRON DEFICIENCY ANEMIA 7/5/2005     Iron infusion/ resolved since injections    Obstructive sleep apnea     DARWIN (obstructive sleep apnea)      Past Surgical History:   Procedure Laterality Date    ARTHROPLASTY KNEE  04/02/2012    Procedure:ARTHROPLASTY KNEE; Left Total Knee Arthroplasty--Anesth.Choice; Surgeon:VICENTE THOMPSON; Location:[facility]    ARTHROTOMY SHOULDER, ROTATOR CUFF REPAIR, COMBINED Right 08/25/2017    Procedure: COMBINED ARTHROTOMY SHOULDER, ROTATOR CUFF REPAIR;  Right shoulder distal clavicle excision, subacromial decompression, rotator cuff repair ;  Surgeon: Vicente Thompson MD;  Location: [facility]    ARTHROTOMY SHOULDER, ROTATOR CUFF REPAIR, COMBINED Right 12/18/2017    Procedure: COMBINED ARTHROTOMY SHOULDER, ROTATOR CUFF REPAIR;  Right Shoulder Rotator Cuff Revision;  Surgeon: Vicente Thompson MD;  Location: [facility]    ARTHROTOMY SHOULDER, ROTATOR CUFF REPAIR, COMBINED Left 04/27/2018    Procedure: COMBINED ARTHROTOMY SHOULDER, ROTATOR CUFF REPAIR;  Left Shoulder Open Subacromial Decompression,Distal Clavicle Excision,Rotator Cuff Repair;  Surgeon: Vicente Thompson MD;  Location: [facility]    C/SECTION, LOW TRANSVERSE  1978, 1984, 1994    x 3    COLONOSCOPY  01/01/2001    normal colonoscopy    COLONOSCOPY N/A 08/22/2017    Procedure: COLONOSCOPY;  Colonoscopy  ;  Surgeon: Delfino Yoo MD;  Location: [facility]    COLONOSCOPY  08/22/2017    EXAMCOL    ESOPHAGOSCOPY, GASTROSCOPY, DUODENOSCOPY (EGD), COMBINED N/A 03/18/2018    Procedure: COMBINED ESOPHAGOSCOPY, GASTROSCOPY, DUODENOSCOPY (EGD);;  Surgeon: Poncho Galindo MD;  Location: [facility]    GASTRIC BYPASS  1980/2005    Gastric Bypass and revision    HERNIA REPAIR, INCISIONAL  06/16/2008    lap.repair with 10x8 mesh    GA REVISE KNEE JOINT REPLACE,ALL PARTS Left 05/29/2019      Cedric Fuller    IN TOTAL KNEE ARTHROPLASTY Right 06/22/2020    Dr. Cedric Fuller    TUBAL LIGATION  01/01/1994    ZZC ORAL SURGERY PROCEDURE  age 18    wisdom teeth            Social History:     Social History     Socioeconomic History    Marital status:      Spouse name: Not on file    Number of children: Not on file    Years of education: Not on file    Highest education level: Not on file   Occupational History    Not on file   Tobacco Use    Smoking status: Never    Smokeless tobacco: Never   Vaping Use    Vaping status: Never Used   Substance and Sexual Activity    Alcohol use: Yes     Alcohol/week: 0.0 standard drinks of alcohol     Comment: mixed very light 1 a month if that    Drug use: No    Sexual activity: Yes     Partners: Male     Birth control/protection: Surgical   Other Topics Concern    Parent/sibling w/ CABG, MI or angioplasty before 65F 55M? No     Service No    Blood Transfusions Yes     Comment: 1976, 1978, 1978, 1984    Caffeine Concern No    Occupational Exposure No    Hobby Hazards No    Sleep Concern No    Stress Concern No    Weight Concern Yes    Special Diet No    Back Care No    Exercise No    Bike Helmet No    Seat Belt Yes    Self-Exams Not Asked   Social History Narrative    Not on file     Social Determinants of Health     Financial Resource Strain: Low Risk  (1/31/2024)    Financial Resource Strain     Within the past 12 months, have you or your family members you live with been unable to get utilities (heat, electricity) when it was really needed?: No   Food Insecurity: Low Risk  (1/31/2024)    Food Insecurity     Within the past 12 months, did you worry that your food would run out before you got money to buy more?: No     Within the past 12 months, did the food you bought just not last and you didn t have money to get more?: No   Transportation Needs: Low Risk  (1/31/2024)    Transportation Needs     Within the past 12 months, has lack of transportation kept you  from medical appointments, getting your medicines, non-medical meetings or appointments, work, or from getting things that you need?: No   Physical Activity: Not on file   Stress: Not on file   Social Connections: Not on file   Interpersonal Safety: Low Risk  (8/15/2024)    Interpersonal Safety     Do you feel physically and emotionally safe where you currently live?: Yes     Within the past 12 months, have you been hit, slapped, kicked or otherwise physically hurt by someone?: No     Within the past 12 months, have you been humiliated or emotionally abused in other ways by your partner or ex-partner?: No   Housing Stability: Low Risk  (1/31/2024)    Housing Stability     Do you have housing? : Yes     Are you worried about losing your housing?: No          Family History:     Family History   Problem Relation Age of Onset    C.A.D. Father         MI at age 60    Hypertension Father     Alzheimer Disease Father     Hyperlipidemia Father     Osteoporosis Mother         on Fosamax    Glaucoma Mother     Other Cancer Brother     Stomach Cancer Maternal Aunt     Cervical Cancer Cousin     Diabetes No family hx of     Cerebrovascular Disease No family hx of     Breast Cancer No family hx of     Cancer - colorectal No family hx of     Prostate Cancer No family hx of     Liver Disease No family hx of             Allergies:     Allergies   Allergen Reactions    Ibuprofen      Other reaction(s): Gastrointestinal  Gastric bleed    Naproxen GI Disturbance     Other reaction(s): Gastrointestinal  Gastric bleed    Nickel Itching     Inflamed with cheap earrings (itchy)    Rofecoxib Itching and Swelling     VIOXX (Swollen Feet,Hands itching), Okay with ibuprofen and aspirin    Vioxx Itching and Swelling     VIOXX (Swollen Feet,Hands itching), Okay with ibuprofen and aspirin            Medications:     Current Outpatient Medications   Medication Sig Dispense Refill    acetaminophen (TYLENOL) 500 MG tablet Take 1,000 mg by mouth  "every 6 hours as needed      albuterol (PROAIR HFA/PROVENTIL HFA/VENTOLIN HFA) 108 (90 Base) MCG/ACT inhaler INHALE 2 PUFFS INTO THE LUNGS EVERY 6 HOURS AS NEEDED FOR SHORTNESS OF BREATH OR DIFFICULT BREATHING OR WHEEZING 25.5 g 1    atenolol (TENORMIN) 50 MG tablet Take 2 tablets (100 mg) by mouth daily for 90 days 180 tablet 3    etanercept (ENBREL SURECLICK) 50 MG/ML autoinjector Inject 50 mg subcutaneously every 7 days. 4 mL 0    nystatin (NYAMYC) 363903 UNIT/GM external powder APPLY TOPICALLY TO AFFECTED  AREA(S) 3 TIMES DAILY AS NEEDED 60 g 1    oxyBUTYnin ER (DITROPAN XL) 5 MG 24 hr tablet Take 1 tablet (5 mg) by mouth 2 times daily 180 tablet 2            Physical Exam:   Blood pressure 113/79, pulse 78, temperature 99.3  F (37.4  C), temperature source Tympanic, resp. rate 18, height 1.6 m (5' 3\"), last menstrual period 2006, SpO2 95%, not currently breastfeeding.  Wt Readings from Last 6 Encounters:   08/15/24 122 kg (269 lb)   24 120.1 kg (264 lb 12.8 oz)   02/15/24 119.7 kg (264 lb)   24 119.7 kg (264 lb)   24 117.9 kg (260 lb)   23 118.4 kg (261 lb)       Resp: No shortness of breath with normal conversation  Psych: nl judgement, orientation, memory, affect.  MSK: No active synovitis or dactylitis.  Full fist formation.           Data:   Imagin/10/2021 x-ray bilateral hand  IMPRESSION: Generalized demineralization. Mild degenerative arthritis involving the interphalangeal joints of the fingers, and the basilar joints of both thumbs, with mild joint space narrowing and osteophytic spurring. Generalized bone demineralization.  No evidence of erosive arthropathy. Soft tissues are normal.    11/10/2021 x-ray bilateral knee  IMPRESSION:   RIGHT KNEE: Uncomplicated cemented total knee arthroplasty. Normal joint alignment. No fracture or erosion. No joint effusion.     LEFT KNEE: Cemented longstem hinged total knee arthroplasty. No evidence of loosening or hardware " complication. Mild patella baja. No fracture or erosion. No joint effusion.    11/10/2021 x-ray right wrist  IMPRESSION: No acute fracture. Chronic healed fracture of the distal radius. Chronic ununited fracture of the ulnar styloid process. Moderate ulnar positive variance. Mild degenerative changes at the STT and first CMC joints.    2/21/2022 x-rays left shoulder  IMPRESSION:  1.  Normal left glenohumeral joint spacing and alignment.  2.  Distal clavicle excision, unchanged.  3.  No fracture.  4.  No significant change since the comparison.    2/21/2022 x-ray cervical spine  IMPRESSION: Minimal degenerative anterolisthesis of C2 upon C3, C3  upon C4 and C5 on C6. Alignment otherwise normal. Vertebral body  heights are normal. Moderate facet arthropathy at C2-C3. Mild  degenerative endplate spurring at C4-C5, C5-C6 and C6-C7. No  fractures.    Laboratory:  10/4/2022  Creatinine 0.79, GFR 81  Albumin 4.1  ALT 9, AST 17  White blood cell count 5.7, hemoglobin 11.9, platelet count 215    12/19/2022  Creatinine 0.72, GFR greater than 90  Albumin 4.0  ALT 7, AST 20  White blood cell count, hemoglobin, platelet count within normal limits    1/10/2023  Albumin creatinine 42.87  Albumin urine 85.4    3/9/2023  Creatinine 0.77, GFR 84  Albumin 3.9  ALT 10, AST 19  Albumin in the urine less than 12  CBC within normal limits    5/17/2023  Creatinine 0.62, GFR greater than 90  Albumin 4.2  ALT 10, AST 17  White blood cell count 5.0, hemoglobin 11.6, platelet count 209    8/25/2023  TB negative  Hepatitis B nonreactive  Hepatitis C nonreactive    10/03/2023  Creatinine 0.85, GFR 74  Albumin 4.1  ALT 10, AST 17  White blood cell count 7.7, hemoglobin 11.9, platelet count 214    6/12/2024  Creatinine 0.84, GFR 74  Albumin 4.2  ALT 12, AST 19  CRP less than 3.0  White blood cell count 5.2, hemoglobin 11.4, platelet count 206  Sed rate 11    8/15/2024  Lyme disease antibody seronegative

## 2024-10-11 ENCOUNTER — OFFICE VISIT (OUTPATIENT)
Dept: RHEUMATOLOGY | Facility: CLINIC | Age: 69
End: 2024-10-11
Attending: PHYSICIAN ASSISTANT
Payer: COMMERCIAL

## 2024-10-11 VITALS
BODY MASS INDEX: 47.65 KG/M2 | SYSTOLIC BLOOD PRESSURE: 113 MMHG | DIASTOLIC BLOOD PRESSURE: 79 MMHG | OXYGEN SATURATION: 95 % | RESPIRATION RATE: 18 BRPM | HEIGHT: 63 IN | TEMPERATURE: 99.3 F | HEART RATE: 78 BPM

## 2024-10-11 DIAGNOSIS — Z79.899 HIGH RISK MEDICATION USE: ICD-10-CM

## 2024-10-11 DIAGNOSIS — M06.09 SERONEGATIVE RHEUMATOID ARTHRITIS OF MULTIPLE SITES (H): Primary | ICD-10-CM

## 2024-10-11 PROCEDURE — G2211 COMPLEX E/M VISIT ADD ON: HCPCS | Performed by: PHYSICIAN ASSISTANT

## 2024-10-11 PROCEDURE — 99214 OFFICE O/P EST MOD 30 MIN: CPT | Performed by: PHYSICIAN ASSISTANT

## 2024-10-11 ASSESSMENT — PAIN SCALES - GENERAL: PAINLEVEL: MILD PAIN (3)

## 2024-10-11 NOTE — PATIENT INSTRUCTIONS
After Visit Instructions:     Thank you for coming to [facility] for your care. It is my goal to partner with you to help you reach your optimal state of health.       Plan:     Schedule follow-up with Danni Martínez PA-C in 6 months.  Labs: CBC, creatinine, Albumin, AST, ALT every 6 months-next due mid December   Medication recommendations:   Continue Enbrel 50mg/mL WEEKLY SubQ.         Danni Martínez PA-C  [facility]  [facility]    Contact information: [facility]  Clinic Number:  [phone]  Please call or send a [employer] message with any questions about your care

## 2024-10-25 ENCOUNTER — VIRTUAL VISIT (OUTPATIENT)
Dept: FAMILY MEDICINE | Facility: CLINIC | Age: 69
End: 2024-10-25
Payer: COMMERCIAL

## 2024-10-25 DIAGNOSIS — R09.81 NASAL CONGESTION: ICD-10-CM

## 2024-10-25 DIAGNOSIS — J02.9 SORE THROAT: Primary | ICD-10-CM

## 2024-10-25 PROCEDURE — 99442 PR PHYSICIAN TELEPHONE EVALUATION 11-20 MIN: CPT | Mod: 93

## 2024-10-25 RX ORDER — FLUTICASONE PROPIONATE 50 MCG
1 SPRAY, SUSPENSION (ML) NASAL DAILY
Qty: 16 G | Refills: 1 | Status: SHIPPED | OUTPATIENT
Start: 2024-10-25

## 2024-10-25 RX ORDER — LORATADINE 10 MG/1
10 TABLET ORAL DAILY
Qty: 30 TABLET | Refills: 1 | Status: SHIPPED | OUTPATIENT
Start: 2024-10-25

## 2024-10-25 ASSESSMENT — ENCOUNTER SYMPTOMS: SORE THROAT: 1

## 2024-10-25 NOTE — PATIENT INSTRUCTIONS
Sore Throat: Care Instructions  Overview     Infection by bacteria or a virus causes most sore throats. Cigarette smoke, dry air, air pollution, allergies, and yelling can also cause a sore throat. Sore throats can be painful and annoying. Fortunately, most sore throats go away on their own. If you have a bacterial infection, your doctor may prescribe antibiotics.  Follow-up care is a key part of your treatment and safety. Be sure to make and go to all appointments, and call your doctor if you are having problems. It's also a good idea to know your test results and keep a list of the medicines you take.  How can you care for yourself at home?  If your doctor prescribed antibiotics, take them as directed. Do not stop taking them just because you feel better. You need to take the full course of antibiotics.  Gargle with warm salt water several times a day to help reduce swelling and relieve pain. Mix 1/2 teaspoon of salt in 1 cup of warm water.  Take an over-the-counter pain medicine, such as acetaminophen (Tylenol), ibuprofen (Advil, Motrin), or naproxen (Aleve). Read and follow all instructions on the label.  Be careful when taking over-the-counter cold or flu medicines and Tylenol at the same time. Many of these medicines have acetaminophen, which is Tylenol. Read the labels to make sure that you are not taking more than the recommended dose. Too much acetaminophen (Tylenol) can be harmful.  Drink plenty of fluids. Fluids may help soothe an irritated throat. Hot fluids, such as tea or soup, may help decrease throat pain.  Use over-the-counter throat lozenges to soothe pain. Regular cough drops or hard candy may also help. These should not be given to young children because of the risk of choking.  Do not smoke or allow others to smoke around you. If you need help quitting, talk to your doctor about stop-smoking programs and medicines. These can increase your chances of quitting for good.  Use a vaporizer or  "humidifier to add moisture to your bedroom. Follow the directions for cleaning the machine.  When should you call for help?   Call your doctor now or seek immediate medical care if:    You have trouble breathing.     Your sore throat gets much worse on one side.     You have new or worse trouble swallowing.     You have a new or higher fever.   Watch closely for changes in your health, and be sure to contact your doctor if you do not get better as expected.  Where can you learn more?  Go to https://www.[employer].net/patiented  Enter U420 in the search box to learn more about \"Sore Throat: Care Instructions.\"  Current as of: September 27, 2023  Content Version: 14.2 2024 [facility] "[employer]".   Care instructions adapted under license by your healthcare professional. If you have questions about a medical condition or this instruction, always ask your healthcare professional. [employer] disclaims any warranty or liability for your use of this information.    "

## 2024-10-25 NOTE — PROGRESS NOTES
"Karen is a 69 year old who is being evaluated via a billable telephone visit.    What phone number would you like to be contacted at? [phone]  How would you like to obtain your AVS? Mail a copy  Originating Location (pt. Location): Home    Distant Location (provider location):  On-site    Assessment & Plan     Sore throat  Patient reports she has a sore throat and does not recall when it started. Patient denies chills, fever, and body aches. Patient reports daily congestion for previous three years. Patient education regarding symptoms that would warrant going to urgent care. Treating for seasonal allergies and placing order for strep test if symptoms increase, patient is able to visit clinic for a lab only appointment.     - loratadine (CLARITIN) 10 MG tablet; Take 1 tablet (10 mg) by mouth daily.  - fluticasone (FLONASE) 50 MCG/ACT nasal spray; Spray 1 spray into both nostrils daily.  - Streptococcus A Rapid Screen w/Reflex to PCR - Clinic Collect    Nasal congestion  Patient reports daily nasal congestion for the past three years producing different types and colors of flem. Encouraged patient to make a clinic visit to further evaluate nasal congestion.     - loratadine (CLARITIN) 10 MG tablet; Take 1 tablet (10 mg) by mouth daily.  - fluticasone (FLONASE) 50 MCG/ACT nasal spray; Spray 1 spray into both nostrils daily.  - Streptococcus A Rapid Screen w/Reflex to PCR - Clinic Collect    BMI  Estimated body mass index is 47.65 kg/m  as calculated from the following:    Height as of 10/11/24: 1.6 m (5' 3\").    Weight as of 8/15/24: 122 kg (269 lb).   Weight management plan: Not addressed at this visit.    Subjective   Karen is a 69 year old, presenting for the following health issues:  No chief complaint on file.        10/25/2024     8:13 AM   Additional Questions   Roomed by rmb   Accompanied by self         10/25/2024     8:13 AM   Patient Reported Additional Medications   Patient reports taking the following " new medications none     Phone visit with patient. Patient reports she has a sore throat and does not recall when it started. Patient denies chills, fever, and body aches. Patient reports daily congestion for previous three years. Patient education regarding symptoms that would warrant going to urgent care. Treating for seasonal allergies and symptom management, placing order for strep test if symptoms increase, patient is able to visit clinic for a lab only appointment. Patient reports daily nasal congestion for the past three years producing different types and colors of flem. Encouraged patient to make a clinic visit to further evaluate nasal congestion.     Pharyngitis          Acute Illness  Acute illness concerns: sore throat ,thick mucus  Onset/Duration: nasal thick phlegm for year ,sore throat started this week ,getting really painful  Symptoms:  Fever: No  Chills/Sweats: No  Headache (location?): YES  Sinus Pressure: No  Conjunctivitis:  No  Ear Pain: no  Rhinorrhea: YES  Congestion: YES  Sore Throat: YES  Cough: YES-non-productive  Wheeze: No  Decreased Appetite: No  Nausea: No  Vomiting: No  Diarrhea: No  Dysuria/Freq.: YES  Dysuria or Hematuria: No  Fatigue/Achiness: YES  Sick/Strep Exposure: No  Therapies tried and outcome: cough drops,sherbert or ice cream freezes throat so she can sleep       Review of Systems        Objective       Vitals:  No vitals were obtained today due to phone visit.    Physical Exam   General: Alert and no distress //Respiratory: No audible wheeze, cough, or shortness of breath // Psychiatric:  Appropriate affect, tone, and pace of words        Phone call duration: 11 minutes  Signed Electronically by: MARGARITA Whitten CNP

## 2024-11-11 ENCOUNTER — TELEPHONE (OUTPATIENT)
Dept: RHEUMATOLOGY | Facility: CLINIC | Age: 69
End: 2024-11-11
Payer: COMMERCIAL

## 2024-11-11 NOTE — TELEPHONE ENCOUNTER
PA Initiation    Medication: ENBREL SURECLICK 50 MG/ML SC SOAJ  Insurance Company: [employer] ([facility]) - Phone [phone] Fax [phone]  Pharmacy Filling the Rx: [facility] - [place], [place] - [facility] [address]   Filling Pharmacy Phone:    Filling Pharmacy Fax:    Start Date: 11/11/2024  [id number])    LUCINDA Madrid, [facility]  Specialty Pharmacy Clinic Liaison     [facility]    [email or web address]     Phone: [phone]  Fax: [phone]

## 2024-11-11 NOTE — PROGRESS NOTES
CHIEF COMPLAINT:   Chief Complaint   Patient presents with    Left Knee - Pain     History of left knee replacement and revision on 5/29/2019 by Dr. Cedric Fuller  still painful. The left lower the leg is swallow and getting worse. She was seen at [facility] in the past. insurance change    Right Knee - Pain     History of Right total knee arthroplasty on 6/22/2020 by Dr. Fuller. States her knee is getting worse can't sleep at night and can't walk. In radha since the surgery           HISTORY OF PRESENT ILLNESS    Jacquie Harris is a 69 year old female seen for evaluation of ongoing bilateral knee pain with no known injury.  She has a long history of problems with arthritis of the knees.  She eventually underwent left knee replacement (Dr Santo?), which was still painful.  She then had revision of this on 5/29/2019 by Dr. Cedric Fuller.  She went on to have right total knee arthroplasty on 6/22/2020 by Dr. Fuller.  She reports having buckling of both knees thereafter.      Right front and lateral.    Left front/outer and across the front of the knee to medial.    Pain is constant, at rest, night, activities. Most pain is throbbing down the lateral left leg. That pain is about a month now.    Treatment with topicals, tylenol.    Seen by Dr. Delfino Billingsley for the same 12/2022, whom recommended therapy at that time.    Present symptoms: moderate pain.    Pain severity: 7/10  Frequency of symptoms: are constant  Exacerbating Factors: weight bearing, stairs, [id number], in and out of car, prolonged sitting, prolonged standing  Relieving Factors: positional changes  Night Pain: Yes  Pain while at rest: Yes    Patient has tried:     NSAIDS: No , unable to take, history of GI bleeds, gastric bypass surgery x2     Acetaminophen: Yes     Opioids: No     Physical Therapy: No      Home Exercise Program / Resistance training: Yes      Activity modification: Yes      Bracing: No      Assistive device:  No      Injections: No       Ice: No      Heat: Yes      Topicals: Yes        Other PMH:  has a past medical history of HTN (hypertension), IRON DEFICIENCY ANEMIA (7/5/2005), Obstructive sleep apnea, and DARWIN (obstructive sleep apnea).    She has no past medical history of Basal cell carcinoma, Breast cancer (H), Diabetes (H), Difficult intubation, Heart disease, Malignant hyperthermia, Malignant neoplasm of ovary (H), Need for prophylactic hormone replacement therapy (postmenopausal), PONV (postoperative nausea and vomiting), Skin cancer, or Squamous cell carcinoma of skin, unspecified.  Patient Active Problem List   Diagnosis    s/p gastric bypass x 2    Allergic rhinitis    Carpal tunnel syndrome    Obstructive sleep apnea    Rosacea    CARDIOVASCULAR SCREENING; LDL GOAL LESS THAN 160    Tick bite, infected, positive Lyme test 1996 not treated    Vitamin D deficiency    Osteoporosis    Obesity, Class III, BMI 40-49.9 (morbid obesity) (H)    S/P TKR (total knee replacement)    HTN, goal below 140/80    Back pain, left below scapula, strained muscles    Encounter for routine gynecological examination     Colles' fracture    Bilateral cold feet    Elevated levels of transaminase & lactic acid dehydrogenase    Fatty liver    Shoulder injury, right, subsequent encounter    GI bleed    Upper GI bleed    Bronchospasm    Multiple joint pain, possible fibromyalgia    Rotator cuff syndrome, right    Benign essential hypertension    History of total bilateral knee replacement    Seronegative rheumatoid arthritis of multiple sites (H)    Nasal congestion    Sore throat       Surgical Hx:  has a past surgical history that includes tubal ligation (01/01/1994); c/section, low transverse (1978, 1984, 1994); gastric bypass (1980/2005); ORAL SURGERY PROCEDURE (age 18); colonoscopy (01/01/2001); hernia repair, incisional (06/16/2008); Arthroplasty knee (04/02/2012); Colonoscopy (N/A, 08/22/2017); Arthrotomy shoulder, rotator cuff repair,  combined (Right, 08/25/2017); Arthrotomy shoulder, rotator cuff repair, combined (Right, 12/18/2017); Esophagoscopy, gastroscopy, duodenoscopy (EGD), combined (N/A, 03/18/2018); Arthrotomy shoulder, rotator cuff repair, combined (Left, 04/27/2018); colonoscopy (08/22/2017); TOTAL KNEE ARTHROPLASTY (Right, 06/22/2020); and REVISE KNEE JOINT REPLACE,ALL PARTS (Left, 05/29/2019).    Medications:   Current Outpatient Medications:     atenolol (TENORMIN) 50 MG tablet, Take 2 tablets (100 mg) by mouth daily for 90 days, Disp: 180 tablet, Rfl: 3    etanercept (ENBREL SURECLICK) 50 MG/ML autoinjector, Inject 50 mg subcutaneously every 7 days., Disp: 4 mL, Rfl: 5    fluticasone (FLONASE) 50 MCG/ACT nasal spray, Spray 1 spray into both nostrils daily., Disp: 16 g, Rfl: 1    loratadine (CLARITIN) 10 MG tablet, Take 1 tablet (10 mg) by mouth daily., Disp: 30 tablet, Rfl: 1    nystatin (NYAMYC) 063888 UNIT/GM external powder, APPLY TOPICALLY TO AFFECTED  AREA(S) 3 TIMES DAILY AS NEEDED, Disp: 60 g, Rfl: 1    oxyBUTYnin ER (DITROPAN XL) 5 MG 24 hr tablet, Take 1 tablet (5 mg) by mouth 2 times daily, Disp: 180 tablet, Rfl: 2    Allergies:   Allergies   Allergen Reactions    Ibuprofen      Other reaction(s): Gastrointestinal  Gastric bleed    Naproxen GI Disturbance     Other reaction(s): Gastrointestinal  Gastric bleed    Nickel Itching     Inflamed with cheap earrings (itchy)    Rofecoxib Itching and Swelling     VIOXX (Swollen Feet,Hands itching), Okay with ibuprofen and aspirin    Vioxx Itching and Swelling     VIOXX (Swollen Feet,Hands itching), Okay with ibuprofen and aspirin       Social Hx: retired.   reports that she has never smoked. She has never used smokeless tobacco. She reports current alcohol use. She reports that she does not use drugs.    Family Hx: family history includes Alzheimer Disease in her father; C.A.D. in her father; Cervical Cancer in her cousin; Glaucoma in her mother; Hyperlipidemia in her father;  Hypertension in her father; Osteoporosis in her mother; Other Cancer in her brother; Stomach Cancer in her maternal aunt.    REVIEW OF SYSTEMS: 10 point ROS neg other than the symptoms noted above in the HPI and PMH. Notables include  CONSTITUTIONAL:NEGATIVE for fever, chills, change in weight  INTEGUMENTARY/SKIN: NEGATIVE for worrisome rashes, moles or lesions  MUSCULOSKELETAL:See HPI above  NEURO: NEGATIVE for weakness, dizziness or paresthesias    PHYSICAL EXAM:  BP (!) 156/91   Pulse 67   Resp 16   Wt 125.2 kg (276 lb)   LMP 05/29/2006 (Approximate)   SpO2 94%   BMI 48.89 kg/m     GENERAL APPEARANCE: healthy, alert, no distress  SKIN: no suspicious lesions or rashes  NEURO: Normal strength and tone, mentation intact and speech normal  PSYCH:  mentation appears normal and affect normal, not anxious  RESPIRATORY: No increased work of breathing.  HANDS: no clubbing, nail pitting  LYMPH: no palpable popliteal lymphadenopathy.    BILATERAL LOWER EXTREMITIES:  Gait: Trendelenburg left    No Quad atrophy, strength weak  Intact sensation deep peroneal nerve, superficial peroneal nerve, med/lat tibial nerve, sural nerve, saphenous nerve  Intact EHL, EDL, TA, FHL, GS, quadriceps hamstrings and hip flexors   Bilateral calf soft and nttp or squeeze.  DTRs: achilles 2+, patella 2+.  Edema: 1+    LEFT KNEE EXAM:    Skin: intact, no ecchymosis or erythema; midline surgical scar, extensile   ROM: full extension to full flexion  Tight hamstrings on straight leg raise.  Effusion: none  Tender: quadriceps muscles and tendon, iliotibial band, adductors, pes, med/lat joint line, anterior or posterior knee, shin  Grossly stable ligamentous exam.    RIGHT KNEE EXAM:       Skin: intact, no ecchymosis or erythema; midline surgical scar  ROM: full extension to full flexion  Tight hamstrings on straight leg raise.  Effusion: none  Tender: quadriceps muscles and tendon, iliotibial band, adductors, pes, med/lat joint line, anterior or  posterior knee, shin but less so compared to the left knee.  Grossly stable ligamentous exam.       X-RAY:  3 views bilateral knee from 11/13/2024 were reviewed in clinic today. On my review, no obvious fractures or dislocations.   RIGHT KNEE: Status post right total knee arthroplasty, in standard  alignment. No concerning periprosthetic lucency. No displaced  fracture. No sizable joint effusion.     LEFT KNEE: Postoperative changes from left total knee arthroplasty  with longstem tibial and femoral components. A portion of the femoral  stem extends beyond the field-of-view on the AP dimension and a  portion of the tibial stem extends beyond the field of view on the  lateral image.      There is apparent irregularity of the patellar articular surface which  is seen only on the lateral view but appears new compared to the  12/26/22 study. This could be in some part artifactual due to  differences in rotation, but loosening is difficult to exclude. CT or  MRI with metal artifact reduction could better evaluate as clinically  indicated.     No displaced fracture. No sizable effusion.            ASSESSMENT/PLAN: Jacquie Harris is a 69 year old female with chronic bilateral painful total knee arthroplasty.     * exam, images reviewed  Implants appears stable without evidence of loosening. Some irregularity of the left patella.  Based on exam, appears mostly soft tissue tenderness, pain. Lower extremity weakness, notable left lower extremity , leg and hip.  Recommend Physical Therapy, home exercise program  Ice/heat, topicals, over the counter pain medications, cane/walker as needed.  Weight loss would help.      Yfn Hernandez M.D., M.S.  Dept. of Orthopaedic Surgery  [facility]

## 2024-11-13 ENCOUNTER — OFFICE VISIT (OUTPATIENT)
Dept: ORTHOPEDICS | Facility: CLINIC | Age: 69
End: 2024-11-13
Payer: COMMERCIAL

## 2024-11-13 VITALS
RESPIRATION RATE: 16 BRPM | WEIGHT: 276 LBS | OXYGEN SATURATION: 94 % | SYSTOLIC BLOOD PRESSURE: 156 MMHG | BODY MASS INDEX: 48.89 KG/M2 | HEART RATE: 67 BPM | DIASTOLIC BLOOD PRESSURE: 91 MMHG

## 2024-11-13 DIAGNOSIS — Z96.653 HISTORY OF TOTAL BILATERAL KNEE REPLACEMENT: ICD-10-CM

## 2024-11-13 DIAGNOSIS — G89.28 CHRONIC KNEE PAIN AFTER TOTAL REPLACEMENT OF BOTH KNEE JOINTS: Primary | ICD-10-CM

## 2024-11-13 DIAGNOSIS — M25.562 CHRONIC KNEE PAIN AFTER TOTAL REPLACEMENT OF BOTH KNEE JOINTS: Primary | ICD-10-CM

## 2024-11-13 DIAGNOSIS — M25.561 CHRONIC KNEE PAIN AFTER TOTAL REPLACEMENT OF BOTH KNEE JOINTS: Primary | ICD-10-CM

## 2024-11-13 DIAGNOSIS — Z96.653 CHRONIC KNEE PAIN AFTER TOTAL REPLACEMENT OF BOTH KNEE JOINTS: Primary | ICD-10-CM

## 2024-11-13 PROCEDURE — 99213 OFFICE O/P EST LOW 20 MIN: CPT | Performed by: ORTHOPAEDIC SURGERY

## 2024-11-13 ASSESSMENT — PAIN SCALES - GENERAL: PAINLEVEL_OUTOF10: SEVERE PAIN (7)

## 2024-11-13 NOTE — LETTER
11/13/2024      Jacquie Harris  [address] [facility] [id number]      Dear Colleague,    Thank you for referring your patient, Jacquie Harris, to the [facility] [place]. Please see a copy of my visit note below.    CHIEF COMPLAINT:   Chief Complaint   Patient presents with     Left Knee - Pain     History of left knee replacement and revision on 5/29/2019 by Dr. Cedric Fuller  still painful. The left lower the leg is swallow and getting worse. She was seen at [facility] in the past. insurance change     Right Knee - Pain     History of Right total knee arthroplasty on 6/22/2020 by Dr. Fuller. States her knee is getting worse can't sleep at night and can't walk. In radha since the surgery           HISTORY OF PRESENT ILLNESS    Jacquie Harris is a 69 year old female seen for evaluation of ongoing bilateral knee pain with no known injury.  She has a long history of problems with arthritis of the knees.  She eventually underwent left knee replacement (Dr Santo?), which was still painful.  She then had revision of this on 5/29/2019 by Dr. Cedric Fuller.  She went on to have right total knee arthroplasty on 6/22/2020 by Dr. Fuller.  She reports having buckling of both knees thereafter.      Right front and lateral.    Left front/outer and across the front of the knee to medial.    Pain is constant, at rest, night, activities. Most pain is throbbing down the lateral left leg. That pain is about a month now.    Treatment with topicals, tylenol.    Seen by Dr. Delfino Billingsley for the same 12/2022, whom recommended therapy at that time.    Present symptoms: moderate pain.    Pain severity: 7/10  Frequency of symptoms: are constant  Exacerbating Factors: weight bearing, stairs, [id number], in and out of car, prolonged sitting, prolonged standing  Relieving Factors: positional changes  Night Pain: Yes  Pain while at rest: Yes    Patient has tried:     NSAIDS: No , unable to take,  history of GI bleeds, gastric bypass surgery x2     Acetaminophen: Yes     Opioids: No     Physical Therapy: No      Home Exercise Program / Resistance training: Yes      Activity modification: Yes      Bracing: No      Assistive device:  No     Injections: No       Ice: No      Heat: Yes      Topicals: Yes        Other PMH:  has a past medical history of HTN (hypertension), IRON DEFICIENCY ANEMIA (7/5/2005), Obstructive sleep apnea, and DARWIN (obstructive sleep apnea).    She has no past medical history of Basal cell carcinoma, Breast cancer (H), Diabetes (H), Difficult intubation, Heart disease, Malignant hyperthermia, Malignant neoplasm of ovary (H), Need for prophylactic hormone replacement therapy (postmenopausal), PONV (postoperative nausea and vomiting), Skin cancer, or Squamous cell carcinoma of skin, unspecified.  Patient Active Problem List   Diagnosis     s/p gastric bypass x 2     Allergic rhinitis     Carpal tunnel syndrome     Obstructive sleep apnea     Rosacea     CARDIOVASCULAR SCREENING; LDL GOAL LESS THAN 160     Tick bite, infected, positive Lyme test 1996 not treated     Vitamin D deficiency     Osteoporosis     Obesity, Class III, BMI 40-49.9 (morbid obesity) (H)     S/P TKR (total knee replacement)     HTN, goal below 140/80     Back pain, left below scapula, strained muscles     Encounter for routine gynecological examination      Colles' fracture     Bilateral cold feet     Elevated levels of transaminase & lactic acid dehydrogenase     Fatty liver     Shoulder injury, right, subsequent encounter     GI bleed     Upper GI bleed     Bronchospasm     Multiple joint pain, possible fibromyalgia     Rotator cuff syndrome, right     Benign essential hypertension     History of total bilateral knee replacement     Seronegative rheumatoid arthritis of multiple sites (H)     Nasal congestion     Sore throat       Surgical Hx:  has a past surgical history that includes tubal ligation (01/01/1994);  c/section, low transverse (1978, 1984, 1994); gastric bypass (1980/2005); ORAL SURGERY PROCEDURE (age 18); colonoscopy (01/01/2001); hernia repair, incisional (06/16/2008); Arthroplasty knee (04/02/2012); Colonoscopy (N/A, 08/22/2017); Arthrotomy shoulder, rotator cuff repair, combined (Right, 08/25/2017); Arthrotomy shoulder, rotator cuff repair, combined (Right, 12/18/2017); Esophagoscopy, gastroscopy, duodenoscopy (EGD), combined (N/A, 03/18/2018); Arthrotomy shoulder, rotator cuff repair, combined (Left, 04/27/2018); colonoscopy (08/22/2017); TOTAL KNEE ARTHROPLASTY (Right, 06/22/2020); and REVISE KNEE JOINT REPLACE,ALL PARTS (Left, 05/29/2019).    Medications:   Current Outpatient Medications:      atenolol (TENORMIN) 50 MG tablet, Take 2 tablets (100 mg) by mouth daily for 90 days, Disp: 180 tablet, Rfl: 3     etanercept (ENBREL SURECLICK) 50 MG/ML autoinjector, Inject 50 mg subcutaneously every 7 days., Disp: 4 mL, Rfl: 5     fluticasone (FLONASE) 50 MCG/ACT nasal spray, Spray 1 spray into both nostrils daily., Disp: 16 g, Rfl: 1     loratadine (CLARITIN) 10 MG tablet, Take 1 tablet (10 mg) by mouth daily., Disp: 30 tablet, Rfl: 1     nystatin (NYAMYC) 613856 UNIT/GM external powder, APPLY TOPICALLY TO AFFECTED  AREA(S) 3 TIMES DAILY AS NEEDED, Disp: 60 g, Rfl: 1     oxyBUTYnin ER (DITROPAN XL) 5 MG 24 hr tablet, Take 1 tablet (5 mg) by mouth 2 times daily, Disp: 180 tablet, Rfl: 2    Allergies:   Allergies   Allergen Reactions     Ibuprofen      Other reaction(s): Gastrointestinal  Gastric bleed     Naproxen GI Disturbance     Other reaction(s): Gastrointestinal  Gastric bleed     Nickel Itching     Inflamed with cheap earrings (itchy)     Rofecoxib Itching and Swelling     VIOXX (Swollen Feet,Hands itching), Okay with ibuprofen and aspirin     Vioxx Itching and Swelling     VIOXX (Swollen Feet,Hands itching), Okay with ibuprofen and aspirin       Social Hx: retired.   reports that she has never smoked. She  has never used smokeless tobacco. She reports current alcohol use. She reports that she does not use drugs.    Family Hx: family history includes Alzheimer Disease in her father; C.A.D. in her father; Cervical Cancer in her cousin; Glaucoma in her mother; Hyperlipidemia in her father; Hypertension in her father; Osteoporosis in her mother; Other Cancer in her brother; Stomach Cancer in her maternal aunt.    REVIEW OF SYSTEMS: 10 point ROS neg other than the symptoms noted above in the HPI and PMH. Notables include  CONSTITUTIONAL:NEGATIVE for fever, chills, change in weight  INTEGUMENTARY/SKIN: NEGATIVE for worrisome rashes, moles or lesions  MUSCULOSKELETAL:See HPI above  NEURO: NEGATIVE for weakness, dizziness or paresthesias    PHYSICAL EXAM:  BP (!) 156/91   Pulse 67   Resp 16   Wt 125.2 kg (276 lb)   LMP 05/29/2006 (Approximate)   SpO2 94%   BMI 48.89 kg/m     GENERAL APPEARANCE: healthy, alert, no distress  SKIN: no suspicious lesions or rashes  NEURO: Normal strength and tone, mentation intact and speech normal  PSYCH:  mentation appears normal and affect normal, not anxious  RESPIRATORY: No increased work of breathing.  HANDS: no clubbing, nail pitting  LYMPH: no palpable popliteal lymphadenopathy.    BILATERAL LOWER EXTREMITIES:  Gait: Trendelenburg left    No Quad atrophy, strength weak  Intact sensation deep peroneal nerve, superficial peroneal nerve, med/lat tibial nerve, sural nerve, saphenous nerve  Intact EHL, EDL, TA, FHL, GS, quadriceps hamstrings and hip flexors   Bilateral calf soft and nttp or squeeze.  DTRs: achilles 2+, patella 2+.  Edema: 1+    LEFT KNEE EXAM:    Skin: intact, no ecchymosis or erythema; midline surgical scar, extensile   ROM: full extension to full flexion  Tight hamstrings on straight leg raise.  Effusion: none  Tender: quadriceps muscles and tendon, iliotibial band, adductors, pes, med/lat joint line, anterior or posterior knee, shin  Grossly stable ligamentous  exam.    RIGHT KNEE EXAM:       Skin: intact, no ecchymosis or erythema; midline surgical scar  ROM: full extension to full flexion  Tight hamstrings on straight leg raise.  Effusion: none  Tender: quadriceps muscles and tendon, iliotibial band, adductors, pes, med/lat joint line, anterior or posterior knee, shin but less so compared to the left knee.  Grossly stable ligamentous exam.       X-RAY:  3 views bilateral knee from 11/13/2024 were reviewed in clinic today. On my review, no obvious fractures or dislocations.   RIGHT KNEE: Status post right total knee arthroplasty, in standard  alignment. No concerning periprosthetic lucency. No displaced  fracture. No sizable joint effusion.     LEFT KNEE: Postoperative changes from left total knee arthroplasty  with longstem tibial and femoral components. A portion of the femoral  stem extends beyond the field-of-view on the AP dimension and a  portion of the tibial stem extends beyond the field of view on the  lateral image.      There is apparent irregularity of the patellar articular surface which  is seen only on the lateral view but appears new compared to the  12/26/22 study. This could be in some part artifactual due to  differences in rotation, but loosening is difficult to exclude. CT or  MRI with metal artifact reduction could better evaluate as clinically  indicated.     No displaced fracture. No sizable effusion.            ASSESSMENT/PLAN: Jacquie Harris is a 69 year old female with chronic bilateral painful total knee arthroplasty.     * exam, images reviewed  Implants appears stable without evidence of loosening. Some irregularity of the left patella.  Based on exam, appears mostly soft tissue tenderness, pain. Lower extremity weakness, notable left lower extremity , leg and hip.  Recommend Physical Therapy, home exercise program  Ice/heat, topicals, over the counter pain medications, cane/walker as needed.  Weight loss would help.      Yfn Hernandez,  M.D., M.S.  Dept. of Orthopaedic Surgery  [facility]          Again, thank you for allowing me to participate in the care of your patient.        Sincerely,        Yfn Hernandez MD

## 2024-11-19 ENCOUNTER — VIRTUAL VISIT (OUTPATIENT)
Dept: PHARMACY | Facility: OTHER | Age: 69
End: 2024-11-19
Attending: PHYSICIAN ASSISTANT
Payer: COMMERCIAL

## 2024-11-19 ENCOUNTER — THERAPY VISIT (OUTPATIENT)
Dept: PHYSICAL THERAPY | Facility: CLINIC | Age: 69
End: 2024-11-19
Attending: ORTHOPAEDIC SURGERY
Payer: COMMERCIAL

## 2024-11-19 DIAGNOSIS — Z96.653 HISTORY OF TOTAL BILATERAL KNEE REPLACEMENT: ICD-10-CM

## 2024-11-19 DIAGNOSIS — Z71.85 VACCINE COUNSELING: ICD-10-CM

## 2024-11-19 DIAGNOSIS — M06.09 SERONEGATIVE RHEUMATOID ARTHRITIS OF MULTIPLE SITES (H): Primary | ICD-10-CM

## 2024-11-19 PROCEDURE — 97161 PT EVAL LOW COMPLEX 20 MIN: CPT | Mod: GP | Performed by: PHYSICAL THERAPIST

## 2024-11-19 PROCEDURE — 97110 THERAPEUTIC EXERCISES: CPT | Mod: GP | Performed by: PHYSICAL THERAPIST

## 2024-11-19 ASSESSMENT — ACTIVITIES OF DAILY LIVING (ADL)
PLEASE_INDICATE_YOR_PRIMARY_REASON_FOR_REFERRAL_TO_THERAPY:: HIP
GOING DOWN 1 FLIGHT OF STAIRS: SLIGHT DIFFICULTY
GETTING_INTO_AND_OUT_OF_AN_AVERAGE_CAR: MODERATE DIFFICULTY
STEPPING UP AND DOWN CURBS: MODERATE DIFFICULTY
SWELLING: THE SYMPTOM AFFECTS MY ACTIVITY MODERATELY
GETTING_INTO_AND_OUT_OF_A_BATHTUB: MODERATE DIFFICULTY
GO UP STAIRS: ACTIVITY IS VERY DIFFICULT
STAND: ACTIVITY IS SOMEWHAT DIFFICULT
JUMPING: UNABLE TO DO
STANDING_FOR_15_MINUTES: SLIGHT DIFFICULTY
ADL_HIGHEST_POTENTIAL_SCORE: 68
GIVING WAY, BUCKLING OR SHIFTING OF KNEE: THE SYMPTOM AFFECTS MY ACTIVITY MODERATELY
HOS_ADL_ITEM_SCORE_TOTAL: 33
RISE FROM A CHAIR: ACTIVITY IS SOMEWHAT DIFFICULT
SITTING_FOR_15_MINUTES: NO DIFFICULTY AT ALL
WALKING_INITIALLY: SLIGHT DIFFICULTY
WALKING_UP_STEEP_HILLS: EXTREME DIFFICULTY
STANDING FOR 15 MINUTES: SLIGHT DIFFICULTY
PLEASE_INDICATE_YOR_PRIMARY_REASON_FOR_REFERRAL_TO_THERAPY:: KNEE
ABILITY_TO_PERFORM_ACTIVITY_WITH_YOUR_NORMAL_TECHNIQUE: UNABLE TO DO
STAND: ACTIVITY IS SOMEWHAT DIFFICULT
SPORTS_HIGHEST_POTENTIAL_SCORE: 36
KNEEL ON THE FRONT OF YOUR KNEE: ACTIVITY IS VERY DIFFICULT
HOW_WOULD_YOU_RATE_THE_CURRENT_FUNCTION_OF_YOUR_KNEE_DURING_YOUR_USUAL_DAILY_ACTIVITIES_ON_A_SCALE_FROM_0_TO_100_WITH_100_BEING_YOUR_LEVEL_OF_KNEE_FUNCTION_PRIOR_TO_YOUR_INJURY_AND_0_BEING_THE_INABILITY_TO_PERFORM_ANY_OF_YOUR_USUAL_DAILY_ACTIVITIES?: 50
WALK: ACTIVITY IS FAIRLY DIFFICULT
WALKING_APPROXIMATELY_10_MINUTES: MODERATE DIFFICULTY
GETTING_INTO_AND_OUT_OF_A_BATHTUB: MODERATE DIFFICULTY
WALKING_FOR_APPROXIMATELY_10_MINUTES: MODERATE DIFFICULTY
SQUAT: I AM UNABLE TO DO THE ACTIVITY
RUNNING_ONE_MILE: UNABLE TO DO
LIGHT_TO_MODERATE_WORK: SLIGHT DIFFICULTY
HEAVY_WORK: MODERATE DIFFICULTY
PUTTING_ON_SOCKS_AND_SHOES: SLIGHT DIFFICULTY
GOING UP 1 FLIGHT OF STAIRS: MODERATE DIFFICULTY
WALKING_15_MINUTES_OR_GREATER: UNABLE TO DO
HOW_WOULD_YOU_RATE_THE_CURRENT_FUNCTION_OF_YOUR_KNEE_DURING_YOUR_USUAL_DAILY_ACTIVITIES_ON_A_SCALE_FROM_0_TO_100_WITH_100_BEING_YOUR_LEVEL_OF_KNEE_FUNCTION_PRIOR_TO_YOUR_INJURY_AND_0_BEING_THE_INABILITY_TO_PERFORM_ANY_OF_YOUR_USUAL_DAILY_ACTIVITIES?: 50
KNEE_ACTIVITY_OF_DAILY_LIVING_SUM: 28
GETTING INTO AND OUT OF AN AVERAGE CAR: MODERATE DIFFICULTY
STIFFNESS: THE SYMPTOM AFFECTS MY ACTIVITY MODERATELY
GO DOWN STAIRS: ACTIVITY IS SOMEWHAT DIFFICULT
TWISTING/PIVOTING_ON_INVOLVED_LEG: MODERATE DIFFICULTY
GOING_DOWN_1_FLIGHT_OF_STAIRS: SLIGHT DIFFICULTY
LIMPING: THE SYMPTOM AFFECTS MY ACTIVITY SEVERELY
HEAVY_WORK: MODERATE DIFFICULTY
RISE FROM A CHAIR: ACTIVITY IS SOMEWHAT DIFFICULT
GOING_UP_1_FLIGHT_OF_STAIRS: MODERATE DIFFICULTY
SPORTS_COUNT: 9
LOW_IMPACT_ACTIVITIES_LIKE_FAST_WALKING: UNABLE TO DO
DEEP_SQUATTING: UNABLE TO DO
LIGHT_TO_MODERATE_WORK: SLIGHT DIFFICULTY
SIT WITH YOUR KNEE BENT: ACTIVITY IS MINIMALLY DIFFICULT
KNEE_ACTIVITY_OF_DAILY_LIVING_SCORE: 40
WEAKNESS: THE SYMPTOM AFFECTS MY ACTIVITY MODERATELY
ABILITY_TO_PARTICIPATE_IN_YOUR_DESIRED_SPORT_AS_LONG_AS_YOU_WOULD_LIKE: UNABLE TO DO
ROLLING_OVER_IN_BED: NO DIFFICULTY AT ALL
ADL_SCORE(%): 0
SPORTS_TOTAL_ITEM_SCORE: 0
AS_A_RESULT_OF_YOUR_KNEE_INJURY,_HOW_WOULD_YOU_RATE_YOUR_CURRENT_LEVEL_OF_DAILY_ACTIVITY?: ABNORMAL
WALKING_DOWN_STEEP_HILLS: MODERATE DIFFICULTY
WALKING_UP_STEEP_HILLS: EXTREME DIFFICULTY
HOW_WOULD_YOU_RATE_YOUR_CURRENT_LEVEL_OF_FUNCTION_DURING_YOUR_SPORTS_RELATED_ACTIVITIES_FROM_0_TO_100_WITH_100_BEING_YOUR_LEVEL_OF_FUNCTION_PRIOR_TO_YOUR_HIP_PROBLEM_AND_0_BEING_THE_INABILITY_TO_PERFORM_ANY_OF_YOUR_USUAL_DAILY_ACTIVITIES?: 0
KNEEL ON THE FRONT OF YOUR KNEE: ACTIVITY IS VERY DIFFICULT
HOW_WOULD_YOU_RATE_THE_OVERALL_FUNCTION_OF_YOUR_KNEE_DURING_YOUR_USUAL_DAILY_ACTIVITIES?: ABNORMAL
GO DOWN STAIRS: ACTIVITY IS SOMEWHAT DIFFICULT
WALK: ACTIVITY IS FAIRLY DIFFICULT
HOW_WOULD_YOU_RATE_YOUR_CURRENT_LEVEL_OF_FUNCTION_DURING_YOUR_USUAL_ACTIVITIES_OF_DAILY_LIVING_FROM_0_TO_100_WITH_100_BEING_YOUR_LEVEL_OF_FUNCTION_PRIOR_TO_YOUR_HIP_PROBLEM_AND_0_BEING_THE_INABILITY_TO_PERFORM_ANY_OF_YOUR_USUAL_DAILY_ACTIVITIES?: 50
GIVING WAY, BUCKLING OR SHIFTING OF KNEE: THE SYMPTOM AFFECTS MY ACTIVITY MODERATELY
RAW_SCORE: 28
TWISTING/PIVOTING ON INVOLVED LEG: MODERATE DIFFICULTY
ADL_COUNT: 17
HOW_WOULD_YOU_RATE_YOUR_CURRENT_LEVEL_OF_FUNCTION?: ABNORMAL
STARTING_AND_STOPPING_QUICKLY: EXTREME DIFFICULTY
WALKING_DOWN_STEEP_HILLS: MODERATE DIFFICULTY
HOW_WOULD_YOU_RATE_THE_OVERALL_FUNCTION_OF_YOUR_KNEE_DURING_YOUR_USUAL_DAILY_ACTIVITIES?: ABNORMAL
GO UP STAIRS: ACTIVITY IS VERY DIFFICULT
STEPPING_UP_AND_DOWN_CURBS: MODERATE DIFFICULTY
SWELLING: THE SYMPTOM AFFECTS MY ACTIVITY MODERATELY
SQUAT: I AM UNABLE TO DO THE ACTIVITY
SITTING FOR 15 MINUTES: NO DIFFICULTY AT ALL
PAIN: THE SYMPTOM AFFECTS MY ACTIVITY MODERATELY
AS_A_RESULT_OF_YOUR_KNEE_INJURY,_HOW_WOULD_YOU_RATE_YOUR_CURRENT_LEVEL_OF_DAILY_ACTIVITY?: ABNORMAL
WALKING_15_MINUTES_OR_GREATER: UNABLE TO DO
PUTTING ON SOCKS AND SHOES: SLIGHT DIFFICULTY
HOS_ADL_HIGHEST_POTENTIAL_SCORE: 64
LIMPING: THE SYMPTOM AFFECTS MY ACTIVITY SEVERELY
SIT WITH YOUR KNEE BENT: ACTIVITY IS MINIMALLY DIFFICULT
HOW_WOULD_YOU_RATE_YOUR_CURRENT_LEVEL_OF_FUNCTION_DURING_YOUR_USUAL_ACTIVITIES_OF_DAILY_LIVING_FROM_0_TO_100_WITH_100_BEING_YOUR_LEVEL_OF_FUNCTION_PRIOR_TO_YOUR_HIP_PROBLEM_AND_0_BEING_THE_INABILITY_TO_PERFORM_ANY_OF_YOUR_USUAL_DAILY_ACTIVITIES?: 50
ADL_TOTAL_ITEM_SCORE: 0
PAIN: THE SYMPTOM AFFECTS MY ACTIVITY MODERATELY
HOS_ADL_SCORE(%): 51.56
SPORTS_SCORE(%): 0
WALKING_INITIALLY: SLIGHT DIFFICULTY
ROLLING OVER IN BED: NO DIFFICULTY AT ALL
WEAKNESS: THE SYMPTOM AFFECTS MY ACTIVITY MODERATELY
STIFFNESS: THE SYMPTOM AFFECTS MY ACTIVITY MODERATELY
DEEP SQUATTING: UNABLE TO DO

## 2024-11-19 NOTE — PROGRESS NOTES
Medication Therapy Management (MTM) Encounter    ASSESSMENT:                            Medication Adherence/Access: No issues identified.    Seronegative rheumatoid arthritis: Recommend patient continue Enbrel at current dose. Discussed that dose can be increased to 2 injections per week if she notices it doesn't last 7 days. Recommend she monitor her symptoms to see how long she is getting effect from each dose so we can evaluate if this is an appropriate change in the future. She is due for lab monitoring 12/2024. Recommend she try using fluticasone nasal spray to injection area to help with mild injection reaction.    Vaccines: Per ACIP guidelines, patient is eligible for Covid-19, Influenza, RSV, and Zoster. Patient previously declined COVID vaccination.    PLAN:                            Continue current medication regimen  Consider the following vaccines:   Shingles (Shingrix)  This is a 2-dose series. The second dose due 2-6 months after the first dose.  Annual flu  Respiratory Syncytial Virus (RSV). Recommended over age of 60.   Have standing labs ordered by Danni Martínez checked in about 4 weeks  Spray fluticasone (Flonase) to injection area about 10 minutes prior to Enbrel injection to see if this helps with itching.    Follow-up: Return in 24 weeks (on 5/6/2025) for Follow up, with me, using a phone visit.    SUBJECTIVE/OBJECTIVE:                          Karen Harris is a 69 year old female seen for a follow-up visit from 6/26/2024. She was referred to me from Danni Martínez PA-C.       Reason for visit: Rheumatoid arthritis follow up.    Allergies/ADRs: Reviewed in chart  Past Medical History: Reviewed in chart  Tobacco: She reports that she has never smoked. She has never used smokeless tobacco.  Alcohol: Less than 1 beverage / month    Medication Adherence/Access: Patient approved for [facility] ([facility]     Seronegative rheumatoid arthritis:   Enbrel 50 mg subcutaneously once  "weekly on Tuesdays (started 4/2024, last dose 11/13/2024)    Patient reports she can sometimes have itch at the injection site that lasts about 3 days. Finds it is tolerable. She was taking loratadine for cold symptoms and didn't see improvement with itch while taking the antihistamine. She reports things are going well with her arthritis, but today is a tougher day due to the weather. Finds her symptoms worsen when it is raining outside. States her hands feel \"full\", she can make fists with both hands. Has not moved them a lot today and thinks the movement will help. She tends to notice increased symptoms close to when her next dose of Enbrel is due and finds good relief a few hours after each dose of Enbrel. Has knee pain for which she is seeing an orthopedic provider who is going to have her work with physical therapy. Endorses she has had both knees replaced in the past.    Previous therapies: Humira every 7 days and every 14 days (ineffective), methotrexate injections, sulfasalazine, hydroxychloroquine     Last lab monitoring completed: 6/12/2024    Vaccinations:  Up to date on PCV20 and TDaP.   Eligible for Influenza, COVID-19, Shingrix, and RSV    Today's Vitals: LMP 05/29/2006 (Approximate)   ----------------    I spent 17 minutes with this patient today. All changes were made via collaborative practice agreement with Danni Martínez. A copy of the visit note was provided to the patient's provider(s).    A summary of these recommendations was sent via [employer].    Que Jeffrey, PharmD  Medication Therapy Management Pharmacist  [facility]  Phone: [phone]    Telemedicine Visit Details  The patient's medications can be safely assessed via a telemedicine encounter.  Type of service:  Telephone visit  Originating Location (pt. Location): Home    Distant Location (provider location):  On-site  Start Time: 10:59 AM  End Time:  11:16 AM     Medication Therapy " Recommendations  Osteoporosis   1 Current Medication: denosumab (PROLIA) injection 60 mg   Rationale: Medication requires monitoring - Needs additional monitoring   Recommendation: Order Lab   Status: No Longer Relevant   Identified Date: 6/26/2024 Completed Date: 11/19/2024   Note: Recommend order Vitamin D level         Seronegative rheumatoid arthritis of multiple sites (H)   1 Current Medication: etanercept (ENBREL SURECLICK) 50 MG/ML autoinjector   Current Medication Sig: Inject 50 mg subcutaneously every 7 days.   Rationale: Undesirable effect - Adverse medication event - Safety   Recommendation: Self-Monitoring - fluticasone 50 MCG/ACT nasal spray   Status: Accepted - no CPA Needed   Identified Date: 11/19/2024 Completed Date: 11/19/2024         Vaccine counseling   1 Rationale: Preventive therapy - Needs additional medication therapy - Indication   Recommendation: Provide Education   Status: Patient Agreed - Adherence/Education   Identified Date: 11/19/2024 Completed Date: 11/19/2024

## 2024-11-19 NOTE — PROGRESS NOTES
PHYSICAL THERAPY EVALUATION  Type of Visit: Evaluation       Fall Risk Screen:  Fall screen completed by: PT  Have you fallen 2 or more times in the past year?: (Patient-Rptd) No  Have you fallen and had an injury in the past year?: (Patient-Rptd) No  Is patient a fall risk?: No    Subjective      Condition type:  Chronic (continuous duration <3 months)  Cause of current episode:  Post Surgical  Type of surgery: 5-Joint Replacement  Nature of treatment:  Rehabilitative  Functional ability:  Severe functional limitations  Documented POC (choose all that apply):  Measurable short and long term/discharge treatment goals related to physical and functional deficits.;Frequency of treatment visits and treatment activities to address deficit areas.;Patient agrees to program participation including home program  Briefly describe symptoms:     How did the symptoms start:  Pt had B TKA's and has had ongoing pain since surgery.  Average pain/intensity last 24 hours:  7/10  Average pain/intensity past week:  9/10  Frequency of Symptoms:  Constantly (% of the time)  Symptom impact on ADLs:  Quite a bit  Condition change since eval:  N/A (first visit)  General health reported by patient:  Good      Presenting condition or subjective complaint: (Patient-Rptd) pain in both knees swelling some pain radiating down the leg to the ankle even justto touch extreme limp to my gait walking .  Pt had B TKA's  (R 6/22/20,  L  done twice revision was done on 5/29/19)and has had ongoing pain since surgery. Pt notes she tried using up walker this weekend which helped some.  Pt has tried knee sleeves w/o benefit.  On rare occasion takes tylenol. No injections.  Pt states knees will feel warm at times.  Pt notes legs feel swollen.    Worse:  Walking up/ down ramps.  Marked time on stairs and pt notes they are terrible.   Needs to use UE to assist STS.   Walking tolerance at best 10 min w/o device.  Better: nothing. Ace wrap.  Date of onset:  "11/13/24 (MD appt)    Relevant medical history:   L hip pain  Dates & types of surgery: (Patient-Rptd) same s the last time i was her for therapy    Prior diagnostic imaging/testing results: (Patient-Rptd) MRI; CT scan; X-ray; Other (Patient-Rptd) surgery to both knees replacements x2 .  Xray from MD note:  exam, images reviewed  Implants appears stable without evidence of loosening. Some irregularity of the left patella.  Prior therapy history for the same diagnosis, illness or injury: (Patient-Rptd) Yes (Patient-Rptd) ???    Prior Level of Function  Transfers: Independent  Ambulation: Independent  ADL: Independent  IADL: Housekeeping    Living Environment  Social support: (Patient-Rptd) With a significant other or spouse   Type of home: (Patient-Rptd) 2-story; Basement   Stairs to enter the home: (Patient-Rptd) Yes (Patient-Rptd) 4 Is there a railing: (Patient-Rptd) No     Ramp: (Patient-Rptd) No   Stairs inside the home: (Patient-Rptd) Yes (Patient-Rptd) 14 Is there a railing: (Patient-Rptd) Yes     Help at home: (Patient-Rptd) None  Equipment owned: (Patient-Rptd) Straight Cane; Walker; Walker with wheels; Grab bars; Bath bench; Lift chair     Employment: (Patient-Rptd) No    Hobbies/Interests: (Patient-Rptd) reading  4wheeling fishing from the [employer] watching the [employer]    Patient goals for therapy: (Patient-Rptd) walk normal and without pain climb thesteps in the [employer]       Objective   KNEE EVALUATION  INTEGUMENTARY (edema, incisions): suprapatellar R 67.0 cm, L  61.5 cm.  5\" below knee joint R 48.5 cm,  L 49.0 cm     GAIT:  Mild + trendelenburg.   ROM: KE 0* B.  KF AROM w/ heelslide R 114*, L 120*  STRENGTH: STS relies heavily on UE assist.  Hip flex 4/5 B.  Quads/ HS 5/5 B.  Hip ABD 4-/5 B  PALPATION: severe tenderness R medial/ lateral jt line and L medial joint line.  Mod tenderness medail aspect of patell B.  Numbness reported lateral knees.  Pt notes " just touching the lower legs is painful.        Assessment & Plan   CLINICAL IMPRESSIONS  Medical Diagnosis: History of total bilateral knee replacement    Treatment Diagnosis: B knee pain   Impression/Assessment: Patient is a 69 year old female with B knee complaints.  The following significant findings have been identified: Pain, Decreased strength, Impaired gait, Impaired muscle performance, and Decreased activity tolerance. These impairments interfere with their ability to perform self care tasks, recreational activities, household chores, and community mobility as compared to previous level of function.     Clinical Decision Making (Complexity):  Clinical Presentation: Stable/Uncomplicated  Clinical Presentation Rationale: based on medical and personal factors listed in PT evaluation  Clinical Decision Making (Complexity): Low complexity    PLAN OF CARE  Treatment Interventions:  Interventions: Manual Therapy, Neuromuscular Re-education, Therapeutic Exercise    Long Term Goals     PT Goal 1  Goal Identifier: 1.  Goal Description: Pt will have improved LE strength in order to get out of chair w/o UE assist  Target Date: 12/31/24  PT Goal 2  Goal Identifier: 2.  Goal Description: Pt will be able to walk 15-20 min w/ pain no > 4/10  Target Date: 12/31/24  PT Goal 3  Goal Identifier: 3.  Goal Description: Pt will be independent and consistent w/ HEP to manage symptoms  Target Date: 12/31/24      Frequency of Treatment: 1X/week X 4 weeks then 1X in 2 weeks  Duration of Treatment: 5 visits in 6 weeks    Recommended Referrals to Other Professionals:  possibly lymphedema PT  Education Assessment:   Learner/Method: Patient;Listening;Reading;Demonstration;Pictures/Video;No Barriers to Learning    Risks and benefits of evaluation/treatment have been explained.   Patient/Family/caregiver agrees with Plan of Care.     Evaluation Time:     PT Eval, Low Complexity Minutes (35235): 25       Signing Clinician: Dasia Wilson  PT        [facility]                                                                                   OUTPATIENT PHYSICAL THERAPY      PLAN OF TREATMENT FOR OUTPATIENT REHABILITATION   Patient's Last Name, First Name, Jacquie Sharma YOB: 1955   Provider's Name   [facility]   Medical Record No.  [id number]     Onset Date: 11/13/24 (MD roe)  Start of Care Date: 11/19/24     Medical Diagnosis:  History of total bilateral knee replacement      PT Treatment Diagnosis:  B knee pain Plan of Treatment  Frequency/Duration: 1X/week X 4 weeks then 1X in 2 weeks/ 5 visits in 6 weeks    Certification date from 11/19/24 to 12/31/24         See note for plan of treatment details and functional goals     Dasia Wilson, PT                         I CERTIFY THE NEED FOR THESE SERVICES FURNISHED UNDER        THIS PLAN OF TREATMENT AND WHILE UNDER MY CARE     (Physician attestation of this document indicates review and certification of the therapy plan).              Referring Provider:  Yfn Hernandez    Initial Assessment  See Epic Evaluation- Start of Care Date: 11/19/24

## 2024-11-19 NOTE — PATIENT INSTRUCTIONS
"Recommendations from today's MTM visit:                                                       Continue current medication regimen  Consider the following vaccines:   Shingles (Shingrix)  This is a 2-dose series. The second dose due 2-6 months after the first dose.  Annual flu  Respiratory Syncytial Virus (RSV). Recommended over age of 60.   Have standing labs ordered by Danni Martínez checked in about 4 weeks  Spray fluticasone (Flonase) to injection area about 10 minutes prior to Enbrel injection to see if this helps with itching.    Follow-up: Return in 24 weeks (on 5/6/2025) for Follow up, with me, using a phone visit.    It was great speaking with you today.  I value your experience and would be very thankful for your time in providing feedback in our clinic survey. In the next few days, you may receive an email or text message from [employer] with a link to a survey related to your  clinical pharmacist.\"     To schedule another MTM appointment, please call the clinic directly or you may call the MTM scheduling line at [phone] or toll-free at [phone].     My Clinical Pharmacist's contact information:                                                      Please feel free to contact me with any questions or concerns you have.      Que Jfefrey, PharmD  Medication Therapy Management Pharmacist  [facility]  Phone: [phone]   "

## 2024-12-08 ENCOUNTER — NURSE TRIAGE (OUTPATIENT)
Dept: NURSING | Facility: CLINIC | Age: 69
End: 2024-12-08
Payer: COMMERCIAL

## 2024-12-08 ENCOUNTER — HOSPITAL ENCOUNTER (EMERGENCY)
Facility: CLINIC | Age: 69
Discharge: HOME | End: 2024-12-08
Attending: EMERGENCY MEDICINE | Admitting: EMERGENCY MEDICINE
Payer: COMMERCIAL

## 2024-12-08 VITALS
DIASTOLIC BLOOD PRESSURE: 91 MMHG | OXYGEN SATURATION: 90 % | WEIGHT: 276 LBS | RESPIRATION RATE: 20 BRPM | BODY MASS INDEX: 48.9 KG/M2 | TEMPERATURE: 101 F | HEIGHT: 63 IN | SYSTOLIC BLOOD PRESSURE: 158 MMHG | HEART RATE: 102 BPM

## 2024-12-08 DIAGNOSIS — U07.1 COVID-19 VIRUS INFECTION: ICD-10-CM

## 2024-12-08 LAB
FLUAV RNA SPEC QL NAA+PROBE: NEGATIVE
FLUBV RNA RESP QL NAA+PROBE: NEGATIVE
RSV RNA SPEC NAA+PROBE: NEGATIVE
SARS-COV-2 RNA RESP QL NAA+PROBE: POSITIVE

## 2024-12-08 PROCEDURE — 250N000013 HC RX MED GY IP 250 OP 250 PS 637: Performed by: EMERGENCY MEDICINE

## 2024-12-08 PROCEDURE — 96372 THER/PROPH/DIAG INJ SC/IM: CPT | Performed by: EMERGENCY MEDICINE

## 2024-12-08 PROCEDURE — 99284 EMERGENCY DEPT VISIT MOD MDM: CPT | Performed by: EMERGENCY MEDICINE

## 2024-12-08 PROCEDURE — 250N000009 HC RX 250: Performed by: EMERGENCY MEDICINE

## 2024-12-08 PROCEDURE — 87637 SARSCOV2&INF A&B&RSV AMP PRB: CPT | Performed by: EMERGENCY MEDICINE

## 2024-12-08 PROCEDURE — 94640 AIRWAY INHALATION TREATMENT: CPT

## 2024-12-08 PROCEDURE — 99284 EMERGENCY DEPT VISIT MOD MDM: CPT | Mod: 25

## 2024-12-08 PROCEDURE — 250N000011 HC RX IP 250 OP 636: Performed by: EMERGENCY MEDICINE

## 2024-12-08 RX ORDER — KETOROLAC TROMETHAMINE 30 MG/ML
30 INJECTION, SOLUTION INTRAMUSCULAR; INTRAVENOUS ONCE
Status: COMPLETED | OUTPATIENT
Start: 2024-12-08 | End: 2024-12-08

## 2024-12-08 RX ORDER — IPRATROPIUM BROMIDE AND ALBUTEROL SULFATE 2.5; .5 MG/3ML; MG/3ML
3 SOLUTION RESPIRATORY (INHALATION) ONCE
Status: COMPLETED | OUTPATIENT
Start: 2024-12-08 | End: 2024-12-08

## 2024-12-08 RX ORDER — ACETAMINOPHEN 650 MG/20.3ML
650 LIQUID ORAL ONCE
Status: COMPLETED | OUTPATIENT
Start: 2024-12-08 | End: 2024-12-08

## 2024-12-08 RX ADMIN — ACETAMINOPHEN 650 MG: 650 SUSPENSION ORAL at 20:58

## 2024-12-08 RX ADMIN — KETOROLAC TROMETHAMINE 30 MG: 30 INJECTION, SOLUTION INTRAMUSCULAR at 20:58

## 2024-12-08 RX ADMIN — IPRATROPIUM BROMIDE AND ALBUTEROL SULFATE 3 ML: 2.5; .5 SOLUTION RESPIRATORY (INHALATION) at 21:03

## 2024-12-08 ASSESSMENT — ACTIVITIES OF DAILY LIVING (ADL)
ADLS_ACUITY_SCORE: 41
ADLS_ACUITY_SCORE: 41

## 2024-12-08 ASSESSMENT — COLUMBIA-SUICIDE SEVERITY RATING SCALE - C-SSRS
1. IN THE PAST MONTH, HAVE YOU WISHED YOU WERE DEAD OR WISHED YOU COULD GO TO SLEEP AND NOT WAKE UP?: NO
6. HAVE YOU EVER DONE ANYTHING, STARTED TO DO ANYTHING, OR PREPARED TO DO ANYTHING TO END YOUR LIFE?: NO
2. HAVE YOU ACTUALLY HAD ANY THOUGHTS OF KILLING YOURSELF IN THE PAST MONTH?: NO

## 2024-12-09 NOTE — TELEPHONE ENCOUNTER
Nurse Triage SBAR    Is this a 2nd Level Triage? NO    Situation:  Breathing difficulty.     Background:  Per , who pt gave verbal consent to speak with, pt is sick after exposure to the flu from family member and is unable to catch her breath.     Assessment:  Per , pt has current severe difficulty breathing with cough, congestion and headache.    Protocol Recommended Disposition:   Call  Now  of pt, verbalized understanding and agreement with plan of care and no further questions at this time.     Recommendation:           Does the patient meet one of the following criteria for ADS visit consideration? 16+ years old, with an [facility] PCP     TIP  Providers, please consider if this condition is appropriate for management at one of our Acute and Diagnostic Services sites.     If patient is a good candidate, please use dotphrase <dot>triageresponse and select Refer to ADS to document.    Reason for Disposition   SEVERE difficulty breathing (e.g., struggling for each breath, speaks in single words)    Protocols used: Breathing Difficulty-A-AH

## 2024-12-09 NOTE — ED TRIAGE NOTES
Patient reports nasal congestion, cough, headache since 0330 this morning.      Triage Assessment (Adult)       Row Name 12/08/24 2042          Triage Assessment    Airway WDL WDL        Respiratory WDL    Respiratory WDL X;rhythm/pattern;cough     Rhythm/Pattern, Respiratory shortness of breath     Cough Frequency frequent     Cough Type congested;productive        Skin Circulation/Temperature WDL    Skin Circulation/Temperature WDL WDL        Cardiac WDL    Cardiac WDL WDL        Peripheral/Neurovascular WDL    Peripheral Neurovascular WDL WDL        Cognitive/Neuro/Behavioral WDL    Cognitive/Neuro/Behavioral WDL WDL

## 2024-12-09 NOTE — ED PROVIDER NOTES
ED Provider Note  [facility]      History     Chief Complaint   Patient presents with    Fever    Cough    Nasal Congestion     HPI  Jacquie Harris is a 69 year old female who presents to the emergency department with concerns regarding cough, sore throat, nasal congestion, body aches, and fever.  Symptoms began abruptly about 3:30 AM, during the overnight hours.  Patient has had ongoing nasal congestion and ongoing shortness of breath secondary to the above symptoms.  Does have ill contacts with family members with flulike symptoms.  Patient has had nausea, without any vomiting.  States that she is only taken minimal Tylenol because she has trouble swallowing pills, and has to chew her food a lot in order to even eat foods at baseline status.        Independent Historian:      Review of External Notes:          Allergies:  Allergies   Allergen Reactions    Ibuprofen      Other reaction(s): Gastrointestinal  Gastric bleed    Naproxen GI Disturbance     Other reaction(s): Gastrointestinal  Gastric bleed    Nickel Itching     Inflamed with cheap earrings (itchy)    Rofecoxib Itching and Swelling     VIOXX (Swollen Feet,Hands itching), Okay with ibuprofen and aspirin    Vioxx Itching and Swelling     VIOXX (Swollen Feet,Hands itching), Okay with ibuprofen and aspirin       Problem List:    Patient Active Problem List    Diagnosis Date Noted    Nasal congestion 10/25/2024     Priority: Medium    Sore throat 10/25/2024     Priority: Medium    Seronegative rheumatoid arthritis of multiple sites (H) 01/31/2024     Priority: Medium    History of total bilateral knee replacement 01/09/2023     Priority: Medium    Benign essential hypertension 06/19/2020     Priority: Medium    Rotator cuff syndrome, right 01/14/2020     Priority: Medium    Multiple joint pain, possible fibromyalgia 12/20/2019     Priority: Medium    Bronchospasm 01/07/2019     Priority: Medium    GI bleed 03/17/2018     Priority:  Medium    Upper GI bleed 03/17/2018     Priority: Medium    Shoulder injury, right, subsequent encounter 08/21/2017     Priority: Medium    Fatty liver 04/20/2017     Priority: Medium    Elevated levels of transaminase & lactic acid dehydrogenase 04/15/2017     Priority: Medium    Bilateral cold feet 01/03/2017     Priority: Medium    Colles' fracture 07/06/2015     Priority: Medium    Encounter for routine gynecological examination  06/04/2015     Priority: Medium    Back pain, left below scapula, strained muscles 10/24/2013     Priority: Medium    HTN, goal below 140/80 05/05/2013     Priority: Medium    S/P TKR (total knee replacement) 04/03/2012     Priority: Medium    Osteoporosis 02/19/2012     Priority: Medium     Previous T scores -2.2  2/2012 Dexascan:  Lumbar spine L1-L4 T-score: -2.4, Left femoral neck T-score: -2.7, Right femoral neck T-score: -2.2   Percent change in lumbar spine: +6.6% since 4/16/2008   Percent change in femurs: +2.9% since 4/16/2008    IMPRESSION: Osteoporosis in the left femoral neck. Severe osteopenia  in the right femoral neck and lumbar spine.      Obesity, Class III, BMI 40-49.9 (morbid obesity) (H) 02/19/2012     Priority: Medium     ideal weight 120, goal weight 200, lose 58 lbs in 58 weeks.      Vitamin D deficiency 07/18/2011     Priority: Medium    Tick bite, infected, positive Lyme test 1996 not treated 07/08/2011     Priority: Medium    Rosacea 11/04/2009     Priority: Medium    Obstructive sleep apnea 12/01/2006     Priority: Medium     Sleep study 12/06 for EDS- AHI 36, desaturations to 65%. CPAP recommended but not tolerated.  01/16/07  ENT consult recommend CPAP and weight loss. Surgery discussed but success rate less than CPAP  1/15/07 CPAP trial, was not able to tolerate. Retried 2008, tolerated better.   Problem list name updated by automated process. Provider to review      Carpal tunnel syndrome 06/07/2006     Priority: Medium     04/10/09 Dr.Meletiou Rendon  Ortho. Finding consistant with CTS, but nerve studies and MRI of C-Spine are normal. Corticosteroid injection given in office      CARDIOVASCULAR SCREENING; LDL GOAL LESS THAN 160 10/31/2010     Priority: Low    Allergic rhinitis 03/27/2006     Priority: Low     Problem list name updated by automated process. Provider to review      s/p gastric bypass x 2 07/05/2005     Priority: Low     Gastric bypass 1980          Past Medical History:    Past Medical History:   Diagnosis Date    HTN (hypertension)     IRON DEFICIENCY ANEMIA 7/5/2005    Obstructive sleep apnea     DARWIN (obstructive sleep apnea)        Past Surgical History:    Past Surgical History:   Procedure Laterality Date    ARTHROPLASTY KNEE  04/02/2012    Procedure:ARTHROPLASTY KNEE; Left Total Knee Arthroplasty--Anesth.Choice; Surgeon:HERNANDO THOMPSON; Location:[facility]    ARTHROTOMY SHOULDER, ROTATOR CUFF REPAIR, COMBINED Right 08/25/2017    Procedure: COMBINED ARTHROTOMY SHOULDER, ROTATOR CUFF REPAIR;  Right shoulder distal clavicle excision, subacromial decompression, rotator cuff repair ;  Surgeon: Hernando Thompson MD;  Location: [facility]    ARTHROTOMY SHOULDER, ROTATOR CUFF REPAIR, COMBINED Right 12/18/2017    Procedure: COMBINED ARTHROTOMY SHOULDER, ROTATOR CUFF REPAIR;  Right Shoulder Rotator Cuff Revision;  Surgeon: Hernando Thompson MD;  Location: [facility]    ARTHROTOMY SHOULDER, ROTATOR CUFF REPAIR, COMBINED Left 04/27/2018    Procedure: COMBINED ARTHROTOMY SHOULDER, ROTATOR CUFF REPAIR;  Left Shoulder Open Subacromial Decompression,Distal Clavicle Excision,Rotator Cuff Repair;  Surgeon: Hernando Thompson MD;  Location: [facility] OR    C/SECTION, LOW TRANSVERSE  1978, 1984, 1994    x 3    COLONOSCOPY  01/01/2001    normal colonoscopy    COLONOSCOPY N/A 08/22/2017    Procedure: COLONOSCOPY;  Colonoscopy  ;  Surgeon: Delfino Yoo MD;  Location: [facility]    COLONOSCOPY  08/22/2017    EXAMCOL    ESOPHAGOSCOPY, GASTROSCOPY, DUODENOSCOPY (EGD),  COMBINED N/A 03/18/2018    Procedure: COMBINED ESOPHAGOSCOPY, GASTROSCOPY, DUODENOSCOPY (EGD);;  Surgeon: Poncho Galindo MD;  Location: [facility]    GASTRIC BYPASS  1980/2005    Gastric Bypass and revision    HERNIA REPAIR, INCISIONAL  06/16/2008    lap.repair with 10x8 mesh    LA REVISE KNEE JOINT REPLACE,ALL PARTS Left 05/29/2019    Dr. Cedric Fuller    LA TOTAL KNEE ARTHROPLASTY Right 06/22/2020    Dr. Cedric Fuller    TUBAL LIGATION  01/01/1994    [facility] ORAL SURGERY PROCEDURE  age 18    wisdom teeth       Family History:    Family History   Problem Relation Age of Onset    C.A.D. Father         MI at age 60    Hypertension Father     Alzheimer Disease Father     Hyperlipidemia Father     Osteoporosis Mother         on Fosamax    Glaucoma Mother     Other Cancer Brother     Stomach Cancer Maternal Aunt     Cervical Cancer Cousin     Diabetes No family hx of     Cerebrovascular Disease No family hx of     Breast Cancer No family hx of     Cancer - colorectal No family hx of     Prostate Cancer No family hx of     Liver Disease No family hx of        Social History:  Marital Status:   [2]  Social History     Tobacco Use    Smoking status: Never    Smokeless tobacco: Never   Vaping Use    Vaping status: Never Used   Substance Use Topics    Alcohol use: Yes     Alcohol/week: 0.0 standard drinks of alcohol     Comment: mixed very light 1 a month if that    Drug use: No        Medications:    acetaminophen (TYLENOL) 160 MG/5ML elixir  nirmatrelvir and ritonavir (PAXLOVID) 300 mg/100 mg therapy pack  atenolol (TENORMIN) 50 MG tablet  etanercept (ENBREL SURECLICK) 50 MG/ML autoinjector  fluticasone (FLONASE) 50 MCG/ACT nasal spray  loratadine (CLARITIN) 10 MG tablet  nystatin (NYAMYC) 776234 UNIT/GM external powder  oxyBUTYnin ER (DITROPAN XL) 5 MG 24 hr tablet          Review of Systems  A medically appropriate review of systems was performed with pertinent positives and negatives noted in the HPI, and all other  "systems negative.    Physical Exam   Patient Vitals for the past 24 hrs:   BP Temp Temp src Pulse Resp SpO2 Height Weight   12/08/24 2041 (!) 155/72 (!) 101  F (38.3  C) Oral 96 20 93 % 1.6 m (5' 3\") 125.2 kg (276 lb)          Physical Exam  General: alert and in no acute distress on arrival  Head: atraumatic, normocephalic  Lungs:  nonlabored.  Clear  CV:  extremities warm and perfused  Abd: nondistended  Skin: no rashes, no diaphoresis and skin color normal  Neuro: Patient awake, alert, speech is fluent,   Psychiatric: affect/mood normal,        ED Course                 Procedures                         Results for orders placed or performed during the hospital encounter of 12/08/24 (from the past 24 hours)   Influenza A/B, RSV and SARS-CoV2 PCR (COVID-19) Nose    Specimen: Nose; Swab   Result Value Ref Range    Influenza A PCR Negative Negative    Influenza B PCR Negative Negative    RSV PCR Negative Negative    SARS CoV2 PCR Positive (A) Negative    Narrative    Testing was performed using the Xpert Xpress CoV2/Flu/RSV Assay on the Cepheid GeneXpert Instrument. This test should be ordered for the detection of SARS-CoV2, influenza, and RSV viruses in individuals with signs and symptoms of respiratory tract infection. This test is for in vitro diagnostic use under the [place] FDA for laboratories certified under CLIA to perform high or moderate complexity testing. This test has been [place] FDA cleared. A negative result does not rule out the presence of PCR inhibitors in the specimen or target RNA in concentration below the limit of detection for the assay. If only one viral target is positive but coinfection with multiple targets is suspected, the sample should be re-tested with another FDA cleared, approved, or authorized test, if coninfection would change clinical management. This test was validated by the [facility]. These laboratories are certified under the Clinical Laboratory Improvement " Amendments of 1988 (CLIA-88) as qualified to perfom high complexity laboratory testing.       MEDICATIONS GIVEN IN THE EMERGENCY DEPARTMENT:  Medications   acetaminophen (TYLENOL) oral liquid 650 mg (650 mg Oral $Given 12/8/24 2058)   ketorolac (TORADOL) injection 30 mg (30 mg Intramuscular $Given 12/8/24 2058)   ipratropium - albuterol 0.5 mg/2.5 mg/3 mL (DUONEB) neb solution 3 mL (3 mLs Nebulization $Given 12/8/24 2103)           Independent Interpretation (X-rays, CTs, rhythm strip):      Consultations/Discussion of Management or Tests:         Social Determinants of Health affecting care:         Assessments & Plan (with Medical Decision Making)  69 year old female who presents to the Emergency Department for evaluation of cough, congestion, body aches, and is noted to be febrile at 101 degrees.  Patient has had cough, and congestion beginning during the overnight hours.  Lungs are otherwise clear to auscultation.    Patient feeling unwell.  Given Tylenol, in addition to Toradol injection.  Patient feels like she has difficulty swallowing, which is normal for her baseline status.  Therefore given liquid Tylenol.    COVID test returned positive.  Constellation of symptoms is consistent with COVID-19 infection.  Discussed treatment options, and patient requesting Paxlovid.  I did drug interaction checking, and no drug interactions identified.  Patient prescribed Paxlovid, and return precautions are discussed.       I have reviewed the nursing notes.    I have reviewed the findings, diagnosis, plan and need for follow up with the patient.         Medical Decision Making  The patient's presentation was of moderate complexity (an acute illness with systemic symptoms).    The patient's evaluation involved:  ordering and/or review of 1 test(s) in this encounter (see separate area of note for details)    The patient's management necessitated moderate risk (prescription drug management including medications given in the  ED).        NEW PRESCRIPTIONS STARTED AT TODAY'S ER VISIT  New Prescriptions    ACETAMINOPHEN (TYLENOL) 160 MG/5ML ELIXIR    Take 31.25 mLs (1,000 mg) by mouth every 6 hours as needed for fever or pain.    NIRMATRELVIR AND RITONAVIR (PAXLOVID) 300 MG/100 MG THERAPY PACK    Take 3 tablets by mouth 2 times daily for 5 days.       Final diagnoses:   COVID-19 virus infection       12/8/2024   [facility] EMERGENCY DEPT       Preet, Delfino Isaac MD  12/08/24 2616

## 2024-12-09 NOTE — DISCHARGE INSTRUCTIONS
Tylenol and ibuprofen as needed for aches, pains, and fever.    Follow-up in clinic as needed.    Return to be seen if new or severe worsening of symptoms develop.

## 2024-12-17 ENCOUNTER — TELEPHONE (OUTPATIENT)
Dept: FAMILY MEDICINE | Facility: CLINIC | Age: 69
End: 2024-12-17
Payer: COMMERCIAL

## 2024-12-17 NOTE — TELEPHONE ENCOUNTER
Reason for Call:  Appointment Request    Patient requesting this type of appt:  diagnosed with covid 12/8/24 but now has a chest and head cold that has now settled into sinus infection     Requested provider: Jennie Suazo    Reason patient unable to be scheduled: Not with their preferred provider    When does patient want to be seen/preferred time: 1-2 days    Comments: requesting sooner work in, patient is scheduled for Thursday this week but is hoping she can get a phone appointment for today or tomorrow     Could we send this information to you in [facility] or would you prefer to receive a phone call?:   Patient would prefer a phone call   Okay to leave a detailed message?: Yes at Cell number on file:    Telephone Information:   Mobile [phone]       Call taken on 12/17/2024 at 11:06 AM by Kirsty Del Toro

## 2024-12-18 ENCOUNTER — TELEPHONE (OUTPATIENT)
Dept: FAMILY MEDICINE | Facility: CLINIC | Age: 69
End: 2024-12-18

## 2024-12-18 ENCOUNTER — VIRTUAL VISIT (OUTPATIENT)
Dept: FAMILY MEDICINE | Facility: CLINIC | Age: 69
End: 2024-12-18
Payer: COMMERCIAL

## 2024-12-18 DIAGNOSIS — R09.81 NASAL CONGESTION: Primary | ICD-10-CM

## 2024-12-18 DIAGNOSIS — Z12.31 VISIT FOR SCREENING MAMMOGRAM: ICD-10-CM

## 2024-12-18 PROCEDURE — 99441 PR PHYSICIAN TELEPHONE EVALUATION 5-10 MIN: CPT | Mod: 93 | Performed by: NURSE PRACTITIONER

## 2024-12-18 NOTE — TELEPHONE ENCOUNTER
Please see TE from today, 12/18/24.  Pt called today & was scheduled & had VV today.  Closing this encounter.    Chandni Corona RN

## 2024-12-18 NOTE — PROGRESS NOTES
Karen is a 69 year old who is being evaluated via a billable telephone visit.    What phone number would you like to be contacted at? [phone]  How would you like to obtain your AVS? [place]  Originating Location (pt. Location): Home    Distant Location (provider location):  On-site      Assessment & Plan     Nasal congestion  Related to COVID infection.  Patient didn't tolerate the Paxlovid due to bad taste in her mouth.  Discussed with patient that the congestion is expected with COVID infection.  Antibiotics are unlikely to help at this point.  Recommend flonase nasal spray daily  Recommend sudafed 120 mg BID prn.  Could also trial Mucinex.  Sinus irrigation also could be tried.  Follow up if no improvement in one week.    Visit for screening mammogram  - MA Screening Bilateral w/ Cristian; Future    The risks, benefits and treatment options of prescribed medications or other treatments have been discussed with the patient. The patient verbalized their understanding and should call or follow up if no improvement or if they develop further problems.  Diane Stroud, CNP                    Subjective   Karen is a 69 year old, presenting for the following health issues:  Nasal Congestion        12/18/2024     2:28 PM   Additional Questions   Roomed by Josselin DAVIS CMA - Virtual visit.         12/18/2024     2:28 PM   Patient Reported Additional Medications   Patient reports taking the following new medications none     HPI       COVID-19 Symptom Review  How many days ago did these symptoms start? 10 days  Covid + 12/8/24  Continues to have nasal congestion, headache and ongoing fever (hasn't taken temp)  Would like medication to help with congestion  Only had 2 days of paxlovid  Are any of the following symptoms significant for you?  New or worsening difficulty breathing? No- just can't breath through her nose  Worsening cough? Yes, it's a dry cough.   Fever or chills? Yes, the highest temperature was; unknown  Headache:  YES  Sore throat: No  Chest pain: No  Diarrhea: YES, but thinks it has to do with what she is eating  Body aches? YES    What treatments has patient tried? Mucinex  Two days of antiviral-paxlovid; stopped due to taste.  Does patient live in a nursing home, group home, or shelter? No  Does patient have a way to get food/medications during quarantined? Yes, I have a friend or family member who can help me.        Has a hard time swallowing pills.            Review of Systems  Constitutional, HEENT, cardiovascular, pulmonary, gi and gu systems are negative, except as otherwise noted.      Objective           Vitals:  No vitals were obtained today due to virtual visit.    Physical Exam   General: Alert and no distress //Respiratory: No audible wheeze, cough, or shortness of breath // Psychiatric:  Appropriate affect, tone, and pace of words            Phone call duration: 10 minutes  Signed Electronically by: MARGARITA Alegre CNP

## 2025-01-15 DIAGNOSIS — I10 BENIGN ESSENTIAL HYPERTENSION: ICD-10-CM

## 2025-01-16 RX ORDER — ATENOLOL 50 MG/1
100 TABLET ORAL DAILY
Qty: 200 TABLET | Refills: 2 | Status: SHIPPED | OUTPATIENT
Start: 2025-01-16

## 2025-01-20 ENCOUNTER — LAB (OUTPATIENT)
Dept: LAB | Facility: CLINIC | Age: 70
End: 2025-01-20
Payer: COMMERCIAL

## 2025-01-20 DIAGNOSIS — M06.09 SERONEGATIVE RHEUMATOID ARTHRITIS OF MULTIPLE SITES (H): ICD-10-CM

## 2025-01-20 DIAGNOSIS — Z79.899 HIGH RISK MEDICATION USE: ICD-10-CM

## 2025-01-20 LAB
ALBUMIN SERPL BCG-MCNC: 4.1 G/DL (ref 3.5–5.2)
ALT SERPL W P-5'-P-CCNC: 12 U/L (ref 0–50)
AST SERPL W P-5'-P-CCNC: 20 U/L (ref 0–45)
CREAT SERPL-MCNC: 0.79 MG/DL (ref 0.51–0.95)
EGFRCR SERPLBLD CKD-EPI 2021: 80 ML/MIN/1.73M2
ERYTHROCYTE [DISTWIDTH] IN BLOOD BY AUTOMATED COUNT: 15.4 % (ref 10–15)
HCT VFR BLD AUTO: 39.7 % (ref 35–47)
HGB BLD-MCNC: 11.6 G/DL (ref 11.7–15.7)
MCH RBC QN AUTO: 23 PG (ref 26.5–33)
MCHC RBC AUTO-ENTMCNC: 29.2 G/DL (ref 31.5–36.5)
MCV RBC AUTO: 79 FL (ref 78–100)
PLATELET # BLD AUTO: 243 10E3/UL (ref 150–450)
RBC # BLD AUTO: 5.04 10E6/UL (ref 3.8–5.2)
WBC # BLD AUTO: 6.1 10E3/UL (ref 4–11)

## 2025-01-20 PROCEDURE — 82565 ASSAY OF CREATININE: CPT

## 2025-01-20 PROCEDURE — 36415 COLL VENOUS BLD VENIPUNCTURE: CPT

## 2025-01-20 PROCEDURE — 84460 ALANINE AMINO (ALT) (SGPT): CPT

## 2025-01-20 PROCEDURE — 82040 ASSAY OF SERUM ALBUMIN: CPT

## 2025-01-20 PROCEDURE — 84450 TRANSFERASE (AST) (SGOT): CPT

## 2025-01-20 PROCEDURE — 85027 COMPLETE CBC AUTOMATED: CPT

## 2025-01-23 ENCOUNTER — TELEPHONE (OUTPATIENT)
Dept: FAMILY MEDICINE | Facility: CLINIC | Age: 70
End: 2025-01-23
Payer: COMMERCIAL

## 2025-01-23 DIAGNOSIS — Z78.0 ASYMPTOMATIC MENOPAUSAL STATE: ICD-10-CM

## 2025-01-23 DIAGNOSIS — M81.0 AGE-RELATED OSTEOPOROSIS WITHOUT CURRENT PATHOLOGICAL FRACTURE: Primary | ICD-10-CM

## 2025-01-23 NOTE — TELEPHONE ENCOUNTER
Spoke with pt regarding Prolia injection.  Last injection was 6/25/24.  Pt says yes, she would like to continue Prolia injections.    Routed to provider for order request.    Tamela Chand RN

## 2025-01-26 ENCOUNTER — HEALTH MAINTENANCE LETTER (OUTPATIENT)
Age: 70
End: 2025-01-26

## 2025-01-27 NOTE — TELEPHONE ENCOUNTER
The following steps were completed to comply with the REMS program for Prolia:  Reviewed the serious risks of Prolia  and the symptoms of each risk.  Advised patient to seek prompt medical attention if they have signs or symptoms of any of the serious risks.  Patient will be provided a copy of the Medication Guide and Patient Brochure prior to first injection.    Jennie Suazo MD

## 2025-01-27 NOTE — PATIENT INSTRUCTIONS
You have selected the below site for your Prolia injection. If you did not schedule this appointment in clinic, please call the number listed below.  [facility]

## 2025-01-28 NOTE — TELEPHONE ENCOUNTER
Date of last Prolia injection:  6/25/24.    Next due 12/26/24  Scheduled 2/18/25    1.  Obtain CAM order from a provider at the site the patient intends to receive the injection.  Provider placed order    2.  Start a telephone encounter, contact pt, and arrange Prolia injection appt on RN schedule a few weeks into the future.  PA team cannot pursue a prior auth without the scheduled appt.  If PA is approved, appt may need to be adjusted to a later date to accommodate protocol completion.  Route PA request to P [id number].  Appt 2/18.  Forwarded to PA team, 1/28/25, Tamela Chand, PAVAN     3.  Once the PA is approved, contact patient and arrange for lab work.  Calcium needs be completed within 2 weeks of injection per  recommendations, or per provider authorization.  Lab work is ordered by the ordering provider.    4.  Verify the following for patient:  A. They have had a GFR in the past 12 months, and it is 30 or greater with no diagnosis of CKD4 or CKD5.   B. Patient denies gastric bypass or parathyroid surgery in the past 6 months.     C. Patient denies dental work in the past 2 months involving drilling into the bone, such as implants/extractions, oral surgery or a tooth extraction that has not healed yet.   D. Patient denies plans for an emergency tooth extraction within the next week.     5.  If lab work is acceptable and all above criteria have been met, contact patient and confirm with pt their scheduled RN visit for the injection.  Or, reschedule the RN appt with pt if needed.      Order Prolia from [facility].  Allow time for Prolia to arrive from [facility].    Orders to "[facility] pharmacy go in on Wednesdays and arrive on Mondays.    If you must expedite the order for next day delivery, the order must be placed before 2 pm.  And, [facility] does NOT accept orders on Fridays.  Friday orders will be addressed the following Monday.   The [facility] pharmacy is an  application:  [employer]    [email or web address]    6. Print a copy of the order confirmation e-mail received.  Print 2 patient labels- attach one to the order sheet printed from the email confirmation, and attach the second label to the order log sheet on clipboard by fridge in clinic B and fill out log sheet with requested information.     7. When the medication arrives, MA will attach the order sheet to the medication, place in the fridge and update the order log sheet.    Med is kept in fridge until administered by RN.     *Prolia is a subcutaneous injection used to treat osteoporosis.  It can be left in fridge until patient arrives.  It needs to be used within 15 minutes, otherwise placed back in fridge during this timeframe, or used within 30 days.     8. After Prolia injection given, cue up a reminder telephone encounter for the next injection that will be due in 6 months.  Send the TE to the nurse pool and post-pone it for 1 month prior to when the next Prolia injection is due.

## 2025-02-03 ENCOUNTER — ANCILLARY PROCEDURE (OUTPATIENT)
Dept: GENERAL RADIOLOGY | Facility: CLINIC | Age: 70
End: 2025-02-03
Attending: FAMILY MEDICINE
Payer: COMMERCIAL

## 2025-02-03 ENCOUNTER — OFFICE VISIT (OUTPATIENT)
Dept: FAMILY MEDICINE | Facility: CLINIC | Age: 70
End: 2025-02-03
Payer: COMMERCIAL

## 2025-02-03 ENCOUNTER — TELEPHONE (OUTPATIENT)
Dept: FAMILY MEDICINE | Facility: CLINIC | Age: 70
End: 2025-02-03

## 2025-02-03 VITALS
SYSTOLIC BLOOD PRESSURE: 144 MMHG | OXYGEN SATURATION: 97 % | RESPIRATION RATE: 20 BRPM | TEMPERATURE: 98.8 F | DIASTOLIC BLOOD PRESSURE: 94 MMHG | HEART RATE: 75 BPM | BODY MASS INDEX: 48.9 KG/M2 | WEIGHT: 276 LBS | HEIGHT: 63 IN

## 2025-02-03 DIAGNOSIS — M06.09 SERONEGATIVE RHEUMATOID ARTHRITIS OF MULTIPLE SITES (H): ICD-10-CM

## 2025-02-03 DIAGNOSIS — R05.9 COUGH, UNSPECIFIED TYPE: Primary | ICD-10-CM

## 2025-02-03 DIAGNOSIS — R05.9 COUGH, UNSPECIFIED TYPE: ICD-10-CM

## 2025-02-03 DIAGNOSIS — J40 BRONCHITIS: ICD-10-CM

## 2025-02-03 PROCEDURE — 99213 OFFICE O/P EST LOW 20 MIN: CPT | Performed by: FAMILY MEDICINE

## 2025-02-03 PROCEDURE — 71046 X-RAY EXAM CHEST 2 VIEWS: CPT | Mod: TC | Performed by: STUDENT IN AN ORGANIZED HEALTH CARE EDUCATION/TRAINING PROGRAM

## 2025-02-03 RX ORDER — DOXYCYCLINE HYCLATE 100 MG
100 TABLET ORAL 2 TIMES DAILY
Qty: 14 TABLET | Refills: 0 | Status: SHIPPED | OUTPATIENT
Start: 2025-02-03

## 2025-02-03 ASSESSMENT — PAIN SCALES - GENERAL: PAINLEVEL_OUTOF10: NO PAIN (0)

## 2025-02-03 NOTE — PROGRESS NOTES
Assessment & Plan   Problem List Items Addressed This Visit          Immune    Seronegative rheumatoid arthritis of multiple sites (H)- follows with rheumatology      Other Visit Diagnoses       Cough, unspecified type    -  Primary    Relevant Orders    XR Chest 2 Views (Completed)    Bronchitis        Relevant Medications    doxycycline hyclate (VIBRA-TABS) 100 MG tablet    Body mass index (BMI) of 45.0 to 49.9 in adult (H)   - ongoing and likely contributing to the patient's health issues.             Chest x-ray was negative. Likely a bronchitis  Antibiotics faxed  Blood pressure elevated. Recommend return for a recheck in a few weeks.          FUTURE APPOINTMENTS:       - Follow-up visit as needed      Luba Jones is a 69 year old, presenting for the following health issues:    Patient is a 69-year-old female presenting to the clinic today for cough ongoing for about 2 months.  She had COVID at the beginning of December and since then she has not been able to have relief from the cough.  She reports that the cough is deep and mostly dry but occasionally she gets some phlegm and decisionally red-yellow to green.  She reports some chest tightness and wheezing.  Pain cough is worse at nighttime.  She denies fevers or chills.  She has taken over-the-counter medications without any relief.  No other systemic symptoms reported.  Sick (Post COVID symptoms)        2/3/2025     3:48 PM   Additional Questions   Roomed by Reyna Mojica   Accompanied by self         2/3/2025     3:48 PM   Patient Reported Additional Medications   Patient reports taking the following new medications none     HPI     Acute Illness  Acute illness concerns: Cough  Onset/Duration: ongoing for over 2 months  Symptoms:  Fever: No  Chills/Sweats: YES  Headache (location?): YES  Sinus Pressure: YES  Conjunctivitis:  No  Ear Pain: no  Rhinorrhea: YES  Congestion: YES  Sore Throat: YES- little bit  Cough: YES-productive of yellow sputum,  "productive of green sputum, with shortness of breath  Wheeze: YES  Decreased Appetite: YES- slightly. Taste and smell still gone.  Nausea: No  Vomiting: No  Diarrhea: YES  Dysuria/Freq.: No  Dysuria or Hematuria: No  Fatigue/Achiness: YES- fatigued  Sick/Strep Exposure: No  Therapies tried and outcome: None        Review of Systems  CONSTITUTIONAL: NEGATIVE for fever, chills, change in weight  ENT/MOUTH: NEGATIVE for ear, mouth and throat problems  RESP:POSITIVE for cough-non productive  CV: NEGATIVE for chest pain, palpitations or peripheral edema      Objective    BP (!) 144/94   Pulse 75   Temp 98.8  F (37.1  C) (Tympanic)   Resp 20   Ht 1.6 m (5' 3\")   Wt 125.2 kg (276 lb)   LMP 05/29/2006 (Approximate)   SpO2 97%   BMI 48.89 kg/m    Body mass index is 48.89 kg/m .  Physical Exam   GENERAL: alert and no distress  NECK: no adenopathy, no asymmetry, masses, or scars  RESP: lungs clear to auscultation - no rales, rhonchi or wheezes and bronchial breath sounds   CV: regular rate and rhythm, normal S1 S2, no S3 or S4, no murmur, click or rub, no peripheral edema  MS: no gross musculoskeletal defects noted, no edema            Signed Electronically by: Jennie Suazo MD    "

## 2025-02-03 NOTE — TELEPHONE ENCOUNTER
Patient Quality Outreach    Patient is due for the following:   Breast Cancer Screening - Mammogram  Physical Annual Wellness Visit    Action(s) Taken:   Schedule a Annual Wellness Visit  Schedule mammogram    Type of outreach:    Sent [employer] message.    Questions for provider review:    None           Emily Hogan, [facility]

## 2025-02-10 ENCOUNTER — LAB (OUTPATIENT)
Dept: LAB | Facility: CLINIC | Age: 70
End: 2025-02-10
Payer: COMMERCIAL

## 2025-02-10 DIAGNOSIS — M81.0 AGE-RELATED OSTEOPOROSIS WITHOUT CURRENT PATHOLOGICAL FRACTURE: ICD-10-CM

## 2025-02-10 DIAGNOSIS — Z78.0 ASYMPTOMATIC MENOPAUSAL STATE: ICD-10-CM

## 2025-02-10 LAB
ANION GAP SERPL CALCULATED.3IONS-SCNC: 7 MMOL/L (ref 7–15)
BUN SERPL-MCNC: 16.2 MG/DL (ref 8–23)
CALCIUM SERPL-MCNC: 9.5 MG/DL (ref 8.8–10.4)
CALCIUM SERPL-MCNC: 9.5 MG/DL (ref 8.8–10.4)
CHLORIDE SERPL-SCNC: 104 MMOL/L (ref 98–107)
CREAT SERPL-MCNC: 0.89 MG/DL (ref 0.51–0.95)
EGFRCR SERPLBLD CKD-EPI 2021: 69 ML/MIN/1.73M2
GLUCOSE SERPL-MCNC: 113 MG/DL (ref 70–99)
HCO3 SERPL-SCNC: 32 MMOL/L (ref 22–29)
POTASSIUM SERPL-SCNC: 4.5 MMOL/L (ref 3.4–5.3)
SODIUM SERPL-SCNC: 143 MMOL/L (ref 135–145)
VIT D+METAB SERPL-MCNC: 14 NG/ML (ref 20–50)

## 2025-02-10 PROCEDURE — 82306 VITAMIN D 25 HYDROXY: CPT

## 2025-02-10 PROCEDURE — 36415 COLL VENOUS BLD VENIPUNCTURE: CPT

## 2025-02-10 PROCEDURE — 80048 BASIC METABOLIC PNL TOTAL CA: CPT

## 2025-02-18 ENCOUNTER — HOSPITAL ENCOUNTER (OUTPATIENT)
Dept: MAMMOGRAPHY | Facility: CLINIC | Age: 70
Discharge: HOME | End: 2025-02-18
Attending: NURSE PRACTITIONER
Payer: COMMERCIAL

## 2025-02-18 ENCOUNTER — ALLIED HEALTH/NURSE VISIT (OUTPATIENT)
Dept: FAMILY MEDICINE | Facility: CLINIC | Age: 70
End: 2025-02-18
Payer: COMMERCIAL

## 2025-02-18 DIAGNOSIS — Z12.31 VISIT FOR SCREENING MAMMOGRAM: ICD-10-CM

## 2025-02-18 DIAGNOSIS — M81.0 AGE-RELATED OSTEOPOROSIS WITHOUT CURRENT PATHOLOGICAL FRACTURE: Primary | ICD-10-CM

## 2025-02-18 PROCEDURE — 77063 BREAST TOMOSYNTHESIS BI: CPT

## 2025-02-18 PROCEDURE — 99207 PR NO CHARGE NURSE ONLY: CPT

## 2025-02-18 PROCEDURE — 96372 THER/PROPH/DIAG INJ SC/IM: CPT | Performed by: FAMILY MEDICINE

## 2025-02-24 DIAGNOSIS — N32.81 OVERACTIVE BLADDER: ICD-10-CM

## 2025-02-24 RX ORDER — MIRABEGRON 25 MG/1
25 TABLET, FILM COATED, EXTENDED RELEASE ORAL DAILY
Qty: 100 TABLET | Refills: 3 | OUTPATIENT
Start: 2025-02-24

## 2025-02-24 NOTE — TELEPHONE ENCOUNTER
Pending Prescriptions:                       Disp   Refills    mirabegron (MYRBETRIQ) 25 MG 24 hr tablet 100 ta*3            Sig: Take 1 tablet (25 mg) by mouth daily.

## 2025-02-26 ENCOUNTER — TELEPHONE (OUTPATIENT)
Dept: FAMILY MEDICINE | Facility: CLINIC | Age: 70
End: 2025-02-26
Payer: COMMERCIAL

## 2025-02-26 DIAGNOSIS — N32.81 OVERACTIVE BLADDER: ICD-10-CM

## 2025-02-26 RX ORDER — MIRABEGRON 25 MG/1
25 TABLET, FILM COATED, EXTENDED RELEASE ORAL DAILY
Qty: 100 TABLET | Refills: 3 | OUTPATIENT
Start: 2025-02-26

## 2025-02-26 NOTE — TELEPHONE ENCOUNTER
Medication Question or Refill        What medication are you calling about (include dose and sig)?: mirabegron (MYRBETRIQ) 25 MG 24 hr tablet     Preferred Pharmacy:     [facility] [address]  [address]  [facility] [address]  [facility] [address]  Phone: [phone] Fax: [phone]    Controlled Substance Agreement on file:   CSA -- Patient Level:    CSA: None found at the patient level.       Who prescribed the medication?: Akintola    Do you need a refill? No    Do you have any questions or concerns?  Yes: pt states she spoke with pharmacy, pharmacy told pt that she will need PCP to notify them that the script is for name brand med only. The generic med is not covered by pts insurance, higher tiered meds are covered. Pt requesting new script be sent to pharmacy.      Could we send this information to you [facility] or would you prefer to receive a phone call?:   Patient would prefer a phone call   Okay to leave a detailed message?: Yes at Home number on file [phone] (home)

## 2025-02-27 RX ORDER — MIRABEGRON 25 MG/1
25 TABLET, FILM COATED, EXTENDED RELEASE ORAL DAILY
Qty: 90 TABLET | Refills: 3 | Status: SHIPPED | OUTPATIENT
Start: 2025-02-27

## 2025-02-27 NOTE — TELEPHONE ENCOUNTER
Dr Suazo,    Please see TE.  [facility] states generic med mirabegron not covered by insurance. Pharmacy requesting new Rx for brand name  Rx sent 2/11/25.    Chandni Corona RN

## 2025-03-08 ENCOUNTER — MYC MEDICAL ADVICE (OUTPATIENT)
Dept: FAMILY MEDICINE | Facility: CLINIC | Age: 70
End: 2025-03-08
Payer: COMMERCIAL

## 2025-03-10 NOTE — TELEPHONE ENCOUNTER
"MA task.    \"Blood pressure taken at home on a home bp machine on march 8th, 2025 134 over 87 and 73 heart rate\"    Please record in patient chart. Thank you!    Mahnaz GUERRIER RN  [facility]  [phone]       "

## 2025-03-20 ENCOUNTER — PATIENT OUTREACH (OUTPATIENT)
Dept: CARE COORDINATION | Facility: CLINIC | Age: 70
End: 2025-03-20
Payer: COMMERCIAL

## 2025-03-31 DIAGNOSIS — E55.9 VITAMIN D DEFICIENCY: ICD-10-CM

## 2025-03-31 RX ORDER — ERGOCALCIFEROL 1.25 MG/1
50000 CAPSULE, LIQUID FILLED ORAL WEEKLY
Qty: 8 CAPSULE | Refills: 5 | OUTPATIENT
Start: 2025-03-31

## 2025-03-31 NOTE — TELEPHONE ENCOUNTER
This is not a medication given chronically d levels need to be rechecked. It's given when patient are very low in Vit d

## 2025-04-01 ENCOUNTER — LAB (OUTPATIENT)
Dept: LAB | Facility: CLINIC | Age: 70
End: 2025-04-01
Payer: COMMERCIAL

## 2025-04-01 DIAGNOSIS — E55.9 VITAMIN D DEFICIENCY: ICD-10-CM

## 2025-04-01 LAB — VIT D+METAB SERPL-MCNC: 19 NG/ML (ref 20–50)

## 2025-04-01 PROCEDURE — 36415 COLL VENOUS BLD VENIPUNCTURE: CPT

## 2025-04-01 PROCEDURE — 82306 VITAMIN D 25 HYDROXY: CPT

## 2025-04-01 NOTE — TELEPHONE ENCOUNTER
Care team please schedule pt for vit d labs, orders are in. Need to see where levels are at after the round of high dose.     Yaritza Godfrey MSN, RN

## 2025-04-02 DIAGNOSIS — E55.9 VITAMIN D DEFICIENCY: Primary | ICD-10-CM

## 2025-04-02 RX ORDER — ERGOCALCIFEROL 1.25 MG/1
50000 CAPSULE, LIQUID FILLED ORAL WEEKLY
Qty: 8 CAPSULE | Refills: 0 | Status: SHIPPED | OUTPATIENT
Start: 2025-04-02

## 2025-04-13 ENCOUNTER — HEALTH MAINTENANCE LETTER (OUTPATIENT)
Age: 70
End: 2025-04-13

## 2025-04-16 ENCOUNTER — HOSPITAL ENCOUNTER (EMERGENCY)
Facility: CLINIC | Age: 70
Discharge: HOME | End: 2025-04-16
Attending: EMERGENCY MEDICINE | Admitting: EMERGENCY MEDICINE
Payer: COMMERCIAL

## 2025-04-16 ENCOUNTER — TELEPHONE (OUTPATIENT)
Dept: RHEUMATOLOGY | Facility: CLINIC | Age: 70
End: 2025-04-16

## 2025-04-16 ENCOUNTER — NURSE TRIAGE (OUTPATIENT)
Dept: FAMILY MEDICINE | Facility: CLINIC | Age: 70
End: 2025-04-16
Payer: COMMERCIAL

## 2025-04-16 VITALS
WEIGHT: 276 LBS | HEIGHT: 62 IN | TEMPERATURE: 97.9 F | OXYGEN SATURATION: 96 % | BODY MASS INDEX: 50.79 KG/M2 | HEART RATE: 105 BPM

## 2025-04-16 DIAGNOSIS — M54.50 LUMBAR BACK PAIN: ICD-10-CM

## 2025-04-16 DIAGNOSIS — M79.604 BILATERAL LEG PAIN: ICD-10-CM

## 2025-04-16 DIAGNOSIS — M79.605 BILATERAL LEG PAIN: ICD-10-CM

## 2025-04-16 LAB
ALBUMIN SERPL BCG-MCNC: 3.9 G/DL (ref 3.5–5.2)
ALP SERPL-CCNC: 65 U/L (ref 40–150)
ALT SERPL W P-5'-P-CCNC: 9 U/L (ref 0–50)
ANION GAP SERPL CALCULATED.3IONS-SCNC: 10 MMOL/L (ref 7–15)
AST SERPL W P-5'-P-CCNC: 14 U/L (ref 0–45)
BASOPHILS # BLD AUTO: 0 10E3/UL (ref 0–0.2)
BASOPHILS NFR BLD AUTO: 1 %
BILIRUB SERPL-MCNC: 0.3 MG/DL
BUN SERPL-MCNC: 16.8 MG/DL (ref 8–23)
CALCIUM SERPL-MCNC: 9.5 MG/DL (ref 8.8–10.4)
CHLORIDE SERPL-SCNC: 104 MMOL/L (ref 98–107)
CK SERPL-CCNC: 66 U/L (ref 26–192)
CREAT SERPL-MCNC: 0.76 MG/DL (ref 0.51–0.95)
EGFRCR SERPLBLD CKD-EPI 2021: 83 ML/MIN/1.73M2
EOSINOPHIL # BLD AUTO: 0.1 10E3/UL (ref 0–0.7)
EOSINOPHIL NFR BLD AUTO: 1 %
ERYTHROCYTE [DISTWIDTH] IN BLOOD BY AUTOMATED COUNT: 15.8 % (ref 10–15)
GLUCOSE SERPL-MCNC: 101 MG/DL (ref 70–99)
HCO3 SERPL-SCNC: 25 MMOL/L (ref 22–29)
HCT VFR BLD AUTO: 37.2 % (ref 35–47)
HGB BLD-MCNC: 10.8 G/DL (ref 11.7–15.7)
IMM GRANULOCYTES # BLD: 0 10E3/UL
IMM GRANULOCYTES NFR BLD: 0 %
LYMPHOCYTES # BLD AUTO: 2.6 10E3/UL (ref 0.8–5.3)
LYMPHOCYTES NFR BLD AUTO: 41 %
MCH RBC QN AUTO: 23.3 PG (ref 26.5–33)
MCHC RBC AUTO-ENTMCNC: 29 G/DL (ref 31.5–36.5)
MCV RBC AUTO: 80 FL (ref 78–100)
MONOCYTES # BLD AUTO: 0.6 10E3/UL (ref 0–1.3)
MONOCYTES NFR BLD AUTO: 9 %
NEUTROPHILS # BLD AUTO: 3.1 10E3/UL (ref 1.6–8.3)
NEUTROPHILS NFR BLD AUTO: 48 %
NRBC # BLD AUTO: 0 10E3/UL
NRBC BLD AUTO-RTO: 0 /100
PLATELET # BLD AUTO: 221 10E3/UL (ref 150–450)
POTASSIUM SERPL-SCNC: 4 MMOL/L (ref 3.4–5.3)
PROT SERPL-MCNC: 7.3 G/DL (ref 6.4–8.3)
RBC # BLD AUTO: 4.64 10E6/UL (ref 3.8–5.2)
SODIUM SERPL-SCNC: 139 MMOL/L (ref 135–145)
WBC # BLD AUTO: 6.4 10E3/UL (ref 4–11)

## 2025-04-16 PROCEDURE — 85041 AUTOMATED RBC COUNT: CPT | Performed by: EMERGENCY MEDICINE

## 2025-04-16 PROCEDURE — 99284 EMERGENCY DEPT VISIT MOD MDM: CPT | Performed by: EMERGENCY MEDICINE

## 2025-04-16 PROCEDURE — 82310 ASSAY OF CALCIUM: CPT | Performed by: EMERGENCY MEDICINE

## 2025-04-16 PROCEDURE — 99284 EMERGENCY DEPT VISIT MOD MDM: CPT | Mod: 25 | Performed by: EMERGENCY MEDICINE

## 2025-04-16 PROCEDURE — 36415 COLL VENOUS BLD VENIPUNCTURE: CPT | Performed by: EMERGENCY MEDICINE

## 2025-04-16 PROCEDURE — 82374 ASSAY BLOOD CARBON DIOXIDE: CPT | Performed by: EMERGENCY MEDICINE

## 2025-04-16 PROCEDURE — 82550 ASSAY OF CK (CPK): CPT | Performed by: EMERGENCY MEDICINE

## 2025-04-16 PROCEDURE — 85004 AUTOMATED DIFF WBC COUNT: CPT | Performed by: EMERGENCY MEDICINE

## 2025-04-16 ASSESSMENT — COLUMBIA-SUICIDE SEVERITY RATING SCALE - C-SSRS
1. IN THE PAST MONTH, HAVE YOU WISHED YOU WERE DEAD OR WISHED YOU COULD GO TO SLEEP AND NOT WAKE UP?: NO
2. HAVE YOU ACTUALLY HAD ANY THOUGHTS OF KILLING YOURSELF IN THE PAST MONTH?: NO
6. HAVE YOU EVER DONE ANYTHING, STARTED TO DO ANYTHING, OR PREPARED TO DO ANYTHING TO END YOUR LIFE?: NO

## 2025-04-16 ASSESSMENT — ACTIVITIES OF DAILY LIVING (ADL)
ADLS_ACUITY_SCORE: 41
ADLS_ACUITY_SCORE: 41

## 2025-04-16 NOTE — TELEPHONE ENCOUNTER
"Received a warm call transfer from  to discuss leg pain:      Reason for Disposition   Thigh or calf pain in only one leg and present > 1 hour    Answer Assessment - Initial Assessment Questions  1. ONSET: \"When did the pain start?\"       Bilateral leg pain.  2. LOCATION: \"Where is the pain located?\"      Right leg pain is from thigh to ankles/foot, the left leg is concentrated on the calf and knee and outside of knee.  3. PAIN: \"How bad is the pain?\"    (Scale 1-10; or mild, moderate, severe)      Moderate 7-8/10  4. WORK OR EXERCISE: \"Has there been any recent work or exercise that involved this part of the body?\"       Denies any overuse of leg.  5. CAUSE: \"What do you think is causing the leg pain?\"      Patient is not sure.  6. OTHER SYMPTOMS: \"Do you have any other symptoms?\" (e.g., chest pain, back pain, breathing difficulty, swelling, rash, fever, numbness, weakness)     No chest pain, has back pain, has swelling in the ankles bilaterally, no redness or numbness noted, hurts all the time and is difficult to walk, no breathing issues.  7. PREGNANCY: \"Is there any chance you are pregnant?\" \"When was your last menstrual period?\"      na    Protocols used: Leg Pain-A-OH      Patient is advised ER to rule out causes, patient is agreeable to plan.      PAVAN Tejada    "

## 2025-04-16 NOTE — TELEPHONE ENCOUNTER
[facility] Call Center    Phone Message    May a detailed message be left on voicemail: yes     Reason for Call: Other: Patient needs a sulema back on her medication changes and has not heard anything on how the changes are going for Abatacept (ORENCIA) 125 MG/ML SOAJ auto-injector  and she wants a call back to be updated as she is now out of her medication. Please call back and advise.     Action Taken: Message routed to:  Other: rheum    Travel Screening: Not Applicable     Date of Service: 04/16/25

## 2025-04-16 NOTE — ED TRIAGE NOTES
"Pt reporting new leg pain bilaterally (back and front of leg from upper thigh down) as well as bilateral leg swelling. Pt started Monday\". Pain wakes pt up during the night. Hx bilateral knee replacement \"a couple years ago\". Denied SOB, chest pain, lightheadedness/dizziness.      Triage Assessment (Adult)       Row Name 04/16/25 7089          Triage Assessment    Airway WDL WDL        Respiratory WDL    Respiratory WDL WDL        Skin Circulation/Temperature WDL    Skin Circulation/Temperature WDL WDL        Cardiac WDL    Cardiac WDL WDL        Peripheral/Neurovascular WDL    Peripheral Neurovascular WDL WDL        Cognitive/Neuro/Behavioral WDL    Cognitive/Neuro/Behavioral WDL WDL                     "

## 2025-04-17 NOTE — TELEPHONE ENCOUNTER
Spoke with patient and given Danni's instructions.  She agrees with that plan; no further questions.    Diane Dsouza RN on 4/17/2025 at 10:18 AM

## 2025-04-17 NOTE — TELEPHONE ENCOUNTER
"It looks like the PA was just recently approved for Orencia--see TE from 4/11/25.    [facility] will be filling it.    Spoke with patient and she just got off the phone with the pharmacy. Orencia will be shipped on Tuesday.    Yesterday patient was in the ED. She missed her Enbrel dose.  Patient wondering if she should take it today?  Or should she just wait to take Orencia on Tuesday?  Currently her RA pain is \"ok\" per patient.  Please advise.    Diane Dsouza RN on 4/17/2025 at 9:50 AM        "

## 2025-04-17 NOTE — TELEPHONE ENCOUNTER
Would recommend waiting until she gets Orencia and start it when it is delivered.    Danni Martínez [facility]

## 2025-04-17 NOTE — ED PROVIDER NOTES
"  History     Chief Complaint   Patient presents with    Leg Pain     Pt reporting new leg pain bilaterally (back and front of leg from upper thigh down) as well as bilateral leg swelling. Pain wakes pt up during the night. Hx bilateral knee replacement \"a couple years ago\".      HPI  History per patient, review of [facility] EMR and Care Everywhere EMR.   Jacquie Harris is a 70 year old female who presents emergency department for evaluation of atraumatic bilateral leg pain with initial onset 2 days ago of left leg pain in the back of the knee and localized pain distally in the left lower leg, followed by posterior right upper leg pain and anterior right knee area pain beginning at approximately 2:30 AM this morning.  No leg swelling, redness, rash or lesions.  No leg circulatory, sensory or motor deficit or abnormality.  No back or flank pain.  No flank or abdominal pain.  No UTI signs or symptoms.  No fever or chills.  No prior history of similar symptoms.   No injury, strain or known precipitant for the symptoms. No other pertinent history or acute complaints or concerns.     Allergies:  Allergies   Allergen Reactions    Ibuprofen      Other reaction(s): Gastrointestinal  Gastric bleed    Naproxen GI Disturbance     Other reaction(s): Gastrointestinal  Gastric bleed    Nickel Itching     Inflamed with cheap earrings (itchy)    Rofecoxib Itching and Swelling     VIOXX (Swollen Feet,Hands itching), Okay with ibuprofen and aspirin    Vioxx Itching and Swelling     VIOXX (Swollen Feet,Hands itching), Okay with ibuprofen and aspirin       Problem List:    Patient Active Problem List    Diagnosis Date Noted    Nasal congestion 10/25/2024     Priority: Medium    Sore throat 10/25/2024     Priority: Medium    Seronegative rheumatoid arthritis of multiple sites (H) 01/31/2024     Priority: Medium    History of total bilateral knee replacement 01/09/2023     Priority: Medium    Benign essential hypertension 06/19/2020     " Priority: Medium    Rotator cuff syndrome, right 01/14/2020     Priority: Medium    Multiple joint pain, possible fibromyalgia 12/20/2019     Priority: Medium    Bronchospasm 01/07/2019     Priority: Medium    GI bleed 03/17/2018     Priority: Medium    Upper GI bleed 03/17/2018     Priority: Medium    Shoulder injury, right, subsequent encounter 08/21/2017     Priority: Medium    Fatty liver 04/20/2017     Priority: Medium    Elevated levels of transaminase & lactic acid dehydrogenase 04/15/2017     Priority: Medium    Bilateral cold feet 01/03/2017     Priority: Medium    Colles' fracture 07/06/2015     Priority: Medium    Encounter for routine gynecological examination  06/04/2015     Priority: Medium    Back pain, left below scapula, strained muscles 10/24/2013     Priority: Medium    HTN, goal below 140/80 05/05/2013     Priority: Medium    S/P TKR (total knee replacement) 04/03/2012     Priority: Medium    Osteoporosis 02/19/2012     Priority: Medium     Previous T scores -2.2  2/2012 Dexascan:  Lumbar spine L1-L4 T-score: -2.4, Left femoral neck T-score: -2.7, Right femoral neck T-score: -2.2   Percent change in lumbar spine: +6.6% since 4/16/2008   Percent change in femurs: +2.9% since 4/16/2008    IMPRESSION: Osteoporosis in the left femoral neck. Severe osteopenia  in the right femoral neck and lumbar spine.      Obesity, Class III, BMI 40-49.9 (morbid obesity) (H) 02/19/2012     Priority: Medium     ideal weight 120, goal weight 200, lose 58 lbs in 58 weeks.      Vitamin D deficiency 07/18/2011     Priority: Medium    Tick bite, infected, positive Lyme test 1996 not treated 07/08/2011     Priority: Medium    Rosacea 11/04/2009     Priority: Medium    Obstructive sleep apnea 12/01/2006     Priority: Medium     Sleep study 12/06 for EDS- AHI 36, desaturations to 65%. CPAP recommended but not tolerated.  01/16/07  ENT consult recommend CPAP and weight loss. Surgery discussed but success rate less than  CPAP  1/15/07 CPAP trial, was not able to tolerate. Retried 2008, tolerated better.   Problem list name updated by automated process. Provider to review      Carpal tunnel syndrome 06/07/2006     Priority: Medium     04/10/09 Dr.Meletiou Cronin. Finding consistant with CTS, but nerve studies and MRI of C-Spine are normal. Corticosteroid injection given in office      CARDIOVASCULAR SCREENING; LDL GOAL LESS THAN 160 10/31/2010     Priority: Low    Allergic rhinitis 03/27/2006     Priority: Low     Problem list name updated by automated process. Provider to review      s/p gastric bypass x 2 07/05/2005     Priority: Low     Gastric bypass 1980          Past Medical History:    Past Medical History:   Diagnosis Date    HTN (hypertension)     IRON DEFICIENCY ANEMIA 7/5/2005    Obstructive sleep apnea     DARWIN (obstructive sleep apnea)        Past Surgical History:    Past Surgical History:   Procedure Laterality Date    ARTHROPLASTY KNEE  04/02/2012    Procedure:ARTHROPLASTY KNEE; Left Total Knee Arthroplasty--Anesth.Choice; Surgeon:HERNANDO THOMPSON; Location:[facility]    ARTHROTOMY SHOULDER, ROTATOR CUFF REPAIR, COMBINED Right 08/25/2017    Procedure: COMBINED ARTHROTOMY SHOULDER, ROTATOR CUFF REPAIR;  Right shoulder distal clavicle excision, subacromial decompression, rotator cuff repair ;  Surgeon: Hernando Thompson MD;  Location: [facility] OR    ARTHROTOMY SHOULDER, ROTATOR CUFF REPAIR, COMBINED Right 12/18/2017    Procedure: COMBINED ARTHROTOMY SHOULDER, ROTATOR CUFF REPAIR;  Right Shoulder Rotator Cuff Revision;  Surgeon: Hernando Thompson MD;  Location: [facility]    ARTHROTOMY SHOULDER, ROTATOR CUFF REPAIR, COMBINED Left 04/27/2018    Procedure: COMBINED ARTHROTOMY SHOULDER, ROTATOR CUFF REPAIR;  Left Shoulder Open Subacromial Decompression,Distal Clavicle Excision,Rotator Cuff Repair;  Surgeon: Hernando Thompson MD;  Location: [facility] OR    C/SECTION, LOW TRANSVERSE  1978, 1984, 1994    x 3    COLONOSCOPY   01/01/2001    normal colonoscopy    COLONOSCOPY N/A 08/22/2017    Procedure: COLONOSCOPY;  Colonoscopy  ;  Surgeon: Delfino Yoo MD;  Location: [facility]    COLONOSCOPY  08/22/2017    EXAMCOL    ESOPHAGOSCOPY, GASTROSCOPY, DUODENOSCOPY (EGD), COMBINED N/A 03/18/2018    Procedure: COMBINED ESOPHAGOSCOPY, GASTROSCOPY, DUODENOSCOPY (EGD);;  Surgeon: Poncho aGlindo MD;  Location: [facility]    GASTRIC BYPASS  1980/2005    Gastric Bypass and revision    HERNIA REPAIR, INCISIONAL  06/16/2008    lap.repair with 10x8 mesh    ME REVISE KNEE JOINT REPLACE,ALL PARTS Left 05/29/2019    Dr. Cedric Fuller    ME TOTAL KNEE ARTHROPLASTY Right 06/22/2020    Dr. Cedric Fuller    TUBAL LIGATION  01/01/1994    Z ORAL SURGERY PROCEDURE  age 18    wisdom teeth       Family History:    Family History   Problem Relation Age of Onset    C.A.D. Father         MI at age 60    Hypertension Father     Alzheimer Disease Father     Hyperlipidemia Father     Osteoporosis Mother         on Fosamax    Glaucoma Mother     Other Cancer Brother     Stomach Cancer Maternal Aunt     Cervical Cancer Cousin     Diabetes No family hx of     Cerebrovascular Disease No family hx of     Breast Cancer No family hx of     Cancer - colorectal No family hx of     Prostate Cancer No family hx of     Liver Disease No family hx of        Social History:  Marital Status:   [2]  Social History     Tobacco Use    Smoking status: Never    Smokeless tobacco: Never   Vaping Use    Vaping status: Never Used   Substance Use Topics    Alcohol use: Yes     Alcohol/week: 0.0 standard drinks of alcohol     Comment: mixed very light 1 a month if that    Drug use: No        Medications:    Abatacept (ORENCIA) 125 MG/ML SOAJ auto-injector  acetaminophen (TYLENOL) 160 MG/5ML elixir  atenolol (TENORMIN) 50 MG tablet  etanercept (ENBREL SURECLICK) 50 MG/ML autoinjector  fluticasone (FLONASE) 50 MCG/ACT nasal spray  loratadine (CLARITIN) 10 MG tablet  mirabegron (MYRBETRIQ) 25 MG  "24 hr tablet  nystatin (NYAMYC) 955424 UNIT/GM external powder  vitamin D2 (ERGOCALCIFEROL) 25007 units (1250 mcg) capsule          Review of Systems  As mentioned in the HPI, in addition focused review of systems was negative.    Physical Exam   Pulse: 105  Temp: 97.9  F (36.6  C)  Height: 157.5 cm (5' 2\")  Weight: 125.2 kg (276 lb)  SpO2: 96 %      Physical Exam  Vitals and nursing note reviewed.   Constitutional:       General: She is not in acute distress.     Appearance: Normal appearance. She is well-developed. She is not ill-appearing or diaphoretic.   Eyes:      General: No scleral icterus.     Extraocular Movements: Extraocular movements intact.      Conjunctiva/sclera: Conjunctivae normal.   Neck:      Trachea: No tracheal deviation.   Cardiovascular:      Rate and Rhythm: Normal rate and regular rhythm.      Pulses: Normal pulses.      Heart sounds: Normal heart sounds. No murmur heard.     No friction rub. No gallop.   Pulmonary:      Effort: Pulmonary effort is normal. No respiratory distress.      Breath sounds: Normal breath sounds. No wheezing, rhonchi or rales.   Abdominal:      General: There is no distension.      Palpations: Abdomen is soft.      Tenderness: There is no abdominal tenderness.   Musculoskeletal:         General: Tenderness (Posterior right upper leg and left posterior knee area soft tissue tenderness with no erythema, warmth, cords, rash or lesions.  Lower extremity CMS/neurovascular function intact.  Mild mid lumbar back tenderness with no CVA area tenderness.) present. No swelling. Normal range of motion.      Right lower leg: No edema.      Left lower leg: No edema.   Skin:     General: Skin is warm and dry.      Coloration: Skin is not pale.      Findings: No bruising, erythema, lesion or rash.   Neurological:      General: No focal deficit present.      Mental Status: She is alert and oriented to person, place, and time.      Sensory: No sensory deficit.      Motor: No " weakness.   Psychiatric:         Mood and Affect: Mood normal.         Behavior: Behavior normal.         ED Course        Procedures                Results for orders placed or performed during the hospital encounter of 04/16/25 (from the past 24 hours)   Comprehensive metabolic panel   Result Value Ref Range    Sodium 139 135 - 145 mmol/L    Potassium 4.0 3.4 - 5.3 mmol/L    Carbon Dioxide (CO2) 25 22 - 29 mmol/L    Anion Gap 10 7 - 15 mmol/L    Urea Nitrogen 16.8 8.0 - 23.0 mg/dL    Creatinine 0.76 0.51 - 0.95 mg/dL    GFR Estimate 83 >60 mL/min/1.73m2    Calcium 9.5 8.8 - 10.4 mg/dL    Chloride 104 98 - 107 mmol/L    Glucose 101 (H) 70 - 99 mg/dL    Alkaline Phosphatase 65 40 - 150 U/L    AST 14 0 - 45 U/L    ALT 9 0 - 50 U/L    Protein Total 7.3 6.4 - 8.3 g/dL    Albumin 3.9 3.5 - 5.2 g/dL    Bilirubin Total 0.3 <=1.2 mg/dL   CBC with platelets differential    Narrative    The following orders were created for panel order CBC with platelets differential.  Procedure                               Abnormality         Status                     ---------                               -----------         ------                     CBC with platelets and ...[[id number]]  Abnormal            Final result                 Please view results for these tests on the individual orders.   CK total   Result Value Ref Range    CK 66 26 - 192 U/L   CBC with platelets and differential   Result Value Ref Range    WBC Count 6.4 4.0 - 11.0 10e3/uL    RBC Count 4.64 3.80 - 5.20 10e6/uL    Hemoglobin 10.8 (L) 11.7 - 15.7 g/dL    Hematocrit 37.2 35.0 - 47.0 %    MCV 80 78 - 100 fL    MCH 23.3 (L) 26.5 - 33.0 pg    MCHC 29.0 (L) 31.5 - 36.5 g/dL    RDW 15.8 (H) 10.0 - 15.0 %    Platelet Count 221 150 - 450 10e3/uL    % Neutrophils 48 %    % Lymphocytes 41 %    % Monocytes 9 %    % Eosinophils 1 %    % Basophils 1 %    % Immature Granulocytes 0 %    NRBCs per 100 WBC 0 <1 /100    Absolute Neutrophils 3.1 1.6 - 8.3 10e3/uL    Absolute  Lymphocytes 2.6 0.8 - 5.3 10e3/uL    Absolute Monocytes 0.6 0.0 - 1.3 10e3/uL    Absolute Eosinophils 0.1 0.0 - 0.7 10e3/uL    Absolute Basophils 0.0 0.0 - 0.2 10e3/uL    Absolute Immature Granulocytes 0.0 <=0.4 10e3/uL    Absolute NRBCs 0.0 10e3/uL   US Lower Extremity Venous Duplex Bilateral    Narrative    EXAM: US LOWER EXTREMITY VENOUS DUPLEX BILATERAL  LOCATION: [facility]  DATE: 4/16/2025    INDICATION: Atraumatic leg pain, evaluate for DVT  COMPARISON: None.  TECHNIQUE: Venous Duplex ultrasound of bilateral lower extremities with and without compression, augmentation and duplex. Color flow and spectral Doppler with waveform analysis performed.    FINDINGS: Exam includes the common femoral, femoral, popliteal veins as well as segmentally visualized deep calf veins and greater saphenous vein.     RIGHT: No deep vein thrombosis. No superficial thrombophlebitis. No popliteal cyst.    LEFT: No deep vein thrombosis. No superficial thrombophlebitis. No popliteal cyst.      Impression    IMPRESSION:  1.  No deep venous thrombosis in the bilateral lower extremities.   CT Lumbar Spine w/o Contrast    Narrative    EXAM: CT LUMBAR SPINE W/O CONTRAST  LOCATION: [facility]  DATE: 4/16/2025    INDICATION: Atraumatic low lumbar back pain and bilateral leg pain.  COMPARISON: None.  TECHNIQUE: Routine CT Lumbar Spine without IV contrast. Multiplanar reformats. Dose reduction techniques were used.     FINDINGS:  Significant beam hardening artifact related to overlapping soft tissues/habitus. No acute fracture identified. Multilevel mild degenerative disc disease. Moderate to severe lower facet arthropathy. No high-grade spinal canal or foraminal stenosis.   Scattered vascular calcifications.      Impression    IMPRESSION:  1.  No acute fracture identified.       Medications - No data to display    Assessments & Plan (with Medical Decision Making)   Atraumatic poorly  localized bilateral lower extremity pain, and mid lumbar back pain which she did not note until I palpated her lower back on exam today, which could be due to lumbar degenerative disc disease degenerative disease with radicular pain.  No current indication for emergent MRI imaging evaluation.  Unremarkable laboratory evaluation and bilateral lower extremity ultrasound evaluations.  Doubt DVT or PE.    Benign exam and she appears stable for outpatient management and follow-up.  I will refer her to her primary care provider for care and evaluation.  She was provided instructions for supportive care and will return as needed for worsened condition or worsening symptoms, leg swelling or new problems or concerns.    I have reviewed the nursing notes.    I have reviewed the findings, diagnosis, plan and need for follow up with the patient.    Medical Decision Making: Moderate complexity      New Prescriptions    No medications on file       Final diagnoses:   Bilateral leg pain   Lumbar back pain       4/16/2025   [facility] EMERGENCY DEPBilateral leg painT       Pablo De Jesus MD  04/17/25 0006

## 2025-04-29 ENCOUNTER — OFFICE VISIT (OUTPATIENT)
Dept: FAMILY MEDICINE | Facility: CLINIC | Age: 70
End: 2025-04-29
Payer: COMMERCIAL

## 2025-04-29 VITALS
DIASTOLIC BLOOD PRESSURE: 84 MMHG | BODY MASS INDEX: 50.42 KG/M2 | WEIGHT: 274 LBS | HEIGHT: 62 IN | TEMPERATURE: 99.1 F | OXYGEN SATURATION: 97 % | HEART RATE: 75 BPM | SYSTOLIC BLOOD PRESSURE: 132 MMHG | RESPIRATION RATE: 16 BRPM

## 2025-04-29 DIAGNOSIS — M47.16 LUMBAR SPONDYLOSIS WITH MYELOPATHY: Primary | ICD-10-CM

## 2025-04-29 PROCEDURE — 1125F AMNT PAIN NOTED PAIN PRSNT: CPT | Performed by: FAMILY MEDICINE

## 2025-04-29 PROCEDURE — 3079F DIAST BP 80-89 MM HG: CPT | Performed by: FAMILY MEDICINE

## 2025-04-29 PROCEDURE — 3075F SYST BP GE 130 - 139MM HG: CPT | Performed by: FAMILY MEDICINE

## 2025-04-29 PROCEDURE — 99213 OFFICE O/P EST LOW 20 MIN: CPT | Performed by: FAMILY MEDICINE

## 2025-04-29 ASSESSMENT — ENCOUNTER SYMPTOMS
PSYCHIATRIC NEGATIVE: 1
CONSTITUTIONAL NEGATIVE: 1
ALLERGIC/IMMUNOLOGIC NEGATIVE: 1
GASTROINTESTINAL NEGATIVE: 1
BACK PAIN: 1
HEMATOLOGIC/LYMPHATIC NEGATIVE: 1
RESPIRATORY NEGATIVE: 1
CARDIOVASCULAR NEGATIVE: 1
ENDOCRINE NEGATIVE: 1
ARTHRALGIAS: 1
EYES NEGATIVE: 1

## 2025-04-29 ASSESSMENT — PAIN SCALES - GENERAL: PAINLEVEL_OUTOF10: SEVERE PAIN (9)

## 2025-04-29 NOTE — PROGRESS NOTES
"  Assessment & Plan   Problem List Items Addressed This Visit    None  Visit Diagnoses       Lumbar spondylosis with myelopathy    -  Primary    Relevant Orders    MR Lumbar Spine w/o Contrast           70 yr old female here for a follow up from the ED. Still having significant pain in her lower legs.   MRI ordered. Patient will be notified of results.      MED REC REQUIRED  Post Medication Reconciliation Status: discharge medications reconciled, continue medications without change  BMI  Estimated body mass index is 50.12 kg/m  as calculated from the following:    Height as of this encounter: 1.575 m (5' 2\").    Weight as of this encounter: 124.3 kg (274 lb).       Follow-up       Subjective   Karen is a 70 year old, presenting for the following health issues:      Patient is a 70-year-old female presenting to the clinic today for an emergency room follow-up.  She was seen for bilateral lower leg pain which is quite severe and woke her up from sleep.    A CT scan showed multiple degenerative changes in her lower back.  She reports some numbness and pain in her lower extremities.  No recent traumas.  We discussed this at length and I recommended getting an MRI she is open to getting this done.  This will guide us to know what the neck step should be.  In the meantime I recommend that she continues with Tylenol as needed for the back pain.  No other symptoms reported.    ER F/U (Follow up from the ER on 4-16-25.) and Imm/Inj (Declined the Covid injection today as she did not tolerate them well in the past.)        4/29/2025    10:02 AM   Additional Questions   Roomed by Chani Moody CMA   Accompanied by Self     Chief Complaint   Patient presents with    ER F/U     Follow up from the ER on 4-16-25.    Imm/Inj     Declined the Covid injection today as she did not tolerate them well in the past.       HPI      ED/UC Followup:    Facility:  [facility] ER  Date of visit: 4-16-25  Reason for visit: " "Bilateral leg pain  Current Status: She states the back pain has not changed since the ER visit.  It goes into her hips and is painful.  When moving around the pain can get to a 8-9/10.   Feels the leg pain could be coming from her knee's.  She has had bilateral knee replacements done twice.         Review of Systems   Constitutional: Negative.    HENT: Negative.     Eyes: Negative.    Respiratory: Negative.     Cardiovascular: Negative.    Gastrointestinal: Negative.    Endocrine: Negative.    Genitourinary: Negative.    Musculoskeletal:  Positive for arthralgias, back pain and gait problem.   Skin: Negative.    Allergic/Immunologic: Negative.    Hematological: Negative.    Psychiatric/Behavioral: Negative.            Objective    /84 (BP Location: Right arm, Patient Position: Chair, Cuff Size: Adult Large)   Pulse 75   Temp 99.1  F (37.3  C) (Tympanic)   Resp 16   Ht 1.575 m (5' 2\")   Wt 124.3 kg (274 lb)   LMP 05/29/2006 (Approximate)   SpO2 97%   BMI 50.12 kg/m    Body mass index is 50.12 kg/m .  Physical Exam  Constitutional:       Appearance: Normal appearance. She is obese.   HENT:      Head: Normocephalic and atraumatic.      Right Ear: Tympanic membrane normal.      Left Ear: Tympanic membrane normal.   Eyes:      Extraocular Movements: Extraocular movements intact.      Pupils: Pupils are equal, round, and reactive to light.   Cardiovascular:      Rate and Rhythm: Normal rate and regular rhythm.      Pulses: Normal pulses.      Heart sounds: Normal heart sounds. No murmur heard.     No friction rub. No gallop.   Pulmonary:      Effort: Pulmonary effort is normal.      Breath sounds: Normal breath sounds.   Musculoskeletal:      Cervical back: Normal range of motion and neck supple.      Right lower leg: Tenderness present.      Left lower leg: Tenderness present.        Legs:       Comments: Lower back pain with radicular symptoms.    Skin:     General: Skin is warm and dry.   Neurological: "      General: No focal deficit present.      Mental Status: She is alert.   Psychiatric:         Mood and Affect: Mood normal.         Behavior: Behavior normal.        FINDINGS:  Significant beam hardening artifact related to overlapping soft tissues/habitus. No acute fracture identified. Multilevel mild degenerative disc disease. Moderate to severe lower facet arthropathy. No high-grade spinal canal or foraminal stenosis.   Scattered vascular calcifications.                                                                      IMPRESSION:  1.  No acute fracture identified.          Signed Electronically by: Jennie Suazo MD

## 2025-05-06 ENCOUNTER — TELEPHONE (OUTPATIENT)
Dept: FAMILY MEDICINE | Facility: CLINIC | Age: 70
End: 2025-05-06
Payer: COMMERCIAL

## 2025-05-06 NOTE — TELEPHONE ENCOUNTER
The patient is calling because she missed her MRI appt today and when she called to reschedule the appt they told her that she needs a new order put in. Please call the patient back to discuss at [phone] and it is ok to leave a detailed voice message. Thank you.

## 2025-05-07 ENCOUNTER — VIRTUAL VISIT (OUTPATIENT)
Dept: PHARMACY | Facility: OTHER | Age: 70
End: 2025-05-07
Attending: FAMILY MEDICINE
Payer: COMMERCIAL

## 2025-05-07 DIAGNOSIS — Z71.85 VACCINE COUNSELING: ICD-10-CM

## 2025-05-07 DIAGNOSIS — M06.09 SERONEGATIVE RHEUMATOID ARTHRITIS OF MULTIPLE SITES (H): Primary | ICD-10-CM

## 2025-05-07 DIAGNOSIS — Z79.899 HIGH RISK MEDICATION USE: ICD-10-CM

## 2025-05-07 NOTE — TELEPHONE ENCOUNTER
Spoke w imaging [phone] and they confirmed order is still valid and pt just needs to call to reschedule. Lvm for pt.    Michell Lewis on 5/7/2025 at 11:05 AM

## 2025-05-07 NOTE — PATIENT INSTRUCTIONS
"Recommendations from today's MTM visit:                                                       Continue current medication regimen  Have labs ordered by Danni Martínez checked approximately 5/22/25  Consider the following vaccines:   Tetanus (TDaP) - due approximately 5/26/25    Follow-up: Return in about 3 months (around 8/7/2025) for Follow up, with me, using a phone visit.    It was great speaking with you today.  I value your experience and would be very thankful for your time in providing feedback in our clinic survey. In the next few days, you may receive an email or text message from [employer] with a link to a survey related to your  clinical pharmacist.\"     To schedule another MTM appointment, please call the clinic directly or you may call the MTM scheduling line at [phone] or toll-free at [phone].     My Clinical Pharmacist's contact information:                                                      Please feel free to contact me with any questions or concerns you have.      Que Jeffrey, PharmD  Medication Therapy Management Pharmacist  [facility]  Phone: [phone]   "

## 2025-05-07 NOTE — TELEPHONE ENCOUNTER
There is already an order in for patient. It was placed on 4/29/2025 and has already gone through her insurance eligibility process. I can see it both in the referrals and in the imaging orders.   Please reach out to imaging to see why the are needing a new order.    Cherelle Oneill RN on 5/7/2025 at 10:35 AM

## 2025-05-07 NOTE — PROGRESS NOTES
Medication Therapy Management (MTM) Encounter    ASSESSMENT:                            Medication Adherence/Access: No issues identified.    Seronegative rheumatoid arthritis  Patient would benefit from continuing current regimen as she is tolerating it well and seeing slight improvement after only a few doses. Discussed that she may see greater benefit with more time on therapy as it can take 3-6 months for some to notice effect. She is due for lab monitoring in approximately 1-2 weeks.    Vaccines  Per ACIP guidelines, patient is eligible for Covid-19, Influenza, RSV, and TDAP. Recommend defer RSV and influenza until late summer/early fall 2025. Patient previously declined COVID. She is due for tetanus booster approximately 5/26/25.    PLAN:                            Continue current medication regimen  Have labs ordered by Danni Martínez checked approximately 5/22/25  Consider the following vaccines:   Tetanus (TDaP) - due approximately 5/26/25    Follow-up: Return in about 3 months (around 8/7/2025) for Follow up, with me, using a phone visit.    SUBJECTIVE/OBJECTIVE:                          Karen Harris is a 70 year old female seen for a follow-up visit from 11/19/2024. She was referred to me from Danni Martínez PA-C.       Reason for visit: Rheumatoid arthritis follow up.    Allergies/ADRs: Reviewed in chart  Past Medical History: Reviewed in chart  Tobacco: She reports that she has never smoked. She has never used smokeless tobacco.  Alcohol: Less than 1 beverage / month    Medication Adherence/Access: no issues reported.    Seronegative rheumatoid arthritis   Orencia 125 mg subcutaneously once weekly (started 4/22/2025, last dose 5/6/2025)    Patient reports no side effects from Orencia and no troubles with administration. States her fingers aren't locking like they were before she started Orencia. Reports no morning stiffness. States she has pain in her knees that she rates 6-7/10 and some swelling  there as well. She attributes this to previous knee surgeries and less so to her arthritis. Working with her primary care provider for evaluation of pain in her back. Patient had scheduled appointment for MRI and missed the appointment. She plans to reschedule and plans to call after our visit.    Previous therapies: Humira every 7 days and every 14 days (ineffective), methotrexate injections, sulfasalazine, hydroxychloroquine, Enbrel (loss of efficacy)     Last lab monitoring completed: 1/20/2025    Vaccinations:  Immunization History   Covid-19 vaccine (0715-9289 version)  Due to receive - previously declined   Influenza (annual) Due to receive   Pneumococcal  Prevnar-20: 12/19/2022 Up-to-date   Tetanus/Tdap  Up-to-date   Shingrix Up-to-date   RSV (only for >= 60 years old)  Due to receive   All patients on biologics should avoid live vaccines (varicella/VZV, intranasal influenza, MMR, or yellow fever vaccine (if traveling))       Today's Vitals: LMP 05/29/2006 (Approximate)   ----------------  Post Discharge Medication Reconciliation Status: patient was not discharged from an inpatient facility or TCU.    I spent 12 minutes with this patient today. All changes were made via collaborative practice agreement with Danni Martínez.     A summary of these recommendations was sent via [employer].    Que Jeffrey, PharmD  Medication Therapy Management Pharmacist  [facility]  Phone: [phone]    Telemedicine Visit Details  The patient's medications can be safely assessed via a telemedicine encounter.  Type of service:  Telephone visit  Originating Location (pt. Location): Home    Distant Location (provider location):  Off-site  Start Time: 11:00 AM  End Time: 11:12 AM     Medication Therapy Recommendations  Seronegative rheumatoid arthritis of multiple sites (H)   1 Current Medication: Abatacept (ORENCIA) 125 MG/ML SOAJ auto-injector   Current Medication Sig: Inject 1 mL (125 mg)  subcutaneously every 7 days. Hold for signs of infection, and seek medical attention.   Rationale: Medication requires monitoring - Needs additional monitoring   Recommendation: Continue to Monitor   Status: Patient Agreed - Adherence/Education   Identified Date: 5/7/2025 Completed Date: 5/7/2025         Vaccine counseling   1 Rationale: Preventive therapy - Needs additional medication therapy - Indication   Recommendation: Provide Education   Status: Patient Agreed - Adherence/Education   Identified Date: 5/7/2025 Completed Date: 5/7/2025

## 2025-05-12 DIAGNOSIS — E55.9 VITAMIN D DEFICIENCY: ICD-10-CM

## 2025-05-12 RX ORDER — ERGOCALCIFEROL 1.25 MG/1
50000 CAPSULE, LIQUID FILLED ORAL WEEKLY
Qty: 8 CAPSULE | Refills: 5 | OUTPATIENT
Start: 2025-05-12

## 2025-05-13 ENCOUNTER — RESULTS FOLLOW-UP (OUTPATIENT)
Dept: RHEUMATOLOGY | Facility: CLINIC | Age: 70
End: 2025-05-13

## 2025-05-13 ENCOUNTER — LAB (OUTPATIENT)
Dept: LAB | Facility: CLINIC | Age: 70
End: 2025-05-13
Payer: COMMERCIAL

## 2025-05-13 DIAGNOSIS — M06.09 SERONEGATIVE RHEUMATOID ARTHRITIS OF MULTIPLE SITES (H): ICD-10-CM

## 2025-05-13 DIAGNOSIS — Z79.899 HIGH RISK MEDICATION USE: ICD-10-CM

## 2025-05-13 DIAGNOSIS — E55.9 VITAMIN D DEFICIENCY: Primary | ICD-10-CM

## 2025-05-13 DIAGNOSIS — E55.9 VITAMIN D DEFICIENCY: ICD-10-CM

## 2025-05-13 LAB
ALBUMIN SERPL BCG-MCNC: 4.1 G/DL (ref 3.5–5.2)
ALT SERPL W P-5'-P-CCNC: 7 U/L (ref 0–50)
AST SERPL W P-5'-P-CCNC: 22 U/L (ref 0–45)
CREAT SERPL-MCNC: 0.78 MG/DL (ref 0.51–0.95)
CRP SERPL-MCNC: <3 MG/L
EGFRCR SERPLBLD CKD-EPI 2021: 81 ML/MIN/1.73M2
ERYTHROCYTE [DISTWIDTH] IN BLOOD BY AUTOMATED COUNT: 15.7 % (ref 10–15)
ERYTHROCYTE [SEDIMENTATION RATE] IN BLOOD BY WESTERGREN METHOD: 22 MM/HR (ref 0–30)
HCT VFR BLD AUTO: 37.2 % (ref 35–47)
HGB BLD-MCNC: 11 G/DL (ref 11.7–15.7)
MCH RBC QN AUTO: 23.1 PG (ref 26.5–33)
MCHC RBC AUTO-ENTMCNC: 29.6 G/DL (ref 31.5–36.5)
MCV RBC AUTO: 78 FL (ref 78–100)
PLATELET # BLD AUTO: 237 10E3/UL (ref 150–450)
RBC # BLD AUTO: 4.77 10E6/UL (ref 3.8–5.2)
VIT D+METAB SERPL-MCNC: 26 NG/ML (ref 20–50)
WBC # BLD AUTO: 5.1 10E3/UL (ref 4–11)

## 2025-05-13 PROCEDURE — 84450 TRANSFERASE (AST) (SGOT): CPT

## 2025-05-13 PROCEDURE — 85652 RBC SED RATE AUTOMATED: CPT

## 2025-05-13 PROCEDURE — 82306 VITAMIN D 25 HYDROXY: CPT

## 2025-05-13 PROCEDURE — 85027 COMPLETE CBC AUTOMATED: CPT

## 2025-05-13 PROCEDURE — 82565 ASSAY OF CREATININE: CPT

## 2025-05-13 PROCEDURE — 86140 C-REACTIVE PROTEIN: CPT

## 2025-05-13 PROCEDURE — 84460 ALANINE AMINO (ALT) (SGPT): CPT

## 2025-05-13 PROCEDURE — 82040 ASSAY OF SERUM ALBUMIN: CPT

## 2025-05-13 PROCEDURE — 36415 COLL VENOUS BLD VENIPUNCTURE: CPT

## 2025-05-14 ENCOUNTER — HOSPITAL ENCOUNTER (OUTPATIENT)
Dept: MRI IMAGING | Facility: CLINIC | Age: 70
Discharge: HOME | End: 2025-05-14
Attending: FAMILY MEDICINE
Payer: COMMERCIAL

## 2025-05-14 ENCOUNTER — RESULTS FOLLOW-UP (OUTPATIENT)
Dept: FAMILY MEDICINE | Facility: CLINIC | Age: 70
End: 2025-05-14

## 2025-05-14 DIAGNOSIS — M47.16 LUMBAR SPONDYLOSIS WITH MYELOPATHY: ICD-10-CM

## 2025-05-14 PROCEDURE — 72148 MRI LUMBAR SPINE W/O DYE: CPT

## 2025-05-16 ENCOUNTER — RESULTS FOLLOW-UP (OUTPATIENT)
Dept: FAMILY MEDICINE | Facility: CLINIC | Age: 70
End: 2025-05-16

## 2025-05-16 DIAGNOSIS — M47.16 LUMBAR SPONDYLOSIS WITH MYELOPATHY: Primary | ICD-10-CM

## 2025-05-16 NOTE — TELEPHONE ENCOUNTER
Repeat vit d was within normal limits so patient does not need high dose any more. She needs a maintenance dose.

## 2025-05-19 RX ORDER — CHOLECALCIFEROL (VITAMIN D3) 50 MCG
1 TABLET ORAL DAILY
COMMUNITY
Start: 2025-05-19

## 2025-05-19 NOTE — TELEPHONE ENCOUNTER
Spoke with patient, reviewed vit D results and recommended dosing and she verbalized good understanding.,     Updated med profile to reflect the recommended changes.    Julie Behrendt RN

## 2025-06-19 DIAGNOSIS — G89.29 CHRONIC LOW BACK PAIN, UNSPECIFIED BACK PAIN LATERALITY, UNSPECIFIED WHETHER SCIATICA PRESENT: Primary | ICD-10-CM

## 2025-06-19 DIAGNOSIS — M54.50 CHRONIC LOW BACK PAIN, UNSPECIFIED BACK PAIN LATERALITY, UNSPECIFIED WHETHER SCIATICA PRESENT: Primary | ICD-10-CM

## 2025-06-23 ENCOUNTER — OFFICE VISIT (OUTPATIENT)
Dept: ORTHOPEDICS | Facility: OTHER | Age: 70
End: 2025-06-23
Attending: FAMILY MEDICINE
Payer: COMMERCIAL

## 2025-06-23 ENCOUNTER — HOSPITAL ENCOUNTER (OUTPATIENT)
Dept: GENERAL RADIOLOGY | Facility: OTHER | Age: 70
Discharge: HOME | End: 2025-06-23
Attending: STUDENT IN AN ORGANIZED HEALTH CARE EDUCATION/TRAINING PROGRAM
Payer: COMMERCIAL

## 2025-06-23 DIAGNOSIS — M54.50 CHRONIC LOW BACK PAIN, UNSPECIFIED BACK PAIN LATERALITY, UNSPECIFIED WHETHER SCIATICA PRESENT: ICD-10-CM

## 2025-06-23 DIAGNOSIS — G89.29 CHRONIC LOW BACK PAIN, UNSPECIFIED BACK PAIN LATERALITY, UNSPECIFIED WHETHER SCIATICA PRESENT: ICD-10-CM

## 2025-06-23 DIAGNOSIS — M47.16 LUMBAR SPONDYLOSIS WITH MYELOPATHY: ICD-10-CM

## 2025-06-23 DIAGNOSIS — M47.816 LUMBAR SPONDYLOSIS: Primary | ICD-10-CM

## 2025-06-23 DIAGNOSIS — M47.26 OTHER SPONDYLOSIS WITH RADICULOPATHY, LUMBAR REGION: ICD-10-CM

## 2025-06-23 PROCEDURE — G0463 HOSPITAL OUTPT CLINIC VISIT: HCPCS

## 2025-06-23 PROCEDURE — 72110 X-RAY EXAM L-2 SPINE 4/>VWS: CPT | Mod: 26 | Performed by: RADIOLOGY

## 2025-06-23 PROCEDURE — 72110 X-RAY EXAM L-2 SPINE 4/>VWS: CPT

## 2025-06-23 NOTE — PROGRESS NOTES
HPI    Pleasure seeing Karen in the [facility] clinic today.    As you know she is a wonderful 70-year-old female presenting primarily with axial back pain that is primarily limiting her ability to ambulate for prolonged periods of time given the severe nature of it.  She really has not tried a ton of conservative therapy to this point but states that his symptoms significantly impacting her quality of life.  She denies any numbness or weakness of bilateral lower extremities with the exception of the pain that causes her legs want to give out at times.  She denies any balance or fine motor skill issues.    Physical exam    Patient stands in neutral sagittal and coronal alignment.  She has slow but steady gait.  Hip and shoulder exams seem to be unremarkable.  No long track signs.  Full-strength in upper and lower EXTR myotomes.  Full sensation upper extremity dermatomes.  Cervical range of motion is normal age-appropriate thoracolumbar range of motion is significantly limited secondary to the provocative nature of endrange flexion and extension.  Reflexes are 1+ symmetric.    Imaging    Radiographs and MRI lumbar spine are available for review.  The MRI delineates focal 3 4 spondylosis.  The MRI delineates moderate bilateral lateral recess and some facet fluid at the L3-4 level.    Assessment    1 is a pleasant 70-year-old female presenting with axial back pain in the setting of lumbar spondylosis and moderate compression in her back.  I think at this time it is prudent we exhaust all conservative treatment options with regards to a L3-4 interlaminar injection into physical therapy.  Referrals for both of these will be provided.    Plan    L3-4 interlaminar injection.  Physical therapy for back health.

## 2025-07-01 ENCOUNTER — TELEPHONE (OUTPATIENT)
Dept: FAMILY MEDICINE | Facility: CLINIC | Age: 70
End: 2025-07-01
Payer: COMMERCIAL

## 2025-07-01 DIAGNOSIS — E55.9 VITAMIN D DEFICIENCY, UNSPECIFIED: ICD-10-CM

## 2025-07-01 DIAGNOSIS — Z76.0 ENCOUNTER FOR MEDICATION REFILL: Primary | ICD-10-CM

## 2025-07-01 NOTE — TELEPHONE ENCOUNTER
Patient Quality Outreach    Patient is due for the following:   Physical Annual Wellness Visit    Action(s) Taken:   Schedule a Annual Wellness Visit    Type of outreach:    Sent [employer] message. and will postpone for 1 week to see if patient views message.    Questions for provider review:    None         Diane GUERRIER LPN  Chart routed to Care Team.

## 2025-07-01 NOTE — TELEPHONE ENCOUNTER
Date of last Prolia injection:  2/18/25  Next due:  8/19/25    1.  Obtain CAM order from a provider at the site the patient intends to receive the injection.  Dr Suazo has pending order in chart dated 2/15/25.    2.  Start a telephone encounter, contact pt, and arrange Prolia injection appt on RN schedule a few weeks into the future.  PA team cannot pursue a prior auth without the scheduled appt.  If PA is approved, appt may need to be adjusted to a later date to accommodate protocol completion.  Route PA request to P [id number].  Routed to PA pool 7/1/25.  LAR  Approved 7/2/25.    3.  Once the PA is approved, contact patient and arrange for lab work.  Calcium needs be completed within 2 weeks of injection per  recommendations, or per provider authorization.  Lab work is ordered by the ordering provider.  [place]  Pt will be back 7/17.  Lab appt 7/18.    Routed to Dr Suazo for lab order placement.    7/1, LAR    4.  Verify the following for patient:  A. They have had a GFR in the past 12 months, and it is 30 or greater with no diagnosis of CKD4 or CKD5. 5/13/25 GFR is 81   B. Patient denies gastric bypass or parathyroid surgery in the past 6 months.    Denies  C. Patient denies dental work in the past 2 months involving drilling into the bone, such as implants/extractions, oral surgery or a tooth extraction that has not healed yet.   Denies dental work or planned dental work.  D. Patient denies plans for an emergency tooth extraction within the next week.   None planned.    5.  If lab work is acceptable and all above criteria have been met, contact patient and confirm with pt their scheduled RN visit for the injection.  Or, reschedule the RN appt with pt if needed.      Order Prolia from wholesale pharmacy.  Allow time for Prolia to arrive from wholesale pharmacy.    Orders to wholesale pharmacy go in on Wednesdays and arrive on Mondays.    If you  must expedite the order for next day delivery, the order must be placed before 2 pm.  And, wholesale pharmacy does NOT accept orders on Fridays.  Friday orders will be addressed the following Monday.   [facility] application:  [employer] Wholesale ([email or web address])    [email or web address]    6. Print a copy of the order confirmation e-mail received.  Print 2 patient labels- attach one to the order sheet printed from the email confirmation, and attach the second label to the order log sheet on clipboard by fridge in clinic B and fill out log sheet with requested information.     7. When the medication arrives, MA will attach the order sheet to the medication, place in the fridge and update the order log sheet.    Med is kept in fridge until administered by RN.     *Prolia is a subcutaneous injection used to treat osteoporosis.  It can be left in fridge until patient arrives.  It needs to be used within 15 minutes, otherwise placed back in fridge during this timeframe, or used within 30 days.     8. After Prolia injection given, cue up a reminder telephone encounter for the next injection that will be due in 6 months.  Send the TE to the nurse pool and post-pone it for 1 month prior to when the next Prolia injection is due.

## 2025-07-14 ENCOUNTER — HOSPITAL ENCOUNTER (OUTPATIENT)
Dept: GENERAL RADIOLOGY | Facility: OTHER | Age: 70
Discharge: HOME | End: 2025-07-14
Attending: STUDENT IN AN ORGANIZED HEALTH CARE EDUCATION/TRAINING PROGRAM | Admitting: STUDENT IN AN ORGANIZED HEALTH CARE EDUCATION/TRAINING PROGRAM
Payer: COMMERCIAL

## 2025-07-14 DIAGNOSIS — M47.26 OTHER SPONDYLOSIS WITH RADICULOPATHY, LUMBAR REGION: ICD-10-CM

## 2025-07-14 DIAGNOSIS — M47.16 LUMBAR SPONDYLOSIS WITH MYELOPATHY: ICD-10-CM

## 2025-07-14 DIAGNOSIS — M47.816 LUMBAR SPONDYLOSIS: ICD-10-CM

## 2025-07-14 PROCEDURE — 250N000011 HC RX IP 250 OP 636: Performed by: RADIOLOGY

## 2025-07-14 PROCEDURE — 250N000009 HC RX 250: Performed by: RADIOLOGY

## 2025-07-14 PROCEDURE — 255N000002 HC RX 255 OP 636: Performed by: RADIOLOGY

## 2025-07-14 PROCEDURE — 62323 NJX INTERLAMINAR LMBR/SAC: CPT

## 2025-07-14 PROCEDURE — 62323 NJX INTERLAMINAR LMBR/SAC: CPT | Performed by: RADIOLOGY

## 2025-07-14 RX ORDER — LIDOCAINE HYDROCHLORIDE 10 MG/ML
1 INJECTION, SOLUTION EPIDURAL; INFILTRATION; INTRACAUDAL; PERINEURAL ONCE
Status: COMPLETED | OUTPATIENT
Start: 2025-07-14 | End: 2025-07-14

## 2025-07-14 RX ORDER — DEXAMETHASONE SODIUM PHOSPHATE 10 MG/ML
10 INJECTION, SOLUTION INTRAMUSCULAR; INTRAVENOUS ONCE
Status: COMPLETED | OUTPATIENT
Start: 2025-07-14 | End: 2025-07-14

## 2025-07-14 RX ORDER — LIDOCAINE HYDROCHLORIDE 10 MG/ML
2 INJECTION, SOLUTION INFILTRATION; PERINEURAL ONCE
Status: COMPLETED | OUTPATIENT
Start: 2025-07-14 | End: 2025-07-14

## 2025-07-14 RX ADMIN — LIDOCAINE HYDROCHLORIDE 1 ML: 10 INJECTION, SOLUTION INFILTRATION; PERINEURAL at 10:25

## 2025-07-14 RX ADMIN — IOHEXOL 1 ML: 240 INJECTION, SOLUTION INTRATHECAL; INTRAVASCULAR; INTRAVENOUS; ORAL at 10:25

## 2025-07-14 RX ADMIN — LIDOCAINE HYDROCHLORIDE 2 ML: 10 INJECTION, SOLUTION EPIDURAL; INFILTRATION; INTRACAUDAL; PERINEURAL at 10:25

## 2025-07-14 RX ADMIN — DEXAMETHASONE SODIUM PHOSPHATE 10 MG: 10 INJECTION, SOLUTION INTRAMUSCULAR; INTRAVENOUS at 10:25

## 2025-07-14 NOTE — TELEPHONE ENCOUNTER
Becky Ohara to Me  Jennie Suazo MD  [place] [facility]        7/2/25  7:49 PM  This is approved.     Thank you,  Becky

## 2025-07-14 NOTE — TELEPHONE ENCOUNTER
Postponed until pt has lab work completed.  Labs scheduled [place] 7[place]18/25 [place].  Prolia is arranged on 8/19.  Tamela Chand RN

## 2025-07-18 NOTE — TELEPHONE ENCOUNTER
Routed to provider.  Pt completed lab work today.  Ok to proceed with Prolia injection?  Tamela Chand RN

## 2025-07-19 NOTE — PROGRESS NOTES
Rheumatology Clinic Visit  [facility]  MARISELA Ulloa     Jacquie Harris MRN# [id number]   YOB: 1955 Age: 70 year old   Date of Visit: 7/21/2025  Primary care provider: Jennie Suzao          Assessment and Plan:     1.  Seronegative rheumatoid arthritis  2.  High-risk medication use    Patient presents today for follow-up.  At times she feels that the Orencia has been helpful but other times she is not sure if it has been very effective.  She did recently get an injection into her back and has noticed significant improvement since starting that.  She does continue to have a lot of pain in her knees from her previous knee replacements.  She knows that the Orencia is not going to be effective for that.  At this time I would recommend that she continue on the Orencia.  Will check labs in 3 months.  She does have a lot of life stressors going on and I encouraged her to seek help with getting some of her stress under control.  She will follow-up with me in 3 months.      The longitudinal plan of care for the diagnosis(es)/condition(s) as documented were addressed during this visit. Due to the added complexity in care, I will continue to support Karen in the subsequent management and with ongoing continuity of care.    Plan:   Schedule follow-up with Danni Martínez PA-C in 3 months.  Labs: CBC, creatinine, Albumin, AST, ALT, CRP and Sed Rate in 3 months  Medication recommendations:   Continue Orencia 125mg/mL SubQ weekly    MARISELA Ulloa  Rheumatology         History of Present Illness:   Jacquie Harris presents for  evaluation of positive SYLVIE, inflammatory arthritis.    Rheumatological history:  Provider: Dr. Uribe   Pertinent labs: Negative rheumatoid factor, CCP antibody, sed rate and CRP.  Positive SYLVIE with a titer of 1:160, speckled pattern.  Normal/negative SYLVIE subsets, ANCA, uric acid, C3, C4  Previous Medications: Plaquenil 200 mg twice daily, last eye exam  "02/27/2023, Sulfasalazine, Methotrexate (non effective), Humira (not effective)  Current medication: Enbrel    Interval history July 21, 2025:  She states that sometimes the medication seems to help at times and other times it does not. She tolerates the Orencia well. No infections.     She got an injection for her back pain and that has been helpful. She got it done almost 1 week ago now. She continues to have pain with her knee replacements.     Interval history April 11, 2025:  Shot day is on Wednesday. She does have increased symptoms in her hands, particularly pain on Tuesdays. She feels that her hands are swollen all the time.     Interval history October 11, 2024:  Doing well. No changes.       Interval history May 22, 2024:  She states that her joints are doing okay. She is having some knee pain but feels this is more related to the weather. She states that her hands are good. When she gives herself a shot, but she will get a red spot and itching at the injection site. No infections since starting the Enbrel.     Interval history February 27, 2024:  She is not sure that her joints are much different. She wore compression gloves last week. She will still have her hands get stuck at times. When doing projects, she will switch hands for the project. She does not find the Humira to be effective taking it every other week versus when she was taking it weekly. Her hands feel \"clumsy\". She cannot do fine motor activities such as embroideries. She has been having some big toe pain. She states that last night that it was swollen and it went to the top of her foot.     Interval history November 27, 2023:  She did make a mistake and was taking her Humira weekly. She did skip last week and has a dose for today. She is finding relief. Last she was getting stuck and has some soreness, but overall feels things are good.     Interval history August 25, 2025:  She has not been doing well. She has not been able to get " "Methotrexate.No history of infections. No cardiovascular history. No history of cancer.      Interval history May 17, 2023:  She states that her hands are close today for making a fist. She still gets some trigger finger. She does still have a hard time opening the bottles. Overall she has not seen a lot of difference with the methotrexate.     Interval history March 15, 2023:  She increased the Sulfasalazine to 1 in the AM and 2 in the evening. She has continued with the Hydroxychloroquine as well. She states that her hands continue to get stuck and she has a lot of pain with them. She reports spending a lot of time rinsing them in hot water.     Interval history January 11, 2023:  She states that she is \"okay\". She has been having increased doctoring as her knees are \"really bad\". She has been working with some providers on that. She is thinking this is where the arthritis has started. She states that her hands continue to be the same. She has not noticed a change with the Sulfasalazine. Her hands continue to get stuck and she feels it is affecting more fingers. She is tolerating the medications well. She has not had any infections or fevers.     Interval history November 9, 2022:  She reports that the prednisone did anything. She states that her fingers continue to feel as if they are getting stuck. She continues to feel the swelling as well. She did have initial improvement in symptoms with the Hydroxychloroquine. She feels this was helpful until just before Dr. Uribe left. Initially she thought it was secondary to the humidity as it started in the summer.     History of gastric bleed on NSAIDs.    HPI from consult 10/5/2022:  Patient was previously treated with Dr. Uribe.  Last office visit was on 6/30/2022.  Initial evaluation was on November 10, 2021.  At that time she had reported pain in her bilateral hands with morning stiffness lasting 2 to 3 hours.  Physical exam showed subtle fullness " "involving the left third MCP and right fifth MCP joints with tenderness to palpation.  Mild tenderness involving the right fifth PIP and left first CMC.  Nodule involving the left third digit A1 pulley area with some tenderness on palpation and a subtle catching sensation with active flexion of the left third digit.    She has been unable to do the Paraffin wax due to cost. She uses hot water. She uses this in the morning to get things going. She states that she hurts today. She states that she has some good days, but \"not very many\". She is unsure that the Plaquenil is working. She states that she was expecting it to be a \"miracle\". She states that she was under the impression that you need something to stop and slow the arthritis with rheumatoid arthritis. She states that she does not do much with her fingers any more. She cannot paint or do crafts. She states that she has so much pain that she cannot even think about painting. She states that the pain is bad in the mornings and evening. If she is able to keep them warm, that seems to help. Many of her symptoms started summer 2020. She was noticing her fingers getting \"crooked\". She has not been getting the sticking of the fingers anymore.     No known family history of rheumatoid arthritis or lupus.  No known family history or personal history of psoriasis, ulcerative colitis or Crohn's disease.             Review of Systems:     Constitutional: negative  Skin: negative  Eyes: negative  Ears/Nose/Throat: negative  Respiratory: No shortness of breath, dyspnea on exertion, cough, or hemoptysis  Cardiovascular: negative  Gastrointestinal: negative  Genitourinary: negative  Musculoskeletal: as above  Neurologic: negative  Psychiatric: negative  Hematologic/Lymphatic/Immunologic: negative  Endocrine: negative         Active Problem List:     Patient Active Problem List    Diagnosis Date Noted    Nasal congestion 10/25/2024     Priority: Medium    Sore throat " 10/25/2024     Priority: Medium    Seronegative rheumatoid arthritis of multiple sites (H) 01/31/2024     Priority: Medium    History of total bilateral knee replacement 01/09/2023     Priority: Medium    Benign essential hypertension 06/19/2020     Priority: Medium    Rotator cuff syndrome, right 01/14/2020     Priority: Medium    Multiple joint pain, possible fibromyalgia 12/20/2019     Priority: Medium    Bronchospasm 01/07/2019     Priority: Medium    GI bleed 03/17/2018     Priority: Medium    Upper GI bleed 03/17/2018     Priority: Medium    Shoulder injury, right, subsequent encounter 08/21/2017     Priority: Medium    Fatty liver 04/20/2017     Priority: Medium    Elevated levels of transaminase & lactic acid dehydrogenase 04/15/2017     Priority: Medium    Bilateral cold feet 01/03/2017     Priority: Medium    Colles' fracture 07/06/2015     Priority: Medium    Encounter for routine gynecological examination  06/04/2015     Priority: Medium    Back pain, left below scapula, strained muscles 10/24/2013     Priority: Medium    HTN, goal below 140/80 05/05/2013     Priority: Medium    S/P TKR (total knee replacement) 04/03/2012     Priority: Medium    Osteoporosis 02/19/2012     Priority: Medium     Previous T scores -2.2  2/2012 Dexascan:  Lumbar spine L1-L4 T-score: -2.4, Left femoral neck T-score: -2.7, Right femoral neck T-score: -2.2   Percent change in lumbar spine: +6.6% since 4/16/2008   Percent change in femurs: +2.9% since 4/16/2008    IMPRESSION: Osteoporosis in the left femoral neck. Severe osteopenia  in the right femoral neck and lumbar spine.      Obesity, Class III, BMI 40-49.9 (morbid obesity) (H) 02/19/2012     Priority: Medium     ideal weight 120, goal weight 200, lose 58 lbs in 58 weeks.      Vitamin D deficiency 07/18/2011     Priority: Medium    Tick bite, infected, positive Lyme test 1996 not treated 07/08/2011     Priority: Medium    Rosacea 11/04/2009     Priority: Medium     Obstructive sleep apnea 12/01/2006     Priority: Medium     Sleep study 12/06 for EDS- AHI 36, desaturations to 65%. CPAP recommended but not tolerated.  01/16/07  ENT consult recommend CPAP and weight loss. Surgery discussed but success rate less than CPAP  1/15/07 CPAP trial, was not able to tolerate. Retried 2008, tolerated better.   Problem list name updated by automated process. Provider to review      Carpal tunnel syndrome 06/07/2006     Priority: Medium     04/10/09 Dr.Meletiou [facility]. Finding consistant with CTS, but nerve studies and MRI of C-Spine are normal. Corticosteroid injection given in office      CARDIOVASCULAR SCREENING; LDL GOAL LESS THAN 160 10/31/2010     Priority: Low    Allergic rhinitis 03/27/2006     Priority: Low     Problem list name updated by automated process. Provider to review      s/p gastric bypass x 2 07/05/2005     Priority: Low     Gastric bypass 1980              Past Medical History:     Past Medical History:   Diagnosis Date    HTN (hypertension)     IRON DEFICIENCY ANEMIA 7/5/2005     Iron infusion/ resolved since injections    Obstructive sleep apnea     DARWIN (obstructive sleep apnea)      Past Surgical History:   Procedure Laterality Date    ARTHROPLASTY KNEE  04/02/2012    Procedure:ARTHROPLASTY KNEE; Left Total Knee Arthroplasty--Anesth.Choice; Surgeon:HERNANDO THOMPSON; Location:[facility]    ARTHROTOMY SHOULDER, ROTATOR CUFF REPAIR, COMBINED Right 08/25/2017    Procedure: COMBINED ARTHROTOMY SHOULDER, ROTATOR CUFF REPAIR;  Right shoulder distal clavicle excision, subacromial decompression, rotator cuff repair ;  Surgeon: Hernando Thompson MD;  Location: [facility]    ARTHROTOMY SHOULDER, ROTATOR CUFF REPAIR, COMBINED Right 12/18/2017    Procedure: COMBINED ARTHROTOMY SHOULDER, ROTATOR CUFF REPAIR;  Right Shoulder Rotator Cuff Revision;  Surgeon: Hernando Thompson MD;  Location: [facility]    ARTHROTOMY SHOULDER, ROTATOR CUFF REPAIR, COMBINED Left 04/27/2018     Procedure: COMBINED ARTHROTOMY SHOULDER, ROTATOR CUFF REPAIR;  Left Shoulder Open Subacromial Decompression,Distal Clavicle Excision,Rotator Cuff Repair;  Surgeon: Hernando Thompson MD;  Location: [facility]    C/SECTION, LOW TRANSVERSE  1978, 1984, 1994    x 3    COLONOSCOPY  01/01/2001    normal colonoscopy    COLONOSCOPY N/A 08/22/2017    Procedure: COLONOSCOPY;  Colonoscopy  ;  Surgeon: Delfino Yoo MD;  Location: [facility]    COLONOSCOPY  08/22/2017    EXAMCOL    ESOPHAGOSCOPY, GASTROSCOPY, DUODENOSCOPY (EGD), COMBINED N/A 03/18/2018    Procedure: COMBINED ESOPHAGOSCOPY, GASTROSCOPY, DUODENOSCOPY (EGD);;  Surgeon: Poncho Galindo MD;  Location: [facility]    GASTRIC BYPASS  1980/2005    Gastric Bypass and revision    HERNIA REPAIR, INCISIONAL  06/16/2008    lap.repair with 10x8 mesh    ID REVISE KNEE JOINT REPLACE,ALL PARTS Left 05/29/2019    Dr. Cedric Fuller    ID TOTAL KNEE ARTHROPLASTY Right 06/22/2020    Dr. Cedric Fuller    TUBAL LIGATION  01/01/1994    [facility] ORAL SURGERY PROCEDURE  age 18    wisdom teeth            Social History:     Social History     Socioeconomic History    Marital status:      Spouse name: Not on file    Number of children: Not on file    Years of education: Not on file    Highest education level: Not on file   Occupational History    Not on file   Tobacco Use    Smoking status: Never    Smokeless tobacco: Never   Vaping Use    Vaping status: Never Used   Substance and Sexual Activity    Alcohol use: Yes     Alcohol/week: 0.0 standard drinks of alcohol     Comment: mixed very light 1 a month if that    Drug use: No    Sexual activity: Yes     Partners: Male     Birth control/protection: Surgical   Other Topics Concern    Parent/sibling w/ CABG, MI or angioplasty before 65F 55M? No     Service No    Blood Transfusions Yes     Comment: 1976, 1978, 1978, 1984    Caffeine Concern No    Occupational Exposure No    Hobby Hazards No    Sleep Concern No    Stress Concern No    Weight  Concern Yes    Special Diet No    Back Care No    Exercise No    Bike Helmet No    Seat Belt Yes    Self-Exams Not Asked   Social History Narrative    Not on file     Social Drivers of Health     Financial Resource Strain: Low Risk  (1/31/2024)    Financial Resource Strain     Within the past 12 months, have you or your family members you live with been unable to get utilities (heat, electricity) when it was really needed?: No   Food Insecurity: Low Risk  (1/31/2024)    Food Insecurity     Within the past 12 months, did you worry that your food would run out before you got money to buy more?: No     Within the past 12 months, did the food you bought just not last and you didn t have money to get more?: No   Transportation Needs: Low Risk  (1/31/2024)    Transportation Needs     Within the past 12 months, has lack of transportation kept you from medical appointments, getting your medicines, non-medical meetings or appointments, work, or from getting things that you need?: No   Physical Activity: Not on file   Stress: Not on file   Social Connections: Not on file   Interpersonal Safety: Low Risk  (4/29/2025)    Interpersonal Safety     Do you feel physically and emotionally safe where you currently live?: Yes     Within the past 12 months, have you been hit, slapped, kicked or otherwise physically hurt by someone?: No     Within the past 12 months, have you been humiliated or emotionally abused in other ways by your partner or ex-partner?: No   Housing Stability: Low Risk  (1/31/2024)    Housing Stability     Do you have housing? : Yes     Are you worried about losing your housing?: No          Family History:     Family History   Problem Relation Age of Onset    C.A.D. Father         MI at age 60    Hypertension Father     Alzheimer Disease Father     Hyperlipidemia Father     Osteoporosis Mother         on Fosamax    Glaucoma Mother     Other Cancer Brother     Stomach Cancer Maternal Aunt     Cervical Cancer Cousin      Diabetes No family hx of     Cerebrovascular Disease No family hx of     Breast Cancer No family hx of     Cancer - colorectal No family hx of     Prostate Cancer No family hx of     Liver Disease No family hx of             Allergies:     Allergies   Allergen Reactions    Ibuprofen      Other reaction(s): Gastrointestinal  Gastric bleed    Naproxen GI Disturbance     Other reaction(s): Gastrointestinal  Gastric bleed    Nickel Itching     Inflamed with cheap earrings (itchy)    Rofecoxib Itching and Swelling     VIOXX (Swollen Feet,Hands itching), Okay with ibuprofen and aspirin    Vioxx Itching and Swelling     VIOXX (Swollen Feet,Hands itching), Okay with ibuprofen and aspirin            Medications:     Current Outpatient Medications   Medication Sig Dispense Refill    Abatacept (ORENCIA) 125 MG/ML SOAJ auto-injector Inject 1 mL (125 mg) subcutaneously every 7 days. Hold for signs of infection, and seek medical attention. 4 mL 5    acetaminophen (TYLENOL) 160 MG/5ML elixir Take 31.25 mLs (1,000 mg) by mouth every 6 hours as needed for fever or pain. 946 mL 0    atenolol (TENORMIN) 50 MG tablet TAKE 2 TABLETS BY MOUTH DAILY (Patient taking differently: Take 50 mg by mouth 2 times daily.) 200 tablet 2    fluticasone (FLONASE) 50 MCG/ACT nasal spray Spray 1 spray into both nostrils daily. 16 g 1    mirabegron (MYRBETRIQ) 25 MG 24 hr tablet Take 1 tablet (25 mg) by mouth daily. 90 tablet 3    nystatin (NYAMYC) 133115 UNIT/GM external powder APPLY TOPICALLY TO AFFECTED  AREA(S) 3 TIMES DAILY AS NEEDED 60 g 1    vitamin D3 (CHOLECALCIFEROL) 50 mcg (2000 units) tablet Take 1 tablet (50 mcg) by mouth daily.      etanercept (ENBREL SURECLICK) 50 MG/ML autoinjector Inject 50 mg subcutaneously every 7 days. (Patient not taking: Reported on 7/21/2025) 4 mL 5    loratadine (CLARITIN) 10 MG tablet Take 1 tablet (10 mg) by mouth daily. (Patient not taking: Reported on 7/21/2025) 30 tablet 1            Physical Exam:  "  Blood pressure (!) 156/75, pulse 72, temperature 98.6  F (37  C), temperature source Tympanic, resp. rate 18, height 1.575 m (5' 2\"), last menstrual period 2006, SpO2 94%, not currently breastfeeding.  Wt Readings from Last 6 Encounters:   25 124.3 kg (274 lb)   25 125.2 kg (276 lb)   25 125.2 kg (276 lb)   24 125.2 kg (276 lb)   24 125.2 kg (276 lb)   08/15/24 122 kg (269 lb)       Resp: No shortness of breath with normal conversation  Psych: nl judgement, orientation, memory, affect.  Skin: No skin rashes noted to exposed skin.           Data:   Imagin/10/2021 x-ray bilateral hand  IMPRESSION: Generalized demineralization. Mild degenerative arthritis involving the interphalangeal joints of the fingers, and the basilar joints of both thumbs, with mild joint space narrowing and osteophytic spurring. Generalized bone demineralization.  No evidence of erosive arthropathy. Soft tissues are normal.    11/10/2021 x-ray bilateral knee  IMPRESSION:   RIGHT KNEE: Uncomplicated cemented total knee arthroplasty. Normal joint alignment. No fracture or erosion. No joint effusion.     LEFT KNEE: Cemented longstem hinged total knee arthroplasty. No evidence of loosening or hardware complication. Mild patella baja. No fracture or erosion. No joint effusion.    11/10/2021 x-ray right wrist  IMPRESSION: No acute fracture. Chronic healed fracture of the distal radius. Chronic ununited fracture of the ulnar styloid process. Moderate ulnar positive variance. Mild degenerative changes at the STT and first CMC joints.    2022 x-rays left shoulder  IMPRESSION:  1.  Normal left glenohumeral joint spacing and alignment.  2.  Distal clavicle excision, unchanged.  3.  No fracture.  4.  No significant change since the comparison.    2022 x-ray cervical spine  IMPRESSION: Minimal degenerative anterolisthesis of C2 upon C3, C3  upon C4 and C5 on C6. Alignment otherwise normal. Vertebral " body  heights are normal. Moderate facet arthropathy at C2-C3. Mild  degenerative endplate spurring at C4-C5, C5-C6 and C6-C7. No  fractures.    Laboratory:  10/4/2022  Creatinine 0.79, GFR 81  Albumin 4.1  ALT 9, AST 17  White blood cell count 5.7, hemoglobin 11.9, platelet count 215    12/19/2022  Creatinine 0.72, GFR greater than 90  Albumin 4.0  ALT 7, AST 20  White blood cell count, hemoglobin, platelet count within normal limits    1/10/2023  Albumin creatinine 42.87  Albumin urine 85.4    3/9/2023  Creatinine 0.77, GFR 84  Albumin 3.9  ALT 10, AST 19  Albumin in the urine less than 12  CBC within normal limits    5/17/2023  Creatinine 0.62, GFR greater than 90  Albumin 4.2  ALT 10, AST 17  White blood cell count 5.0, hemoglobin 11.6, platelet count 209    8/25/2023  TB negative  Hepatitis B nonreactive  Hepatitis C nonreactive    10/03/2023  Creatinine 0.85, GFR 74  Albumin 4.1  ALT 10, AST 17  White blood cell count 7.7, hemoglobin 11.9, platelet count 214    6/12/2024  Creatinine 0.84, GFR 74  Albumin 4.2  ALT 12, AST 19  CRP less than 3.0  White blood cell count 5.2, hemoglobin 11.4, platelet count 206  Sed rate 11    8/15/2024  Lyme disease antibody seronegative    1/20/2025  Creatinine 0.79, GFR 80  Albumin 4.1  ALT 12, AST 20  White blood cell count 6.1, hemoglobin 11.6, platelet count 243    7/18/2025  Creatinine 0.79, GFR 80  Albumin 4.0  ALT 11, AST 19  Vitamin D 27  Reports a count 6.6, hemoglobin 11.1, platelet count 275

## 2025-07-21 ENCOUNTER — OFFICE VISIT (OUTPATIENT)
Dept: FAMILY MEDICINE | Facility: CLINIC | Age: 70
End: 2025-07-21
Payer: COMMERCIAL

## 2025-07-21 ENCOUNTER — OFFICE VISIT (OUTPATIENT)
Dept: RHEUMATOLOGY | Facility: CLINIC | Age: 70
End: 2025-07-21
Payer: COMMERCIAL

## 2025-07-21 VITALS
RESPIRATION RATE: 20 BRPM | TEMPERATURE: 99.3 F | HEART RATE: 74 BPM | DIASTOLIC BLOOD PRESSURE: 80 MMHG | WEIGHT: 266.7 LBS | OXYGEN SATURATION: 98 % | SYSTOLIC BLOOD PRESSURE: 138 MMHG | BODY MASS INDEX: 45.53 KG/M2 | HEIGHT: 64 IN

## 2025-07-21 VITALS
HEIGHT: 62 IN | SYSTOLIC BLOOD PRESSURE: 156 MMHG | TEMPERATURE: 98.6 F | BODY MASS INDEX: 50.12 KG/M2 | RESPIRATION RATE: 18 BRPM | OXYGEN SATURATION: 94 % | HEART RATE: 72 BPM | DIASTOLIC BLOOD PRESSURE: 75 MMHG

## 2025-07-21 DIAGNOSIS — Z79.899 HIGH RISK MEDICATION USE: ICD-10-CM

## 2025-07-21 DIAGNOSIS — Z13.6 SCREENING FOR CARDIOVASCULAR CONDITION: ICD-10-CM

## 2025-07-21 DIAGNOSIS — Z13.220 LIPID SCREENING: ICD-10-CM

## 2025-07-21 DIAGNOSIS — M06.09 SERONEGATIVE RHEUMATOID ARTHRITIS OF MULTIPLE SITES (H): Primary | ICD-10-CM

## 2025-07-21 DIAGNOSIS — N32.81 OAB (OVERACTIVE BLADDER): ICD-10-CM

## 2025-07-21 DIAGNOSIS — Z00.00 ENCOUNTER FOR MEDICARE ANNUAL WELLNESS EXAM: Primary | ICD-10-CM

## 2025-07-21 LAB
CHOLEST SERPL-MCNC: 169 MG/DL
FASTING STATUS PATIENT QL REPORTED: NO
HDLC SERPL-MCNC: 44 MG/DL
LDLC SERPL CALC-MCNC: 98 MG/DL
NONHDLC SERPL-MCNC: 125 MG/DL
TRIGL SERPL-MCNC: 136 MG/DL

## 2025-07-21 PROCEDURE — G0439 PPPS, SUBSEQ VISIT: HCPCS | Performed by: FAMILY MEDICINE

## 2025-07-21 PROCEDURE — 3075F SYST BP GE 130 - 139MM HG: CPT | Performed by: FAMILY MEDICINE

## 2025-07-21 PROCEDURE — 99213 OFFICE O/P EST LOW 20 MIN: CPT | Mod: 25 | Performed by: FAMILY MEDICINE

## 2025-07-21 PROCEDURE — 99214 OFFICE O/P EST MOD 30 MIN: CPT | Performed by: PHYSICIAN ASSISTANT

## 2025-07-21 PROCEDURE — 3078F DIAST BP <80 MM HG: CPT | Performed by: PHYSICIAN ASSISTANT

## 2025-07-21 PROCEDURE — G2211 COMPLEX E/M VISIT ADD ON: HCPCS | Performed by: FAMILY MEDICINE

## 2025-07-21 PROCEDURE — G2211 COMPLEX E/M VISIT ADD ON: HCPCS | Performed by: PHYSICIAN ASSISTANT

## 2025-07-21 PROCEDURE — 1125F AMNT PAIN NOTED PAIN PRSNT: CPT | Performed by: FAMILY MEDICINE

## 2025-07-21 PROCEDURE — 3079F DIAST BP 80-89 MM HG: CPT | Performed by: FAMILY MEDICINE

## 2025-07-21 PROCEDURE — 3077F SYST BP >= 140 MM HG: CPT | Performed by: PHYSICIAN ASSISTANT

## 2025-07-21 PROCEDURE — 36415 COLL VENOUS BLD VENIPUNCTURE: CPT | Performed by: FAMILY MEDICINE

## 2025-07-21 PROCEDURE — 3048F LDL-C <100 MG/DL: CPT | Performed by: FAMILY MEDICINE

## 2025-07-21 PROCEDURE — 80061 LIPID PANEL: CPT | Performed by: FAMILY MEDICINE

## 2025-07-21 RX ORDER — MIRABEGRON 50 MG/1
50 TABLET, FILM COATED, EXTENDED RELEASE ORAL DAILY
Qty: 90 TABLET | Refills: 1 | Status: SHIPPED | OUTPATIENT
Start: 2025-07-21

## 2025-07-21 SDOH — HEALTH STABILITY: PHYSICAL HEALTH: ON AVERAGE, HOW MANY MINUTES DO YOU ENGAGE IN EXERCISE AT THIS LEVEL?: 0 MIN

## 2025-07-21 SDOH — HEALTH STABILITY: PHYSICAL HEALTH: ON AVERAGE, HOW MANY DAYS PER WEEK DO YOU ENGAGE IN MODERATE TO STRENUOUS EXERCISE (LIKE A BRISK WALK)?: 0 DAYS

## 2025-07-21 ASSESSMENT — SOCIAL DETERMINANTS OF HEALTH (SDOH): HOW OFTEN DO YOU GET TOGETHER WITH FRIENDS OR RELATIVES?: NEVER

## 2025-07-21 ASSESSMENT — PAIN SCALES - GENERAL: PAINLEVEL_OUTOF10: SEVERE PAIN (8)

## 2025-07-21 NOTE — PROGRESS NOTES
"Preventive Care Visit  [facility]  Jennie Suazo MD, Family Medicine  Jul 21, 2025      Assessment & Plan   Problem List Items Addressed This Visit    None  Visit Diagnoses         Encounter for Medicare annual wellness exam    -  Primary      Screening for cardiovascular condition          Lipid screening        Relevant Orders    Lipid panel reflex to direct LDL Non-fasting (Completed)    OFFICE/OUTPT VISIT,EST,LEVL IV (Completed)      OAB (overactive bladder)        Relevant Medications    mirabegron (MYRBETRIQ) 50 MG 24 hr tablet    Other Relevant Orders    OFFICE/OUTPT VISIT,EST,LEVL IV (Completed)             Patient is a 70 yr old female here for her annual physical. [place] ,she is due for her prolia in August. She wants to know if she can have it in [facility].we will try to coordinate with the clinic.      Patient has been advised of split billing requirements and indicates understanding: Yes    BMI  Estimated body mass index is 46.5 kg/m  as calculated from the following:    Height as of this encounter: 1.613 m (5' 3.5\").    Weight as of this encounter: 121 kg (266 lb 11.2 oz).       Counseling  Appropriate preventive services were addressed with this patient via screening, questionnaire, or discussion as appropriate for fall prevention, nutrition, physical activity, Tobacco-use cessation, social engagement, weight loss and cognition.  Checklist reviewing preventive services available has been given to the patient.  Reviewed patient's diet, addressing concerns and/or questions.   Patient is at risk for social isolation and has been provided with information about the benefit of social connection.   The patient was instructed to see the dentist every 6 months.   Patient reported safety concerns were addressed today.Information on urinary incontinence and treatment options given to patient.   Reviewed preventive health " counseling, as reflected in patient instructions       Regular exercise       Healthy diet/nutrition    Follow-up    Follow-up Visit   Expected date:  Jul 28, 2026 (Approximate)      Follow Up Appointment Details:     Follow-up with whom?: PCP    Follow-Up for what?: Medicare Wellness    Welcome or Annual?: Annual Wellness    How?: In Person                 Luba Jones is a 70 year old, presenting for the following:  Physical        7/21/2025     2:29 PM   Additional Questions   Roomed by Reyna MAYORGA   Accompanied by self         7/21/2025     2:29 PM   Patient Reported Additional Medications   Patient reports taking the following new medications none          HPI  Patient is a 70 yr old female here for her annual physical. She reports no acute symptoms. Her past medical history is significant for hypertension,hyperlipidemia, obesity. She reports that she is having some issues with her children particularly her first son who recently sued her and her .   She is due for some blood work. She will update her vaccines in the Fall.        Advance Care Planning    Document on file is a Health Care Directive or POLST.        7/21/2025   General Health   How would you rate your overall physical health? Good   Feel stress (tense, anxious, or unable to sleep) Not at all         7/21/2025   Nutrition   Diet: Regular (no restrictions)         7/21/2025   Exercise   Days per week of moderate/strenous exercise 0 days   Average minutes spent exercising at this level 0 min   (!) EXERCISE CONCERN      7/21/2025   Social Factors   Frequency of gathering with friends or relatives Never   Worry food won't last until get money to buy more Yes   Food not last or not have enough money for food? Yes   Do you have housing? (Housing is defined as stable permanent housing and does not include staying outside in a car, in a tent, in an abandoned building, in an overnight shelter, or couch-surfing.) Yes   Are you worried about losing  your housing? Yes   Lack of transportation? No   Unable to get utilities (heat,electricity)? No   Want help with housing or utility concern? No   (!) FOOD SECURITY CONCERN PRESENT(!) HOUSING CONCERN PRESENT(!) SOCIAL CONNECTIONS CONCERN      7/21/2025   Fall Risk   Fallen 2 or more times in the past year? No    Trouble with walking or balance? Yes    Reason Gait Speed Test Not Completed Patient declines   Reason for decline Patient in a lot of knee pain.       Proxy-reported          7/21/2025   Activities of Daily Living- Home Safety   Needs help with the following daily activites None of the above   Safety concerns in the home Throw rugs in the hallway    None of the above       Multiple values from one day are sorted in reverse-chronological order         7/21/2025   Dental   Dentist two times every year? (!) NO         7/21/2025   Hearing Screening   Hearing concerns? None of the above         7/21/2025   Driving Risk Screening   Patient/family members have concerns about driving No         7/21/2025   General Alertness/Fatigue Screening   Have you been more tired than usual lately? No         7/21/2025   Urinary Incontinence Screening   Bothered by leaking urine in past 6 months Yes         Today's PHQ-2 Score:       7/21/2025     2:29 PM   PHQ-2 ( 1999 [employer])   Q1: Little interest or pleasure in doing things 0    Q2: Feeling down, depressed or hopeless 1    PHQ-2 Score 1    Q1: Little interest or pleasure in doing things Not at all   Q2: Feeling down, depressed or hopeless Several days   PHQ-2 Score 1       Proxy-reported           7/21/2025   Substance Use   Alcohol more than 3/day or more than 7/wk No   Do you have a current opioid prescription? No   How severe/bad is pain from 1 to 10? 8/10   Do you use any other substances recreationally? No     Social History     Tobacco Use    Smoking status: Never    Smokeless tobacco: Never   Vaping Use    Vaping status: Never Used   Substance Use Topics    Alcohol  use: Yes     Alcohol/week: 0.0 standard drinks of alcohol     Comment: mixed very light 1 a month if that    Drug use: No           2/18/2025   LAST FHS-7 RESULTS   1st degree relative breast or ovarian cancer No   Any relative bilateral breast cancer No   Any male have breast cancer No   Any ONE woman have BOTH breast AND ovarian cancer No   Any woman with breast cancer before 50yrs No   2 or more relatives with breast AND/OR ovarian cancer No   2 or more relatives with breast AND/OR bowel cancer No        Mammogram Screening - Mammogram every 1-2 years updated in Health Maintenance based on mutual decision making    ASCVD Risk   The 10-year ASCVD risk score (Tayler DICKSON, et al., 2019) is: 15.9%    Values used to calculate the score:      Age: 70 years      Sex: Female      Is Non- : No      Diabetic: No      Tobacco smoker: No      Systolic Blood Pressure: 138 mmHg      Is BP treated: Yes      HDL Cholesterol: 44 mg/dL      Total Cholesterol: 169 mg/dL            Reviewed and updated as needed this visit by Provider                    Past Medical History:   Diagnosis Date    HTN (hypertension)     IRON DEFICIENCY ANEMIA 7/5/2005     Iron infusion/ resolved since injections    Obstructive sleep apnea     DARWIN (obstructive sleep apnea)      Past Surgical History:   Procedure Laterality Date    ARTHROPLASTY KNEE  04/02/2012    Procedure:ARTHROPLASTY KNEE; Left Total Knee Arthroplasty--Anesth.Choice; Surgeon:HERNANDO THOMPSON; Location:[facility]    ARTHROTOMY SHOULDER, ROTATOR CUFF REPAIR, COMBINED Right 08/25/2017    Procedure: COMBINED ARTHROTOMY SHOULDER, ROTATOR CUFF REPAIR;  Right shoulder distal clavicle excision, subacromial decompression, rotator cuff repair ;  Surgeon: Hernando Thompson MD;  Location: [facility]    ARTHROTOMY SHOULDER, ROTATOR CUFF REPAIR, COMBINED Right 12/18/2017    Procedure: COMBINED ARTHROTOMY SHOULDER, ROTATOR CUFF REPAIR;  Right Shoulder Rotator Cuff  Revision;  Surgeon: Hernando Thompson MD;  Location: [facility]    ARTHROTOMY SHOULDER, ROTATOR CUFF REPAIR, COMBINED Left 04/27/2018    Procedure: COMBINED ARTHROTOMY SHOULDER, ROTATOR CUFF REPAIR;  Left Shoulder Open Subacromial Decompression,Distal Clavicle Excision,Rotator Cuff Repair;  Surgeon: Hernando Thompson MD;  Location: [facility]    C/SECTION, LOW TRANSVERSE  1978, 1984, 1994    x 3    COLONOSCOPY  01/01/2001    normal colonoscopy    COLONOSCOPY N/A 08/22/2017    Procedure: COLONOSCOPY;  Colonoscopy  ;  Surgeon: Delfino Yoo MD;  Location: [facility]    COLONOSCOPY  08/22/2017    EXAMCOL    ESOPHAGOSCOPY, GASTROSCOPY, DUODENOSCOPY (EGD), COMBINED N/A 03/18/2018    Procedure: COMBINED ESOPHAGOSCOPY, GASTROSCOPY, DUODENOSCOPY (EGD);;  Surgeon: Poncho Galindo MD;  Location: [facility]    GASTRIC BYPASS  1980/2005    Gastric Bypass and revision    HERNIA REPAIR, INCISIONAL  06/16/2008    lap.repair with 10x8 mesh    PA REVISE KNEE JOINT REPLACE,ALL PARTS Left 05/29/2019    Dr. Cedric Fuller    PA TOTAL KNEE ARTHROPLASTY Right 06/22/2020    Dr. Cedric Fuller    TUBAL LIGATION  01/01/1994    [facility] SURG[facility] PROCEDURE  age 18    wisdom teeth     Lab work is in process  Labs reviewed in EPIC  BP Readings from Last 3 Encounters:   07/21/25 138/80   07/21/25 (!) 156/75   04/29/25 132/84    Wt Readings from Last 3 Encounters:   07/21/25 121 kg (266 lb 11.2 oz)   04/29/25 124.3 kg (274 lb)   04/16/25 125.2 kg (276 lb)                  Patient Active Problem List   Diagnosis    s/p gastric bypass x 2    Allergic rhinitis    Carpal tunnel syndrome    Obstructive sleep apnea    Rosacea    CARDIOVASCULAR SCREENING; LDL GOAL LESS THAN 160    Tick bite, infected, positive Lyme test 1996 not treated    Vitamin D deficiency    Osteoporosis    Obesity, Class III, BMI 40-49.9 (morbid obesity) (H)    S/P TKR (total knee replacement)    HTN, goal below 140/80    Back pain, left below scapula, strained muscles    Encounter for  routine gynecological examination     Colles' fracture    Bilateral cold feet    Elevated levels of transaminase & lactic acid dehydrogenase    Fatty liver    Shoulder injury, right, subsequent encounter    GI bleed    Upper GI bleed    Bronchospasm    Multiple joint pain, possible fibromyalgia    Rotator cuff syndrome, right    Benign essential hypertension    History of total bilateral knee replacement    Seronegative rheumatoid arthritis of multiple sites (H)    Nasal congestion    Sore throat     Past Surgical History:   Procedure Laterality Date    ARTHROPLASTY KNEE  04/02/2012    Procedure:ARTHROPLASTY KNEE; Left Total Knee Arthroplasty--Anesth.Choice; Surgeon:HERNANDO THOMPSON; Location:[facility]    ARTHROTOMY SHOULDER, ROTATOR CUFF REPAIR, COMBINED Right 08/25/2017    Procedure: COMBINED ARTHROTOMY SHOULDER, ROTATOR CUFF REPAIR;  Right shoulder distal clavicle excision, subacromial decompression, rotator cuff repair ;  Surgeon: Hernando Thompson MD;  Location: [facility]    ARTHROTOMY SHOULDER, ROTATOR CUFF REPAIR, COMBINED Right 12/18/2017    Procedure: COMBINED ARTHROTOMY SHOULDER, ROTATOR CUFF REPAIR;  Right Shoulder Rotator Cuff Revision;  Surgeon: Hernando Thompson MD;  Location: [facility]    ARTHROTOMY SHOULDER, ROTATOR CUFF REPAIR, COMBINED Left 04/27/2018    Procedure: COMBINED ARTHROTOMY SHOULDER, ROTATOR CUFF REPAIR;  Left Shoulder Open Subacromial Decompression,Distal Clavicle Excision,Rotator Cuff Repair;  Surgeon: Hernando Thompson MD;  Location: [facility]    C/SECTION, LOW TRANSVERSE  1978, 1984, 1994    x 3    COLONOSCOPY  01/01/2001    normal colonoscopy    COLONOSCOPY N/A 08/22/2017    Procedure: COLONOSCOPY;  Colonoscopy  ;  Surgeon: Delfino Yoo MD;  Location: [facility]    COLONOSCOPY  08/22/2017    EXAMCOL    ESOPHAGOSCOPY, GASTROSCOPY, DUODENOSCOPY (EGD), COMBINED N/A 03/18/2018    Procedure: COMBINED ESOPHAGOSCOPY, GASTROSCOPY, DUODENOSCOPY (EGD);;  Surgeon: Poncho Galindo MD;   Location: [facility]    GASTRIC BYPASS  1980/2005    Gastric Bypass and revision    HERNIA REPAIR, INCISIONAL  06/16/2008    lap.repair with 10x8 mesh    LA REVISE KNEE JOINT REPLACE,ALL PARTS Left 05/29/2019    Dr. Cedric Fuller    LA TOTAL KNEE ARTHROPLASTY Right 06/22/2020    Dr. Cedric Fuller    TUBAL LIGATION  01/01/1994    [facility] ORAL SURGERY PROCEDURE  age 18    wisdom teeth       Social History     Tobacco Use    Smoking status: Never    Smokeless tobacco: Never   Substance Use Topics    Alcohol use: Yes     Alcohol/week: 0.0 standard drinks of alcohol     Comment: mixed very light 1 a month if that     Family History   Problem Relation Age of Onset    C.A.D. Father         MI at age 60    Hypertension Father     Alzheimer Disease Father     Hyperlipidemia Father     Osteoporosis Mother         on Fosamax    Glaucoma Mother     Other Cancer Brother     Stomach Cancer Maternal Aunt     Cervical Cancer Cousin     Diabetes No family hx of     Cerebrovascular Disease No family hx of     Breast Cancer No family hx of     Cancer - colorectal No family hx of     Prostate Cancer No family hx of     Liver Disease No family hx of          Current Outpatient Medications   Medication Sig Dispense Refill    Abatacept (ORENCIA) 125 MG/ML SOAJ auto-injector Inject 1 mL (125 mg) subcutaneously every 7 days. Hold for signs of infection, and seek medical attention. 4 mL 5    acetaminophen (TYLENOL) 160 MG/5ML elixir Take 31.25 mLs (1,000 mg) by mouth every 6 hours as needed for fever or pain. 946 mL 0    atenolol (TENORMIN) 50 MG tablet TAKE 2 TABLETS BY MOUTH DAILY 200 tablet 2    fluticasone (FLONASE) 50 MCG/ACT nasal spray Spray 1 spray into both nostrils daily. 16 g 1    loratadine (CLARITIN) 10 MG tablet Take 1 tablet (10 mg) by mouth daily. 30 tablet 1    mirabegron (MYRBETRIQ) 50 MG 24 hr tablet Take 1 tablet (50 mg) by mouth daily. 90 tablet 1    nystatin (NYAMYC) 897303 UNIT/GM external powder APPLY TOPICALLY TO AFFECTED   AREA(S) 3 TIMES DAILY AS NEEDED 60 g 1    vitamin D3 (CHOLECALCIFEROL) 50 mcg (2000 units) tablet Take 1 tablet (50 mcg) by mouth daily.       Allergies   Allergen Reactions    Ibuprofen      Other reaction(s): Gastrointestinal  Gastric bleed    Naproxen GI Disturbance     Other reaction(s): Gastrointestinal  Gastric bleed    Nickel Itching     Inflamed with cheap earrings (itchy)    Rofecoxib Itching and Swelling     VIOXX (Swollen Feet,Hands itching), Okay with ibuprofen and aspirin    Vioxx Itching and Swelling     VIOXX (Swollen Feet,Hands itching), Okay with ibuprofen and aspirin     Current providers sharing in care for this patient include:  Patient Care Team:  Jennie Suazo MD as PCP - General (Family Practice)  Malachi Rome MD as MD (Gastroenterology)  Jennie Suazo MD as Assigned PCP  Danin Martínez PA-C as Assigned Rheumatology Provider  Que Jean-Baptiste DPM as Assigned Surgical Provider  Que Jeffrey RPH as Pharmacist (Pharmacist)  Que Jeffrey RPH as Assigned MTM Pharmacist  Yfn Hernandez MD as Assigned Musculoskeletal Provider    The following health maintenance items are reviewed in Epic and correct as of today:  Health Maintenance   Topic Date Due    RSV VACCINE (1 - Risk 60-74 years 1-dose series) Never done    COVID-19 VACCINE (3 - Moderna risk series) 04/28/2021    ANNUAL REVIEW OF HM ORDERS  10/03/2024    DTAP/TDAP/TD VACCINE (2 - Td or Tdap) 05/26/2025    INFLUENZA VACCINE (1) 09/01/2025    MAMMO SCREENING  02/18/2026    BMP  07/18/2026    MEDICARE ANNUAL WELLNESS VISIT  07/21/2026    LIPID  07/21/2026    FALL RISK ASSESSMENT  07/21/2026    COLORECTAL CANCER SCREENING  08/22/2027    ADVANCE CARE PLANNING  12/19/2027    DIABETES SCREENING  07/18/2028    DEXA  02/01/2038    HEPATITIS C SCREENING  Completed    PHQ-2 (once per calendar year)  Completed    PNEUMOCOCCAL VACCINE 50+ YEARS  Completed    ZOSTER VACCINE  Completed    HPV VACCINE  Aged  "Out    MENINGITIS VACCINE  Aged Out         Review of Systems  Constitutional, HEENT, cardiovascular, pulmonary, gi and gu systems are negative, except as otherwise noted.     Objective    Exam  /80 (BP Location: Left arm, Patient Position: Sitting, Cuff Size: Adult Large)   Pulse 74   Temp 99.3  F (37.4  C) (Tympanic)   Resp 20   Ht 1.613 m (5' 3.5\")   Wt 121 kg (266 lb 11.2 oz)   LMP 05/29/2006 (Approximate)   SpO2 98%   BMI 46.50 kg/m     Estimated body mass index is 46.5 kg/m  as calculated from the following:    Height as of this encounter: 1.613 m (5' 3.5\").    Weight as of this encounter: 121 kg (266 lb 11.2 oz).    Physical Exam  GENERAL: alert and no distress  EYES: Eyes grossly normal to inspection, PERRL and conjunctivae and sclerae normal  HENT: ear canals and TM's normal, nose and mouth without ulcers or lesions  NECK: no adenopathy, no asymmetry, masses, or scars  RESP: lungs clear to auscultation - no rales, rhonchi or wheezes  CV: regular rate and rhythm, normal S1 S2, no S3 or S4, no murmur, click or rub, no peripheral edema  ABDOMEN: soft, nontender, no hepatosplenomegaly, no masses and bowel sounds normal  MS: no gross musculoskeletal defects noted, no edema        7/21/2025   Mini Cog   Clock Draw Score 2 Normal   3 Item Recall 2 objects recalled   Mini Cog Total Score 4              Signed Electronically by: Jennie Suazo MD    "

## 2025-07-21 NOTE — TELEPHONE ENCOUNTER
Prolia injection was received today and was placed in refrigerator for RN.    Routing to RN pool.    Reyna Mojica MA

## 2025-07-21 NOTE — PATIENT INSTRUCTIONS
Patient Education   Preventive Care Advice   This is general advice given by our system to help you stay healthy. However, your care team may have specific advice just for you. Please talk to your care team about your preventive care needs.  Nutrition  Eat 5 or more servings of fruits and vegetables each day.  Try wheat bread, brown rice and whole grain pasta (instead of white bread, rice, and pasta).  Get enough calcium and vitamin D. Check the label on foods and aim for 100% of the RDA (recommended daily allowance).  Lifestyle  Exercise at least 150 minutes each week  (30 minutes a day, 5 days a week).  Do muscle strengthening activities 2 days a week. These help control your weight and prevent disease.  No smoking.  Wear sunscreen to prevent skin cancer.  Have a dental exam and cleaning every 6 months.  Yearly exams  See your health care team every year to talk about:  Any changes in your health.  Any medicines your care team has prescribed.  Preventive care, family planning, and ways to prevent chronic diseases.  Shots (vaccines)   HPV shots (up to age 26), if you've never had them before.  Hepatitis B shots (up to age 59), if you've never had them before.  COVID-19 shot: Get this shot when it's due.  Flu shot: Get a flu shot every year.  Tetanus shot: Get a tetanus shot every 10 years.  Pneumococcal, hepatitis A, and RSV shots: Ask your care team if you need these based on your risk.  Shingles shot (for age 50 and up)  General health tests  Diabetes screening:  Starting at age 35, Get screened for diabetes at least every 3 years.  If you are younger than age 35, ask your care team if you should be screened for diabetes.  Cholesterol test: At age 39, start having a cholesterol test every 5 years, or more often if advised.  Bone density scan (DEXA): At age 50, ask your care team if you should have this scan for osteoporosis (brittle bones).  Hepatitis C: Get tested at least once in your life.  STIs (sexually  transmitted infections)  Before age 24: Ask your care team if you should be screened for STIs.  After age 24: Get screened for STIs if you're at risk. You are at risk for STIs (including HIV) if:  You are sexually active with more than one person.  You don't use condoms every time.  You or a partner was diagnosed with a sexually transmitted infection.  If you are at risk for HIV, ask about PrEP medicine to prevent HIV.  Get tested for HIV at least once in your life, whether you are at risk for HIV or not.  Cancer screening tests  Cervical cancer screening: If you have a cervix, begin getting regular cervical cancer screening tests starting at age 21.  Breast cancer scan (mammogram): If you've ever had breasts, begin having regular mammograms starting at age 40. This is a scan to check for breast cancer.  Colon cancer screening: It is important to start screening for colon cancer at age 45.  Have a colonoscopy test every 10 years (or more often if you're at risk) Or, ask your provider about stool tests like a FIT test every year or Cologuard test every 3 years.  To learn more about your testing options, visit:   .  For help making a decision, visit:   https://bit.ly/an48976.  Prostate cancer screening test: If you have a prostate, ask your care team if a prostate cancer screening test (PSA) at age 55 is right for you.  Lung cancer screening: If you are a current or former smoker ages 50 to 80, ask your care team if ongoing lung cancer screenings are right for you.  For informational purposes only. Not to replace the advice of your health care provider. Copyright   2023 [facility]. All rights reserved. Clinically reviewed by the [facility] Transitions Program. [employer] acquired by [employer] 469442 - REV 01/24.  Learning About Activities of Daily Living  What are activities of daily living?     Activities of daily living (ADLs) are the basic self-care tasks you do every day. These include eating, bathing, dressing,  and moving around.  As you age, and if you have health problems, you may find that it's harder to do some of these tasks. If so, your doctor can suggest ideas that may help.  To measure what kind of help you may need, your doctor will ask how well you are able to do ADLs. Let your doctor know if there are any tasks that you are having trouble doing. This is an important first step to getting help. And when you have the help you need, you can stay as independent as possible.  How will a doctor assess your ADLs?  Asking about ADLs is part of a routine health checkup your doctor will likely do as you age. Your health check might be done in a doctor's office, in your home, or at a hospital. The goal is to find out if you are having any problems that could make it hard to care for yourself or that make it unsafe for you to be on your own.  To measure your ADLs, your doctor will ask how hard it is for you to do routine tasks. Your doctor may also want to know if you have changed the way you do a task because of a health problem. Your doctor may watch how you:  Walk back and forth.  Keep your balance while you stand or walk.  Move from sitting to standing or from a bed to a chair.  Button or unbutton a shirt or sweater.  Remove and put on your shoes.  It's common to feel a little worried or anxious if you find you can't do all the things you used to be able to do. Talking with your doctor about ADLs is a way to make sure you're as safe as possible and able to care for yourself as well as you can. You may want to bring a caregiver, friend, or family member to your checkup. They can help you talk to your doctor.  Follow-up care is a key part of your treatment and safety. Be sure to make and go to all appointments, and call your doctor if you are having problems. It's also a good idea to know your test results and keep a list of the medicines you take.  Current as of: October 24, 2024  Content Version: 14.5    6114-4421  [employer].   Care instructions adapted under license by your healthcare professional. If you have questions about a medical condition or this instruction, always ask your healthcare professional. [employer] disclaims any warranty or liability for your use of this information.    Preventing Falls: Care Instructions  Injuries and health problems such as trouble walking or poor eyesight can increase your risk of falling. So can some medicines. But there are things you can do to help prevent falls. You can exercise to get stronger. You can also arrange your home to make it safer.    Talk to your doctor about the medicines you take. Ask if any of them increase the risk of falls and whether they can be changed or stopped.   Try to exercise regularly. It can help improve your strength and balance. This can help lower your risk of falling.         Practice fall safety and prevention.   Wear low-heeled shoes that fit well and give your feet good support. Talk to your doctor if you have foot problems that make this hard.  Carry a cellphone or wear a medical alert device that you can use to call for help.  Use stepladders instead of chairs to reach high objects. Don't climb if you're at risk for falls. Ask for help, if needed.  Wear the correct eyeglasses, if you need them.        Make your home safer.   Remove rugs, cords, clutter, and furniture from walkways.  Keep your house well lit. Use night-lights in hallways and bathrooms.  Install and use sturdy handrails on stairways.  Wear nonskid footwear, even inside. Don't walk barefoot or in socks without shoes.        Be safe outside.   Use handrails, curb cuts, and ramps whenever possible.  Keep your hands free by using a shoulder bag or backpack.  Try to walk in well-lit areas. Watch out for uneven ground, changes in pavement, and debris.  Be careful in the winter. Walk on the grass or gravel when sidewalks are slippery. Use de-icer on steps and  "walkways. Add non-slip devices to shoes.    Put grab bars and nonskid mats in your shower or tub and near the toilet. Try to use a shower chair or bath bench when bathing.   Get into a tub or shower by putting in your weaker leg first. Get out with your strong side first. Have a phone or medical alert device in the bathroom with you.   Where can you learn more?  Go to https://www.Meteor.Vook/patiented  Enter G117 in the search box to learn more about \"Preventing Falls: Care Instructions.\"  Current as of: July 31, 2024  Content Version: 14.5 2024-2025 [employer].   Care instructions adapted under license by your healthcare professional. If you have questions about a medical condition or this instruction, always ask your healthcare professional. [employer] disclaims any warranty or liability for your use of this information.    Relationships for Good Health  Relationships are important for our health and happiness. Social isolation, loneliness and lack of support are bad for your health. Studies show that loneliness can harm health and limit your life span as much as high blood pressure and smoking.   Take some time to reflect on your relationships. Then answer these questions:  Are there people in your life that cause you stress or drain your energy? What can you do to set limits?  ________________________________________________________________________________________________________________________________________________________________________________________________________________________________________________________________________________________________________________________________________________  Who do you enjoy spending time with? Who can you go to for " support?  ________________________________________________________________________________________________________________________________________________________________________________________________________________________________________________________________________________________________________________________________________________  What can you do to improve your relationships with others?  __________________________________________________________________________________________________________________________________________________________________________________________________________________  ______________________________________________________________________________________________________________________________  What do you like most about your relationships with others?  ________________________________________________________________________________________________________________________________________________________________________________________________________________________________________________________________________________________________________________________________________________  My goal: ______________________________________________________________________  I will: ______________________________________________________________________________________________________________________________________________________________________________________________    For informational purposes only. Not to replace the advice of your health care provider. Copyright   2018 [facility]. All rights reserved. Clinically reviewed by [facility]ric Health  Team. [employer] 509546 - Rev 06/24.  Bladder Training: Care Instructions  Your Care Instructions     Bladder training is used to treat urge incontinence and stress incontinence. Urge incontinence means that the need to urinate comes on so fast that you can't get to a toilet in time. Stress incontinence means that you  leak urine because of pressure on your bladder. For example, it may happen when you laugh, cough, or lift something heavy.  Bladder training can increase how long you can wait before you have to urinate. It can also help your bladder hold more urine. And it can give you better control over the urge to urinate.  It is important to remember that bladder training takes a few weeks to a few months to make a difference. You may not see results right away, but don't give up.  Follow-up care is a key part of your treatment and safety. Be sure to make and go to all appointments, and call your doctor if you are having problems. It's also a good idea to know your test results and keep a list of the medicines you take.  How can you care for yourself at home?  Work with your doctor to come up with a bladder training program that is right for you. You may use one or more of the following methods.  Delayed urination  In the beginning, try to keep from urinating for 5 minutes after you first feel the need to go.  While you wait, take deep, slow breaths to relax. Kegel exercises can also help you delay the need to go to the bathroom.  After some practice, when you can easily wait 5 minutes to urinate, try to wait 10 minutes before you urinate.  Slowly increase the waiting period until you are able to control when you have to urinate.  Scheduled urination  Empty your bladder when you first wake up in the morning.  Schedule times throughout the day when you will urinate.  Start by going to the bathroom every hour, even if you don't need to go.  Slowly increase the time between trips to the bathroom.  When you have found a schedule that works well for you, keep doing it.  If you wake up during the night and have to urinate, do it. Apply your schedule to waking hours only.  Kegel exercises  These tighten and strengthen pelvic muscles, which can help you control the flow of urine. (If doing these exercises causes pain, stop doing them  "and talk with your doctor.) To do Kegel exercises:  Squeeze your muscles as if you were trying not to pass gas. Or squeeze your muscles as if you were stopping the flow of urine. Your belly, legs, and buttocks shouldn't move.  Hold the squeeze for 3 seconds, then relax for 5 to 10 seconds.  Start with 3 seconds, then add 1 second each week until you are able to squeeze for 10 seconds.  Repeat the exercise 10 times a session. Do 3 to 8 sessions a day.  When should you call for help?  Watch closely for changes in your health, and be sure to contact your doctor if:    Your incontinence is getting worse.     You do not get better as expected.   Where can you learn more?  Go to https://www.[employer].net/patiented  Enter V684 in the search box to learn more about \"Bladder Training: Care Instructions.\"  Current as of: April 30, 2024  Content Version: 14.5    7902-6415 [employer].   Care instructions adapted under license by your healthcare professional. If you have questions about a medical condition or this instruction, always ask your healthcare professional. [employer] disclaims any warranty or liability for your use of this information.       "

## 2025-07-21 NOTE — PATIENT INSTRUCTIONS
After Visit Instructions:     Thank you for coming to [facility] for your care. It is my goal to partner with you to help you reach your optimal state of health.       Plan:     Schedule follow-up with Danni Martínez PA-C in 3 months.  Labs: CBC, creatinine, Albumin, AST, ALT, CRP and Sed Rate in 3 months  Medication recommendations:   Continue Orencia 125mg/mL SubQ weekly    Danni Martínez PA-C  [facility]  [facility]    Contact information: [facility]  Clinic Number:  [phone]  Please call or send a [employer] message with any questions about your care

## 2025-07-23 NOTE — TELEPHONE ENCOUNTER
Left detailed message on patient's voicemail that lab work is acceptable and Prolia has been ordered and received in the clinic.  Will plan to administer on 8/19 as arranged.    The voicemail prompt identified patient as the  of this message.  Tamela Chand RN

## 2025-07-25 PROBLEM — M47.816 LUMBAR SPONDYLOSIS: Status: ACTIVE | Noted: 2025-07-25

## 2025-07-25 PROBLEM — M47.16 LUMBAR SPONDYLOSIS WITH MYELOPATHY: Status: ACTIVE | Noted: 2025-07-25

## 2025-07-28 ENCOUNTER — TELEPHONE (OUTPATIENT)
Dept: GENERAL RADIOLOGY | Facility: OTHER | Age: 70
End: 2025-07-28
Payer: COMMERCIAL

## 2025-08-19 ENCOUNTER — ALLIED HEALTH/NURSE VISIT (OUTPATIENT)
Dept: FAMILY MEDICINE | Facility: CLINIC | Age: 70
End: 2025-08-19
Payer: COMMERCIAL

## 2025-08-19 DIAGNOSIS — M81.0 OSTEOPOROSIS, UNSPECIFIED OSTEOPOROSIS TYPE, UNSPECIFIED PATHOLOGICAL FRACTURE PRESENCE: Primary | ICD-10-CM

## 2025-08-19 PROCEDURE — 99207 PR NO CHARGE NURSE ONLY: CPT

## 2025-08-19 PROCEDURE — 96372 THER/PROPH/DIAG INJ SC/IM: CPT | Performed by: FAMILY MEDICINE

## 2025-08-21 ENCOUNTER — HOSPITAL ENCOUNTER (EMERGENCY)
Facility: CLINIC | Age: 70
Discharge: HOME | End: 2025-08-21
Attending: PHYSICIAN ASSISTANT | Admitting: PHYSICIAN ASSISTANT
Payer: COMMERCIAL

## 2025-08-21 VITALS
DIASTOLIC BLOOD PRESSURE: 93 MMHG | TEMPERATURE: 98.4 F | HEART RATE: 73 BPM | OXYGEN SATURATION: 99 % | RESPIRATION RATE: 16 BRPM | SYSTOLIC BLOOD PRESSURE: 149 MMHG

## 2025-08-21 DIAGNOSIS — H57.89 EYE DRAINAGE: Primary | ICD-10-CM

## 2025-08-21 DIAGNOSIS — L53.9 ERYTHEMA OF SKIN OF EYELID: ICD-10-CM

## 2025-08-21 PROCEDURE — 99213 OFFICE O/P EST LOW 20 MIN: CPT | Performed by: PHYSICIAN ASSISTANT

## 2025-08-21 PROCEDURE — G0463 HOSPITAL OUTPT CLINIC VISIT: HCPCS | Performed by: PHYSICIAN ASSISTANT

## 2025-08-21 RX ORDER — ERYTHROMYCIN 5 MG/G
0.5 OINTMENT OPHTHALMIC 4 TIMES DAILY
Qty: 3.5 G | Refills: 0 | Status: SHIPPED | OUTPATIENT
Start: 2025-08-21 | End: 2025-08-28

## 2025-08-21 ASSESSMENT — ACTIVITIES OF DAILY LIVING (ADL): ADLS_ACUITY_SCORE: 41

## 2025-08-21 ASSESSMENT — ENCOUNTER SYMPTOMS
EYE DISCHARGE: 1
CONSTITUTIONAL NEGATIVE: 1

## 2025-09-02 ENCOUNTER — TELEPHONE (OUTPATIENT)
Dept: PHARMACY | Facility: OTHER | Age: 70
End: 2025-09-02
Payer: COMMERCIAL

## (undated) DEVICE — GLOVE PROTEXIS BLUE W/NEU-THERA 7.0  2D73EB70

## (undated) DEVICE — SOL WATER IRRIG 1000ML BOTTLE 07139-09

## (undated) DEVICE — SPONGE LAP 18X18" 1515

## (undated) DEVICE — ADHESIVE SWIFTSET 0.8ML OCTYL SS6

## (undated) DEVICE — SOL NACL 0.9% IRRIG 1000ML BOTTLE 07138-09

## (undated) DEVICE — GLOVE PROTEXIS W/NEU-THERA 8.5  2D73TE85

## (undated) DEVICE — PACK SHOULDER

## (undated) DEVICE — SU VICRYL 2-0 CT-1 36" UND J945H

## (undated) DEVICE — GLOVE PROTEXIS W/NEU-THERA 7.5  2D73TE75

## (undated) DEVICE — Device

## (undated) DEVICE — GOWN LG DISP 9515

## (undated) DEVICE — PREP DURAPREP 26ML APL 8630

## (undated) DEVICE — BLADE KNIFE SURG 10 371110

## (undated) DEVICE — SOL NACL 0.9% IRRIG 1000ML BOTTLE 2F7124

## (undated) DEVICE — DRSG GAUZE 4X4" 3033

## (undated) DEVICE — DRAPE ARTHROSCOPY SHOULDER BEACHCHAIR 29369

## (undated) DEVICE — SU VICRYL 1 CT-1 36" UND J947H

## (undated) DEVICE — GLOVE PROTEXIS W/NEU-THERA 8.0  2D73TE80

## (undated) DEVICE — BLADE SAW OSCILLATING STRYK MED 9.0X25X0.38MM 2296-003-111

## (undated) DEVICE — SUCTION TIP POOLE K770

## (undated) DEVICE — SU STRATAFIX 3-0 MH 12" PS-2 SXMD1B103

## (undated) DEVICE — GLOVE PROTEXIS W/NEU-THERA 6.5  2D73TE65

## (undated) DEVICE — SU TIGERTAPE 2MMX7"  AR-7237-7T

## (undated) DEVICE — NDL ARTHREX MULTIFIRE/FASTPASS SCORPION AR-13995N

## (undated) DEVICE — TAPE CLOTH ADHESIVE 3" ZONAS

## (undated) DEVICE — SU VICRYL 1 CTB-1 36" UND JB947

## (undated) DEVICE — GOWN IMPERVIOUS SPECIALTY XLG/XLONG 32474

## (undated) DEVICE — DRSG ABDOMINAL 07 1/2X8" 7197D

## (undated) RX ORDER — LIDOCAINE HYDROCHLORIDE 10 MG/ML
INJECTION, SOLUTION EPIDURAL; INFILTRATION; INTRACAUDAL; PERINEURAL
Status: DISPENSED
Start: 2025-07-14

## (undated) RX ORDER — FENTANYL CITRATE 50 UG/ML
INJECTION, SOLUTION INTRAMUSCULAR; INTRAVENOUS
Status: DISPENSED
Start: 2018-04-27

## (undated) RX ORDER — ONDANSETRON 2 MG/ML
INJECTION INTRAMUSCULAR; INTRAVENOUS
Status: DISPENSED
Start: 2017-12-18

## (undated) RX ORDER — EPHEDRINE SULFATE 50 MG/ML
INJECTION, SOLUTION INTRAVENOUS
Status: DISPENSED
Start: 2017-08-25

## (undated) RX ORDER — KETOROLAC TROMETHAMINE 30 MG/ML
INJECTION, SOLUTION INTRAMUSCULAR; INTRAVENOUS
Status: DISPENSED
Start: 2018-04-27

## (undated) RX ORDER — LIDOCAINE HCL/EPINEPHRINE/PF 2%-1:200K
VIAL (ML) INJECTION
Status: DISPENSED
Start: 2017-08-25

## (undated) RX ORDER — DEXAMETHASONE SODIUM PHOSPHATE 10 MG/ML
INJECTION, SOLUTION INTRAMUSCULAR; INTRAVENOUS
Status: DISPENSED
Start: 2025-07-14

## (undated) RX ORDER — ACETAMINOPHEN 325 MG/1
TABLET ORAL
Status: DISPENSED
Start: 2017-08-25

## (undated) RX ORDER — LIDOCAINE HYDROCHLORIDE 10 MG/ML
INJECTION, SOLUTION EPIDURAL; INFILTRATION; INTRACAUDAL; PERINEURAL
Status: DISPENSED
Start: 2018-04-27

## (undated) RX ORDER — DEXAMETHASONE SODIUM PHOSPHATE 4 MG/ML
INJECTION, SOLUTION INTRA-ARTICULAR; INTRALESIONAL; INTRAMUSCULAR; INTRAVENOUS; SOFT TISSUE
Status: DISPENSED
Start: 2017-12-18

## (undated) RX ORDER — LIDOCAINE HYDROCHLORIDE 10 MG/ML
INJECTION, SOLUTION EPIDURAL; INFILTRATION; INTRACAUDAL; PERINEURAL
Status: DISPENSED
Start: 2017-11-24

## (undated) RX ORDER — ROPIVACAINE HYDROCHLORIDE 5 MG/ML
INJECTION, SOLUTION EPIDURAL; INFILTRATION; PERINEURAL
Status: DISPENSED
Start: 2017-08-25

## (undated) RX ORDER — FENTANYL CITRATE 50 UG/ML
INJECTION, SOLUTION INTRAMUSCULAR; INTRAVENOUS
Status: DISPENSED
Start: 2017-12-18

## (undated) RX ORDER — LIDOCAINE HYDROCHLORIDE AND EPINEPHRINE 15; 5 MG/ML; UG/ML
INJECTION, SOLUTION EPIDURAL
Status: DISPENSED
Start: 2018-04-27

## (undated) RX ORDER — ROPIVACAINE HYDROCHLORIDE 7.5 MG/ML
INJECTION, SOLUTION EPIDURAL; PERINEURAL
Status: DISPENSED
Start: 2018-04-27

## (undated) RX ORDER — LIDOCAINE HYDROCHLORIDE 10 MG/ML
INJECTION, SOLUTION EPIDURAL; INFILTRATION; INTRACAUDAL; PERINEURAL
Status: DISPENSED
Start: 2017-08-25

## (undated) RX ORDER — PROPOFOL 10 MG/ML
INJECTION, EMULSION INTRAVENOUS
Status: DISPENSED
Start: 2017-08-25

## (undated) RX ORDER — DEXAMETHASONE SODIUM PHOSPHATE 4 MG/ML
INJECTION, SOLUTION INTRA-ARTICULAR; INTRALESIONAL; INTRAMUSCULAR; INTRAVENOUS; SOFT TISSUE
Status: DISPENSED
Start: 2018-04-27

## (undated) RX ORDER — LIDOCAINE HYDROCHLORIDE 10 MG/ML
INJECTION, SOLUTION INFILTRATION; PERINEURAL
Status: DISPENSED
Start: 2025-07-14

## (undated) RX ORDER — ONDANSETRON 2 MG/ML
INJECTION INTRAMUSCULAR; INTRAVENOUS
Status: DISPENSED
Start: 2017-08-25

## (undated) RX ORDER — ONDANSETRON 2 MG/ML
INJECTION INTRAMUSCULAR; INTRAVENOUS
Status: DISPENSED
Start: 2018-04-27

## (undated) RX ORDER — EPHEDRINE SULFATE 50 MG/ML
INJECTION, SOLUTION INTRAVENOUS
Status: DISPENSED
Start: 2017-12-18

## (undated) RX ORDER — ROPIVACAINE HYDROCHLORIDE 7.5 MG/ML
INJECTION, SOLUTION EPIDURAL; PERINEURAL
Status: DISPENSED
Start: 2017-12-18

## (undated) RX ORDER — GLYCOPYRROLATE 0.2 MG/ML
INJECTION, SOLUTION INTRAMUSCULAR; INTRAVENOUS
Status: DISPENSED
Start: 2018-04-27

## (undated) RX ORDER — FENTANYL CITRATE 50 UG/ML
INJECTION, SOLUTION INTRAMUSCULAR; INTRAVENOUS
Status: DISPENSED
Start: 2017-08-25

## (undated) RX ORDER — DEXAMETHASONE SODIUM PHOSPHATE 4 MG/ML
INJECTION, SOLUTION INTRA-ARTICULAR; INTRALESIONAL; INTRAMUSCULAR; INTRAVENOUS; SOFT TISSUE
Status: DISPENSED
Start: 2017-08-25

## (undated) RX ORDER — PHENYLEPHRINE HCL IN 0.9% NACL 1 MG/10 ML
SYRINGE (ML) INTRAVENOUS
Status: DISPENSED
Start: 2017-12-18

## (undated) RX ORDER — PROPOFOL 10 MG/ML
INJECTION, EMULSION INTRAVENOUS
Status: DISPENSED
Start: 2017-12-18

## (undated) RX ORDER — PROPOFOL 10 MG/ML
INJECTION, EMULSION INTRAVENOUS
Status: DISPENSED
Start: 2018-04-27

## (undated) RX ORDER — LIDOCAINE HYDROCHLORIDE 10 MG/ML
INJECTION, SOLUTION EPIDURAL; INFILTRATION; INTRACAUDAL; PERINEURAL
Status: DISPENSED
Start: 2017-12-18